# Patient Record
Sex: MALE | Race: WHITE | NOT HISPANIC OR LATINO | Employment: OTHER | URBAN - METROPOLITAN AREA
[De-identification: names, ages, dates, MRNs, and addresses within clinical notes are randomized per-mention and may not be internally consistent; named-entity substitution may affect disease eponyms.]

---

## 2017-01-03 ENCOUNTER — TELEPHONE (OUTPATIENT)
Dept: DERMATOLOGY | Facility: CLINIC | Age: 82
End: 2017-01-03

## 2017-01-03 RX ORDER — TESTOSTERONE CYPIONATE 200 MG/ML
INJECTION, SOLUTION INTRAMUSCULAR
Qty: 6 ML | Refills: 1 | Status: SHIPPED | OUTPATIENT
Start: 2017-01-03 | End: 2018-08-28 | Stop reason: SDUPTHER

## 2017-01-03 NOTE — TELEPHONE ENCOUNTER
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 I called him today and he stated that his arm has stopped bleeding and it is now in a sling with no pain and minimal swelling. Pt continues to take Keflex. Pt is coming in tomorrow for bandage change.

## 2017-01-04 ENCOUNTER — PATIENT MESSAGE (OUTPATIENT)
Dept: HEMATOLOGY/ONCOLOGY | Facility: CLINIC | Age: 82
End: 2017-01-04

## 2017-01-04 ENCOUNTER — OFFICE VISIT (OUTPATIENT)
Dept: DERMATOLOGY | Facility: CLINIC | Age: 82
End: 2017-01-04
Payer: MEDICARE

## 2017-01-04 ENCOUNTER — PROCEDURE VISIT (OUTPATIENT)
Dept: OPHTHALMOLOGY | Facility: CLINIC | Age: 82
End: 2017-01-04
Payer: MEDICARE

## 2017-01-04 VITALS — DIASTOLIC BLOOD PRESSURE: 64 MMHG | HEART RATE: 63 BPM | SYSTOLIC BLOOD PRESSURE: 144 MMHG

## 2017-01-04 DIAGNOSIS — Z09 POSTOP CHECK: Primary | ICD-10-CM

## 2017-01-04 DIAGNOSIS — H02.9 LESION OF UPPER EYELID: ICD-10-CM

## 2017-01-04 DIAGNOSIS — H00.19 CHALAZION, UNSPECIFIED LATERALITY: Primary | ICD-10-CM

## 2017-01-04 PROCEDURE — 99999 PR PBB SHADOW E&M-EST. PATIENT-LVL III: CPT | Mod: PBBFAC,,, | Performed by: DERMATOLOGY

## 2017-01-04 PROCEDURE — 92285 EXTERNAL OCULAR PHOTOGRAPHY: CPT | Mod: S$GLB,,, | Performed by: OPHTHALMOLOGY

## 2017-01-04 PROCEDURE — 88305 TISSUE EXAM BY PATHOLOGIST: CPT | Mod: 26,,, | Performed by: PATHOLOGY

## 2017-01-04 PROCEDURE — 67840 REMOVE EYELID LESION: CPT | Mod: E3,S$GLB,, | Performed by: OPHTHALMOLOGY

## 2017-01-04 PROCEDURE — 99024 POSTOP FOLLOW-UP VISIT: CPT | Mod: S$GLB,,, | Performed by: DERMATOLOGY

## 2017-01-04 PROCEDURE — 11640 EXC F/E/E/N/L MAL+MRG 0.5CM<: CPT | Mod: 51,E4,S$GLB, | Performed by: OPHTHALMOLOGY

## 2017-01-04 PROCEDURE — 92012 INTRM OPH EXAM EST PATIENT: CPT | Mod: 25,S$GLB,, | Performed by: OPHTHALMOLOGY

## 2017-01-04 RX ORDER — PREDNISONE 5 MG/1
TABLET ORAL
COMMUNITY
Start: 2017-01-02 | End: 2017-02-08

## 2017-01-04 NOTE — PROGRESS NOTES
82 y.o. male patient is here for wound check after surgery.    Patient reports that on last Friday afternoon 12/30, he noticed bleeding coming from bandage. He tried calling [facility] direct line but since it was Friday afternoon, no one answered.  He said he then called the main number and was connected to triage nurse who told him to go to ER.  Pt went to [facility] ER where no active bleeding was noted and as such an additional compression bandage was applied.  No issues since then. Taking the Keflex.  Throughout all this, I received a message from the triage nurse when we returned back from the New Year's holiday on 1/3/17 and [facility] called patient yesterday who said he was fine.     WOUND PE:  The L arm incision is healing well with sutures intact. No signs or symptoms of infection.    IMPRESSION:  Healing operative site from Mohs' surgery, [facility] L arm s/p Mohs with CLC, postop day #6.    PLAN:  Demonstrated wound care today to patient.  Continue wound care.   Keep moist and with Aquaphor and Telfa.   No tape to avoid tearing skin - only kerlex/coban    RTC:  In 1 week for suture removal

## 2017-01-04 NOTE — PROGRESS NOTES
HPI     DLS 12/13/16---Dr Monsivais    Pt is here for possible excisional of RLL cyst to r/o CA per Dr Monsivais---pt   noticed cyst for about a year with no changes to size or color     S/p Moh L arm w Dr Bliss (12/29/16)    S/p Shave Bx L arm Dr Monsivais 12/13/16      Gtts None        Last edited by Alina Moore on 1/4/2017 10:52 AM.         Assessment /Plan     For exam results, see Encounter Report.    Chalazion, unspecified laterality  -     Tissue Specimen To Pathology, Ophthalmology  -     Tissue Specimen To Pathology, Ophthalmology    Lesion of upper eyelid    Other orders  -     tobramycin-dexamethasone 0.3-0.1% (TOBRADEX) 0.3-0.1 % Oint; Place into the right eye every evening. Place on incision sites on nightly  Dispense: 3.5 g; Refill: 0        1) Chalazion, OD, RLL  -- pt with extensive blepharitis, was referred here due to concern for SCC however lesion more typical of chalazion  -- will remove and send for path today     2) Skin tag, OD, RUL  -- small, will remove and send for path today    Procedure Note     Attending: Jennie Lee  Resident: Kem Cuevas  Pre-op Dx: Chalazion; skin tag  Post-op Dx: same  Local: 2% lidocaine with epinephrine with 0.9% NaHCO3 and vitrase   Specimens: right upper eyelid lesion, right lower eyelid chalazion  Complications: None  Blood Loss: minimal     The patient was prepped and draped in the usual sterile manner for ophthalmic plastic surgery. A corneal shield was placed in the right palpebral fissure. 2% lidocaine with epinephrine with 0.9% NaHCO3 was injected around the lower lid lesion. A chalazion clamp was used to clamp the lesion with open face anteriorly. An #11 blade was then used to incise the skin overlying the lesion. The contents of the lesion were then removed with katheryn scissors and sent for pathology evaluation. High-temp cautery was used to obtain hemostasis. Next the RUL lesion was removed along with some orbicularis with katheryn scissors. High-temp cautery was  then used to obtain hemostasis.The corneal shield was removed. Tobradex ointment was applied to the wounds. The patient tolerated the procedure well. There were no complications.      RTC in 2 weeks or sooner prn    Post-operative instructions were given to the patient.     I have reviewed and concur with the resident's history, physical, assessment, and plan.  I have personally interviewed and examined the patient.

## 2017-01-05 RX ORDER — SODIUM CHLORIDE 0.9 % (FLUSH) 0.9 %
10 SYRINGE (ML) INJECTION
Status: CANCELLED | OUTPATIENT
Start: 2017-01-12

## 2017-01-05 RX ORDER — HEPARIN 100 UNIT/ML
500 SYRINGE INTRAVENOUS
Status: CANCELLED | OUTPATIENT
Start: 2017-01-12

## 2017-01-05 RX ORDER — ACETAMINOPHEN 325 MG/1
650 TABLET ORAL
Status: CANCELLED | OUTPATIENT
Start: 2017-01-12

## 2017-01-05 RX ORDER — FAMOTIDINE 10 MG/ML
20 INJECTION INTRAVENOUS
Status: CANCELLED | OUTPATIENT
Start: 2017-01-12

## 2017-01-11 ENCOUNTER — OFFICE VISIT (OUTPATIENT)
Dept: DERMATOLOGY | Facility: CLINIC | Age: 82
End: 2017-01-11
Payer: MEDICARE

## 2017-01-11 DIAGNOSIS — Z09 POSTOP CHECK: Primary | ICD-10-CM

## 2017-01-11 DIAGNOSIS — C44.111 BASAL CELL CARCINOMA, EYELID, RIGHT: ICD-10-CM

## 2017-01-11 PROCEDURE — 99999 PR PBB SHADOW E&M-EST. PATIENT-LVL III: CPT | Mod: PBBFAC,,, | Performed by: DERMATOLOGY

## 2017-01-11 PROCEDURE — 99024 POSTOP FOLLOW-UP VISIT: CPT | Mod: S$GLB,,, | Performed by: DERMATOLOGY

## 2017-01-11 RX ORDER — NEOMYCIN SULFATE, POLYMYXIN B SULFATE, AND DEXAMETHASONE 3.5; 10000; 1 MG/G; [USP'U]/G; MG/G
OINTMENT OPHTHALMIC
COMMUNITY
Start: 2017-01-04 | End: 2017-08-24

## 2017-01-11 NOTE — PROGRESS NOTES
82 y.o. male patient is here for suture removal following Mohs' surgery.    Patient reports no problems to the L arm. Now with a biopsy proven BCC R lower eyelid biopsied by Kem Cuevas MD.    Physical Exam   Eyes:         R medial lower eyelid at lid margin with a 4 x 4 mm bx site located 1.2 cm laterally from the right medial canthus and 2.5 cm medially from the right lateral canthus.  L arm sutures intact.  Good skin edges. No signs or symptoms of infection.  Small dehiscence centrally.    IMPRESSION/PLAN:  Biopsy proven infiltrative basal cell carcinoma, R lower eyelid, path # [id number].  Will schedule Mohs and coordinate subsequent reconstruction with Dr. Lee in Oculoplastics.    Healing operative site from Mohs' surgery, SCC L arm s/p Mohs with CLC, postop day #14.  Sutures removed today.   Continue wound care.  Keep moist with Aquaphor and covered with Telfa in central area of dehiscence.  Reassured pt it will heal but will take few weeks.

## 2017-01-12 ENCOUNTER — INFUSION (OUTPATIENT)
Dept: INFUSION THERAPY | Facility: HOSPITAL | Age: 82
End: 2017-01-12
Attending: INTERNAL MEDICINE
Payer: MEDICARE

## 2017-01-12 VITALS
DIASTOLIC BLOOD PRESSURE: 65 MMHG | HEART RATE: 60 BPM | RESPIRATION RATE: 18 BRPM | SYSTOLIC BLOOD PRESSURE: 142 MMHG | TEMPERATURE: 98 F

## 2017-01-12 DIAGNOSIS — D80.1 HYPOGAMMAGLOBULINEMIA: Primary | ICD-10-CM

## 2017-01-12 DIAGNOSIS — N18.2 CKD (CHRONIC KIDNEY DISEASE), STAGE 2 (MILD): ICD-10-CM

## 2017-01-12 DIAGNOSIS — J32.9 CHRONIC SINUSITIS, UNSPECIFIED LOCATION: ICD-10-CM

## 2017-01-12 DIAGNOSIS — E79.0 HYPERURICEMIA: ICD-10-CM

## 2017-01-12 DIAGNOSIS — C91.10 CLL (CHRONIC LYMPHOCYTIC LEUKEMIA): ICD-10-CM

## 2017-01-12 PROCEDURE — 63600175 PHARM REV CODE 636 W HCPCS: Performed by: INTERNAL MEDICINE

## 2017-01-12 PROCEDURE — 96365 THER/PROPH/DIAG IV INF INIT: CPT

## 2017-01-12 PROCEDURE — 96375 TX/PRO/DX INJ NEW DRUG ADDON: CPT

## 2017-01-12 PROCEDURE — 25000003 PHARM REV CODE 250: Performed by: INTERNAL MEDICINE

## 2017-01-12 PROCEDURE — 96366 THER/PROPH/DIAG IV INF ADDON: CPT

## 2017-01-12 PROCEDURE — 96367 TX/PROPH/DG ADDL SEQ IV INF: CPT

## 2017-01-12 RX ORDER — ACETAMINOPHEN 325 MG/1
650 TABLET ORAL
Status: COMPLETED | OUTPATIENT
Start: 2017-01-12 | End: 2017-01-12

## 2017-01-12 RX ORDER — FAMOTIDINE 10 MG/ML
20 INJECTION INTRAVENOUS
Status: COMPLETED | OUTPATIENT
Start: 2017-01-12 | End: 2017-01-12

## 2017-01-12 RX ORDER — SODIUM CHLORIDE 0.9 % (FLUSH) 0.9 %
10 SYRINGE (ML) INJECTION
Status: DISCONTINUED | OUTPATIENT
Start: 2017-01-12 | End: 2017-01-12 | Stop reason: HOSPADM

## 2017-01-12 RX ORDER — HEPARIN 100 UNIT/ML
500 SYRINGE INTRAVENOUS
Status: DISCONTINUED | OUTPATIENT
Start: 2017-01-12 | End: 2017-01-12 | Stop reason: HOSPADM

## 2017-01-12 RX ORDER — CLONIDINE HYDROCHLORIDE 0.1 MG/1
0.1 TABLET ORAL
Status: DISCONTINUED | OUTPATIENT
Start: 2017-01-12 | End: 2017-01-12 | Stop reason: HOSPADM

## 2017-01-12 RX ADMIN — DIPHENHYDRAMINE HYDROCHLORIDE 50 MG: 50 INJECTION, SOLUTION INTRAMUSCULAR; INTRAVENOUS at 10:01

## 2017-01-12 RX ADMIN — ACETAMINOPHEN 650 MG: 325 TABLET ORAL at 10:01

## 2017-01-12 RX ADMIN — FAMOTIDINE 20 MG: 10 INJECTION INTRAVENOUS at 10:01

## 2017-01-12 RX ADMIN — HUMAN IMMUNOGLOBULIN G 20 G: 20 LIQUID INTRAVENOUS at 10:01

## 2017-01-12 NOTE — PLAN OF CARE
Problem: Chemotherapy Effects (Adult)  Goal: Signs and Symptoms of Listed Potential Problems Will be Absent or Manageable (Chemotherapy Effects)  Signs and symptoms of listed potential problems will be absent or manageable by discharge/transition of care (reference Chemotherapy Effects (Adult) CPG).   Outcome: Ongoing (interventions implemented as appropriate)  Pt. Here today for monthly IVIG. Will see  On 1/25. In between surgeries to have cancer removed. Reports good energy. Overall denies new symptoms or worsening side effects of treatments. No questions at this time. Peripheral IV started in back of R. Forearm. Good Blood return, infusing without difficulty.

## 2017-01-12 NOTE — PLAN OF CARE
Problem: Patient Care Overview (Adult)  Goal: Plan of Care Review  Outcome: Ongoing (interventions implemented as appropriate)  Pt. Tolerated infusion without complication. Denies any negative side effects post infusion. IV flushed with NS and removed. No questions at this time. Will RTC 1/25/16.

## 2017-01-13 ENCOUNTER — TELEPHONE (OUTPATIENT)
Dept: DERMATOLOGY | Facility: CLINIC | Age: 82
End: 2017-01-13

## 2017-01-13 NOTE — TELEPHONE ENCOUNTER
Pt with bx proven BCC on R lower eyelid, pt is tentatively scheduled for mohs and repair with  on 3/2/17 at 800 am. Spoke with Betty and she said she would let us know if anything opens up sooner.

## 2017-01-16 ENCOUNTER — TELEPHONE (OUTPATIENT)
Dept: DERMATOLOGY | Facility: CLINIC | Age: 82
End: 2017-01-16

## 2017-01-24 ENCOUNTER — OFFICE VISIT (OUTPATIENT)
Dept: OPHTHALMOLOGY | Facility: CLINIC | Age: 82
End: 2017-01-24
Payer: MEDICARE

## 2017-01-24 DIAGNOSIS — C44.111 BASAL CELL CARCINOMA OF EYELID, RIGHT: Primary | ICD-10-CM

## 2017-01-24 PROCEDURE — 99999 PR PBB SHADOW E&M-EST. PATIENT-LVL III: CPT | Mod: PBBFAC,,, | Performed by: OPHTHALMOLOGY

## 2017-01-24 PROCEDURE — 92012 INTRM OPH EXAM EST PATIENT: CPT | Mod: S$GLB,,, | Performed by: OPHTHALMOLOGY

## 2017-01-24 NOTE — PROGRESS NOTES
Assessment /Plan     For exam results, see Encounter Report.    Basal cell carcinoma of eyelid, right      1) BCC of right lower lid  -- per pathology report from biopsy on 1/4/17  -- pt seen by Dr. Bliss, plan for Moh's surgery, will need reconstruction given location.  -- discussed r/b/a to repair with pt who wishes to proceed.    Informed consent obtained after extensive risks/benefits/alternatives were discussed with the patient including but not limited to pain, bleeding, infection, ocular injury, loss of the eye, asymmetry, need for revision in future, scarring, tearing.  Alternatives such as waiting were discussed.  All questions were answered.      Hold ASA, NSAIDS, and fish oil 5 to 7 days prior to procedure.    Return for surgery      I have reviewed and concur with the resident's history, physical, assessment, and plan.  I have personally interviewed and examined the patient.

## 2017-01-25 ENCOUNTER — OFFICE VISIT (OUTPATIENT)
Dept: HEMATOLOGY/ONCOLOGY | Facility: CLINIC | Age: 82
End: 2017-01-25
Payer: MEDICARE

## 2017-01-25 ENCOUNTER — INFUSION (OUTPATIENT)
Dept: INFUSION THERAPY | Facility: HOSPITAL | Age: 82
End: 2017-01-25
Attending: INTERNAL MEDICINE
Payer: MEDICARE

## 2017-01-25 VITALS — SYSTOLIC BLOOD PRESSURE: 146 MMHG | DIASTOLIC BLOOD PRESSURE: 65 MMHG | RESPIRATION RATE: 20 BRPM | HEART RATE: 65 BPM

## 2017-01-25 VITALS
HEIGHT: 67 IN | SYSTOLIC BLOOD PRESSURE: 122 MMHG | BODY MASS INDEX: 28.65 KG/M2 | WEIGHT: 182.56 LBS | DIASTOLIC BLOOD PRESSURE: 68 MMHG

## 2017-01-25 DIAGNOSIS — D80.1 HYPOGAMMAGLOBULINEMIA: ICD-10-CM

## 2017-01-25 DIAGNOSIS — D69.6 THROMBOCYTOPENIA: ICD-10-CM

## 2017-01-25 DIAGNOSIS — C91.10 CLL (CHRONIC LYMPHOCYTIC LEUKEMIA): ICD-10-CM

## 2017-01-25 DIAGNOSIS — C91.10 CLL (CHRONIC LYMPHOCYTIC LEUKEMIA): Primary | ICD-10-CM

## 2017-01-25 DIAGNOSIS — E79.0 HYPERURICEMIA: Primary | ICD-10-CM

## 2017-01-25 DIAGNOSIS — D84.9 IMMUNOSUPPRESSION: ICD-10-CM

## 2017-01-25 DIAGNOSIS — Z51.11 ENCOUNTER FOR ANTINEOPLASTIC CHEMOTHERAPY: ICD-10-CM

## 2017-01-25 PROCEDURE — 3074F SYST BP LT 130 MM HG: CPT | Mod: S$GLB,,, | Performed by: INTERNAL MEDICINE

## 2017-01-25 PROCEDURE — 25000003 PHARM REV CODE 250: Performed by: INTERNAL MEDICINE

## 2017-01-25 PROCEDURE — 96415 CHEMO IV INFUSION ADDL HR: CPT

## 2017-01-25 PROCEDURE — 96413 CHEMO IV INFUSION 1 HR: CPT

## 2017-01-25 PROCEDURE — 63600175 PHARM REV CODE 636 W HCPCS: Performed by: INTERNAL MEDICINE

## 2017-01-25 PROCEDURE — 3078F DIAST BP <80 MM HG: CPT | Mod: S$GLB,,, | Performed by: INTERNAL MEDICINE

## 2017-01-25 PROCEDURE — 1157F ADVNC CARE PLAN IN RCRD: CPT | Mod: S$GLB,,, | Performed by: INTERNAL MEDICINE

## 2017-01-25 PROCEDURE — 99999 PR PBB SHADOW E&M-EST. PATIENT-LVL III: CPT | Mod: PBBFAC,,, | Performed by: INTERNAL MEDICINE

## 2017-01-25 PROCEDURE — 96375 TX/PRO/DX INJ NEW DRUG ADDON: CPT

## 2017-01-25 PROCEDURE — 1160F RVW MEDS BY RX/DR IN RCRD: CPT | Mod: S$GLB,,, | Performed by: INTERNAL MEDICINE

## 2017-01-25 PROCEDURE — 99214 OFFICE O/P EST MOD 30 MIN: CPT | Mod: S$GLB,,, | Performed by: INTERNAL MEDICINE

## 2017-01-25 PROCEDURE — 99499 UNLISTED E&M SERVICE: CPT | Mod: S$GLB,,, | Performed by: INTERNAL MEDICINE

## 2017-01-25 PROCEDURE — 96367 TX/PROPH/DG ADDL SEQ IV INF: CPT

## 2017-01-25 PROCEDURE — 1126F AMNT PAIN NOTED NONE PRSNT: CPT | Mod: S$GLB,,, | Performed by: INTERNAL MEDICINE

## 2017-01-25 PROCEDURE — 1159F MED LIST DOCD IN RCRD: CPT | Mod: S$GLB,,, | Performed by: INTERNAL MEDICINE

## 2017-01-25 RX ORDER — ACETAMINOPHEN 325 MG/1
1000 TABLET ORAL
Status: CANCELLED | OUTPATIENT
Start: 2017-01-25

## 2017-01-25 RX ORDER — FAMOTIDINE 10 MG/ML
20 INJECTION INTRAVENOUS
Status: CANCELLED | OUTPATIENT
Start: 2017-01-25

## 2017-01-25 RX ORDER — HEPARIN 100 UNIT/ML
500 SYRINGE INTRAVENOUS
Status: CANCELLED | OUTPATIENT
Start: 2017-01-25

## 2017-01-25 RX ORDER — SODIUM CHLORIDE 0.9 % (FLUSH) 0.9 %
10 SYRINGE (ML) INJECTION
Status: CANCELLED | OUTPATIENT
Start: 2017-01-25

## 2017-01-25 RX ORDER — ACETAMINOPHEN 500 MG
1000 TABLET ORAL
Status: COMPLETED | OUTPATIENT
Start: 2017-01-25 | End: 2017-01-25

## 2017-01-25 RX ORDER — FAMOTIDINE 10 MG/ML
20 INJECTION INTRAVENOUS
Status: COMPLETED | OUTPATIENT
Start: 2017-01-25 | End: 2017-01-25

## 2017-01-25 RX ORDER — SODIUM CHLORIDE 0.9 % (FLUSH) 0.9 %
10 SYRINGE (ML) INJECTION
Status: DISCONTINUED | OUTPATIENT
Start: 2017-01-25 | End: 2017-01-25 | Stop reason: HOSPADM

## 2017-01-25 RX ORDER — HEPARIN 100 UNIT/ML
500 SYRINGE INTRAVENOUS
Status: DISCONTINUED | OUTPATIENT
Start: 2017-01-25 | End: 2017-01-25 | Stop reason: HOSPADM

## 2017-01-25 RX ORDER — GUAIFENESIN 600 MG/1
1200 TABLET, EXTENDED RELEASE ORAL 2 TIMES DAILY
COMMUNITY
End: 2017-08-24

## 2017-01-25 RX ADMIN — ACETAMINOPHEN 1000 MG: 500 TABLET ORAL at 09:01

## 2017-01-25 RX ADMIN — SODIUM CHLORIDE: 0.9 INJECTION, SOLUTION INTRAVENOUS at 09:01

## 2017-01-25 RX ADMIN — FAMOTIDINE 20 MG: 10 INJECTION INTRAVENOUS at 09:01

## 2017-01-25 RX ADMIN — DEXAMETHASONE SODIUM PHOSPHATE 10 MG: 4 INJECTION, SOLUTION INTRAMUSCULAR; INTRAVENOUS at 10:01

## 2017-01-25 RX ADMIN — SODIUM CHLORIDE 2000 MG: 0.9 INJECTION, SOLUTION INTRAVENOUS at 10:01

## 2017-01-25 RX ADMIN — DIPHENHYDRAMINE HYDROCHLORIDE 50 MG: 50 INJECTION, SOLUTION INTRAMUSCULAR; INTRAVENOUS at 09:01

## 2017-01-25 NOTE — PLAN OF CARE
Problem: Patient Care Overview (Adult)  Goal: Plan of Care Review  Outcome: Ongoing (interventions implemented as appropriate)  Patient tolerated Ofatumumab treatment with vital signs stable. Patient instructed to call MD with any problems . AVS given and Patient discharged home.

## 2017-01-25 NOTE — MR AVS SNAPSHOT
[facility]  [address]  [place] [address]  Phone: [phone]                  Thierry Ramso JrEder   2017 9:00 AM   Appointment    Description:  Male : 2/10/1934   Provider:  Joaquin Wooten Jr., MD   Department:  [facility]                To Do List           Future Appointments        Provider Department Dept Phone    2017 9:00 AM Joaquin Wooten Jr., MD [place] - Hematology Oncology [phone]    2017 10:30 AM [facility], CHEMO [facility] [phone]    2017 9:00 AM Karissa Monsivais MD [place] - Dermatology [phone]    3/2/2017 8:00 AM Ronel Bliss MD [facility] - Dermatology Surgery [phone]    3/2/2017 1:00 PM Jennie Lee MD [facility] - Ophthalmology [phone]      Goals (5 Years of Data)              10/26/16    9/28/15    4/2/15    HDL > 45   35  36  40    LDL CALC < 70   70.4  92.6  83.8      [facility]     [facility] Nurse Care Line -  Assistance  Registered nurses in the [facility] provide clinical advisement, health education, appointment booking, and other advisory services.  Call for this free service at [phone].             Medications           Message regarding Medications     Verify the changes and/or additions to your medication regime listed below are the same as discussed with your clinician today.  If any of these changes or additions are incorrect, please notify your healthcare provider.             Verify that the below list of medications is an accurate representation of the medications you are currently taking.  If none reported, the list may be blank. If incorrect, please contact your healthcare provider. Carry this list with you in case of emergency.           Current Medications     ACCU-CHEK FASTCLIX Misc TEST  BLOOD  GLUCOSE FOUR TIMES DAILY    atorvastatin (LIPITOR) 20 MG tablet TAKE 1 TABLET ONE TIME DAILY    BD ALCOHOL SWABS PadM APPLY 1  "PAD AS NEEDED.    BD INSULIN PEN NEEDLE UF SHORT 31 gauge x 5/16" Ndle USE WITH LEVEMIR PEN  TO INJECT  10  UNITS SUBCUTANEOUSLY TWICE DAILY    clopidogrel (PLAVIX) 75 mg tablet TAKE 1 TABLET ONE TIME DAILY    cyanocobalamin (VITAMIN B-12) 500 MCG tablet Take 1 tablet by mouth Daily.    epinephrine (EPIPEN 2-LEVI) 0.3 mg/0.3 mL (1:1,000) AtIn Inject 0.3 mLs (0.3 mg total) into the muscle once.    fluticasone (FLONASE) 50 mcg/actuation nasal spray 1 spray by Nasal route as needed.     insulin aspart (NOVOLOG) 100 unit/mL InPn pen Nov 4 units with meals and correction scale 1:25 goal 150 ave daily dose 26U/d plus needles    insulin detemir (LEVEMIR FLEXTOUCH) 100 unit/mL (3 mL) SubQ InPn pen Inject 18 Units into the skin once daily.    LACTOBACILLUS ACIDOPHILUS (ACIDOPHILUS ORAL) Take 1 capsule by mouth once daily.    levothyroxine (SYNTHROID) 125 MCG tablet TAKE 1 TABLET ONE TIME DAILY    losartan (COZAAR) 25 MG tablet Take 1 tablet (25 mg total) by mouth once daily.    multivitamin with minerals tablet Take 1 tablet by mouth once daily.    neomycin-polymyxin-dexamethasone (DEXACINE) 3.5 mg/g-10,000 unit/g-0.1 % Oint     oxycodone-acetaminophen (PERCOCET) 5-325 mg per tablet Take 1 tablet by mouth every 4 to 6 hours as needed for Pain.    pantoprazole (PROTONIX) 40 MG tablet TAKE 1 TABLET ONE TIME DAILY    predniSONE (DELTASONE) 5 MG tablet     tazarotene (AVAGE) 0.1 % cream Apply topically every evening.    testosterone cypionate (DEPOTESTOTERONE CYPIONATE) 200 mg/mL injection INJECT  1ML INTRAMUSCULARLY  EVERY  14  DAYS           Clinical Reference Information           Allergies as of 1/25/2017     Ace Inhibitors    Divalproex    Peanut      Immunizations Administered on Date of Encounter - 1/25/2017     None      "

## 2017-01-25 NOTE — PROGRESS NOTES
HISTORY OF PRESENT ILLNESS:  Mr. 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  In April 2013, there was an abrupt rise in his white blood cell count to   93,000 and some prolymphocytes were seen in the peripheral smear.  He developed   generalized urticaria which I suspected was paraneoplastic.  He was then treated   with five courses of fludarabine, cyclophosphamide and Rituxan.  The   fludarabine dose was decreased somewhat because of mild renal impairment and the   sixth treatment was with Rituxan alone because he developed thrombocytopenia,   which has persisted since then.    His thrombocytopenia became more severe in early 2016.  Although his platelet   counts had been around 100,000 in 2015, in 2016 the platelets declined to   36,000-40,000.  At that time, he was taking both aspirin and Plavix.  He   temporarily was taken off aspirin when he had a venous thrombosis in the right   popliteal vein and he took warfarin for several months.  That has now been   discontinued and he is again taking aspirin and Plavix.    A bone marrow examination in April 2016 showed no signs of myelodysplasia.    Small lymphocytes comprised 30% to 40% of the bone marrow cells.  The FISH   analyses for myelodysplastic disorders were negative.  The cytogenetic study   revealed trisomy 12.    I treated him with prednisone 60 mg daily for two weeks, but there was no   improvement in the platelet count.  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    Because of continued worsening thrombocytopenia, I advised treatment with   ofatumumab, which he began on 06/30/2016.  He completed eight weekly treatments   of the drug and then he completed 4 monthly treatments of the drug.    Fortunately, there has been improvement in his platelet count.  Since   08/24/2016, all of his  platelet counts have been above 100,000 except for one   count of 87,000 on 12/12/2016.    Since I last saw him, he fell onto his face and had considerable bleeding that   required suturing of lacerations of his nose.  There was a nasal fracture also.    He had a TIA in the past and he had two strokes following the pituitary   surgery in 2002.    He has had many nonmelanoma skin cancers excised from various sites and he now   is about to have another skin cancer removed from his lower right eyelid.  He is   using Efudex on his face.  He also has some dental problems and is considering   having dental implants.    He has had no infections since I last saw him.  He has been receiving   intravenous immunoglobulin because of numerous episodes of sinusitis and   respiratory infections in the past.  The frequency of those has diminished   substantially since he began the IVIG infusions.    He is not having fevers or sweats.  His energy level is good.    ADDITIONAL PAST HISTORY, SYSTEM REVIEW, SOCIAL HISTORY AND FAMILY HISTORY:  Have   been reviewed and updated in the electronic record.    PHYSICAL EXAMINATION:  GENERAL APPEARANCE:  Well-developed, well-nourished man in no distress.  EYES:  No jaundice or pallor.  There is an area of increased pigment on the mid   portion of the right lower lid.  EARS:  Clear canals and membranes.  NOSE:  Clear naris.  SINUSES:  No tenderness.  MOUTH AND THROAT:  Several missing teeth.  One broken right maxillary premolar.    No bleeding.  No mucosal lesions.  NECK:  No masses or bruits.  No thyroid abnormalities.  LYMPH NODES:  No enlarged cervical, axillary or inguinal nodes.  CHEST AND LUNGS:  Normal respiratory effort.  Clear to auscultation and   percussion.  HEART:  Regular rate and rhythm without murmur or gallop.  ABDOMEN:  Soft without masses or tenderness.  No hepatosplenomegaly.  EXTREMITIES:  Faint trace of pretibial edema bilaterally.  PERIPHERAL VASCULATURE:  Diminished  pulses in the feet and ankles.  Good   popliteal pulses.  SKIN:  There are many keratotic areas on his face in regions that are being   treated with topical 5-FU.  There are some ecchymoses on his forearms.  There   are numerous scars from prior skin cancer surgery.  NEUROLOGIC:  Motor function is good.  He is fully oriented.  Mental status is   good.    LABORATORY STUDIES:  Blood counts today include hemoglobin 15.5, WBC 5600 with a   normal differential and platelets 114,000.  Comprehensive metabolic profile is   normal with the exception of glucose 134.    IMPRESSION:  1.  Chronic lymphocytic leukemia.  2.  Thrombocytopenia, which appears to be a consequence of CLL and has improved   with ofatumumab treatment.  3.  Panhypopituitarism.  4.  Chronic immunosuppression for which he receives monthly immunoglobulin   infusions.  5.  Multiple skin cancers, both in the past and the present.    RECOMMENDATIONS:  1.  He will receive ofatumumab today.  2.  Return visit in two months (EPA appointment) with CBC, CMP, IgG level and   appointment for ofatumumab.  3.  Continue with monthly immunoglobulin infusions.    Mr. Ramos had a number of questions about his hematologic disorders and some   other medical issues and most of his 35-minute visit today was devoted to   counseling him about those matters.      AWB/HN  dd: 01/25/2017 09:25:33 (CST)  td: 01/26/2017 00:12:39 (CST)  Doc ID   #[id number]  Job ID #[id number]    CC:

## 2017-01-25 NOTE — MR AVS SNAPSHOT
"Patient Information     Patient Name Sex Thierry Villanueva Jr. Male 2/10/1934      Visit Information        Provider Department Dept Phone Center    2017 10:30 AM [facility], CHE[facility] Chemotherapy Infusion [phone] Rui Lopez      Patient Instructions     None      Your Current Medications Are     ACCU-CHEK FASTCLIX Misc    atorvastatin (LIPITOR) 20 MG tablet    BD ALCOHOL SWABS PadM    BD INSULIN PEN NEEDLE UF SHORT 31 gauge x 5/16" Ndle    clopidogrel (PLAVIX) 75 mg tablet    cyanocobalamin (VITAMIN B-12) 500 MCG tablet    epinephrine (EPIPEN 2-LEVI) 0.3 mg/0.3 mL (1:1,000) AtIn    fluticasone (FLONASE) 50 mcg/actuation nasal spray    guaifenesin (MUCINEX) 600 mg 12 hr tablet    insulin aspart (NOVOLOG) 100 unit/mL InPn pen    insulin detemir (LEVEMIR FLEXTOUCH) 100 unit/mL (3 mL) SubQ InPn pen    LACTOBACILLUS ACIDOPHILUS (ACIDOPHILUS ORAL)    levothyroxine (SYNTHROID) 125 MCG tablet    losartan (COZAAR) 25 MG tablet    multivitamin with minerals tablet    neomycin-polymyxin-dexamethasone (DEXACINE) 3.5 mg/g-10,000 unit/g-0.1 % Oint    oxycodone-acetaminophen (PERCOCET) 5-325 mg per tablet    pantoprazole (PROTONIX) 40 MG tablet    predniSONE (DELTASONE) 5 MG tablet    tazarotene (AVAGE) 0.1 % cream    testosterone cypionate (DEPOTESTOTERONE CYPIONATE) 200 mg/mL injection      Facility-Administered Medications     acetaminophen tablet 1,000 mg    alteplase injection 2 mg    dexamethasone IVPB 10 mg    diphenhydramine IVPB 50 mg    famotidine (PF) 20 mg/2 mL injection 20 mg    heparin, porcine (PF) 100 unit/mL injection flush 500 Units    ofatumumab (Arzerra) 2000 mg infusion in 1L NS    sodium chloride 0.9% 250 mL flush bag    sodium chloride 0.9% flush 10 mL      Appointments for Next Year     2017  9:00 AM ESTABLISHED PATIENT (10 min.) [place] - Dermatology Karissa Monsivais MD    Arrive at check-in approximately 15 minutes before your scheduled appointment time. Bring all outside medical " "records and imaging, along with a list of your current medications and insurance card.    5th Floor    3/2/2017  8:00 AM MOHS MICRO GRAPHIC SURG1 (10 min.) [facility] - Dermatology Surgery Ronel Bliss MD    Arrive at check-in approximately 15 minutes before your scheduled appointment time. Bring all outside medical records and imaging, along with a list of your current medications and insurance card.    [facility] 1st Floor    3/2/2017  1:00 PM PROCEDURE (15 min.) [facility] - Ophthalmology Jennie Lee MD    Arrive at check-in approximately 15 minutes before your scheduled appointment time. Bring all outside medical records and imaging, along with a list of your current medications and insurance card.    10th Floor  Dr. Kapoor patients please go to the 1st Floor [facility] for your appointment.          Default Flowsheet Data (last 24 hours)      Amb Complex Vitals Doc        01/25/17 1450 01/25/17 1310 01/25/17 1240 01/25/17 1210 01/25/17 1140    Measurements    BP (!)  146/65   Ofatumumab infusion completed (!)  157/68   Ofatumumab maintained at 400 mL/hr (!)  155/70   Ofatumumab increased to 400 mL/hr (!)  146/65   Ofatumumab increased to 200 mL/hr (!)  166/74   Ofatumumab increased to 100 mL/hr    Pulse 65 69 63 (!)  57 61       01/25/17 1110 01/25/17 1040 01/25/17 0945 01/25/17 0849 01/24/17 1544    Measurements    Weight    82.8 kg (182 lb 8.7 oz)     Height    5' 7" (1.702 m)     BSA (Calculated - sq m)    1.98 sq meters     BMI (Calculated)    28.6     /65   Ofatumumab increased to 50 mL/hr --   Ofatumumab started at 25 mL/hr 129/61 122/68     Pulse (!)  57  61      Resp   20      Pain Assessment    Pain Score   Zero Zero Zero    Pain Loc     EYE            Allergies     Ace Inhibitors Swelling    angioedema    Divalproex     Rash under arms, body creases    Peanut Swelling      Medications You Received from 01/24/2017 1456 to 01/25/2017 1456        Date/Time Order Dose Route Action    "  01/25/2017 0957 acetaminophen tablet 1,000 mg 1,000 mg Oral Given     01/25/2017 1015 dexamethasone IVPB 10 mg 10 mg Intravenous New Bag     01/25/2017 0957 diphenhydramine IVPB 50 mg 50 mg Intravenous New Bag     01/25/2017 0957 famotidine (PF) 20 mg/2 mL injection 20 mg 20 mg Intravenous Given     01/25/2017 1037 ofatumumab (Arzerra) 2000 mg infusion in 1L NS 2,000 mg Intravenous New Bag     01/25/2017 0955 sodium chloride 0.9% 250 mL flush bag   Intravenous New Bag      Current Discharge Medication List     Cannot display discharge medications since this is not an admission.

## 2017-01-26 ENCOUNTER — PATIENT MESSAGE (OUTPATIENT)
Dept: HEMATOLOGY/ONCOLOGY | Facility: CLINIC | Age: 82
End: 2017-01-26

## 2017-01-28 ENCOUNTER — PATIENT MESSAGE (OUTPATIENT)
Dept: HEMATOLOGY/ONCOLOGY | Facility: CLINIC | Age: 82
End: 2017-01-28

## 2017-02-08 ENCOUNTER — PROCEDURE VISIT (OUTPATIENT)
Dept: DERMATOLOGY | Facility: CLINIC | Age: 82
End: 2017-02-08
Payer: MEDICARE

## 2017-02-08 ENCOUNTER — PROCEDURE VISIT (OUTPATIENT)
Dept: OPHTHALMOLOGY | Facility: CLINIC | Age: 82
End: 2017-02-08
Payer: MEDICARE

## 2017-02-08 VITALS — DIASTOLIC BLOOD PRESSURE: 74 MMHG | HEART RATE: 73 BPM | SYSTOLIC BLOOD PRESSURE: 145 MMHG

## 2017-02-08 VITALS — HEART RATE: 75 BPM | DIASTOLIC BLOOD PRESSURE: 79 MMHG | SYSTOLIC BLOOD PRESSURE: 140 MMHG

## 2017-02-08 DIAGNOSIS — C44.111 BASAL CELL CARCINOMA OF EYELID, RIGHT: Primary | ICD-10-CM

## 2017-02-08 DIAGNOSIS — C44.1122 BASAL CELL CARCINOMA OF RIGHT LOWER EYELID: Primary | ICD-10-CM

## 2017-02-08 PROCEDURE — 17311 MOHS 1 STAGE H/N/HF/G: CPT | Mod: RT,S$GLB,, | Performed by: DERMATOLOGY

## 2017-02-08 PROCEDURE — 99499 UNLISTED E&M SERVICE: CPT | Mod: S$GLB,,, | Performed by: OPHTHALMOLOGY

## 2017-02-08 PROCEDURE — 14060 TIS TRNFR E/N/E/L 10 SQ CM/<: CPT | Mod: RT,S$GLB,, | Performed by: OPHTHALMOLOGY

## 2017-02-08 PROCEDURE — 68815 PROBE NASOLACRIMAL DUCT: CPT | Mod: 51,RT,S$GLB, | Performed by: OPHTHALMOLOGY

## 2017-02-08 PROCEDURE — 99499 UNLISTED E&M SERVICE: CPT | Mod: S$GLB,,, | Performed by: DERMATOLOGY

## 2017-02-08 PROCEDURE — 17312 MOHS ADDL STAGE: CPT | Mod: S$GLB,,, | Performed by: DERMATOLOGY

## 2017-02-08 RX ORDER — TOBRAMYCIN AND DEXAMETHASONE 3; 1 MG/ML; MG/ML
1 SUSPENSION/ DROPS OPHTHALMIC
Qty: 1 BOTTLE | Refills: 2 | Status: SHIPPED | OUTPATIENT
Start: 2017-02-08 | End: 2017-02-18

## 2017-02-08 RX ORDER — PREDNISONE 2.5 MG/1
2.5 TABLET ORAL DAILY
COMMUNITY
End: 2017-03-06 | Stop reason: SDUPTHER

## 2017-02-08 RX ORDER — CEPHALEXIN 500 MG/1
500 CAPSULE ORAL 4 TIMES DAILY
Qty: 40 CAPSULE | Refills: 0 | Status: SHIPPED | OUTPATIENT
Start: 2017-02-08 | End: 2017-02-18

## 2017-02-08 NOTE — PROGRESS NOTES
PROCEDURE: Mohs' Micrographic Surgery    INDICATION: Location in mask areas of face including central face, nose, eyelids, eyebrows, lips, chin, preauricular, temple, and ear. Biopsy-proven skin cancer of cosmetically and functionally important areas, including head, neck, genital, hand, foot, or areas known for having difficulty in healing, such as the lower anterior legs. Tumor with ill-defined borders. Tumor with aggressive histopathology. Aggressive histopathology including sclerosing, morpheaform/infiltrating, micronodular, superficial multicentric, poorly differentiated, basosquamous, or perineural invasion.    REFERRING MD: Kem Cuevas MD    CASE NUMBER:     ANESTHETIC: 1 cc 1% Lidocaine, plain, 2cc 0.5% Lidocaine with Epi 1:200,000 mixed 1:1 with 0.5% Bupivacaine, and 3 gtts 0.5% Tetracaine Hydrochloride Ophthalmic Solution    SURGICAL PREP: Ophthalmic Betadine    SURGEON: Ronel Bliss MD    ASSISTANTS: Terri Subramanian PA-C, Reba Rosario, Surg Tech and Kristy Eric, Surg Tech    PREOPERATIVE DIAGNOSIS: basal cell carcinoma    POSTOPERATIVE DIAGNOSIS: basal cell carcinoma    PATHOLOGIC DIAGNOSIS: basal cell carcinoma- infiltrating    HISTOLOGY OF SPECIMENS IN FIRST STAGE:   Tumor Type: Tumor seen. Infiltrative basal cell carcinoma: Basaloid tumor in dermis characterized by thin elongated strands of basaloid cells infiltrating between dermal collagen bundles.   Depth of Invasion: epidermis, dermis and subcutaneous tissue  Perineural Invasion: No    HISTOLOGY OF SPECIMENS IN SUBSEQUENT STAGES:  · Tumor Type: No tumor seen.    STAGES OF MOHS' SURGERY PERFORMED: 2    TUMOR-FREE PLANE ACHIEVED: Yes    HEMOSTASIS: electrocoagulation     SPECIMENS: 4 (3 in stage A and 1 in stage B)    LOCATION: right lower eyelid    INITIAL LESION SIZE: 0.3 x 0.5 cm    FINAL DEFECT SIZE: 0.5 x 1.6 cm    WOUND REPAIR/DISPOSITION: The patient tolerated Mohs' Micrographic Surgery for a basal cell carcinoma very well. When the  tumor was completely removed, the patient was referred to Dr. Lee in Oculoplastics for repair of the surgical defect.    Vitals:    02/08/17 0750 02/08/17 1019   BP: 133/69 (!) 140/79   BP Location: Left arm    Patient Position: Sitting    BP Method: Automatic    Pulse: 81 75

## 2017-02-08 NOTE — PROGRESS NOTES
HPI     Eye Problem    Additional comments: here for mohs repair Dr Bliss           Comments   Pain scale 4   Takes 7.5 mg of prednisone daily. Took 12.5 mg today       Last edited by Betty Hamilton on 2/8/2017  1:04 PM. (History)        ROS     Positive for: Eyes    Negative for: Constitutional, Gastrointestinal, Neurological, Skin,   Genitourinary, Musculoskeletal, HENT, Endocrine, Cardiovascular,   Respiratory, Psychiatric, Allergic/Imm, Heme/Lymph    Last edited by Rakesh Wright MD on 2/8/2017  1:37 PM. (History)        Assessment /Plan     For exam results, see Encounter Report.    Basal cell carcinoma of eyelid, right    Other orders  -     tobramycin-dexamethasone 0.3-0.1% (TOBRADEX) 0.3-0.1 % Oint; Place into both eyes 3 (three) times daily. Place a generous amount over the wounds as instructed.  Dispense: 3.5 g; Refill: 0  -     tobramycin-dexamethasone 0.3-0.1% (TOBRADEX) 0.3-0.1 % DrpS; Place 1 drop into the right eye every 4 (four) hours while awake.  Dispense: 1 Bottle; Refill: 2  -     cephALEXin (KEFLEX) 500 MG capsule; Take 1 capsule (500 mg total) by mouth 4 (four) times daily.  Dispense: 40 capsule; Refill: 0      1350 Pt denies allergy to betadine , iodine , or seafood. Grounding pad attached to left lower back region. Skin dry, intact and free of redness. Pt placed in supine position. One drop of proparacaine instilled into right eye and entire facial region prepped with betadine swabs X3 as per Dr Lee's instructions.     1640 Procedure complete. Grounding pad removed from lower back region. Pt skin dry, intact and free of redness. Post procedure instructions verbalized/ written instruction sheet given by Dr Lee to patient.

## 2017-02-08 NOTE — PROGRESS NOTES
Assessment /Plan     For exam results, see Encounter Report.    Basal cell carcinoma of eyelid, right    Other orders  -     tobramycin-dexamethasone 0.3-0.1% (TOBRADEX) 0.3-0.1 % Oint; Place into both eyes 3 (three) times daily. Place a generous amount over the wounds as instructed.  Dispense: 3.5 g; Refill: 0  -     tobramycin-dexamethasone 0.3-0.1% (TOBRADEX) 0.3-0.1 % DrpS; Place 1 drop into the right eye every 4 (four) hours while awake.  Dispense: 1 Bottle; Refill: 2  -     cephALEXin (KEFLEX) 500 MG capsule; Take 1 capsule (500 mg total) by mouth 4 (four) times daily.  Dispense: 40 capsule; Refill: 0        1) BCC of right lower lid  -- per pathology report from biopsy on 1/4/17  -- s/p Mohs surgery by Dr. Bliss, plan for Moh's surgery  -- discussed r/b/a.  Informed consent obtained.    Attending: Jennie Lee MD  Resident: Rakesh Wright MD  Pre-op Dx: right lower eyelid defect    Post-op Dx: same   Procedure: Repair of right lower eyelid defect with advancement flap and placement of mini [place] stent   1. 41657  2. 77211    Anesthesia: Local   Specimens: None   Complications: None   EBL: less than 5 cc       Indication for surgery: This is a 83 y.o. male who is s/p MOHS excision of right lower eyelid basal cell carcinoma. Pt. is here for repair. The patient understands the risk of complications including but not limited to, bleeding, infection, ocular injury, scarring, over-correction, under-correction and asymmetry, and the need for possible revision surgery.       The wound was measured to be 5 mm X 16 mm. A  Tenzel rotational flap was drawn adjacent to the right eye. 5 cc of 2% lidocaine with epinephrine, 0.5 % Marcaine, and Vitrase were injected into the right periorbital region. A corneal shield was placed in the right palpebral fissure. A 15 blade was used to incise the Tenzel flap to the depth of skin and subcutaneous tissue.  Monopolar cautery was used to elevate the flap in the pre-periosteal  plane including a cantholysis of the inferior alberto of the lateral canthal tendon.  The horizontal extent of the Tenzel flap was the lateral aspect of the zygoma. The flap was measured to be 5 mm X 20 mm.      Next the distal end of the lacerated lower canalicular system was found and subsequently dilated with a punctal dilator and 1/2 [place] probe.  A mini-[place] stent was placed in the punctal remnant.     To prevent lid retraction in the future, the Tenzel flap was then anchored to the zygoma in a superior medial vector with two buried 4-0 vicryl on P-2 sutures.      The rotational flap was then medialized to the medial lid remnant and sewn together with interrupted 6-0 vicryl for the deep layers and the orbicularis.  Vertical mattress 6-0 nurolon were placed to reappose the gray line and the lash line and left long.  The peripunctal tissues were realigned with vertical mattress 7-0 vicryl. Interrupted 6-0 vicryl on S-14 was also used to realign the skin followed by a running 6-0 nurolon suture. One anchoring 6-0 nurolon suture was placed to hold the long ends of the sutures. The lateral extent of the Tenzel flap was reapposed to the skin with a running 6-0 nurolon with interrupted 6-0 vicryl on S-14    The corneal shield was removed. Tobradex deepak was applied to the right eye and the wounds.     Hemostasis was obtained. The patient tolerated the procedure well. There were no complications.    Post-operative instructions were given to the patient.

## 2017-02-13 ENCOUNTER — OFFICE VISIT (OUTPATIENT)
Dept: DERMATOLOGY | Facility: CLINIC | Age: 82
End: 2017-02-13
Payer: MEDICARE

## 2017-02-13 VITALS — WEIGHT: 182 LBS | BODY MASS INDEX: 28.51 KG/M2

## 2017-02-13 DIAGNOSIS — C44.92 SCC (SQUAMOUS CELL CARCINOMA): ICD-10-CM

## 2017-02-13 DIAGNOSIS — C44.91 BCC (BASAL CELL CARCINOMA OF SKIN): ICD-10-CM

## 2017-02-13 DIAGNOSIS — L82.1 SEBORRHEIC KERATOSES: ICD-10-CM

## 2017-02-13 DIAGNOSIS — D48.5 NEOPLASM OF UNCERTAIN BEHAVIOR OF SKIN: Primary | ICD-10-CM

## 2017-02-13 DIAGNOSIS — Z85.828 HISTORY OF SKIN CANCER: ICD-10-CM

## 2017-02-13 PROCEDURE — 11101 PR BIOPSY, EACH ADDED LESION: CPT | Mod: S$GLB,,, | Performed by: DERMATOLOGY

## 2017-02-13 PROCEDURE — 99213 OFFICE O/P EST LOW 20 MIN: CPT | Mod: 25,S$GLB,, | Performed by: DERMATOLOGY

## 2017-02-13 PROCEDURE — 11100 PR BIOPSY OF SKIN LESION: CPT | Mod: 59,S$GLB,, | Performed by: DERMATOLOGY

## 2017-02-13 PROCEDURE — 1159F MED LIST DOCD IN RCRD: CPT | Mod: S$GLB,,, | Performed by: DERMATOLOGY

## 2017-02-13 PROCEDURE — 1160F RVW MEDS BY RX/DR IN RCRD: CPT | Mod: S$GLB,,, | Performed by: DERMATOLOGY

## 2017-02-13 PROCEDURE — 1157F ADVNC CARE PLAN IN RCRD: CPT | Mod: S$GLB,,, | Performed by: DERMATOLOGY

## 2017-02-13 PROCEDURE — 88342 IMHCHEM/IMCYTCHM 1ST ANTB: CPT | Mod: 26,,, | Performed by: PATHOLOGY

## 2017-02-13 PROCEDURE — 99999 PR PBB SHADOW E&M-EST. PATIENT-LVL III: CPT | Mod: PBBFAC,,, | Performed by: DERMATOLOGY

## 2017-02-13 PROCEDURE — 88305 TISSUE EXAM BY PATHOLOGIST: CPT | Performed by: PATHOLOGY

## 2017-02-13 RX ORDER — NEOMYCIN SULFATE, POLYMYXIN B SULFATE AND DEXAMETHASONE 3.5; 10000; 1 MG/ML; [USP'U]/ML; MG/ML
SUSPENSION/ DROPS OPHTHALMIC
COMMUNITY
Start: 2017-02-09 | End: 2017-08-24

## 2017-02-13 NOTE — PROGRESS NOTES
Subjective:       Patient ID:  Thierry Ramos Jr. is a 83 y.o. male who presents for   Chief Complaint   Patient presents with    Follow-up     for BCC    Skin Check     face     HPI Comments: bcc on right eyelid and scc on left arm removed recently, now with lesion on left nose which has bled some and a spot on his right hand not painful no tx.       Review of Systems   Constitutional: Negative for fever.   Skin: Negative for itching and rash.   Hematologic/Lymphatic: Bruises/bleeds easily.        Objective:    Physical Exam   Constitutional: He appears well-developed and well-nourished. No distress.   Neurological: He is alert and oriented to person, place, and time. He is not disoriented.   Psychiatric: He has a normal mood and affect.   Skin:   Areas Examined (abnormalities noted in diagram):   Scalp / Hair Palpated and Inspected  Head / Face Inspection Performed  Neck Inspection Performed  Chest / Axilla Inspection Performed  Abdomen Inspection Performed  Back Inspection Performed  RUE Inspected  LUE Inspection Performed  Nails and Digits Inspection Performed                       Diagram Legend      See annotation      Assessment / Plan:      Pathology Orders:      Normal Orders This Visit    Tissue Specimen To Pathology, Dermatology     Questions:    Directional Terms:  Other(comment)    Clinical information:  r/o scc    Specific Site:  right hand    Tissue Specimen To Pathology, Dermatology     Questions:    Directional Terms:  Other(comment)    Clinical information:  bcc vs scc    Specific Site:  left nose        Neoplasm of uncertain behavior of skin  -     Tissue Specimen To Pathology, Dermatology  -     Tissue Specimen To Pathology, Dermatology  Right hand  Shave biopsy performed after verbal consent including risk of infection, scar, recurrence, need for additional treatment of site. Area prepped with alcohol, anesthetized with 1% lidocaine with epinephrine. . Hemostasis achieved with monsels. No  complications. Dressing applied. Wound care explained. Will need further rx if + ca      Shave biopsy performed after verbal consent including risk of infection, scar, recurrence, need for additional treatment of site. Area prepped with alcohol, anesthetized with 1% lidocaine with epinephrine. . Hemostasis achieved with monsels No complications. Dressing applied. Wound care explained. Will need further rx if + ca          .      History of skin cancer  Comments:  [facility] [place]    Seborrheic keratoses  reassurance             No Follow-up on file.

## 2017-02-13 NOTE — MR AVS SNAPSHOT
[place] - [facility]ermatology   [facility]  [place] [address]  Phone: [phone]  Fax: [phone]                  Thierry Ramos Jr.   2017 9:00 AM   Office Visit    Description:  Male : 2/10/1934   Provider:  Karissa Monsivais MD   Department:  [place] [facility] Dermatology           Reason for Visit     Follow-up     Skin Check           Diagnoses this Visit        Comments    Neoplasm of uncertain behavior of skin    -  Primary     History of skin cancer     [facility]    Seborrheic keratoses                To Do List           Future Appointments        Provider Department Dept Phone    2017 8:00 AM Jennie Lee MD [facility] - Ophthalmology [phone]    2017 10:30 AM [facility], CHEMO [facility] [address] [phone]    3/13/2017 10:00 AM [facility], CHEMO [facility] [address] [phone]    3/23/2017 8:00 AM LAB, HEMONC CANCER BLDG [facility] [address] [phone]    3/23/2017 9:00 AM Joaquin Wooten Jr., MD [place] - Hematology Oncology [phone]      Goals (5 Years of Data)              10/26/16    9/28/15    4/2/15    HDL > 45   35  36  40    LDL CALC < 70   70.4  92.6  83.8      Follow-Up and Disposition     Return in about 3 months (around 2017).      [facility]     [facility] Care Line - 247 Assistance  Registered nurses in the [facility] provide clinical advisement, health education, appointment booking, and other advisory services.  Call for this free service at [phone].             Medications           Message regarding Medications     Verify the changes and/or additions to your medication regime listed below are the same as discussed with your clinician today.  If any of these changes or additions are incorrect, please notify your healthcare provider.             Verify that the below list of medications is an accurate representation of the medications you are currently taking.  If none reported, the list  "may be blank. If incorrect, please contact your healthcare provider. Carry this list with you in case of emergency.           Current Medications     ACCU-CHEK FASTCLIX Misc TEST  BLOOD  GLUCOSE FOUR TIMES DAILY    atorvastatin (LIPITOR) 20 MG tablet TAKE 1 TABLET ONE TIME DAILY    BD ALCOHOL SWABS PadM APPLY 1 PAD AS NEEDED.    BD INSULIN PEN NEEDLE UF SHORT 31 gauge x 5/16" Ndle USE WITH LEVEMIR PEN  TO INJECT  10  UNITS SUBCUTANEOUSLY TWICE DAILY    cephALEXin (KEFLEX) 500 MG capsule Take 1 capsule (500 mg total) by mouth 4 (four) times daily.    clopidogrel (PLAVIX) 75 mg tablet TAKE 1 TABLET ONE TIME DAILY    cyanocobalamin (VITAMIN B-12) 500 MCG tablet Take 1 tablet by mouth Daily.    fluticasone (FLONASE) 50 mcg/actuation nasal spray 1 spray by Nasal route as needed.     guaifenesin (MUCINEX) 600 mg 12 hr tablet Take 1,200 mg by mouth 2 (two) times daily.    insulin aspart (NOVOLOG) 100 unit/mL InPn pen Nov 4 units with meals and correction scale 1:25 goal 150 ave daily dose 26U/d plus needles    insulin detemir (LEVEMIR FLEXTOUCH) 100 unit/mL (3 mL) SubQ InPn pen Inject 18 Units into the skin once daily.    LACTOBACILLUS ACIDOPHILUS (ACIDOPHILUS ORAL) Take 1 capsule by mouth once daily.    levothyroxine (SYNTHROID) 125 MCG tablet TAKE 1 TABLET ONE TIME DAILY    multivitamin with minerals tablet Take 1 tablet by mouth once daily.    neomycin-polymyxin-dexamethasone (DEXACINE) 3.5 mg/g-10,000 unit/g-0.1 % Oint     neomycin-polymyxin-dexamethasone (MAXITROL) 3.5mg/mL-10,000 unit/mL-0.1 % DrpS     oxycodone-acetaminophen (PERCOCET) 5-325 mg per tablet Take 1 tablet by mouth every 4 to 6 hours as needed for Pain.    pantoprazole (PROTONIX) 40 MG tablet TAKE 1 TABLET ONE TIME DAILY    predniSONE (DELTASONE) 2.5 MG tablet Take 2.5 mg by mouth once daily.    tazarotene (AVAGE) 0.1 % cream Apply topically every evening.    testosterone cypionate (DEPOTESTOTERONE CYPIONATE) 200 mg/mL injection INJECT  1ML " INTRAMUSCULARLY  EVERY  14  DAYS    tobramycin-dexamethasone 0.3-0.1% (TOBRADEX) 0.3-0.1 % DrpS Place 1 drop into the right eye every 4 (four) hours while awake.    tobramycin-dexamethasone 0.3-0.1% (TOBRADEX) 0.3-0.1 % Oint Place into both eyes 3 (three) times daily. Place a generous amount over the wounds as instructed.    epinephrine (EPIPEN 2-LEVI) 0.3 mg/0.3 mL (1:1,000) AtIn Inject 0.3 mLs (0.3 mg total) into the muscle once.    losartan (COZAAR) 25 MG tablet Take 1 tablet (25 mg total) by mouth once daily.           Clinical Reference Information           Your Vitals Were     Weight BMI             82.6 kg (182 lb) 28.51 kg/m2         Allergies as of 2/13/2017     Ace Inhibitors    Divalproex    Peanut      Immunizations Administered on Date of Encounter - 2/13/2017     None      Orders Placed During Today's Visit      Normal Orders This Visit    Tissue Specimen To Pathology, Dermatology     Tissue Specimen To Pathology, Dermatology       Language Assistance Services     ATTENTION: Language assistance services are available, free of charge. Please call [phone].      ATENCIÓN: Si habla adia, tiene a fortune disposición servicios gratuitos de asistencia lingüística. Llame al [phone].     LOLIS Ý: N?u b?n nói Ti?ng Vi?t, có các d?ch v? h? tr? ngôn ng? mi?n phí dành cho b?n. G?i s? [phone].         [facility] complies with applicable Federal civil rights laws and does not discriminate on the basis of race, color, national origin, age, disability, or sex.

## 2017-02-14 ENCOUNTER — OFFICE VISIT (OUTPATIENT)
Dept: OPHTHALMOLOGY | Facility: CLINIC | Age: 82
End: 2017-02-14
Payer: MEDICARE

## 2017-02-14 ENCOUNTER — INFUSION (OUTPATIENT)
Dept: INFUSION THERAPY | Facility: HOSPITAL | Age: 82
End: 2017-02-14
Attending: INTERNAL MEDICINE
Payer: MEDICARE

## 2017-02-14 VITALS
RESPIRATION RATE: 18 BRPM | TEMPERATURE: 98 F | HEART RATE: 62 BPM | DIASTOLIC BLOOD PRESSURE: 65 MMHG | SYSTOLIC BLOOD PRESSURE: 138 MMHG

## 2017-02-14 DIAGNOSIS — E79.0 HYPERURICEMIA: ICD-10-CM

## 2017-02-14 DIAGNOSIS — D80.1 HYPOGAMMAGLOBULINEMIA: Primary | ICD-10-CM

## 2017-02-14 DIAGNOSIS — Z98.890 POST-OPERATIVE STATE: Primary | ICD-10-CM

## 2017-02-14 DIAGNOSIS — N18.2 CKD (CHRONIC KIDNEY DISEASE), STAGE 2 (MILD): ICD-10-CM

## 2017-02-14 DIAGNOSIS — C91.10 CLL (CHRONIC LYMPHOCYTIC LEUKEMIA): ICD-10-CM

## 2017-02-14 DIAGNOSIS — C44.111 BASAL CELL CARCINOMA OF EYELID, RIGHT: ICD-10-CM

## 2017-02-14 DIAGNOSIS — J32.9 CHRONIC SINUSITIS, UNSPECIFIED LOCATION: ICD-10-CM

## 2017-02-14 PROCEDURE — 99999 PR PBB SHADOW E&M-EST. PATIENT-LVL II: CPT | Mod: PBBFAC,,, | Performed by: OPHTHALMOLOGY

## 2017-02-14 PROCEDURE — 96375 TX/PRO/DX INJ NEW DRUG ADDON: CPT

## 2017-02-14 PROCEDURE — 96365 THER/PROPH/DIAG IV INF INIT: CPT

## 2017-02-14 PROCEDURE — 63600175 PHARM REV CODE 636 W HCPCS: Performed by: INTERNAL MEDICINE

## 2017-02-14 PROCEDURE — 99024 POSTOP FOLLOW-UP VISIT: CPT | Mod: S$GLB,,, | Performed by: OPHTHALMOLOGY

## 2017-02-14 PROCEDURE — 96366 THER/PROPH/DIAG IV INF ADDON: CPT

## 2017-02-14 PROCEDURE — 96367 TX/PROPH/DG ADDL SEQ IV INF: CPT

## 2017-02-14 PROCEDURE — 25000003 PHARM REV CODE 250: Performed by: INTERNAL MEDICINE

## 2017-02-14 RX ORDER — ACETAMINOPHEN 325 MG/1
650 TABLET ORAL
Status: COMPLETED | OUTPATIENT
Start: 2017-02-14 | End: 2017-02-14

## 2017-02-14 RX ORDER — ACETAMINOPHEN 325 MG/1
650 TABLET ORAL
Status: CANCELLED | OUTPATIENT
Start: 2017-02-14

## 2017-02-14 RX ORDER — FAMOTIDINE 10 MG/ML
20 INJECTION INTRAVENOUS
Status: CANCELLED | OUTPATIENT
Start: 2017-02-14

## 2017-02-14 RX ORDER — FAMOTIDINE 10 MG/ML
20 INJECTION INTRAVENOUS
Status: COMPLETED | OUTPATIENT
Start: 2017-02-14 | End: 2017-02-14

## 2017-02-14 RX ORDER — HEPARIN 100 UNIT/ML
500 SYRINGE INTRAVENOUS
Status: CANCELLED | OUTPATIENT
Start: 2017-02-14

## 2017-02-14 RX ORDER — SODIUM CHLORIDE 0.9 % (FLUSH) 0.9 %
10 SYRINGE (ML) INJECTION
Status: CANCELLED | OUTPATIENT
Start: 2017-02-14

## 2017-02-14 RX ADMIN — ACETAMINOPHEN 650 MG: 325 TABLET ORAL at 10:02

## 2017-02-14 RX ADMIN — SODIUM CHLORIDE: 0.9 INJECTION, SOLUTION INTRAVENOUS at 10:02

## 2017-02-14 RX ADMIN — HUMAN IMMUNOGLOBULIN G 20 G: 20 LIQUID INTRAVENOUS at 11:02

## 2017-02-14 RX ADMIN — FAMOTIDINE 20 MG: 10 INJECTION INTRAVENOUS at 10:02

## 2017-02-14 RX ADMIN — DIPHENHYDRAMINE HYDROCHLORIDE 50 MG: 50 INJECTION, SOLUTION INTRAMUSCULAR; INTRAVENOUS at 10:02

## 2017-02-14 NOTE — PROGRESS NOTES
HPI     Dr Monsivais    Pt is here for 1 week post op follow up. Pt denies any pain . No blurred   [place]. Saw Dermatologist yesterday. Using tobradex deepak at night.       S/p Moh L arm w Dr Bliss (12/29/16)    S/p Shave Bx L arm Dr Monsivais 12/13/16      Gtts none  Tobradex deepak at night       Last edited by Jennie Lee MD on 2/14/2017  8:46 AM.         Assessment /Plan     For exam results, see Encounter Report.    Post-operative state    Basal cell carcinoma of eyelid, right    Patient doing well! Post-operative instructions reviewed. Sutures removed. All questions answered.  Return in 3 weeks prn sooner any worsening of vision/symptoms or any concerns.  Continue Tobradex deepak qhs and gtts tid OD.

## 2017-02-14 NOTE — PLAN OF CARE
Problem: Patient Care Overview (Adult)  Goal: Plan of Care Review  Outcome: Ongoing (interventions implemented as appropriate)  Pt. Tolerated treatment well. Discharged without complaints or signs of adverse effects.

## 2017-02-21 ENCOUNTER — PROCEDURE VISIT (OUTPATIENT)
Dept: DERMATOLOGY | Facility: CLINIC | Age: 82
End: 2017-02-21
Payer: MEDICARE

## 2017-02-21 VITALS
WEIGHT: 182 LBS | BODY MASS INDEX: 28.56 KG/M2 | HEART RATE: 67 BPM | HEIGHT: 67 IN | SYSTOLIC BLOOD PRESSURE: 142 MMHG | DIASTOLIC BLOOD PRESSURE: 68 MMHG

## 2017-02-21 DIAGNOSIS — C44.622 SQUAMOUS CELL CARCINOMA OF HAND, RIGHT: Primary | ICD-10-CM

## 2017-02-21 PROCEDURE — 99499 UNLISTED E&M SERVICE: CPT | Mod: S$GLB,,, | Performed by: DERMATOLOGY

## 2017-02-21 PROCEDURE — 17312 MOHS ADDL STAGE: CPT | Mod: S$GLB,,, | Performed by: DERMATOLOGY

## 2017-02-21 PROCEDURE — 13132 CMPLX RPR F/C/C/M/N/AX/G/H/F: CPT | Mod: 51,RT,S$GLB, | Performed by: DERMATOLOGY

## 2017-02-21 PROCEDURE — 17311 MOHS 1 STAGE H/N/HF/G: CPT | Mod: RT,S$GLB,, | Performed by: DERMATOLOGY

## 2017-02-21 NOTE — PROGRESS NOTES
PROCEDURE: Mohs' Micrographic Surgery    INDICATION: Biopsy-proven skin cancer of cosmetically and functionally important areas, including head, neck, genital, hand, foot, or areas known for having difficulty in healing, such as the lower anterior legs. Tumor with ill-defined borders.    REFERRING MD: Karissa Monsivais M.D.    CASE NUMBER:     ANESTHETIC: 2.5 cc 0.5% Lidocaine with Epi 1:200,000 mixed 1:1 with 0.5% Bupivacaine    SURGICAL PREP: Hibiclens    SURGEON: Ronel Bliss MD    ASSISTANTS: Terri Subramanian PA-C, Jacqui Tiwari,  and Kristy Eric, Surg Tech    PREOPERATIVE DIAGNOSIS: squamous cell carcinoma    POSTOPERATIVE DIAGNOSIS: squamous cell carcinoma    PATHOLOGIC DIAGNOSIS: squamous cell carcinoma- invasive, well differentiated, poorly differentiated    HISTOLOGY OF SPECIMENS IN FIRST STAGE:   Tumor Type: Tumor seen. Invasive squamous cell carcinoma: Proliferation of squamous cells exhibiting atypia and infiltrating within the dermis.  Well-differentiated squamous cell carcinoma: Proliferation of squamous cells exhibiting atypia and infiltrating within the dermis.  Poorly-differentiated squamous cell carcinoma: Proliferation of squamous cells exhibiting atypia of poor differentiation and infiltrating within the dermis.   Depth of Invasion: epidermis, dermis and subcutaneous tissue  Perineural Invasion: No    HISTOLOGY OF SPECIMENS IN SUBSEQUENT STAGES:  · Tumor Type: No tumor seen.    STAGES OF MOHS' SURGERY PERFORMED: 2    TUMOR-FREE PLANE ACHIEVED: Yes    HEMOSTASIS: electrocoagulation and 4-0 vicryl suture ties     SPECIMENS: 3 (2 in stage A and 1 in stage B)    LOCATION: right (dorsal) hand    INITIAL LESION SIZE: 0.6 x 0.7 cm    FINAL DEFECT SIZE: 1.3 x 1.6 cm    WOUND REPAIR/DISPOSITION: The patient tolerated Mohs' Micrographic Surgery for a squamous cell carcinoma very well. When the tumor was completely removed, a repair of the surgical defect was undertaken.      PROCEDURE: Complex  "Linear Repair    INDICATION: Status post Mohs' Micrographic Surgery for squamous cell carcinoma.    CASE NUMBER:     SURGEON: Ronel Bliss MD    ASSISTANTS: Terri Subramanian PA-C and Faye Henry MA    ANESTHETIC: 2 cc 1% Lidocaine with Epinephrine 1:100,000, buffered    SURGICAL PREP: Hibiclens    LOCATION: right (dorsal) hand    DEFECT SIZE: 1.3 x 1.6 cm    WOUND REPAIR/DISPOSITION:  After the patient's carcinoma had been completely removed with Mohs' Micrographic Surgery, a repair of the surgical defect was undertaken. The patient was returned to the operating suite where the area of right dorsal hand was prepped, draped, and anesthetized in the usual sterile fashion. The wound was widely undermined in all directions. Then, electrocoagulation was used to obtain meticulous hemostasis. 4-0 Vicryl buried vertical mattress sutures were placed into the subcutaneous and dermal plane to close the wound and shankar the cutaneous wound edge. Bilateral dog ears were identified and were removed by a standard Burow's triangle technique. The cutaneous wound edges were closed using interrupted 4-0 Prolene suture.    The patient tolerated the procedure well.    The area was cleaned and dressed appropriately and the patient was given wound care instructions, as well as appointment for follow-up evaluation.    LENGTH OF REPAIR: 3.7 cm    Vitals:    02/21/17 1151 02/21/17 1437   BP: 122/68 (!) 142/68   BP Location: Left arm    Patient Position: Sitting    BP Method: Automatic    Pulse: 69 67   Weight: 82.6 kg (182 lb)    Height: 5' 7" (1.702 m)          "

## 2017-02-27 ENCOUNTER — TELEPHONE (OUTPATIENT)
Dept: CARDIOLOGY | Facility: CLINIC | Age: 82
End: 2017-02-27

## 2017-02-27 DIAGNOSIS — C44.91 BASAL CELL CARCINOMA: ICD-10-CM

## 2017-02-27 DIAGNOSIS — Z01.818 PREOP TESTING: ICD-10-CM

## 2017-02-27 DIAGNOSIS — M95.0 MOHS DEFECT OF NOSE: Primary | ICD-10-CM

## 2017-02-27 DIAGNOSIS — Z98.890 STATUS POST MOHS SURGERY: ICD-10-CM

## 2017-02-27 DIAGNOSIS — Z98.890 MOHS DEFECT OF NOSE: Primary | ICD-10-CM

## 2017-02-27 NOTE — TELEPHONE ENCOUNTER
----- Message from Renay Duran RN sent at 2/27/2017  3:20 PM CST -----  To Whom It May Concern:    Mr. Ramos is scheduled for a Mohs defect repair with Dr. Dsouza on March 31, 2017.    Since this will be performed under general anesthesia, he will require cardiac clearance prior to surgery as well as instructions on when he may safely stop his Plavix prior to surgery and resume post-operatively.    We appreciate your help in this matter.    Sincerely,  RONAN Marie, RN  Clinical Care Coordinator  Ext. [phone]

## 2017-03-01 NOTE — TELEPHONE ENCOUNTER
Cleared for surgery and anesthesia. Hold clopidogrel for 5 days prior to surgery. Pl resume the day after.

## 2017-03-02 ENCOUNTER — PATIENT MESSAGE (OUTPATIENT)
Dept: HEMATOLOGY/ONCOLOGY | Facility: CLINIC | Age: 82
End: 2017-03-02

## 2017-03-03 DIAGNOSIS — E11.9 TYPE 2 DIABETES MELLITUS WITHOUT COMPLICATION: ICD-10-CM

## 2017-03-06 ENCOUNTER — OFFICE VISIT (OUTPATIENT)
Dept: DERMATOLOGY | Facility: CLINIC | Age: 82
End: 2017-03-06
Payer: MEDICARE

## 2017-03-06 DIAGNOSIS — Z09 POSTOP CHECK: Primary | ICD-10-CM

## 2017-03-06 PROCEDURE — 99024 POSTOP FOLLOW-UP VISIT: CPT | Mod: S$GLB,,, | Performed by: DERMATOLOGY

## 2017-03-06 PROCEDURE — 99999 PR PBB SHADOW E&M-EST. PATIENT-LVL III: CPT | Mod: PBBFAC,,, | Performed by: DERMATOLOGY

## 2017-03-06 RX ORDER — PREDNISONE 2.5 MG/1
TABLET ORAL
Qty: 90 TABLET | Refills: 11 | Status: SHIPPED | OUTPATIENT
Start: 2017-03-06 | End: 2018-03-14 | Stop reason: SDUPTHER

## 2017-03-06 RX ORDER — LOSARTAN POTASSIUM 25 MG/1
TABLET ORAL
Qty: 90 TABLET | Refills: 3 | Status: SHIPPED | OUTPATIENT
Start: 2017-03-06 | End: 2018-03-03 | Stop reason: SDUPTHER

## 2017-03-06 RX ORDER — ISOPROPYL ALCOHOL 70 ML/100ML
SWAB TOPICAL
Qty: 300 EACH | Refills: 3 | Status: SHIPPED | OUTPATIENT
Start: 2017-03-06 | End: 2020-04-02

## 2017-03-06 RX ORDER — PREDNISONE 5 MG/1
TABLET ORAL
Qty: 90 TABLET | Refills: 3 | Status: SHIPPED | OUTPATIENT
Start: 2017-03-06 | End: 2018-03-14 | Stop reason: SDUPTHER

## 2017-03-06 NOTE — PROGRESS NOTES
83 y.o. male patient is here for suture removal following Mohs' surgery.    Patient reports no problems with R hand.    WOUND PE:  The R hand sutures intact. Wound healing well. Good skin edges. No signs or symptoms of infection.    IMPRESSION:  Healing operative site from Mohs surgery SCC,  R hand s/p Mohs with CLC, postop day # 13.    PLAN:  Sutures removed today. Steri-strips applied.  D/c wound care.  Keep moist with Aquaphor.    RTC:  In 3 weeks for Mohs BCC L nose.

## 2017-03-07 ENCOUNTER — TELEPHONE (OUTPATIENT)
Dept: OTOLARYNGOLOGY | Facility: CLINIC | Age: 82
End: 2017-03-07

## 2017-03-07 RX ORDER — ATORVASTATIN CALCIUM 20 MG/1
TABLET, FILM COATED ORAL
Qty: 90 TABLET | Refills: 3 | Status: SHIPPED | OUTPATIENT
Start: 2017-03-07 | End: 2018-05-28 | Stop reason: SDUPTHER

## 2017-03-13 ENCOUNTER — INFUSION (OUTPATIENT)
Dept: INFUSION THERAPY | Facility: HOSPITAL | Age: 82
End: 2017-03-13
Attending: INTERNAL MEDICINE
Payer: MEDICARE

## 2017-03-13 VITALS
HEART RATE: 57 BPM | TEMPERATURE: 98 F | SYSTOLIC BLOOD PRESSURE: 145 MMHG | RESPIRATION RATE: 16 BRPM | DIASTOLIC BLOOD PRESSURE: 67 MMHG

## 2017-03-13 DIAGNOSIS — D80.1 HYPOGAMMAGLOBULINEMIA: Primary | ICD-10-CM

## 2017-03-13 DIAGNOSIS — N18.2 CKD (CHRONIC KIDNEY DISEASE), STAGE 2 (MILD): ICD-10-CM

## 2017-03-13 DIAGNOSIS — E79.0 HYPERURICEMIA: ICD-10-CM

## 2017-03-13 DIAGNOSIS — J32.9 CHRONIC SINUSITIS, UNSPECIFIED LOCATION: ICD-10-CM

## 2017-03-13 DIAGNOSIS — C91.10 CLL (CHRONIC LYMPHOCYTIC LEUKEMIA): ICD-10-CM

## 2017-03-13 PROCEDURE — 96365 THER/PROPH/DIAG IV INF INIT: CPT

## 2017-03-13 PROCEDURE — 63600175 PHARM REV CODE 636 W HCPCS: Performed by: INTERNAL MEDICINE

## 2017-03-13 PROCEDURE — 96367 TX/PROPH/DG ADDL SEQ IV INF: CPT

## 2017-03-13 PROCEDURE — 96375 TX/PRO/DX INJ NEW DRUG ADDON: CPT

## 2017-03-13 PROCEDURE — 96366 THER/PROPH/DIAG IV INF ADDON: CPT

## 2017-03-13 PROCEDURE — 25000003 PHARM REV CODE 250: Performed by: INTERNAL MEDICINE

## 2017-03-13 RX ORDER — FAMOTIDINE 10 MG/ML
20 INJECTION INTRAVENOUS
Status: CANCELLED | OUTPATIENT
Start: 2017-03-13 | End: 2017-03-13

## 2017-03-13 RX ORDER — FAMOTIDINE 10 MG/ML
20 INJECTION INTRAVENOUS
Status: COMPLETED | OUTPATIENT
Start: 2017-03-13 | End: 2017-03-13

## 2017-03-13 RX ORDER — ACETAMINOPHEN 325 MG/1
650 TABLET ORAL
Status: COMPLETED | OUTPATIENT
Start: 2017-03-13 | End: 2017-03-13

## 2017-03-13 RX ORDER — ACETAMINOPHEN 325 MG/1
650 TABLET ORAL
Status: CANCELLED | OUTPATIENT
Start: 2017-03-13

## 2017-03-13 RX ORDER — HEPARIN 100 UNIT/ML
500 SYRINGE INTRAVENOUS
Status: CANCELLED | OUTPATIENT
Start: 2017-03-13

## 2017-03-13 RX ORDER — SODIUM CHLORIDE 0.9 % (FLUSH) 0.9 %
10 SYRINGE (ML) INJECTION
Status: CANCELLED | OUTPATIENT
Start: 2017-03-13

## 2017-03-13 RX ORDER — SODIUM CHLORIDE 0.9 % (FLUSH) 0.9 %
10 SYRINGE (ML) INJECTION
Status: DISCONTINUED | OUTPATIENT
Start: 2017-03-13 | End: 2017-03-13 | Stop reason: HOSPADM

## 2017-03-13 RX ADMIN — DIPHENHYDRAMINE HYDROCHLORIDE 50 MG: 50 INJECTION, SOLUTION INTRAMUSCULAR; INTRAVENOUS at 10:03

## 2017-03-13 RX ADMIN — SODIUM CHLORIDE, PRESERVATIVE FREE 10 ML: 5 INJECTION INTRAVENOUS at 01:03

## 2017-03-13 RX ADMIN — HUMAN IMMUNOGLOBULIN G 20 G: 20 LIQUID INTRAVENOUS at 10:03

## 2017-03-13 RX ADMIN — DEXTROSE: 50 INJECTION, SOLUTION INTRAVENOUS at 10:03

## 2017-03-13 RX ADMIN — ACETAMINOPHEN 650 MG: 325 TABLET ORAL at 10:03

## 2017-03-13 RX ADMIN — FAMOTIDINE 20 MG: 10 INJECTION INTRAVENOUS at 10:03

## 2017-03-13 NOTE — PLAN OF CARE
Problem: Patient Care Overview (Adult)  Goal: Discharge Needs Assessment  Outcome: Ongoing (interventions implemented as appropriate)  1310-Patient tolerated treatment well. Discharged without complaints or S/S of adverse event. AVS given.  Instructed to call provider for any questions or concerns.

## 2017-03-13 NOTE — PLAN OF CARE
Problem: Patient Care Overview (Adult)  Goal: Individualization & Mutuality  Outcome: Ongoing (interventions implemented as appropriate)  1013-Labs , hx, and medications reviewed. Assessment completed. Discussed plan of care with patient. Patient in agreement. Chair reclined and warm blanket and snack offered.

## 2017-03-14 ENCOUNTER — HOSPITAL ENCOUNTER (OUTPATIENT)
Dept: CARDIOLOGY | Facility: CLINIC | Age: 82
Discharge: HOME | End: 2017-03-14
Payer: MEDICARE

## 2017-03-14 ENCOUNTER — OFFICE VISIT (OUTPATIENT)
Dept: OPHTHALMOLOGY | Facility: CLINIC | Age: 82
End: 2017-03-14
Payer: MEDICARE

## 2017-03-14 DIAGNOSIS — M95.0 MOHS DEFECT OF NOSE: ICD-10-CM

## 2017-03-14 DIAGNOSIS — C44.111 BASAL CELL CARCINOMA OF EYELID, RIGHT: ICD-10-CM

## 2017-03-14 DIAGNOSIS — Z01.818 PREOP TESTING: ICD-10-CM

## 2017-03-14 DIAGNOSIS — C44.91 BASAL CELL CARCINOMA: ICD-10-CM

## 2017-03-14 DIAGNOSIS — Z98.890 STATUS POST MOHS SURGERY: ICD-10-CM

## 2017-03-14 DIAGNOSIS — Z98.890 POST-OPERATIVE STATE: Primary | ICD-10-CM

## 2017-03-14 DIAGNOSIS — Z98.890 MOHS DEFECT OF NOSE: ICD-10-CM

## 2017-03-14 PROCEDURE — 99024 POSTOP FOLLOW-UP VISIT: CPT | Mod: S$GLB,,, | Performed by: OPHTHALMOLOGY

## 2017-03-14 PROCEDURE — 99999 PR PBB SHADOW E&M-EST. PATIENT-LVL II: CPT | Mod: PBBFAC,,, | Performed by: OPHTHALMOLOGY

## 2017-03-14 PROCEDURE — 93000 ELECTROCARDIOGRAM COMPLETE: CPT | Mod: S$GLB,,, | Performed by: INTERNAL MEDICINE

## 2017-03-23 ENCOUNTER — OFFICE VISIT (OUTPATIENT)
Dept: HEMATOLOGY/ONCOLOGY | Facility: CLINIC | Age: 82
End: 2017-03-23
Payer: MEDICARE

## 2017-03-23 ENCOUNTER — INFUSION (OUTPATIENT)
Dept: INFUSION THERAPY | Facility: HOSPITAL | Age: 82
End: 2017-03-23
Attending: INTERNAL MEDICINE
Payer: MEDICARE

## 2017-03-23 VITALS
BODY MASS INDEX: 28.3 KG/M2 | DIASTOLIC BLOOD PRESSURE: 68 MMHG | HEART RATE: 72 BPM | HEIGHT: 67 IN | SYSTOLIC BLOOD PRESSURE: 118 MMHG | WEIGHT: 180.31 LBS

## 2017-03-23 VITALS — SYSTOLIC BLOOD PRESSURE: 181 MMHG | RESPIRATION RATE: 16 BRPM | HEART RATE: 65 BPM | DIASTOLIC BLOOD PRESSURE: 76 MMHG

## 2017-03-23 DIAGNOSIS — D80.1 HYPOGAMMAGLOBULINEMIA: ICD-10-CM

## 2017-03-23 DIAGNOSIS — E23.0 PANHYPOPITUITARISM: ICD-10-CM

## 2017-03-23 DIAGNOSIS — D84.9 IMMUNOSUPPRESSION: ICD-10-CM

## 2017-03-23 DIAGNOSIS — J32.0 CHRONIC MAXILLARY SINUSITIS: ICD-10-CM

## 2017-03-23 DIAGNOSIS — Z51.11 ENCOUNTER FOR ANTINEOPLASTIC CHEMOTHERAPY: ICD-10-CM

## 2017-03-23 DIAGNOSIS — E79.0 HYPERURICEMIA: Primary | ICD-10-CM

## 2017-03-23 DIAGNOSIS — C91.10 CLL (CHRONIC LYMPHOCYTIC LEUKEMIA): ICD-10-CM

## 2017-03-23 DIAGNOSIS — C91.10 CLL (CHRONIC LYMPHOCYTIC LEUKEMIA): Primary | ICD-10-CM

## 2017-03-23 PROCEDURE — 96413 CHEMO IV INFUSION 1 HR: CPT

## 2017-03-23 PROCEDURE — 99999 PR PBB SHADOW E&M-EST. PATIENT-LVL III: CPT | Mod: PBBFAC,,, | Performed by: INTERNAL MEDICINE

## 2017-03-23 PROCEDURE — 1159F MED LIST DOCD IN RCRD: CPT | Mod: S$GLB,,, | Performed by: INTERNAL MEDICINE

## 2017-03-23 PROCEDURE — 96375 TX/PRO/DX INJ NEW DRUG ADDON: CPT

## 2017-03-23 PROCEDURE — 3078F DIAST BP <80 MM HG: CPT | Mod: S$GLB,,, | Performed by: INTERNAL MEDICINE

## 2017-03-23 PROCEDURE — 25000003 PHARM REV CODE 250: Performed by: INTERNAL MEDICINE

## 2017-03-23 PROCEDURE — 1157F ADVNC CARE PLAN IN RCRD: CPT | Mod: S$GLB,,, | Performed by: INTERNAL MEDICINE

## 2017-03-23 PROCEDURE — 96415 CHEMO IV INFUSION ADDL HR: CPT

## 2017-03-23 PROCEDURE — 63600175 PHARM REV CODE 636 W HCPCS: Performed by: INTERNAL MEDICINE

## 2017-03-23 PROCEDURE — 96367 TX/PROPH/DG ADDL SEQ IV INF: CPT

## 2017-03-23 PROCEDURE — 99214 OFFICE O/P EST MOD 30 MIN: CPT | Mod: S$GLB,,, | Performed by: INTERNAL MEDICINE

## 2017-03-23 PROCEDURE — 99499 UNLISTED E&M SERVICE: CPT | Mod: S$GLB,,, | Performed by: INTERNAL MEDICINE

## 2017-03-23 PROCEDURE — 1160F RVW MEDS BY RX/DR IN RCRD: CPT | Mod: S$GLB,,, | Performed by: INTERNAL MEDICINE

## 2017-03-23 PROCEDURE — 3074F SYST BP LT 130 MM HG: CPT | Mod: S$GLB,,, | Performed by: INTERNAL MEDICINE

## 2017-03-23 PROCEDURE — 1126F AMNT PAIN NOTED NONE PRSNT: CPT | Mod: S$GLB,,, | Performed by: INTERNAL MEDICINE

## 2017-03-23 RX ORDER — ACETAMINOPHEN 500 MG
1000 TABLET ORAL
Status: COMPLETED | OUTPATIENT
Start: 2017-03-23 | End: 2017-03-23

## 2017-03-23 RX ORDER — HEPARIN 100 UNIT/ML
500 SYRINGE INTRAVENOUS
Status: CANCELLED | OUTPATIENT
Start: 2017-03-23

## 2017-03-23 RX ORDER — ACETAMINOPHEN 325 MG/1
1000 TABLET ORAL
Status: CANCELLED | OUTPATIENT
Start: 2017-03-23

## 2017-03-23 RX ORDER — SODIUM CHLORIDE 0.9 % (FLUSH) 0.9 %
10 SYRINGE (ML) INJECTION
Status: DISCONTINUED | OUTPATIENT
Start: 2017-03-23 | End: 2017-03-23 | Stop reason: HOSPADM

## 2017-03-23 RX ORDER — FAMOTIDINE 10 MG/ML
20 INJECTION INTRAVENOUS
Status: COMPLETED | OUTPATIENT
Start: 2017-03-23 | End: 2017-03-23

## 2017-03-23 RX ORDER — FAMOTIDINE 10 MG/ML
20 INJECTION INTRAVENOUS
Status: CANCELLED | OUTPATIENT
Start: 2017-03-23

## 2017-03-23 RX ORDER — SODIUM CHLORIDE 0.9 % (FLUSH) 0.9 %
10 SYRINGE (ML) INJECTION
Status: CANCELLED | OUTPATIENT
Start: 2017-03-23

## 2017-03-23 RX ADMIN — FAMOTIDINE 20 MG: 10 INJECTION INTRAVENOUS at 10:03

## 2017-03-23 RX ADMIN — SODIUM CHLORIDE: 0.9 INJECTION, SOLUTION INTRAVENOUS at 09:03

## 2017-03-23 RX ADMIN — DEXAMETHASONE SODIUM PHOSPHATE 10 MG: 4 INJECTION, SOLUTION INTRAMUSCULAR; INTRAVENOUS at 09:03

## 2017-03-23 RX ADMIN — ACETAMINOPHEN 1000 MG: 500 TABLET ORAL at 09:03

## 2017-03-23 RX ADMIN — SODIUM CHLORIDE 2000 MG: 0.9 INJECTION, SOLUTION INTRAVENOUS at 10:03

## 2017-03-23 RX ADMIN — DIPHENHYDRAMINE HYDROCHLORIDE 50 MG: 50 INJECTION, SOLUTION INTRAMUSCULAR; INTRAVENOUS at 10:03

## 2017-03-23 NOTE — MR AVS SNAPSHOT
"Patient Information     Patient Name Sex     Thierry Ramos Jr. Male 2/10/1934      Visit Information        Provider Department Dept Phone Center    3/23/2017 10:00 AM [facility], CHEMO [facility] Chemotherapy Infusion [phone] Rui Lpoez      Patient Instructions     None      Your Current Medications Are     ACCU-CHEK FASTCLIX Misc    alcohol swabs PadM    atorvastatin (LIPITOR) 20 MG tablet    BD INSULIN PEN NEEDLE UF SHORT 31 gauge x 5/16" Ndle    clopidogrel (PLAVIX) 75 mg tablet    cyanocobalamin (VITAMIN B-12) 500 MCG tablet    fluticasone (FLONASE) 50 mcg/actuation nasal spray    guaifenesin (MUCINEX) 600 mg 12 hr tablet    insulin aspart (NOVOLOG) 100 unit/mL InPn pen    insulin detemir (LEVEMIR FLEXTOUCH) 100 unit/mL (3 mL) SubQ InPn pen    LACTOBACILLUS ACIDOPHILUS (ACIDOPHILUS ORAL)    levothyroxine (SYNTHROID) 125 MCG tablet    losartan (COZAAR) 25 MG tablet    multivitamin with minerals tablet    neomycin-polymyxin-dexamethasone (DEXACINE) 3.5 mg/g-10,000 unit/g-0.1 % Oint    neomycin-polymyxin-dexamethasone (MAXITROL) 3.5mg/mL-10,000 unit/mL-0.1 % DrpS    oxycodone-acetaminophen (PERCOCET) 5-325 mg per tablet    pantoprazole (PROTONIX) 40 MG tablet    predniSONE (DELTASONE) 2.5 MG tablet    predniSONE (DELTASONE) 5 MG tablet    tazarotene (AVAGE) 0.1 % cream    testosterone cypionate (DEPOTESTOTERONE CYPIONATE) 200 mg/mL injection    epinephrine (EPIPEN 2-LEVI) 0.3 mg/0.3 mL (1:1,000) AtIn      Facility-Administered Medications     acetaminophen tablet 1,000 mg    dexamethasone IVPB 10 mg    diphenhydramine IVPB 50 mg    famotidine (PF) 20 mg/2 mL injection 20 mg    ofatumumab (Arzerra) 2000 mg infusion in 1L NS    sodium chloride 0.9% 250 mL flush bag    sodium chloride 0.9% flush 10 mL      Appointments for Next Year     3/30/2017  8:00 AM MOHS MICRO GRAPHIC SURG1 (10 min.) [facility] D[facility]at[facility] Ronel Bliss MD    Arrive at check-in approximately 15 minutes before your scheduled " appointment time. Bring all outside medical records and imaging, along with a list of your current medications and insurance card.    [facility] 1st Floor    3/30/2017 12:00 PM PRE-OP (15 min.) [facility] - Otorhinolaryngology [place] MARY Dsuoza III, MD    Arrive at check-in approximately 15 minutes before your scheduled appointment time. Bring all outside medical records and imaging, along with a list of your current medications and insurance card.    [address]    3/30/2017 12:30 PM MEDICAL PHOTO (15 min.) [facility] Medical Photography PHOTOGRAPHY, APPT    Patients must have Patient Photography Work Order and Consent Form at time of appointment.    [facility] 6th [facility] Room 642    3/30/2017  1:00 PM PRE-ADMIT TESTING VISIT (30 min.) [facility] RN    Arrive at check-in approximately 15 minutes before your scheduled appointment time. Bring all outside medical records and imaging, along with a list of your current medications and insurance card.    Department is located behind the [address] in Room [address], near the [address] Entrance.    4/5/2017  9:30 AM POST-OP (15 min.) [facility] - Otorhinolaryngology [place] MARY Dsouza III, MD    Arrive at check-in approximately 15 minutes before your scheduled appointment time. Bring all outside medical records and imaging, along with a list of your current medications and insurance card.    [address]    4/19/2017  9:00 AM ESTABLISHED PATIENT (10 min.) [place] - Dermatology Karissa Monsivais MD    Arrive at check-in approximately 15 minutes before your scheduled appointment time. Bring all outside medical records and imaging, along with a list of your current medications and insurance card.    5th Floor    6/13/2017  1:30 PM ESTABLISHED PATIENT (15 min.) [facility] Ophthalmology Jennie Lee MD    Arrive at check-in approximately 15 minutes before your scheduled appointment time. Bring all outside medical  "records and imaging, along with a list of your current medications and insurance card.    10th Floor  Dr. Kapoor patients please go to the 1st Floor [facility] for your appointment.          Default Flowsheet Data (last 24 hours)      Amb Complex Vitals Doc        03/23/17 1227 03/23/17 1154 03/23/17 1124 03/23/17 1053 03/23/17 1021    Measurements    BP (!)  (P)  156/70   rate up to 400cc/hr (!)  (P)  149/67   rate up to 200cc/hr (!)  (P)  179/75   rate up to 100cc/hr (!)  (P)  158/75   rate up to 50cc/hr (!)  (P)  166/74   Start Arzerra at 25cc/hr    Pulse (!)  (P)  58 (P)  62 (P)  62 (!)  (P)  58 (P)  60       03/23/17 0919 03/23/17 0845 03/23/17 0843          Measurements    Weight   81.8 kg (180 lb 5.4 oz)      Height  5' 7" (1.702 m)       BP (!)  159/70 118/68       Pulse (!)  58 72       Resp 16        Pain Assessment    Pain Score Zero Zero               Allergies     Ace Inhibitors Swelling    angioedema    Divalproex     Rash under arms, body creases    Peanut Swelling      Medications You Received from 03/22/2017 1436 to 03/23/2017 1436        Date/Time Order Dose Route Action     03/23/2017 0942 acetaminophen tablet 1,000 mg 1,000 mg Oral Given     03/23/2017 0942 dexamethasone IVPB 10 mg 10 mg Intravenous New Bag     03/23/2017 1000 diphenhydramine IVPB 50 mg 50 mg Intravenous New Bag     03/23/2017 1000 famotidine (PF) 20 mg/2 mL injection 20 mg 20 mg Intravenous Given     03/23/2017 1021 ofatumumab (Arzerra) 2000 mg infusion in 1L NS 2,000 mg Intravenous New Bag     03/23/2017 0942 sodium chloride 0.9% 250 mL flush bag   Intravenous New Bag      Current Discharge Medication List     Cannot display discharge medications since this is not an admission.      "

## 2017-03-23 NOTE — PROGRESS NOTES
Mr. Ramos is an 83-year-old man who was diagnosed with chronic lymphocytic   leukemia/small lymphocytic lymphoma in April 2012.  In April 2013, there was an   abrupt rise in his white blood cell count to 93,000 and some prolymphocytes were   observed in the peripheral smear.  He developed generalized urticaria, which I   suspected was paraneoplastic.  He began treatment with fludarabine,   cyclophosphamide and Rituxan and received five courses of this.  The fludarabine   dose was decreased somewhat because of mild renal impairment and the sixth   treatment was with Rituxan alone because he had developed thrombocytopenia,   which has persisted since then.    His thrombocytopenia became more severe in early 2016 with platelet counts in   the range of 35,000-40,000.  At that time, he was taking both aspirin and   Plavix.  He was taken off aspirin for several months after he had a venous   thrombosis in the right popliteal vein and he took warfarin for several months.    Warfarin has now been discontinued and he is again taking both aspirin and   Plavix.    A bone marrow examination in April 2016 revealed no signs of myelodysplasia.    Small lymphocytes comprised 35% of the bone marrow cells.  The FISH analyses for   myelodysplastic disorders were negative.  The cytogenetic study indicated   trisomy 12.    I treated him with prednisone 60 mg daily for two weeks for thrombocytopenia,   but there was no improvement in his platelet count.  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    Because of continued thrombocytopenia, I recommended therapy with ofatumumab,   which he began on 06/30/2016.  He completed eight weekly infusions of this drug   and then four monthly infusions.  With ofatumumab, there was an improvement in   the platelet count, and since mid August 2016, his platelet counts have been in   the range of 87,000-114,000.    He  had a TIA in the past and he had two strokes following pituitary surgery in   2002.  He has had many non-melanoma skin cancers removed and he is to have others   excised very soon.  A recent skin cancer excision was in the medial   right orbit.  He has had one episode of sinusitis since his last visit with me.    This has been a chronic and severe problem for some years and he has been   receiving intravenous immune globulin.  Since the immune globulin was started,   the frequency of sinusitis and other respiratory infections has clearly   diminished.    He tells me that he is to have four teeth extracted soon and will be taken off   one or both anticoagulants briefly.    He is not having fevers or sweats.  He exercises on an almost daily basis.  He   has no severe pain.  He is not having fevers or sweats.  His appetite is good and   his weight is stable.    ADDITIONAL PAST HISTORY, SYSTEM REVIEW, SOCIAL HISTORY AND FAMILY HISTORY:  Have   been reviewed and updated in the electronic record.    PHYSICAL EXAMINATION:  GENERAL APPEARANCE:  Well-developed, well-nourished man in no distress.  EYES:  Surgical scar on the medial right lower lid.  No jaundice or pallor.  EARS:  Clear canals and membranes.  NOSE:  Clear nares.  SINUSES:  No tenderness.  MOUTH AND THROAT:  Several missing and broken teeth.  No bleeding.  No mucosal   lesions.  NECK:  No thyroid abnormalities.  No masses or bruits.  LYMPH NODES:  No enlarged cervical, axillary or inguinal nodes.  CHEST AND LUNGS:  Normal respiratory effort.  Clear to auscultation and   percussion.  HEART:  Regular rate and rhythm without murmur or gallop.  ABDOMEN:  Soft without masses or tenderness.  No hepatosplenomegaly.  EXTREMITIES:  Trace pretibial edema bilaterally.  PERIPHERAL VASCULATURE:  Diminished pulses in the feet and ankles.  Adequate   popliteal pulses.  NEUROLOGIC:  Motor function is good.  He is fully oriented.  Mental status is   good.  SKIN:  Many keratotic  lesions on the face and arms.  Numerous ecchymoses on the   forearms.  Many scars from prior skin cancer surgery.    LABORATORY STUDIES:  Blood counts today include hemoglobin 15.1, WBC 4190 with   52% granulocytes, 33% lymphocytes, 12% monocytes and 1% eosinophil.  The   platelet count is 97,000.  Comprehensive metabolic profile is remarkable for   glucose 148 and albumin 3.3.    IMPRESSION:  1.  Chronic lymphocytic leukemia.  2.  Thrombocytopenia, which appears to largely be a consequence of CLL and   has improved with ofatumumab.  3.  Immunodeficiency, for which he receives monthly immune globulin infusions.  4.  Panhypopituitarism.  5.  Multiple skin cancers.    RECOMMENDATIONS:  1.  Ofatumumab will be administered today.  2.  Ofatumumab will be repeated in two months, and at that time, he will have a   CBC and CMP.  3.  He will continue to receive intravenous immune globulin every month.  4.  Return visit (EPA) in four months with CBC, CMP, IgG level and appointment for   ofatumumab.    Mr. Ramos and his wife had a number of questions about his CLL, his   thrombocytopenia, his skin cancers and a number of his other medical problems.    Most of his 30-minute appointment today was devoted to discussing these matters   with them.      RICH  dd: 03/23/2017 09:09:42 (CDT)  td: 03/24/2017 01:17:10 (CDT)  Doc ID   #[id number]  Job ID #[id number]    CC:

## 2017-03-23 NOTE — MR AVS SNAPSHOT
[facility]  [address]  [place] [address]  Phone: [phone]                  Thierry Raoms Jr.   3/23/2017 9:00 AM   Appointment    Description:  Male : 2/10/1934   Provider:  Joaquin Wooten Jr., MD   Department:  [facility]                To Do List           Future Appointments        Provider Department Dept Phone    3/23/2017 9:00 AM Joaquin Wooten Jr., MD [facility] - Hematology Oncology [phone]    3/23/2017 10:00 AM [facility], CHE[facility] [phone]    3/30/2017 8:00 AM Ronel Bliss MD [facility] - Dermatology Surgery [phone]    3/30/2017 12:00 PM [place] MARY Dsouza III, MD [facility] - Otorhinolaryngology [phone]    3/30/2017 12:30 PM PHOTOGRAPHY, APPT [facility] - Medical Photography [phone]      Your Future Surgeries/Procedures     Mar 31, 2017   Surgery with [place] MARY Dsouza III, MD   [facility]    [address] [facility] [address] [phone]              Goals (5 Years of Data)              10/26/16    9/28/15    4/2/15    HDL > 45   35  36  40    LDL CALC < 70   70.4  92.6  83.8      [facility]     [facility] Nurse Care Line -  Assistance  Registered nurses in the [facility] provide clinical advisement, health education, appointment booking, and other advisory services.  Call for this free service at [phone].             Medications           Message regarding Medications     Verify the changes and/or additions to your medication regime listed below are the same as discussed with your clinician today.  If any of these changes or additions are incorrect, please notify your healthcare provider.             Verify that the below list of medications is an accurate representation of the medications you are currently taking.  If none reported, the list may be blank. If incorrect, please contact your healthcare provider.  "Carry this list with you in case of emergency.           Current Medications     ACCU-CHEK FASTCLIX Misc TEST  BLOOD  GLUCOSE FOUR TIMES DAILY    alcohol swabs PadM APPLY 1 PAD TOPICALLY  AS NEEDED.    atorvastatin (LIPITOR) 20 MG tablet TAKE 1 TABLET ONE TIME DAILY    BD INSULIN PEN NEEDLE UF SHORT 31 gauge x 5/16" Ndle USE WITH LEVEMIR PEN  TO INJECT  10  UNITS SUBCUTANEOUSLY TWICE DAILY    clopidogrel (PLAVIX) 75 mg tablet TAKE 1 TABLET ONE TIME DAILY    cyanocobalamin (VITAMIN B-12) 500 MCG tablet Take 1 tablet by mouth Daily.    epinephrine (EPIPEN 2-LEVI) 0.3 mg/0.3 mL (1:1,000) AtIn Inject 0.3 mLs (0.3 mg total) into the muscle once.    fluticasone (FLONASE) 50 mcg/actuation nasal spray 1 spray by Nasal route as needed.     guaifenesin (MUCINEX) 600 mg 12 hr tablet Take 1,200 mg by mouth 2 (two) times daily.    insulin aspart (NOVOLOG) 100 unit/mL InPn pen Nov 4 units with meals and correction scale 1:25 goal 150 ave daily dose 26U/d plus needles    insulin detemir (LEVEMIR FLEXTOUCH) 100 unit/mL (3 mL) SubQ InPn pen Inject 26 Units into the skin once daily.    LACTOBACILLUS ACIDOPHILUS (ACIDOPHILUS ORAL) Take 1 capsule by mouth once daily.    levothyroxine (SYNTHROID) 125 MCG tablet TAKE 1 TABLET ONE TIME DAILY    losartan (COZAAR) 25 MG tablet TAKE 1 TABLET EVERY DAY    multivitamin with minerals tablet Take 1 tablet by mouth once daily.    neomycin-polymyxin-dexamethasone (DEXACINE) 3.5 mg/g-10,000 unit/g-0.1 % Oint     neomycin-polymyxin-dexamethasone (MAXITROL) 3.5mg/mL-10,000 unit/mL-0.1 % DrpS     oxycodone-acetaminophen (PERCOCET) 5-325 mg per tablet Take 1 tablet by mouth every 4 to 6 hours as needed for Pain.    pantoprazole (PROTONIX) 40 MG tablet TAKE 1 TABLET ONE TIME DAILY    predniSONE (DELTASONE) 2.5 MG tablet TAKE 1 TABLET EVERY DAY    predniSONE (DELTASONE) 5 MG tablet TAKE 1 TABLET EVERY DAY    tazarotene (AVAGE) 0.1 % cream Apply topically every evening.    testosterone cypionate " (DEPOTESTOTERONE CYPIONATE) 200 mg/mL injection INJECT  1ML INTRAMUSCULARLY  EVERY  14  DAYS           Clinical Reference Information           Allergies as of 3/23/2017     Ace Inhibitors    Divalproex    Peanut      Immunizations Administered on Date of Encounter - 3/23/2017     None      Language Assistance Services     ATTENTION: Language assistance services are available, free of charge. Please call [phone].      ATENCIÓN: Si habla español, tiene a fortune disposición servicios gratuitos de asistencia lingüística. Llame al [phone].     CHÚ Ý: N?u b?n nói Ti?ng Vi?t, có các d?ch v? h? tr? ngôn ng? mi?n phí dành cho b?n. G?i s? [phone].         [facility] Hematology Oncology complies with applicable Federal civil rights laws and does not discriminate on the basis of race, color, national origin, age, disability, or sex.

## 2017-03-23 NOTE — PLAN OF CARE
Problem: Patient Care Overview (Adult)  Goal: Plan of Care Review  Outcome: Ongoing (interventions implemented as appropriate)  Tolerated treatment well.  Advised to call MD for any problems or concerns.  AVS given.  RTC in 1 month for next Arzerra infusion.  NAD noted upon discharge.

## 2017-03-23 NOTE — PLAN OF CARE
Problem: Chemotherapy Effects (Adult)  Goal: Signs and Symptoms of Listed Potential Problems Will be Absent or Manageable (Chemotherapy Effects)  Signs and symptoms of listed potential problems will be absent or manageable by discharge/transition of care (reference Chemotherapy Effects (Adult) CPG).   Outcome: Ongoing (interventions implemented as appropriate)  Here for Cycle 14 Day 1 of Arzerra.  Blankets and comfort measures provided.  Resting well in reclining position in treatment chair.

## 2017-03-30 ENCOUNTER — OFFICE VISIT (OUTPATIENT)
Dept: OTOLARYNGOLOGY | Facility: CLINIC | Age: 82
End: 2017-03-30
Payer: MEDICARE

## 2017-03-30 ENCOUNTER — PROCEDURE VISIT (OUTPATIENT)
Dept: DERMATOLOGY | Facility: CLINIC | Age: 82
End: 2017-03-30
Payer: MEDICARE

## 2017-03-30 ENCOUNTER — ANESTHESIA EVENT (OUTPATIENT)
Dept: SURGERY | Facility: HOSPITAL | Age: 82
End: 2017-03-30
Payer: MEDICARE

## 2017-03-30 ENCOUNTER — HOSPITAL ENCOUNTER (OUTPATIENT)
Dept: PREADMISSION TESTING | Facility: HOSPITAL | Age: 82
Discharge: HOME | End: 2017-03-30
Attending: OTOLARYNGOLOGY
Payer: MEDICARE

## 2017-03-30 VITALS
DIASTOLIC BLOOD PRESSURE: 64 MMHG | TEMPERATURE: 96 F | BODY MASS INDEX: 28.25 KG/M2 | SYSTOLIC BLOOD PRESSURE: 137 MMHG | WEIGHT: 180 LBS | HEIGHT: 67 IN | RESPIRATION RATE: 16 BRPM | HEART RATE: 71 BPM

## 2017-03-30 VITALS
SYSTOLIC BLOOD PRESSURE: 139 MMHG | BODY MASS INDEX: 28.25 KG/M2 | DIASTOLIC BLOOD PRESSURE: 64 MMHG | WEIGHT: 180 LBS | HEART RATE: 69 BPM | HEIGHT: 67 IN

## 2017-03-30 DIAGNOSIS — Z98.890 HISTORY OF MOH'S MICROGRAPHIC SURGERY FOR SKIN CANCER: ICD-10-CM

## 2017-03-30 DIAGNOSIS — C44.311 BASAL CELL CARCINOMA OF LEFT ALA NASI: Primary | ICD-10-CM

## 2017-03-30 DIAGNOSIS — Z85.828 HISTORY OF MOH'S MICROGRAPHIC SURGERY FOR SKIN CANCER: ICD-10-CM

## 2017-03-30 DIAGNOSIS — C44.300 SKIN CANCER OF FACE: Primary | ICD-10-CM

## 2017-03-30 PROCEDURE — 99499 UNLISTED E&M SERVICE: CPT | Mod: S$GLB,,, | Performed by: DERMATOLOGY

## 2017-03-30 PROCEDURE — 1157F ADVNC CARE PLAN IN RCRD: CPT | Mod: S$GLB,,, | Performed by: OTOLARYNGOLOGY

## 2017-03-30 PROCEDURE — 99214 OFFICE O/P EST MOD 30 MIN: CPT | Mod: 57,S$GLB,, | Performed by: OTOLARYNGOLOGY

## 2017-03-30 PROCEDURE — 17312 MOHS ADDL STAGE: CPT | Mod: S$GLB,,, | Performed by: DERMATOLOGY

## 2017-03-30 PROCEDURE — 17311 MOHS 1 STAGE H/N/HF/G: CPT | Mod: S$GLB,,, | Performed by: DERMATOLOGY

## 2017-03-30 PROCEDURE — 1160F RVW MEDS BY RX/DR IN RCRD: CPT | Mod: S$GLB,,, | Performed by: OTOLARYNGOLOGY

## 2017-03-30 PROCEDURE — 1126F AMNT PAIN NOTED NONE PRSNT: CPT | Mod: S$GLB,,, | Performed by: OTOLARYNGOLOGY

## 2017-03-30 PROCEDURE — 99999 PR PBB SHADOW E&M-EST. PATIENT-LVL II: CPT | Mod: PBBFAC,,, | Performed by: OTOLARYNGOLOGY

## 2017-03-30 PROCEDURE — 3078F DIAST BP <80 MM HG: CPT | Mod: S$GLB,,, | Performed by: OTOLARYNGOLOGY

## 2017-03-30 PROCEDURE — 3074F SYST BP LT 130 MM HG: CPT | Mod: S$GLB,,, | Performed by: OTOLARYNGOLOGY

## 2017-03-30 PROCEDURE — 1159F MED LIST DOCD IN RCRD: CPT | Mod: S$GLB,,, | Performed by: OTOLARYNGOLOGY

## 2017-03-30 NOTE — PROGRESS NOTES
PROCEDURE: Mohs' Micrographic Surgery    INDICATION: Location in mask areas of face including central face, nose, eyelids, eyebrows, lips, chin, preauricular, temple, and ear. Biopsy-proven skin cancer of cosmetically and functionally important areas, including head, neck, genital, hand, foot, or areas known for having difficulty in healing, such as the lower anterior legs. Tumor with ill-defined borders.    REFERRING MD: Karissa Monsivais M.D.    CASE NUMBER:     ANESTHETIC: 3 cc 0.5% Lidocaine with Epi 1:200,000 mixed 1:1 with 0.5% Bupivacaine    SURGICAL PREP: Betadine    SURGEON: Ronel Bliss MD    ASSISTANTS: Terri Subramanian PA-C, Faye Henry MA and Reba Rosario, Surg Tech    PREOPERATIVE DIAGNOSIS: basal cell carcinoma    POSTOPERATIVE DIAGNOSIS: basal cell carcinoma    PATHOLOGIC DIAGNOSIS: basal cell carcinoma- superficial, nodular    HISTOLOGY OF SPECIMENS IN FIRST STAGE:   Tumor Type: Tumor seen. Superficial basal cell carcinoma: Foci of basaloid cells with peripheral palisading and focal retraction artifact arising along the dermoepidermal junction and extending into the papillary dermis.  Nodular basal cell carcinoma: Nodular tumor in dermis composed of basaloid cells exhibiting peripheral palisading and retraction artifact.   Depth of Invasion: epidermis, dermis and subcutaneous tissue  Perineural Invasion: No    HISTOLOGY OF SPECIMENS IN SUBSEQUENT STAGES:  · Tumor Type: No tumor seen.    STAGES OF MOHS' SURGERY PERFORMED: 2    TUMOR-FREE PLANE ACHIEVED: Yes    HEMOSTASIS: electrocoagulation     SPECIMENS: 4 (2 in stage A and 2 in stage B)    LOCATION: left nose (L inferior ala). Patient verified location.    INITIAL LESION SIZE: 0.7 x 0.8 cm    FINAL DEFECT SIZE: 1.1 x 1.2 cm    WOUND REPAIR/DISPOSITION: The patient tolerated Mohs' Micrographic Surgery for a basal cell carcinoma very well. When the tumor was completely removed, the patient was referred to Dr. Dsouza in ENT Plastics for repair of  "the surgical defect.    Vitals:    03/30/17 0735   BP: 139/64   BP Location: Left arm   Patient Position: Sitting   BP Method: Automatic   Pulse: 69   Weight: 81.6 kg (180 lb)   Height: 5' 7" (1.702 m)           "

## 2017-03-30 NOTE — ANESTHESIA PREPROCEDURE EVALUATION
03/30/2017  Thierry Ramos Jr. is a 83 y.o., male.    [facility] Anesthesia Evaluation    I have reviewed the Patient Summary Reports.    I have reviewed the Nursing Notes.   I have reviewed the Medications.   Steroids Taken In Past Year:     Review of Systems  Anesthesia Hx:  No problems with previous Anesthesia  History of prior surgery of interest to airway management or planning: facial plastic/reconstructive. Previous anesthesia: General 2014  with general anesthesia.  Procedure performed at an [facility]. Airway issues documented on chart review include difficult direct laryngoscopy, glidescope used , view on direct laryngoscopy Grade IV , view on video-laryngoscopy Grade I    Family Hx of Anesthesia complications:  Personal Hx of Anesthesia complications  Difficult Intubation Difficult or Failed Neuraxial Anesthesia  Hematology/Oncology:         -- Anemia: Chronic Lymphocytic Leukemia (CLL) and Squamous Cell  Current/Recent Cancer. (CLL)  --  Cancer in past history:  Other (see Oncology comments) chemotherapy    Cardiovascular:   Exercise tolerance: good Hypertension, well controlled  Denies Angina. PVD     NYHA Classification III  Functional Capacity good / => 4 METS    Pulmonary:   Denies Shortness of breath.  Denies Sleep Apnea.    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 Pt states hematoma post op resolved     Endocrine:   Diabetes, well controlled, type 2 Hypothyroidism Adrenal insufficiency 2002 pituatary tumor   Dermatological:  Skin Symptoms/Problems: Multiple sites of squamous cell CA      Physical Exam  General:  Well nourished     Airway/Jaw/Neck:  Airway Findings: Mouth Opening: Normal Tongue: Normal  General Airway Assessment: Adult  Mallampati: III  Improves to II with phonation.  TM Distance: Normal, at least 6 cm  Jaw/Neck Findings:  Limited Ability to Prognath  Neck ROM: Normal ROM  Neck Findings: Normal    Eyes/Ears/Nose:  Eyes/Ears/Nose Findings: Has guaze bandage on end of nose where lesion is    Dental:  Dental Findings: upper partial dentures   Chest/Lungs:  Chest/Lungs Findings: Clear to auscultation, Normal Respiratory Rate     Heart/Vascular:  Heart Findings: Rate: Normal  Rhythm: Regular Rhythm  Sounds: Normal  Heart murmur: negative    Abdomen:  Abdomen Findings: Normal    Musculoskeletal:  Musculoskeletal Findings: Normal   Skin:  Skin Findings:  Multiple skin tumors     Mental Status:  Mental Status Findings:  Cooperative, Alert and Oriented         Anesthesia Plan  Type of Anesthesia, risks & benefits discussed:  Anesthesia Type:  general  Patient's Preference:   Intra-op Monitoring Plan: standard ASA monitors  Intra-op Monitoring Plan Comments:   Post Op Pain Control Plan:   Post Op Pain Control Plan Comments:   Induction:   IV  Beta Blocker:  Patient is not currently on a Beta-Blocker (No further documentation required).       Informed Consent: Patient understands risks and agrees with Anesthesia plan.  Questions answered. Anesthesia consent signed with patient.  ASA Score: 3     Day of Surgery Review of History & Physical:    H&P update referred to the surgeon.     Anesthesia Plan Notes: Will give stress dose steroids (100mg hydrocortisone), platelets stable at 97,000, renal function at baseline, will use glidescope.        Ready For Surgery From Anesthesia Perspective.     Pt and wife given preop and medication instructions, verbalized understand.

## 2017-03-30 NOTE — IP AVS SNAPSHOT
[facility]  [address]  [facility] [address]  Phone: [phone]           Patient Discharge Instructions  Our goal is to set you up for success. This packet includes information on your condition, medications, and your home care. It will help you care for yourself to prevent having to return to the hospital.     Please ask your nurse if you have any questions.      There are many details to remember when preparing for your surgery. Here is what you will need to do, please ask your nurse if there are more specific instructions and if you have any questions:    1. Before procedure Do not smoke or drink alcoholic beverages 24 hours prior to your procedure. Do not eat or drink anything 8 hours before your procedure - this includes gum, mints, and candy.     2. Day of procedure Please remove all jewelry for the procedure. If you wear contact lenses, dentures, hearing aids or glasses, bring a container to put them in during your surgery and give to a family member.  If your doctor has scheduled you for an overnight stay, bring a small overnight bag with any personal items that you need.      3. After procedure  Make arrangements in advance for transportation home by a responsible adult. It is not safe to drive a vehicle during the 24 hours following surgery.     PLEASE NOTE: You may be contacted the day before your surgery to confirm your surgery date and arrival time. The Surgery schedule has many variables which may affect the time of your surgery case. Family members should be available if your surgery time changes.           [facility]  Unless otherwise directed by your provider, please   contact [facility], our nurse care line   that is available for 24/7 assistance.     [phone] (toll-free)     Registered nurses in the [facility]   provide: appointment scheduling, clinical advisement, health education, and other advisory services.               "    ** Verify the list of medication(s) below is accurate and up to date. Carry this with you in case of emergency. If your medications have changed, please notify your healthcare provider.             Medication List      TAKE these medications        Additional Info                      ACCU-CHEK FASTCLIX Misc   Quantity:  408 each   Refills:  6   Generic drug:  lancets    Instructions:  TEST  BLOOD  GLUCOSE FOUR TIMES DAILY     Begin Date    AM    Noon    PM    Bedtime       ACIDOPHILUS ORAL   Refills:  0   Dose:  1 capsule    Instructions:  Take 1 capsule by mouth once daily.     Begin Date    AM    Noon    PM    Bedtime       alcohol swabs Padm   Quantity:  300 each   Refills:  3    Instructions:  APPLY 1 PAD TOPICALLY  AS NEEDED.     Begin Date    AM    Noon    PM    Bedtime       atorvastatin 20 MG tablet   Commonly known as:  LIPITOR   Quantity:  90 tablet   Refills:  3    Instructions:  TAKE 1 TABLET ONE TIME DAILY     Begin Date    AM    Noon    PM    Bedtime       BD INSULIN PEN NEEDLE UF SHORT 31 gauge x 5/16" Ndle   Quantity:  180 each   Refills:  4   Generic drug:  pen needle, diabetic    Instructions:  USE WITH LEVEMIR PEN  TO INJECT  10  UNITS SUBCUTANEOUSLY TWICE DAILY     Begin Date    AM    Noon    PM    Bedtime       clopidogrel 75 mg tablet   Commonly known as:  PLAVIX   Quantity:  90 tablet   Refills:  3    Instructions:  TAKE 1 TABLET ONE TIME DAILY     Begin Date    AM    Noon    PM    Bedtime       epinephrine 0.3 mg/0.3 mL Atin   Commonly known as:  EPIPEN 2-LEVI   Quantity:  0.3 mL   Refills:  3   Dose:  1 each    Instructions:  Inject 0.3 mLs (0.3 mg total) into the muscle once.     Begin Date    AM    Noon    PM    Bedtime       fluticasone 50 mcg/actuation nasal spray   Commonly known as:  FLONASE   Refills:  0   Dose:  1 spray    Instructions:  1 spray by Nasal route as needed.     Begin Date    AM    Noon    PM    Bedtime       guaifenesin 600 mg 12 hr tablet   Commonly known as:  " MUCINEX   Refills:  0   Dose:  1200 mg    Instructions:  Take 1,200 mg by mouth 2 (two) times daily.     Begin Date    AM    Noon    PM    Bedtime       insulin aspart 100 unit/mL Inpn pen   Commonly known as:  NovoLOG   Quantity:  1 Box   Refills:  3    Instructions:  Nov 4 units with meals and correction scale 1:25 goal 150 ave daily dose 26U/d plus needles     Begin Date    AM    Noon    PM    Bedtime       insulin detemir 100 unit/mL (3 mL) Inpn pen   Commonly known as:  LEVEMIR FLEXTOUCH   Quantity:  30 mL   Refills:  1   Dose:  26 Units    Instructions:  Inject 26 Units into the skin once daily.     Begin Date    AM    Noon    PM    Bedtime       levothyroxine 125 MCG tablet   Commonly known as:  SYNTHROID   Quantity:  90 tablet   Refills:  3    Instructions:  TAKE 1 TABLET ONE TIME DAILY     Begin Date    AM    Noon    PM    Bedtime       losartan 25 MG tablet   Commonly known as:  COZAAR   Quantity:  90 tablet   Refills:  3    Instructions:  TAKE 1 TABLET EVERY DAY     Begin Date    AM    Noon    PM    Bedtime       multivitamin with minerals tablet   Refills:  0   Dose:  1 tablet    Instructions:  Take 1 tablet by mouth once daily.     Begin Date    AM    Noon    PM    Bedtime       * neomycin-polymyxin-dexamethasone 3.5 mg/g-10,000 unit/g-0.1 % Oint   Commonly known as:  DEXACINE   Refills:  0      Begin Date    AM    Noon    PM    Bedtime       * neomycin-polymyxin-dexamethasone 3.5mg/mL-10,000 unit/mL-0.1 % Drps   Commonly known as:  MAXITROL   Refills:  0      Begin Date    AM    Noon    PM    Bedtime       oxycodone-acetaminophen 5-325 mg per tablet   Commonly known as:  PERCOCET   Quantity:  20 tablet   Refills:  0   Dose:  1 tablet    Instructions:  Take 1 tablet by mouth every 4 to 6 hours as needed for Pain.     Begin Date    AM    Noon    PM    Bedtime       pantoprazole 40 MG tablet   Commonly known as:  PROTONIX   Quantity:  90 tablet   Refills:  3    Instructions:  TAKE 1 TABLET ONE TIME DAILY      Begin Date    AM    Noon    PM    Bedtime       * predniSONE 5 MG tablet   Commonly known as:  DELTASONE   Quantity:  90 tablet   Refills:  3    Instructions:  TAKE 1 TABLET EVERY DAY     Begin Date    AM    Noon    PM    Bedtime       * predniSONE 2.5 MG tablet   Commonly known as:  DELTASONE   Quantity:  90 tablet   Refills:  11    Instructions:  TAKE 1 TABLET EVERY DAY     Begin Date    AM    Noon    PM    Bedtime       tazarotene 0.1 % cream   Commonly known as:  AVAGE   Quantity:  30 g   Refills:  3    Instructions:  Apply topically every evening.     Begin Date    AM    Noon    PM    Bedtime       testosterone cypionate 200 mg/mL injection   Commonly known as:  DEPOTESTOTERONE CYPIONATE   Quantity:  6 mL   Refills:  1    Instructions:  INJECT  1ML INTRAMUSCULARLY  EVERY  14  DAYS     Begin Date    AM    Noon    PM    Bedtime       VITAMIN B-12 500 MCG tablet   Refills:  0   Dose:  1 tablet   Generic drug:  cyanocobalamin    Instructions:  Take 1 tablet by mouth Daily.     Begin Date    AM    Noon    PM    Bedtime       * Notice:  This list has 4 medication(s) that are the same as other medications prescribed for you. Read the directions carefully, and ask your doctor or other care provider to review them with you.               Please bring to all follow up appointments:    1. A copy of your discharge instructions.  2. All medicines you are currently taking in their original bottles.  3. Identification and insurance card.    Please arrive 15 minutes ahead of scheduled appointment time.    Please call 24 hours in advance if you must reschedule your appointment and/or time.        Your Scheduled Appointments     Apr 05, 2017  9:30 AM CDT   Post OP with [place] MD Tommy Collado III - Otorhinolaryngology ([facility] )    [address]  [facility] [address]   [phone]            Apr 10, 2017 10:00 AM CDT   Infusion 300 Min with [facility], CHEMO   [facility]    [address] [facility] [address]   [phone]            Apr 19, 2017  9:00 AM CDT   Established Patient Visit with MD Goldie Teran - Dermatology ([facility] [place])    [address] [facility]  [place] [address]   [phone]            May 15, 2017 10:00 AM CDT   Infusion 300 Min with [facility] CHEMO   [facility] ([facility])    [address] [place] [address]   [phone]            May 25, 2017  9:00 AM CDT   Infusion 420 Min with [facility] CHEMO   [facility] ([facility])    [address] [place] [address]   [phone]              Your Future Surgeries/Procedures     Mar 31, 2017   Surgery with [place] MARY Dsouza III, MD   [facility] ([facility])    [address] [facility] [address]   [phone]                  Discharge Instructions       PreOp Instructions given:  - Written medication information (what to hold and what to take)  - NPO guidelines  - Arrival place directions given; time to be given the day before procedure by the Surgeon's Office  - Bathing with antibacterial soap   - Don't wear any jewelry or bring any valuables AM of surgery  - No makeup or moisturizer to face  - No perfume/cologne, powder, lotions or aftershave    Pt. verbalized understanding.     Discharge References/Attachments     ANESTHESIA: MONITORED ANESTHESIA CARE (MAC) (ENGLISH)        Admission Information     Date & Time Provider Department CSN    3/30/2017  1:00 PM Rhonda G Leopold, MD [facility] [id number]      Care Providers     Provider Role Specialty Primary office phone    Rhonda G Leopold, MD Attending Provider Anesthesiology [phone]      Your Vitals Were     BP Pulse Temp Resp Height Weight    137/64 71 96 °F (35.6 °C) 16 5' 7" (1.702 m) 81.6 kg (180 lb)    BMI                28.19 kg/m2     "      Recent Lab Values        3/30/2012 7/10/2013 5/31/2014 10/20/2014 4/2/2015 1/27/2016 2/23/2016 12/21/2016      8:00 AM  7:16 AM  3:09 AM  7:45 AM  9:03 AM  9:43 PM  9:48 AM  8:51 AM    A1C 6.4 (H) 6.0 10.4 (H) 6.2 6.1 6.8 (H) 7.5 (H) 7.1 (H)    Comment for A1C at  8:51 AM on 12/21/2016:  According to ADA guidelines, hemoglobin A1C <7.0% represents  optimal control in non-pregnant diabetic patients.  Different  metrics may apply to specific populations.   Standards of Medical Care in Diabetes - 2016.  For the purpose of screening for the presence of diabetes:  <5.7%     Consistent with the absence of diabetes  5.7-6.4%  Consistent with increasing risk for diabetes   (prediabetes)  >or=6.5%  Consistent with diabetes  Currently no consensus exists for use of hemoglobin A1C  for diagnosis of diabetes for children.        Allergies as of 3/30/2017        Reactions    Ace Inhibitors Swelling    angioedema    Divalproex     Rash under arms, body creases    Peanut Swelling      Advance Directives     An advance directive is a document which, in the event you are no longer able to make decisions for yourself, tells your healthcare team what kind of treatment you do or do not want to receive, or who you would like to make those decisions for you.  If you do not currently have an advance directive, [facility] encourages you to create one.  For more information call:  [phone]), [phone] ([phone]),  or log on to [email or web address].        Language Assistance Services     ATTENTION: Language assistance services are available, free of charge. Please call [phone].      ATENCIÓN: Si habla español, tiene a fortune disposición servicios gratuitos de asistencia lingüística. Llame al [phone].     CHÚ Ý: N?u b?n nói Ti?ng Vi?t, có các d?ch v? h? tr? ngôn ng? mi?n phí dành cho b?n. G?i s? [phone].        Stroke Education              Chronic Kindey Disease Education             Diabetes  Discharge Instructions                                    [facility] complies with applicable Federal civil rights laws and does not discriminate on the basis of race, color, national origin, age, disability, or sex.

## 2017-03-31 ENCOUNTER — ANESTHESIA (OUTPATIENT)
Dept: SURGERY | Facility: HOSPITAL | Age: 82
End: 2017-03-31
Payer: MEDICARE

## 2017-03-31 ENCOUNTER — HOSPITAL ENCOUNTER (OUTPATIENT)
Facility: HOSPITAL | Age: 82
Discharge: HOME | End: 2017-03-31
Attending: OTOLARYNGOLOGY | Admitting: OTOLARYNGOLOGY
Payer: MEDICARE

## 2017-03-31 ENCOUNTER — SURGERY (OUTPATIENT)
Age: 82
End: 2017-03-31

## 2017-03-31 VITALS
DIASTOLIC BLOOD PRESSURE: 66 MMHG | WEIGHT: 180 LBS | TEMPERATURE: 98 F | HEIGHT: 71 IN | SYSTOLIC BLOOD PRESSURE: 124 MMHG | RESPIRATION RATE: 18 BRPM | HEART RATE: 61 BPM | BODY MASS INDEX: 25.2 KG/M2 | OXYGEN SATURATION: 100 %

## 2017-03-31 DIAGNOSIS — Z98.890 MOHS DEFECT OF ALA NASI: ICD-10-CM

## 2017-03-31 DIAGNOSIS — C44.320 SQUAMOUS CELL CARCINOMA OF SKIN OF FACE: Primary | ICD-10-CM

## 2017-03-31 DIAGNOSIS — M95.0 MOHS DEFECT OF ALA NASI: ICD-10-CM

## 2017-03-31 LAB — POCT GLUCOSE: 119 MG/DL (ref 70–110)

## 2017-03-31 PROCEDURE — 63600175 PHARM REV CODE 636 W HCPCS: Performed by: NURSE ANESTHETIST, CERTIFIED REGISTERED

## 2017-03-31 PROCEDURE — 25000003 PHARM REV CODE 250: Performed by: OTOLARYNGOLOGY

## 2017-03-31 PROCEDURE — 37000008 HC ANESTHESIA 1ST 15 MINUTES: Performed by: OTOLARYNGOLOGY

## 2017-03-31 PROCEDURE — 25000003 PHARM REV CODE 250: Performed by: REGISTERED NURSE

## 2017-03-31 PROCEDURE — 25000003 PHARM REV CODE 250: Performed by: STUDENT IN AN ORGANIZED HEALTH CARE EDUCATION/TRAINING PROGRAM

## 2017-03-31 PROCEDURE — 37000009 HC ANESTHESIA EA ADD 15 MINS: Performed by: OTOLARYNGOLOGY

## 2017-03-31 PROCEDURE — 36000706: Performed by: OTOLARYNGOLOGY

## 2017-03-31 PROCEDURE — 36000707: Performed by: OTOLARYNGOLOGY

## 2017-03-31 PROCEDURE — 71000015 HC POSTOP RECOV 1ST HR: Performed by: OTOLARYNGOLOGY

## 2017-03-31 PROCEDURE — 82962 GLUCOSE BLOOD TEST: CPT | Performed by: OTOLARYNGOLOGY

## 2017-03-31 PROCEDURE — 15004 WOUND PREP F/N/HF/G: CPT | Mod: ,,, | Performed by: OTOLARYNGOLOGY

## 2017-03-31 PROCEDURE — 25000003 PHARM REV CODE 250: Performed by: NURSE ANESTHETIST, CERTIFIED REGISTERED

## 2017-03-31 PROCEDURE — D9220A PRA ANESTHESIA: Mod: CRNA,,, | Performed by: NURSE ANESTHETIST, CERTIFIED REGISTERED

## 2017-03-31 PROCEDURE — 27100025 HC TUBING, SET FLUID WARMER: Performed by: REGISTERED NURSE

## 2017-03-31 PROCEDURE — 71000033 HC RECOVERY, INTIAL HOUR: Performed by: OTOLARYNGOLOGY

## 2017-03-31 PROCEDURE — 71000039 HC RECOVERY, EACH ADD'L HOUR

## 2017-03-31 PROCEDURE — D9220A PRA ANESTHESIA: Mod: ANES,,, | Performed by: ANESTHESIOLOGY

## 2017-03-31 PROCEDURE — 14061 TIS TRNFR E/N/E/L10.1-30SQCM: CPT | Mod: ,,, | Performed by: OTOLARYNGOLOGY

## 2017-03-31 PROCEDURE — 63600175 PHARM REV CODE 636 W HCPCS: Performed by: REGISTERED NURSE

## 2017-03-31 RX ORDER — SUCCINYLCHOLINE CHLORIDE 20 MG/ML
INJECTION INTRAMUSCULAR; INTRAVENOUS
Status: DISCONTINUED | OUTPATIENT
Start: 2017-03-31 | End: 2017-03-31

## 2017-03-31 RX ORDER — LIDOCAINE HYDROCHLORIDE 10 MG/ML
1 INJECTION, SOLUTION EPIDURAL; INFILTRATION; INTRACAUDAL; PERINEURAL ONCE
Status: DISCONTINUED | OUTPATIENT
Start: 2017-03-31 | End: 2017-03-31 | Stop reason: HOSPADM

## 2017-03-31 RX ORDER — LIDOCAINE HYDROCHLORIDE AND EPINEPHRINE 10; 10 MG/ML; UG/ML
INJECTION, SOLUTION INFILTRATION; PERINEURAL
Status: DISCONTINUED | OUTPATIENT
Start: 2017-03-31 | End: 2017-03-31 | Stop reason: HOSPADM

## 2017-03-31 RX ORDER — ACETAMINOPHEN 10 MG/ML
INJECTION, SOLUTION INTRAVENOUS
Status: DISCONTINUED | OUTPATIENT
Start: 2017-03-31 | End: 2017-03-31

## 2017-03-31 RX ORDER — OXYCODONE AND ACETAMINOPHEN 5; 325 MG/1; MG/1
1 TABLET ORAL EVERY 6 HOURS PRN
Qty: 30 TABLET | Refills: 0 | Status: SHIPPED | OUTPATIENT
Start: 2017-03-31 | End: 2017-04-10

## 2017-03-31 RX ORDER — ONDANSETRON 2 MG/ML
4 INJECTION INTRAMUSCULAR; INTRAVENOUS ONCE
Status: DISCONTINUED | OUTPATIENT
Start: 2017-03-31 | End: 2017-03-31 | Stop reason: HOSPADM

## 2017-03-31 RX ORDER — PHENYLEPHRINE HYDROCHLORIDE 10 MG/ML
INJECTION INTRAVENOUS
Status: DISCONTINUED | OUTPATIENT
Start: 2017-03-31 | End: 2017-03-31

## 2017-03-31 RX ORDER — SODIUM CHLORIDE 0.9 % (FLUSH) 0.9 %
3 SYRINGE (ML) INJECTION
Status: DISCONTINUED | OUTPATIENT
Start: 2017-03-31 | End: 2017-03-31 | Stop reason: HOSPADM

## 2017-03-31 RX ORDER — OXYCODONE AND ACETAMINOPHEN 5; 325 MG/1; MG/1
1 TABLET ORAL EVERY 4 HOURS PRN
Status: DISCONTINUED | OUTPATIENT
Start: 2017-03-31 | End: 2017-03-31 | Stop reason: HOSPADM

## 2017-03-31 RX ORDER — SODIUM CHLORIDE 9 MG/ML
INJECTION, SOLUTION INTRAVENOUS CONTINUOUS
Status: DISCONTINUED | OUTPATIENT
Start: 2017-03-31 | End: 2017-03-31 | Stop reason: HOSPADM

## 2017-03-31 RX ORDER — ROCURONIUM BROMIDE 10 MG/ML
INJECTION, SOLUTION INTRAVENOUS
Status: DISCONTINUED | OUTPATIENT
Start: 2017-03-31 | End: 2017-03-31

## 2017-03-31 RX ORDER — PROPOFOL 10 MG/ML
VIAL (ML) INTRAVENOUS
Status: DISCONTINUED | OUTPATIENT
Start: 2017-03-31 | End: 2017-03-31

## 2017-03-31 RX ORDER — ONDANSETRON HYDROCHLORIDE 8 MG/1
8 TABLET, FILM COATED ORAL EVERY 8 HOURS PRN
Qty: 21 TABLET | Refills: 1 | Status: SHIPPED | OUTPATIENT
Start: 2017-03-31 | End: 2017-04-07

## 2017-03-31 RX ORDER — CEPHALEXIN 500 MG/1
500 CAPSULE ORAL EVERY 8 HOURS
Qty: 40 CAPSULE | Refills: 0 | Status: SHIPPED | OUTPATIENT
Start: 2017-03-31 | End: 2017-04-10

## 2017-03-31 RX ORDER — LIDOCAINE HYDROCHLORIDE 10 MG/ML
1 INJECTION, SOLUTION EPIDURAL; INFILTRATION; INTRACAUDAL; PERINEURAL ONCE
Status: COMPLETED | OUTPATIENT
Start: 2017-03-31 | End: 2017-03-31

## 2017-03-31 RX ORDER — FENTANYL CITRATE 50 UG/ML
INJECTION, SOLUTION INTRAMUSCULAR; INTRAVENOUS
Status: DISCONTINUED | OUTPATIENT
Start: 2017-03-31 | End: 2017-03-31

## 2017-03-31 RX ORDER — HYDROMORPHONE HYDROCHLORIDE 1 MG/ML
0.2 INJECTION, SOLUTION INTRAMUSCULAR; INTRAVENOUS; SUBCUTANEOUS EVERY 5 MIN PRN
Status: DISCONTINUED | OUTPATIENT
Start: 2017-03-31 | End: 2017-03-31 | Stop reason: HOSPADM

## 2017-03-31 RX ORDER — ETOMIDATE 2 MG/ML
INJECTION INTRAVENOUS
Status: DISCONTINUED | OUTPATIENT
Start: 2017-03-31 | End: 2017-03-31

## 2017-03-31 RX ORDER — GLYCOPYRROLATE 0.2 MG/ML
INJECTION INTRAMUSCULAR; INTRAVENOUS
Status: DISCONTINUED | OUTPATIENT
Start: 2017-03-31 | End: 2017-03-31

## 2017-03-31 RX ORDER — BACITRACIN ZINC 500 UNIT/G
OINTMENT (GRAM) TOPICAL
Status: DISCONTINUED | OUTPATIENT
Start: 2017-03-31 | End: 2017-03-31 | Stop reason: HOSPADM

## 2017-03-31 RX ORDER — NEOSTIGMINE METHYLSULFATE 1 MG/ML
INJECTION, SOLUTION INTRAVENOUS
Status: DISCONTINUED | OUTPATIENT
Start: 2017-03-31 | End: 2017-03-31

## 2017-03-31 RX ORDER — LIDOCAINE HCL/PF 100 MG/5ML
SYRINGE (ML) INTRAVENOUS
Status: DISCONTINUED | OUTPATIENT
Start: 2017-03-31 | End: 2017-03-31

## 2017-03-31 RX ORDER — ONDANSETRON 2 MG/ML
4 INJECTION INTRAMUSCULAR; INTRAVENOUS ONCE AS NEEDED
Status: DISCONTINUED | OUTPATIENT
Start: 2017-03-31 | End: 2017-03-31 | Stop reason: HOSPADM

## 2017-03-31 RX ORDER — ONDANSETRON 2 MG/ML
INJECTION INTRAMUSCULAR; INTRAVENOUS
Status: DISCONTINUED | OUTPATIENT
Start: 2017-03-31 | End: 2017-03-31

## 2017-03-31 RX ADMIN — EPHEDRINE SULFATE 5 MG: 50 INJECTION, SOLUTION INTRAMUSCULAR; INTRAVENOUS; SUBCUTANEOUS at 12:03

## 2017-03-31 RX ADMIN — ONDANSETRON 4 MG: 2 INJECTION INTRAMUSCULAR; INTRAVENOUS at 12:03

## 2017-03-31 RX ADMIN — HYDROCORTISONE SODIUM SUCCINATE 100 MG: 100 INJECTION, POWDER, FOR SOLUTION INTRAMUSCULAR; INTRAVENOUS at 11:03

## 2017-03-31 RX ADMIN — ACETAMINOPHEN 1000 MG: 10 INJECTION, SOLUTION INTRAVENOUS at 12:03

## 2017-03-31 RX ADMIN — OXYCODONE HYDROCHLORIDE AND ACETAMINOPHEN 1 TABLET: 5; 325 TABLET ORAL at 02:03

## 2017-03-31 RX ADMIN — ETOMIDATE 15 MG: 2 INJECTION, SOLUTION INTRAVENOUS at 11:03

## 2017-03-31 RX ADMIN — LIDOCAINE HYDROCHLORIDE 100 MG: 20 INJECTION, SOLUTION INTRAVENOUS at 11:03

## 2017-03-31 RX ADMIN — BACITRACIN ZINC 1 TUBE: 500 OINTMENT TOPICAL at 12:03

## 2017-03-31 RX ADMIN — GLYCOPYRROLATE 0.3 MG: 0.2 INJECTION, SOLUTION INTRAMUSCULAR; INTRAVENOUS at 12:03

## 2017-03-31 RX ADMIN — SUCCINYLCHOLINE CHLORIDE 200 MG: 20 INJECTION, SOLUTION INTRAMUSCULAR; INTRAVENOUS at 11:03

## 2017-03-31 RX ADMIN — ROCURONIUM BROMIDE 5 MG: 10 INJECTION, SOLUTION INTRAVENOUS at 11:03

## 2017-03-31 RX ADMIN — LIDOCAINE HYDROCHLORIDE AND EPINEPHRINE 4 ML: 10; 10 INJECTION, SOLUTION INFILTRATION; PERINEURAL at 12:03

## 2017-03-31 RX ADMIN — SODIUM CHLORIDE: 0.9 INJECTION, SOLUTION INTRAVENOUS at 11:03

## 2017-03-31 RX ADMIN — NEOSTIGMINE METHYLSULFATE 3 MG: 1 INJECTION INTRAVENOUS at 12:03

## 2017-03-31 RX ADMIN — FENTANYL CITRATE 150 MCG: 50 INJECTION, SOLUTION INTRAMUSCULAR; INTRAVENOUS at 11:03

## 2017-03-31 RX ADMIN — SODIUM CHLORIDE, SODIUM GLUCONATE, SODIUM ACETATE, POTASSIUM CHLORIDE, MAGNESIUM CHLORIDE, SODIUM PHOSPHATE, DIBASIC, AND POTASSIUM PHOSPHATE: .53; .5; .37; .037; .03; .012; .00082 INJECTION, SOLUTION INTRAVENOUS at 12:03

## 2017-03-31 RX ADMIN — PROPOFOL 100 MG: 10 INJECTION, EMULSION INTRAVENOUS at 11:03

## 2017-03-31 RX ADMIN — PHENYLEPHRINE HYDROCHLORIDE 100 MCG: 10 INJECTION INTRAVENOUS at 12:03

## 2017-03-31 RX ADMIN — SODIUM CHLORIDE 10 ML/HR: 0.9 INJECTION, SOLUTION INTRAVENOUS at 11:03

## 2017-03-31 RX ADMIN — FENTANYL CITRATE 100 MCG: 50 INJECTION, SOLUTION INTRAMUSCULAR; INTRAVENOUS at 11:03

## 2017-03-31 RX ADMIN — ROCURONIUM BROMIDE 15 MG: 10 INJECTION, SOLUTION INTRAVENOUS at 11:03

## 2017-03-31 RX ADMIN — LIDOCAINE HYDROCHLORIDE 10 MG: 10 INJECTION, SOLUTION EPIDURAL; INFILTRATION; INTRACAUDAL; PERINEURAL at 11:03

## 2017-03-31 RX ADMIN — Medication 2 G: at 11:03

## 2017-03-31 NOTE — ANESTHESIA POSTPROCEDURE EVALUATION
"Anesthesia Post Evaluation    Patient: Thierry Ramos Jr.    Procedure(s) Performed: Procedure(s) (LRB):  REPAIR-MOHS DEFECT-left nose (Left)    Final Anesthesia Type: general  Patient location during evaluation: PACU  Patient participation: Yes- Able to Participate  Level of consciousness: awake and alert  Post-procedure vital signs: reviewed and stable  Pain management: adequate  Airway patency: patent  PONV status at discharge: No PONV  Anesthetic complications: no      Cardiovascular status: blood pressure returned to baseline  Respiratory status: unassisted  Hydration status: euvolemic          Visit Vitals    /65    Pulse 61    Temp 36.5 °C (97.7 °F) (Oral)    Resp 18    Ht 5' 11" (1.803 m)    Wt 81.6 kg (180 lb)    SpO2 95%    BMI 25.1 kg/m2       Pain/Geoffrey Score: Pain Assessment Performed: Yes (3/31/2017  2:20 PM)  Presence of Pain: denies (3/31/2017  2:20 PM)  Pain Rating Prior to Med Admin: 0 (3/31/2017  2:25 PM)  Geoffrey Score: 10 (3/31/2017  2:20 PM)      "

## 2017-03-31 NOTE — BRIEF OP NOTE
[facility]  Brief Operative Note     SUMMARY     Surgery Date: 3/31/2017     Surgeon(s) and Role:     * Bright Pereira MD - Resident - Assisting     * [place] MARY Dsouza III, MD - Primary        Pre-op Diagnosis:  Basal cell carcinoma [C44.91]  Preop testing [Z01.818]  Mohs defect of nose [M95.0]  Status post Mohs surgery [Z98.890]    Post-op Diagnosis:  Post-Op Diagnosis Codes:     * Basal cell carcinoma [C44.91]     * Preop testing [Z01.818]     * Mohs defect of nose [M95.0]     * Status post Mohs surgery [Z98.890]    Procedure(s) (LRB):  REPAIR-MOHS DEFECT-left nose (Left)    Anesthesia: General    Description of the findings of the procedure: site prep CPT 58514  Repair Moh's defect with melolabial flap 5x4.5 cm CPT 14-61    Findings/Key Components: see op report    Estimated Blood Loss: 4 ml         Specimens:   Specimen     None          Discharge Note    SUMMARY     Admit Date: 3/31/2017    Discharge Date and Time:  03/31/2017 1:09 PM    Hospital Course (synopsis of major diagnoses, care, treatment, and services provided during the course of the hospital stay): Pt has outpatient surgery and was awakened in the recovery room and discharged home in a stable condition. pts pain was controlled and tolerating a diet         Final Diagnosis: Post-Op Diagnosis Codes:     * Basal cell carcinoma [C44.91]     * Preop testing [Z01.818]     * Mohs defect of nose [M95.0]     * Status post Mohs surgery [Z98.890]    Disposition: Home or Self Care    Follow Up/Patient Instructions:     Medications:  Reconciled Home Medications:   Current Discharge Medication List      START taking these medications    Details   cephALEXin (KEFLEX) 500 MG capsule Take 1 capsule (500 mg total) by mouth every 8 (eight) hours.  Qty: 40 capsule, Refills: 0      ondansetron (ZOFRAN) 8 MG tablet Take 1 tablet (8 mg total) by mouth every 8 (eight) hours as needed for Nausea.  Qty: 21 tablet, Refills: 1      !!  "oxycodone-acetaminophen (PERCOCET) 5-325 mg per tablet Take 1 tablet by mouth every 6 (six) hours as needed for Pain.  Qty: 30 tablet, Refills: 0       !! - Potential duplicate medications found. Please discuss with provider.      CONTINUE these medications which have NOT CHANGED    Details   atorvastatin (LIPITOR) 20 MG tablet TAKE 1 TABLET ONE TIME DAILY  Qty: 90 tablet, Refills: 3      clopidogrel (PLAVIX) 75 mg tablet TAKE 1 TABLET ONE TIME DAILY  Qty: 90 tablet, Refills: 3      cyanocobalamin (VITAMIN B-12) 500 MCG tablet Take 1 tablet by mouth Daily.      fluticasone (FLONASE) 50 mcg/actuation nasal spray 1 spray by Nasal route as needed.       insulin detemir (LEVEMIR FLEXTOUCH) 100 unit/mL (3 mL) SubQ InPn pen Inject 26 Units into the skin once daily.  Qty: 30 mL, Refills: 1      LACTOBACILLUS ACIDOPHILUS (ACIDOPHILUS ORAL) Take 1 capsule by mouth once daily.      levothyroxine (SYNTHROID) 125 MCG tablet TAKE 1 TABLET ONE TIME DAILY  Qty: 90 tablet, Refills: 3      losartan (COZAAR) 25 MG tablet TAKE 1 TABLET EVERY DAY  Qty: 90 tablet, Refills: 3      multivitamin with minerals tablet Take 1 tablet by mouth once daily.      pantoprazole (PROTONIX) 40 MG tablet TAKE 1 TABLET ONE TIME DAILY  Qty: 90 tablet, Refills: 3      !! predniSONE (DELTASONE) 2.5 MG tablet TAKE 1 TABLET EVERY DAY  Qty: 90 tablet, Refills: 11      !! predniSONE (DELTASONE) 5 MG tablet TAKE 1 TABLET EVERY DAY  Qty: 90 tablet, Refills: 3      ACCU-CHEK FASTCLIX Misc TEST  BLOOD  GLUCOSE FOUR TIMES DAILY  Qty: 408 each, Refills: 6      alcohol swabs PadM APPLY 1 PAD TOPICALLY  AS NEEDED.  Qty: 300 each, Refills: 3      BD INSULIN PEN NEEDLE UF SHORT 31 gauge x 5/16" Ndle USE WITH LEVEMIR PEN  TO INJECT  10  UNITS SUBCUTANEOUSLY TWICE DAILY  Qty: 180 each, Refills: 4      epinephrine (EPIPEN 2-LEVI) 0.3 mg/0.3 mL (1:1,000) AtIn Inject 0.3 mLs (0.3 mg total) into the muscle once.  Qty: 0.3 mL, Refills: 3      guaifenesin (MUCINEX) 600 mg 12 hr " tablet Take 1,200 mg by mouth 2 (two) times daily.      insulin aspart (NOVOLOG) 100 unit/mL InPn pen Nov 4 units with meals and correction scale 1:25 goal 150 ave daily dose 26U/d plus needles  Qty: 1 Box, Refills: 3      neomycin-polymyxin-dexamethasone (DEXACINE) 3.5 mg/g-10,000 unit/g-0.1 % Oint       neomycin-polymyxin-dexamethasone (MAXITROL) 3.5mg/mL-10,000 unit/mL-0.1 % DrpS       !! oxycodone-acetaminophen (PERCOCET) 5-325 mg per tablet Take 1 tablet by mouth every 4 to 6 hours as needed for Pain.  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  Qty: 30 g, Refills: 3    Associated Diagnoses: Multiple actinic keratoses      testosterone cypionate (DEPOTESTOTERONE CYPIONATE) 200 mg/mL injection INJECT  1ML INTRAMUSCULARLY  EVERY  14  DAYS  Qty: 6 mL, Refills: 1       !! - Potential duplicate medications found. Please discuss with provider.          Discharge Procedure Orders  Diet general     Activity as tolerated     Call MD for:  increased confusion or weakness     Call MD for:  persistent dizziness, light-headedness, or visual disturbances     Call MD for:  worsening rash     Call MD for:  severe persistent headache     Call MD for:  difficulty breathing or increased cough     Call MD for:  redness, tenderness, or signs of infection (pain, swelling, redness, odor or green/yellow discharge around incision site)     Call MD for:  severe uncontrolled pain     Call MD for:  persistent nausea and vomiting or diarrhea     Call MD for:  temperature >100.4     No dressing needed       Follow-up Information     Follow up with ERNESTINE Dsouza MD.    Specialty:  Otolaryngology    Contact information:    [address]  [facility] [address] [phone]

## 2017-03-31 NOTE — OP NOTE
DATE OF PROCEDURE: 3/31/2017        PREOPERATIVE DIAGNOSES:  1. Mohs defect nose    POSTOPERATIVE DIAGNOSES:  2. same    PROCEDURES:  1. Site prep CPT 94110  2. Repair Moh's defect of nose with melolabial rotation flap 5x4.5 cm CPT 96352  SURGEON: DELORES Dsouza III, M.D.    ASSISTANT SURGEON: Bright Pereira M.D. (RES).    ANESTHESIA: General anesthesia with endotracheal intubation.    ESTIMATED BLOOD LOSS: 4 mL.    PROCEDURE IN DETAIL: After the appropriate consents were obtained, the patient   was brought to the Operating Room. She was positioned supine on the operating   room table. After successful endotracheal intubation, general anesthesia was   initiated. The patient was then rotated 90 degrees away from anesthesia. The   patient was then prepped and draped in the usual fashion. A complete timeout   was then held with the nursing, surgical, and Anesthesia teams. The melolabial flap was marked out and injected with 1% lidocaine with epinepherine. Incision was made with a 15 blade and the flap was elevated. The defect site was cleaned of debris, scar and fibrinous exudate. The areas that were bleeding were cauterized. A 5-0 monocryl was used to inset the flap and 6-0 nylon was used for the skin. To close the donor defect an area along the melolabial line was removed and undermined to an area 4.5x5 cm. This was closed in a similar fashion. A telfa roll with bacitracin was placed in the nose to keep it patent. Bacitracin ointment was applied and the patient was returned to [facility] and sent to the recovery room in the stable condition. Dr. Dsouza was present for the entirety of the case.      DISPOSITION: Transferred to Postanesthesia Care Unit.    COMPLICATIONS: None.    Dr. Dsouza was present and participated in the entirety of the case.

## 2017-03-31 NOTE — TRANSFER OF CARE
"Anesthesia Transfer of Care Note    Patient: Thierry Ramos Jr.    Procedure(s) Performed: Procedure(s) (LRB):  REPAIR-MOHS DEFECT-left nose (Left)    Patient location: PACU    Anesthesia Type: general    Transport from OR: Transported from OR on 6-10 L/min O2 by face mask with adequate spontaneous ventilation    Post pain: adequate analgesia    Post assessment: no apparent anesthetic complications and tolerated procedure well    Post vital signs: stable    Level of consciousness: awake, alert and oriented    Nausea/Vomiting: no nausea/vomiting    Complications: none          Last vitals:   Visit Vitals    BP (!) 158/67    Pulse 67    Temp 36.7 °C (98 °F) (Axillary)    Resp 20    Ht 5' 11" (1.803 m)    Wt 81.6 kg (180 lb)    SpO2 98%    BMI 25.1 kg/m2     "

## 2017-03-31 NOTE — CONSULTS
Mr. Ramos presents referred by Dr. Bliss for consultation.    VITAL SIGNS:  Per nurses' notes.    CHIEF COMPLAINT:  Mohs defect of the left nasal ala.    HISTORY OF PRESENT ILLNESS:  This is an 83-year-old white male with history of   multiple previous skin carcinomas of the face and extremities, previously   excised via Mohs technique and repairs varying from skin grafts to forehead   flap.  He presents today having undergone Mohs excision of skin carcinoma of the   left nasal ala this morning by Dr. Bliss.    PAST MEDICAL HISTORY:  Significant for the previously mentioned squamous and   basal cell carcinomas.  He has a history of chronic lymphocytic leukemia as well   as carotid stenosis, previous history of a stroke as well as kidney disorder.    PAST SURGICAL HISTORY:  Include carotid endarterectomy, FESS with septoplasty   and submucosal resection of turbinates by me in September 2014 and several   previous Mohs excisions.    FAMILY HISTORY:  Positive for leukemia and colon cancer and breast cancer.    SOCIAL HISTORY:  Nonsmoker.  Social drinker.    MEDICATIONS AND ALLERGIES:  Per MedCard.    PHYSICAL EXAMINATION:  General Appearance:  Well-developed, well-nourished elderly 83-year-old white   male in no apparent distress.  Communication ability is good.    External auditory canals and TMs are clear.  Hearing grossly intact.  External   nose is normal.  Intranasally, he has an anterior spur on the right with a   marked posterior spur on the right, as well.  This creates an approximately 75%   obstruction.  Turbinates are 2 to 3+.  Lips, teeth, and gums are normal.    Oropharynx:  Tonsils are 2+.  Mucosa normal.  Palate shows good elevation.  He   does have a moderately large palate.  Inspection of his head and face is normal.    He is nontender to palpation. His nasal bandages removed.  This reveals a skin   thickness only defect of the mid left nasal ala.  His mucosa and intranasal   skin is intact.  This  does go to his ala margin.  NECK:  Supple.  Thyroid with no masses.  LYMPHATICS:  No nodes.  RESPIRATORY:  Effort is normal.  Ocular mobility normal.  Cranial nerves 2-12 are grossly intact.    Auscultation is clear.  The remainder of exam is essentially normal.    IMPRESSION:  An 83-year-old white male status post Mohs excision of skin   carcinoma of the left nasal ala resulting in defect approximately 1-1.5 cm in   diameter full-thickness skin.  He is typically on Plavix.  He has been off for   the last 4 to 5 days, though his PSAs are still elevated.    RECOMMENDATION:  I have discussed my findings with he and his wife in detail.    We have discussed repair with flap and/or graft.  We will consider a melolabial   or bilobed type rotation flap for this repair.  We will set him up for surgery   for tomorrow.      HG/HN  dd: 03/30/2017 12:17:31 (CDT)  td: 03/31/2017 02:44:13 (CDT)  Doc ID   #[id number]  Job ID #[id number]    CC:

## 2017-03-31 NOTE — IP AVS SNAPSHOT
[facility]  [address]  [facility] [address]  Phone: [phone]           Patient Discharge Instructions   Our goal is to set you up for success. This packet includes information on your condition, medications, and your home care.  It will help you care for yourself to prevent having to return to the hospital.     Please ask your nurse if you have any questions.      There are many details to remember when preparing to leave the hospital. Here is what you will need to do:    1. Take your medicine. If you are prescribed medications, review your Medication List on the following pages. You may have new medications to  at the pharmacy and others that you'll need to stop taking. Review the instructions for how and when to take your medications. Talk with your doctor or nurses if you are unsure of what to do.     2. Go to your follow-up appointments. Specific follow-up information is listed in the following pages. Your may be contacted by a nurse or clinical provider about future appointments. Be sure we have all of the phone numbers to reach you. Please contact your provider's office if you are unable to make an appointment.     3. Watch for warning signs. Your doctor or nurse will give you detailed warning signs to watch for and when to call for assistance. These instructions may also include educational information about your condition. If you experience any of warning signs to your health, call your doctor.           [facility]  Unless otherwise directed by your provider, please   contact [facility], our nurse care line   that is available for 24/7 assistance.     [phone] (toll-free)     Registered nurses in the [facility]   provide: appointment scheduling, clinical advisement, health education, and other advisory services.                  ** Verify the list of medication(s) below is accurate and up to date. Carry this with you in case of  emergency. If your medications have changed, please notify your healthcare provider.             Medication List      START taking these medications        Additional Info                      cephALEXin 500 MG capsule   Commonly known as:  KEFLEX   Quantity:  40 capsule   Refills:  0   Dose:  500 mg    Instructions:  Take 1 capsule (500 mg total) by mouth every 8 (eight) hours.     Begin Date    AM    Noon    PM    Bedtime       ondansetron 8 MG tablet   Commonly known as:  ZOFRAN   Quantity:  21 tablet   Refills:  1   Dose:  8 mg    Instructions:  Take 1 tablet (8 mg total) by mouth every 8 (eight) hours as needed for Nausea.     Begin Date    AM    Noon    PM    Bedtime         CHANGE how you take these medications        Additional Info                      insulin aspart 100 unit/mL Inpn pen   Commonly known as:  NovoLOG   Quantity:  1 Box   Refills:  3   What changed:  additional instructions    Instructions:  Nov 4 units with meals and correction scale 1:25 goal 150 ave daily dose 26U/d plus needles     Begin Date    AM    Noon    PM    Bedtime       * oxycodone-acetaminophen 5-325 mg per tablet   Commonly known as:  PERCOCET   Quantity:  20 tablet   Refills:  0   Dose:  1 tablet   What changed:  Another medication with the same name was added. Make sure you understand how and when to take each.    Last time this was given:  1 tablet on 3/31/2017  2:25 PM   Instructions:  Take 1 tablet by mouth every 4 to 6 hours as needed for Pain.     Begin Date    AM    Noon    PM    Bedtime       * oxycodone-acetaminophen 5-325 mg per tablet   Commonly known as:  PERCOCET   Quantity:  30 tablet   Refills:  0   Dose:  1 tablet   What changed:  You were already taking a medication with the same name, and this prescription was added. Make sure you understand how and when to take each.    Last time this was given:  1 tablet on 3/31/2017  2:25 PM   Instructions:  Take 1 tablet by mouth every 6 (six) hours as needed for Pain.  "    Begin Date    AM    Noon    PM    Bedtime       * Notice:  This list has 2 medication(s) that are the same as other medications prescribed for you. Read the directions carefully, and ask your doctor or other care provider to review them with you.      CONTINUE taking these medications        Additional Info                      ACCU-CHEK FASTCLIX Misc   Quantity:  408 each   Refills:  6   Generic drug:  lancets    Instructions:  TEST  BLOOD  GLUCOSE FOUR TIMES DAILY     Begin Date    AM    Noon    PM    Bedtime       ACIDOPHILUS ORAL   Refills:  0   Dose:  1 capsule    Instructions:  Take 1 capsule by mouth once daily.     Begin Date    AM    Noon    PM    Bedtime       alcohol swabs Padm   Quantity:  300 each   Refills:  3    Instructions:  APPLY 1 PAD TOPICALLY  AS NEEDED.     Begin Date    AM    Noon    PM    Bedtime       atorvastatin 20 MG tablet   Commonly known as:  LIPITOR   Quantity:  90 tablet   Refills:  3    Instructions:  TAKE 1 TABLET ONE TIME DAILY     Begin Date    AM    Noon    PM    Bedtime       BD INSULIN PEN NEEDLE UF SHORT 31 gauge x 5/16" Ndle   Quantity:  180 each   Refills:  4   Generic drug:  pen needle, diabetic    Instructions:  USE WITH LEVEMIR PEN  TO INJECT  10  UNITS SUBCUTANEOUSLY TWICE DAILY     Begin Date    AM    Noon    PM    Bedtime       clopidogrel 75 mg tablet   Commonly known as:  PLAVIX   Quantity:  90 tablet   Refills:  3    Instructions:  TAKE 1 TABLET ONE TIME DAILY     Begin Date    AM    Noon    PM    Bedtime       epinephrine 0.3 mg/0.3 mL Atin   Commonly known as:  EPIPEN 2-LEVI   Quantity:  0.3 mL   Refills:  3   Dose:  1 each    Instructions:  Inject 0.3 mLs (0.3 mg total) into the muscle once.     Begin Date    AM    Noon    PM    Bedtime       fluticasone 50 mcg/actuation nasal spray   Commonly known as:  FLONASE   Refills:  0   Dose:  1 spray    Instructions:  1 spray by Nasal route as needed.     Begin Date    AM    Noon    PM    Bedtime       guaifenesin " 600 mg 12 hr tablet   Commonly known as:  MUCINEX   Refills:  0   Dose:  1200 mg    Instructions:  Take 1,200 mg by mouth 2 (two) times daily.     Begin Date    AM    Noon    PM    Bedtime       insulin detemir 100 unit/mL (3 mL) Inpn pen   Commonly known as:  LEVEMIR FLEXTOUCH   Quantity:  30 mL   Refills:  1   Dose:  26 Units    Instructions:  Inject 26 Units into the skin once daily.     Begin Date    AM    Noon    PM    Bedtime       levothyroxine 125 MCG tablet   Commonly known as:  SYNTHROID   Quantity:  90 tablet   Refills:  3    Instructions:  TAKE 1 TABLET ONE TIME DAILY     Begin Date    AM    Noon    PM    Bedtime       losartan 25 MG tablet   Commonly known as:  COZAAR   Quantity:  90 tablet   Refills:  3    Instructions:  TAKE 1 TABLET EVERY DAY     Begin Date    AM    Noon    PM    Bedtime       multivitamin with minerals tablet   Refills:  0   Dose:  1 tablet    Instructions:  Take 1 tablet by mouth once daily.     Begin Date    AM    Noon    PM    Bedtime       * neomycin-polymyxin-dexamethasone 3.5 mg/g-10,000 unit/g-0.1 % Oint   Commonly known as:  DEXACINE   Refills:  0      Begin Date    AM    Noon    PM    Bedtime       * neomycin-polymyxin-dexamethasone 3.5mg/mL-10,000 unit/mL-0.1 % Drps   Commonly known as:  MAXITROL   Refills:  0      Begin Date    AM    Noon    PM    Bedtime       pantoprazole 40 MG tablet   Commonly known as:  PROTONIX   Quantity:  90 tablet   Refills:  3    Instructions:  TAKE 1 TABLET ONE TIME DAILY     Begin Date    AM    Noon    PM    Bedtime       * predniSONE 5 MG tablet   Commonly known as:  DELTASONE   Quantity:  90 tablet   Refills:  3    Instructions:  TAKE 1 TABLET EVERY DAY     Begin Date    AM    Noon    PM    Bedtime       * predniSONE 2.5 MG tablet   Commonly known as:  DELTASONE   Quantity:  90 tablet   Refills:  11    Instructions:  TAKE 1 TABLET EVERY DAY     Begin Date    AM    Noon    PM    Bedtime       tazarotene 0.1 % cream   Commonly known as:  AVAGE    Quantity:  30 g   Refills:  3    Instructions:  Apply topically every evening.     Begin Date    AM    Noon    PM    Bedtime       testosterone cypionate 200 mg/mL injection   Commonly known as:  DEPOTESTOTERONE CYPIONATE   Quantity:  6 mL   Refills:  1    Instructions:  INJECT  1ML INTRAMUSCULARLY  EVERY  14  DAYS     Begin Date    AM    Noon    PM    Bedtime       VITAMIN B-12 500 MCG tablet   Refills:  0   Dose:  1 tablet   Generic drug:  cyanocobalamin    Instructions:  Take 1 tablet by mouth Daily.     Begin Date    AM    Noon    PM    Bedtime       * Notice:  This list has 4 medication(s) that are the same as other medications prescribed for you. Read the directions carefully, and ask your doctor or other care provider to review them with you.         Where to Get Your Medications      These medications were sent to [facility] [address]  [address] [address]     Phone:  [phone]     ondansetron 8 MG tablet         You can get these medications from any pharmacy     Bring a paper prescription for each of these medications     cephALEXin 500 MG capsule    oxycodone-acetaminophen 5-325 mg per tablet                  Please bring to all follow up appointments:    1. A copy of your discharge instructions.  2. All medicines you are currently taking in their original bottles.  3. Identification and insurance card.    Please arrive 15 minutes ahead of scheduled appointment time.    Please call 24 hours in advance if you must reschedule your appointment and/or time.        Your Scheduled Appointments     Apr 05, 2017  9:30 AM CDT   Post OP with Kim MD Tommy Collado III - Otorhinolaryngology ([facility] )    [address]  [facility] [address]   [phone]            Apr 10, 2017 10:00 AM CDT   Infusion 300 Min with [facility], CHEMO   [facility] ([facility])    [address]  [place]  [facility] [address]   [phone]            Apr 19, 2017  9:00 AM CDT   Established Patient Visit with Karissa Monsivais MD   [place] - Dermatology ([facility])    [address] [facility]  [place] [address]   [phone]            May 15, 2017 10:00 AM CDT   Infusion 300 Min with [facility], CHEMO   [facility] ([facility])    [facility] [facility] [address]   [phone]            May 25, 2017  9:00 AM CDT   Infusion 420 Min with [facility], CHEMO   [facility] ([facility])    [address] [place] [id number] [phone]              Follow-up Information     Follow up with ERNESTINE Dsouza MD.    Specialty:  Otolaryngology    Contact information:    [address] [place] [address]  [phone]          Discharge Instructions     Future Orders    Activity as tolerated     Call MD for:  difficulty breathing or increased cough     Call MD for:  increased confusion or weakness     Call MD for:  persistent dizziness, light-headedness, or visual disturbances     Call MD for:  persistent nausea and vomiting or diarrhea     Call MD for:  redness, tenderness, or signs of infection (pain, swelling, redness, odor or green/yellow discharge around incision site)     Call MD for:  severe persistent headache     Call MD for:  severe uncontrolled pain     Call MD for:  temperature >100.4     Call MD for:  worsening rash     Diet general     Questions:    Total calories:      Fat restriction, if any:      Protein restriction, if any:      Na restriction, if any:      Fluid restriction:      Additional restrictions:      No dressing needed         Admission Information     Date & Time Provider Department CSN    3/31/2017  9:11 AM [place] MARY Dsouza III, MD [facility] [id number]      Care Providers     Provider Role Specialty Primary office phone    [place] MARY Dsouza III, MD Attending Provider  "Otolaryngology [phone]    [place] MARY Dsouza III, MD Surgeon  Otolaryngology [phone]      Your Vitals Were     BP Pulse Temp Resp Height Weight    126/69 64 97.5 °F (36.4 °C) (Temporal) 18 5' 11" (1.803 m) 81.6 kg (180 lb)    SpO2 BMI             98% 25.1 kg/m2         Recent Lab Values        3/30/2012 7/10/2013 5/31/2014 10/20/2014 4/2/2015 1/27/2016 2/23/2016 12/21/2016      8:00 AM  7:16 AM  3:09 AM  7:45 AM  9:03 AM  9:43 PM  9:48 AM  8:51 AM    A1C 6.4 (H) 6.0 10.4 (H) 6.2 6.1 6.8 (H) 7.5 (H) 7.1 (H)    Comment for A1C at  8:51 AM on 12/21/2016:  According to ADA guidelines, hemoglobin A1C <7.0% represents  optimal control in non-pregnant diabetic patients.  Different  metrics may apply to specific populations.   Standards of Medical Care in Diabetes - 2016.  For the purpose of screening for the presence of diabetes:  <5.7%     Consistent with the absence of diabetes  5.7-6.4%  Consistent with increasing risk for diabetes   (prediabetes)  >or=6.5%  Consistent with diabetes  Currently no consensus exists for use of hemoglobin A1C  for diagnosis of diabetes for children.        Allergies as of 3/31/2017        Reactions    Ace Inhibitors Swelling    angioedema    Divalproex     Rash under arms, body creases    Peanut Swelling      Advance Directives     An advance directive is a document which, in the event you are no longer able to make decisions for yourself, tells your healthcare team what kind of treatment you do or do not want to receive, or who you would like to make those decisions for you.  If you do not currently have an advance directive, [facility] encourages you to create one.  For more information call:  [phone]), [phone] ([phone]),  or log on to [email or web address].        Language Assistance Services     ATTENTION: Language assistance services are available, free of charge. Please call [phone].      ATENCIÓN: Si nickolas thomas [employer] disposición " servicios gratuitos de asistencia lingüística. Jovanny lynn [phone].     [facility] Ý: N?u b?n nói Ti?ng Vi?t, có các d?ch v? h? tr? ngôn ng? mi?n phí dành cho b?n. G?i s? [phone].        Stroke Education              Chronic Kindey Disease Education             Diabetes Discharge Instructions                                    [facility] complies with applicable Federal civil rights laws and does not discriminate on the basis of race, color, national origin, age, disability, or sex.

## 2017-03-31 NOTE — H&P
VITAL SIGNS: Per nurses' notes.     CHIEF COMPLAINT: Mohs defect of the left nasal ala.     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 Bliss.     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 He has a history of chronic lymphocytic leukemia as well  as carotid stenosis, previous history of a stroke as well as kidney disorder.     PAST SURGICAL HISTORY: Include carotid endarterectomy, FESS with septoplasty   and submucosal resection of turbinates by me in September 2014 and several   previous Mohs excisions.     FAMILY HISTORY: Positive for leukemia and colon cancer and breast cancer.     SOCIAL HISTORY: Nonsmoker. Social drinker.     MEDICATIONS AND ALLERGIES: Per MedCard.     PHYSICAL EXAMINATION:  General Appearance: Well-developed, well-nourished elderly 83-year-old white   male in no apparent distress. Communication ability is good.   External auditory canals and TMs are clear. Hearing grossly intact. External   nose is normal. Intranasally, he has an anterior spur on the right with a   marked posterior spur on the right, as well. This creates an approximately 75%   obstruction. Turbinates are 2 to 3+. Lips, teeth, and gums are normal.   Oropharynx: Tonsils are 2+. Mucosa normal. Palate shows good elevation. He   does have a moderately large palate. Inspection of his head and face is normal.  He is nontender to palpation. His nasal bandages removed. This reveals a skin  thickness only defect of the mid left nasal ala. His mucosa and intranasal   skin is intact. This does go to his ala margin.  NECK: Supple. Thyroid with no masses.  LYMPHATICS: No  nodes.  RESPIRATORY: Effort is normal.  Ocular mobility normal.  Cranial nerves 2-12 are grossly intact.   Auscultation is clear.  The remainder of exam is essentially normal.     IMPRESSION: An 83-year-old white male status post Mohs excision of skin   carcinoma of the left nasal ala resulting in defect approximately 1-1.5 cm in   diameter full-thickness skin. He is typically on Plavix. He has been off for   the last 4 to 5 days, though his PSAs are still elevated.     RECOMMENDATION: I have discussed my findings with he and his wife in detail.   We have discussed repair with flap and/or graft. We will consider a melolabial   or bilobed type rotation flap for this repair. No issues since he was seen in clinic. Will proceed to surgery

## 2017-03-31 NOTE — PLAN OF CARE
Discharge instructions given, patient verbalized understanding. Consents in chart, Vitals stable, no complaints. Prescriptions given to patient.

## 2017-03-31 NOTE — ANESTHESIA RELEASE NOTE
"Anesthesia Release from PACU Note    Patient: Thierry Ramos     Procedure(s) Performed: Procedure(s) (LRB):  REPAIR-MOHS DEFECT-left nose (Left)    Anesthesia type: general    Post pain: Adequate analgesia    Post assessment: no apparent anesthetic complications    Last Vitals:   Visit Vitals    /65    Pulse 61    Temp 36.5 °C (97.7 °F) (Oral)    Resp 18    Ht 5' 11" (1.803 m)    Wt 81.6 kg (180 lb)    SpO2 95%    BMI 25.1 kg/m2       Post vital signs: stable    Level of consciousness: awake    Nausea/Vomiting: no nausea/no vomiting    Complications: none    Airway Patency: patent    Respiratory: unassisted    Cardiovascular: stable and blood pressure at baseline    Hydration: euvolemic  "

## 2017-04-02 ENCOUNTER — PATIENT MESSAGE (OUTPATIENT)
Dept: OTOLARYNGOLOGY | Facility: CLINIC | Age: 82
End: 2017-04-02

## 2017-04-05 ENCOUNTER — OFFICE VISIT (OUTPATIENT)
Dept: OTOLARYNGOLOGY | Facility: CLINIC | Age: 82
End: 2017-04-05
Payer: MEDICARE

## 2017-04-05 VITALS — HEART RATE: 63 BPM | DIASTOLIC BLOOD PRESSURE: 70 MMHG | SYSTOLIC BLOOD PRESSURE: 150 MMHG

## 2017-04-05 DIAGNOSIS — Z98.890 HISTORY OF MOH'S MICROGRAPHIC SURGERY FOR SKIN CANCER: ICD-10-CM

## 2017-04-05 DIAGNOSIS — J32.9 CHRONIC RECURRENT SINUSITIS: Primary | ICD-10-CM

## 2017-04-05 DIAGNOSIS — Z85.828 HISTORY OF MOH'S MICROGRAPHIC SURGERY FOR SKIN CANCER: ICD-10-CM

## 2017-04-05 DIAGNOSIS — Z98.890 POST-OPERATIVE STATE: ICD-10-CM

## 2017-04-05 PROCEDURE — 1126F AMNT PAIN NOTED NONE PRSNT: CPT | Mod: S$GLB,,, | Performed by: OTOLARYNGOLOGY

## 2017-04-05 PROCEDURE — 99999 PR PBB SHADOW E&M-EST. PATIENT-LVL II: CPT | Mod: PBBFAC,,, | Performed by: OTOLARYNGOLOGY

## 2017-04-05 PROCEDURE — 1159F MED LIST DOCD IN RCRD: CPT | Mod: S$GLB,,, | Performed by: OTOLARYNGOLOGY

## 2017-04-05 PROCEDURE — 1160F RVW MEDS BY RX/DR IN RCRD: CPT | Mod: S$GLB,,, | Performed by: OTOLARYNGOLOGY

## 2017-04-05 PROCEDURE — 3077F SYST BP >= 140 MM HG: CPT | Mod: S$GLB,,, | Performed by: OTOLARYNGOLOGY

## 2017-04-05 PROCEDURE — 99024 POSTOP FOLLOW-UP VISIT: CPT | Mod: S$GLB,,, | Performed by: OTOLARYNGOLOGY

## 2017-04-05 PROCEDURE — 3078F DIAST BP <80 MM HG: CPT | Mod: S$GLB,,, | Performed by: OTOLARYNGOLOGY

## 2017-04-05 PROCEDURE — 31231 NASAL ENDOSCOPY DX: CPT | Mod: 79,S$GLB,, | Performed by: OTOLARYNGOLOGY

## 2017-04-05 PROCEDURE — 1157F ADVNC CARE PLAN IN RCRD: CPT | Mod: S$GLB,,, | Performed by: OTOLARYNGOLOGY

## 2017-04-05 NOTE — PROGRESS NOTES
I have personally taken the history and examined this patient and agree with the residents note that's stated below.

## 2017-04-05 NOTE — PROGRESS NOTES
The patient presents back approximately one week S/P repair of Mohs defect of left melolabial fold with flap repair. Flap shows good viability and is healing well. Sutures are removed with out difficulty. Wound care instructions are reviewed.    Also c/o rhinorrhea and PND on a daily basis. Had FESS in 2014 for similar symptoms. Had initial relief but has been getting worse over the past year. Worse in morning. Coughing up mucus and constant draining. No sinus pressure or headaches. He feels that he is always draining. Using nasal saline rinses and flonase on a daily basis without relief. He states he has itchy eyes and sneezing. Does not believe he has allergies. PMH significant for CLL now with low IgG.    No sinus tenderness  Slight septal deviation to right  Mucosa normal without polyps    Rigid nasal endoscopy scope # [id number]  Purulent drainage from [facility] b/l; ostia patent w/o polyps    Plan:   Stop flonase  Start Medicated compounded rinses  RTC 3 weeks for follow up visit.

## 2017-04-10 ENCOUNTER — INFUSION (OUTPATIENT)
Dept: INFUSION THERAPY | Facility: HOSPITAL | Age: 82
End: 2017-04-10
Attending: OTOLARYNGOLOGY
Payer: MEDICARE

## 2017-04-10 VITALS
SYSTOLIC BLOOD PRESSURE: 111 MMHG | RESPIRATION RATE: 18 BRPM | DIASTOLIC BLOOD PRESSURE: 56 MMHG | TEMPERATURE: 99 F | HEART RATE: 63 BPM

## 2017-04-10 DIAGNOSIS — J32.9 CHRONIC SINUSITIS, UNSPECIFIED LOCATION: ICD-10-CM

## 2017-04-10 DIAGNOSIS — E79.0 HYPERURICEMIA: ICD-10-CM

## 2017-04-10 DIAGNOSIS — D80.1 HYPOGAMMAGLOBULINEMIA: Primary | ICD-10-CM

## 2017-04-10 DIAGNOSIS — C91.10 CLL (CHRONIC LYMPHOCYTIC LEUKEMIA): ICD-10-CM

## 2017-04-10 DIAGNOSIS — N18.2 CKD (CHRONIC KIDNEY DISEASE), STAGE 2 (MILD): ICD-10-CM

## 2017-04-10 PROCEDURE — 96367 TX/PROPH/DG ADDL SEQ IV INF: CPT

## 2017-04-10 PROCEDURE — 96375 TX/PRO/DX INJ NEW DRUG ADDON: CPT

## 2017-04-10 PROCEDURE — 25000003 PHARM REV CODE 250: Performed by: INTERNAL MEDICINE

## 2017-04-10 PROCEDURE — 96365 THER/PROPH/DIAG IV INF INIT: CPT

## 2017-04-10 PROCEDURE — 63600175 PHARM REV CODE 636 W HCPCS: Performed by: INTERNAL MEDICINE

## 2017-04-10 PROCEDURE — 96366 THER/PROPH/DIAG IV INF ADDON: CPT

## 2017-04-10 RX ORDER — HEPARIN 100 UNIT/ML
500 SYRINGE INTRAVENOUS
Status: CANCELLED | OUTPATIENT
Start: 2017-04-10

## 2017-04-10 RX ORDER — FAMOTIDINE 10 MG/ML
20 INJECTION INTRAVENOUS
Status: COMPLETED | OUTPATIENT
Start: 2017-04-10 | End: 2017-04-10

## 2017-04-10 RX ORDER — ACETAMINOPHEN 325 MG/1
650 TABLET ORAL
Status: CANCELLED | OUTPATIENT
Start: 2017-04-10

## 2017-04-10 RX ORDER — SODIUM CHLORIDE 0.9 % (FLUSH) 0.9 %
10 SYRINGE (ML) INJECTION
Status: CANCELLED | OUTPATIENT
Start: 2017-04-10

## 2017-04-10 RX ORDER — FAMOTIDINE 10 MG/ML
20 INJECTION INTRAVENOUS
Status: CANCELLED | OUTPATIENT
Start: 2017-04-10 | End: 2017-04-10

## 2017-04-10 RX ORDER — ACETAMINOPHEN 325 MG/1
650 TABLET ORAL
Status: COMPLETED | OUTPATIENT
Start: 2017-04-10 | End: 2017-04-10

## 2017-04-10 RX ADMIN — SODIUM CHLORIDE: 0.9 INJECTION, SOLUTION INTRAVENOUS at 09:04

## 2017-04-10 RX ADMIN — HUMAN IMMUNOGLOBULIN G 20 G: 20 LIQUID INTRAVENOUS at 10:04

## 2017-04-10 RX ADMIN — DIPHENHYDRAMINE HYDROCHLORIDE 50 MG: 50 INJECTION, SOLUTION INTRAMUSCULAR; INTRAVENOUS at 09:04

## 2017-04-10 RX ADMIN — ACETAMINOPHEN 650 MG: 325 TABLET ORAL at 09:04

## 2017-04-10 RX ADMIN — FAMOTIDINE 20 MG: 10 INJECTION INTRAVENOUS at 09:04

## 2017-04-10 NOTE — MR AVS SNAPSHOT
"Patient Information     Patient Name Sex     Thierry Ramos Jr. Male 2/10/1934      Visit Information        Provider Department Dept Phone Center    4/10/2017 10:00 AM [facility], CHEMO [facility] Chemotherapy Infusion [phone] Rui Lopez      Patient Instructions     None      Your Current Medications Are     ACCU-CHEK FASTCLIX Misc    alcohol swabs PadM    atorvastatin (LIPITOR) 20 MG tablet    BD INSULIN PEN NEEDLE UF SHORT 31 gauge x 5/16" Ndle    cephALEXin (KEFLEX) 500 MG capsule    clopidogrel (PLAVIX) 75 mg tablet    cyanocobalamin (VITAMIN B-12) 500 MCG tablet    epinephrine (EPIPEN 2-LEVI) 0.3 mg/0.3 mL (1:1,000) AtIn    fluticasone (FLONASE) 50 mcg/actuation nasal spray    guaifenesin (MUCINEX) 600 mg 12 hr tablet    insulin aspart (NOVOLOG) 100 unit/mL InPn pen    insulin detemir (LEVEMIR FLEXTOUCH) 100 unit/mL (3 mL) SubQ InPn pen    LACTOBACILLUS ACIDOPHILUS (ACIDOPHILUS ORAL)    levothyroxine (SYNTHROID) 125 MCG tablet    losartan (COZAAR) 25 MG tablet    multivitamin with minerals tablet    neomycin-polymyxin-dexamethasone (DEXACINE) 3.5 mg/g-10,000 unit/g-0.1 % Oint    neomycin-polymyxin-dexamethasone (MAXITROL) 3.5mg/mL-10,000 unit/mL-0.1 % DrpS    oxycodone-acetaminophen (PERCOCET) 5-325 mg per tablet    oxycodone-acetaminophen (PERCOCET) 5-325 mg per tablet    pantoprazole (PROTONIX) 40 MG tablet    predniSONE (DELTASONE) 2.5 MG tablet    predniSONE (DELTASONE) 5 MG tablet    tazarotene (AVAGE) 0.1 % cream    testosterone cypionate (DEPOTESTOTERONE CYPIONATE) 200 mg/mL injection      Facility-Administered Medications     acetaminophen tablet 650 mg    diphenhydramine IVPB 50 mg    famotidine (PF) 20 mg/2 mL injection 20 mg    immune globulin (human) (IgG) 10 % injection 20 g    sodium chloride 0.9% 250 mL flush bag      Appointments for Next Year     2017  9:00 AM ESTABLISHED PATIENT (10 min.) [place] [facility] Karissa C. Ray, MD    Arrive at check-in approximately 15 minutes before " your scheduled appointment time. Bring all outside medical records and imaging, along with a list of your current medications and insurance card.    5th Floor    4/26/2017  9:30 AM POST-OP (15 min.) [facility] - Otorhinolaryngology [place] MARY Dsouza III, MD    Arrive at check-in approximately 15 minutes before your scheduled appointment time. Bring all outside medical records and imaging, along with a list of your current medications and insurance card.    [facility] [place] - 4th Floor    5/15/2017 10:00 AM INFUSION 300 MIN (300 min.) [facility], CHEMO    Arrive at check-in approximately 15 minutes before your scheduled appointment time. Bring all outside medical records and imaging, along with a list of your current medications and insurance card.    [facility], 5th Floor    5/25/2017  8:00 AM NON FASTING LAB (10 min.) [facility] LAB, HEMON CANCER BLDG    Arrive at check-in approximately 15 minutes before your scheduled appointment time. Bring all outside medical records and imaging, along with a list of your current medications and insurance card.    [facility] 3rd Floor    5/25/2017  9:00 AM INFUSION 420 MIN (420 min.) [facility], CHEMO    Arrive at check-in approximately 15 minutes before your scheduled appointment time. Bring all outside medical records and imaging, along with a list of your current medications and insurance card.    [facility], 5th Floor    6/12/2017 10:00 AM INFUSION 300 MIN (300 min.) [facility], CHEMO    Arrive at check-in approximately 15 minutes before your scheduled appointment time. Bring all outside medical records and imaging, along with a list of your current medications and insurance card.    [facility], 5th Floor    6/13/2017  1:30 PM ESTABLISHED PATIENT (15 min.) [facility] - Ophthalmology Jennie Lee MD    Arrive at check-in approximately 15 minutes  before your scheduled appointment time. Bring all outside medical records and imaging, along with a list of your current medications and insurance card.    10th Floor  Dr. Kapoor patients please go to the 1st Floor [facility] for your appointment.          Default Flowsheet Data (last 24 hours)      Amb Complex Vitals Doc        04/10/17 1246 04/10/17 1213 04/10/17 1137 04/10/17 1105 04/10/17 0933    Measurements    BP (!)  111/56   IVIG completed. 137/62   IVIG rate set at 196ml/hr x 30 min (!)  152/65   IVIG rate set at 98ml/hr x 30 min 137/62   IVIG rate set at 49ml/hr x 30 min 135/64    Temp 98.9 °F (37.2 °C)        Pulse 63 63 65 65 72    Resp 18 18 18 18 18    Pain Assessment    Pain Score Zero    Zero            Allergies     Ace Inhibitors Swelling    angioedema    Divalproex     Rash under arms, body creases    Peanut Swelling      Medications You Received from 04/09/2017 1253 to 04/10/2017 1253        Date/Time Order Dose Route Action     04/10/2017 0950 acetaminophen tablet 650 mg 650 mg Oral Given     04/10/2017 0957 diphenhydramine IVPB 50 mg 50 mg Intravenous New Bag     04/10/2017 0950 famotidine (PF) 20 mg/2 mL injection 20 mg 20 mg Intravenous Given     04/10/2017 1031 immune globulin (human) (IgG) 10 % injection 20 g 20 g Intravenous New Bag     04/10/2017 0950 sodium chloride 0.9% 250 mL flush bag   Intravenous New Bag      Current Discharge Medication List     Cannot display discharge medications since this is not an admission.

## 2017-04-11 ENCOUNTER — TELEPHONE (OUTPATIENT)
Dept: OTOLARYNGOLOGY | Facility: CLINIC | Age: 82
End: 2017-04-11

## 2017-04-13 ENCOUNTER — TELEPHONE (OUTPATIENT)
Dept: OTOLARYNGOLOGY | Facility: CLINIC | Age: 82
End: 2017-04-13

## 2017-04-19 ENCOUNTER — OFFICE VISIT (OUTPATIENT)
Dept: DERMATOLOGY | Facility: CLINIC | Age: 82
End: 2017-04-19
Payer: MEDICARE

## 2017-04-19 VITALS — WEIGHT: 180 LBS | BODY MASS INDEX: 25.1 KG/M2

## 2017-04-19 DIAGNOSIS — L82.1 SEBORRHEIC KERATOSES: ICD-10-CM

## 2017-04-19 DIAGNOSIS — Z85.828 HISTORY OF SKIN CANCER: ICD-10-CM

## 2017-04-19 DIAGNOSIS — L57.0 MULTIPLE ACTINIC KERATOSES: Primary | ICD-10-CM

## 2017-04-19 PROCEDURE — 17003 DESTRUCT PREMALG LES 2-14: CPT | Mod: S$GLB,,, | Performed by: DERMATOLOGY

## 2017-04-19 PROCEDURE — 17000 DESTRUCT PREMALG LESION: CPT | Mod: S$GLB,,, | Performed by: DERMATOLOGY

## 2017-04-19 PROCEDURE — 1157F ADVNC CARE PLAN IN RCRD: CPT | Mod: S$GLB,,, | Performed by: DERMATOLOGY

## 2017-04-19 PROCEDURE — 1160F RVW MEDS BY RX/DR IN RCRD: CPT | Mod: S$GLB,,, | Performed by: DERMATOLOGY

## 2017-04-19 PROCEDURE — 99999 PR PBB SHADOW E&M-EST. PATIENT-LVL II: CPT | Mod: PBBFAC,,, | Performed by: DERMATOLOGY

## 2017-04-19 PROCEDURE — 99213 OFFICE O/P EST LOW 20 MIN: CPT | Mod: 25,S$GLB,, | Performed by: DERMATOLOGY

## 2017-04-19 PROCEDURE — 1159F MED LIST DOCD IN RCRD: CPT | Mod: S$GLB,,, | Performed by: DERMATOLOGY

## 2017-04-19 RX ORDER — BUDESONIDE 0.5 MG/2ML
INHALANT ORAL
COMMUNITY
Start: 2017-04-13 | End: 2017-08-24

## 2017-04-19 NOTE — PROGRESS NOTES
Subjective:       Patient ID:  Thierry Ramos Jr. is a 83 y.o. male who presents for   Chief Complaint   Patient presents with    Follow-up     BCC left arm     Skin Check     UBSE     HPI Comments: History of Present Illness: The patient presents with chief complaint of lesions .  Location: face    Duration: weeks  Signs/Symptoms: none    Prior treatments: none    Had the scc removed from his right hand and the bcc on his nose mohs surgery doing well.      Review of Systems   Constitutional: Negative for fever.   Skin: Negative for itching and rash.   Hematologic/Lymphatic: Bruises/bleeds easily.        Objective:    Physical Exam   Constitutional: He appears well-developed and well-nourished. No distress.   Neurological: He is alert and oriented to person, place, and time. He is not disoriented.   Psychiatric: He has a normal mood and affect.   Skin:   Areas Examined (abnormalities noted in diagram):   Scalp / Hair Palpated and Inspected  Head / Face Inspection Performed  Neck Inspection Performed  Chest / Axilla Inspection Performed  Abdomen Inspection Performed  Back Inspection Performed  RUE Inspected  LUE Inspection Performed                       Diagram Legend     Erythematous scaling macule/papule c/w actinic keratosis         Assessment / Plan:        Multiple actinic keratoses  -     Photodynamic Therapy; Future face    History of skin cancer  [facility]    Seborrheic keratoses  reassurance               Return in about 3 months (around 7/19/2017).

## 2017-04-19 NOTE — MR AVS SNAPSHOT
[place] [facility]   [facility]  [place] [address]  Phone: [phone]  Fax: [phone]                  Thierry Ramos Jr.   2017 9:00 AM   Office Visit    Description:  Male : 2/10/1934   Provider:  Karissa Monsivais MD   Department:  [place] [facility]           Reason for Visit     Follow-up     Skin Check           Diagnoses this Visit        Comments    Multiple actinic keratoses    -  Primary     History of skin cancer         Seborrheic keratoses                To Do List           Future Appointments        Provider Department Dept Phone    2017 9:30 AM [place] MARY Dsouza III, MD [facility] - Otorhinolaryngology [phone]    5/15/2017 10:00 AM [facility], CHEMO [facility] [phone]    2017 8:00 AM LAB, HEMONC CANCER BLDG [facility] [phone]    2017 9:00 AM [facility], CHE[facility] [phone]    2017 10:00 AM [facility] CHEMO [facility] [phone]      Goals (5 Years of Data)              10/26/16    9/28/15    4/2/15    HDL > 45   35  36  40    LDL CALC < 70   70.4  92.6  83.8      Follow-Up and Disposition     Return in about 3 months (around 2017).      [facility]     [facility] Nurse Care [facility] -  Assistance  Unless otherwise directed by your provider, please contact [facility], our nurse care line that is available for  assistance.     Registered nurses in the [facility] provide: appointment scheduling, clinical advisement, health education, and other advisory services.  Call: [phone] (toll free)               Medications           Message regarding Medications     Verify the changes and/or additions to your medication regime listed below are the same as discussed with your clinician today.  If any of these changes or additions are incorrect, please notify your healthcare provider.             Verify that the below list  "of medications is an accurate representation of the medications you are currently taking.  If none reported, the list may be blank. If incorrect, please contact your healthcare provider. Carry this list with you in case of emergency.           Current Medications     ACCU-CHEK FASTCLIX Misc TEST  BLOOD  GLUCOSE FOUR TIMES DAILY    alcohol swabs PadM APPLY 1 PAD TOPICALLY  AS NEEDED.    atorvastatin (LIPITOR) 20 MG tablet TAKE 1 TABLET ONE TIME DAILY    BD INSULIN PEN NEEDLE UF SHORT 31 gauge x 5/16" Ndle USE WITH LEVEMIR PEN  TO INJECT  10  UNITS SUBCUTANEOUSLY TWICE DAILY    budesonide (PULMICORT) 0.5 mg/2 mL nebulizer solution     clopidogrel (PLAVIX) 75 mg tablet TAKE 1 TABLET ONE TIME DAILY    cyanocobalamin (VITAMIN B-12) 500 MCG tablet Take 1 tablet by mouth Daily.    fluticasone (FLONASE) 50 mcg/actuation nasal spray 1 spray by Nasal route as needed.     guaifenesin (MUCINEX) 600 mg 12 hr tablet Take 1,200 mg by mouth 2 (two) times daily.    insulin aspart (NOVOLOG) 100 unit/mL InPn pen Nov 4 units with meals and correction scale 1:25 goal 150 ave daily dose 26U/d plus needles    insulin detemir (LEVEMIR FLEXTOUCH) 100 unit/mL (3 mL) SubQ InPn pen Inject 26 Units into the skin once daily.    LACTOBACILLUS ACIDOPHILUS (ACIDOPHILUS ORAL) Take 1 capsule by mouth once daily.    levothyroxine (SYNTHROID) 125 MCG tablet TAKE 1 TABLET ONE TIME DAILY    losartan (COZAAR) 25 MG tablet TAKE 1 TABLET EVERY DAY    multivitamin with minerals tablet Take 1 tablet by mouth once daily.    neomycin-polymyxin-dexamethasone (DEXACINE) 3.5 mg/g-10,000 unit/g-0.1 % Oint     neomycin-polymyxin-dexamethasone (MAXITROL) 3.5mg/mL-10,000 unit/mL-0.1 % DrpS     oxycodone-acetaminophen (PERCOCET) 5-325 mg per tablet Take 1 tablet by mouth every 4 to 6 hours as needed for Pain.    pantoprazole (PROTONIX) 40 MG tablet TAKE 1 TABLET ONE TIME DAILY    predniSONE (DELTASONE) 2.5 MG tablet TAKE 1 TABLET EVERY DAY    predniSONE (DELTASONE) 5 " MG tablet TAKE 1 TABLET EVERY DAY    tazarotene (AVAGE) 0.1 % cream Apply topically every evening.    testosterone cypionate (DEPOTESTOTERONE CYPIONATE) 200 mg/mL injection INJECT  1ML INTRAMUSCULARLY  EVERY  14  DAYS    epinephrine (EPIPEN 2-LEVI) 0.3 mg/0.3 mL (1:1,000) AtIn Inject 0.3 mLs (0.3 mg total) into the muscle once.           Clinical Reference Information           Your Vitals Were     Weight BMI             81.6 kg (180 lb) 25.1 kg/m2         Allergies as of 4/19/2017     Ace Inhibitors    Divalproex    Peanut      Immunizations Administered on Date of Encounter - 4/19/2017     None      Orders Placed During Today's Visit     Future Labs/Procedures Expected by Expires    Photodynamic Therapy  As directed 4/19/2018      Language Assistance Services     ATTENTION: Language assistance services are available, free of charge. Please call [phone].      ATENCIÓN: Si habla español, tiene a fortune disposición servicios gratuitos de asistencia lingüística. Llame al [phone].     CHÚ Ý: N?u b?n nói Ti?ng Vi?t, có các d?ch v? h? tr? ngôn ng? mi?n phí dành cho b?n. G?i s? [phone].         [facility] complies with applicable Federal civil rights laws and does not discriminate on the basis of race, color, national origin, age, disability, or sex.

## 2017-04-25 ENCOUNTER — TELEPHONE (OUTPATIENT)
Dept: DERMATOLOGY | Facility: CLINIC | Age: 82
End: 2017-04-25

## 2017-04-26 ENCOUNTER — OFFICE VISIT (OUTPATIENT)
Dept: OTOLARYNGOLOGY | Facility: CLINIC | Age: 82
End: 2017-04-26
Payer: MEDICARE

## 2017-04-26 VITALS — SYSTOLIC BLOOD PRESSURE: 131 MMHG | HEART RATE: 69 BPM | DIASTOLIC BLOOD PRESSURE: 74 MMHG

## 2017-04-26 DIAGNOSIS — J32.9 CHRONIC RECURRENT SINUSITIS: Primary | ICD-10-CM

## 2017-04-26 PROCEDURE — 1160F RVW MEDS BY RX/DR IN RCRD: CPT | Mod: S$GLB,,, | Performed by: OTOLARYNGOLOGY

## 2017-04-26 PROCEDURE — 3075F SYST BP GE 130 - 139MM HG: CPT | Mod: S$GLB,,, | Performed by: OTOLARYNGOLOGY

## 2017-04-26 PROCEDURE — 1159F MED LIST DOCD IN RCRD: CPT | Mod: S$GLB,,, | Performed by: OTOLARYNGOLOGY

## 2017-04-26 PROCEDURE — 99213 OFFICE O/P EST LOW 20 MIN: CPT | Mod: 25,24,S$GLB, | Performed by: OTOLARYNGOLOGY

## 2017-04-26 PROCEDURE — 1126F AMNT PAIN NOTED NONE PRSNT: CPT | Mod: S$GLB,,, | Performed by: OTOLARYNGOLOGY

## 2017-04-26 PROCEDURE — 31231 NASAL ENDOSCOPY DX: CPT | Mod: 79,S$GLB,, | Performed by: OTOLARYNGOLOGY

## 2017-04-26 PROCEDURE — 3078F DIAST BP <80 MM HG: CPT | Mod: S$GLB,,, | Performed by: OTOLARYNGOLOGY

## 2017-04-26 PROCEDURE — 99999 PR PBB SHADOW E&M-EST. PATIENT-LVL II: CPT | Mod: PBBFAC,,, | Performed by: OTOLARYNGOLOGY

## 2017-04-26 PROCEDURE — 1157F ADVNC CARE PLAN IN RCRD: CPT | Mod: S$GLB,,, | Performed by: OTOLARYNGOLOGY

## 2017-04-26 NOTE — PROGRESS NOTES
The patient presents back approximately one month S/P repair of Mohs defect of left melolabial fold with flap repair. Flap shows good viability and is healing well. Wound care instructions are reviewed.    Also f/u for rhinorrhea and PND on a daily basis. Had FESS in 2014 for similar symptoms. Started back on steroid rinses and he states he has some improvement in symptoms overnight. Continues to have rhinorrhea and congestion that is worse in the morning but overall improved. He states he is still blowing yellow mucus from his nose.     No sinus tenderness  Slight septal deviation to right  Thick mucus in middle meatus on right  Mucosa normal without polyps    Rigid nasal endoscopy scope # 120HYM  Thick mocoid drainage from [facility] b/l, improved from prior exam; ostia patent w/o polyps    Plan:   Continue steroid rinses  RTC4 to 8 weeks

## 2017-04-29 ENCOUNTER — PATIENT MESSAGE (OUTPATIENT)
Dept: OTOLARYNGOLOGY | Facility: CLINIC | Age: 82
End: 2017-04-29

## 2017-05-04 ENCOUNTER — CLINICAL SUPPORT (OUTPATIENT)
Dept: DERMATOLOGY | Facility: CLINIC | Age: 82
End: 2017-05-04
Payer: MEDICARE

## 2017-05-04 DIAGNOSIS — L57.0 MULTIPLE ACTINIC KERATOSES: ICD-10-CM

## 2017-05-04 PROCEDURE — 99499 UNLISTED E&M SERVICE: CPT | Mod: S$GLB,,, | Performed by: DERMATOLOGY

## 2017-05-04 PROCEDURE — 99999 PR PBB SHADOW E&M-EST. PATIENT-LVL III: CPT | Mod: PBBFAC,,,

## 2017-05-04 PROCEDURE — 96567 PDT DSTR PRMLG LES SKN: CPT | Mod: S$GLB,,, | Performed by: DERMATOLOGY

## 2017-05-04 NOTE — PROGRESS NOTES
Photodynamic Therapy Note.    PDT ordered per Dr. Monsivais    Patient here today for treatment of actinic keratoses using photodynamic therapy.  Risks including but not limited to burning, stinging, redness, swelling, crusting or blistering of the skin of the area treated were discussed with patient.  Patient elects to proceed with photodynamic therapy.    Treatment area:  Face  Treatment area cleaned with rubbing alcohol, Levulan Kerastick (1) applied evenly to entire surface and allowed to absorb for 60 minutes.  Patient then placed under Omar-U light for 16 minutes 40 seconds.    Patient tolerated treatment well with only mild but tolerable symptoms of discomfort.  Area washed gently with mild soap and water; Zinc oxide sunscreen applied.  Patient advised to avoid any significant light exposure (sun and artificial) for next 48 hours.    RTC:  In 1 month or sooner if concern arises.

## 2017-05-15 ENCOUNTER — INFUSION (OUTPATIENT)
Dept: INFUSION THERAPY | Facility: HOSPITAL | Age: 82
End: 2017-05-15
Attending: INTERNAL MEDICINE
Payer: MEDICARE

## 2017-05-15 VITALS
RESPIRATION RATE: 18 BRPM | HEART RATE: 61 BPM | TEMPERATURE: 98 F | SYSTOLIC BLOOD PRESSURE: 125 MMHG | DIASTOLIC BLOOD PRESSURE: 58 MMHG

## 2017-05-15 DIAGNOSIS — C91.10 CLL (CHRONIC LYMPHOCYTIC LEUKEMIA): ICD-10-CM

## 2017-05-15 DIAGNOSIS — D80.1 HYPOGAMMAGLOBULINEMIA: Primary | ICD-10-CM

## 2017-05-15 DIAGNOSIS — J32.9 CHRONIC SINUSITIS, UNSPECIFIED LOCATION: ICD-10-CM

## 2017-05-15 DIAGNOSIS — N18.2 CKD (CHRONIC KIDNEY DISEASE), STAGE 2 (MILD): ICD-10-CM

## 2017-05-15 DIAGNOSIS — E79.0 HYPERURICEMIA: ICD-10-CM

## 2017-05-15 PROCEDURE — 63600175 PHARM REV CODE 636 W HCPCS: Performed by: INTERNAL MEDICINE

## 2017-05-15 PROCEDURE — 25000003 PHARM REV CODE 250: Performed by: INTERNAL MEDICINE

## 2017-05-15 PROCEDURE — 96375 TX/PRO/DX INJ NEW DRUG ADDON: CPT

## 2017-05-15 PROCEDURE — 96366 THER/PROPH/DIAG IV INF ADDON: CPT

## 2017-05-15 PROCEDURE — 96365 THER/PROPH/DIAG IV INF INIT: CPT

## 2017-05-15 PROCEDURE — 96367 TX/PROPH/DG ADDL SEQ IV INF: CPT

## 2017-05-15 RX ORDER — SODIUM CHLORIDE 0.9 % (FLUSH) 0.9 %
10 SYRINGE (ML) INJECTION
Status: CANCELLED | OUTPATIENT
Start: 2017-05-15

## 2017-05-15 RX ORDER — FAMOTIDINE 10 MG/ML
20 INJECTION INTRAVENOUS
Status: COMPLETED | OUTPATIENT
Start: 2017-05-15 | End: 2017-05-15

## 2017-05-15 RX ORDER — HEPARIN 100 UNIT/ML
500 SYRINGE INTRAVENOUS
Status: CANCELLED | OUTPATIENT
Start: 2017-05-15

## 2017-05-15 RX ORDER — FAMOTIDINE 10 MG/ML
20 INJECTION INTRAVENOUS
Status: CANCELLED | OUTPATIENT
Start: 2017-05-15 | End: 2017-05-15

## 2017-05-15 RX ORDER — ACETAMINOPHEN 325 MG/1
650 TABLET ORAL
Status: CANCELLED | OUTPATIENT
Start: 2017-05-15

## 2017-05-15 RX ORDER — ACETAMINOPHEN 325 MG/1
650 TABLET ORAL
Status: COMPLETED | OUTPATIENT
Start: 2017-05-15 | End: 2017-05-15

## 2017-05-15 RX ADMIN — ACETAMINOPHEN 650 MG: 325 TABLET ORAL at 10:05

## 2017-05-15 RX ADMIN — SODIUM CHLORIDE: 0.9 INJECTION, SOLUTION INTRAVENOUS at 10:05

## 2017-05-15 RX ADMIN — DIPHENHYDRAMINE HYDROCHLORIDE 50 MG: 50 INJECTION, SOLUTION INTRAMUSCULAR; INTRAVENOUS at 10:05

## 2017-05-15 RX ADMIN — HUMAN IMMUNOGLOBULIN G 20 G: 20 LIQUID INTRAVENOUS at 11:05

## 2017-05-15 RX ADMIN — FAMOTIDINE 20 MG: 10 INJECTION INTRAVENOUS at 10:05

## 2017-05-15 NOTE — PLAN OF CARE
Problem: Patient Care Overview (Adult)  Goal: Adult Individualization and Mutuality  1. PIV   2. Likes warm blanket   Outcome: Ongoing (interventions implemented as appropriate)  1045 --  Patient's labs, history, allergies, and medication reviewed.  Patient to receive IVIG.  Discussed plan of care with patient.  Patient in agreement.  PIV set in L. FA.  Good blood return.  Will monitor.

## 2017-05-15 NOTE — PLAN OF CARE
Problem: Patient Care Overview (Adult)  Goal: Plan of Care Review  Outcome: Ongoing (interventions implemented as appropriate)  6820 -- Patient tolerated treatment well.  Discharged without S/S of adverse effects.  AVS given.  Patient instructed to call provider with any questions or concerns.

## 2017-05-15 NOTE — MR AVS SNAPSHOT
"Patient Information     Patient Name Sex     Thierry Ramos Jr. Male 2/10/1934      Visit Information        Provider Department Dept Phone Center    5/15/2017 10:00 AM [facility], CHEMO [facility] Chemotherapy Infusion [phone] Rui Lopez      Patient Instructions     None      Your Current Medications Are     ACCU-CHEK FASTCLIX Misc    alcohol swabs PadM    atorvastatin (LIPITOR) 20 MG tablet    BD INSULIN PEN NEEDLE UF SHORT 31 gauge x 5/16" Ndle    budesonide (PULMICORT) 0.5 mg/2 mL nebulizer solution    clopidogrel (PLAVIX) 75 mg tablet    cyanocobalamin (VITAMIN B-12) 500 MCG tablet    epinephrine (EPIPEN 2-LEVI) 0.3 mg/0.3 mL (1:1,000) AtIn    fluticasone (FLONASE) 50 mcg/actuation nasal spray    guaifenesin (MUCINEX) 600 mg 12 hr tablet    insulin aspart (NOVOLOG) 100 unit/mL InPn pen    insulin detemir (LEVEMIR FLEXTOUCH) 100 unit/mL (3 mL) SubQ InPn pen    LACTOBACILLUS ACIDOPHILUS (ACIDOPHILUS ORAL)    levothyroxine (SYNTHROID) 125 MCG tablet    losartan (COZAAR) 25 MG tablet    multivitamin with minerals tablet    neomycin-polymyxin-dexamethasone (DEXACINE) 3.5 mg/g-10,000 unit/g-0.1 % Oint    neomycin-polymyxin-dexamethasone (MAXITROL) 3.5mg/mL-10,000 unit/mL-0.1 % DrpS    oxycodone-acetaminophen (PERCOCET) 5-325 mg per tablet    pantoprazole (PROTONIX) 40 MG tablet    predniSONE (DELTASONE) 2.5 MG tablet    predniSONE (DELTASONE) 5 MG tablet    tazarotene (AVAGE) 0.1 % cream    testosterone cypionate (DEPOTESTOTERONE CYPIONATE) 200 mg/mL injection      Facility-Administered Medications     acetaminophen tablet 650 mg    diphenhydramine IVPB 50 mg    famotidine (PF) 20 mg/2 mL injection 20 mg    immune globulin (human) (IgG) 10 % injection 20 g    sodium chloride 0.9% 250 mL flush bag      Appointments for Next Year     2017  8:00 AM NON FASTING LAB (10 min.) [facility]    Arrive at check-in approximately 15 minutes before your scheduled appointment time. " Bring all outside medical records and imaging, along with a list of your current medications and insurance card.    [facility] 3rd Floor    5/25/2017  9:00 AM INFUSION 420 MIN (420 min.) [facility], CHEMO    Arrive at check-in approximately 15 minutes before your scheduled appointment time. Bring all outside medical records and imaging, along with a list of your current medications and insurance card.    [facility], 5th Floor    6/12/2017 10:00 AM INFUSION 300 MIN (300 min.) [facility], CHEMO    Arrive at check-in approximately 15 minutes before your scheduled appointment time. Bring all outside medical records and imaging, along with a list of your current medications and insurance card.    [facility], 5th Floor    6/13/2017  1:30 PM ESTABLISHED PATIENT (15 min.) [facility] Ophthalmology Jennie Lee MD    Arrive at check-in approximately 15 minutes before your scheduled appointment time. Bring all outside medical records and imaging, along with a list of your current medications and insurance card.    10th Floor  Dr. Kapoor patients please go to the 1st Floor [facility] for your appointment.     6/21/2017  9:00 AM ESTABLISHED PATIENT (10 min.) [place] - Dermatology Karissa Monsivais MD    Arrive at check-in approximately 15 minutes before your scheduled appointment time. Bring all outside medical records and imaging, along with a list of your current medications and insurance card.    5th Floor    6/21/2017  2:15 PM POST-OP (15 min.) [facility] - Otorhinolaryngology [place] MARY Dsouza III, MD    Arrive at check-in approximately 15 minutes before your scheduled appointment time. Bring all outside medical records and imaging, along with a list of your current medications and insurance card.    [facility] [place] - 4th Floor    7/20/2017  8:00 AM NON FASTING LAB (10 min.) [facility] CANCER DG    Arrive at  check-in approximately 15 minutes before your scheduled appointment time. Bring all outside medical records and imaging, along with a list of your current medications and insurance card.    [facility] 3rd Floor    7/20/2017  9:00 AM ESTABLISHED PATIENT EXTENDED (60 min.) [place] - Hematology Oncology Joaquin Wooten Jr., MD    Arrive at check-in approximately 15 minutes before your scheduled appointment time. Bring all outside medical records and imaging, along with a list of your current medications and insurance card.    [facility] - 3rd Floor    7/20/2017 10:00 AM INFUSION 420 MIN (420 min.) [facility] CHEMO    Arrive at check-in approximately 15 minutes before your scheduled appointment time. Bring all outside medical records and imaging, along with a list of your current medications and insurance card.    [facility], 5th Floor    9/6/2017  7:00 AM NON FASTING LAB (15 min.) [place] - Laboratory LAB, [place]    Arrive at check-in approximately 15 minutes before your scheduled appointment time. Bring all outside medical records and imaging, along with a list of your current medications and insurance card.    5th Floor    9/13/2017  9:30 AM EST PATIENT - ENDOCRINE (30 min.) [facility] - Endo/Diab/Metab Kindra Churchill NP    Arrive at check-in approximately 15 minutes before your scheduled appointment time. Bring all outside medical records and imaging, along with a list of your current medications and insurance card.    9th Floor         Default Flowsheet Data (last 24 hours)      Amb Complex Vitals Doc        05/15/17 1334 05/15/17 1307 05/15/17 1228 05/15/17 1156 05/15/17 1129    Measurements    BP (!)  125/58   IVIG complete 137/61   rate 200cc/hr 132/61   rate 100cc/hr (!)  153/66   rate 50cc/hr (!)  145/69   IVIG rate 24.5cc/hr    Pulse 61 61 60 66 70    Resp 18 18 18  18       05/15/17 1016                Measurements    BP (!)  155/69         Temp 98.1 °F (36.7 °C)        Pulse 69        Resp 18        Pain Assessment    Pain Score Zero                Allergies     Ace Inhibitors Swelling    angioedema    Divalproex     Rash under arms, body creases    Peanut Swelling      Medications You Received from 05/14/2017 1335 to 05/15/2017 1335        Date/Time Order Dose Route Action     05/15/2017 1031 acetaminophen tablet 650 mg 650 mg Oral Given     05/15/2017 1032 diphenhydramine IVPB 50 mg 50 mg Intravenous New Bag     05/15/2017 1032 famotidine (PF) 20 mg/2 mL injection 20 mg 20 mg Intravenous Given     05/15/2017 1126 immune globulin (human) (IgG) 10 % injection 20 g 20 g Intravenous New Bag     05/15/2017 1030 sodium chloride 0.9% 250 mL flush bag   Intravenous New Bag      Current Discharge Medication List     Cannot display discharge medications since this is not an admission.

## 2017-05-18 RX ORDER — PANTOPRAZOLE SODIUM 40 MG/1
TABLET, DELAYED RELEASE ORAL
Qty: 90 TABLET | Refills: 3 | Status: SHIPPED | OUTPATIENT
Start: 2017-05-18 | End: 2018-05-07 | Stop reason: SDUPTHER

## 2017-05-18 RX ORDER — CLOPIDOGREL BISULFATE 75 MG/1
TABLET ORAL
Qty: 90 TABLET | Refills: 3 | Status: SHIPPED | OUTPATIENT
Start: 2017-05-18 | End: 2018-05-07 | Stop reason: SDUPTHER

## 2017-05-25 ENCOUNTER — INFUSION (OUTPATIENT)
Dept: INFUSION THERAPY | Facility: HOSPITAL | Age: 82
End: 2017-05-25
Attending: INTERNAL MEDICINE
Payer: MEDICARE

## 2017-05-25 VITALS
SYSTOLIC BLOOD PRESSURE: 124 MMHG | BODY MASS INDEX: 25.15 KG/M2 | RESPIRATION RATE: 18 BRPM | HEART RATE: 57 BPM | TEMPERATURE: 98 F | DIASTOLIC BLOOD PRESSURE: 60 MMHG | WEIGHT: 180.31 LBS

## 2017-05-25 DIAGNOSIS — Z51.11 ENCOUNTER FOR ANTINEOPLASTIC CHEMOTHERAPY: ICD-10-CM

## 2017-05-25 DIAGNOSIS — C91.10 CLL (CHRONIC LYMPHOCYTIC LEUKEMIA): ICD-10-CM

## 2017-05-25 DIAGNOSIS — E79.0 HYPERURICEMIA: Primary | ICD-10-CM

## 2017-05-25 PROCEDURE — 63600175 PHARM REV CODE 636 W HCPCS: Performed by: INTERNAL MEDICINE

## 2017-05-25 PROCEDURE — 96375 TX/PRO/DX INJ NEW DRUG ADDON: CPT

## 2017-05-25 PROCEDURE — 96413 CHEMO IV INFUSION 1 HR: CPT

## 2017-05-25 PROCEDURE — 96415 CHEMO IV INFUSION ADDL HR: CPT

## 2017-05-25 PROCEDURE — 96367 TX/PROPH/DG ADDL SEQ IV INF: CPT

## 2017-05-25 PROCEDURE — 25000003 PHARM REV CODE 250: Performed by: INTERNAL MEDICINE

## 2017-05-25 RX ORDER — SODIUM CHLORIDE 0.9 % (FLUSH) 0.9 %
10 SYRINGE (ML) INJECTION
Status: DISCONTINUED | OUTPATIENT
Start: 2017-05-25 | End: 2017-05-25 | Stop reason: HOSPADM

## 2017-05-25 RX ORDER — SODIUM CHLORIDE 0.9 % (FLUSH) 0.9 %
10 SYRINGE (ML) INJECTION
Status: CANCELLED | OUTPATIENT
Start: 2017-05-25

## 2017-05-25 RX ORDER — ACETAMINOPHEN 500 MG
1000 TABLET ORAL
Status: COMPLETED | OUTPATIENT
Start: 2017-05-25 | End: 2017-05-25

## 2017-05-25 RX ORDER — HEPARIN 100 UNIT/ML
500 SYRINGE INTRAVENOUS
Status: CANCELLED | OUTPATIENT
Start: 2017-05-25

## 2017-05-25 RX ORDER — FAMOTIDINE 10 MG/ML
20 INJECTION INTRAVENOUS
Status: CANCELLED | OUTPATIENT
Start: 2017-05-25

## 2017-05-25 RX ORDER — FAMOTIDINE 10 MG/ML
20 INJECTION INTRAVENOUS
Status: COMPLETED | OUTPATIENT
Start: 2017-05-25 | End: 2017-05-25

## 2017-05-25 RX ORDER — ACETAMINOPHEN 325 MG/1
1000 TABLET ORAL
Status: CANCELLED | OUTPATIENT
Start: 2017-05-25

## 2017-05-25 RX ADMIN — ACETAMINOPHEN 1000 MG: 500 TABLET ORAL at 10:05

## 2017-05-25 RX ADMIN — SODIUM CHLORIDE 2000 MG: 0.9 INJECTION, SOLUTION INTRAVENOUS at 10:05

## 2017-05-25 RX ADMIN — DEXAMETHASONE SODIUM PHOSPHATE 10 MG: 4 INJECTION, SOLUTION INTRAMUSCULAR; INTRAVENOUS at 10:05

## 2017-05-25 RX ADMIN — DIPHENHYDRAMINE HYDROCHLORIDE 50 MG: 50 INJECTION, SOLUTION INTRAMUSCULAR; INTRAVENOUS at 10:05

## 2017-05-25 RX ADMIN — SODIUM CHLORIDE: 0.9 INJECTION, SOLUTION INTRAVENOUS at 09:05

## 2017-05-25 RX ADMIN — FAMOTIDINE 20 MG: 10 INJECTION INTRAVENOUS at 10:05

## 2017-05-25 NOTE — PLAN OF CARE
Problem: Patient Care Overview (Adult)  Goal: Plan of Care Review  Outcome: Ongoing (interventions implemented as appropriate)  Pt tolerated tx well, rtc in June, pt states does [facility], no distress noted upon d/c

## 2017-05-25 NOTE — PLAN OF CARE
Problem: Chemotherapy Effects (Adult)  Goal: Signs and Symptoms of Listed Potential Problems Will be Absent or Manageable (Chemotherapy Effects)  Signs and symptoms of listed potential problems will be absent or manageable by discharge/transition of care (reference Chemotherapy Effects (Adult) CPG).   Outcome: Ongoing (interventions implemented as appropriate)  Pt here for Azerra infusion

## 2017-05-26 ENCOUNTER — TELEPHONE (OUTPATIENT)
Dept: HEMATOLOGY/ONCOLOGY | Facility: CLINIC | Age: 82
End: 2017-05-26

## 2017-05-26 NOTE — TELEPHONE ENCOUNTER
Referral message fwd to Dr. Wooten.       ----- Message from Milana Finn sent at 5/26/2017 10:18 AM CDT -----  Contact: [facility]   Needs a peer to peer, so she has msged all the info. Please call Yash [email or web address] [phone]

## 2017-06-05 ENCOUNTER — TELEPHONE (OUTPATIENT)
Dept: OPHTHALMOLOGY | Facility: CLINIC | Age: 82
End: 2017-06-05

## 2017-06-05 ENCOUNTER — PATIENT MESSAGE (OUTPATIENT)
Dept: OPHTHALMOLOGY | Facility: CLINIC | Age: 82
End: 2017-06-05

## 2017-06-05 NOTE — TELEPHONE ENCOUNTER
----- Message from Elli Noriega sent at 6/5/2017 12:33 PM CDT -----  Contact: Thierry Perez calling to confirm what type of appointment he is scheduled for on 06/13/17.  He states that it shouldn't just be a follow up visit.  He can be reached at the number on file after 4pm

## 2017-06-06 ENCOUNTER — PATIENT MESSAGE (OUTPATIENT)
Dept: HEMATOLOGY/ONCOLOGY | Facility: CLINIC | Age: 82
End: 2017-06-06

## 2017-06-12 ENCOUNTER — INFUSION (OUTPATIENT)
Dept: INFUSION THERAPY | Facility: HOSPITAL | Age: 82
End: 2017-06-12
Attending: INTERNAL MEDICINE
Payer: MEDICARE

## 2017-06-12 VITALS
RESPIRATION RATE: 16 BRPM | DIASTOLIC BLOOD PRESSURE: 61 MMHG | WEIGHT: 173.31 LBS | HEIGHT: 71 IN | SYSTOLIC BLOOD PRESSURE: 142 MMHG | HEART RATE: 66 BPM | BODY MASS INDEX: 24.26 KG/M2 | TEMPERATURE: 99 F

## 2017-06-12 DIAGNOSIS — C91.10 CLL (CHRONIC LYMPHOCYTIC LEUKEMIA): ICD-10-CM

## 2017-06-12 DIAGNOSIS — E79.0 HYPERURICEMIA: ICD-10-CM

## 2017-06-12 DIAGNOSIS — J32.9 CHRONIC SINUSITIS, UNSPECIFIED LOCATION: ICD-10-CM

## 2017-06-12 DIAGNOSIS — N18.2 CKD (CHRONIC KIDNEY DISEASE), STAGE 2 (MILD): ICD-10-CM

## 2017-06-12 DIAGNOSIS — D80.1 HYPOGAMMAGLOBULINEMIA: Primary | ICD-10-CM

## 2017-06-12 PROCEDURE — 25000003 PHARM REV CODE 250: Performed by: INTERNAL MEDICINE

## 2017-06-12 PROCEDURE — 96366 THER/PROPH/DIAG IV INF ADDON: CPT

## 2017-06-12 PROCEDURE — 96367 TX/PROPH/DG ADDL SEQ IV INF: CPT

## 2017-06-12 PROCEDURE — 96365 THER/PROPH/DIAG IV INF INIT: CPT

## 2017-06-12 PROCEDURE — 63600175 PHARM REV CODE 636 W HCPCS: Performed by: INTERNAL MEDICINE

## 2017-06-12 PROCEDURE — 96375 TX/PRO/DX INJ NEW DRUG ADDON: CPT

## 2017-06-12 RX ORDER — FAMOTIDINE 10 MG/ML
20 INJECTION INTRAVENOUS
Status: COMPLETED | OUTPATIENT
Start: 2017-06-12 | End: 2017-06-12

## 2017-06-12 RX ORDER — SODIUM CHLORIDE 0.9 % (FLUSH) 0.9 %
10 SYRINGE (ML) INJECTION
Status: DISCONTINUED | OUTPATIENT
Start: 2017-06-12 | End: 2017-06-12 | Stop reason: HOSPADM

## 2017-06-12 RX ORDER — SODIUM CHLORIDE 0.9 % (FLUSH) 0.9 %
10 SYRINGE (ML) INJECTION
Status: CANCELLED | OUTPATIENT
Start: 2017-06-12

## 2017-06-12 RX ORDER — ACETAMINOPHEN 325 MG/1
650 TABLET ORAL
Status: CANCELLED | OUTPATIENT
Start: 2017-06-12

## 2017-06-12 RX ORDER — ACETAMINOPHEN 325 MG/1
650 TABLET ORAL
Status: COMPLETED | OUTPATIENT
Start: 2017-06-12 | End: 2017-06-12

## 2017-06-12 RX ORDER — FAMOTIDINE 10 MG/ML
20 INJECTION INTRAVENOUS
Status: CANCELLED | OUTPATIENT
Start: 2017-06-12 | End: 2017-06-12

## 2017-06-12 RX ORDER — HEPARIN 100 UNIT/ML
500 SYRINGE INTRAVENOUS
Status: CANCELLED | OUTPATIENT
Start: 2017-06-12

## 2017-06-12 RX ADMIN — DIPHENHYDRAMINE HYDROCHLORIDE 50 MG: 50 INJECTION, SOLUTION INTRAMUSCULAR; INTRAVENOUS at 10:06

## 2017-06-12 RX ADMIN — HUMAN IMMUNOGLOBULIN G 20 G: 20 LIQUID INTRAVENOUS at 11:06

## 2017-06-12 RX ADMIN — ACETAMINOPHEN 650 MG: 325 TABLET ORAL at 10:06

## 2017-06-12 RX ADMIN — SODIUM CHLORIDE: 0.9 INJECTION, SOLUTION INTRAVENOUS at 10:06

## 2017-06-12 RX ADMIN — FAMOTIDINE 20 MG: 10 INJECTION INTRAVENOUS at 10:06

## 2017-06-12 NOTE — PLAN OF CARE
Problem: Patient Care Overview (Adult)  Goal: Plan of Care Review  Outcome: Ongoing (interventions implemented as appropriate)  Tolerated IVIG treatment well.  Advised to call MD for any problems or concerns.  AVS given.  RTC on 7/20/17.  NAD noted upon discharge.

## 2017-06-13 ENCOUNTER — OFFICE VISIT (OUTPATIENT)
Dept: OPHTHALMOLOGY | Facility: CLINIC | Age: 82
End: 2017-06-13
Payer: MEDICARE

## 2017-06-13 DIAGNOSIS — Z98.890 POST-OPERATIVE STATE: Primary | ICD-10-CM

## 2017-06-13 DIAGNOSIS — C44.111 BASAL CELL CARCINOMA OF EYELID, RIGHT: ICD-10-CM

## 2017-06-13 PROCEDURE — 92012 INTRM OPH EXAM EST PATIENT: CPT | Mod: 25,S$GLB,, | Performed by: OPHTHALMOLOGY

## 2017-06-13 PROCEDURE — 68840 EXPLORE/IRRIGATE TEAR DUCTS: CPT | Mod: RT,S$GLB,, | Performed by: OPHTHALMOLOGY

## 2017-06-13 PROCEDURE — 99999 PR PBB SHADOW E&M-EST. PATIENT-LVL II: CPT | Mod: PBBFAC,,, | Performed by: OPHTHALMOLOGY

## 2017-06-13 NOTE — PROGRESS NOTES
HPI     Post-op Evaluation    Additional comments: 3 month f/u            Comments   S/p mohs repair with mini monoka stent 2/8/17  No pain, od . Epiphora.ou. Trying to get an appt. With allergy clinic.  No drops  Pseudophakia ou  lasik ou       Last edited by Betty Hamilton on 6/13/2017  1:39 PM. (History)            Assessment /Plan     For exam results, see Encounter Report.    Post-operative state    Basal cell carcinoma of eyelid, right        S/p right lower eyelid reconstruction with placement of mini monoka stent on 2/8/17.  Patient with medial notch adjacent to mini monoka stent.  Stent has been in place for three months, now here for removal.   - Stent removed today    Procedure note:  OD: right lower canaliculus and nld patent    - P&I s/p tube removal, canaliculus patent    - Photos taken today     RTC 1 year prn sooner any worsening    I have reviewed and concur with the resident's history, physical, assessment, and plan.  I have personally interviewed and examined the patient.

## 2017-06-21 ENCOUNTER — OFFICE VISIT (OUTPATIENT)
Dept: OTOLARYNGOLOGY | Facility: CLINIC | Age: 82
End: 2017-06-21
Payer: MEDICARE

## 2017-06-21 ENCOUNTER — OFFICE VISIT (OUTPATIENT)
Dept: DERMATOLOGY | Facility: CLINIC | Age: 82
End: 2017-06-21
Payer: MEDICARE

## 2017-06-21 VITALS — HEART RATE: 62 BPM | DIASTOLIC BLOOD PRESSURE: 69 MMHG | SYSTOLIC BLOOD PRESSURE: 140 MMHG

## 2017-06-21 VITALS — WEIGHT: 173 LBS | BODY MASS INDEX: 24.13 KG/M2

## 2017-06-21 DIAGNOSIS — Z85.828 HISTORY OF SKIN CANCER: ICD-10-CM

## 2017-06-21 DIAGNOSIS — Z98.890 POST-OPERATIVE STATE: ICD-10-CM

## 2017-06-21 DIAGNOSIS — L57.0 MULTIPLE ACTINIC KERATOSES: Primary | ICD-10-CM

## 2017-06-21 DIAGNOSIS — Z98.890 HISTORY OF MOH'S MICROGRAPHIC SURGERY FOR SKIN CANCER: ICD-10-CM

## 2017-06-21 DIAGNOSIS — Z85.828 HISTORY OF MOH'S MICROGRAPHIC SURGERY FOR SKIN CANCER: ICD-10-CM

## 2017-06-21 DIAGNOSIS — L82.1 SEBORRHEIC KERATOSES: ICD-10-CM

## 2017-06-21 DIAGNOSIS — J32.9 CHRONIC RECURRENT SINUSITIS: Primary | ICD-10-CM

## 2017-06-21 PROCEDURE — 87070 CULTURE OTHR SPECIMN AEROBIC: CPT

## 2017-06-21 PROCEDURE — 17000 DESTRUCT PREMALG LESION: CPT | Mod: S$GLB,,, | Performed by: DERMATOLOGY

## 2017-06-21 PROCEDURE — 87102 FUNGUS ISOLATION CULTURE: CPT

## 2017-06-21 PROCEDURE — 99999 PR PBB SHADOW E&M-EST. PATIENT-LVL III: CPT | Mod: PBBFAC,,, | Performed by: OTOLARYNGOLOGY

## 2017-06-21 PROCEDURE — 1159F MED LIST DOCD IN RCRD: CPT | Mod: S$GLB,,, | Performed by: DERMATOLOGY

## 2017-06-21 PROCEDURE — 87075 CULTR BACTERIA EXCEPT BLOOD: CPT

## 2017-06-21 PROCEDURE — 1159F MED LIST DOCD IN RCRD: CPT | Mod: S$GLB,,, | Performed by: OTOLARYNGOLOGY

## 2017-06-21 PROCEDURE — 31231 NASAL ENDOSCOPY DX: CPT | Mod: 79,S$GLB,, | Performed by: OTOLARYNGOLOGY

## 2017-06-21 PROCEDURE — 87186 SC STD MICRODIL/AGAR DIL: CPT

## 2017-06-21 PROCEDURE — 99999 PR PBB SHADOW E&M-EST. PATIENT-LVL II: CPT | Mod: PBBFAC,,, | Performed by: DERMATOLOGY

## 2017-06-21 PROCEDURE — 1126F AMNT PAIN NOTED NONE PRSNT: CPT | Mod: S$GLB,,, | Performed by: OTOLARYNGOLOGY

## 2017-06-21 PROCEDURE — 87077 CULTURE AEROBIC IDENTIFY: CPT

## 2017-06-21 PROCEDURE — 87076 CULTURE ANAEROBE IDENT EACH: CPT | Mod: 59

## 2017-06-21 PROCEDURE — 1157F ADVNC CARE PLAN IN RCRD: CPT | Mod: S$GLB,,, | Performed by: OTOLARYNGOLOGY

## 2017-06-21 PROCEDURE — 99213 OFFICE O/P EST LOW 20 MIN: CPT | Mod: 25,S$GLB,, | Performed by: DERMATOLOGY

## 2017-06-21 PROCEDURE — 99213 OFFICE O/P EST LOW 20 MIN: CPT | Mod: 24,25,S$GLB, | Performed by: OTOLARYNGOLOGY

## 2017-06-21 PROCEDURE — 1157F ADVNC CARE PLAN IN RCRD: CPT | Mod: S$GLB,,, | Performed by: DERMATOLOGY

## 2017-06-21 PROCEDURE — 17003 DESTRUCT PREMALG LES 2-14: CPT | Mod: S$GLB,,, | Performed by: DERMATOLOGY

## 2017-06-21 NOTE — PROGRESS NOTES
Subjective:       Patient ID:  Thierry Ramos Jr. is a 83 y.o. male who presents for   Chief Complaint   Patient presents with    Follow-up     PDT on face     Skin Check     UBSE     Had PDT helped some on face still with actinic keratoses face and arms        Review of Systems   Constitutional: Negative for fever.   Skin: Negative for itching and rash.   Hematologic/Lymphatic: Bruises/bleeds easily.        Objective:    Physical Exam   Constitutional: He appears well-developed and well-nourished. No distress.   Neurological: He is alert and oriented to person, place, and time. He is not disoriented.   Psychiatric: He has a normal mood and affect.   Skin:   Areas Examined (abnormalities noted in diagram):   Scalp / Hair Palpated and Inspected  Head / Face Inspection Performed  Neck Inspection Performed  Chest / Axilla Inspection Performed  Abdomen Inspection Performed  Back Inspection Performed  RUE Inspected  LUE Inspection Performed                   Diagram Legend     Erythematous scaling macule/papule c/w actinic keratosis           Assessment / Plan:        Multiple actinic keratoses   Cryosurgery Procedure Note    Verbal consent from the patient is obtained and the patient is aware of the precancerous quality and need for treatment of these lesions. Liquid nitrogen cryosurgery is applied to the 11 actinic keratoses, as detailed in the physical exam, to produce a freeze injury.    History of skin cancer  [facility] [place]    Seborrheic keratoses  reassurance               Return in about 4 weeks (around 7/19/2017).

## 2017-06-21 NOTE — PROGRESS NOTES
The patient presents back approximately three months S/P repair of Mohs defect of left melolabial fold with flap repair. Flap shows good viability and is healing well. Is is very happy with his result.     He also C/O purulent D/C in the AM upon arising. Had FESS in 2014 for similar symptoms. Started back on steroid rinses and he states he has some improvement in symptoms overnight. Continues to have rhinorrhea and congestion that is worse in the morning. He states he is still blowing yellow mucus from his nose.      Exam with scope # [id number]  Thick mocoid drainage from [facility] on his left side, cultures taken.  Right side is clear.     Plan:   Change to Ab rinses based on culture results.  He will contact us via my [facility] to arrange.

## 2017-06-24 LAB — BACTERIA SPEC AEROBE CULT: NORMAL

## 2017-06-27 ENCOUNTER — TELEPHONE (OUTPATIENT)
Dept: INTERNAL MEDICINE | Facility: CLINIC | Age: 82
End: 2017-06-27

## 2017-06-27 ENCOUNTER — PATIENT MESSAGE (OUTPATIENT)
Dept: OTOLARYNGOLOGY | Facility: CLINIC | Age: 82
End: 2017-06-27

## 2017-06-27 ENCOUNTER — PATIENT MESSAGE (OUTPATIENT)
Dept: HEMATOLOGY/ONCOLOGY | Facility: CLINIC | Age: 82
End: 2017-06-27

## 2017-06-27 DIAGNOSIS — E78.2 MIXED DYSLIPIDEMIA: ICD-10-CM

## 2017-06-27 DIAGNOSIS — I10 ESSENTIAL HYPERTENSION, BENIGN: Primary | ICD-10-CM

## 2017-06-27 NOTE — TELEPHONE ENCOUNTER
----- Message from Anastasia Gregg sent at 6/27/2017  9:31 AM CDT -----  Contact: Pt [phone]  Doctor appointment and lab have been scheduled.  Please link lab orders to the lab appointment.  Date of doctor appointment:  08-24-17  Physical or EP:  Physical  Date of lab appointment:    Comments:

## 2017-06-28 LAB — BACTERIA SPEC ANAEROBE CULT: NORMAL

## 2017-07-06 ENCOUNTER — PATIENT MESSAGE (OUTPATIENT)
Dept: OTOLARYNGOLOGY | Facility: CLINIC | Age: 82
End: 2017-07-06

## 2017-07-10 ENCOUNTER — INFUSION (OUTPATIENT)
Dept: INFUSION THERAPY | Facility: HOSPITAL | Age: 82
End: 2017-07-10
Attending: INTERNAL MEDICINE
Payer: MEDICARE

## 2017-07-10 VITALS
SYSTOLIC BLOOD PRESSURE: 163 MMHG | RESPIRATION RATE: 18 BRPM | HEIGHT: 71 IN | BODY MASS INDEX: 24.19 KG/M2 | DIASTOLIC BLOOD PRESSURE: 69 MMHG | HEART RATE: 60 BPM | TEMPERATURE: 98 F | WEIGHT: 172.81 LBS

## 2017-07-10 DIAGNOSIS — E79.0 HYPERURICEMIA: ICD-10-CM

## 2017-07-10 DIAGNOSIS — N18.30 STAGE 3 CHRONIC KIDNEY DISEASE: ICD-10-CM

## 2017-07-10 DIAGNOSIS — C91.10 CLL (CHRONIC LYMPHOCYTIC LEUKEMIA): ICD-10-CM

## 2017-07-10 DIAGNOSIS — D80.1 HYPOGAMMAGLOBULINEMIA: Primary | ICD-10-CM

## 2017-07-10 DIAGNOSIS — J32.9 CHRONIC SINUSITIS, UNSPECIFIED LOCATION: ICD-10-CM

## 2017-07-10 PROCEDURE — 63600175 PHARM REV CODE 636 W HCPCS: Performed by: INTERNAL MEDICINE

## 2017-07-10 PROCEDURE — 96367 TX/PROPH/DG ADDL SEQ IV INF: CPT

## 2017-07-10 PROCEDURE — 25000003 PHARM REV CODE 250: Performed by: INTERNAL MEDICINE

## 2017-07-10 PROCEDURE — 96366 THER/PROPH/DIAG IV INF ADDON: CPT

## 2017-07-10 PROCEDURE — 96365 THER/PROPH/DIAG IV INF INIT: CPT

## 2017-07-10 PROCEDURE — 96375 TX/PRO/DX INJ NEW DRUG ADDON: CPT

## 2017-07-10 RX ORDER — SODIUM CHLORIDE 0.9 % (FLUSH) 0.9 %
10 SYRINGE (ML) INJECTION
Status: CANCELLED | OUTPATIENT
Start: 2017-07-10

## 2017-07-10 RX ORDER — HEPARIN 100 UNIT/ML
500 SYRINGE INTRAVENOUS
Status: CANCELLED | OUTPATIENT
Start: 2017-07-10

## 2017-07-10 RX ORDER — ACETAMINOPHEN 325 MG/1
650 TABLET ORAL
Status: CANCELLED | OUTPATIENT
Start: 2017-07-10

## 2017-07-10 RX ORDER — SODIUM CHLORIDE 0.9 % (FLUSH) 0.9 %
10 SYRINGE (ML) INJECTION
Status: DISCONTINUED | OUTPATIENT
Start: 2017-07-10 | End: 2017-07-10 | Stop reason: HOSPADM

## 2017-07-10 RX ORDER — FAMOTIDINE 10 MG/ML
20 INJECTION INTRAVENOUS
Status: COMPLETED | OUTPATIENT
Start: 2017-07-10 | End: 2017-07-10

## 2017-07-10 RX ORDER — ACETAMINOPHEN 325 MG/1
650 TABLET ORAL
Status: COMPLETED | OUTPATIENT
Start: 2017-07-10 | End: 2017-07-10

## 2017-07-10 RX ORDER — FAMOTIDINE 10 MG/ML
20 INJECTION INTRAVENOUS
Status: CANCELLED | OUTPATIENT
Start: 2017-07-10 | End: 2017-07-10

## 2017-07-10 RX ADMIN — ACETAMINOPHEN 650 MG: 325 TABLET ORAL at 10:07

## 2017-07-10 RX ADMIN — FAMOTIDINE 20 MG: 10 INJECTION INTRAVENOUS at 10:07

## 2017-07-10 RX ADMIN — HUMAN IMMUNOGLOBULIN G 20 G: 20 LIQUID INTRAVENOUS at 10:07

## 2017-07-10 RX ADMIN — SODIUM CHLORIDE: 0.9 INJECTION, SOLUTION INTRAVENOUS at 10:07

## 2017-07-10 RX ADMIN — DIPHENHYDRAMINE HYDROCHLORIDE 50 MG: 50 INJECTION, SOLUTION INTRAMUSCULAR; INTRAVENOUS at 10:07

## 2017-07-10 NOTE — PLAN OF CARE
Problem: Patient Care Overview (Adult)  Goal: Plan of Care Review  Outcome: Ongoing (interventions implemented as appropriate)  Tolerated treatment well.  Advised to call MD for any problems or concerns.  AVS given.  RTC on 7/20/17 for Azerra infusion.  NAD noted upon discharge.

## 2017-07-11 ENCOUNTER — PATIENT MESSAGE (OUTPATIENT)
Dept: OTOLARYNGOLOGY | Facility: CLINIC | Age: 82
End: 2017-07-11

## 2017-07-11 ENCOUNTER — OFFICE VISIT (OUTPATIENT)
Dept: DERMATOLOGY | Facility: CLINIC | Age: 82
End: 2017-07-11
Payer: MEDICARE

## 2017-07-11 ENCOUNTER — TELEPHONE (OUTPATIENT)
Dept: OTOLARYNGOLOGY | Facility: CLINIC | Age: 82
End: 2017-07-11

## 2017-07-11 VITALS — WEIGHT: 172 LBS | BODY MASS INDEX: 23.99 KG/M2

## 2017-07-11 DIAGNOSIS — L82.1 SEBORRHEIC KERATOSES: ICD-10-CM

## 2017-07-11 DIAGNOSIS — Z85.828 HISTORY OF SKIN CANCER: ICD-10-CM

## 2017-07-11 DIAGNOSIS — L57.0 MULTIPLE ACTINIC KERATOSES: Primary | ICD-10-CM

## 2017-07-11 PROCEDURE — 99999 PR PBB SHADOW E&M-EST. PATIENT-LVL II: CPT | Mod: PBBFAC,,, | Performed by: DERMATOLOGY

## 2017-07-11 PROCEDURE — 1159F MED LIST DOCD IN RCRD: CPT | Mod: S$GLB,,, | Performed by: DERMATOLOGY

## 2017-07-11 PROCEDURE — 17000 DESTRUCT PREMALG LESION: CPT | Mod: S$GLB,,, | Performed by: DERMATOLOGY

## 2017-07-11 PROCEDURE — 99213 OFFICE O/P EST LOW 20 MIN: CPT | Mod: 25,S$GLB,, | Performed by: DERMATOLOGY

## 2017-07-11 PROCEDURE — 17003 DESTRUCT PREMALG LES 2-14: CPT | Mod: S$GLB,,, | Performed by: DERMATOLOGY

## 2017-07-11 PROCEDURE — 1157F ADVNC CARE PLAN IN RCRD: CPT | Mod: S$GLB,,, | Performed by: DERMATOLOGY

## 2017-07-12 NOTE — PROGRESS NOTES
Subjective:       Patient ID:  Thierry Ramos Jr. is a 83 y.o. male who presents for   Chief Complaint   Patient presents with    Follow-up     Much improved few aks remain on face which were treated with cryo last visit not painful        Review of Systems   Constitutional: Negative for fever.   Skin: Negative for itching and rash.   Hematologic/Lymphatic: Does not bruise/bleed easily.        Objective:    Physical Exam   Constitutional: He appears well-developed and well-nourished. No distress.   Neurological: He is alert and oriented to person, place, and time. He is not disoriented.   Psychiatric: He has a normal mood and affect.   Skin:   Areas Examined (abnormalities noted in diagram):   Head / Face Inspection Performed  Neck Inspection Performed  Chest / Axilla Inspection Performed  Back Inspection Performed  RUE Inspected  LUE Inspection Performed                   Diagram Legend     Erythematous scaling macule/papule c/w actinic keratosis           Assessment / Plan:        Multiple actinic keratoses   Cryosurgery Procedure Note    Verbal consent from the patient is obtained and the patient is aware of the precancerous quality and need for treatment of these lesions. Liquid nitrogen cryosurgery is applied to the 4 actinic keratoses, as detailed in the physical exam, to produce a freeze injury.      Seborrheic keratoses  reassurance      History of skin cancer  [facility] [place]           Return in about 3 months (around 10/11/2017).

## 2017-07-20 ENCOUNTER — INFUSION (OUTPATIENT)
Dept: INFUSION THERAPY | Facility: HOSPITAL | Age: 82
End: 2017-07-20
Attending: INTERNAL MEDICINE
Payer: MEDICARE

## 2017-07-20 ENCOUNTER — OFFICE VISIT (OUTPATIENT)
Dept: HEMATOLOGY/ONCOLOGY | Facility: CLINIC | Age: 82
End: 2017-07-20
Payer: MEDICARE

## 2017-07-20 VITALS
SYSTOLIC BLOOD PRESSURE: 165 MMHG | DIASTOLIC BLOOD PRESSURE: 69 MMHG | RESPIRATION RATE: 20 BRPM | TEMPERATURE: 98 F | HEART RATE: 65 BPM

## 2017-07-20 VITALS
HEART RATE: 72 BPM | WEIGHT: 172.19 LBS | BODY MASS INDEX: 24.1 KG/M2 | HEIGHT: 71 IN | DIASTOLIC BLOOD PRESSURE: 76 MMHG | SYSTOLIC BLOOD PRESSURE: 116 MMHG

## 2017-07-20 DIAGNOSIS — D80.1 HYPOGAMMAGLOBULINEMIA: ICD-10-CM

## 2017-07-20 DIAGNOSIS — Z51.11 ENCOUNTER FOR ANTINEOPLASTIC CHEMOTHERAPY: ICD-10-CM

## 2017-07-20 DIAGNOSIS — E23.0 PANHYPOPITUITARISM: ICD-10-CM

## 2017-07-20 DIAGNOSIS — E79.0 HYPERURICEMIA: Primary | ICD-10-CM

## 2017-07-20 DIAGNOSIS — D69.6 THROMBOCYTOPENIA: ICD-10-CM

## 2017-07-20 DIAGNOSIS — C91.10 CLL (CHRONIC LYMPHOCYTIC LEUKEMIA): ICD-10-CM

## 2017-07-20 DIAGNOSIS — C91.10 CLL (CHRONIC LYMPHOCYTIC LEUKEMIA): Primary | ICD-10-CM

## 2017-07-20 PROCEDURE — 96415 CHEMO IV INFUSION ADDL HR: CPT

## 2017-07-20 PROCEDURE — 99214 OFFICE O/P EST MOD 30 MIN: CPT | Mod: S$GLB,,, | Performed by: INTERNAL MEDICINE

## 2017-07-20 PROCEDURE — 96413 CHEMO IV INFUSION 1 HR: CPT

## 2017-07-20 PROCEDURE — 25000003 PHARM REV CODE 250: Performed by: INTERNAL MEDICINE

## 2017-07-20 PROCEDURE — 63600175 PHARM REV CODE 636 W HCPCS: Performed by: INTERNAL MEDICINE

## 2017-07-20 PROCEDURE — 1126F AMNT PAIN NOTED NONE PRSNT: CPT | Mod: S$GLB,,, | Performed by: INTERNAL MEDICINE

## 2017-07-20 PROCEDURE — 99999 PR PBB SHADOW E&M-EST. PATIENT-LVL III: CPT | Mod: PBBFAC,,, | Performed by: INTERNAL MEDICINE

## 2017-07-20 PROCEDURE — 96375 TX/PRO/DX INJ NEW DRUG ADDON: CPT

## 2017-07-20 PROCEDURE — 1159F MED LIST DOCD IN RCRD: CPT | Mod: S$GLB,,, | Performed by: INTERNAL MEDICINE

## 2017-07-20 PROCEDURE — 96367 TX/PROPH/DG ADDL SEQ IV INF: CPT

## 2017-07-20 PROCEDURE — 99499 UNLISTED E&M SERVICE: CPT | Mod: S$GLB,,, | Performed by: INTERNAL MEDICINE

## 2017-07-20 PROCEDURE — 1157F ADVNC CARE PLAN IN RCRD: CPT | Mod: S$GLB,,, | Performed by: INTERNAL MEDICINE

## 2017-07-20 RX ORDER — FAMOTIDINE 10 MG/ML
20 INJECTION INTRAVENOUS
Status: CANCELLED | OUTPATIENT
Start: 2017-08-24

## 2017-07-20 RX ORDER — HEPARIN 100 UNIT/ML
500 SYRINGE INTRAVENOUS
Status: CANCELLED | OUTPATIENT
Start: 2017-08-24

## 2017-07-20 RX ORDER — ACETAMINOPHEN 325 MG/1
1000 TABLET ORAL
Status: CANCELLED | OUTPATIENT
Start: 2017-08-24

## 2017-07-20 RX ORDER — FAMOTIDINE 10 MG/ML
20 INJECTION INTRAVENOUS
Status: COMPLETED | OUTPATIENT
Start: 2017-07-20 | End: 2017-07-20

## 2017-07-20 RX ORDER — SODIUM CHLORIDE 0.9 % (FLUSH) 0.9 %
10 SYRINGE (ML) INJECTION
Status: CANCELLED | OUTPATIENT
Start: 2017-08-24

## 2017-07-20 RX ORDER — ACETAMINOPHEN 500 MG
1000 TABLET ORAL
Status: COMPLETED | OUTPATIENT
Start: 2017-07-20 | End: 2017-07-20

## 2017-07-20 RX ORDER — VANCOMYCIN HYDROCHLORIDE 1 G/20ML
INJECTION, POWDER, LYOPHILIZED, FOR SOLUTION INTRAVENOUS
COMMUNITY
Start: 2017-07-11 | End: 2017-12-12

## 2017-07-20 RX ADMIN — SODIUM CHLORIDE 2000 MG: 0.9 INJECTION, SOLUTION INTRAVENOUS at 10:07

## 2017-07-20 RX ADMIN — DIPHENHYDRAMINE HYDROCHLORIDE 50 MG: 50 INJECTION, SOLUTION INTRAMUSCULAR; INTRAVENOUS at 10:07

## 2017-07-20 RX ADMIN — FAMOTIDINE 20 MG: 10 INJECTION INTRAVENOUS at 10:07

## 2017-07-20 RX ADMIN — ACETAMINOPHEN 1000 MG: 500 TABLET ORAL at 09:07

## 2017-07-20 RX ADMIN — SODIUM CHLORIDE: 9 INJECTION, SOLUTION INTRAVENOUS at 09:07

## 2017-07-20 RX ADMIN — DEXAMETHASONE SODIUM PHOSPHATE 10 MG: 4 INJECTION, SOLUTION INTRAMUSCULAR; INTRAVENOUS at 09:07

## 2017-07-20 NOTE — PLAN OF CARE
Problem: Patient Care Overview (Adult)  Goal: Plan of Care Review  Outcome: Ongoing (interventions implemented as appropriate)  Tolerated chemo infusion without difficulty. No complaints voiced. AVS given to pt. Instructed to notify Md with any concerns or problems. . Pt verbalized understanding.

## 2017-07-20 NOTE — PROGRESS NOTES
Mr. Ramos is an 83-year-old man who was diagnosed with chronic lymphocytic   leukemia in April of 2012.  In April 2013, there was an abrupt rise in his white   blood cell count of 93,000 and some prolymphocytes were noted in the peripheral   smear.  He developed generalized urticaria, which I suspected was paraneoplastic.   He was treated with five courses of fludarabine, cyclophosphamide and Rituxan.  The fludarabine dose was decreased somewhat because of mild renal impairment   and the sixth treatment was with Rituxan alone because he had developed   thrombocytopenia, which has persisted since then.    His thrombocytopenia became more severe in early 2016 with platelet counts in   the range of 35,000-40,000, at that time, he was taking both aspirin and Plavix.    He was taken off aspirin for several months when he had thrombosis in the   right popliteal vein and he took warfarin, which was later discontinued.  He is   now taking both aspirin and Plavix again.    A bone marrow examination in April 2016 showed no signs of myelodysplasia.    Small lymphocytes comprised 35% of the bone marrow cells.  The cytogenetic study   and the FISH analyses indicated trisomy 12.  The FISH analyses for   myelodysplastic disorders were negative.    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    Because of the continued thrombocytopenia and the fact that he was taking   anticoagulant medication, I recommended a trial of therapy with ofatumumab,   which he began on 06/30/2016.  He completed eight weekly infusions of this drug   and then 4 monthly infusions.  There was improvement in his platelet count with   ofatumumab and, since August 2016, his platelet counts have been in the range of    87,000-114,000.    He has had numerous episodes of infection in the past, particularly sinusitis.    He is currently using inhalation of an antibiotic regimen into his nasal passages.  He   began monthly infusions of immunoglobulin several years ago and the frequency of   sinusitis and respiratory infections subsequently diminished substantially.    He has had a TIA in the past and he had two strokes following pituitary surgery   in 2002.  He has had many non-melanoma skin cancers removed and he is seeing a   dermatologist frequently.    He had one fall since I last saw him and had soft tissue injuries and bleeding   in the skin areas of his skin area of his right hip and on his right arm.    He is not having fevers or sweats.  He tries to do some exercise daily.  He   reports no severe pain.  His weight has gradually increased as his age is   increased.  His appetite is good.    ADDITIONAL PAST HISTORY, SYSTEM REVIEW, SOCIAL HISTORY AND FAMILY HISTORY:  Have   been reviewed and updated in the electronic record.    PHYSICAL EXAMINATION:  GENERAL APPEARANCE:  Well-developed, well-nourished man in no distress.  EYES:  Several social scars on the right eyelid.  No jaundice or pallor.  EARS:  Clear canals and membranes.  NOSE:  Clear nares.  SINUSES:  No tenderness.  MOUTH AND GUMS:  Some additional missing teeth.  No mucosal lesions.  The   patient has had several teeth extracted and he plans to have some dental   implants.  NECK:  No masses or bruits.  No thyroid abnormalities.  LYMPH NODES:  No enlarged cervical, axillary or inguinal nodes.  CHEST AND LUNGS:  Normal respiratory effort.  Clear to auscultation and   percussion.  HEART:  Regular rate and rhythm without murmur or gallop.  ABDOMEN:  Soft without masses or tenderness.  No hepatosplenomegaly.  EXTREMITIES:  A very faint trace of pretibial edema bilaterally.  PERIPHERAL VASCULATURE:  Adequate popliteal pulses.  Decreased pulses in the   feet and  ankles.  NEUROLOGIC:  Mental status is good.  He is fully oriented.  Motor function is   good.  SKIN:  Many actinic keratoses on his face and arms.  Numerous ecchymoses on the   arms.  Multiple scars from prior skin cancer surgery.    LABORATORY STUDIES:  Blood counts today include hemoglobin 14.8, platelets   92,000 and WBC 4990 with 56% granulocytes, 28% lymphocytes, 12% monocytes and 2%   eosinophils.  Chemistry profile is remarkable for glucose 118 and albumin 3.4.    The IgG level is 782.    IMPRESSION:  1.  Chronic lymphocytic leukemia.  2.  Thrombocytopenia, which appears to be largely a consequence of CLL and has   improved with ofatumumab.  3.  Immunodeficiency for which he is receiving monthly immunoglobulin infusions.  4.  Panhypopituitarism.  5.  Multiple skin cancers.    PLAN:  1.  Ofatumumab today.  2.  He will be scheduled to receive ofatumumab in two months.  At that time, he   will have a CBC and CMP.  3.  He will continue to receive intravenous immunoglobulin each month.  4.  Return visit (EPA appointment) in four months with CBC, CMP, IgG level and   appointment for ofatumumab.    I talked at some length with Mr. Ramos and his wife about his current and   possible future therapy.  His most recent problem related to CLL has been   thrombocytopenia.  If the platelet count becomes reduced again, I may be   advise a trial ofdrugs such as ibrutinib or idelalisib.  The latter might be   preferable because it is less likely to be associated with bleeding   complications, but it may be somewhat more likely to be associated within   infections.      MAKAYLA/IN  dd: 07/20/2017 09:28:50 (CDT)  td: 07/20/2017 23:52:53 (CDT)  Doc ID   #[id number]  Job ID #[id number]    CC:

## 2017-07-25 LAB — FUNGUS SPEC CULT: NORMAL

## 2017-08-03 RX ORDER — LEVOTHYROXINE SODIUM 125 UG/1
TABLET ORAL
Qty: 90 TABLET | Refills: 3 | Status: SHIPPED | OUTPATIENT
Start: 2017-08-03 | End: 2018-09-06 | Stop reason: SDUPTHER

## 2017-08-03 RX ORDER — PEN NEEDLE, DIABETIC 30 GX3/16"
NEEDLE, DISPOSABLE MISCELLANEOUS
Qty: 180 EACH | Refills: 4 | Status: SHIPPED | OUTPATIENT
Start: 2017-08-03 | End: 2019-03-25

## 2017-08-07 ENCOUNTER — INFUSION (OUTPATIENT)
Dept: INFUSION THERAPY | Facility: HOSPITAL | Age: 82
End: 2017-08-07
Attending: INTERNAL MEDICINE
Payer: MEDICARE

## 2017-08-07 VITALS
DIASTOLIC BLOOD PRESSURE: 72 MMHG | HEART RATE: 55 BPM | BODY MASS INDEX: 24.01 KG/M2 | RESPIRATION RATE: 16 BRPM | TEMPERATURE: 98 F | WEIGHT: 172.19 LBS | SYSTOLIC BLOOD PRESSURE: 137 MMHG

## 2017-08-07 DIAGNOSIS — D80.1 HYPOGAMMAGLOBULINEMIA: Primary | ICD-10-CM

## 2017-08-07 DIAGNOSIS — E79.0 HYPERURICEMIA: ICD-10-CM

## 2017-08-07 DIAGNOSIS — N18.30 STAGE 3 CHRONIC KIDNEY DISEASE: ICD-10-CM

## 2017-08-07 DIAGNOSIS — J32.9 CHRONIC SINUSITIS, UNSPECIFIED LOCATION: ICD-10-CM

## 2017-08-07 DIAGNOSIS — C91.10 CLL (CHRONIC LYMPHOCYTIC LEUKEMIA): ICD-10-CM

## 2017-08-07 PROCEDURE — S0028 INJECTION, FAMOTIDINE, 20 MG: HCPCS | Performed by: INTERNAL MEDICINE

## 2017-08-07 PROCEDURE — 96367 TX/PROPH/DG ADDL SEQ IV INF: CPT

## 2017-08-07 PROCEDURE — 25000003 PHARM REV CODE 250: Performed by: INTERNAL MEDICINE

## 2017-08-07 PROCEDURE — 96366 THER/PROPH/DIAG IV INF ADDON: CPT

## 2017-08-07 PROCEDURE — 96375 TX/PRO/DX INJ NEW DRUG ADDON: CPT

## 2017-08-07 PROCEDURE — 63600175 PHARM REV CODE 636 W HCPCS: Performed by: INTERNAL MEDICINE

## 2017-08-07 PROCEDURE — 96365 THER/PROPH/DIAG IV INF INIT: CPT

## 2017-08-07 RX ORDER — SODIUM CHLORIDE 0.9 % (FLUSH) 0.9 %
10 SYRINGE (ML) INJECTION
Status: CANCELLED | OUTPATIENT
Start: 2017-08-07

## 2017-08-07 RX ORDER — SODIUM CHLORIDE 0.9 % (FLUSH) 0.9 %
10 SYRINGE (ML) INJECTION
Status: DISCONTINUED | OUTPATIENT
Start: 2017-08-07 | End: 2017-08-07 | Stop reason: HOSPADM

## 2017-08-07 RX ORDER — FAMOTIDINE 10 MG/ML
20 INJECTION INTRAVENOUS
Status: CANCELLED | OUTPATIENT
Start: 2017-08-07

## 2017-08-07 RX ORDER — ACETAMINOPHEN 325 MG/1
650 TABLET ORAL
Status: COMPLETED | OUTPATIENT
Start: 2017-08-07 | End: 2017-08-07

## 2017-08-07 RX ORDER — ACETAMINOPHEN 325 MG/1
650 TABLET ORAL
Status: CANCELLED | OUTPATIENT
Start: 2017-08-07

## 2017-08-07 RX ORDER — HEPARIN 100 UNIT/ML
500 SYRINGE INTRAVENOUS
Status: CANCELLED | OUTPATIENT
Start: 2017-08-07

## 2017-08-07 RX ORDER — FAMOTIDINE 10 MG/ML
20 INJECTION INTRAVENOUS
Status: COMPLETED | OUTPATIENT
Start: 2017-08-07 | End: 2017-08-07

## 2017-08-07 RX ORDER — HEPARIN 100 UNIT/ML
500 SYRINGE INTRAVENOUS
Status: DISCONTINUED | OUTPATIENT
Start: 2017-08-07 | End: 2017-08-07 | Stop reason: HOSPADM

## 2017-08-07 RX ADMIN — HUMAN IMMUNOGLOBULIN G 20 G: 20 LIQUID INTRAVENOUS at 11:08

## 2017-08-07 RX ADMIN — ACETAMINOPHEN 650 MG: 325 TABLET ORAL at 10:08

## 2017-08-07 RX ADMIN — SODIUM CHLORIDE: 0.9 INJECTION, SOLUTION INTRAVENOUS at 10:08

## 2017-08-07 RX ADMIN — FAMOTIDINE 20 MG: 10 INJECTION INTRAVENOUS at 10:08

## 2017-08-07 RX ADMIN — DIPHENHYDRAMINE HYDROCHLORIDE 50 MG: 50 INJECTION, SOLUTION INTRAMUSCULAR; INTRAVENOUS at 10:08

## 2017-08-07 NOTE — PLAN OF CARE
Problem: Chemotherapy Effects (Adult)  Goal: Signs and Symptoms of Listed Potential Problems Will be Absent or Manageable (Chemotherapy Effects)  Signs and symptoms of listed potential problems will be absent or manageable by discharge/transition of care (reference Chemotherapy Effects (Adult) CPG).   Outcome: Ongoing (interventions implemented as appropriate)  Labs , hx, and medications reviewed. Assessment completed. Discussed plan of care with patient. Patient in agreement. VSS.  Chair reclined and warm blanket and snack offered. Will cont to monitor

## 2017-08-07 NOTE — PLAN OF CARE
Problem: Patient Care Overview (Adult)  Goal: Plan of Care Review  Outcome: Ongoing (interventions implemented as appropriate)  Pt tolerated IVIG without adverse effects. VSS. Provided AVS & verbalized understanding of RTC date. DC with family ambulating independently.

## 2017-08-17 ENCOUNTER — LAB VISIT (OUTPATIENT)
Dept: LAB | Facility: HOSPITAL | Age: 82
End: 2017-08-17
Attending: INTERNAL MEDICINE
Payer: MEDICARE

## 2017-08-17 DIAGNOSIS — E78.2 MIXED DYSLIPIDEMIA: ICD-10-CM

## 2017-08-17 DIAGNOSIS — I10 ESSENTIAL HYPERTENSION, BENIGN: ICD-10-CM

## 2017-08-17 LAB
ALBUMIN SERPL BCP-MCNC: 3.5 G/DL
ALP SERPL-CCNC: 69 U/L
ALT SERPL W/O P-5'-P-CCNC: 41 U/L
ANION GAP SERPL CALC-SCNC: 9 MMOL/L
AST SERPL-CCNC: 43 U/L
BASOPHILS # BLD AUTO: 0.02 K/UL
BASOPHILS NFR BLD: 0.4 %
BILIRUB SERPL-MCNC: 0.6 MG/DL
BUN SERPL-MCNC: 20 MG/DL
CALCIUM SERPL-MCNC: 9.5 MG/DL
CHLORIDE SERPL-SCNC: 108 MMOL/L
CHOLEST/HDLC SERPL: 2.7 {RATIO}
CO2 SERPL-SCNC: 26 MMOL/L
CREAT SERPL-MCNC: 1 MG/DL
DIFFERENTIAL METHOD: ABNORMAL
EOSINOPHIL # BLD AUTO: 0.1 K/UL
EOSINOPHIL NFR BLD: 1.4 %
ERYTHROCYTE [DISTWIDTH] IN BLOOD BY AUTOMATED COUNT: 15.3 %
EST. GFR  (AFRICAN AMERICAN): >60 ML/MIN/1.73 M^2
EST. GFR  (NON AFRICAN AMERICAN): >60 ML/MIN/1.73 M^2
GLUCOSE SERPL-MCNC: 85 MG/DL
HCT VFR BLD AUTO: 46.7 %
HDL/CHOLESTEROL RATIO: 37.5 %
HDLC SERPL-MCNC: 104 MG/DL
HDLC SERPL-MCNC: 39 MG/DL
HGB BLD-MCNC: 15.7 G/DL
LDLC SERPL CALC-MCNC: 47.6 MG/DL
LYMPHOCYTES # BLD AUTO: 2.2 K/UL
LYMPHOCYTES NFR BLD: 43.1 %
MCH RBC QN AUTO: 31.3 PG
MCHC RBC AUTO-ENTMCNC: 33.6 G/DL
MCV RBC AUTO: 93 FL
MONOCYTES # BLD AUTO: 0.7 K/UL
MONOCYTES NFR BLD: 13.7 %
NEUTROPHILS # BLD AUTO: 2 K/UL
NEUTROPHILS NFR BLD: 40 %
NONHDLC SERPL-MCNC: 65 MG/DL
PLATELET # BLD AUTO: 96 K/UL
PMV BLD AUTO: 10.8 FL
POTASSIUM SERPL-SCNC: 4.2 MMOL/L
PROT SERPL-MCNC: 6.4 G/DL
RBC # BLD AUTO: 5.02 M/UL
SODIUM SERPL-SCNC: 143 MMOL/L
T4 FREE SERPL-MCNC: 0.74 NG/DL
TRIGL SERPL-MCNC: 87 MG/DL
TSH SERPL DL<=0.005 MIU/L-ACNC: 0.05 UIU/ML
WBC # BLD AUTO: 5.04 K/UL

## 2017-08-17 PROCEDURE — 84443 ASSAY THYROID STIM HORMONE: CPT

## 2017-08-17 PROCEDURE — 84439 ASSAY OF FREE THYROXINE: CPT

## 2017-08-17 PROCEDURE — 85025 COMPLETE CBC W/AUTO DIFF WBC: CPT

## 2017-08-17 PROCEDURE — 80053 COMPREHEN METABOLIC PANEL: CPT

## 2017-08-17 PROCEDURE — 80061 LIPID PANEL: CPT

## 2017-08-17 PROCEDURE — 36415 COLL VENOUS BLD VENIPUNCTURE: CPT | Mod: PO

## 2017-08-24 ENCOUNTER — OFFICE VISIT (OUTPATIENT)
Dept: INTERNAL MEDICINE | Facility: CLINIC | Age: 82
End: 2017-08-24
Payer: MEDICARE

## 2017-08-24 VITALS
RESPIRATION RATE: 14 BRPM | HEIGHT: 70 IN | SYSTOLIC BLOOD PRESSURE: 124 MMHG | DIASTOLIC BLOOD PRESSURE: 66 MMHG | TEMPERATURE: 98 F | BODY MASS INDEX: 24.05 KG/M2 | WEIGHT: 168 LBS | HEART RATE: 80 BPM

## 2017-08-24 DIAGNOSIS — E03.8 SUBCLINICAL HYPOTHYROIDISM: ICD-10-CM

## 2017-08-24 DIAGNOSIS — D80.1 HYPOGAMMAGLOBULINEMIA: Primary | ICD-10-CM

## 2017-08-24 DIAGNOSIS — D69.6 THROMBOCYTOPENIA: ICD-10-CM

## 2017-08-24 DIAGNOSIS — E78.2 MIXED DYSLIPIDEMIA: ICD-10-CM

## 2017-08-24 DIAGNOSIS — Z00.00 ANNUAL PHYSICAL EXAM: ICD-10-CM

## 2017-08-24 PROCEDURE — 99397 PER PM REEVAL EST PAT 65+ YR: CPT | Mod: S$GLB,,, | Performed by: INTERNAL MEDICINE

## 2017-08-24 PROCEDURE — 99999 PR PBB SHADOW E&M-EST. PATIENT-LVL III: CPT | Mod: PBBFAC,,,

## 2017-08-24 NOTE — PROGRESS NOTES
Subjective:       Patient ID: Thierry Ramos Jr. is a 83 y.o. male.    Chief Complaint: Annual Exam    Mr Ramos is an 84 yo M presenting for annual exam. He has a medical history of prior CVA, CLL in remission followed by Dr. Joaquin Wooten and is maintained on a daily biologic with recovery of thrombocytopenia. Patient also has DM2, hypothyroidism, adrenal insufficiency on daily prednisone, multiple facial/ head skin cancers s/p excision, and pituitary adenoma s/p open cranial surgery. He is followed by [facility] for DM and insulin management, as well as for adrenal insufficiency. He reports no interval worsening of symptoms, no signs of bleeding diathesis or infection this visit. Recent labwork was reviewed as mostly normal, aside from PLTs in 90,000s range, which is chronic per baseline. He is now receiving parenteral IgG every other month in Heme clinic for hypogammaglobulinemia. He is euthyroid by recent labwork. He does complain of chronic rhinosinusitis that is followed by ENT.         Cholesterol: lipid panel reviewed- tight control on statin.   Vaccines: live, inactivated viruses withheld given immunosuppresion; Tetanus - UTD  Colonoscopy: no longer routinely screened  A1c: last A1C in Endocrine clinic was ~7%.     Exercise: patient engages in little aerobic activity.   Diet: liberal intake of salt, carbs, and fats    Risk factors    Past Medical History:  No date: Actinic keratosis  No date: Anemia  No date: Basal cell carcinoma  No date: Carotid stenosis  No date: CLL (chronic lymphocytic leukemia)  2014: Diabetes mellitus      Comment: Steroid related  No date: Hyperlipidemia  No date: Hypertension  No date: Hypopituitarism  No date: Hypothyroid  3/13/2015: Immunosuppression  No date: SQUAMOUS CELL CARCINOMA      Comment: on the right side of the face   1/27/2016: Squamous cell carcinoma of skin of right temple  No date: Stroke  2/12: Syncope  No date: Unspecified disorder of kidney and  "ureter  Past Surgical History:  2/27/2007: CAROTID ENDARTERECTOMY (L)  No date: EXCISION TURBINATE, SUBMUCOUS  9/16/2014: FESS  3/23/2015, 2/24/2016: Mohs excision       Comment: combined with WLE forehead  9/16/2014: NASAL SEPTUM SURGERY  1995: right ureter surgery  3/23/2015, 2/24/2016: SENTINEL LYMPH NODE BIOPSY  9/16/2014: SINUS SURGERY      Comment: septo, ESS, BITSMR  No date: Transphenoidal surgery  Social History    Marital status:              Spouse name:                       Years of education:                 Number of children:               Occupational History  Occupation          Employer            Comment               retired                                     Social History Main Topics    Smoking status: Never Smoker                                                                Smokeless tobacco: Never Used                        Alcohol use: Yes           1.2 oz/week       Glasses of wine: 1, Cans of beer: 1 per week       Comment: "rarely"    Drug use: No              Sexual activity: Not Currently        Other Topics            Concern    None on file    Social History Narrative    He is .      Review of patient's allergies indicates:   -- Ace inhibitors -- Swelling    --  angioedema   -- Divalproex     --  Rash under arms, body creases   -- Peanut -- Swelling  Mr. Ramos had no medications administered during this visit.        Review of Systems   Constitutional: Positive for appetite change. Negative for chills, fatigue, fever and unexpected weight change.        Lower appetite, modest weight  Loss.    HENT: Positive for congestion, rhinorrhea and sinus pressure. Negative for mouth sores, nosebleeds, sore throat and trouble swallowing.         Edentulous in places   Eyes: Negative for photophobia and visual disturbance.   Respiratory: Negative for choking, chest tightness, shortness of breath and wheezing.    Cardiovascular: Negative for chest pain, palpitations and leg " swelling.   Gastrointestinal: Negative for abdominal pain, constipation, diarrhea and nausea.   Endocrine: Negative for polyuria.   Genitourinary: Negative for frequency and urgency.   Musculoskeletal: Negative for arthralgias, joint swelling and myalgias.   Skin: Positive for color change. Negative for rash.        Skin changes   Allergic/Immunologic: Negative for immunocompromised state.   Neurological: Negative for weakness, light-headedness and headaches.   Hematological: Negative for adenopathy. Does not bruise/bleed easily.   Psychiatric/Behavioral: Negative for agitation and dysphoric mood.       Objective:      Physical Exam   Constitutional: No distress.   HENT:   Nose: Nose normal.   Mouth/Throat: No oropharyngeal exudate.   Multiple actinic keratoses  R temple skin ca excision area noted.   Tongue with brown plaque   Eyes: EOM are normal. Pupils are equal, round, and reactive to light. Left eye exhibits no discharge. No scleral icterus.   Neck: Normal range of motion. No tracheal deviation present. No thyromegaly present.   Cardiovascular: Normal rate, regular rhythm, normal heart sounds and intact distal pulses.  Exam reveals no gallop and no friction rub.    No murmur heard.  Pulmonary/Chest: Effort normal and breath sounds normal. No respiratory distress. He has no wheezes. He has no rales.   Abdominal: Soft. Bowel sounds are normal. He exhibits no distension and no mass. There is no guarding.   Musculoskeletal: Normal range of motion. He exhibits no edema, tenderness or deformity.   Lymphadenopathy:     He has no cervical adenopathy.   Neurological: He is alert. He displays normal reflexes. No cranial nerve deficit. Coordination normal.   Patellar reflexes 1+    Skin: Capillary refill takes less than 2 seconds. No rash noted. He is not diaphoretic. No pallor.   Hyperpigmentation of extremities    Psychiatric: He has a normal mood and affect.       Assessment:       1. Hypogammaglobulinemia    2. Mixed  dyslipidemia    3. Annual physical exam    4. Thrombocytopenia    5. Subclinical hypothyroidism    6. Type 2 diabetes, uncontrolled, with neuropathy        Plan:       1. Hypogammaglobulinemia  -patient reports no recent infections. Maintained on IgG infusion every other month. Stable CBC with exception of platelet count.     2. Mixed dyslipidemia  -patient's LDL shows above-adequate control on current home statin.     3. Annual physical exam  -reviewed control of A1c, DM, HTN,and discussed follow up specialty care with patient.     4. Thrombocytopenia  -PLT stable per recent baseline given CLL in remission. Patient is maintained on Arzerra per Hematology.     5. Subclinical hypothyroidism  -cont home Synthroid; euthyroid     6. Type 2 diabetes, uncontrolled, with neuropathy  -good control with home insulin; following up with Endocrine soon, as well as for follow up of adrenal insufficiency.      Stu Tineo MD  PGY-2 Internal Medicine

## 2017-09-05 ENCOUNTER — INFUSION (OUTPATIENT)
Dept: INFUSION THERAPY | Facility: HOSPITAL | Age: 82
End: 2017-09-05
Attending: INTERNAL MEDICINE
Payer: MEDICARE

## 2017-09-05 VITALS
DIASTOLIC BLOOD PRESSURE: 60 MMHG | OXYGEN SATURATION: 96 % | BODY MASS INDEX: 24.05 KG/M2 | TEMPERATURE: 98 F | WEIGHT: 168 LBS | RESPIRATION RATE: 20 BRPM | HEIGHT: 70 IN | SYSTOLIC BLOOD PRESSURE: 120 MMHG | HEART RATE: 61 BPM

## 2017-09-05 DIAGNOSIS — E79.0 HYPERURICEMIA: ICD-10-CM

## 2017-09-05 DIAGNOSIS — C91.10 CLL (CHRONIC LYMPHOCYTIC LEUKEMIA): ICD-10-CM

## 2017-09-05 DIAGNOSIS — D80.1 HYPOGAMMAGLOBULINEMIA: Primary | ICD-10-CM

## 2017-09-05 DIAGNOSIS — J32.9 CHRONIC SINUSITIS, UNSPECIFIED LOCATION: ICD-10-CM

## 2017-09-05 PROCEDURE — 96365 THER/PROPH/DIAG IV INF INIT: CPT

## 2017-09-05 PROCEDURE — S0028 INJECTION, FAMOTIDINE, 20 MG: HCPCS | Performed by: INTERNAL MEDICINE

## 2017-09-05 PROCEDURE — 63600175 PHARM REV CODE 636 W HCPCS: Performed by: INTERNAL MEDICINE

## 2017-09-05 PROCEDURE — 25000003 PHARM REV CODE 250: Performed by: INTERNAL MEDICINE

## 2017-09-05 PROCEDURE — 96366 THER/PROPH/DIAG IV INF ADDON: CPT

## 2017-09-05 PROCEDURE — 96375 TX/PRO/DX INJ NEW DRUG ADDON: CPT

## 2017-09-05 PROCEDURE — 96367 TX/PROPH/DG ADDL SEQ IV INF: CPT

## 2017-09-05 RX ORDER — FAMOTIDINE 10 MG/ML
20 INJECTION INTRAVENOUS
Status: CANCELLED | OUTPATIENT
Start: 2017-09-05

## 2017-09-05 RX ORDER — HEPARIN 100 UNIT/ML
500 SYRINGE INTRAVENOUS
Status: CANCELLED | OUTPATIENT
Start: 2017-09-05

## 2017-09-05 RX ORDER — ACETAMINOPHEN 325 MG/1
650 TABLET ORAL
Status: CANCELLED | OUTPATIENT
Start: 2017-09-05

## 2017-09-05 RX ORDER — SODIUM CHLORIDE 0.9 % (FLUSH) 0.9 %
10 SYRINGE (ML) INJECTION
Status: CANCELLED | OUTPATIENT
Start: 2017-09-05

## 2017-09-05 RX ORDER — FAMOTIDINE 10 MG/ML
20 INJECTION INTRAVENOUS
Status: COMPLETED | OUTPATIENT
Start: 2017-09-05 | End: 2017-09-05

## 2017-09-05 RX ORDER — ACETAMINOPHEN 325 MG/1
650 TABLET ORAL
Status: COMPLETED | OUTPATIENT
Start: 2017-09-05 | End: 2017-09-05

## 2017-09-05 RX ORDER — HEPARIN 100 UNIT/ML
500 SYRINGE INTRAVENOUS
Status: DISCONTINUED | OUTPATIENT
Start: 2017-09-05 | End: 2017-09-05 | Stop reason: HOSPADM

## 2017-09-05 RX ORDER — SODIUM CHLORIDE 0.9 % (FLUSH) 0.9 %
10 SYRINGE (ML) INJECTION
Status: DISCONTINUED | OUTPATIENT
Start: 2017-09-05 | End: 2017-09-05 | Stop reason: HOSPADM

## 2017-09-05 RX ADMIN — ACETAMINOPHEN 650 MG: 325 TABLET ORAL at 10:09

## 2017-09-05 RX ADMIN — FAMOTIDINE 20 MG: 10 INJECTION INTRAVENOUS at 10:09

## 2017-09-05 RX ADMIN — HUMAN IMMUNOGLOBULIN G 20 G: 20 LIQUID INTRAVENOUS at 11:09

## 2017-09-05 RX ADMIN — SODIUM CHLORIDE: 0.9 INJECTION, SOLUTION INTRAVENOUS at 10:09

## 2017-09-05 RX ADMIN — DIPHENHYDRAMINE HYDROCHLORIDE 50 MG: 50 INJECTION, SOLUTION INTRAMUSCULAR; INTRAVENOUS at 10:09

## 2017-09-05 NOTE — PLAN OF CARE
Problem: Patient Care Overview (Adult)  Goal: Plan of Care Review  Outcome: Ongoing (interventions implemented as appropriate)  Patient tolerated IVIG infusion well;no s/s reaction. Patient and wife given AVS;instructed to call MD with any problems or concerns.

## 2017-09-05 NOTE — PLAN OF CARE
Problem: Patient Care Overview (Adult)  Goal: Adult Individualization and Mutuality  1. PIV   2. Likes warm blanket     Outcome: Ongoing (interventions implemented as appropriate)  Patient arrived to clinic,accompanied by wife. No c/o pain and no s/s distress. Pending IVIG infusion.

## 2017-09-06 ENCOUNTER — LAB VISIT (OUTPATIENT)
Dept: LAB | Facility: HOSPITAL | Age: 82
End: 2017-09-06
Attending: INTERNAL MEDICINE
Payer: MEDICARE

## 2017-09-06 DIAGNOSIS — E23.0 PANHYPOPITUITARISM: ICD-10-CM

## 2017-09-06 LAB
ALBUMIN SERPL BCP-MCNC: 3.1 G/DL
ALP SERPL-CCNC: 62 U/L
ALT SERPL W/O P-5'-P-CCNC: 29 U/L
ANION GAP SERPL CALC-SCNC: 10 MMOL/L
AST SERPL-CCNC: 35 U/L
BILIRUB SERPL-MCNC: 0.6 MG/DL
BUN SERPL-MCNC: 20 MG/DL
CALCIUM SERPL-MCNC: 9.1 MG/DL
CHLORIDE SERPL-SCNC: 106 MMOL/L
CO2 SERPL-SCNC: 27 MMOL/L
CREAT SERPL-MCNC: 1.1 MG/DL
EST. GFR  (AFRICAN AMERICAN): >60 ML/MIN/1.73 M^2
EST. GFR  (NON AFRICAN AMERICAN): >60 ML/MIN/1.73 M^2
ESTIMATED AVG GLUCOSE: 120 MG/DL
GLUCOSE SERPL-MCNC: 82 MG/DL
HBA1C MFR BLD HPLC: 5.8 %
POTASSIUM SERPL-SCNC: 4.5 MMOL/L
PROT SERPL-MCNC: 6.5 G/DL
SODIUM SERPL-SCNC: 143 MMOL/L
T4 FREE SERPL-MCNC: 0.77 NG/DL
TSH SERPL DL<=0.005 MIU/L-ACNC: 0.06 UIU/ML

## 2017-09-06 PROCEDURE — 83036 HEMOGLOBIN GLYCOSYLATED A1C: CPT

## 2017-09-06 PROCEDURE — 80053 COMPREHEN METABOLIC PANEL: CPT

## 2017-09-06 PROCEDURE — 84270 ASSAY OF SEX HORMONE GLOBUL: CPT

## 2017-09-06 PROCEDURE — 84439 ASSAY OF FREE THYROXINE: CPT

## 2017-09-06 PROCEDURE — 84443 ASSAY THYROID STIM HORMONE: CPT

## 2017-09-09 LAB
ALBUMIN SERPL-MCNC: 3.6 G/DL (ref 3.6–5.1)
SHBG SERPL-SCNC: 19 NMOL/L (ref 22–77)
TESTOST FREE SERPL-MCNC: 218.4 PG/ML (ref 6–73)
TESTOST SERPL-MCNC: 836 NG/DL (ref 250–1100)
TESTOSTERONE.FREE+WB SERPL-MCNC: 363.7 NG/DL (ref 15–150)

## 2017-09-13 ENCOUNTER — OFFICE VISIT (OUTPATIENT)
Dept: ENDOCRINOLOGY | Facility: CLINIC | Age: 82
End: 2017-09-13
Payer: MEDICARE

## 2017-09-13 ENCOUNTER — CLINICAL SUPPORT (OUTPATIENT)
Dept: OPHTHALMOLOGY | Facility: CLINIC | Age: 82
End: 2017-09-13
Attending: INTERNAL MEDICINE
Payer: MEDICARE

## 2017-09-13 VITALS
HEIGHT: 70 IN | DIASTOLIC BLOOD PRESSURE: 66 MMHG | BODY MASS INDEX: 23.93 KG/M2 | RESPIRATION RATE: 16 BRPM | SYSTOLIC BLOOD PRESSURE: 133 MMHG | WEIGHT: 167.13 LBS | HEART RATE: 67 BPM

## 2017-09-13 DIAGNOSIS — E03.8 CENTRAL HYPOTHYROIDISM: ICD-10-CM

## 2017-09-13 DIAGNOSIS — E23.0 PANHYPOPITUITARISM: ICD-10-CM

## 2017-09-13 DIAGNOSIS — H35.371 EPIRETINAL MEMBRANE, RIGHT: Primary | ICD-10-CM

## 2017-09-13 DIAGNOSIS — C91.10 CLL (CHRONIC LYMPHOCYTIC LEUKEMIA): ICD-10-CM

## 2017-09-13 DIAGNOSIS — E78.2 MIXED DYSLIPIDEMIA: ICD-10-CM

## 2017-09-13 DIAGNOSIS — I10 ESSENTIAL HYPERTENSION: Chronic | ICD-10-CM

## 2017-09-13 PROCEDURE — 1159F MED LIST DOCD IN RCRD: CPT | Mod: S$GLB,,, | Performed by: INTERNAL MEDICINE

## 2017-09-13 PROCEDURE — 99214 OFFICE O/P EST MOD 30 MIN: CPT | Mod: S$GLB,,, | Performed by: INTERNAL MEDICINE

## 2017-09-13 PROCEDURE — 3075F SYST BP GE 130 - 139MM HG: CPT | Mod: S$GLB,,, | Performed by: INTERNAL MEDICINE

## 2017-09-13 PROCEDURE — 3078F DIAST BP <80 MM HG: CPT | Mod: S$GLB,,, | Performed by: INTERNAL MEDICINE

## 2017-09-13 PROCEDURE — 1126F AMNT PAIN NOTED NONE PRSNT: CPT | Mod: S$GLB,,, | Performed by: INTERNAL MEDICINE

## 2017-09-13 PROCEDURE — 3008F BODY MASS INDEX DOCD: CPT | Mod: S$GLB,,, | Performed by: INTERNAL MEDICINE

## 2017-09-13 PROCEDURE — 1157F ADVNC CARE PLAN IN RCRD: CPT | Mod: S$GLB,,, | Performed by: INTERNAL MEDICINE

## 2017-09-13 PROCEDURE — 99999 PR PBB SHADOW E&M-EST. PATIENT-LVL II: CPT | Mod: PBBFAC,,,

## 2017-09-13 PROCEDURE — 99999 PR PBB SHADOW E&M-EST. PATIENT-LVL III: CPT | Mod: PBBFAC,,, | Performed by: INTERNAL MEDICINE

## 2017-09-13 PROCEDURE — 92250 FUNDUS PHOTOGRAPHY W/I&R: CPT | Mod: S$GLB,,, | Performed by: OPHTHALMOLOGY

## 2017-09-13 NOTE — PROGRESS NOTES
Subjective:      Patient ID: Thierry Ramos Jr. is a 83 y.o. male.    Chief Complaint:  No chief complaint on file.      History of Present Illness  Here for follow up of Panhypopituitarism.   Pituitary tumor treated with radiation in 2002     Previously followed by Dr. Orr with last documented office visit in 2016     This is the patient's initial visit with me today     He is currently taking Prednisone 7.5 mg daily and he does this by taking 5mg +2.5 mg daily   For Hypothyroidism - he is taking Levothyroxine 125 mcg daily   For hypogonadism - he is taking Depotesterone every 2 weeks     Denies changes in health status since last visit   He arrives to clinic today accompanied by his wife     He denies headaches  Denies vision changes     MRI orbit face neck from 03/2015:   heterogeneous enhancement in the inferior aspect of the sella which may represent residual lesion or residual pituitary tissue.      He also has T2DM , on Novolog per sliding scale starting at 200 mg /dL   Taking Levemir 18-26 units once daily     24 hour dietary recall:   Breakfast: eggs, 2 slices of wheat toast and coffee   Lunch: spinach, salad, ground beef, noodles with gravy   Dinner: turkey breast, roast beef, cheese and toast   Snacks: low carb ice cream     Last diabetic eye exam: >2-3 years ago   He does not follow with podiatry     ADA standards of care:   ACEi/ARB: losartan  Statin: lipitor  Also taking Plavix and ASA     Review of Systems   Constitutional: Positive for fatigue.   HENT: Positive for hearing loss and sinus pressure.    Eyes: Negative for visual disturbance.   Respiratory: Positive for cough. Negative for shortness of breath.    Cardiovascular: Negative for chest pain, palpitations and leg swelling.   Gastrointestinal: Negative for constipation and diarrhea.   Musculoskeletal: Negative for arthralgias and back pain.   Neurological: Negative for headaches.   Psychiatric/Behavioral: The patient is not  nervous/anxious.      Objective:   Physical Exam   Constitutional: He is oriented to person, place, and time. He appears well-developed and well-nourished. No distress.   HENT:   Head: Atraumatic.   Neck: Neck supple. No thyromegaly present.   Cardiovascular: Normal rate.    Pulmonary/Chest: Effort normal.   Abdominal: Soft.   No bruising noted to abdomen   Injection sites are okay    Musculoskeletal: Normal range of motion. He exhibits no edema.   Neurological: He is alert and oriented to person, place, and time. He has normal reflexes.   Skin: Skin is warm and dry.   Bruising noted to bilateral upper extremities    Psychiatric: He has a normal mood and affect. His behavior is normal. Thought content normal.   Nursing note and vitals reviewed.    Lab Review:   Results for orders placed or performed in visit on 09/06/17   T4, free   Result Value Ref Range    Free T4 0.77 0.71 - 1.51 ng/dL   TSH   Result Value Ref Range    TSH 0.056 (L) 0.400 - 4.000 uIU/mL   Testosterone Panel   Result Value Ref Range    Testosterone 836 250 - 1100 ng/dL    Testosterone, Free 218.4 (H) 6.0 - 73.0 pg/mL    Testosterone, Bioavailable 363.7 (H) 15.0 - 150.0 ng/dL    Sex Hormone Binding Globulin 19 (L) 22 - 77 nmol/L    Albumin 3.6 3.6 - 5.1 g/dL   Comprehensive metabolic panel   Result Value Ref Range    Sodium 143 136 - 145 mmol/L    Potassium 4.5 3.5 - 5.1 mmol/L    Chloride 106 95 - 110 mmol/L    CO2 27 23 - 29 mmol/L    Glucose 82 70 - 110 mg/dL    BUN, Bld 20 8 - 23 mg/dL    Creatinine 1.1 0.5 - 1.4 mg/dL    Calcium 9.1 8.7 - 10.5 mg/dL    Total Protein 6.5 6.0 - 8.4 g/dL    Albumin 3.1 (L) 3.5 - 5.2 g/dL    Total Bilirubin 0.6 0.1 - 1.0 mg/dL    Alkaline Phosphatase 62 55 - 135 U/L    AST 35 10 - 40 U/L    ALT 29 10 - 44 U/L    Anion Gap 10 8 - 16 mmol/L    eGFR if African American >60.0 >60 mL/min/1.73 m^2    eGFR if non African American >60.0 >60 mL/min/1.73 m^2   Hemoglobin A1c   Result Value Ref Range    Hemoglobin A1C 5.8  (H) 4.0 - 5.6 %    Estimated Avg Glucose 120 68 - 131 mg/dL     Assessment and Plan:      1. Panhypopituitarism:  Continue all meds  Free T4 is normal - continue current dose of Levothyroxine   Continue Testosterone as previously prescribed     2. T2DM with neuropathy   Good control at this time per A1c  Continue current regimen   Reviewed signs and symptoms of hypoglycemia along with appropriate treatment protocol   Eye exam due - arrange diabetic eye cam   Bring meter or log to office visit for review   Continue dietary and lifestyle changes   Advised to never walk barefoot   Never put sharp objects to feet       Follow up with Dr. Caruso in the pituitary clinic

## 2017-09-13 NOTE — PROGRESS NOTES
HPI     Diabetic Eye Exam    Additional comments: Photos           Comments   83 y.o. y/o here for screening for Diabetic Renopathy with non-dilated   fundus photos per P Juve Wooten MD         Last edited by Marika Bedolla MA on 9/13/2017 11:11 AM. (History)            Assessment /Plan     For exam results, see Encounter Report.    Type 2 diabetes, uncontrolled, with neuropathy  -     Diabetic Eye Screening Photo

## 2017-09-14 ENCOUNTER — INFUSION (OUTPATIENT)
Dept: INFUSION THERAPY | Facility: HOSPITAL | Age: 82
End: 2017-09-14
Attending: INTERNAL MEDICINE
Payer: MEDICARE

## 2017-09-14 VITALS — RESPIRATION RATE: 18 BRPM | DIASTOLIC BLOOD PRESSURE: 72 MMHG | SYSTOLIC BLOOD PRESSURE: 162 MMHG | HEART RATE: 55 BPM

## 2017-09-14 DIAGNOSIS — Z00.00 ANNUAL PHYSICAL EXAM: ICD-10-CM

## 2017-09-14 DIAGNOSIS — E79.0 HYPERURICEMIA: Primary | ICD-10-CM

## 2017-09-14 DIAGNOSIS — C91.10 CLL (CHRONIC LYMPHOCYTIC LEUKEMIA): ICD-10-CM

## 2017-09-14 PROCEDURE — 63600175 PHARM REV CODE 636 W HCPCS: Performed by: INTERNAL MEDICINE

## 2017-09-14 PROCEDURE — 96413 CHEMO IV INFUSION 1 HR: CPT

## 2017-09-14 PROCEDURE — 96415 CHEMO IV INFUSION ADDL HR: CPT

## 2017-09-14 PROCEDURE — 25000003 PHARM REV CODE 250: Performed by: INTERNAL MEDICINE

## 2017-09-14 PROCEDURE — 96367 TX/PROPH/DG ADDL SEQ IV INF: CPT

## 2017-09-14 PROCEDURE — S0028 INJECTION, FAMOTIDINE, 20 MG: HCPCS | Performed by: INTERNAL MEDICINE

## 2017-09-14 PROCEDURE — 96375 TX/PRO/DX INJ NEW DRUG ADDON: CPT

## 2017-09-14 RX ORDER — ACETAMINOPHEN 500 MG
1000 TABLET ORAL
Status: COMPLETED | OUTPATIENT
Start: 2017-09-14 | End: 2017-09-14

## 2017-09-14 RX ORDER — SODIUM CHLORIDE 0.9 % (FLUSH) 0.9 %
10 SYRINGE (ML) INJECTION
Status: CANCELLED | OUTPATIENT
Start: 2017-10-19

## 2017-09-14 RX ORDER — FAMOTIDINE 10 MG/ML
20 INJECTION INTRAVENOUS
Status: COMPLETED | OUTPATIENT
Start: 2017-09-14 | End: 2017-09-14

## 2017-09-14 RX ORDER — HEPARIN 100 UNIT/ML
500 SYRINGE INTRAVENOUS
Status: DISCONTINUED | OUTPATIENT
Start: 2017-09-14 | End: 2017-09-14 | Stop reason: HOSPADM

## 2017-09-14 RX ORDER — HEPARIN 100 UNIT/ML
500 SYRINGE INTRAVENOUS
Status: CANCELLED | OUTPATIENT
Start: 2017-10-19

## 2017-09-14 RX ORDER — SODIUM CHLORIDE 0.9 % (FLUSH) 0.9 %
10 SYRINGE (ML) INJECTION
Status: DISCONTINUED | OUTPATIENT
Start: 2017-09-14 | End: 2017-09-14 | Stop reason: HOSPADM

## 2017-09-14 RX ORDER — ACETAMINOPHEN 325 MG/1
1000 TABLET ORAL
Status: CANCELLED | OUTPATIENT
Start: 2017-10-19

## 2017-09-14 RX ORDER — FAMOTIDINE 10 MG/ML
20 INJECTION INTRAVENOUS
Status: CANCELLED | OUTPATIENT
Start: 2017-10-19

## 2017-09-14 RX ADMIN — DEXAMETHASONE SODIUM PHOSPHATE 10 MG: 4 INJECTION, SOLUTION INTRAMUSCULAR; INTRAVENOUS at 10:09

## 2017-09-14 RX ADMIN — DIPHENHYDRAMINE HYDROCHLORIDE 50 MG: 50 INJECTION, SOLUTION INTRAMUSCULAR; INTRAVENOUS at 10:09

## 2017-09-14 RX ADMIN — SODIUM CHLORIDE: 0.9 INJECTION, SOLUTION INTRAVENOUS at 10:09

## 2017-09-14 RX ADMIN — ACETAMINOPHEN 1000 MG: 500 TABLET ORAL at 10:09

## 2017-09-14 RX ADMIN — SODIUM CHLORIDE 1000 MG: 0.9 INJECTION, SOLUTION INTRAVENOUS at 11:09

## 2017-09-14 RX ADMIN — FAMOTIDINE 20 MG: 10 INJECTION INTRAVENOUS at 10:09

## 2017-09-14 NOTE — PLAN OF CARE
Problem: Patient Care Overview (Adult)  Goal: Plan of Care Review  Outcome: Ongoing (interventions implemented as appropriate)  Tolerated Arzerra treatment well.  Advised to call MD for any problems or concerns.  AVS given.  RTC on 10/3/17.  NAD noted upon discharge.

## 2017-09-19 ENCOUNTER — OFFICE VISIT (OUTPATIENT)
Dept: DERMATOLOGY | Facility: CLINIC | Age: 82
End: 2017-09-19
Payer: MEDICARE

## 2017-09-19 ENCOUNTER — TELEPHONE (OUTPATIENT)
Dept: OPTOMETRY | Facility: CLINIC | Age: 82
End: 2017-09-19

## 2017-09-19 VITALS — BODY MASS INDEX: 23.96 KG/M2 | WEIGHT: 167 LBS

## 2017-09-19 DIAGNOSIS — L82.1 SEBORRHEIC KERATOSES: ICD-10-CM

## 2017-09-19 DIAGNOSIS — D48.5 NEOPLASM OF UNCERTAIN BEHAVIOR OF SKIN: ICD-10-CM

## 2017-09-19 DIAGNOSIS — L57.0 MULTIPLE ACTINIC KERATOSES: Primary | ICD-10-CM

## 2017-09-19 DIAGNOSIS — Z85.828 HISTORY OF SKIN CANCER: ICD-10-CM

## 2017-09-19 PROCEDURE — 1157F ADVNC CARE PLAN IN RCRD: CPT | Mod: S$GLB,,, | Performed by: DERMATOLOGY

## 2017-09-19 PROCEDURE — 99999 PR PBB SHADOW E&M-EST. PATIENT-LVL III: CPT | Mod: PBBFAC,,, | Performed by: DERMATOLOGY

## 2017-09-19 PROCEDURE — 1159F MED LIST DOCD IN RCRD: CPT | Mod: S$GLB,,, | Performed by: DERMATOLOGY

## 2017-09-19 PROCEDURE — 88342 IMHCHEM/IMCYTCHM 1ST ANTB: CPT | Mod: 26,,, | Performed by: PATHOLOGY

## 2017-09-19 PROCEDURE — 3008F BODY MASS INDEX DOCD: CPT | Mod: S$GLB,,, | Performed by: DERMATOLOGY

## 2017-09-19 PROCEDURE — 1126F AMNT PAIN NOTED NONE PRSNT: CPT | Mod: S$GLB,,, | Performed by: DERMATOLOGY

## 2017-09-19 PROCEDURE — 11100 PR BIOPSY OF SKIN LESION: CPT | Mod: 59,S$GLB,, | Performed by: DERMATOLOGY

## 2017-09-19 PROCEDURE — 17003 DESTRUCT PREMALG LES 2-14: CPT | Mod: S$GLB,,, | Performed by: DERMATOLOGY

## 2017-09-19 PROCEDURE — 99213 OFFICE O/P EST LOW 20 MIN: CPT | Mod: 25,S$GLB,, | Performed by: DERMATOLOGY

## 2017-09-19 PROCEDURE — 88305 TISSUE EXAM BY PATHOLOGIST: CPT | Performed by: PATHOLOGY

## 2017-09-19 PROCEDURE — 17000 DESTRUCT PREMALG LESION: CPT | Mod: S$GLB,,, | Performed by: DERMATOLOGY

## 2017-09-19 NOTE — PROGRESS NOTES
Subjective:       Patient ID:  Thierry Ramos Jr. is a 83 y.o. male who presents for   Chief Complaint   Patient presents with    Skin Check     UBSE    Spot     History of Present Illness: The patient presents with chief complaint of spot.  Location: left temple  Duration: months  Signs/Symptoms: bleeds    Prior treatments: cryo          Review of Systems   Constitutional: Negative for fever.   Skin: Negative for itching and rash.   Hematologic/Lymphatic: Bruises/bleeds easily.        Objective:    Physical Exam   Constitutional: He appears well-developed and well-nourished. No distress.   Neurological: He is alert and oriented to person, place, and time. He is not disoriented.   Psychiatric: He has a normal mood and affect.   Skin:   Areas Examined (abnormalities noted in diagram):   Scalp / Hair Palpated and Inspected  Head / Face Inspection Performed  Neck Inspection Performed  Chest / Axilla Inspection Performed  Back Inspection Performed  RUE Inspected  LUE Inspection Performed                   Diagram Legend     Erythematous scaling macule/papule c/w actinic keratosis         See annotation      Assessment / Plan:      Pathology Orders:      Normal Orders This Visit    Tissue Specimen To Pathology, Dermatology     Questions:    Directional Terms:  Other(comment)    Clinical information:  r/o scc    Specific Site:  left temple        Multiple actinic keratoses   Cryosurgery Procedure Note    Verbal consent from the patient is obtained and the patient is aware of the precancerous quality and need for treatment of these lesions. Liquid nitrogen cryosurgery is applied to the 4 actinic keratoses, as detailed in the physical exam, to produce a freeze injury.      Neoplasm of uncertain behavior of skin  -     Tissue Specimen To Pathology, Dermatology  Left templeShave biopsy performed after verbal consent including risk of infection, scar, recurrence, need for additional treatment of site. Area prepped with  alcohol, anesthetized with  1% lidocaine with epinephrine. . Hemostasis achieved with monsels No complications. Dressing applied. Wound care explained. Will need further rx if + ca            [facility]    Seborrheic keratoses  reassurance               Return in about 3 months (around 12/19/2017).

## 2017-09-22 ENCOUNTER — PATIENT MESSAGE (OUTPATIENT)
Dept: HEMATOLOGY/ONCOLOGY | Facility: CLINIC | Age: 82
End: 2017-09-22

## 2017-09-25 ENCOUNTER — PATIENT MESSAGE (OUTPATIENT)
Dept: ADMINISTRATIVE | Facility: OTHER | Age: 82
End: 2017-09-25

## 2017-09-27 ENCOUNTER — PATIENT MESSAGE (OUTPATIENT)
Dept: DERMATOLOGY | Facility: CLINIC | Age: 82
End: 2017-09-27

## 2017-10-02 ENCOUNTER — INFUSION (OUTPATIENT)
Dept: INFUSION THERAPY | Facility: HOSPITAL | Age: 82
End: 2017-10-02
Attending: INTERNAL MEDICINE
Payer: MEDICARE

## 2017-10-02 VITALS
TEMPERATURE: 99 F | HEIGHT: 70 IN | DIASTOLIC BLOOD PRESSURE: 56 MMHG | WEIGHT: 165 LBS | HEART RATE: 61 BPM | SYSTOLIC BLOOD PRESSURE: 117 MMHG | BODY MASS INDEX: 23.62 KG/M2 | RESPIRATION RATE: 16 BRPM

## 2017-10-02 DIAGNOSIS — C91.10 CLL (CHRONIC LYMPHOCYTIC LEUKEMIA): ICD-10-CM

## 2017-10-02 DIAGNOSIS — E79.0 HYPERURICEMIA: ICD-10-CM

## 2017-10-02 DIAGNOSIS — J32.9 CHRONIC SINUSITIS, UNSPECIFIED LOCATION: ICD-10-CM

## 2017-10-02 DIAGNOSIS — D80.1 HYPOGAMMAGLOBULINEMIA: Primary | ICD-10-CM

## 2017-10-02 PROCEDURE — 96365 THER/PROPH/DIAG IV INF INIT: CPT

## 2017-10-02 PROCEDURE — 96366 THER/PROPH/DIAG IV INF ADDON: CPT

## 2017-10-02 PROCEDURE — S0028 INJECTION, FAMOTIDINE, 20 MG: HCPCS | Performed by: INTERNAL MEDICINE

## 2017-10-02 PROCEDURE — 96367 TX/PROPH/DG ADDL SEQ IV INF: CPT

## 2017-10-02 PROCEDURE — 63600175 PHARM REV CODE 636 W HCPCS: Performed by: INTERNAL MEDICINE

## 2017-10-02 PROCEDURE — 25000003 PHARM REV CODE 250: Performed by: INTERNAL MEDICINE

## 2017-10-02 PROCEDURE — 96375 TX/PRO/DX INJ NEW DRUG ADDON: CPT

## 2017-10-02 RX ORDER — FAMOTIDINE 10 MG/ML
20 INJECTION INTRAVENOUS
Status: COMPLETED | OUTPATIENT
Start: 2017-10-02 | End: 2017-10-02

## 2017-10-02 RX ORDER — ACETAMINOPHEN 325 MG/1
650 TABLET ORAL
Status: CANCELLED | OUTPATIENT
Start: 2017-10-06

## 2017-10-02 RX ORDER — SODIUM CHLORIDE 0.9 % (FLUSH) 0.9 %
10 SYRINGE (ML) INJECTION
Status: CANCELLED | OUTPATIENT
Start: 2017-10-06

## 2017-10-02 RX ORDER — ACETAMINOPHEN 325 MG/1
650 TABLET ORAL
Status: COMPLETED | OUTPATIENT
Start: 2017-10-02 | End: 2017-10-02

## 2017-10-02 RX ORDER — HEPARIN 100 UNIT/ML
500 SYRINGE INTRAVENOUS
Status: CANCELLED | OUTPATIENT
Start: 2017-10-06

## 2017-10-02 RX ORDER — FAMOTIDINE 10 MG/ML
20 INJECTION INTRAVENOUS
Status: CANCELLED | OUTPATIENT
Start: 2017-10-06 | End: 2017-10-06

## 2017-10-02 RX ADMIN — HUMAN IMMUNOGLOBULIN G 20 G: 20 LIQUID INTRAVENOUS at 10:10

## 2017-10-02 RX ADMIN — FAMOTIDINE 20 MG: 10 INJECTION INTRAVENOUS at 10:10

## 2017-10-02 RX ADMIN — DIPHENHYDRAMINE HYDROCHLORIDE 50 MG: 50 INJECTION, SOLUTION INTRAMUSCULAR; INTRAVENOUS at 10:10

## 2017-10-02 RX ADMIN — SODIUM CHLORIDE: 0.9 INJECTION, SOLUTION INTRAVENOUS at 10:10

## 2017-10-02 RX ADMIN — ACETAMINOPHEN 650 MG: 325 TABLET ORAL at 10:10

## 2017-10-02 NOTE — PLAN OF CARE
Problem: Patient Care Overview (Adult)  Goal: Plan of Care Review  Outcome: Ongoing (interventions implemented as appropriate)  Patient tolerated infusion well.  No reaction suspected.  VSS. No questions or concerns.  AVS given to patient.  Patient ambulated off unit unassisted.

## 2017-10-02 NOTE — PLAN OF CARE
Problem: Chemotherapy Effects (Adult)  Goal: Signs and Symptoms of Listed Potential Problems Will be Absent or Manageable (Chemotherapy Effects)  Signs and symptoms of listed potential problems will be absent or manageable by discharge/transition of care (reference Chemotherapy Effects (Adult) CPG).   Outcome: Ongoing (interventions implemented as appropriate)  Patient here for IVIG.  Assessment complete and labs reviewed.  VSS.  Chair reclined and blanket offered.  No needs expressed at this time.  Will continue to monitor.

## 2017-10-05 ENCOUNTER — TELEPHONE (OUTPATIENT)
Dept: CARDIOLOGY | Facility: CLINIC | Age: 82
End: 2017-10-05

## 2017-10-05 NOTE — TELEPHONE ENCOUNTER
----- Message from Lou Chambers MD sent at 10/4/2017  7:01 PM CDT -----  Regarding: RE: Plavix and Mohs surgery  Sorry for not responding earlier. Yes he can hold Plavix for a few days. If scheduled for 10/10, suggest he hold starting tomorrow and resume 1 day after surgery.  LEONA Julian, FAC, [facility]   Pre[facility]ive Cardiology  Tel: [phone]    ----- Message -----  From: Terri Subramanian PA-C  Sent: 9/28/2017   4:11 PM  To: Lou Chambers MD  Subject: Plavix and Mohs surgery                          Pt is scheduled for Mohs surgery for a basal cell carcinoma on the left temple on 10/10/17. Can patient stop Plavix a few days prior to this procedure?    Thanks,  An

## 2017-10-10 ENCOUNTER — PROCEDURE VISIT (OUTPATIENT)
Dept: DERMATOLOGY | Facility: CLINIC | Age: 82
End: 2017-10-10
Payer: MEDICARE

## 2017-10-10 VITALS
HEART RATE: 71 BPM | DIASTOLIC BLOOD PRESSURE: 62 MMHG | BODY MASS INDEX: 23.62 KG/M2 | HEIGHT: 70 IN | WEIGHT: 165 LBS | SYSTOLIC BLOOD PRESSURE: 113 MMHG

## 2017-10-10 DIAGNOSIS — C44.319 BASAL CELL CARCINOMA OF LEFT TEMPLE REGION: Primary | ICD-10-CM

## 2017-10-10 PROCEDURE — 13133 CMPLX RPR F/C/C/M/N/AX/G/H/F: CPT | Mod: S$GLB,,, | Performed by: DERMATOLOGY

## 2017-10-10 PROCEDURE — 17312 MOHS ADDL STAGE: CPT | Mod: S$GLB,,, | Performed by: DERMATOLOGY

## 2017-10-10 PROCEDURE — 17311 MOHS 1 STAGE H/N/HF/G: CPT | Mod: S$GLB,,, | Performed by: DERMATOLOGY

## 2017-10-10 PROCEDURE — 17315 MOHS SURG ADDL BLOCK: CPT | Mod: S$GLB,,, | Performed by: DERMATOLOGY

## 2017-10-10 PROCEDURE — 13132 CMPLX RPR F/C/C/M/N/AX/G/H/F: CPT | Mod: 51,S$GLB,, | Performed by: DERMATOLOGY

## 2017-10-10 PROCEDURE — 99499 UNLISTED E&M SERVICE: CPT | Mod: S$GLB,,, | Performed by: DERMATOLOGY

## 2017-10-10 RX ORDER — CEPHALEXIN 500 MG/1
500 CAPSULE ORAL 3 TIMES DAILY
Qty: 21 CAPSULE | Refills: 0 | Status: SHIPPED | OUTPATIENT
Start: 2017-10-10 | End: 2017-10-17

## 2017-10-10 NOTE — PROGRESS NOTES
PROCEDURE: Mohs' Micrographic Surgery    INDICATION: Location in mask areas of face including central face, nose, eyelids, eyebrows, lips, chin, preauricular, temple, and ear. Size > 1.0 cm on face. Biopsy-proven skin cancer of cosmetically and functionally important areas, including head, neck, genital, hand, foot, or areas known for having difficulty in healing, such as the lower anterior legs. Tumor with ill-defined borders. Aggressive histopathology including sclerosing, morpheaform/infiltrating, micronodular, superficial multicentric, poorly differentiated, basosquamous, or perineural invasion.    REFERRING MD: Karissa Monsivais M.D.    CASE NUMBER:     ANESTHETIC: 9 cc 0.5% Lidocaine with Epi 1:200,000 mixed 1:1 with 0.5% Bupivacaine    SURGICAL PREP: Hibiclens    SURGEON: Ronel Bliss MD    ASSISTANTS: Terri Subramanian PA-C, Faye Henry MA and Kristy Eric, Surg Tech    PREOPERATIVE DIAGNOSIS: poorly differentiated carcinoma, favor basal cell carcinoma    POSTOPERATIVE DIAGNOSIS: poorly differentiated carcinoma, favor basal cell carcinoma    PATHOLOGIC DIAGNOSIS: poorly differentiated carcinoma, favor basal cell carcinoma- nodular    HISTOLOGY OF SPECIMENS IN FIRST STAGE:   Tumor Type: Tumor seen. Nodular basal cell carcinoma: Nodular tumor in dermis composed of basaloid cells exhibiting peripheral palisading and retraction artifact.   Depth of Invasion: epidermis and dermis  Perineural Invasion: No    HISTOLOGY OF SPECIMENS IN SUBSEQUENT STAGES:  · Tumor Type: No tumor seen.    STAGES OF MOHS' SURGERY PERFORMED: 2    TUMOR-FREE PLANE ACHIEVED: Yes    HEMOSTASIS: electrocoagulation     SPECIMENS: 9 (7 in stage A and 2 in stage B)    LOCATION: left temple. Patient verified location.    INITIAL LESION SIZE: 1.8 x 3.1 cm    FINAL DEFECT SIZE: 3.0 x 4.2 cm    WOUND REPAIR/DISPOSITION: The patient tolerated Mohs' Micrographic Surgery for a basal cell carcinoma very well. When the tumor was completely removed, a  "repair of the surgical defect was undertaken.      PROCEDURE: Complex Linear Repair    INDICATION: Status post Mohs' Micrographic Surgery for basal cell carcinoma.    CASE NUMBER:     SURGEON: Ronel Bliss MD    ASSISTANTS: Terri Subramanian PA-C and Kristy Eric, Surg Tech    ANESTHETIC: 6 cc 1% Lidocaine with Epinephrine 1:100,000    SURGICAL PREP: Hibiclens    LOCATION: left temple    DEFECT SIZE: 3.0 x 4.2 cm    WOUND REPAIR/DISPOSITION:  After the patient's carcinoma had been completely removed with Mohs' Micrographic Surgery, a repair of the surgical defect was undertaken. The patient was returned to the operating suite where the area of left temple was prepped, draped, and anesthetized in the usual sterile fashion. The wound was widely undermined in all directions. Then, electrocoagulation was used to obtain meticulous hemostasis. 4-0 Vicryl buried vertical mattress sutures were placed into the subcutaneous and dermal plane to close the wound and shankar the cutaneous wound edge. Bilateral dog ears were identified and were removed by a standard Burow's triangle technique. The cutaneous wound edges were closed using interrupted 4-0 Prolene and 5-0 Prolene sutures.    The patient tolerated the procedure well.    The area was cleaned and dressed appropriately and the patient was given wound care instructions, as well as appointment for follow-up evaluation. Pt was placed on Keflex 500 mg TID x 7 days. Advised pt to take the Keflex after the completion of his penicillin therapy (has 1 day left).    LENGTH OF REPAIR: 8.3 cm    Vitals:    10/10/17 0732 10/10/17 1223   BP: (!) 143/63 113/62   BP Location: Right arm Right arm   Patient Position: Sitting Sitting   BP Method: Medium (Automatic) Medium (Automatic)   Pulse: 66 71   Weight: 74.8 kg (165 lb)    Height: 5' 10" (1.778 m)          "

## 2017-10-17 ENCOUNTER — OFFICE VISIT (OUTPATIENT)
Dept: DERMATOLOGY | Facility: CLINIC | Age: 82
End: 2017-10-17
Payer: MEDICARE

## 2017-10-17 DIAGNOSIS — Z09 POSTOP CHECK: Primary | ICD-10-CM

## 2017-10-17 PROCEDURE — 99999 PR PBB SHADOW E&M-EST. PATIENT-LVL III: CPT | Mod: PBBFAC,,, | Performed by: DERMATOLOGY

## 2017-10-17 PROCEDURE — 99024 POSTOP FOLLOW-UP VISIT: CPT | Mod: S$GLB,,, | Performed by: DERMATOLOGY

## 2017-10-17 NOTE — PROGRESS NOTES
83 y.o. male patient is here for suture removal following Mohs' surgery.    Patient reports no problems with left temple.    WOUND PE:  The left temple sutures intact. Wound healing well. Good skin edges. No signs or symptoms of infection.      IMPRESSION:  Healing operative site from Mohs' surgery BCC, left temple s/p Mohs with CLC, postop day # 7.    PLAN:  Sutures removed today. Steri-strips applied.  Continue wound care.  Keep moist with Aquaphor.  Call if any concern arises.    RTC:  In 3-6 months with Karissa Monsivais M.D. for skin check or sooner if new concern arises.

## 2017-11-06 ENCOUNTER — INFUSION (OUTPATIENT)
Dept: INFUSION THERAPY | Facility: HOSPITAL | Age: 82
End: 2017-11-06
Attending: INTERNAL MEDICINE
Payer: MEDICARE

## 2017-11-06 VITALS
TEMPERATURE: 98 F | RESPIRATION RATE: 16 BRPM | HEART RATE: 56 BPM | HEIGHT: 70 IN | SYSTOLIC BLOOD PRESSURE: 121 MMHG | WEIGHT: 169 LBS | DIASTOLIC BLOOD PRESSURE: 60 MMHG | BODY MASS INDEX: 24.2 KG/M2

## 2017-11-06 DIAGNOSIS — E79.0 HYPERURICEMIA: ICD-10-CM

## 2017-11-06 DIAGNOSIS — D80.1 HYPOGAMMAGLOBULINEMIA: Primary | ICD-10-CM

## 2017-11-06 DIAGNOSIS — J32.9 CHRONIC SINUSITIS, UNSPECIFIED LOCATION: ICD-10-CM

## 2017-11-06 DIAGNOSIS — C91.10 CLL (CHRONIC LYMPHOCYTIC LEUKEMIA): ICD-10-CM

## 2017-11-06 PROCEDURE — 96366 THER/PROPH/DIAG IV INF ADDON: CPT

## 2017-11-06 PROCEDURE — 96367 TX/PROPH/DG ADDL SEQ IV INF: CPT

## 2017-11-06 PROCEDURE — S0028 INJECTION, FAMOTIDINE, 20 MG: HCPCS | Performed by: INTERNAL MEDICINE

## 2017-11-06 PROCEDURE — 96375 TX/PRO/DX INJ NEW DRUG ADDON: CPT

## 2017-11-06 PROCEDURE — 63600175 PHARM REV CODE 636 W HCPCS: Performed by: INTERNAL MEDICINE

## 2017-11-06 PROCEDURE — 25000003 PHARM REV CODE 250: Performed by: INTERNAL MEDICINE

## 2017-11-06 PROCEDURE — 96365 THER/PROPH/DIAG IV INF INIT: CPT

## 2017-11-06 RX ORDER — FAMOTIDINE 10 MG/ML
20 INJECTION INTRAVENOUS
Status: COMPLETED | OUTPATIENT
Start: 2017-11-06 | End: 2017-11-06

## 2017-11-06 RX ORDER — ACETAMINOPHEN 325 MG/1
650 TABLET ORAL
Status: COMPLETED | OUTPATIENT
Start: 2017-11-06 | End: 2017-11-06

## 2017-11-06 RX ORDER — SODIUM CHLORIDE 0.9 % (FLUSH) 0.9 %
10 SYRINGE (ML) INJECTION
Status: CANCELLED | OUTPATIENT
Start: 2017-11-10

## 2017-11-06 RX ORDER — HEPARIN 100 UNIT/ML
500 SYRINGE INTRAVENOUS
Status: CANCELLED | OUTPATIENT
Start: 2017-11-10

## 2017-11-06 RX ORDER — FAMOTIDINE 10 MG/ML
20 INJECTION INTRAVENOUS
Status: CANCELLED | OUTPATIENT
Start: 2017-11-10

## 2017-11-06 RX ORDER — ACETAMINOPHEN 325 MG/1
650 TABLET ORAL
Status: CANCELLED | OUTPATIENT
Start: 2017-11-10

## 2017-11-06 RX ADMIN — HUMAN IMMUNOGLOBULIN G 20 G: 20 LIQUID INTRAVENOUS at 10:11

## 2017-11-06 RX ADMIN — FAMOTIDINE 20 MG: 10 INJECTION INTRAVENOUS at 10:11

## 2017-11-06 RX ADMIN — ACETAMINOPHEN 650 MG: 325 TABLET ORAL at 10:11

## 2017-11-06 RX ADMIN — DIPHENHYDRAMINE HYDROCHLORIDE 50 MG: 50 INJECTION, SOLUTION INTRAMUSCULAR; INTRAVENOUS at 10:11

## 2017-11-06 RX ADMIN — SODIUM CHLORIDE: 900 INJECTION, SOLUTION INTRAVENOUS at 10:11

## 2017-11-06 NOTE — PLAN OF CARE
Problem: Patient Care Overview (Adult)  Goal: Plan of Care Review  Outcome: Ongoing (interventions implemented as appropriate)  Patient toleated infusion well.  No reaction suspected.  VSS.  No questions or concerns.  AVS given to patient.  Will RTC Thurs.  Patient ambulated off unit unassisted.

## 2017-11-08 ENCOUNTER — OFFICE VISIT (OUTPATIENT)
Dept: HEMATOLOGY/ONCOLOGY | Facility: CLINIC | Age: 82
End: 2017-11-08
Payer: MEDICARE

## 2017-11-08 VITALS
HEIGHT: 71 IN | HEART RATE: 72 BPM | BODY MASS INDEX: 23.3 KG/M2 | WEIGHT: 166.44 LBS | SYSTOLIC BLOOD PRESSURE: 126 MMHG | DIASTOLIC BLOOD PRESSURE: 78 MMHG

## 2017-11-08 DIAGNOSIS — D69.6 THROMBOCYTOPENIA: ICD-10-CM

## 2017-11-08 DIAGNOSIS — C91.10 CLL (CHRONIC LYMPHOCYTIC LEUKEMIA): Primary | ICD-10-CM

## 2017-11-08 DIAGNOSIS — D80.1 HYPOGAMMAGLOBULINEMIA: ICD-10-CM

## 2017-11-08 DIAGNOSIS — D84.9 IMMUNOSUPPRESSION: ICD-10-CM

## 2017-11-08 PROCEDURE — 99215 OFFICE O/P EST HI 40 MIN: CPT | Mod: S$GLB,,, | Performed by: INTERNAL MEDICINE

## 2017-11-08 PROCEDURE — 99499 UNLISTED E&M SERVICE: CPT | Mod: S$GLB,,, | Performed by: INTERNAL MEDICINE

## 2017-11-08 PROCEDURE — 99999 PR PBB SHADOW E&M-EST. PATIENT-LVL III: CPT | Mod: PBBFAC,,, | Performed by: INTERNAL MEDICINE

## 2017-11-08 RX ORDER — ACETAMINOPHEN 325 MG/1
1000 TABLET ORAL
Status: CANCELLED | OUTPATIENT
Start: 2017-12-14

## 2017-11-08 RX ORDER — HEPARIN 100 UNIT/ML
500 SYRINGE INTRAVENOUS
Status: CANCELLED | OUTPATIENT
Start: 2017-12-14

## 2017-11-08 RX ORDER — SODIUM CHLORIDE 0.9 % (FLUSH) 0.9 %
10 SYRINGE (ML) INJECTION
Status: CANCELLED | OUTPATIENT
Start: 2017-12-14

## 2017-11-08 RX ORDER — FAMOTIDINE 10 MG/ML
20 INJECTION INTRAVENOUS
Status: CANCELLED | OUTPATIENT
Start: 2017-12-14

## 2017-11-08 NOTE — PROGRESS NOTES
HISTORY OF PRESENT ILLNESS:  Mr. Ramos is an 83-year-old man who was   diagnosed with chronic lymphocytic leukemia in April 2012.  In April 2013, there   was an abrupt rise in his white blood cell count to 93,000 and some   prolymphocytes were seen in his peripheral blood smear.  He developed   generalized urticaria, which I suspected was paraneoplastic.  At that time, he   received five courses of fludarabine, cyclophosphamide and Rituxan.  The   fludarabine dose was decreased somewhat because of mild renal impairment and the   sixth course was with Rituxan alone because he had developed thrombocytopenia,   which has persisted since then.    Thrombocytopenia has been a continuing problem.  When it became more severe in   early 2016 with platelet counts in the range of 35,000-40,000, I was more   concerned since he was taking both aspirin and Plavix.  A   bone marrow examination in April 2016 showed no signs of myelodysplasia.  35%   of the bone marrow cells were small lymphocytes.  The cytogenetic and FISH analyses  disclosed trisomy 12.  The FISH analyses for myelodysplastic disorders were   negative.    I treated him for possible immune thrombocytopenia with prednisone 60 mg daily   for two weeks, but his platelet count did not improve.  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    Because of the continued thrombocytopenia and the need for drugs to alter   platelet function, I recommended treatment with ofatumumab, which began on   06/30/2016.  He completed eight weekly infusions of the drug and then 4 monthly   infusions.  His platelet count improved following the use of ofatumumab and in   the late months of 2016, it was slightly over 100,000.  Platelet counts in 2017   have been in the range of 82,000-114,000.    He has had many episodes of infection in the past, particularly sinusitis.  He   has had no more  procedures on his sinuses since I last saw him, and he had   one course of antibiotics.  Because of the multiple infections and low   immunoglobulin levels, he was placed on monthly infusions of immunoglobulin   several years ago and the frequency of sinusitis and pneumonia has diminished.    He had one TIA in the past, he had two strokes following a pituitary surgery in   2002.  He has had numerous non-melanoma skin cancers removed, and he continues   to see a dermatologist regularly.    He is not having fevers or sweats.  He exercises on a regular basis.  His   appetite is good.  His diabetes has been satisfactorily controlled.    ADDITIONAL PAST HISTORY, SYSTEM REVIEW, SOCIAL HISTORY AND FAMILY HISTORY:  Have   been reviewed and updated in the electronic record.    PHYSICAL EXAMINATION:  GENERAL APPEARANCE:  Well-developed and well-nourished man in no distress.  EYES:  Some scars on the right eyelid.  No jaundice or pallor.  EARS:  Clear canals and membranes.  NOSE:  Clear nares.  SINUSES:  No tenderness.  MOUTH AND GUMS:  Several missing teeth.  No mucosal lesions.  NECK:  No masses or bruits.  No thyroid abnormalities.  LYMPH NODES:  There are two soft nodes measuring less than 1 cm in each side of   the posterior neck.  There is one small node of about 1 cm deep in the right   axilla.  I do not feel lymph nodes elsewhere.  CHEST AND LUNGS:  Normal respiratory effort.  Clear to auscultation and   percussion.  HEART:  Regular rate and rhythm without murmur or gallop.  ABDOMEN:  Soft without masses or tenderness.  No hepatosplenomegaly.  EXTREMITIES:  Trace pretibial edema bilaterally.  PERIPHERAL VASCULATURE:  Decreased pulses in the feet and ankles.  NEUROLOGIC:  Mental status is good.  He is fully oriented.  Motor function is   good.  SKIN:  Multiple surgical scars from prior cancer surgery.  Ecchymoses on his   arms.  Actinic changes on his face.    LABORATORY STUDIES:  Blood counts today include hemoglobin  14.6, platelets   82,000 and WBC 4700 with 54% granulocytes, 28% lymphocytes, 13% monocytes and 2%   eosinophils.  Comprehensive metabolic profile is remarkable for albumin 3.1 and   AST 41.    IMPRESSION:  1.  Chronic lymphocytic leukemia.  2.  Thrombocytopenia, which seems to largely be a consequence of CLL.  3.  Immunodeficiency for which he receives monthly immunoglobulin infusions.  4.  Panhypopituitarism.  5.  Diabetes mellitus.  6.  Multiple skin cancers.    RECOMMENDATIONS:  1.  He will receive ofatumumab tomorrow.  2.  He will continue to receive intravenous immunoglobulin each month.  3.  In two months, he will have a CBC and CMP and receive another infusion of   ofatumumab.  4.  Return visit (EPA appointment) in four months with CBC, CMP, IgG level and   appointment for ofatumumab.     and Mrs. Ramos had a number of questions about his leukemia and his   thrombocytopenia.  Most of his 30-minute visit was devoted to counseling them   about these matters and answering their questions.  I have told them in the past   that I might consider idelalisib if his platelet count becomes low again.  I   am hesitant to use ibrutinib because of its effect on platelet function.    Most of his 40-minute appointment today was devoted to reviewing these matters   with him.      RICH  dd: 11/08/2017 11:10:13 (CST)  td: 11/09/2017 06:49:05 (CST)  Doc ID   #[id number]  Job ID #[id number]    CC:

## 2017-11-08 NOTE — Clinical Note
"Let chemo know that [facility] will only let me order 2000 mg of Arzerra; the "usual" long term dose is 1000 mg, which I prefer he gets because that may be all his insurance will pay for. The higher dose is OK if they cannot fix this."

## 2017-11-09 ENCOUNTER — INFUSION (OUTPATIENT)
Dept: INFUSION THERAPY | Facility: HOSPITAL | Age: 82
End: 2017-11-09
Attending: INTERNAL MEDICINE
Payer: MEDICARE

## 2017-11-09 VITALS
SYSTOLIC BLOOD PRESSURE: 139 MMHG | HEART RATE: 58 BPM | TEMPERATURE: 97 F | RESPIRATION RATE: 17 BRPM | DIASTOLIC BLOOD PRESSURE: 65 MMHG

## 2017-11-09 DIAGNOSIS — E79.0 HYPERURICEMIA: Primary | ICD-10-CM

## 2017-11-09 DIAGNOSIS — Z00.00 ANNUAL PHYSICAL EXAM: ICD-10-CM

## 2017-11-09 DIAGNOSIS — C91.10 CLL (CHRONIC LYMPHOCYTIC LEUKEMIA): ICD-10-CM

## 2017-11-09 PROCEDURE — 63600175 PHARM REV CODE 636 W HCPCS: Performed by: INTERNAL MEDICINE

## 2017-11-09 PROCEDURE — 25000003 PHARM REV CODE 250: Performed by: INTERNAL MEDICINE

## 2017-11-09 PROCEDURE — S0028 INJECTION, FAMOTIDINE, 20 MG: HCPCS | Performed by: INTERNAL MEDICINE

## 2017-11-09 PROCEDURE — 96375 TX/PRO/DX INJ NEW DRUG ADDON: CPT

## 2017-11-09 PROCEDURE — 96413 CHEMO IV INFUSION 1 HR: CPT

## 2017-11-09 PROCEDURE — A4216 STERILE WATER/SALINE, 10 ML: HCPCS | Performed by: INTERNAL MEDICINE

## 2017-11-09 PROCEDURE — 96415 CHEMO IV INFUSION ADDL HR: CPT

## 2017-11-09 PROCEDURE — 96367 TX/PROPH/DG ADDL SEQ IV INF: CPT

## 2017-11-09 RX ORDER — SODIUM CHLORIDE 0.9 % (FLUSH) 0.9 %
10 SYRINGE (ML) INJECTION
Status: DISCONTINUED | OUTPATIENT
Start: 2017-11-09 | End: 2017-11-09 | Stop reason: HOSPADM

## 2017-11-09 RX ORDER — ACETAMINOPHEN 500 MG
1000 TABLET ORAL
Status: COMPLETED | OUTPATIENT
Start: 2017-11-09 | End: 2017-11-09

## 2017-11-09 RX ORDER — HEPARIN 100 UNIT/ML
500 SYRINGE INTRAVENOUS
Status: DISCONTINUED | OUTPATIENT
Start: 2017-11-09 | End: 2017-11-09 | Stop reason: HOSPADM

## 2017-11-09 RX ORDER — FAMOTIDINE 10 MG/ML
20 INJECTION INTRAVENOUS
Status: COMPLETED | OUTPATIENT
Start: 2017-11-09 | End: 2017-11-09

## 2017-11-09 RX ADMIN — DEXAMETHASONE SODIUM PHOSPHATE 10 MG: 4 INJECTION, SOLUTION INTRAMUSCULAR; INTRAVENOUS at 10:11

## 2017-11-09 RX ADMIN — SODIUM CHLORIDE 2000 MG: 0.9 INJECTION, SOLUTION INTRAVENOUS at 11:11

## 2017-11-09 RX ADMIN — SODIUM CHLORIDE: 0.9 INJECTION, SOLUTION INTRAVENOUS at 09:11

## 2017-11-09 RX ADMIN — FAMOTIDINE 20 MG: 10 INJECTION INTRAVENOUS at 10:11

## 2017-11-09 RX ADMIN — Medication 10 ML: at 03:11

## 2017-11-09 RX ADMIN — ACETAMINOPHEN 1000 MG: 500 TABLET ORAL at 10:11

## 2017-11-09 RX ADMIN — DIPHENHYDRAMINE HYDROCHLORIDE 50 MG: 50 INJECTION, SOLUTION INTRAMUSCULAR; INTRAVENOUS at 10:11

## 2017-11-09 NOTE — PLAN OF CARE
Problem: Patient Care Overview (Adult)  Goal: Adult Individualization and Mutuality  1. PIV   2. Likes warm blanket     Outcome: Ongoing (interventions implemented as appropriate)  Labs , hx, and medications reviewed. Assessment completed. Discussed plan of care with patient. Patient in agreement. Chair reclined and warm blanket and snack offered.

## 2017-11-22 DIAGNOSIS — C91.10 CLL (CHRONIC LYMPHOCYTIC LEUKEMIA): Primary | ICD-10-CM

## 2017-11-22 DIAGNOSIS — E55.9 VITAMIN D DEFICIENCY: ICD-10-CM

## 2017-12-04 ENCOUNTER — INFUSION (OUTPATIENT)
Dept: INFUSION THERAPY | Facility: HOSPITAL | Age: 82
End: 2017-12-04
Attending: INTERNAL MEDICINE
Payer: MEDICARE

## 2017-12-04 VITALS
TEMPERATURE: 98 F | BODY MASS INDEX: 22.63 KG/M2 | DIASTOLIC BLOOD PRESSURE: 65 MMHG | WEIGHT: 161.63 LBS | RESPIRATION RATE: 20 BRPM | SYSTOLIC BLOOD PRESSURE: 139 MMHG | HEIGHT: 71 IN | HEART RATE: 61 BPM

## 2017-12-04 DIAGNOSIS — D80.1 HYPOGAMMAGLOBULINEMIA: Primary | ICD-10-CM

## 2017-12-04 DIAGNOSIS — E79.0 HYPERURICEMIA: ICD-10-CM

## 2017-12-04 DIAGNOSIS — C91.10 CLL (CHRONIC LYMPHOCYTIC LEUKEMIA): ICD-10-CM

## 2017-12-04 DIAGNOSIS — J32.9 CHRONIC SINUSITIS, UNSPECIFIED LOCATION: ICD-10-CM

## 2017-12-04 PROCEDURE — 96366 THER/PROPH/DIAG IV INF ADDON: CPT

## 2017-12-04 PROCEDURE — 96367 TX/PROPH/DG ADDL SEQ IV INF: CPT

## 2017-12-04 PROCEDURE — 96365 THER/PROPH/DIAG IV INF INIT: CPT

## 2017-12-04 PROCEDURE — 63600175 PHARM REV CODE 636 W HCPCS: Performed by: INTERNAL MEDICINE

## 2017-12-04 PROCEDURE — S0028 INJECTION, FAMOTIDINE, 20 MG: HCPCS | Performed by: INTERNAL MEDICINE

## 2017-12-04 PROCEDURE — 25000003 PHARM REV CODE 250: Performed by: INTERNAL MEDICINE

## 2017-12-04 PROCEDURE — 96375 TX/PRO/DX INJ NEW DRUG ADDON: CPT

## 2017-12-04 RX ORDER — FAMOTIDINE 10 MG/ML
20 INJECTION INTRAVENOUS
Status: COMPLETED | OUTPATIENT
Start: 2017-12-04 | End: 2017-12-04

## 2017-12-04 RX ORDER — HEPARIN 100 UNIT/ML
500 SYRINGE INTRAVENOUS
Status: DISCONTINUED | OUTPATIENT
Start: 2017-12-04 | End: 2017-12-04 | Stop reason: HOSPADM

## 2017-12-04 RX ORDER — SODIUM CHLORIDE 0.9 % (FLUSH) 0.9 %
10 SYRINGE (ML) INJECTION
Status: CANCELLED | OUTPATIENT
Start: 2017-12-08

## 2017-12-04 RX ORDER — SODIUM CHLORIDE 0.9 % (FLUSH) 0.9 %
10 SYRINGE (ML) INJECTION
Status: DISCONTINUED | OUTPATIENT
Start: 2017-12-04 | End: 2017-12-04 | Stop reason: HOSPADM

## 2017-12-04 RX ORDER — FAMOTIDINE 10 MG/ML
20 INJECTION INTRAVENOUS
Status: CANCELLED | OUTPATIENT
Start: 2017-12-08

## 2017-12-04 RX ORDER — ACETAMINOPHEN 325 MG/1
650 TABLET ORAL
Status: COMPLETED | OUTPATIENT
Start: 2017-12-04 | End: 2017-12-04

## 2017-12-04 RX ORDER — HEPARIN 100 UNIT/ML
500 SYRINGE INTRAVENOUS
Status: CANCELLED | OUTPATIENT
Start: 2017-12-08

## 2017-12-04 RX ORDER — ACETAMINOPHEN 325 MG/1
650 TABLET ORAL
Status: CANCELLED | OUTPATIENT
Start: 2017-12-08

## 2017-12-04 RX ADMIN — HUMAN IMMUNOGLOBULIN G 20 G: 20 LIQUID INTRAVENOUS at 10:12

## 2017-12-04 RX ADMIN — FAMOTIDINE 20 MG: 10 INJECTION INTRAVENOUS at 09:12

## 2017-12-04 RX ADMIN — DIPHENHYDRAMINE HYDROCHLORIDE 50 MG: 50 INJECTION, SOLUTION INTRAMUSCULAR; INTRAVENOUS at 09:12

## 2017-12-04 RX ADMIN — ACETAMINOPHEN 650 MG: 325 TABLET ORAL at 09:12

## 2017-12-04 RX ADMIN — SODIUM CHLORIDE: 9 INJECTION, SOLUTION INTRAVENOUS at 09:12

## 2017-12-04 NOTE — PLAN OF CARE
Problem: Patient Care Overview (Adult)  Goal: Plan of Care Review  Outcome: Ongoing (interventions implemented as appropriate)  Pt tolerated IVIG with no complications. VSS. Pt instructed to call MD with any problems. AVS given to patient and patient discharged home with wife at side.

## 2017-12-07 ENCOUNTER — OFFICE VISIT (OUTPATIENT)
Dept: DERMATOLOGY | Facility: CLINIC | Age: 82
End: 2017-12-07
Payer: MEDICARE

## 2017-12-07 ENCOUNTER — LAB VISIT (OUTPATIENT)
Dept: LAB | Facility: HOSPITAL | Age: 82
End: 2017-12-07
Attending: INTERNAL MEDICINE
Payer: MEDICARE

## 2017-12-07 VITALS — BODY MASS INDEX: 22.45 KG/M2 | WEIGHT: 161 LBS

## 2017-12-07 DIAGNOSIS — Z85.828 HISTORY OF SKIN CANCER: ICD-10-CM

## 2017-12-07 DIAGNOSIS — E78.2 MIXED DYSLIPIDEMIA: ICD-10-CM

## 2017-12-07 DIAGNOSIS — L57.0 MULTIPLE ACTINIC KERATOSES: Primary | ICD-10-CM

## 2017-12-07 LAB
ALT SERPL W/O P-5'-P-CCNC: 33 U/L
ANION GAP SERPL CALC-SCNC: 8 MMOL/L
AST SERPL-CCNC: 38 U/L
BUN SERPL-MCNC: 22 MG/DL
CALCIUM SERPL-MCNC: 9.4 MG/DL
CHLORIDE SERPL-SCNC: 106 MMOL/L
CHOLEST SERPL-MCNC: 169 MG/DL
CHOLEST/HDLC SERPL: 3.5 {RATIO}
CO2 SERPL-SCNC: 28 MMOL/L
CREAT SERPL-MCNC: 1 MG/DL
EST. GFR  (AFRICAN AMERICAN): >60 ML/MIN/1.73 M^2
EST. GFR  (NON AFRICAN AMERICAN): >60 ML/MIN/1.73 M^2
GLUCOSE SERPL-MCNC: 86 MG/DL
HDLC SERPL-MCNC: 48 MG/DL
HDLC SERPL: 28.4 %
LDLC SERPL CALC-MCNC: 101.8 MG/DL
NONHDLC SERPL-MCNC: 121 MG/DL
POTASSIUM SERPL-SCNC: 3.9 MMOL/L
SODIUM SERPL-SCNC: 142 MMOL/L
TRIGL SERPL-MCNC: 96 MG/DL

## 2017-12-07 PROCEDURE — 17003 DESTRUCT PREMALG LES 2-14: CPT | Mod: S$GLB,,, | Performed by: DERMATOLOGY

## 2017-12-07 PROCEDURE — 36415 COLL VENOUS BLD VENIPUNCTURE: CPT | Mod: PO

## 2017-12-07 PROCEDURE — 84460 ALANINE AMINO (ALT) (SGPT): CPT

## 2017-12-07 PROCEDURE — 99999 PR PBB SHADOW E&M-EST. PATIENT-LVL III: CPT | Mod: PBBFAC,,, | Performed by: DERMATOLOGY

## 2017-12-07 PROCEDURE — 80048 BASIC METABOLIC PNL TOTAL CA: CPT

## 2017-12-07 PROCEDURE — 17000 DESTRUCT PREMALG LESION: CPT | Mod: S$GLB,,, | Performed by: DERMATOLOGY

## 2017-12-07 PROCEDURE — 80061 LIPID PANEL: CPT

## 2017-12-07 PROCEDURE — 99213 OFFICE O/P EST LOW 20 MIN: CPT | Mod: 25,S$GLB,, | Performed by: DERMATOLOGY

## 2017-12-07 PROCEDURE — 84450 TRANSFERASE (AST) (SGOT): CPT

## 2017-12-07 NOTE — PROGRESS NOTES
Subjective:       Patient ID:  Thierry Ramos Jr. is a 83 y.o. male who presents for   Chief Complaint   Patient presents with    Follow-up     BCC left temple    Skin Check     face     History of Present Illness: The patient presents with chief complaint of spots.  Location: left cheek  Duration: months  Signs/Symptoms: none    Prior treatments: cryo    Had mohs surgery for the bcc on his left forehead doing well.           Review of Systems   Constitutional: Negative for fever.   Skin: Negative for itching and rash.   Hematologic/Lymphatic: Bruises/bleeds easily.        Objective:    Physical Exam   Constitutional: He appears well-developed and well-nourished. No distress.   Neurological: He is alert and oriented to person, place, and time. He is not disoriented.   Psychiatric: He has a normal mood and affect.   Skin:   Areas Examined (abnormalities noted in diagram):   Scalp / Hair Palpated and Inspected  Head / Face Inspection Performed  Neck Inspection Performed  Chest / Axilla Inspection Performed  RUE Inspected  LUE Inspection Performed                   Diagram Legend     Erythematous scaling macule/papule c/w actinic keratosis       Vascular papule c/w angioma      Pigmented verrucoid papule/plaque c/w seborrheic keratosis      Yellow umbilicated papule c/w sebaceous hyperplasia      Irregularly shaped tan macule c/w lentigo     1-2 mm smooth white papules consistent with Milia      Movable subcutaneous cyst with punctum c/w epidermal inclusion cyst      Subcutaneous movable cyst c/w pilar cyst      Firm pink to brown papule c/w dermatofibroma      Pedunculated fleshy papule(s) c/w skin tag(s)      Evenly pigmented macule c/w junctional nevus     Mildly variegated pigmented, slightly irregular-bordered macule c/w mildly atypical nevus      Flesh colored to evenly pigmented papule c/w intradermal nevus       Pink pearly papule/plaque c/w basal cell carcinoma      Erythematous hyperkeratotic cursted  plaque c/w SCC      Surgical scar with no sign of skin cancer recurrence      Open and closed comedones      Inflammatory papules and pustules      Verrucoid papule consistent consistent with wart     Erythematous eczematous patches and plaques     Dystrophic onycholytic nail with subungual debris c/w onychomycosis     Umbilicated papule    Erythematous-base heme-crusted tan verrucoid plaque consistent with inflamed seborrheic keratosis     Erythematous Silvery Scaling Plaque c/w Psoriasis     See annotation      Assessment / Plan:        Multiple actinic keratoses   Cryosurgery Procedure Note    Verbal consent from the patient is obtained and the patient is aware of the precancerous quality and need for treatment of these lesions. Liquid nitrogen cryosurgery is applied to the 6 actinic keratoses, as detailed in the physical exam, to produce a freeze injury.      History of skin cancer  [facility]             Return in about 3 months (around 3/7/2018).

## 2017-12-12 ENCOUNTER — OFFICE VISIT (OUTPATIENT)
Dept: CARDIOLOGY | Facility: CLINIC | Age: 82
End: 2017-12-12
Payer: MEDICARE

## 2017-12-12 VITALS
DIASTOLIC BLOOD PRESSURE: 64 MMHG | HEART RATE: 72 BPM | HEIGHT: 71 IN | SYSTOLIC BLOOD PRESSURE: 136 MMHG | WEIGHT: 160 LBS | BODY MASS INDEX: 22.4 KG/M2

## 2017-12-12 DIAGNOSIS — I10 ESSENTIAL HYPERTENSION: ICD-10-CM

## 2017-12-12 DIAGNOSIS — E78.2 MIXED DYSLIPIDEMIA: ICD-10-CM

## 2017-12-12 DIAGNOSIS — I65.22 OCCLUSION AND STENOSIS OF LEFT CAROTID ARTERY: Primary | ICD-10-CM

## 2017-12-12 PROCEDURE — 99999 PR PBB SHADOW E&M-EST. PATIENT-LVL III: CPT | Mod: PBBFAC,,, | Performed by: INTERNAL MEDICINE

## 2017-12-12 PROCEDURE — 99215 OFFICE O/P EST HI 40 MIN: CPT | Mod: S$GLB,,, | Performed by: INTERNAL MEDICINE

## 2017-12-12 PROCEDURE — 93000 ELECTROCARDIOGRAM COMPLETE: CPT | Mod: S$GLB,,, | Performed by: INTERNAL MEDICINE

## 2017-12-12 PROCEDURE — 99499 UNLISTED E&M SERVICE: CPT | Mod: S$GLB,,, | Performed by: INTERNAL MEDICINE

## 2017-12-12 RX ORDER — NAPROXEN SODIUM 220 MG/1
81 TABLET, FILM COATED ORAL DAILY
COMMUNITY
End: 2018-10-30 | Stop reason: SDUPTHER

## 2017-12-12 NOTE — PATIENT INSTRUCTIONS
Decrease aspirin to 3 days a week.  You should avoid NSAIDs because you take a blood thinner. NSAIDs are : Ibuprofen, Advil, Motrin, Aleeve, Naproxen, Meloxicam, Mobic, Diclofenac and Voltaren.  Do not take Celebrex either.  You can take Tylenol for pain. It is not a NSAID. Limit Tylenol to 4 tabs (500 mg each) in a 24 hr period.

## 2017-12-12 NOTE — PROGRESS NOTES
Subjective:   Patient ID:  Thierry Ramos Jr. is a 83 y.o. male who presents for follow-up of Dyslipidemia      Problem List:  Dyslipidemia   Statin induced myalgias - 2006   Carotid disease with h/o stroke   - (L) CEA 2007   HTN  DM steroid related - 2014  Hypopituitarism s/p pituitary tumor resectrion  CLL  DVT 8/16    HPI:   Thierry Ramos Jr.feels generally well. He does not report angina or shortness of breath with exertion.     He came across an article that was highly critical of statins and stopped taking atorvastatin about 1 week ago because he was concerned about developing Alzheimer's dementia. He does not report cognitive impairment. Total chol jumped from 104 mg/dl in 8/17 to 169 mg/dl a few days ago.  He remains on dual antiplatelet therapy. He bruises easily, but does not report melena, nose bleeds or other bleeding from other sites.   He exercises every day - calisthinics: 100 pushups, 60 curls, leg lifts and shadow boxing.      Review of Systems   Constitution: Negative for weakness, malaise/fatigue, weight gain and weight loss.   Cardiovascular: Negative for chest pain, claudication, dyspnea on exertion, irregular heartbeat, leg swelling, orthopnea, palpitations, paroxysmal nocturnal dyspnea and syncope.   Respiratory: Positive for cough and sputum production. Negative for wheezing.    Hematologic/Lymphatic: Bruises/bleeds easily.   Musculoskeletal: Negative for falls, joint pain, muscle cramps, muscle weakness and myalgias.   Gastrointestinal: Negative for abdominal pain, heartburn and melena.   Genitourinary: Negative for frequency, hematuria and nocturia.   Neurological: Negative for dizziness, light-headedness, loss of balance and paresthesias.   Psychiatric/Behavioral: Negative for depression.       Current Outpatient Prescriptions   Medication Sig    ACCU-CHEK FASTCLIX Misc TEST  BLOOD  GLUCOSE FOUR TIMES DAILY    alcohol swabs PadM APPLY 1 PAD TOPICALLY  AS NEEDED.    aspirin 81 MG  "Chew Take 81 mg by mouth once daily.    clopidogrel (PLAVIX) 75 mg tablet TAKE 1 TABLET ONE TIME DAILY    cyanocobalamin (VITAMIN B-12) 500 MCG tablet Take 1 tablet by mouth Daily.    DOCOSAHEXANOIC ACID/EPA (FISH OIL ORAL) Take by mouth once daily.    epinephrine (EPIPEN 2-LEVI) 0.3 mg/0.3 mL (1:1,000) AtIn Inject 0.3 mLs (0.3 mg total) into the muscle once.    insulin aspart (NOVOLOG) 100 unit/mL InPn pen Nov 4 units with meals and correction scale 1:25 goal 150 ave daily dose 26U/d plus needles (Patient taking differently: correction scale 1:25 goal 150 ave daily dose 26U/d plus needles)    insulin detemir (LEVEMIR FLEXTOUCH) 100 unit/mL (3 mL) SubQ InPn pen Inject 26 Units into the skin once daily. (Patient taking differently: Inject 18 Units into the skin once daily. )    levothyroxine (SYNTHROID) 125 MCG tablet TAKE 1 TABLET ONE TIME DAILY    losartan (COZAAR) 25 MG tablet TAKE 1 TABLET EVERY DAY    multivitamin with minerals tablet Take 1 tablet by mouth once daily.    pantoprazole (PROTONIX) 40 MG tablet TAKE 1 TABLET ONE TIME DAILY    pen needle, diabetic (BD INSULIN PEN NEEDLE UF SHORT) 31 gauge x 5/16" Ndle USE WITH LEVEMIR PEN  TO INJECT  10  UNITS SUBCUTANEOUSLY TWICE DAILY    predniSONE (DELTASONE) 2.5 MG tablet TAKE 1 TABLET EVERY DAY    predniSONE (DELTASONE) 5 MG tablet TAKE 1 TABLET EVERY DAY    tazarotene (AVAGE) 0.1 % cream Apply topically every evening.    testosterone cypionate (DEPOTESTOTERONE CYPIONATE) 200 mg/mL injection INJECT  1ML INTRAMUSCULARLY  EVERY  14  DAYS    atorvastatin (LIPITOR) 20 MG tablet Stopped about a week         Social History   Substance Use Topics    Smoking status: Never Smoker    Smokeless tobacco: Never Used    Alcohol use 1.2 oz/week     1 Glasses of wine, 1 Cans of beer per week      Comment: "rarely"         Objective:     Physical Exam   Constitutional: He is oriented to person, place, and time. He appears well-developed and well-nourished. " "  /64   Pulse 72   Ht 5' 11" (1.803 m)   Wt 72.6 kg (160 lb)   BMI 22.32 kg/m²      HENT:   Head: Normocephalic and atraumatic.   Neck: No JVD present. Carotid bruit is not present.   Cardiovascular: Normal rate, regular rhythm, S1 normal and S2 normal.  Exam reveals no gallop.    No murmur heard.  Pulses:       Radial pulses are 2+ on the right side, and 2+ on the left side.        Posterior tibial pulses are 2+ on the right side, and 1+ on the left side.   Pulmonary/Chest: Effort normal. He has no wheezes. He has no rales. Chest wall is not dull to percussion.   Abdominal: Soft. There is no splenomegaly or hepatomegaly. There is no tenderness.   Musculoskeletal:        Right lower leg: He exhibits no edema.        Left lower leg: He exhibits no edema.   Neurological: He is alert and oriented to person, place, and time. Gait normal.   Skin: Skin is warm and dry. Bruising noted. No cyanosis. Nails show no clubbing.   Psychiatric: He has a normal mood and affect. His speech is normal and behavior is normal. Judgment and thought content normal. Cognition and memory are normal.           Lab Results   Component Value Date    CHOL 169 12/07/2017    HDL 48 12/07/2017    LDLCALC 101.8 12/07/2017    TRIG 96 12/07/2017    CHOLHDL 28.4 12/07/2017     Lab Results   Component Value Date    GLU 86 12/07/2017    CREATININE 1.0 12/07/2017    BUN 22 12/07/2017     12/07/2017    K 3.9 12/07/2017     12/07/2017    CO2 28 12/07/2017     Lab Results   Component Value Date    ALT 33 12/07/2017    AST 38 12/07/2017    ALKPHOS 77 11/08/2017    BILITOT 0.8 11/08/2017       ECG today reviewed by me. It reveals sinus rhythm with no significant change.      Assessment and Plan:     1. Occlusion and stenosis of left carotid artery     2. Mixed dyslipidemia    3. Essential hypertension        Mixed dyslipidemia  Discussed importance of statin therapy. Reassured patient that although statins can cause cognitive " dysfunction, it is reversible and they do not cause Alzheimer's disease. Greater than 50% of 40 minutes spent counseling.  Resume atorvastatin 20 mg a day.     Hypertension  Stable.  Continue same meds.    Occlusion and stenosis of left carotid artery  S/P (L) CVA 12/06 and (L) CEA 2/07   Continue clopidogrel but reduce low dose aspirin to 3 days a week.     RTC 9/18

## 2017-12-17 PROBLEM — I77.9 LEFT-SIDED CAROTID ARTERY DISEASE: Status: ACTIVE | Noted: 2017-12-17

## 2017-12-18 NOTE — ASSESSMENT & PLAN NOTE
S/P (L) CVA 12/06 and (L) CEA 2/07   Continue clopidogrel, but may decrease aspirin to 81 mg 3 days a week.

## 2017-12-18 NOTE — ASSESSMENT & PLAN NOTE
Discussed importance of statin therapy. Reassured patient that although statins can cause cognitive dysfunction, it is reversible and they do not cause Alzheimer's disease. Greater than 50% of 40 minutes spent counseling.  Resume atorvastatin 20 mg a day.

## 2017-12-18 NOTE — ASSESSMENT & PLAN NOTE
S/P (L) CVA 12/06 and (L) CEA 2/07   Continue clopidogrel but reduce low dose aspirin to 3 days a week.

## 2018-01-01 ENCOUNTER — PATIENT MESSAGE (OUTPATIENT)
Dept: ENDOCRINOLOGY | Facility: CLINIC | Age: 83
End: 2018-01-01

## 2018-01-02 ENCOUNTER — INFUSION (OUTPATIENT)
Dept: INFUSION THERAPY | Facility: HOSPITAL | Age: 83
End: 2018-01-02
Attending: INTERNAL MEDICINE
Payer: MEDICARE

## 2018-01-02 VITALS
HEIGHT: 71 IN | SYSTOLIC BLOOD PRESSURE: 167 MMHG | BODY MASS INDEX: 21.84 KG/M2 | WEIGHT: 156 LBS | HEART RATE: 62 BPM | TEMPERATURE: 98 F | RESPIRATION RATE: 16 BRPM | DIASTOLIC BLOOD PRESSURE: 74 MMHG

## 2018-01-02 DIAGNOSIS — D80.1 HYPOGAMMAGLOBULINEMIA: Primary | ICD-10-CM

## 2018-01-02 DIAGNOSIS — J32.9 CHRONIC SINUSITIS, UNSPECIFIED LOCATION: ICD-10-CM

## 2018-01-02 DIAGNOSIS — E79.0 HYPERURICEMIA: ICD-10-CM

## 2018-01-02 DIAGNOSIS — C91.10 CLL (CHRONIC LYMPHOCYTIC LEUKEMIA): ICD-10-CM

## 2018-01-02 PROCEDURE — 96365 THER/PROPH/DIAG IV INF INIT: CPT

## 2018-01-02 PROCEDURE — 63600175 PHARM REV CODE 636 W HCPCS: Performed by: INTERNAL MEDICINE

## 2018-01-02 PROCEDURE — 25000003 PHARM REV CODE 250: Performed by: INTERNAL MEDICINE

## 2018-01-02 PROCEDURE — 96366 THER/PROPH/DIAG IV INF ADDON: CPT

## 2018-01-02 PROCEDURE — S0028 INJECTION, FAMOTIDINE, 20 MG: HCPCS | Performed by: INTERNAL MEDICINE

## 2018-01-02 RX ORDER — HEPARIN 100 UNIT/ML
500 SYRINGE INTRAVENOUS
Status: DISCONTINUED | OUTPATIENT
Start: 2018-01-02 | End: 2018-01-02 | Stop reason: HOSPADM

## 2018-01-02 RX ORDER — ACETAMINOPHEN 325 MG/1
650 TABLET ORAL
Status: COMPLETED | OUTPATIENT
Start: 2018-01-02 | End: 2018-01-02

## 2018-01-02 RX ORDER — SODIUM CHLORIDE 0.9 % (FLUSH) 0.9 %
10 SYRINGE (ML) INJECTION
Status: CANCELLED | OUTPATIENT
Start: 2018-01-02

## 2018-01-02 RX ORDER — HEPARIN 100 UNIT/ML
500 SYRINGE INTRAVENOUS
Status: CANCELLED | OUTPATIENT
Start: 2018-01-02

## 2018-01-02 RX ORDER — SODIUM CHLORIDE 0.9 % (FLUSH) 0.9 %
10 SYRINGE (ML) INJECTION
Status: DISCONTINUED | OUTPATIENT
Start: 2018-01-02 | End: 2018-01-02 | Stop reason: HOSPADM

## 2018-01-02 RX ORDER — FAMOTIDINE 10 MG/ML
20 INJECTION INTRAVENOUS
Status: CANCELLED | OUTPATIENT
Start: 2018-01-02

## 2018-01-02 RX ORDER — ACETAMINOPHEN 325 MG/1
650 TABLET ORAL
Status: CANCELLED | OUTPATIENT
Start: 2018-01-02

## 2018-01-02 RX ORDER — FAMOTIDINE 10 MG/ML
20 INJECTION INTRAVENOUS
Status: COMPLETED | OUTPATIENT
Start: 2018-01-02 | End: 2018-01-02

## 2018-01-02 RX ADMIN — FAMOTIDINE 20 MG: 10 INJECTION INTRAVENOUS at 10:01

## 2018-01-02 RX ADMIN — HUMAN IMMUNOGLOBULIN G 20 G: 20 LIQUID INTRAVENOUS at 11:01

## 2018-01-02 RX ADMIN — DIPHENHYDRAMINE HYDROCHLORIDE 50 MG: 50 INJECTION, SOLUTION INTRAMUSCULAR; INTRAVENOUS at 11:01

## 2018-01-02 RX ADMIN — ACETAMINOPHEN 650 MG: 325 TABLET ORAL at 10:01

## 2018-01-02 NOTE — PLAN OF CARE
Problem: Patient Care Overview (Adult)  Goal: Discharge Needs Assessment  Outcome: Ongoing (interventions implemented as appropriate)  Pt tolerated IVIG well to max dose vitals remained stable. No s/s of adverse reaction noted. PIV d/c'd upon discharge cath tip intact. AVS given no questions asked pt instructed to notify MD of any concerns understanding verbalized. Leaves clinic no distress noted accompanied by wife.

## 2018-01-04 ENCOUNTER — INFUSION (OUTPATIENT)
Dept: INFUSION THERAPY | Facility: HOSPITAL | Age: 83
End: 2018-01-04
Attending: INTERNAL MEDICINE
Payer: MEDICARE

## 2018-01-04 VITALS
TEMPERATURE: 98 F | HEART RATE: 65 BPM | RESPIRATION RATE: 18 BRPM | BODY MASS INDEX: 22.45 KG/M2 | SYSTOLIC BLOOD PRESSURE: 139 MMHG | DIASTOLIC BLOOD PRESSURE: 69 MMHG | WEIGHT: 160.94 LBS

## 2018-01-04 DIAGNOSIS — C91.10 CLL (CHRONIC LYMPHOCYTIC LEUKEMIA): ICD-10-CM

## 2018-01-04 DIAGNOSIS — E79.0 HYPERURICEMIA: Primary | ICD-10-CM

## 2018-01-04 DIAGNOSIS — Z00.00 ANNUAL PHYSICAL EXAM: ICD-10-CM

## 2018-01-04 PROCEDURE — 96415 CHEMO IV INFUSION ADDL HR: CPT

## 2018-01-04 PROCEDURE — 96375 TX/PRO/DX INJ NEW DRUG ADDON: CPT

## 2018-01-04 PROCEDURE — 25000003 PHARM REV CODE 250: Performed by: INTERNAL MEDICINE

## 2018-01-04 PROCEDURE — 96367 TX/PROPH/DG ADDL SEQ IV INF: CPT

## 2018-01-04 PROCEDURE — S0028 INJECTION, FAMOTIDINE, 20 MG: HCPCS | Performed by: INTERNAL MEDICINE

## 2018-01-04 PROCEDURE — 63600175 PHARM REV CODE 636 W HCPCS: Performed by: INTERNAL MEDICINE

## 2018-01-04 PROCEDURE — 96413 CHEMO IV INFUSION 1 HR: CPT

## 2018-01-04 RX ORDER — FAMOTIDINE 10 MG/ML
20 INJECTION INTRAVENOUS
Status: COMPLETED | OUTPATIENT
Start: 2018-01-04 | End: 2018-01-04

## 2018-01-04 RX ORDER — SODIUM CHLORIDE 0.9 % (FLUSH) 0.9 %
10 SYRINGE (ML) INJECTION
Status: CANCELLED | OUTPATIENT
Start: 2018-02-08

## 2018-01-04 RX ORDER — FAMOTIDINE 10 MG/ML
20 INJECTION INTRAVENOUS
Status: CANCELLED | OUTPATIENT
Start: 2018-02-08

## 2018-01-04 RX ORDER — ACETAMINOPHEN 500 MG
1000 TABLET ORAL
Status: COMPLETED | OUTPATIENT
Start: 2018-01-04 | End: 2018-01-04

## 2018-01-04 RX ORDER — HEPARIN 100 UNIT/ML
500 SYRINGE INTRAVENOUS
Status: CANCELLED | OUTPATIENT
Start: 2018-02-08

## 2018-01-04 RX ORDER — SODIUM CHLORIDE 0.9 % (FLUSH) 0.9 %
10 SYRINGE (ML) INJECTION
Status: DISCONTINUED | OUTPATIENT
Start: 2018-01-04 | End: 2018-01-04 | Stop reason: HOSPADM

## 2018-01-04 RX ORDER — HEPARIN 100 UNIT/ML
500 SYRINGE INTRAVENOUS
Status: DISCONTINUED | OUTPATIENT
Start: 2018-01-04 | End: 2018-01-04 | Stop reason: HOSPADM

## 2018-01-04 RX ORDER — ACETAMINOPHEN 325 MG/1
1000 TABLET ORAL
Status: CANCELLED | OUTPATIENT
Start: 2018-02-08

## 2018-01-04 RX ADMIN — DIPHENHYDRAMINE HYDROCHLORIDE 50 MG: 50 INJECTION, SOLUTION INTRAMUSCULAR; INTRAVENOUS at 11:01

## 2018-01-04 RX ADMIN — DEXAMETHASONE SODIUM PHOSPHATE 10 MG: 4 INJECTION, SOLUTION INTRAMUSCULAR; INTRAVENOUS at 11:01

## 2018-01-04 RX ADMIN — SODIUM CHLORIDE: 9 INJECTION, SOLUTION INTRAVENOUS at 11:01

## 2018-01-04 RX ADMIN — SODIUM CHLORIDE 2000 MG: 0.9 INJECTION, SOLUTION INTRAVENOUS at 12:01

## 2018-01-04 RX ADMIN — ACETAMINOPHEN 1000 MG: 500 TABLET ORAL at 11:01

## 2018-01-04 RX ADMIN — FAMOTIDINE 20 MG: 10 INJECTION INTRAVENOUS at 12:01

## 2018-01-04 NOTE — PLAN OF CARE
Problem: Chemotherapy Effects (Adult)  Goal: Signs and Symptoms of Listed Potential Problems Will be Absent or Manageable (Chemotherapy Effects)  Signs and symptoms of listed potential problems will be absent or manageable by discharge/transition of care (reference Chemotherapy Effects (Adult) CPG).   Outcome: Ongoing (interventions implemented as appropriate)  Pt here for Arzerra infusion, reports feeling well and tolerating treatment, no complaints or concerns at present

## 2018-01-04 NOTE — PLAN OF CARE
Problem: Patient Care Overview (Adult)  Goal: Plan of Care Review  Outcome: Ongoing (interventions implemented as appropriate)  Infusion completed, pt tolerated well; pt instructed to contact MD for any needs or concerns; printed AVS given to pt, pt verbalized understanding of all discussed and when to report next

## 2018-01-11 ENCOUNTER — OFFICE VISIT (OUTPATIENT)
Dept: DERMATOLOGY | Facility: CLINIC | Age: 83
End: 2018-01-11
Payer: MEDICARE

## 2018-01-11 VITALS — BODY MASS INDEX: 22.32 KG/M2 | WEIGHT: 160 LBS

## 2018-01-11 DIAGNOSIS — L57.0 MULTIPLE ACTINIC KERATOSES: ICD-10-CM

## 2018-01-11 DIAGNOSIS — L82.1 SEBORRHEIC KERATOSES: Primary | ICD-10-CM

## 2018-01-11 DIAGNOSIS — B08.1 BATEMAN'S DISEASE: ICD-10-CM

## 2018-01-11 DIAGNOSIS — Z85.828 HISTORY OF SKIN CANCER: ICD-10-CM

## 2018-01-11 PROCEDURE — 99999 PR PBB SHADOW E&M-EST. PATIENT-LVL III: CPT | Mod: PBBFAC,,, | Performed by: DERMATOLOGY

## 2018-01-11 PROCEDURE — 17003 DESTRUCT PREMALG LES 2-14: CPT | Mod: S$GLB,,, | Performed by: DERMATOLOGY

## 2018-01-11 PROCEDURE — 17000 DESTRUCT PREMALG LESION: CPT | Mod: S$GLB,,, | Performed by: DERMATOLOGY

## 2018-01-11 PROCEDURE — 99213 OFFICE O/P EST LOW 20 MIN: CPT | Mod: 25,S$GLB,, | Performed by: DERMATOLOGY

## 2018-01-11 RX ORDER — IBUPROFEN 800 MG/1
TABLET ORAL
COMMUNITY
Start: 2017-10-05 | End: 2018-08-29

## 2018-01-11 RX ORDER — CHLORHEXIDINE GLUCONATE ORAL RINSE 1.2 MG/ML
SOLUTION DENTAL
COMMUNITY
Start: 2017-12-15 | End: 2018-10-30

## 2018-01-11 RX ORDER — AMOXICILLIN AND CLAVULANATE POTASSIUM 875; 125 MG/1; MG/1
TABLET, FILM COATED ORAL
COMMUNITY
Start: 2017-10-05 | End: 2018-01-12

## 2018-01-11 NOTE — PROGRESS NOTES
Subjective:       Patient ID:  Thierry Ramos Jr. is a 83 y.o. male who presents for   Chief Complaint   Patient presents with    Follow-up     face     This is a high risk patient here to check for the development of new lesions.  History of Present Illness: The patient presents with chief complaint of spot.  Location: right hand  Duration: a month  Signs/Symptoms: none    Prior treatments: none          Review of Systems   Constitutional: Negative for fever.   Skin: Negative for itching and rash.   Hematologic/Lymphatic: Bruises/bleeds easily.        Objective:    Physical Exam   Constitutional: He appears well-developed and well-nourished. No distress.   Neurological: He is alert and oriented to person, place, and time. He is not disoriented.   Psychiatric: He has a normal mood and affect.   Skin:   Areas Examined (abnormalities noted in diagram):   Scalp / Hair Palpated and Inspected  Head / Face Inspection Performed  Neck Inspection Performed  Chest / Axilla Inspection Performed  RUE Inspected  LUE Inspection Performed                       Diagram Legend     Erythematous scaling macule/papule c/w actinic keratosis       Vascular papule c/w angioma      Pigmented verrucoid papule/plaque c/w seborrheic keratosis      Yellow umbilicated papule c/w sebaceous hyperplasia      Irregularly shaped tan macule c/w lentigo     1-2 mm smooth white papules consistent with Milia      Movable subcutaneous cyst with punctum c/w epidermal inclusion cyst      Subcutaneous movable cyst c/w pilar cyst      Firm pink to brown papule c/w dermatofibroma      Pedunculated fleshy papule(s) c/w skin tag(s)      Evenly pigmented macule c/w junctional nevus     Mildly variegated pigmented, slightly irregular-bordered macule c/w mildly atypical nevus      Flesh colored to evenly pigmented papule c/w intradermal nevus       Pink pearly papule/plaque c/w basal cell carcinoma      Erythematous hyperkeratotic cursted plaque c/w SCC       Surgical scar with no sign of skin cancer recurrence      Open and closed comedones      Inflammatory papules and pustules      Verrucoid papule consistent consistent with wart     Erythematous eczematous patches and plaques     Dystrophic onycholytic nail with subungual debris c/w onychomycosis     Umbilicated papule    Erythematous-base heme-crusted tan verrucoid plaque consistent with inflamed seborrheic keratosis     Erythematous Silvery Scaling Plaque c/w Psoriasis     See annotation      Assessment / Plan:        Seborrheic keratoses  reassurance      Multiple actinic keratoses   Cryosurgery Procedure Note    Verbal consent from the patient is obtained and the patient is aware of the precancerous quality and need for treatment of these lesions. Liquid nitrogen cryosurgery is applied to the 6 actinic keratoses, as detailed in the physical exam, to produce a freeze injury.    Ephraim's disease    History of skin cancer  Comments:  [facility] [place]             Follow-up in about 3 months (around 4/11/2018).

## 2018-01-12 ENCOUNTER — OFFICE VISIT (OUTPATIENT)
Dept: ALLERGY | Facility: CLINIC | Age: 83
End: 2018-01-12
Payer: MEDICARE

## 2018-01-12 ENCOUNTER — LAB VISIT (OUTPATIENT)
Dept: LAB | Facility: HOSPITAL | Age: 83
End: 2018-01-12
Attending: ALLERGY & IMMUNOLOGY
Payer: MEDICARE

## 2018-01-12 VITALS — HEIGHT: 70 IN | OXYGEN SATURATION: 99 % | HEART RATE: 73 BPM | BODY MASS INDEX: 23.42 KG/M2 | WEIGHT: 163.56 LBS

## 2018-01-12 DIAGNOSIS — J31.0 OTHER CHRONIC RHINITIS: Primary | ICD-10-CM

## 2018-01-12 DIAGNOSIS — J31.0 OTHER CHRONIC RHINITIS: ICD-10-CM

## 2018-01-12 DIAGNOSIS — H10.423 SIMPLE CHRONIC CONJUNCTIVITIS OF BOTH EYES: ICD-10-CM

## 2018-01-12 PROCEDURE — 99204 OFFICE O/P NEW MOD 45 MIN: CPT | Mod: S$GLB,,, | Performed by: ALLERGY & IMMUNOLOGY

## 2018-01-12 PROCEDURE — 99999 PR PBB SHADOW E&M-EST. PATIENT-LVL III: CPT | Mod: PBBFAC,,, | Performed by: ALLERGY & IMMUNOLOGY

## 2018-01-12 PROCEDURE — 36415 COLL VENOUS BLD VENIPUNCTURE: CPT | Mod: PO

## 2018-01-12 PROCEDURE — 86003 ALLG SPEC IGE CRUDE XTRC EA: CPT | Mod: 59

## 2018-01-12 PROCEDURE — 86003 ALLG SPEC IGE CRUDE XTRC EA: CPT

## 2018-01-12 NOTE — PROGRESS NOTES
Subjective:       Patient ID: Thierry Ramos Jr. is a 83 y.o. male.    Chief Complaint:  Other (hx cll, trouble with sinus, )      82 yo man presents for new patient evaluation of chronic rhinitis. He has not had issues lifelong, has had about last 5 years. 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 He also had pituitary adenoma and had 25 radiation treatments to head. He feels since then he has these sinus issues. He has had extensive skin cancers on face, one removed from side of nose caused hole in nose and had reconstructive surgery. From packing he ended up with fungal infection so is on compounded nasal spray for that. He wakes every AM with congestion dn full feeling, he does sinus irrigation and gets lots of yellowish mucus out. Rest of day he blows nose with clear to yellow mucus. No sneeze. Had PND. Not much congestion. Has very itchy eyes but also dry eyes. No SOB or wheeze. Has this all year,. AM is worse. He did have reaction once with lip and mouth swelling from peanuts so avoids eating but throws peanuts  out every AM for the birds and wonders if that is a trigger. H is not on antihistamines just this nasal spray. He has no asthma or eczema. No known insect or latex allergy. Letha had sinus surgery.        Environmental History: see history section for home environment  Review of Systems   Constitutional: Positive for fatigue. Negative for activity change, appetite change, chills, fever and unexpected weight change.   HENT: Positive for congestion, postnasal drip, rhinorrhea and sinus pressure. Negative for ear discharge, ear pain, facial swelling, hearing loss, mouth sores, nosebleeds, sneezing, sore throat, tinnitus, trouble swallowing and voice change.    Eyes: Positive for redness and itching. Negative for discharge and visual disturbance.   Respiratory: Positive for cough. Negative for chest tightness, shortness of breath and wheezing.    Cardiovascular: Negative  for chest pain, palpitations and leg swelling.   Gastrointestinal: Negative for abdominal distention, abdominal pain, constipation, diarrhea, nausea and vomiting.   Genitourinary: Negative for difficulty urinating.   Musculoskeletal: Positive for arthralgias, gait problem and myalgias. Negative for back pain and joint swelling.   Skin: Negative for color change, pallor and rash.   Neurological: Negative for dizziness, tremors, speech difficulty, weakness, light-headedness and headaches.   Hematological: Negative for adenopathy. Bruises/bleeds easily.   Psychiatric/Behavioral: Negative for agitation, confusion, decreased concentration and sleep disturbance. The patient is not nervous/anxious.         Objective:    Physical Exam   Constitutional: He is oriented to person, place, and time. He appears well-developed and well-nourished. No distress.   HENT:   Head: Normocephalic and atraumatic.   Right Ear: Hearing, tympanic membrane, external ear and ear canal normal.   Left Ear: Hearing, tympanic membrane, external ear and ear canal normal.   Nose: No mucosal edema (pink turbinates), rhinorrhea, sinus tenderness or septal deviation. No epistaxis.   Mouth/Throat: Oropharynx is clear and moist and mucous membranes are normal. No uvula swelling.   Eyes: Conjunctivae are normal. Right eye exhibits no discharge. Left eye exhibits no discharge.   Neck: Normal range of motion. No thyromegaly present.   Cardiovascular: Normal rate, regular rhythm and normal heart sounds.    No murmur heard.  Pulmonary/Chest: Effort normal and breath sounds normal. No respiratory distress. He has no wheezes.   Abdominal: Soft. He exhibits no distension. There is no tenderness.   Musculoskeletal: Normal range of motion. He exhibits no edema.   Lymphadenopathy:     He has no cervical adenopathy.   Neurological: He is alert and oriented to person, place, and time. Coordination normal.   Skin: Skin is warm and dry. No rash noted. No erythema.    Psychiatric: He has a normal mood and affect. His behavior is normal. Judgment and thought content normal.   Nursing note and vitals reviewed.      Laboratory:   none performed   Assessment:       1. Other chronic rhinitis    2. Simple chronic conjunctivitis of both eyes         Plan:       1. Advised sinus issues may be allergy vs chronic inflammation from prior radiation. Will send immunocaps to see if any allergy. After consider trial azelastine to dry up some  2. Phone review

## 2018-01-15 LAB
A ALTERNATA IGE QN: <0.35 KU/L
A FUMIGATUS IGE QN: <0.35 KU/L
ALLERGEN MAPLE/SYCAMORE IGE: <0.35 KU/L
ALLERGEN PENICILLIUM IGE: <0.35 KU/L
ALLERGEN WALNUT IGE: <0.35 KU/L
ALLERGEN WALNUT TREE IGE: <0.35 KU/L
ALLERGEN WHITE PINE TREE IGE: <0.35 KU/L
ALLERGEN WILLOW IGE: <0.35 KU/L
ALMOND IGE QN: <0.35 KU/L
BAHIA GRASS IGE QN: <0.35 KU/L
BALD CYPRESS IGE QN: <0.35 KU/L
BERMUDA GRASS IGE QN: <0.35 KU/L
BRAZIL NUT IGE QN: <0.35 KU/L
C GLOBOSUM IGE QN: <0.35 KU/L
C HERBARUM IGE QN: <0.35 KU/L
C LUNATA IGE QN: <0.35 KU/L
CASHEW NUT IGE QN: <0.35 KU/L
CAT DANDER IGE QN: <0.35 KU/L
COCONUT IGE QN: <0.35 KU/L
COMMON RAGWEED IGE QN: <0.35 KU/L
COTTONWOOD IGE QN: <0.35 KU/L
D FARINAE IGE QN: <0.35 KU/L
D PTERONYSS IGE QN: <0.35 KU/L
DEPRECATED A ALTERNATA IGE RAST QL: NORMAL
DEPRECATED A FUMIGATUS IGE RAST QL: NORMAL
DEPRECATED ALMOND IGE RAST QL: NORMAL
DEPRECATED BAHIA GRASS IGE RAST QL: NORMAL
DEPRECATED BALD CYPRESS IGE RAST QL: NORMAL
DEPRECATED BERMUDA GRASS IGE RAST QL: NORMAL
DEPRECATED BRAZIL NUT IGE RAST QL: NORMAL
DEPRECATED C GLOBOSUM IGE RAST QL: NORMAL
DEPRECATED C HERBARUM IGE RAST QL: NORMAL
DEPRECATED C LUNATA IGE RAST QL: NORMAL
DEPRECATED CASHEW NUT IGE RAST QL: NORMAL
DEPRECATED CAT DANDER IGE RAST QL: NORMAL
DEPRECATED COCONUT IGE RAST QL: NORMAL
DEPRECATED COMMON RAGWEED IGE RAST QL: NORMAL
DEPRECATED COTTONWOOD IGE RAST QL: NORMAL
DEPRECATED D FARINAE IGE RAST QL: NORMAL
DEPRECATED D PTERONYSS IGE RAST QL: NORMAL
DEPRECATED DOG DANDER IGE RAST QL: NORMAL
DEPRECATED ENGL PLANTAIN IGE RAST QL: NORMAL
DEPRECATED HAZELNUT IGE RAST QL: NORMAL
DEPRECATED JOHNSON GRASS IGE RAST QL: NORMAL
DEPRECATED MACADAMIA IGE RAST QL: NORMAL
DEPRECATED MARSH ELDER IGE RAST QL: NORMAL
DEPRECATED MUGWORT IGE RAST QL: NORMAL
DEPRECATED PEANUT IGE RAST QL: NORMAL
DEPRECATED PECAN/HICK NUT IGE RAST QL: NORMAL
DEPRECATED PECAN/HICK TREE IGE RAST QL: NORMAL
DEPRECATED PISTACHIO IGE RAST QL: NORMAL
DEPRECATED ROACH IGE RAST QL: NORMAL
DEPRECATED S ROSTRATA IGE RAST QL: NORMAL
DEPRECATED SALTWORT IGE RAST QL: NORMAL
DEPRECATED SILVER BIRCH IGE RAST QL: NORMAL
DEPRECATED TIMOTHY IGE RAST QL: NORMAL
DEPRECATED WHITE OAK IGE RAST QL: NORMAL
DOG DANDER IGE QN: <0.35 KU/L
ENGL PLANTAIN IGE QN: <0.35 KU/L
HAZELNUT IGE QN: <0.35 KU/L
JOHNSON GRASS IGE QN: <0.35 KU/L
MACADAMIA IGE QN: <0.35 KU/L
MAPLE/SYCAMORE CLASS: NORMAL
MARSH ELDER IGE QN: <0.35 KU/L
MUGWORT IGE QN: <0.35 KU/L
PEANUT IGE QN: <0.35 KU/L
PECAN/HICK NUT IGE QN: <0.35 KU/L
PECAN/HICK TREE IGE QN: <0.35 KU/L
PENICILLIUM CLASS: NORMAL
PISTACHIO IGE QN: <0.35 KU/L
ROACH IGE QN: <0.35 KU/L
S ROSTRATA IGE QN: <0.35 KU/L
SALTWORT IGE QN: <0.35 KU/L
SILVER BIRCH IGE QN: <0.35 KU/L
TIMOTHY IGE QN: <0.35 KU/L
WALNUT CLASS: NORMAL
WALNUT TREE CLASS: NORMAL
WHITE OAK IGE QN: <0.35 KU/L
WHITE PINE CLASS: NORMAL
WILLOW CLASS: NORMAL

## 2018-01-19 ENCOUNTER — PATIENT MESSAGE (OUTPATIENT)
Dept: ALLERGY | Facility: CLINIC | Age: 83
End: 2018-01-19

## 2018-01-21 ENCOUNTER — HOSPITAL ENCOUNTER (EMERGENCY)
Facility: HOSPITAL | Age: 83
Discharge: HOME | End: 2018-01-21
Attending: EMERGENCY MEDICINE
Payer: MEDICARE

## 2018-01-21 VITALS
WEIGHT: 165 LBS | HEIGHT: 71 IN | TEMPERATURE: 98 F | RESPIRATION RATE: 20 BRPM | HEART RATE: 55 BPM | DIASTOLIC BLOOD PRESSURE: 67 MMHG | OXYGEN SATURATION: 99 % | SYSTOLIC BLOOD PRESSURE: 157 MMHG | BODY MASS INDEX: 23.1 KG/M2

## 2018-01-21 DIAGNOSIS — S50.02XA TRAUMATIC HEMATOMA OF LEFT ELBOW, INITIAL ENCOUNTER: ICD-10-CM
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
## 2018-01-21 DIAGNOSIS — S73.102A SPRAIN OF LEFT HIP, INITIAL ENCOUNTER: Primary | ICD-10-CM

## 2018-01-21 PROCEDURE — 99283 EMERGENCY DEPT VISIT LOW MDM: CPT

## 2018-01-21 PROCEDURE — 25000003 PHARM REV CODE 250: Performed by: EMERGENCY MEDICINE

## 2018-01-21 RX ORDER — HYDROCODONE BITARTRATE AND ACETAMINOPHEN 7.5; 325 MG/1; MG/1
1 TABLET ORAL EVERY 6 HOURS PRN
Status: DISCONTINUED | OUTPATIENT
Start: 2018-01-21 | End: 2018-01-21 | Stop reason: HOSPADM

## 2018-01-21 RX ORDER — HYDROCODONE BITARTRATE AND ACETAMINOPHEN 7.5; 325 MG/1; MG/1
1 TABLET ORAL EVERY 4 HOURS PRN
Qty: 18 TABLET | Refills: 0 | Status: SHIPPED | OUTPATIENT
Start: 2018-01-21 | End: 2018-08-29

## 2018-01-21 RX ADMIN — HYDROCODONE BITARTRATE AND ACETAMINOPHEN 1 TABLET: 7.5; 325 TABLET ORAL at 07:01

## 2018-01-22 ENCOUNTER — PATIENT MESSAGE (OUTPATIENT)
Dept: INTERNAL MEDICINE | Facility: CLINIC | Age: 83
End: 2018-01-22

## 2018-01-22 RX ORDER — AZELASTINE 1 MG/ML
2 SPRAY, METERED NASAL 2 TIMES DAILY
Qty: 30 ML | Refills: 12 | Status: SHIPPED | OUTPATIENT
Start: 2018-01-22 | End: 2018-08-29

## 2018-01-22 NOTE — ED NOTES
"Pt is able to bear weight, pt refuses CT, Dr Ag at bedside explained risks of not having CT pt states "I'm fine I can move my legs just fine"  "

## 2018-01-22 NOTE — ED NOTES
"Pt requested urinal at this time, urinal given assistance offered to stand, pt refused. States " I can do this by myself"   "

## 2018-01-22 NOTE — ED NOTES
"Assumed pt care pt refused assessment states "i've already been seen by the Doctor I just want to know about my xrays"  "

## 2018-01-22 NOTE — ED PROVIDER NOTES
Encounter Date: 1/21/2018       History     Chief Complaint   Patient presents with    Fall     pt lost his balance while going to  his granddaughter and fell onto his left side. C/o pain to left elbow and left hip. Did not hit his head or lose consciousness     84 y/o male presents to ED for evaluation s/p fall.  Pt was standing up pulling his granddaughter up by the arms when he fell to his left side.  Pt reported left hip and left elbow pain to his family.  The fall was witnessed and the patient had no head injury or LOC.  No n/v since that time.  Pt is on plavix.  He has developed a left elbow hematoma since the fall.  Pt reports pain with weightbearing.  Pt has no pain with rom left elbow.  No confusion, unsteady gait or AMS noted by family.  Pt did require assistance getting up s/p fall.      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    Hyperlipidemia     Hypertension     Hypopituitarism     Hypothyroid     Immunosuppression 3/13/2015    SQUAMOUS CELL CARCINOMA     on the right side of the face     Squamous cell carcinoma of skin of right temple 1/27/2016    Stroke     Syncope 2/12    Unspecified disorder of kidney and ureter      Past Surgical History:   Procedure Laterality Date    CAROTID ENDARTERECTOMY (L)  2/27/2007    EXCISION TURBINATE, SUBMUCOUS      FESS  9/16/2014    Mohs excision   3/23/2015, 2/24/2016    combined with WLE forehead    NASAL SEPTUM SURGERY  9/16/2014    right ureter surgery  1995    SENTINEL LYMPH NODE BIOPSY  3/23/2015, 2/24/2016  "   SINUS SURGERY  9/16/2014    septo, ESS, BITSMR    Transphenoidal surgery       Family History   Problem Relation Age of Onset    Cancer Mother      breast    Colon cancer Father     Cancer Father      colon    Cancer Brother      leukemia    No Known Problems Sister     No Known Problems Maternal Aunt     No Known Problems Maternal Uncle     No Known Problems Paternal Aunt     No Known Problems Paternal Uncle     No Known Problems Maternal Grandmother     No Known Problems Maternal Grandfather     No Known Problems Paternal Grandmother     No Known Problems Paternal Grandfather     Amblyopia Neg Hx     Blindness Neg Hx     Cataracts Neg Hx     Diabetes Neg Hx     Glaucoma Neg Hx     Hypertension Neg Hx     Macular degeneration Neg Hx     Retinal detachment Neg Hx     Strabismus Neg Hx     Stroke Neg Hx     Thyroid disease Neg Hx     Allergic rhinitis Neg Hx     Allergies Neg Hx     Angioedema Neg Hx     Asthma Neg Hx     Atopy Neg Hx     Eczema Neg Hx     Immunodeficiency Neg Hx     Rhinitis Neg Hx     Urticaria Neg Hx      Social History   Substance Use Topics    Smoking status: Never Smoker    Smokeless tobacco: Never Used    Alcohol use 1.2 oz/week     1 Glasses of wine, 1 Cans of beer per week      Comment: "rarely"     Review of Systems   Constitutional: Negative for activity change and appetite change.   HENT: Negative for facial swelling.    Eyes: Negative for visual disturbance.   Respiratory: Negative for cough and shortness of breath.    Cardiovascular: Negative for chest pain and palpitations.   Gastrointestinal: Negative for abdominal pain, nausea and vomiting.   Musculoskeletal: Negative for back pain, neck pain and neck stiffness.        + left hip and left elbow pain   Skin: Positive for wound (left elbow hematoma).   Neurological: Negative for dizziness, seizures, syncope, facial asymmetry, speech difficulty, weakness, light-headedness, numbness and headaches. "   Hematological: Bruises/bleeds easily.   Psychiatric/Behavioral: Negative for confusion.   All other systems reviewed and are negative.      Physical Exam     Initial Vitals [01/21/18 1721]   BP Pulse Resp Temp SpO2   133/61 64 16 97.9 °F (36.6 °C) 97 %      MAP       85         Physical Exam    Nursing note and vitals reviewed.  Constitutional: He appears well-developed and well-nourished. He is not diaphoretic. No distress.   HENT:   Head: Normocephalic and atraumatic.   Right Ear: External ear normal.   Left Ear: External ear normal.   Nose: Nose normal.   Mouth/Throat: Oropharynx is clear and moist.   Eyes: Conjunctivae and EOM are normal. Pupils are equal, round, and reactive to light. No scleral icterus.   Neck: Normal range of motion. Neck supple.   No midline neck pain   Cardiovascular: Normal rate, regular rhythm and normal heart sounds. Exam reveals no gallop and no friction rub.    No murmur heard.  Pulmonary/Chest: Breath sounds normal. No respiratory distress. He has no wheezes. He has no rhonchi. He has no rales. He exhibits no tenderness.   Abdominal: Soft. He exhibits no distension. There is no tenderness.   Musculoskeletal: Normal range of motion. He exhibits tenderness. He exhibits no edema.        Left hip: He exhibits tenderness. He exhibits normal range of motion, normal strength, no bony tenderness, no swelling, no crepitus, no deformity and no laceration.        Left forearm: He exhibits tenderness and swelling.        Legs:  Left elbow hematoma  No midline c/t/L spine pain   Neurological: He is alert and oriented to person, place, and time. He has normal strength. No cranial nerve deficit or sensory deficit.   Skin: Skin is warm and dry.   Left elbow hematoma   Psychiatric: His behavior is normal. Judgment and thought content normal.         ED Course   Procedures  Labs Reviewed - No data to display       X-Rays:   Independently Interpreted Readings:   Other Readings:  No acute fracture or  "dislocation noted on left elbow or left hip xray    Medical Decision Making:   Initial Assessment:   82 y/o male presents to ED s/p falling onto his left hip and left elbow.  Pt denies head injury.  No AMS noted by family.  No n/v.  No neck or back pain.  No associated numbness/weakness/tingling.   Differential Diagnosis:   DDX:  Fracture, dislocation, hematoma  Independently Interpreted Test(s):   I have ordered and independently interpreted X-rays - see prior notes.  Clinical Tests:   Radiological Study: Ordered and Reviewed  ED Management:  Elbow and left hip xray neg.  Pt reports pain in the posterior thigh that he describes as a pulled muscle.  He is able to weightbear w/o difficulty but it is painful for him to walk due to his pulled muscle.  Pt has no pain on palpation of femur/knee/tib/fib/ankle/foot.  I have told the patient and his family at bedside that he needs to have a CT of his hip and pelvis because he is reporting pain with walking.  He adamantly refuses while waving his leg around stating that "nothing is broken."  Pt would like a rx for pain meds and to be d/c home.  I have advised both the patient and the family that I cannot completely rule out a fracture w/o further imaging.  He verbalizes understanding and will f/u with pcp.     Pt counseled on their diagnosis and the importance of following up with PCP.  Pt also cautioned on when to return to ED.  Pt verbalizes understanding of discharge plan and will return to ED immediately if symptoms worsen                     ED Course      Clinical Impression:   The primary encounter diagnosis was Sprain of left hip, initial encounter. Diagnoses of Fall and Traumatic hematoma of left elbow, initial encounter were also pertinent to this visit.    Disposition:   Disposition: Discharged  Condition: Stable                        Jena Ag MD  01/23/18 0146    "

## 2018-01-29 ENCOUNTER — INFUSION (OUTPATIENT)
Dept: INFUSION THERAPY | Facility: HOSPITAL | Age: 83
End: 2018-01-29
Attending: INTERNAL MEDICINE
Payer: MEDICARE

## 2018-01-29 VITALS
HEIGHT: 71 IN | SYSTOLIC BLOOD PRESSURE: 142 MMHG | DIASTOLIC BLOOD PRESSURE: 62 MMHG | RESPIRATION RATE: 17 BRPM | WEIGHT: 165 LBS | HEART RATE: 68 BPM | TEMPERATURE: 98 F | BODY MASS INDEX: 23.1 KG/M2

## 2018-01-29 DIAGNOSIS — E79.0 HYPERURICEMIA: ICD-10-CM

## 2018-01-29 DIAGNOSIS — J32.9 CHRONIC SINUSITIS, UNSPECIFIED LOCATION: ICD-10-CM

## 2018-01-29 DIAGNOSIS — C91.10 CLL (CHRONIC LYMPHOCYTIC LEUKEMIA): ICD-10-CM

## 2018-01-29 DIAGNOSIS — D80.1 HYPOGAMMAGLOBULINEMIA: Primary | ICD-10-CM

## 2018-01-29 PROCEDURE — 25000003 PHARM REV CODE 250: Performed by: INTERNAL MEDICINE

## 2018-01-29 PROCEDURE — 96365 THER/PROPH/DIAG IV INF INIT: CPT

## 2018-01-29 PROCEDURE — 96366 THER/PROPH/DIAG IV INF ADDON: CPT

## 2018-01-29 PROCEDURE — 96367 TX/PROPH/DG ADDL SEQ IV INF: CPT

## 2018-01-29 PROCEDURE — S0028 INJECTION, FAMOTIDINE, 20 MG: HCPCS | Performed by: INTERNAL MEDICINE

## 2018-01-29 PROCEDURE — 63600175 PHARM REV CODE 636 W HCPCS: Mod: JG | Performed by: INTERNAL MEDICINE

## 2018-01-29 PROCEDURE — 96375 TX/PRO/DX INJ NEW DRUG ADDON: CPT

## 2018-01-29 RX ORDER — ACETAMINOPHEN 325 MG/1
650 TABLET ORAL
Status: CANCELLED | OUTPATIENT
Start: 2018-02-02

## 2018-01-29 RX ORDER — SODIUM CHLORIDE 0.9 % (FLUSH) 0.9 %
10 SYRINGE (ML) INJECTION
Status: DISCONTINUED | OUTPATIENT
Start: 2018-01-29 | End: 2018-01-29 | Stop reason: HOSPADM

## 2018-01-29 RX ORDER — SODIUM CHLORIDE 0.9 % (FLUSH) 0.9 %
10 SYRINGE (ML) INJECTION
Status: CANCELLED | OUTPATIENT
Start: 2018-02-02

## 2018-01-29 RX ORDER — FAMOTIDINE 10 MG/ML
20 INJECTION INTRAVENOUS
Status: COMPLETED | OUTPATIENT
Start: 2018-01-29 | End: 2018-01-29

## 2018-01-29 RX ORDER — FAMOTIDINE 10 MG/ML
20 INJECTION INTRAVENOUS
Status: CANCELLED | OUTPATIENT
Start: 2018-02-02 | End: 2018-02-02

## 2018-01-29 RX ORDER — HEPARIN 100 UNIT/ML
500 SYRINGE INTRAVENOUS
Status: CANCELLED | OUTPATIENT
Start: 2018-02-02

## 2018-01-29 RX ORDER — ACETAMINOPHEN 325 MG/1
650 TABLET ORAL
Status: COMPLETED | OUTPATIENT
Start: 2018-01-29 | End: 2018-01-29

## 2018-01-29 RX ORDER — HEPARIN 100 UNIT/ML
500 SYRINGE INTRAVENOUS
Status: DISCONTINUED | OUTPATIENT
Start: 2018-01-29 | End: 2018-01-29 | Stop reason: HOSPADM

## 2018-01-29 RX ADMIN — FAMOTIDINE 20 MG: 10 INJECTION INTRAVENOUS at 10:01

## 2018-01-29 RX ADMIN — ACETAMINOPHEN 650 MG: 325 TABLET ORAL at 10:01

## 2018-01-29 RX ADMIN — DIPHENHYDRAMINE HYDROCHLORIDE 50 MG: 50 INJECTION, SOLUTION INTRAMUSCULAR; INTRAVENOUS at 10:01

## 2018-01-29 RX ADMIN — SODIUM CHLORIDE: 900 INJECTION, SOLUTION INTRAVENOUS at 10:01

## 2018-01-29 RX ADMIN — HUMAN IMMUNOGLOBULIN G 20 G: 20 LIQUID INTRAVENOUS at 11:01

## 2018-01-29 NOTE — PLAN OF CARE
Problem: Patient Care Overview (Adult)  Goal: Plan of Care Review  Outcome: Ongoing (interventions implemented as appropriate)  Pt tolerated IVIG infusion without issue, avs given to pt , pt to rtc 2/26/18, no distress noted upon d/c to home    Problem: Chemotherapy Effects (Adult)  Goal: Signs and Symptoms of Listed Potential Problems Will be Absent or Manageable (Chemotherapy Effects)  Signs and symptoms of listed potential problems will be absent or manageable by discharge/transition of care (reference Chemotherapy Effects (Adult) CPG).   Outcome: Ongoing (interventions implemented as appropriate)  Pt

## 2018-01-29 NOTE — PLAN OF CARE
Problem: Chemotherapy Effects (Adult)  Goal: Signs and Symptoms of Listed Potential Problems Will be Absent or Manageable (Chemotherapy Effects)  Signs and symptoms of listed potential problems will be absent or manageable by discharge/transition of care (reference Chemotherapy Effects (Adult) CPG).   Outcome: Ongoing (interventions implemented as appropriate)  Pt here for IVIG infusion, labs, hx, meds, allergies reviewed. Pt with no complaints or concerns at this time, reclined in chair, continue to monitor

## 2018-02-08 ENCOUNTER — PATIENT MESSAGE (OUTPATIENT)
Dept: HEMATOLOGY/ONCOLOGY | Facility: CLINIC | Age: 83
End: 2018-02-08

## 2018-02-09 ENCOUNTER — OFFICE VISIT (OUTPATIENT)
Dept: INTERNAL MEDICINE | Facility: CLINIC | Age: 83
End: 2018-02-09
Payer: MEDICARE

## 2018-02-09 VITALS
HEART RATE: 80 BPM | WEIGHT: 160.25 LBS | BODY MASS INDEX: 22.44 KG/M2 | RESPIRATION RATE: 18 BRPM | DIASTOLIC BLOOD PRESSURE: 68 MMHG | HEIGHT: 71 IN | TEMPERATURE: 98 F | SYSTOLIC BLOOD PRESSURE: 130 MMHG

## 2018-02-09 DIAGNOSIS — S50.02XD CONTUSION OF LEFT ELBOW, SUBSEQUENT ENCOUNTER: ICD-10-CM

## 2018-02-09 DIAGNOSIS — S76.312S HAMSTRING MUSCLE STRAIN, LEFT, SEQUELA: Primary | ICD-10-CM

## 2018-02-09 PROCEDURE — 3008F BODY MASS INDEX DOCD: CPT | Mod: S$GLB,,, | Performed by: INTERNAL MEDICINE

## 2018-02-09 PROCEDURE — 90662 IIV NO PRSV INCREASED AG IM: CPT | Mod: S$GLB,,, | Performed by: INTERNAL MEDICINE

## 2018-02-09 PROCEDURE — 1126F AMNT PAIN NOTED NONE PRSNT: CPT | Mod: S$GLB,,, | Performed by: INTERNAL MEDICINE

## 2018-02-09 PROCEDURE — 1159F MED LIST DOCD IN RCRD: CPT | Mod: S$GLB,,, | Performed by: INTERNAL MEDICINE

## 2018-02-09 PROCEDURE — G0008 ADMIN INFLUENZA VIRUS VAC: HCPCS | Mod: S$GLB,,, | Performed by: INTERNAL MEDICINE

## 2018-02-09 PROCEDURE — 99999 PR PBB SHADOW E&M-EST. PATIENT-LVL III: CPT | Mod: PBBFAC,,, | Performed by: INTERNAL MEDICINE

## 2018-02-09 PROCEDURE — 99213 OFFICE O/P EST LOW 20 MIN: CPT | Mod: 25,S$GLB,, | Performed by: INTERNAL MEDICINE

## 2018-02-09 RX ORDER — BUDESONIDE 0.5 MG/2ML
INHALANT ORAL
Status: ON HOLD | COMMUNITY
Start: 2018-01-31 | End: 2018-09-03 | Stop reason: HOSPADM

## 2018-02-09 NOTE — PROGRESS NOTES
This office note has been dictated.  HISTORY OF PRESENT ILLNESS:  This is an 83-year-old gentleman with several   chronic medical conditions, in today following up on an ED visit on 01/21/2018.    The patient had sustained a mechanical fall.  Detailed descriptions of this   incident by this examiner indicates the patient had reached over to execute a   flip of his small great grandchild and felt a sudden pop in his left hamstring   group, which caused him to fall to his left and sustained a minor contusion to   his left hip and left elbow.  He went to the ED where x-rays of the hip and   elbow were unremarkable.  He reports having pain in the hamstring to a   moderately significant degree for several days but has improved profoundly.  He   is no longer having any impairment in ambulation or other.  He is not having any   overt weakness, paresthesias in the lower extremity.  There was no head trauma   involved in his mechanical fall.    CURRENT MEDICATIONS:  Noted and reviewed in the electronic medical record and   medication list.    REVIEW OF SYSTEMS:  CONSTITUTIONAL:  No fever, chills or generalized body aches.  CARDIOVASCULAR:  No chest pain, no palpitations, no syncope or presyncope.    PAST MEDICAL HISTORY, PAST SURGICAL HISTORY, FAMILY MEDICAL HISTORY AND SOCIAL   HISTORY:  All noted and reviewed in the electronic medical record history   sections.    PHYSICAL EXAMINATION:  GENERAL:  Alert, pleasant, appropriately groomed male in no acute distress.  VITAL SIGNS:  All noted and reviewed as normal.  MUSCULOSKELETAL:  Left lower extremity demonstrates mild ecchymoses of the   posterior thigh and extending down to the calf.  There is very minimal   tenderness in the mid hamstring group.  There is no induration, no fullness, no   redness, warmth.  The hip and knee executes full range of motion, stability and   strength.  There is no distal lower extremity edema.  Gait is normal.    LABORATORY DATA:  Reviewed the ED  evaluation along with the plain radiographic   film reports.    IMPRESSION:  This sounds like the patient had a hamstring pull, which caused   this fall with a consequence minimal left elbow contusion and hip contusion.    Symptomatically, he is now profoundly improved with no complication.    PLAN:  Discussed, reassured.  No specific additional therapy at this time.    Advise if any issues develop.      PB/HN  dd: 02/09/2018 12:20:53 (CST)  td: 02/10/2018 14:38:35 (CST)  Doc ID   #[id number]  Job ID #[id number]    CC:

## 2018-02-12 ENCOUNTER — PATIENT MESSAGE (OUTPATIENT)
Dept: ALLERGY | Facility: CLINIC | Age: 83
End: 2018-02-12

## 2018-02-15 RX ORDER — IPRATROPIUM BROMIDE 42 UG/1
2 SPRAY, METERED NASAL 4 TIMES DAILY
Qty: 15 ML | Refills: 6 | Status: SHIPPED | OUTPATIENT
Start: 2018-02-15 | End: 2018-08-29

## 2018-02-16 ENCOUNTER — PES CALL (OUTPATIENT)
Dept: ADMINISTRATIVE | Facility: CLINIC | Age: 83
End: 2018-02-16

## 2018-02-26 ENCOUNTER — INFUSION (OUTPATIENT)
Dept: INFUSION THERAPY | Facility: HOSPITAL | Age: 83
End: 2018-02-26
Attending: INTERNAL MEDICINE
Payer: MEDICARE

## 2018-02-26 VITALS
HEART RATE: 60 BPM | WEIGHT: 160.25 LBS | SYSTOLIC BLOOD PRESSURE: 133 MMHG | BODY MASS INDEX: 22.44 KG/M2 | DIASTOLIC BLOOD PRESSURE: 60 MMHG | HEIGHT: 71 IN | RESPIRATION RATE: 16 BRPM

## 2018-02-26 DIAGNOSIS — C91.10 CLL (CHRONIC LYMPHOCYTIC LEUKEMIA): ICD-10-CM

## 2018-02-26 DIAGNOSIS — J32.9 CHRONIC SINUSITIS, UNSPECIFIED LOCATION: ICD-10-CM

## 2018-02-26 DIAGNOSIS — E79.0 HYPERURICEMIA: ICD-10-CM

## 2018-02-26 DIAGNOSIS — D80.1 HYPOGAMMAGLOBULINEMIA: Primary | ICD-10-CM

## 2018-02-26 PROCEDURE — A4216 STERILE WATER/SALINE, 10 ML: HCPCS | Performed by: INTERNAL MEDICINE

## 2018-02-26 PROCEDURE — 96367 TX/PROPH/DG ADDL SEQ IV INF: CPT

## 2018-02-26 PROCEDURE — 96366 THER/PROPH/DIAG IV INF ADDON: CPT

## 2018-02-26 PROCEDURE — 96375 TX/PRO/DX INJ NEW DRUG ADDON: CPT

## 2018-02-26 PROCEDURE — 96365 THER/PROPH/DIAG IV INF INIT: CPT

## 2018-02-26 PROCEDURE — 25000003 PHARM REV CODE 250: Performed by: INTERNAL MEDICINE

## 2018-02-26 PROCEDURE — 63600175 PHARM REV CODE 636 W HCPCS: Performed by: INTERNAL MEDICINE

## 2018-02-26 PROCEDURE — S0028 INJECTION, FAMOTIDINE, 20 MG: HCPCS | Performed by: INTERNAL MEDICINE

## 2018-02-26 RX ORDER — ACETAMINOPHEN 325 MG/1
650 TABLET ORAL
Status: COMPLETED | OUTPATIENT
Start: 2018-02-26 | End: 2018-02-26

## 2018-02-26 RX ORDER — SODIUM CHLORIDE 0.9 % (FLUSH) 0.9 %
10 SYRINGE (ML) INJECTION
Status: DISCONTINUED | OUTPATIENT
Start: 2018-02-26 | End: 2018-02-26 | Stop reason: HOSPADM

## 2018-02-26 RX ORDER — ACETAMINOPHEN 325 MG/1
650 TABLET ORAL
Status: CANCELLED | OUTPATIENT
Start: 2018-03-02

## 2018-02-26 RX ORDER — HEPARIN 100 UNIT/ML
500 SYRINGE INTRAVENOUS
Status: DISCONTINUED | OUTPATIENT
Start: 2018-02-26 | End: 2018-02-26 | Stop reason: HOSPADM

## 2018-02-26 RX ORDER — HEPARIN 100 UNIT/ML
500 SYRINGE INTRAVENOUS
Status: CANCELLED | OUTPATIENT
Start: 2018-03-02

## 2018-02-26 RX ORDER — FAMOTIDINE 10 MG/ML
20 INJECTION INTRAVENOUS
Status: CANCELLED | OUTPATIENT
Start: 2018-03-02

## 2018-02-26 RX ORDER — SODIUM CHLORIDE 0.9 % (FLUSH) 0.9 %
10 SYRINGE (ML) INJECTION
Status: CANCELLED | OUTPATIENT
Start: 2018-03-02

## 2018-02-26 RX ORDER — FAMOTIDINE 10 MG/ML
20 INJECTION INTRAVENOUS
Status: COMPLETED | OUTPATIENT
Start: 2018-02-26 | End: 2018-02-26

## 2018-02-26 RX ADMIN — HUMAN IMMUNOGLOBULIN G 20 G: 20 LIQUID INTRAVENOUS at 10:02

## 2018-02-26 RX ADMIN — ACETAMINOPHEN 650 MG: 325 TABLET, FILM COATED ORAL at 10:02

## 2018-02-26 RX ADMIN — SODIUM CHLORIDE, PRESERVATIVE FREE 10 ML: 5 INJECTION INTRAVENOUS at 01:02

## 2018-02-26 RX ADMIN — DIPHENHYDRAMINE HYDROCHLORIDE 50 MG: 50 INJECTION, SOLUTION INTRAMUSCULAR; INTRAVENOUS at 10:02

## 2018-02-26 RX ADMIN — SODIUM CHLORIDE: 9 INJECTION, SOLUTION INTRAVENOUS at 10:02

## 2018-02-26 RX ADMIN — FAMOTIDINE 20 MG: 10 INJECTION INTRAVENOUS at 10:02

## 2018-02-26 NOTE — PLAN OF CARE
Problem: Chemotherapy Effects (Adult)  Goal: Signs and Symptoms of Listed Potential Problems Will be Absent or Manageable (Chemotherapy Effects)  Signs and symptoms of listed potential problems will be absent or manageable by discharge/transition of care (reference Chemotherapy Effects (Adult) CPG).   Outcome: Ongoing (interventions implemented as appropriate)  Pt here for IVIG. VSS. No complaints voiced. Consent/labs/meds/allergies reviewed. PIV to left hand with blood return noted. All questions answered. Will continue to monitor.

## 2018-02-26 NOTE — PLAN OF CARE
Problem: Patient Care Overview (Adult)  Goal: Plan of Care Review  Outcome: Ongoing (interventions implemented as appropriate)  Pt tolerated tx without complications. VSS. No s/s of reaction. Instructed to contact MD with any questions. PIV removed and AVS given to patient.

## 2018-03-01 ENCOUNTER — LAB VISIT (OUTPATIENT)
Dept: LAB | Facility: HOSPITAL | Age: 83
End: 2018-03-01
Attending: INTERNAL MEDICINE
Payer: MEDICARE

## 2018-03-01 ENCOUNTER — PATIENT MESSAGE (OUTPATIENT)
Dept: HEMATOLOGY/ONCOLOGY | Facility: CLINIC | Age: 83
End: 2018-03-01

## 2018-03-01 ENCOUNTER — OFFICE VISIT (OUTPATIENT)
Dept: HEMATOLOGY/ONCOLOGY | Facility: CLINIC | Age: 83
End: 2018-03-01
Payer: MEDICARE

## 2018-03-01 VITALS
SYSTOLIC BLOOD PRESSURE: 122 MMHG | HEIGHT: 71 IN | DIASTOLIC BLOOD PRESSURE: 75 MMHG | WEIGHT: 160.5 LBS | BODY MASS INDEX: 22.47 KG/M2 | HEART RATE: 72 BPM

## 2018-03-01 DIAGNOSIS — E23.0 PANHYPOPITUITARISM: ICD-10-CM

## 2018-03-01 DIAGNOSIS — C91.10 CLL (CHRONIC LYMPHOCYTIC LEUKEMIA): ICD-10-CM

## 2018-03-01 DIAGNOSIS — C91.10 CLL (CHRONIC LYMPHOCYTIC LEUKEMIA): Primary | ICD-10-CM

## 2018-03-01 DIAGNOSIS — D80.1 HYPOGAMMAGLOBULINEMIA: ICD-10-CM

## 2018-03-01 DIAGNOSIS — D69.6 THROMBOCYTOPENIA: ICD-10-CM

## 2018-03-01 DIAGNOSIS — D84.9 IMMUNOSUPPRESSION: ICD-10-CM

## 2018-03-01 PROBLEM — M95.0 MOHS DEFECT OF ALA NASI: Status: RESOLVED | Noted: 2017-03-31 | Resolved: 2018-03-01

## 2018-03-01 PROBLEM — Z98.890 MOHS DEFECT OF ALA NASI: Status: RESOLVED | Noted: 2017-03-31 | Resolved: 2018-03-01

## 2018-03-01 LAB
ALBUMIN SERPL BCP-MCNC: 3.2 G/DL
ALP SERPL-CCNC: 75 U/L
ALT SERPL W/O P-5'-P-CCNC: 41 U/L
ANION GAP SERPL CALC-SCNC: 8 MMOL/L
AST SERPL-CCNC: 42 U/L
BILIRUB SERPL-MCNC: 0.8 MG/DL
BUN SERPL-MCNC: 22 MG/DL
CALCIUM SERPL-MCNC: 9.6 MG/DL
CHLORIDE SERPL-SCNC: 107 MMOL/L
CO2 SERPL-SCNC: 26 MMOL/L
CREAT SERPL-MCNC: 1.1 MG/DL
ERYTHROCYTE [DISTWIDTH] IN BLOOD BY AUTOMATED COUNT: 14.6 %
EST. GFR  (AFRICAN AMERICAN): >60 ML/MIN/1.73 M^2
EST. GFR  (NON AFRICAN AMERICAN): >60 ML/MIN/1.73 M^2
GLUCOSE SERPL-MCNC: 94 MG/DL
HCT VFR BLD AUTO: 45.2 %
HGB BLD-MCNC: 14.4 G/DL
IGG SERPL-MCNC: 1075 MG/DL
IMM GRANULOCYTES # BLD AUTO: 0.1 K/UL
MCH RBC QN AUTO: 30.5 PG
MCHC RBC AUTO-ENTMCNC: 31.9 G/DL
MCV RBC AUTO: 96 FL
NEUTROPHILS # BLD AUTO: 3.3 K/UL
PLATELET # BLD AUTO: 87 K/UL
PMV BLD AUTO: 9.9 FL
POTASSIUM SERPL-SCNC: 5.4 MMOL/L
PROT SERPL-MCNC: 6.5 G/DL
RBC # BLD AUTO: 4.72 M/UL
SODIUM SERPL-SCNC: 141 MMOL/L
WBC # BLD AUTO: 5.51 K/UL

## 2018-03-01 PROCEDURE — 99999 PR PBB SHADOW E&M-EST. PATIENT-LVL III: CPT | Mod: PBBFAC,,, | Performed by: INTERNAL MEDICINE

## 2018-03-01 PROCEDURE — 82784 ASSAY IGA/IGD/IGG/IGM EACH: CPT

## 2018-03-01 PROCEDURE — 85027 COMPLETE CBC AUTOMATED: CPT

## 2018-03-01 PROCEDURE — 3078F DIAST BP <80 MM HG: CPT | Mod: S$GLB,,, | Performed by: INTERNAL MEDICINE

## 2018-03-01 PROCEDURE — 99214 OFFICE O/P EST MOD 30 MIN: CPT | Mod: S$GLB,,, | Performed by: INTERNAL MEDICINE

## 2018-03-01 PROCEDURE — 3074F SYST BP LT 130 MM HG: CPT | Mod: S$GLB,,, | Performed by: INTERNAL MEDICINE

## 2018-03-01 PROCEDURE — 36415 COLL VENOUS BLD VENIPUNCTURE: CPT

## 2018-03-01 PROCEDURE — 99499 UNLISTED E&M SERVICE: CPT | Mod: S$GLB,,, | Performed by: INTERNAL MEDICINE

## 2018-03-01 PROCEDURE — 80053 COMPREHEN METABOLIC PANEL: CPT

## 2018-03-01 RX ORDER — ACETAMINOPHEN 325 MG/1
1000 TABLET ORAL
Status: CANCELLED | OUTPATIENT
Start: 2018-04-06

## 2018-03-01 RX ORDER — FAMOTIDINE 10 MG/ML
20 INJECTION INTRAVENOUS
Status: CANCELLED | OUTPATIENT
Start: 2018-04-06

## 2018-03-01 RX ORDER — HEPARIN 100 UNIT/ML
500 SYRINGE INTRAVENOUS
Status: CANCELLED | OUTPATIENT
Start: 2018-04-06

## 2018-03-01 RX ORDER — SODIUM CHLORIDE 0.9 % (FLUSH) 0.9 %
10 SYRINGE (ML) INJECTION
Status: CANCELLED | OUTPATIENT
Start: 2018-04-06

## 2018-03-01 NOTE — PROGRESS NOTES
HISTORY OF PRESENT ILLNESS:  Mr. Ramos is an 84-year-old man who was   diagnosed with chronic lymphocytic leukemia in April 2012.  In April 2013, there   was a sudden rise in his white blood cell count to 93,000 and some   prolymphocytes were noted in his peripheral blood smear.  He developed   generalized urticaria, which I suspected was paraneoplastic.  He received five   courses of fludarabine, cyclophosphamide and Rituxan.  The fludarabine dose was   decreased somewhat because of mild renal impairment and the sixth dose was with   Rituxan alone because he had developed thrombocytopenia, which has persisted   since then.    His major troublesome manifestation of CLL and most of his therapy has been for  thrombocytopenia.  It became more severe in early 2016 with platelet counts in   the range of 35,000-40,000.  I was particularly concerned about this since he is   taking both aspirin and Plavix.  A bone marrow examination in April 2016 showed   no signs of myelodysplasia and 35% of the bone marrow cells were small   lymphocytes.  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  The FISH   analyses for myelodysplastic disorders were negative.    I treated him for the possibility of immune thrombocytopenia with prednisone 60   mg daily for two weeks, but his platelet count did not improve.  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    Because of the continued thrombocytopenia, I recommended treatment with   ofatumumab, which he began on 06/30/2016.  After eight weekly infusions of the   drug, he then had four monthly infusions and he has continued since then to   receive ofatumumab at two-month intervals.  His platelet count improved   following the institution of this drug and during the past year, his platelet   counts have been in the range of 82,000-97,000.  He has not had any bleeding.    He  had many episodes of infection in the past, particularly recurrent   sinusitis.  He had several surgical procedures on his sinuses.  Since I last   saw him in November 2017, he had another episode of sinusitis for which he was   treated with antibiotics.  Because of this problem, he was placed on monthly   infusions of immunoglobulin several years ago and the frequency of sinusitis    diminished as did the frequency of pneumonia.    He previously had a TIA and he had two strokes following pituitary surgery in   2002.  He has had many nonmelanoma skin cancers excised.    At present, he is generally feeling well.  He is not having fevers or sweats.    He gets regular exercise.  He injured his left leg while trying to lift a   grandchild.  It was painful for about a week, but the pain has largely resolved.    ADDITIONAL PAST HISTORY, SYSTEM REVIEW, SOCIAL HISTORY, AND FAMILY HISTORY:    Have been reviewed and updated in the electronic record.    PHYSICAL EXAMINATION:  GENERAL APPEARANCE:  Well-developed, well-nourished man in no distress.  EYES:  Scars on the right eyelid.  No jaundice or pallor.  EARS:  Clear canals and membranes.  NOSE:  Clear nares.  SINUSES:  No tenderness.  MOUTH AND TEETH:  Several missing teeth.  Some dental implant devices that has   not been yet fitted with crowns.  No mucosal lesions.  NECK:  No thyroid abnormalities.  No masses or bruits.  LYMPH NODES:  No enlarged cervical or inguinal nodes.  There may be one small   node about 1 cm in size deep in the right axilla.  CHEST AND LUNGS:  Normal respiratory effort.  Clear to auscultation and   percussion.  HEART:  Regular rate and rhythm without murmur or gallop.  ABDOMEN:  Soft without masses or tenderness.  No hepatosplenomegaly.  EXTREMITIES:  Trace pretibial edema bilaterally.  PERIPHERAL VASCULATURE:  Diminished pulses in the feet and ankles.  NEUROLOGIC:  He is fully oriented.  Motor function is good.  Mental status is   good.  SKIN:   Numerous scars on the face from prior cancer surgery.  Some ecchymoses on   the arms.  Actinic changes on the face.    LABORATORY STUDIES:  Blood counts today include hemoglobin 14.4, platelets   87,000 and WBC 5510.  The absolute neutrophil count is 3300.  Comprehensive   metabolic profile is normal with the exceptions of potassium 5.4, albumin 3.2,   and AST 42.  IgG is 1075.    IMPRESSION:  1.  Chronic lymphocytic leukemia.  2.  Thrombocytopenia, which seems to primarily be a consequence of CLL.  3.  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  4.  Panhypopituitarism.  5.  Diabetes mellitus.    RECOMMENDATIONS:  1.  He is due to receive ofatumumab now (it is given every two months).  He does   not have an appointment for this, so I will ask to be scheduled within the next   week.  2.  Continue intravenous immunoglobulin every month.  3.  In two months, he will have a CBC, CMP and another infusion of ofatumumab.  4.  Return visit (EPA appointment) in four months with CBC, CMP, IgG level and   appointment for ofatumumab.    Mr. Ramos had a number of questions about his thrombocytopenia, his infection   problems and CLL and most of his 30-minute appointment today was devoted to   discussing those matters and counseling him about them.      MAKAYLA/MARINO  dd: 03/01/2018 09:18:09 (CST)  td: 03/02/2018 03:34:40 (CST)  Doc ID   #[id number]  Job ID #[id number]    CC:

## 2018-03-02 ENCOUNTER — INFUSION (OUTPATIENT)
Dept: INFUSION THERAPY | Facility: HOSPITAL | Age: 83
End: 2018-03-02
Attending: INTERNAL MEDICINE
Payer: MEDICARE

## 2018-03-02 ENCOUNTER — PATIENT MESSAGE (OUTPATIENT)
Dept: HEMATOLOGY/ONCOLOGY | Facility: CLINIC | Age: 83
End: 2018-03-02

## 2018-03-02 VITALS
RESPIRATION RATE: 18 BRPM | HEART RATE: 67 BPM | DIASTOLIC BLOOD PRESSURE: 64 MMHG | TEMPERATURE: 97 F | SYSTOLIC BLOOD PRESSURE: 150 MMHG

## 2018-03-02 DIAGNOSIS — C91.10 CLL (CHRONIC LYMPHOCYTIC LEUKEMIA): ICD-10-CM

## 2018-03-02 DIAGNOSIS — Z00.00 ANNUAL PHYSICAL EXAM: ICD-10-CM

## 2018-03-02 DIAGNOSIS — E79.0 HYPERURICEMIA: Primary | ICD-10-CM

## 2018-03-02 PROCEDURE — S0028 INJECTION, FAMOTIDINE, 20 MG: HCPCS | Performed by: INTERNAL MEDICINE

## 2018-03-02 PROCEDURE — 63600175 PHARM REV CODE 636 W HCPCS: Performed by: INTERNAL MEDICINE

## 2018-03-02 PROCEDURE — 25000003 PHARM REV CODE 250: Performed by: INTERNAL MEDICINE

## 2018-03-02 PROCEDURE — 96413 CHEMO IV INFUSION 1 HR: CPT

## 2018-03-02 PROCEDURE — 96415 CHEMO IV INFUSION ADDL HR: CPT

## 2018-03-02 PROCEDURE — 96375 TX/PRO/DX INJ NEW DRUG ADDON: CPT

## 2018-03-02 PROCEDURE — 96367 TX/PROPH/DG ADDL SEQ IV INF: CPT

## 2018-03-02 RX ORDER — ACETAMINOPHEN 500 MG
1000 TABLET ORAL
Status: COMPLETED | OUTPATIENT
Start: 2018-03-02 | End: 2018-03-02

## 2018-03-02 RX ORDER — FAMOTIDINE 10 MG/ML
20 INJECTION INTRAVENOUS
Status: COMPLETED | OUTPATIENT
Start: 2018-03-02 | End: 2018-03-02

## 2018-03-02 RX ORDER — SODIUM CHLORIDE 0.9 % (FLUSH) 0.9 %
10 SYRINGE (ML) INJECTION
Status: DISCONTINUED | OUTPATIENT
Start: 2018-03-02 | End: 2018-03-02 | Stop reason: HOSPADM

## 2018-03-02 RX ORDER — HEPARIN 100 UNIT/ML
500 SYRINGE INTRAVENOUS
Status: DISCONTINUED | OUTPATIENT
Start: 2018-03-02 | End: 2018-03-02 | Stop reason: HOSPADM

## 2018-03-02 RX ADMIN — SODIUM CHLORIDE 2000 MG: 0.9 INJECTION, SOLUTION INTRAVENOUS at 09:03

## 2018-03-02 RX ADMIN — DEXAMETHASONE SODIUM PHOSPHATE 10 MG: 4 INJECTION, SOLUTION INTRAMUSCULAR; INTRAVENOUS at 08:03

## 2018-03-02 RX ADMIN — FAMOTIDINE 20 MG: 10 INJECTION INTRAVENOUS at 08:03

## 2018-03-02 RX ADMIN — ACETAMINOPHEN 1000 MG: 500 TABLET ORAL at 08:03

## 2018-03-02 RX ADMIN — SODIUM CHLORIDE: 0.9 INJECTION, SOLUTION INTRAVENOUS at 08:03

## 2018-03-02 RX ADMIN — DIPHENHYDRAMINE HYDROCHLORIDE 50 MG: 50 INJECTION, SOLUTION INTRAMUSCULAR; INTRAVENOUS at 09:03

## 2018-03-02 NOTE — PLAN OF CARE
Problem: Patient Care Overview (Adult)  Goal: Plan of Care Review  Outcome: Ongoing (interventions implemented as appropriate)  Pt tolerated arzerra infusion without issue, avs given, rtc 3/26/18 for IVIG, no distress noted upon d/c to home

## 2018-03-02 NOTE — PLAN OF CARE
Problem: Chemotherapy Effects (Adult)  Goal: Signs and Symptoms of Listed Potential Problems Will be Absent or Manageable (Chemotherapy Effects)  Signs and symptoms of listed potential problems will be absent or manageable by discharge/transition of care (reference Chemotherapy Effects (Adult) CPG).   Outcome: Ongoing (interventions implemented as appropriate)  Pt here for Arzerra infusion D1C20, labs, hx, meds, allergies reviewed. Pt with no complaints at this time, reclined in chair, warm blanket provided, continue to monitor

## 2018-03-05 RX ORDER — LOSARTAN POTASSIUM 25 MG/1
TABLET ORAL
Qty: 90 TABLET | Refills: 3 | Status: SHIPPED | OUTPATIENT
Start: 2018-03-05 | End: 2019-03-19 | Stop reason: SDUPTHER

## 2018-03-13 ENCOUNTER — OFFICE VISIT (OUTPATIENT)
Dept: URGENT CARE | Facility: CLINIC | Age: 83
End: 2018-03-13
Payer: MEDICARE

## 2018-03-13 VITALS
SYSTOLIC BLOOD PRESSURE: 136 MMHG | WEIGHT: 155 LBS | OXYGEN SATURATION: 97 % | RESPIRATION RATE: 18 BRPM | HEART RATE: 85 BPM | TEMPERATURE: 100 F | HEIGHT: 71 IN | DIASTOLIC BLOOD PRESSURE: 78 MMHG | BODY MASS INDEX: 21.7 KG/M2

## 2018-03-13 DIAGNOSIS — Z92.29 ANTIBIOTIC TREATMENT WITHIN PAST 2 MONTHS: ICD-10-CM

## 2018-03-13 DIAGNOSIS — H10.33 ACUTE BACTERIAL CONJUNCTIVITIS OF BOTH EYES: ICD-10-CM

## 2018-03-13 DIAGNOSIS — J40 BRONCHITIS: ICD-10-CM

## 2018-03-13 DIAGNOSIS — J32.9 SINUSITIS, UNSPECIFIED CHRONICITY, UNSPECIFIED LOCATION: Primary | ICD-10-CM

## 2018-03-13 PROCEDURE — 3078F DIAST BP <80 MM HG: CPT | Mod: CPTII,S$GLB,, | Performed by: PHYSICIAN ASSISTANT

## 2018-03-13 PROCEDURE — 99204 OFFICE O/P NEW MOD 45 MIN: CPT | Mod: S$GLB,,, | Performed by: PHYSICIAN ASSISTANT

## 2018-03-13 PROCEDURE — 3075F SYST BP GE 130 - 139MM HG: CPT | Mod: CPTII,S$GLB,, | Performed by: PHYSICIAN ASSISTANT

## 2018-03-13 RX ORDER — LEVOFLOXACIN 500 MG/1
500 TABLET, FILM COATED ORAL DAILY
Qty: 7 TABLET | Refills: 0 | Status: SHIPPED | OUTPATIENT
Start: 2018-03-13 | End: 2018-03-20

## 2018-03-13 RX ORDER — PROMETHAZINE HYDROCHLORIDE AND CODEINE PHOSPHATE 6.25; 1 MG/5ML; MG/5ML
5 SOLUTION ORAL NIGHTLY PRN
Qty: 80 ML | Refills: 0 | Status: SHIPPED | OUTPATIENT
Start: 2018-03-13 | End: 2018-03-23

## 2018-03-13 RX ORDER — POLYMYXIN B SULFATE AND TRIMETHOPRIM 1; 10000 MG/ML; [USP'U]/ML
1 SOLUTION OPHTHALMIC 4 TIMES DAILY
Qty: 1 BOTTLE | Refills: 0 | Status: SHIPPED | OUTPATIENT
Start: 2018-03-13 | End: 2018-03-20

## 2018-03-13 NOTE — PATIENT INSTRUCTIONS
- Please return here or go to the Emergency Department for any concerns or worsening of condition.   - If you were prescribed antibiotics, please take them to completion.  - If you were prescribed a narcotic medication, do not drive or operate heavy equipment or machinery while taking these medications.  - Please follow up with your primary care provider (PCP) or discussed specialist(s) as needed.           Sinusitis (Antibiotic Treatment)    The sinuses are air-filled spaces within the bones of the face. They connect to the inside of the nose. Sinusitis is an inflammation of the tissue lining the sinus cavity. Sinus inflammation can occur during a cold. It can also be due to allergies to pollens and other particles in the air. Sinusitis can cause symptoms of sinus congestion and fullness. A sinus infection causes fever, headache and facial pain. There is often green or yellow drainage from the nose or into the back of the throat (post-nasal drip). You have been given antibiotics to treat this condition.  Home care:  · Take the full course of antibiotics as instructed. Do not stop taking them, even if you feel better.  · Drink plenty of water, hot tea, and other liquids. This may help thin mucus. It also may promote sinus drainage.  · Heat may help soothe painful areas of the face. Use a towel soaked in hot water. Or,  the shower and direct the hot spray onto your face. Using a vaporizer along with a menthol rub at night may also help.   · An expectorant containing guaifenesin may help thin the mucus and promote drainage from the sinuses.  · Over-the-counter decongestants may be used unless a similar medicine was prescribed. Nasal sprays work the fastest. Use one that contains phenylephrine or oxymetazoline. First blow the nose gently. Then use the spray. Do not use these medicines more often than directed on the label or symptoms may get worse. You may also use tablets containing pseudoephedrine. Avoid  products that combine ingredients, because side effects may be increased. Read labels. You can also ask the pharmacist for help. (NOTE: Persons with high blood pressure should not use decongestants. They can raise blood pressure.)  · Over-the-counter antihistamines may help if allergies contributed to your sinusitis.    · Do not use nasal rinses or irrigation during an acute sinus infection, unless told to by your health care provider. Rinsing may spread the infection to other sinuses.  · Use acetaminophen or ibuprofen to control pain, unless another pain medicine was prescribed. (If you have chronic liver or kidney disease or ever had a stomach ulcer, talk with your doctor before using these medicines. Aspirin should never be used in anyone under 18 years of age who is ill with a fever. It may cause severe liver damage.)  · Don't smoke. This can worsen symptoms.  Follow-up care  Follow up with your healthcare provider or our staff if you are not improving within the next week.  When to seek medical advice  Call your healthcare provider if any of these occur:  · Facial pain or headache becoming more severe  · Stiff neck  · Unusual drowsiness or confusion  · Swelling of the forehead or eyelids  · Vision problems, including blurred or double vision  · Fever of 100.4ºF (38ºC) or higher, or as directed by your healthcare provider  · Seizure  · Breathing problems  · Symptoms not resolving within 10 days  Date Last Reviewed: 4/13/2015  © 6235-0347 [employer]. [address] [place] [address]. All rights reserved. This information is not intended as a substitute for professional medical care. Always follow your healthcare professional's instructions.        Acute Bronchitis  Your healthcare provider has told you that you have acute bronchitis. Bronchitis is infection or inflammation of the bronchial tubes (airways in the lungs). Normally, air moves easily in and out of the airways. Bronchitis  narrows the airways, making it harder for air to flow in and out of the lungs. This causes symptoms such as shortness of breath, coughing up yellow or green mucus, and wheezing. Bronchitis can be acute or chronic. Acute means the condition comes on quickly and goes away in a short time, usually within 3 to 10 days. Chronic means a condition lasts a long time and often comes back.    What causes acute bronchitis?  Acute bronchitis almost always starts as a viral respiratory infection, such as a cold or the flu. Certain factors make it more likely for a cold or flu to turn into bronchitis. These include being very young, being elderly, having a heart or lung problem, or having a weak immune system. Cigarette smoking also makes bronchitis more likely.  When bronchitis develops, the airways become swollen. The airways may also become infected with bacteria. This is known as a secondary infection.  Diagnosing acute bronchitis  Your healthcare provider will examine you and ask about your symptoms and health history. You may also have a sputum culture to test the fluid in your lungs. Chest X-rays may be done to look for infection in the lungs.  Treating acute bronchitis  Bronchitis usually clears up as the cold or flu goes away. You can help feel better faster by doing the following:  · Take medicine as directed. You may be told to take ibuprofen or other over-the-counter medicines. These help relieve inflammation in your bronchial tubes. Your healthcare provider may prescribe an inhaler to help open up the bronchial tubes. Most of the time, acute bronchitis is caused by a viral infection. Antibiotics are usually not prescribed for viral infections.  · Drink plenty of fluids, such as water, juice, or warm soup. Fluids loosen mucus so that you can cough it up. This helps you breathe more easily. Fluids also prevent dehydration.  · Make sure you get plenty of rest.  · Do not smoke. Do not allow anyone else to smoke in your  home.  Recovery and follow-up  Follow up with your doctor as you are told. You will likely feel better in a week or two. But a dry cough can linger beyond that time. Let your doctor know if you still have symptoms (other than a dry cough) after 2 weeks, or if youre prone to getting bronchial infections. Take steps to protect yourself from future infections. These steps include stopping smoking and avoiding tobacco smoke, washing your hands often, and getting a yearly flu shot.  When to call your healthcare provider  Call the healthcare provider if you have any of the following:  · Fever of 100.4°F (38.0°C) or higher, or as advised  · Symptoms that get worse, or new symptoms  · Trouble breathing  · Symptoms that dont start to improve within a week, or within 3 days of taking antibiotics   Date Last Reviewed: 12/1/2016  © 5581-7535 [employer]. [address] [place] [address]. All rights reserved. This information is not intended as a substitute for professional medical care. Always follow your healthcare professional's instructions.        Conjunctivitis, Bacterial    You have an infection in the membranes covering the white part of the eye. This part of the eye is called the conjunctiva. The infection is called conjunctivitis. The most common symptoms of conjunctivitis include a thick, pus-like discharge from the eye, swollen eyelids, redness, eyelids sticking together upon awakening, and a gritty or scratchy feeling in the eye. Your infection was caused by bacteria. It may be treated with medicine. With treatment, the infection takes about 7 to 10 days to resolve.  Home care  · Use prescribed antibiotic eye drops or ointment as directed to treat the infection.  · Apply a warm compress (towel soaked in warm water) to the affected eye 3 to 4 times a day. Do this just before applying medicine to the eye.  · Use a warm, wet cloth to wipe away crusting of the eyelids in the morning. This is  caused by mucus drainage during the night. You may also use saline irrigating solution or artificial tears to rinse away mucus in the eye. Do not put a patch over the eye.  · Wash your hands before and after touching the infected eye. This is to prevent spreading the infection to the other eye, and to other people. Do not share your towels or washcloths with others.  · You may use acetaminophen or ibuprofen to control pain, unless another medicine was prescribed. (Note: If you have chronic liver or kidney disease or have ever had a stomach ulcer or gastrointestinal bleeding, talk with your doctor before using these medicines.)  · Do not wear contact lenses until your eyes have healed and all symptoms are gone.  Follow-up care  Follow up with your healthcare provider, or as advised.  When to seek medical advice  Call your healthcare provider right away if any of these occur:  · Worsening vision  · Increasing pain in the eye  · Increasing swelling or redness of the eyelid  · Redness spreading around the eye  Date Last Reviewed: 6/14/2015  © 0056-0691 [email or web address]. [address] [place] [address], [place] [address]. All rights reserved. This information is not intended as a substitute for professional medical care. Always follow your healthcare professional's instructions.

## 2018-03-13 NOTE — PROGRESS NOTES
"Subjective:       Patient ID: Thierry Ramos Jr. is a 84 y.o. male.    Vitals:  height is 5' 11" (1.803 m) and weight is 70.3 kg (155 lb). His temperature is 99.9 °F (37.7 °C). His blood pressure is 136/78 and his pulse is 85. His respiration is 18 and oxygen saturation is 97%.     Chief Complaint: URI    3-4 weeks of sinus congestion/pressure and PMHx significant for frequent sinusitis s/p multiple sinus surgeries/procedures as well as skin CA and CLL for which receives Hem/Occ IV infusions on a regular basis. WIFE STATES THAT SHE GAVE HIM 5 DAYS OF A LET OVER ANTIBIOTIC (DOES NOT REMEMBER WHAT) LAST WEEK AND HIS SYMPTOMS DID NOT IMPROVE.       URI    The current episode started 1 to 4 weeks ago (3-4 weeks). The problem has been gradually worsening. There has been no fever. Associated symptoms include congestion, coughing, headaches, rhinorrhea, sinus pain and a sore throat. Pertinent negatives include no abdominal pain, chest pain, diarrhea, ear pain, nausea, neck pain, rash, vomiting or wheezing. He has tried decongestant for the symptoms. The treatment provided no relief.   Sinus Problem   This is a new problem. The current episode started 1 to 4 weeks ago (3-4 weeks). The problem has been gradually worsening since onset. There has been no fever. His pain is at a severity of 5/10. The pain is moderate. Associated symptoms include chills, congestion, coughing, headaches, sinus pressure and a sore throat. Pertinent negatives include no ear pain, hoarse voice, neck pain or shortness of breath.   Eye Problem    Both eyes are affected.This is a new problem. The current episode started in the past 7 days. The problem occurs constantly. There was no injury mechanism. The pain is at a severity of 2/10. The pain is mild. There is no known exposure to pink eye. He does not wear contacts. Associated symptoms include an eye discharge, a recent URI and weakness. Pertinent negatives include no blurred vision, eye redness, " fever, foreign body sensation, nausea, photophobia or vomiting.     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    Chronic rhinosinusitis 09/16/2014    Stroke-like episode 10/17/2014    Polyneuropathy 10/20/2014    Chronic steroid use 10/20/2014    Occlusion and stenosis of left carotid artery 10/21/2014    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    Squamous cell carcinoma of forehead 02/27/2015    Immunosuppression 03/13/2015    Squamous cell carcinoma of skin of face 01/12/2016    Squamous cell carcinoma of skin of right temple 01/27/2016    Type 2 diabetes, uncontrolled, with neuropathy 03/02/2016    Epiretinal membrane (ERM) of right eye 09/13/2017     Resolved Ambulatory Problems     Diagnosis Date Noted    CKD (chronic kidney disease) 12/04/2012    Acute urticaria 04/09/2013    CLL (chronic lymphocytic leukemia) 04/09/2013    CLL (chronic lymphocytic leukemia) 04/09/2013    CLL (chronic lymphocytic leukemia) 04/17/2013    Screening 02/25/2014    Chronic sinusitis 04/07/2014    Diabetes mellitus 05/29/2014    LUZ (acute kidney injury)     Dehydration     Hyperglycemia     Orthostasis     Anemia of chronic disease 05/31/2014    Hyperosmolar non-ketotic state in patient with type 2 diabetes mellitus 05/31/2014    LUZ (acute kidney injury) 05/31/2014    Dehydration 06/04/2014    Syncope 10/20/2014    Adrenal crisis 10/20/2014    Diarrhea 10/20/2014    Angioedema 01/27/2016    Allergic reaction 01/29/2016    Facial swelling 01/29/2016    Edema 02/23/2016    CKD (chronic kidney disease) stage 3, GFR 30-59 ml/min 02/23/2016     Thrombocytopenia 04/26/2016    Deep vein thrombosis (DVT) 07/27/2016    Long-term (current) use of anticoagulants 07/29/2016    Mohs defect of ala nasi 03/31/2017     Past Medical History:   Diagnosis Date    Actinic keratosis     Anemia     Basal cell carcinoma     Carotid stenosis     CLL (chronic lymphocytic leukemia)     Diabetes mellitus 2014    Hyperlipidemia     Hypertension     Hypopituitarism     Hypothyroid     Immunosuppression 3/13/2015    SQUAMOUS CELL CARCINOMA     Squamous cell carcinoma of skin of right temple 1/27/2016    Stroke     Syncope 2/12    Unspecified disorder of kidney and ureter       Past Surgical History:   Procedure Laterality Date    CAROTID ENDARTERECTOMY (L)  2/27/2007    EXCISION TURBINATE, SUBMUCOUS      FESS  9/16/2014    Mohs excision   3/23/2015, 2/24/2016    combined with WLE forehead    NASAL SEPTUM SURGERY  9/16/2014    right ureter surgery  1995    SENTINEL LYMPH NODE BIOPSY  3/23/2015, 2/24/2016    SINUS SURGERY  9/16/2014    septo, ESS, BITSMR    Transphenoidal surgery        Review of Systems   Constitution: Positive for chills, weakness and malaise/fatigue. Negative for fever.   HENT: Positive for congestion, rhinorrhea, sinus pain, sinus pressure and sore throat. Negative for ear pain and hoarse voice.    Eyes: Positive for discharge. Negative for blurred vision, pain, photophobia, redness, vision loss in left eye, vision loss in right eye and visual disturbance.   Cardiovascular: Negative for chest pain, dyspnea on exertion, leg swelling, near-syncope and syncope.   Respiratory: Positive for cough and sputum production. Negative for hemoptysis, shortness of breath and wheezing.    Hematologic/Lymphatic: Negative for adenopathy.   Skin: Negative for itching and rash.   Musculoskeletal: Negative for back pain, myalgias, neck pain and stiffness.   Gastrointestinal: Negative for abdominal pain, diarrhea, nausea and vomiting.   Neurological:  Positive for headaches. Negative for dizziness, light-headedness and numbness.   Psychiatric/Behavioral: Negative for altered mental status.   Allergic/Immunologic: Negative for hives.   All other systems reviewed and are negative.      Objective:      Physical Exam   Constitutional: He is oriented to person, place, and time. He appears well-developed and well-nourished.  Non-toxic appearance. He has a sickly appearance. He appears ill. No distress.   HENT:   Head: Normocephalic and atraumatic. Head is without right periorbital erythema and without left periorbital erythema.   Right Ear: Hearing, tympanic membrane, external ear and ear canal normal.   Left Ear: Hearing, tympanic membrane, external ear and ear canal normal.   Nose: Mucosal edema and sinus tenderness present. No epistaxis. Right sinus exhibits maxillary sinus tenderness and frontal sinus tenderness. Left sinus exhibits maxillary sinus tenderness and frontal sinus tenderness.   Mouth/Throat: Uvula is midline and mucous membranes are normal. No uvula swelling. Posterior oropharyngeal erythema present. No oropharyngeal exudate.   Bilateral purulent nasal congestion and erythema. Purulent post nasal drip noted.   Eyes: EOM are normal. Pupils are equal, round, and reactive to light. Right eye exhibits discharge. Right eye exhibits no hordeolum. No foreign body present in the right eye. Left eye exhibits discharge. Left eye exhibits no hordeolum. No foreign body present in the left eye. Right conjunctiva is injected. Right conjunctiva has no hemorrhage. Left conjunctiva is injected. Left conjunctiva has no hemorrhage.   Neck: Normal range of motion. Neck supple.   Cardiovascular: Normal rate, regular rhythm and normal heart sounds.  Exam reveals no gallop and no friction rub.    No murmur heard.  Pulmonary/Chest: Effort normal. No accessory muscle usage. No tachypnea and no bradypnea. No respiratory distress. He has no decreased breath sounds. He has no  wheezes. He has rhonchi in the right middle field and the left middle field. He has no rales. He exhibits no tenderness and no crepitus.   Musculoskeletal: Normal range of motion.   Lymphadenopathy:        Head (right side): No submental, no submandibular, no tonsillar, no preauricular, no posterior auricular and no occipital adenopathy present.        Head (left side): No submental, no submandibular, no tonsillar, no preauricular, no posterior auricular and no occipital adenopathy present.     He has no cervical adenopathy.        Right cervical: No posterior cervical adenopathy present.       Left cervical: No posterior cervical adenopathy present.        Right: No supraclavicular adenopathy present.        Left: No supraclavicular adenopathy present.   Neurological: He is alert and oriented to person, place, and time. He is not disoriented. No cranial nerve deficit. Coordination and gait normal.   Skin: No abrasion, no ecchymosis, no laceration and no rash noted. No erythema.   Psychiatric: He has a normal mood and affect. His behavior is normal.   Nursing note and vitals reviewed.      Assessment:       1. Sinusitis, unspecified chronicity, unspecified location    2. Bronchitis    3. Acute bacterial conjunctivitis of both eyes    4. Antibiotic treatment within past 2 months        Plan:         Sinusitis, unspecified chronicity, unspecified location  -     levoFLOXacin (LEVAQUIN) 500 MG tablet; Take 1 tablet (500 mg total) by mouth once daily.  Dispense: 7 tablet; Refill: 0  -     promethazine-codeine 6.25-10 mg/5 ml (PHENERGAN WITH CODEINE) 6.25-10 mg/5 mL syrup; Take 5 mLs by mouth nightly as needed for Cough.  Dispense: 80 mL; Refill: 0    Bronchitis  -     levoFLOXacin (LEVAQUIN) 500 MG tablet; Take 1 tablet (500 mg total) by mouth once daily.  Dispense: 7 tablet; Refill: 0  -     promethazine-codeine 6.25-10 mg/5 ml (PHENERGAN WITH CODEINE) 6.25-10 mg/5 mL syrup; Take 5 mLs by mouth nightly as needed for  Cough.  Dispense: 80 mL; Refill: 0    Acute bacterial conjunctivitis of both eyes  -     polymyxin B sulf-trimethoprim (POLYTRIM) 10,000 unit- 1 mg/mL Drop; Place 1 drop into both eyes 4 (four) times daily.  Dispense: 1 Bottle; Refill: 0    Antibiotic treatment within past 2 months    - Please return here or go to the Emergency Department for any concerns or worsening of condition.   - Follow up with PCP in the next 5-7 days if no improvement or sooner for worsening/new symptoms  - If you were prescribed antibiotics, please take them to completion.  - If you were prescribed a narcotic medication, do not drive or operate heavy equipment or machinery while taking these medications.  - Please follow up with your primary care provider (PCP) or discussed specialist(s) as needed.

## 2018-03-14 ENCOUNTER — PATIENT MESSAGE (OUTPATIENT)
Dept: ENDOCRINOLOGY | Facility: CLINIC | Age: 83
End: 2018-03-14

## 2018-03-14 RX ORDER — PREDNISONE 5 MG/1
5 TABLET ORAL DAILY
Qty: 90 TABLET | Refills: 3 | Status: SHIPPED | OUTPATIENT
Start: 2018-03-14 | End: 2019-03-15 | Stop reason: SDUPTHER

## 2018-03-14 RX ORDER — PREDNISONE 2.5 MG/1
2.5 TABLET ORAL DAILY
Qty: 90 TABLET | Refills: 11 | Status: SHIPPED | OUTPATIENT
Start: 2018-03-14 | End: 2018-05-30 | Stop reason: SDUPTHER

## 2018-03-25 RX ORDER — ACETAMINOPHEN 325 MG/1
650 TABLET ORAL
Status: CANCELLED | OUTPATIENT
Start: 2018-03-30

## 2018-03-25 RX ORDER — FAMOTIDINE 10 MG/ML
20 INJECTION INTRAVENOUS
Status: CANCELLED | OUTPATIENT
Start: 2018-03-30 | End: 2018-03-30

## 2018-03-25 RX ORDER — HEPARIN 100 UNIT/ML
500 SYRINGE INTRAVENOUS
Status: CANCELLED | OUTPATIENT
Start: 2018-03-30

## 2018-03-25 RX ORDER — SODIUM CHLORIDE 0.9 % (FLUSH) 0.9 %
10 SYRINGE (ML) INJECTION
Status: CANCELLED | OUTPATIENT
Start: 2018-03-30

## 2018-03-26 ENCOUNTER — INFUSION (OUTPATIENT)
Dept: INFUSION THERAPY | Facility: HOSPITAL | Age: 83
End: 2018-03-26
Attending: INTERNAL MEDICINE
Payer: MEDICARE

## 2018-03-26 VITALS
BODY MASS INDEX: 20.93 KG/M2 | HEART RATE: 70 BPM | HEIGHT: 71 IN | RESPIRATION RATE: 16 BRPM | SYSTOLIC BLOOD PRESSURE: 118 MMHG | DIASTOLIC BLOOD PRESSURE: 58 MMHG | WEIGHT: 149.5 LBS

## 2018-03-26 DIAGNOSIS — D80.1 HYPOGAMMAGLOBULINEMIA: Primary | ICD-10-CM

## 2018-03-26 DIAGNOSIS — C91.10 CLL (CHRONIC LYMPHOCYTIC LEUKEMIA): ICD-10-CM

## 2018-03-26 DIAGNOSIS — E79.0 HYPERURICEMIA: ICD-10-CM

## 2018-03-26 DIAGNOSIS — J32.9 CHRONIC SINUSITIS, UNSPECIFIED LOCATION: ICD-10-CM

## 2018-03-26 PROCEDURE — 63600175 PHARM REV CODE 636 W HCPCS: Performed by: INTERNAL MEDICINE

## 2018-03-26 PROCEDURE — 25000003 PHARM REV CODE 250: Performed by: INTERNAL MEDICINE

## 2018-03-26 PROCEDURE — 96365 THER/PROPH/DIAG IV INF INIT: CPT

## 2018-03-26 PROCEDURE — 96367 TX/PROPH/DG ADDL SEQ IV INF: CPT

## 2018-03-26 PROCEDURE — 96375 TX/PRO/DX INJ NEW DRUG ADDON: CPT

## 2018-03-26 PROCEDURE — S0028 INJECTION, FAMOTIDINE, 20 MG: HCPCS | Performed by: INTERNAL MEDICINE

## 2018-03-26 PROCEDURE — 96366 THER/PROPH/DIAG IV INF ADDON: CPT

## 2018-03-26 RX ORDER — ACETAMINOPHEN 325 MG/1
650 TABLET ORAL
Status: COMPLETED | OUTPATIENT
Start: 2018-03-26 | End: 2018-03-26

## 2018-03-26 RX ORDER — HEPARIN 100 UNIT/ML
500 SYRINGE INTRAVENOUS
Status: DISCONTINUED | OUTPATIENT
Start: 2018-03-26 | End: 2018-03-26 | Stop reason: HOSPADM

## 2018-03-26 RX ORDER — SODIUM CHLORIDE 0.9 % (FLUSH) 0.9 %
10 SYRINGE (ML) INJECTION
Status: DISCONTINUED | OUTPATIENT
Start: 2018-03-26 | End: 2018-03-26 | Stop reason: HOSPADM

## 2018-03-26 RX ORDER — FAMOTIDINE 10 MG/ML
20 INJECTION INTRAVENOUS
Status: COMPLETED | OUTPATIENT
Start: 2018-03-26 | End: 2018-03-26

## 2018-03-26 RX ADMIN — ACETAMINOPHEN 650 MG: 325 TABLET, FILM COATED ORAL at 09:03

## 2018-03-26 RX ADMIN — HUMAN IMMUNOGLOBULIN G 20 G: 20 LIQUID INTRAVENOUS at 10:03

## 2018-03-26 RX ADMIN — FAMOTIDINE 20 MG: 10 INJECTION INTRAVENOUS at 09:03

## 2018-03-26 RX ADMIN — DIPHENHYDRAMINE HYDROCHLORIDE 50 MG: 50 INJECTION, SOLUTION INTRAMUSCULAR; INTRAVENOUS at 09:03

## 2018-03-26 RX ADMIN — SODIUM CHLORIDE: 9 INJECTION, SOLUTION INTRAVENOUS at 09:03

## 2018-03-26 NOTE — PLAN OF CARE
Problem: Patient Care Overview (Adult)  Goal: Plan of Care Review  Outcome: Ongoing (interventions implemented as appropriate)  Pt tolerated IVIG infusion without difficulty. PIV removed. VSS; NAD. Discharging unassisted.

## 2018-04-12 ENCOUNTER — OFFICE VISIT (OUTPATIENT)
Dept: DERMATOLOGY | Facility: CLINIC | Age: 83
End: 2018-04-12
Payer: MEDICARE

## 2018-04-12 VITALS — WEIGHT: 149 LBS | BODY MASS INDEX: 20.78 KG/M2

## 2018-04-12 DIAGNOSIS — D48.5 NEOPLASM OF UNCERTAIN BEHAVIOR OF SKIN: Primary | ICD-10-CM

## 2018-04-12 DIAGNOSIS — Z85.828 HISTORY OF SKIN CANCER: ICD-10-CM

## 2018-04-12 PROCEDURE — 11100 PR BIOPSY OF SKIN LESION: CPT | Mod: S$GLB,,, | Performed by: DERMATOLOGY

## 2018-04-12 PROCEDURE — 99999 PR PBB SHADOW E&M-EST. PATIENT-LVL III: CPT | Mod: PBBFAC,,, | Performed by: DERMATOLOGY

## 2018-04-12 PROCEDURE — 88305 TISSUE EXAM BY PATHOLOGIST: CPT | Mod: 59 | Performed by: PATHOLOGY

## 2018-04-12 PROCEDURE — 99213 OFFICE O/P EST LOW 20 MIN: CPT | Mod: 25,S$GLB,, | Performed by: DERMATOLOGY

## 2018-04-12 PROCEDURE — 11101 PR BIOPSY, EACH ADDED LESION: CPT | Mod: S$GLB,,, | Performed by: DERMATOLOGY

## 2018-04-12 RX ORDER — IPRATROPIUM BROMIDE 21 UG/1
SPRAY, METERED NASAL
COMMUNITY
Start: 2018-03-28 | End: 2018-08-29

## 2018-04-12 NOTE — PROGRESS NOTES
Subjective:       Patient ID:  Thierry Ramos Jr. is a 84 y.o. male who presents for   Chief Complaint   Patient presents with    Follow-up    Spot     face     History of Present Illness: The patient presents with chief complaint of spots .  Location: face  Duration: months  Signs/Symptoms: none    Prior treatments: none    This is a high risk patient here to check for the development of new lesions.            Review of Systems   Constitutional: Negative for fever.   Skin: Negative for itching and rash.   Hematologic/Lymphatic: Bruises/bleeds easily.        Objective:    Physical Exam   Constitutional: He appears well-developed and well-nourished. No distress.   Neurological: He is alert and oriented to person, place, and time. He is not disoriented.   Psychiatric: He has a normal mood and affect.   Skin:   Areas Examined (abnormalities noted in diagram):   Scalp / Hair Palpated and Inspected  Head / Face Inspection Performed  Neck Inspection Performed  Chest / Axilla Inspection Performed  RUE Inspected  LUE Inspection Performed                       Diagram Legend     Erythematous scaling macule/papule c/w actinic keratosis       Vascular papule c/w angioma      Pigmented verrucoid papule/plaque c/w seborrheic keratosis      Yellow umbilicated papule c/w sebaceous hyperplasia      Irregularly shaped tan macule c/w lentigo     1-2 mm smooth white papules consistent with Milia      Movable subcutaneous cyst with punctum c/w epidermal inclusion cyst      Subcutaneous movable cyst c/w pilar cyst      Firm pink to brown papule c/w dermatofibroma      Pedunculated fleshy papule(s) c/w skin tag(s)      Evenly pigmented macule c/w junctional nevus     Mildly variegated pigmented, slightly irregular-bordered macule c/w mildly atypical nevus      Flesh colored to evenly pigmented papule c/w intradermal nevus       Pink pearly papule/plaque c/w basal cell carcinoma      Erythematous hyperkeratotic cursted plaque c/w  SCC      Surgical scar with no sign of skin cancer recurrence      Open and closed comedones      Inflammatory papules and pustules      Verrucoid papule consistent consistent with wart     Erythematous eczematous patches and plaques     Dystrophic onycholytic nail with subungual debris c/w onychomycosis     Umbilicated papule    Erythematous-base heme-crusted tan verrucoid plaque consistent with inflamed seborrheic keratosis     Erythematous Silvery Scaling Plaque c/w Psoriasis     See annotation      Assessment / Plan:      Pathology Orders:     Normal Orders This Visit    Tissue Specimen To Pathology, Dermatology     Questions:    Directional Terms:  Other(comment)    Clinical information:  scc    Specific Site:  left temple    Tissue Specimen To Pathology, Dermatology     Questions:    Directional Terms:  Other(comment)    Clinical information:  scc    Specific Site:  right cheek medial    Tissue Specimen To Pathology, Dermatology     Questions:    Directional Terms:  Other(comment)    Clinical information:  scc    Specific Site:  right cheek lateral        Neoplasm of uncertain behavior of skin  -     Tissue Specimen To Pathology, Dermatology  -     Tissue Specimen To Pathology, Dermatology  -     Tissue Specimen To Pathology, Dermatology  Shave biopsy performed after verbal consent including risk of infection, scar, recurrence, need for additional treatment of site. Area prepped with alcohol, anesthetized with 1% lidocaine with epinephrine. . Hemostasis achieved with  monsels. No complications. Dressing applied. Wound care explained. Will need further rx if + ca  ;eft tem,ple        Shave biopsy performed after verbal consent including risk of infection, scar, recurrence, need for additional treatment of site. Area prepped with alcohol, anesthetized with  1% lidocaine with epinephrine. . Hemostasis achieved with  monsels. No complications. Dressing applied. Wound care explained. Will need further rx if +  ca  Right cheek medial        Shave biopsy performed after verbal consent including risk of infection, scar, recurrence, need for additional treatment of site. Area prepped with alcohol, anesthetized with  1% lidocaine with epinephrine. . Hemostasis achieved with  monsels. No complications. Dressing applied. Wound care explained. Will need further rx if + ca  Right cheek lateral    History of skin cancer  Actinic keratoses  Will treat later with efudex following above bx results           Follow-up in about 3 months (around 7/12/2018).

## 2018-04-20 RX ORDER — SODIUM CHLORIDE 0.9 % (FLUSH) 0.9 %
10 SYRINGE (ML) INJECTION
Status: CANCELLED | OUTPATIENT
Start: 2018-04-30

## 2018-04-20 RX ORDER — FLUOROURACIL 50 MG/G
CREAM TOPICAL
Qty: 40 G | Refills: 3 | Status: SHIPPED | OUTPATIENT
Start: 2018-04-20

## 2018-04-20 RX ORDER — HEPARIN 100 UNIT/ML
500 SYRINGE INTRAVENOUS
Status: CANCELLED | OUTPATIENT
Start: 2018-04-30

## 2018-04-20 RX ORDER — FAMOTIDINE 10 MG/ML
20 INJECTION INTRAVENOUS
Status: CANCELLED | OUTPATIENT
Start: 2018-04-30 | End: 2018-04-30

## 2018-04-20 RX ORDER — ACETAMINOPHEN 325 MG/1
650 TABLET ORAL
Status: CANCELLED | OUTPATIENT
Start: 2018-04-30

## 2018-04-26 ENCOUNTER — INFUSION (OUTPATIENT)
Dept: INFUSION THERAPY | Facility: HOSPITAL | Age: 83
End: 2018-04-26
Attending: INTERNAL MEDICINE
Payer: MEDICARE

## 2018-04-26 VITALS
TEMPERATURE: 98 F | RESPIRATION RATE: 18 BRPM | HEIGHT: 71 IN | HEART RATE: 60 BPM | DIASTOLIC BLOOD PRESSURE: 57 MMHG | SYSTOLIC BLOOD PRESSURE: 112 MMHG | WEIGHT: 150 LBS | BODY MASS INDEX: 21 KG/M2

## 2018-04-26 DIAGNOSIS — J32.9 CHRONIC SINUSITIS, UNSPECIFIED LOCATION: ICD-10-CM

## 2018-04-26 DIAGNOSIS — C91.10 CLL (CHRONIC LYMPHOCYTIC LEUKEMIA): ICD-10-CM

## 2018-04-26 DIAGNOSIS — D80.1 HYPOGAMMAGLOBULINEMIA: Primary | ICD-10-CM

## 2018-04-26 DIAGNOSIS — E79.0 HYPERURICEMIA: ICD-10-CM

## 2018-04-26 PROCEDURE — 63600175 PHARM REV CODE 636 W HCPCS: Performed by: INTERNAL MEDICINE

## 2018-04-26 PROCEDURE — 96365 THER/PROPH/DIAG IV INF INIT: CPT

## 2018-04-26 PROCEDURE — 96367 TX/PROPH/DG ADDL SEQ IV INF: CPT

## 2018-04-26 PROCEDURE — 96375 TX/PRO/DX INJ NEW DRUG ADDON: CPT

## 2018-04-26 PROCEDURE — 25000003 PHARM REV CODE 250: Performed by: INTERNAL MEDICINE

## 2018-04-26 PROCEDURE — 96366 THER/PROPH/DIAG IV INF ADDON: CPT

## 2018-04-26 PROCEDURE — S0028 INJECTION, FAMOTIDINE, 20 MG: HCPCS | Performed by: INTERNAL MEDICINE

## 2018-04-26 RX ORDER — ACETAMINOPHEN 325 MG/1
650 TABLET ORAL
Status: COMPLETED | OUTPATIENT
Start: 2018-04-26 | End: 2018-04-26

## 2018-04-26 RX ORDER — SODIUM CHLORIDE 0.9 % (FLUSH) 0.9 %
10 SYRINGE (ML) INJECTION
Status: DISCONTINUED | OUTPATIENT
Start: 2018-04-26 | End: 2018-04-26 | Stop reason: HOSPADM

## 2018-04-26 RX ORDER — HEPARIN 100 UNIT/ML
500 SYRINGE INTRAVENOUS
Status: DISCONTINUED | OUTPATIENT
Start: 2018-04-26 | End: 2018-04-26 | Stop reason: HOSPADM

## 2018-04-26 RX ORDER — FAMOTIDINE 10 MG/ML
20 INJECTION INTRAVENOUS
Status: COMPLETED | OUTPATIENT
Start: 2018-04-26 | End: 2018-04-26

## 2018-04-26 RX ADMIN — ACETAMINOPHEN 650 MG: 325 TABLET, FILM COATED ORAL at 10:04

## 2018-04-26 RX ADMIN — HUMAN IMMUNOGLOBULIN G 20 G: 20 LIQUID INTRAVENOUS at 10:04

## 2018-04-26 RX ADMIN — DIPHENHYDRAMINE HYDROCHLORIDE 50 MG: 50 INJECTION, SOLUTION INTRAMUSCULAR; INTRAVENOUS at 10:04

## 2018-04-26 RX ADMIN — SODIUM CHLORIDE: 9 INJECTION, SOLUTION INTRAVENOUS at 10:04

## 2018-04-26 RX ADMIN — FAMOTIDINE 20 MG: 10 INJECTION INTRAVENOUS at 10:04

## 2018-04-26 NOTE — PLAN OF CARE
Problem: Patient Care Overview (Adult)  Goal: Plan of Care Review  Outcome: Ongoing (interventions implemented as appropriate)  1345:  Patient tolerated IVIG infusion well.  VSS, NAD noted.  PIV removed from left wrist intact.  AVS declined.  Released to home in stable condition.

## 2018-04-26 NOTE — NURSING
1000:  [facility], VSS, assessment completed.  #24 gauge PIV placed to right wrist, flushes easily, brisk blood return noted.  Pt tolerated well.  Blankets, coffee offered, chair reclined.  Pre-meds initiated.  Will monitor.

## 2018-04-30 ENCOUNTER — TELEPHONE (OUTPATIENT)
Dept: DERMATOLOGY | Facility: CLINIC | Age: 83
End: 2018-04-30

## 2018-04-30 NOTE — TELEPHONE ENCOUNTER
----- Message from Sebastian Zhang sent at 4/30/2018  9:15 AM CDT -----  Contact: Patient  Pt states that fluorouracil (EFUDEX) 5 % cream was sent to him in the mail through [facility], and has sent it back as he states that he does not need the medication, please contact the pt at [phone] to discuss

## 2018-05-08 RX ORDER — PANTOPRAZOLE SODIUM 40 MG/1
TABLET, DELAYED RELEASE ORAL
Qty: 90 TABLET | Refills: 3 | Status: SHIPPED | OUTPATIENT
Start: 2018-05-08 | End: 2020-04-01

## 2018-05-08 RX ORDER — CLOPIDOGREL BISULFATE 75 MG/1
TABLET ORAL
Qty: 90 TABLET | Refills: 3 | Status: SHIPPED | OUTPATIENT
Start: 2018-05-08 | End: 2019-05-22 | Stop reason: SDUPTHER

## 2018-05-09 ENCOUNTER — PROCEDURE VISIT (OUTPATIENT)
Dept: DERMATOLOGY | Facility: CLINIC | Age: 83
End: 2018-05-09
Payer: MEDICARE

## 2018-05-09 VITALS
HEART RATE: 74 BPM | DIASTOLIC BLOOD PRESSURE: 69 MMHG | HEIGHT: 71 IN | BODY MASS INDEX: 21 KG/M2 | WEIGHT: 150 LBS | SYSTOLIC BLOOD PRESSURE: 125 MMHG

## 2018-05-09 DIAGNOSIS — C44.329 SQUAMOUS CELL CARCINOMA OF SKIN OF LEFT TEMPLE: Primary | ICD-10-CM

## 2018-05-09 PROCEDURE — 99499 UNLISTED E&M SERVICE: CPT | Mod: S$GLB,,, | Performed by: DERMATOLOGY

## 2018-05-09 PROCEDURE — 13132 CMPLX RPR F/C/C/M/N/AX/G/H/F: CPT | Mod: 51,S$GLB,, | Performed by: DERMATOLOGY

## 2018-05-09 PROCEDURE — 17311 MOHS 1 STAGE H/N/HF/G: CPT | Mod: S$GLB,,, | Performed by: DERMATOLOGY

## 2018-05-09 PROCEDURE — 17312 MOHS ADDL STAGE: CPT | Mod: S$GLB,,, | Performed by: DERMATOLOGY

## 2018-05-09 RX ORDER — CEPHALEXIN 500 MG/1
500 CAPSULE ORAL 3 TIMES DAILY
Qty: 21 CAPSULE | Refills: 0 | Status: SHIPPED | OUTPATIENT
Start: 2018-05-09 | End: 2018-05-16

## 2018-05-09 NOTE — PROGRESS NOTES
PROCEDURE: Mohs' Micrographic Surgery    INDICATION: Location in mask areas of face including central face, nose, eyelids, eyebrows, lips, chin, preauricular, temple, and ear. Biopsy-proven skin cancer of cosmetically and functionally important areas, including head, neck, genital, hand, foot, or areas known for having difficulty in healing, such as the lower anterior legs. Tumor with ill-defined borders.    REFERRING MD: Karissa Monsivais M.D.    CASE NUMBER:     ANESTHETIC: 6 cc 0.5% Lidocaine with Epi 1:200,000 mixed 1:1 with 0.5% Bupivacaine    SURGICAL PREP: Hibiclens    SURGEON: Ronel Bliss MD    ASSISTANTS: Terri Subramanian PA-C and Faye Henry MA    PREOPERATIVE DIAGNOSIS: squamous cell carcinoma    POSTOPERATIVE DIAGNOSIS: squamous cell carcinoma    PATHOLOGIC DIAGNOSIS: squamous cell carcinoma- invasive, moderately differentiated, superficial    HISTOLOGY OF SPECIMENS IN FIRST STAGE:   Tumor Type: Tumor seen. Superficial squamous cell carcinoma: Proliferation of squamous cells exhibiting atypia and infiltrating within the superficial papillary dermis.  Invasive squamous cell carcinoma: Proliferation of squamous cells exhibiting atypia and infiltrating within the dermis.  Moderately-differentiated squamous cell carcinoma: Proliferation of squamous cells exhibiting atypia of moderate differentiation and infiltrating within the dermis.   Depth of Invasion: epidermis and dermis  Perineural Invasion: No    HISTOLOGY OF SPECIMENS IN SUBSEQUENT STAGES:  · Tumor Type: No tumor seen.    STAGES OF MOHS' SURGERY PERFORMED: 2    TUMOR-FREE PLANE ACHIEVED: Yes    HEMOSTASIS: electrocoagulation     SPECIMENS: 4 (2 in stage A and 2 in stage B)    LOCATION: left temple. Patient verified location.    INITIAL LESION SIZE: 0.7 x 0.7 cm    FINAL DEFECT SIZE: 1.4 x 2.0 cm    WOUND REPAIR/DISPOSITION: The patient tolerated Mohs' Micrographic Surgery for a squamous cell carcinoma very well. When the tumor was completely  "removed, a repair of the surgical defect was undertaken.      PROCEDURE: Complex Linear Repair    INDICATION: Status post Mohs' Micrographic Surgery for squamous cell carcinoma.    CASE NUMBER:     SURGEON: Ronel Bliss MD    ASSISTANTS: Terri Subramanian PA-C and Reba Rosario Surg Tech    ANESTHETIC: 3 cc 1% Lidocaine with Epinephrine 1:100,000    SURGICAL PREP: Hibiclens    LOCATION: left temple    DEFECT SIZE: 1.4 x 2.0 cm    WOUND REPAIR/DISPOSITION:  After the patient's carcinoma had been completely removed with Mohs' Micrographic Surgery, a repair of the surgical defect was undertaken. The patient was returned to the operating suite where the area of left temple was prepped, draped, and anesthetized in the usual sterile fashion. The wound was widely undermined in all directions. Then, electrocoagulation was used to obtain meticulous hemostasis. 5-0 Vicryl buried vertical mattress sutures were placed into the subcutaneous and dermal plane to close the wound and shankar the cutaneous wound edge. Bilateral dog ears were identified and were removed by a standard Burow's triangle technique. The cutaneous wound edges were closed using interrupted 5-0 Prolene suture.    The patient tolerated the procedure well.    The area was cleaned and dressed appropriately and the patient was given wound care instructions, as well as appointment for follow-up evaluation. Patient declined pain medication and was placed on Keflex 500 mg TID x 7 days.    LENGTH OF REPAIR: 3.5 cm    Vitals:    05/09/18 0736 05/09/18 1041   BP:  125/69   BP Location:  Left arm   Patient Position:  Sitting   BP Method:  Small (Automatic)   Pulse:  74   Weight: 68 kg (150 lb)    Height: 5' 11" (1.803 m)          "

## 2018-05-15 ENCOUNTER — OFFICE VISIT (OUTPATIENT)
Dept: INTERNAL MEDICINE | Facility: CLINIC | Age: 83
End: 2018-05-15
Payer: MEDICARE

## 2018-05-15 VITALS
DIASTOLIC BLOOD PRESSURE: 59 MMHG | WEIGHT: 154.31 LBS | SYSTOLIC BLOOD PRESSURE: 122 MMHG | BODY MASS INDEX: 21.6 KG/M2 | RESPIRATION RATE: 18 BRPM | TEMPERATURE: 98 F | HEART RATE: 75 BPM | HEIGHT: 71 IN

## 2018-05-15 DIAGNOSIS — E23.0 PANHYPOPITUITARISM: ICD-10-CM

## 2018-05-15 DIAGNOSIS — I15.2 HYPERTENSION ASSOCIATED WITH DIABETES: ICD-10-CM

## 2018-05-15 DIAGNOSIS — E11.59 HYPERTENSION ASSOCIATED WITH DIABETES: ICD-10-CM

## 2018-05-15 DIAGNOSIS — C91.10 CLL (CHRONIC LYMPHOCYTIC LEUKEMIA): ICD-10-CM

## 2018-05-15 PROCEDURE — 3078F DIAST BP <80 MM HG: CPT | Mod: CPTII,S$GLB,, | Performed by: INTERNAL MEDICINE

## 2018-05-15 PROCEDURE — 99499 UNLISTED E&M SERVICE: CPT | Mod: S$PBB,,, | Performed by: INTERNAL MEDICINE

## 2018-05-15 PROCEDURE — 3074F SYST BP LT 130 MM HG: CPT | Mod: CPTII,S$GLB,, | Performed by: INTERNAL MEDICINE

## 2018-05-15 PROCEDURE — 99214 OFFICE O/P EST MOD 30 MIN: CPT | Mod: S$GLB,,, | Performed by: INTERNAL MEDICINE

## 2018-05-15 PROCEDURE — 99999 PR PBB SHADOW E&M-EST. PATIENT-LVL III: CPT | Mod: PBBFAC,,, | Performed by: INTERNAL MEDICINE

## 2018-05-16 ENCOUNTER — OFFICE VISIT (OUTPATIENT)
Dept: DERMATOLOGY | Facility: CLINIC | Age: 83
End: 2018-05-16
Payer: MEDICARE

## 2018-05-16 DIAGNOSIS — Z09 POSTOP CHECK: Primary | ICD-10-CM

## 2018-05-16 PROCEDURE — 99999 PR PBB SHADOW E&M-EST. PATIENT-LVL III: CPT | Mod: PBBFAC,,, | Performed by: DERMATOLOGY

## 2018-05-16 PROCEDURE — 99024 POSTOP FOLLOW-UP VISIT: CPT | Mod: S$GLB,,, | Performed by: DERMATOLOGY

## 2018-05-16 NOTE — PROGRESS NOTES
84 y.o. male patient is here for suture removal following Mohs' surgery.    Patient reports no problems.    WOUND PE:  The L temple sutures intact. Wound healing well. Good skin edges. No signs or symptoms of infection.    IMPRESSION:  Healing operative site from Mohs' surgery, SCC L temple s/p Mohs with CLC, postop day #7.    PLAN:  Sutures removed today. Steri-strips applied.  Continue wound care.  Keep moist with Aquaphor.  Call if any concern arises.    RTC:  In 3-6 months with Karissa Monsivais M.D. for skin check or sooner if new concern arises.

## 2018-05-22 ENCOUNTER — PATIENT MESSAGE (OUTPATIENT)
Dept: HEMATOLOGY/ONCOLOGY | Facility: CLINIC | Age: 83
End: 2018-05-22

## 2018-05-22 ENCOUNTER — TELEPHONE (OUTPATIENT)
Dept: HEMATOLOGY/ONCOLOGY | Facility: CLINIC | Age: 83
End: 2018-05-22

## 2018-05-22 ENCOUNTER — INFUSION (OUTPATIENT)
Dept: INFUSION THERAPY | Facility: HOSPITAL | Age: 83
End: 2018-05-22
Attending: INTERNAL MEDICINE
Payer: MEDICARE

## 2018-05-22 VITALS
RESPIRATION RATE: 18 BRPM | HEART RATE: 68 BPM | DIASTOLIC BLOOD PRESSURE: 67 MMHG | TEMPERATURE: 98 F | SYSTOLIC BLOOD PRESSURE: 132 MMHG

## 2018-05-22 DIAGNOSIS — C91.10 CLL (CHRONIC LYMPHOCYTIC LEUKEMIA): ICD-10-CM

## 2018-05-22 DIAGNOSIS — J32.9 CHRONIC SINUSITIS, UNSPECIFIED LOCATION: ICD-10-CM

## 2018-05-22 DIAGNOSIS — D80.1 HYPOGAMMAGLOBULINEMIA: Primary | ICD-10-CM

## 2018-05-22 DIAGNOSIS — E79.0 HYPERURICEMIA: ICD-10-CM

## 2018-05-22 PROCEDURE — 96366 THER/PROPH/DIAG IV INF ADDON: CPT

## 2018-05-22 PROCEDURE — 25000003 PHARM REV CODE 250: Performed by: INTERNAL MEDICINE

## 2018-05-22 PROCEDURE — 96365 THER/PROPH/DIAG IV INF INIT: CPT

## 2018-05-22 PROCEDURE — 96375 TX/PRO/DX INJ NEW DRUG ADDON: CPT

## 2018-05-22 PROCEDURE — S0028 INJECTION, FAMOTIDINE, 20 MG: HCPCS | Performed by: INTERNAL MEDICINE

## 2018-05-22 PROCEDURE — 96367 TX/PROPH/DG ADDL SEQ IV INF: CPT

## 2018-05-22 PROCEDURE — 63600175 PHARM REV CODE 636 W HCPCS: Mod: JG | Performed by: INTERNAL MEDICINE

## 2018-05-22 RX ORDER — FAMOTIDINE 10 MG/ML
20 INJECTION INTRAVENOUS
Status: COMPLETED | OUTPATIENT
Start: 2018-05-22 | End: 2018-05-22

## 2018-05-22 RX ORDER — SODIUM CHLORIDE 0.9 % (FLUSH) 0.9 %
10 SYRINGE (ML) INJECTION
Status: CANCELLED | OUTPATIENT
Start: 2018-05-26

## 2018-05-22 RX ORDER — HEPARIN 100 UNIT/ML
500 SYRINGE INTRAVENOUS
Status: DISCONTINUED | OUTPATIENT
Start: 2018-05-22 | End: 2018-05-22 | Stop reason: HOSPADM

## 2018-05-22 RX ORDER — FAMOTIDINE 10 MG/ML
20 INJECTION INTRAVENOUS
Status: CANCELLED | OUTPATIENT
Start: 2018-05-26 | End: 2018-05-26

## 2018-05-22 RX ORDER — HEPARIN 100 UNIT/ML
500 SYRINGE INTRAVENOUS
Status: CANCELLED | OUTPATIENT
Start: 2018-05-26

## 2018-05-22 RX ORDER — ACETAMINOPHEN 325 MG/1
650 TABLET ORAL
Status: COMPLETED | OUTPATIENT
Start: 2018-05-22 | End: 2018-05-22

## 2018-05-22 RX ORDER — ACETAMINOPHEN 325 MG/1
650 TABLET ORAL
Status: CANCELLED | OUTPATIENT
Start: 2018-05-26

## 2018-05-22 RX ORDER — SODIUM CHLORIDE 0.9 % (FLUSH) 0.9 %
10 SYRINGE (ML) INJECTION
Status: DISCONTINUED | OUTPATIENT
Start: 2018-05-22 | End: 2018-05-22 | Stop reason: HOSPADM

## 2018-05-22 RX ADMIN — FAMOTIDINE 20 MG: 10 INJECTION, SOLUTION INTRAVENOUS at 10:05

## 2018-05-22 RX ADMIN — SODIUM CHLORIDE: 9 INJECTION, SOLUTION INTRAVENOUS at 10:05

## 2018-05-22 RX ADMIN — HUMAN IMMUNOGLOBULIN G 20 G: 20 LIQUID INTRAVENOUS at 10:05

## 2018-05-22 RX ADMIN — DIPHENHYDRAMINE HYDROCHLORIDE 50 MG: 50 INJECTION, SOLUTION INTRAMUSCULAR; INTRAVENOUS at 10:05

## 2018-05-22 RX ADMIN — ACETAMINOPHEN 650 MG: 325 TABLET, FILM COATED ORAL at 10:05

## 2018-05-22 NOTE — PLAN OF CARE
Problem: Patient Care Overview (Adult)  Goal: Plan of Care Review  Outcome: Ongoing (interventions implemented as appropriate)  1330 -- Patient tolerated treatment well.  Vital signs stable.  No apparent distress noted.  Discharged without S/S of adverse effects.  AVS given.  Patient instructed to call provider with any questions or concerns.

## 2018-05-22 NOTE — PLAN OF CARE
Problem: Patient Care Overview (Adult)  Goal: Adult Individualization and Mutuality  1. PIV   2. Likes warm blanket     Outcome: Ongoing (interventions implemented as appropriate)  1037 -- Patient's labs, history, allergies, and medication reviewed.  Assessment complete.  Vital signs stable.  Patient to receive IVIG.  Discussed plan of care with patient.  Patient in agreement. Chair reclined.  Warm blanket and snack offered.  Will monitor.

## 2018-05-22 NOTE — TELEPHONE ENCOUNTER
----- Message from Irish Fry sent at 5/22/2018  9:47 AM CDT -----  Contact: pt  Pt called to schedule appt for June  Callback#[phone]  Thank You  RACHEL Fry

## 2018-05-25 ENCOUNTER — INFUSION (OUTPATIENT)
Dept: INFUSION THERAPY | Facility: HOSPITAL | Age: 83
End: 2018-05-25
Attending: INTERNAL MEDICINE
Payer: MEDICARE

## 2018-05-25 VITALS
DIASTOLIC BLOOD PRESSURE: 66 MMHG | RESPIRATION RATE: 18 BRPM | SYSTOLIC BLOOD PRESSURE: 149 MMHG | HEART RATE: 62 BPM | TEMPERATURE: 98 F

## 2018-05-25 DIAGNOSIS — C91.10 CLL (CHRONIC LYMPHOCYTIC LEUKEMIA): ICD-10-CM

## 2018-05-25 DIAGNOSIS — Z00.00 ANNUAL PHYSICAL EXAM: ICD-10-CM

## 2018-05-25 DIAGNOSIS — E79.0 HYPERURICEMIA: Primary | ICD-10-CM

## 2018-05-25 PROCEDURE — 63600175 PHARM REV CODE 636 W HCPCS: Performed by: INTERNAL MEDICINE

## 2018-05-25 PROCEDURE — S0028 INJECTION, FAMOTIDINE, 20 MG: HCPCS | Performed by: INTERNAL MEDICINE

## 2018-05-25 PROCEDURE — 96375 TX/PRO/DX INJ NEW DRUG ADDON: CPT

## 2018-05-25 PROCEDURE — 96367 TX/PROPH/DG ADDL SEQ IV INF: CPT

## 2018-05-25 PROCEDURE — 96415 CHEMO IV INFUSION ADDL HR: CPT

## 2018-05-25 PROCEDURE — 25000003 PHARM REV CODE 250: Performed by: INTERNAL MEDICINE

## 2018-05-25 PROCEDURE — 96413 CHEMO IV INFUSION 1 HR: CPT

## 2018-05-25 RX ORDER — FAMOTIDINE 10 MG/ML
20 INJECTION INTRAVENOUS
Status: COMPLETED | OUTPATIENT
Start: 2018-05-25 | End: 2018-05-25

## 2018-05-25 RX ORDER — HEPARIN 100 UNIT/ML
500 SYRINGE INTRAVENOUS
Status: CANCELLED | OUTPATIENT
Start: 2018-06-08

## 2018-05-25 RX ORDER — ACETAMINOPHEN 500 MG
1000 TABLET ORAL
Status: COMPLETED | OUTPATIENT
Start: 2018-05-25 | End: 2018-05-25

## 2018-05-25 RX ORDER — SODIUM CHLORIDE 0.9 % (FLUSH) 0.9 %
10 SYRINGE (ML) INJECTION
Status: CANCELLED | OUTPATIENT
Start: 2018-06-08

## 2018-05-25 RX ADMIN — DIPHENHYDRAMINE HYDROCHLORIDE 50 MG: 50 INJECTION, SOLUTION INTRAMUSCULAR; INTRAVENOUS at 10:05

## 2018-05-25 RX ADMIN — DEXAMETHASONE SODIUM PHOSPHATE 10 MG: 4 INJECTION, SOLUTION INTRA-ARTICULAR; INTRALESIONAL; INTRAMUSCULAR; INTRAVENOUS; SOFT TISSUE at 10:05

## 2018-05-25 RX ADMIN — ACETAMINOPHEN 1000 MG: 500 TABLET ORAL at 10:05

## 2018-05-25 RX ADMIN — SODIUM CHLORIDE 2000 MG: 0.9 INJECTION, SOLUTION INTRAVENOUS at 11:05

## 2018-05-25 RX ADMIN — SODIUM CHLORIDE: 9 INJECTION, SOLUTION INTRAVENOUS at 10:05

## 2018-05-25 RX ADMIN — FAMOTIDINE 20 MG: 10 INJECTION, SOLUTION INTRAVENOUS at 10:05

## 2018-05-25 NOTE — PLAN OF CARE
Problem: Patient Care Overview (Adult)  Goal: Plan of Care Review  Outcome: Ongoing (interventions implemented as appropriate)  Today's labs reviewed by Dr. Ireland. Pt tolerated Arzerra with no complications. VSS. Pt instructed to call MD with any problems. NAD. Pt discharged home with spouse at side.

## 2018-05-28 RX ORDER — ATORVASTATIN CALCIUM 20 MG/1
TABLET, FILM COATED ORAL
Qty: 90 TABLET | Refills: 3 | Status: SHIPPED | OUTPATIENT
Start: 2018-05-28 | End: 2019-05-22 | Stop reason: SDUPTHER

## 2018-05-30 DIAGNOSIS — E23.0 HYPOPITUITARISM: Primary | ICD-10-CM

## 2018-05-30 RX ORDER — PREDNISONE 2.5 MG/1
2.5 TABLET ORAL DAILY
Qty: 90 TABLET | Refills: 3 | Status: ON HOLD | OUTPATIENT
Start: 2018-05-30 | End: 2019-07-25 | Stop reason: HOSPADM

## 2018-05-31 NOTE — PROGRESS NOTES
This office note has been dictated.  HISTORY OF PRESENT ILLNESS:  This is an 84-year-old gentleman following up on   several chronic medical conditions.  The patient has hypertension, diabetes   mellitus, panhypopituitarism, chronic lymphocytic leukemia.  He is followed by   Endocrinology for his panhypopituitarism and diabetes.  He is on insulin   therapy.  He reports generally doing well with no fever, chills, body aches or   unexpected weight loss.  No chest pain, no palpitation, no syncope.    CURRENT MEDICATIONS:  All medications are noted and reviewed in the electronic   medical record medication list.    REVIEW OF SYSTEMS:  CONSTITUTIONAL:  No fever, no chills, no generalized body aches.  HEENT:  No hoarseness, no dysphagia, no earache, no hearing issues.  CARDIOVASCULAR:  No chest pain, no palpitations, no syncope.  No claudication.  RESPIRATORY:  No cough, no shortness of breath.  GASTROINTESTINAL:  No nausea or vomiting.  No abdominal pain or diarrhea or   change in bowel habits.  NEUROLOGIC:  No headaches.  No new focal neurological symptomatologies.    PAST MEDICAL HISTORY, PAST SURGICAL HISTORY, FAMILY MEDICAL HISTORY AND SOCIAL   HISTORY:  All noted and reviewed in our electronic medical record history   sections.    PHYSICAL EXAMINATION:  GENERAL:  Alert, pleasant and appropriately groomed gentleman, in no acute   distress.  VITAL SIGNS:  Blood pressure 122/60.  Other vital signs are noted and reviewed   as normal.  HEENT:  Normocephalic.  NECK:  Supple.  No masses, no thyromegaly.  LUNGS:  Clear to auscultation and normal to percussion.  CARDIOVASCULAR:  Regular rate and rhythm.  There is no significant murmur.    Carotids are full bilaterally without bruits; 2+ pedal pulses.  No peripheral   extremity edema.  No ischemic changes in the lower extremities.  MENTAL STATUS:  Alert and oriented.  Affect and mood all appropriate.    DATA:  Reviewed recent laboratory data and noted scheduled upcoming lab  data.    IMPRESSION:  1.  Hypertension, reasonably controlled.  2.  Diabetes mellitus has been reasonably controlled, followed by Endocrinology.  3.  Panhypopituitarism.  4.  Chronic lymphocytic leukemia followed by Hematology/Oncology.    PLAN:  1.  Continue current pharmacologic regimens.  2.  See us back in six months.      PB/HN  dd: 05/31/2018 10:56:32 (CDT)  td: 06/01/2018 00:44:59 (CDT)  Doc ID   #[id number]  Job ID #[id number]    CC:       Diabetic foot exam:    Visual Inspection:  Normal -  Bilateral    Pedal Pulses:   Right: Present  Left: Present    Posterior tibialis:   Right:Present  Left: Present    pwb

## 2018-06-18 ENCOUNTER — INFUSION (OUTPATIENT)
Dept: INFUSION THERAPY | Facility: HOSPITAL | Age: 83
End: 2018-06-18
Attending: INTERNAL MEDICINE
Payer: MEDICARE

## 2018-06-18 VITALS
TEMPERATURE: 98 F | DIASTOLIC BLOOD PRESSURE: 62 MMHG | HEART RATE: 69 BPM | SYSTOLIC BLOOD PRESSURE: 143 MMHG | RESPIRATION RATE: 16 BRPM

## 2018-06-18 DIAGNOSIS — J32.9 CHRONIC SINUSITIS, UNSPECIFIED LOCATION: ICD-10-CM

## 2018-06-18 DIAGNOSIS — E79.0 HYPERURICEMIA: ICD-10-CM

## 2018-06-18 DIAGNOSIS — D80.1 HYPOGAMMAGLOBULINEMIA: Primary | ICD-10-CM

## 2018-06-18 DIAGNOSIS — C91.10 CLL (CHRONIC LYMPHOCYTIC LEUKEMIA): ICD-10-CM

## 2018-06-18 PROCEDURE — 96365 THER/PROPH/DIAG IV INF INIT: CPT

## 2018-06-18 PROCEDURE — 25000003 PHARM REV CODE 250: Performed by: INTERNAL MEDICINE

## 2018-06-18 PROCEDURE — 63600175 PHARM REV CODE 636 W HCPCS: Mod: JG | Performed by: INTERNAL MEDICINE

## 2018-06-18 PROCEDURE — 96367 TX/PROPH/DG ADDL SEQ IV INF: CPT

## 2018-06-18 PROCEDURE — 96375 TX/PRO/DX INJ NEW DRUG ADDON: CPT

## 2018-06-18 PROCEDURE — 96366 THER/PROPH/DIAG IV INF ADDON: CPT

## 2018-06-18 PROCEDURE — S0028 INJECTION, FAMOTIDINE, 20 MG: HCPCS | Performed by: INTERNAL MEDICINE

## 2018-06-18 RX ORDER — HEPARIN 100 UNIT/ML
500 SYRINGE INTRAVENOUS
Status: CANCELLED | OUTPATIENT
Start: 2018-06-22

## 2018-06-18 RX ORDER — ACETAMINOPHEN 325 MG/1
650 TABLET ORAL
Status: COMPLETED | OUTPATIENT
Start: 2018-06-18 | End: 2018-06-18

## 2018-06-18 RX ORDER — FAMOTIDINE 10 MG/ML
20 INJECTION INTRAVENOUS
Status: CANCELLED | OUTPATIENT
Start: 2018-06-22 | End: 2018-06-22

## 2018-06-18 RX ORDER — SODIUM CHLORIDE 0.9 % (FLUSH) 0.9 %
10 SYRINGE (ML) INJECTION
Status: CANCELLED | OUTPATIENT
Start: 2018-06-22

## 2018-06-18 RX ORDER — FAMOTIDINE 10 MG/ML
20 INJECTION INTRAVENOUS
Status: COMPLETED | OUTPATIENT
Start: 2018-06-18 | End: 2018-06-18

## 2018-06-18 RX ORDER — ACETAMINOPHEN 325 MG/1
650 TABLET ORAL
Status: CANCELLED | OUTPATIENT
Start: 2018-06-22

## 2018-06-18 RX ADMIN — HUMAN IMMUNOGLOBULIN G 20 G: 20 LIQUID INTRAVENOUS at 10:06

## 2018-06-18 RX ADMIN — FAMOTIDINE 20 MG: 10 INJECTION, SOLUTION INTRAVENOUS at 10:06

## 2018-06-18 RX ADMIN — DIPHENHYDRAMINE HYDROCHLORIDE 50 MG: 50 INJECTION, SOLUTION INTRAMUSCULAR; INTRAVENOUS at 10:06

## 2018-06-18 RX ADMIN — ACETAMINOPHEN 650 MG: 325 TABLET ORAL at 10:06

## 2018-06-18 RX ADMIN — SODIUM CHLORIDE: 0.9 INJECTION, SOLUTION INTRAVENOUS at 10:06

## 2018-07-16 ENCOUNTER — INFUSION (OUTPATIENT)
Dept: INFUSION THERAPY | Facility: HOSPITAL | Age: 83
End: 2018-07-16
Attending: INTERNAL MEDICINE
Payer: MEDICARE

## 2018-07-16 VITALS
RESPIRATION RATE: 16 BRPM | SYSTOLIC BLOOD PRESSURE: 113 MMHG | TEMPERATURE: 98 F | HEART RATE: 75 BPM | DIASTOLIC BLOOD PRESSURE: 54 MMHG

## 2018-07-16 DIAGNOSIS — C91.10 CLL (CHRONIC LYMPHOCYTIC LEUKEMIA): ICD-10-CM

## 2018-07-16 DIAGNOSIS — J32.9 CHRONIC SINUSITIS, UNSPECIFIED LOCATION: ICD-10-CM

## 2018-07-16 DIAGNOSIS — E79.0 HYPERURICEMIA: ICD-10-CM

## 2018-07-16 DIAGNOSIS — D80.1 HYPOGAMMAGLOBULINEMIA: Primary | ICD-10-CM

## 2018-07-16 PROCEDURE — S0028 INJECTION, FAMOTIDINE, 20 MG: HCPCS | Performed by: INTERNAL MEDICINE

## 2018-07-16 PROCEDURE — 96413 CHEMO IV INFUSION 1 HR: CPT

## 2018-07-16 PROCEDURE — 96367 TX/PROPH/DG ADDL SEQ IV INF: CPT

## 2018-07-16 PROCEDURE — 63600175 PHARM REV CODE 636 W HCPCS: Performed by: INTERNAL MEDICINE

## 2018-07-16 PROCEDURE — 96415 CHEMO IV INFUSION ADDL HR: CPT

## 2018-07-16 PROCEDURE — 96375 TX/PRO/DX INJ NEW DRUG ADDON: CPT

## 2018-07-16 PROCEDURE — 25000003 PHARM REV CODE 250: Performed by: INTERNAL MEDICINE

## 2018-07-16 RX ORDER — SODIUM CHLORIDE 0.9 % (FLUSH) 0.9 %
10 SYRINGE (ML) INJECTION
Status: CANCELLED | OUTPATIENT
Start: 2018-07-20

## 2018-07-16 RX ORDER — FAMOTIDINE 10 MG/ML
20 INJECTION INTRAVENOUS
Status: CANCELLED | OUTPATIENT
Start: 2018-07-20

## 2018-07-16 RX ORDER — ACETAMINOPHEN 325 MG/1
650 TABLET ORAL
Status: CANCELLED | OUTPATIENT
Start: 2018-07-20

## 2018-07-16 RX ORDER — FAMOTIDINE 10 MG/ML
20 INJECTION INTRAVENOUS
Status: COMPLETED | OUTPATIENT
Start: 2018-07-16 | End: 2018-07-16

## 2018-07-16 RX ORDER — HEPARIN 100 UNIT/ML
500 SYRINGE INTRAVENOUS
Status: CANCELLED | OUTPATIENT
Start: 2018-07-20

## 2018-07-16 RX ORDER — ACETAMINOPHEN 325 MG/1
650 TABLET ORAL
Status: COMPLETED | OUTPATIENT
Start: 2018-07-16 | End: 2018-07-16

## 2018-07-16 RX ADMIN — HUMAN IMMUNOGLOBULIN G 20 G: 20 LIQUID INTRAVENOUS at 10:07

## 2018-07-16 RX ADMIN — ACETAMINOPHEN 650 MG: 325 TABLET, FILM COATED ORAL at 10:07

## 2018-07-16 RX ADMIN — DIPHENHYDRAMINE HYDROCHLORIDE 50 MG: 50 INJECTION, SOLUTION INTRAMUSCULAR; INTRAVENOUS at 10:07

## 2018-07-16 RX ADMIN — FAMOTIDINE 20 MG: 10 INJECTION, SOLUTION INTRAVENOUS at 10:07

## 2018-07-16 NOTE — PLAN OF CARE
Problem: Patient Care Overview (Adult)  Goal: Plan of Care Review  Outcome: Ongoing (interventions implemented as appropriate)  Tolerated infusion well.  No reactions noted.  AVS given.  No questions or concerns at this time.  Ambulated off unit unassisted.

## 2018-07-19 ENCOUNTER — OFFICE VISIT (OUTPATIENT)
Dept: URGENT CARE | Facility: CLINIC | Age: 83
End: 2018-07-19
Payer: MEDICARE

## 2018-07-19 ENCOUNTER — OFFICE VISIT (OUTPATIENT)
Dept: HEMATOLOGY/ONCOLOGY | Facility: CLINIC | Age: 83
End: 2018-07-19
Payer: MEDICARE

## 2018-07-19 ENCOUNTER — LAB VISIT (OUTPATIENT)
Dept: LAB | Facility: HOSPITAL | Age: 83
End: 2018-07-19
Attending: INTERNAL MEDICINE
Payer: MEDICARE

## 2018-07-19 VITALS
SYSTOLIC BLOOD PRESSURE: 122 MMHG | HEIGHT: 71 IN | DIASTOLIC BLOOD PRESSURE: 70 MMHG | BODY MASS INDEX: 21.24 KG/M2 | HEART RATE: 76 BPM | WEIGHT: 151.69 LBS

## 2018-07-19 VITALS
HEIGHT: 71 IN | TEMPERATURE: 98 F | OXYGEN SATURATION: 95 % | DIASTOLIC BLOOD PRESSURE: 64 MMHG | BODY MASS INDEX: 21 KG/M2 | HEART RATE: 83 BPM | SYSTOLIC BLOOD PRESSURE: 116 MMHG | WEIGHT: 150 LBS | RESPIRATION RATE: 18 BRPM

## 2018-07-19 DIAGNOSIS — M25.512 PAIN AND SWELLING OF LEFT SHOULDER: Primary | ICD-10-CM

## 2018-07-19 DIAGNOSIS — D69.6 THROMBOCYTOPENIA: ICD-10-CM

## 2018-07-19 DIAGNOSIS — D80.1 HYPOGAMMAGLOBULINEMIA: ICD-10-CM

## 2018-07-19 DIAGNOSIS — M25.412 PAIN AND SWELLING OF LEFT SHOULDER: Primary | ICD-10-CM

## 2018-07-19 DIAGNOSIS — E23.0 PANHYPOPITUITARISM: ICD-10-CM

## 2018-07-19 DIAGNOSIS — S40.012A CONTUSION OF LEFT SHOULDER, INITIAL ENCOUNTER: ICD-10-CM

## 2018-07-19 DIAGNOSIS — C91.10 CLL (CHRONIC LYMPHOCYTIC LEUKEMIA): Primary | ICD-10-CM

## 2018-07-19 DIAGNOSIS — C91.10 CLL (CHRONIC LYMPHOCYTIC LEUKEMIA): ICD-10-CM

## 2018-07-19 DIAGNOSIS — C91.10 LEUKEMIA, LYMPHOCYTIC, CHRONIC: ICD-10-CM

## 2018-07-19 LAB
ALBUMIN SERPL BCP-MCNC: 3.4 G/DL
ALP SERPL-CCNC: 83 U/L
ALT SERPL W/O P-5'-P-CCNC: 32 U/L
ANION GAP SERPL CALC-SCNC: 9 MMOL/L
AST SERPL-CCNC: 38 U/L
BASOPHILS # BLD AUTO: 0.06 K/UL
BASOPHILS NFR BLD: 1.1 %
BILIRUB SERPL-MCNC: 0.9 MG/DL
BUN SERPL-MCNC: 30 MG/DL
CALCIUM SERPL-MCNC: 9.9 MG/DL
CHLORIDE SERPL-SCNC: 103 MMOL/L
CO2 SERPL-SCNC: 29 MMOL/L
CREAT SERPL-MCNC: 1 MG/DL
DIFFERENTIAL METHOD: ABNORMAL
EOSINOPHIL # BLD AUTO: 0.3 K/UL
EOSINOPHIL NFR BLD: 4.8 %
ERYTHROCYTE [DISTWIDTH] IN BLOOD BY AUTOMATED COUNT: 14.1 %
EST. GFR  (AFRICAN AMERICAN): >60 ML/MIN/1.73 M^2
EST. GFR  (NON AFRICAN AMERICAN): >60 ML/MIN/1.73 M^2
GLUCOSE SERPL-MCNC: 85 MG/DL
HCT VFR BLD AUTO: 46.8 %
HGB BLD-MCNC: 15.3 G/DL
IGG SERPL-MCNC: 1115 MG/DL
IMM GRANULOCYTES # BLD AUTO: 0.06 K/UL
IMM GRANULOCYTES NFR BLD AUTO: 1.1 %
LYMPHOCYTES # BLD AUTO: 2 K/UL
LYMPHOCYTES NFR BLD: 37.6 %
MCH RBC QN AUTO: 31.7 PG
MCHC RBC AUTO-ENTMCNC: 32.7 G/DL
MCV RBC AUTO: 97 FL
MONOCYTES # BLD AUTO: 0.6 K/UL
MONOCYTES NFR BLD: 11.6 %
NEUTROPHILS # BLD AUTO: 2.3 K/UL
NEUTROPHILS NFR BLD: 43.8 %
NRBC BLD-RTO: 0 /100 WBC
PLATELET # BLD AUTO: 88 K/UL
PMV BLD AUTO: 9.7 FL
POTASSIUM SERPL-SCNC: 4.4 MMOL/L
PROT SERPL-MCNC: 6.5 G/DL
RBC # BLD AUTO: 4.82 M/UL
SODIUM SERPL-SCNC: 141 MMOL/L
WBC # BLD AUTO: 5.24 K/UL

## 2018-07-19 PROCEDURE — 3074F SYST BP LT 130 MM HG: CPT | Mod: CPTII,S$GLB,, | Performed by: INTERNAL MEDICINE

## 2018-07-19 PROCEDURE — 99214 OFFICE O/P EST MOD 30 MIN: CPT | Mod: S$GLB,,, | Performed by: FAMILY MEDICINE

## 2018-07-19 PROCEDURE — 85025 COMPLETE CBC W/AUTO DIFF WBC: CPT

## 2018-07-19 PROCEDURE — 36415 COLL VENOUS BLD VENIPUNCTURE: CPT

## 2018-07-19 PROCEDURE — 3078F DIAST BP <80 MM HG: CPT | Mod: CPTII,S$GLB,, | Performed by: FAMILY MEDICINE

## 2018-07-19 PROCEDURE — 99999 PR PBB SHADOW E&M-EST. PATIENT-LVL III: CPT | Mod: PBBFAC,,, | Performed by: INTERNAL MEDICINE

## 2018-07-19 PROCEDURE — 3078F DIAST BP <80 MM HG: CPT | Mod: CPTII,S$GLB,, | Performed by: INTERNAL MEDICINE

## 2018-07-19 PROCEDURE — 82784 ASSAY IGA/IGD/IGG/IGM EACH: CPT

## 2018-07-19 PROCEDURE — 99214 OFFICE O/P EST MOD 30 MIN: CPT | Mod: S$GLB,,, | Performed by: INTERNAL MEDICINE

## 2018-07-19 PROCEDURE — 3074F SYST BP LT 130 MM HG: CPT | Mod: CPTII,S$GLB,, | Performed by: FAMILY MEDICINE

## 2018-07-19 PROCEDURE — 80053 COMPREHEN METABOLIC PANEL: CPT

## 2018-07-19 RX ORDER — HEPARIN 100 UNIT/ML
500 SYRINGE INTRAVENOUS
Status: CANCELLED | OUTPATIENT
Start: 2018-08-24

## 2018-07-19 RX ORDER — HYDROCODONE BITARTRATE AND ACETAMINOPHEN 5; 325 MG/1; MG/1
1 TABLET ORAL EVERY 8 HOURS PRN
Qty: 15 TABLET | Refills: 0 | Status: SHIPPED | OUTPATIENT
Start: 2018-07-19 | End: 2018-08-29

## 2018-07-19 RX ORDER — ACETAMINOPHEN 325 MG/1
1000 TABLET ORAL
Status: CANCELLED | OUTPATIENT
Start: 2018-08-24

## 2018-07-19 RX ORDER — FAMOTIDINE 10 MG/ML
20 INJECTION INTRAVENOUS
Status: CANCELLED | OUTPATIENT
Start: 2018-08-24

## 2018-07-19 RX ORDER — SODIUM CHLORIDE 0.9 % (FLUSH) 0.9 %
10 SYRINGE (ML) INJECTION
Status: CANCELLED | OUTPATIENT
Start: 2018-08-24

## 2018-07-19 NOTE — PROGRESS NOTES
HISTORY OF PRESENT ILLNESS:  Mr. Ramos is an 84-year-old man who was   diagnosed with chronic lymphocytic leukemia in April 2012.  In April 2013, there   was an abrupt rise in his white blood cell count to 93,000 and some   prolymphocytes were present in his peripheral smear.  He developed generalized   urticaria, which I suspected was paraneoplastic.  I treated him with five   courses of fludarabine, cyclophosphamide and Rituxan.  The fludarabine doses   were decreased somewhat because of mild renal impairment and the sixth treatment   was with Rituxan alone because he had developed thrombocytopenia, which has   persisted since then.    His major troublesome problem from CLL and much of his therapy has been in   for thrombocytopenia.  It became more severe in early 2016 with platelet   counts in the range of 35,000-40,000.  He was taking both aspirin and Plavix at   that time.  A bone marrow examination showed no signs of myelodysplasia and 35%   of the bone marrow cells were small lymphocytes.  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  The FISH studies for myelodysplastic disorders   were negative.    I treated him for the possibility of immune thrombocytopenia with prednisone 60   mg daily for two weeks, but his platelet count did not improve.  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    Because of continued thrombocytopenia, I recommended treatment with ofatumumab,   which he began in late June 2016.  He had eight weekly infusions, followed by   four monthly infusions and since then he has been receiving the drug at   two-month intervals.  His platelet count improved with that therapy and his   values in the last year have been in the range of 66,000-98,000.  Most of the   values have been over 80,000.  His white blood cell count has been normal and he   has long had a normal  proportion of lymphocytes.    He has experienced many episodes of infection in the past, particularly   recurrent sinusitis.  He has had several surgical procedures on his sinuses.    Because of this problem, I placed him several years ago on monthly   immunoglobulin infusions and these have diminished the frequency of sinusitis and   the frequency of pneumonia.    Following the pituitary surgery in 2002, he had a TIA and then two strokes.  He   has had many nonmelanoma skin cancers excised.  He is currently being treated   with a topical cream for actinic skin damage on his face and scalp.    He recently injured his left shoulder and has diminished motion there.  He also   scraped his left lower leg.    ADDITIONAL PAST HISTORY, SYSTEM REVIEW, SOCIAL HISTORY AND FAMILY HISTORY:  Have   been reviewed and updated in the electronic record.    PHYSICAL EXAMINATION:  GENERAL APPEARANCE:  Well-developed, well-nourished man in no distress.  Many   surgical scars at the sites of skin cancer treatment.  EYES:  No jaundice or pallor.  EARS:  Clear canals and membranes.  NOSE:  Clear nares.  SINUSES:  No tenderness.  MOUTH AND THROAT:  Some missing teeth.  No mucosal lesions.  NECK:  No masses or bruits.  No thyroid abnormalities.  LYMPH NODES:  No enlarged cervical, axillary or inguinal nodes.  CHEST AND LUNGS:  Normal respiratory effort.  Clear to auscultation and   percussion.  HEART:  Regular rate and rhythm without murmur or gallop.  ABDOMEN:  Soft without masses or tenderness.  No hepatosplenomegaly.  EXTREMITIES:  1+ pretibial and ankle edema bilaterally.  PERIPHERAL VASCULATURE:  Decreased pulses in the feet and ankles.  Good   popliteal pulses.  NEUROLOGIC:  He is oriented.  Motor function is good.  Mental status is good.  SKIN:  Numerous areas on the face that are inflamed from a recent topical cream   for actinic disease.  There are numerous surgical scars from prior skin cancer   surgery.    LABORATORY STUDIES:   Blood counts today include hemoglobin 15.3, platelets   88,000 and WBC 5240 with a normal differential.  Comprehensive metabolic profile   is normal with the exception of albumin 3.4.  IgG is 1115 (he received an   infusion of immunoglobulin within the last week).    IMPRESSION:  1.  Chronic lymphocytic leukemia.  2.  Thrombocytopenia, which appears to be primarily a consequence of CLL.  3.  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  The frequency of infections has   diminished with monthly immunoglobulin infusions.  4.  Panhypopituitarism.  5.  Diabetes mellitus.    PLAN:  1.  He will continue ofatumumab at intervals of every two months.  2.  Continue intravenous immunoglobulin every month.  3.  In two months, blood for CBC, CMP and an infusion of ofatumumab.  4.  Return visit (EPA appointment) in four months with CBC, CMP, IgG level and   appointment for ofatumumab.    Mr. Ramos and his wife had many questions about his thrombocytopenia, his   immune function, his chronic lymphocytic leukemia and his skin cancer troubles.    Most of his 30-minute visit today was devoted to counseling them about these   matters.      RICH  dd: 07/19/2018 10:31:42 (CDT)  td: 07/19/2018 13:00:15 (CDT)  Doc ID   #[id number]  Job ID #[id number]    CC:

## 2018-07-19 NOTE — PATIENT INSTRUCTIONS
Understanding Glenohumeral Osteoarthritis    A joint is a place where two bones meet. The glenohumeral joint is the main joint in the shoulder. It is a ball-and-socket joint. It is formed where the head or ball of the upper arm bone fits into the shallow socket on the shoulder blade. The upper arm bone is called the humerus. The socket is called the glenoid. This is how the joint gets its name. When the glenohumeral joint is healthy, it helps you rotate and move your arm freely without any pain.  With glenohumeral osteoarthritis, the parts of the joint wear down. This can cause pain, stiffness, and limited movement. Glenohumeral osteoarthritis is a long-term condition. But treatment can help manage symptoms, increase movement, and improve function.  How glenohumeral osteoarthritis occurs  All joints contain a smooth tissue called cartilage. Cartilage cushions the ends of bones. This helps them glide smoothly against each other. Glenohumeral osteoarthritis occurs when cartilage in the glenohumeral joint begins to break down. The ball and socket bones may then become exposed and rub together. The bones may become rough and pitted and may begin to wear away. This prevents smooth movement of the joint.  Causes of glenohumeral osteoarthritis  In most cases, the condition develops because of damage from a shoulder injury, such as a dislocated or broken shoulder. Normal wear and tear of the joint from aging can also cause osteoarthritis.  Symptoms of glenohumeral osteoarthritis  Symptoms tend to develop slowly over months to years. Shoulder pain is common. The pain is often worse with activity, and may get better with rest. Over time, the pain may worsen, and may even occur at night.  Stiffness in the shoulder is also common. It may be hard to move or use the arm and shoulder as you normally would. This can make even simple tasks difficult, such as reaching for an item on a shelf or getting dressed.  Treating  glenohumeral osteoarthritis  Treatment may include:  · Resting the shoulder. This involves limiting certain movements, such as reaching, lifting, pushing, or pulling. These can cause further wear and tear of the joint and make symptoms worse.  · Cold or heat packs. Cold packs can help reduce pain and swelling. Heat packs do not help with swelling. But they may help relieve pain and stiffness, especially before physical therapy or activity.  · Pain medicines. These help relieve pain and swelling. Medicines may be prescribed or bought over the counter.  · Injections of medicine into the joint. These help relieve symptoms for a time.  · Physical therapy and exercises.  These help improve strength and range of motion in the joint. This may reduce shoulder stiffness and improve function.  · Certain assistive devices. These are tools that can be used to make activities of daily life easier. For instance, a grasper can help you reach and grab items.  · Alternative treatments. These include options, such as acupuncture or massage. They may help relieve pain and stiffness in some cases.  If other treatments dont do enough to relieve symptoms, you may need surgery. During surgery, the damaged joint is removed. It is then replaced with an artificial joint. This can help relieve symptoms and improve function to near normal.     When to call your healthcare provider  Call your healthcare provider right away if you have any of these:  · Fever of 100.4°F (38°C) or higher, or as directed  · Symptoms that dont get better with treatment, or get worse  · New symptoms   Date Last Reviewed: 3/10/2016  © 8049-1849 [employer]. [address] [place] [address], [place] [address]. All rights reserved. This information is not intended as a substitute for professional medical care. Always follow your healthcare professional's instructions.

## 2018-07-19 NOTE — PROGRESS NOTES
"Subjective:       Patient ID: Thierry Ramos Jr. is a 84 y.o. male.    Vitals:  height is 5' 11" (1.803 m) and weight is 68 kg (150 lb). His temperature is 98.2 °F (36.8 °C). His blood pressure is 116/64 and his pulse is 83. His respiration is 18 and oxygen saturation is 95%.     Chief Complaint: Shoulder Injury    Pateint states that he was on his patio and he slipped and fell, one week ago. Patient states that it is painful when lifting his shoulder, and moving it around. Pt has hx of  CLL on chemo also has hx of Low PLT and gets platelet transfusion        Shoulder Injury    The incident occurred at home. The left shoulder is affected. The incident occurred more than 1 week ago. The injury mechanism was a fall. The quality of the pain is described as aching. The pain does not radiate. The pain is at a severity of 10/10. The pain is severe. Pertinent negatives include no chest pain or numbness. The symptoms are aggravated by movement and overhead lifting. He has tried heat (Ibuprofen ) for the symptoms. The treatment provided mild relief.     Review of Systems   Constitution: Negative for weakness and malaise/fatigue.   HENT: Negative for nosebleeds.    Cardiovascular: Negative for chest pain and syncope.   Respiratory: Negative for shortness of breath.    Musculoskeletal: Positive for joint pain. Negative for back pain and neck pain.   Gastrointestinal: Negative for abdominal pain.   Genitourinary: Negative for hematuria.   Neurological: Negative for dizziness and numbness.       Objective:      Physical Exam   Cardiovascular:   No murmur heard.  Pulmonary/Chest: No respiratory distress.   Musculoskeletal:   Left shoulder , no gross deformity, abduction and over head movement with increase tenderness     Lymphadenopathy:     He has no cervical adenopathy.       Assessment:       1. Pain and swelling of left shoulder    2. Contusion of left shoulder, initial encounter        Plan:         Pain and swelling of " left shoulder  -     X-Ray Shoulder 2 or More Views Left; Future; Expected date: 07/19/2018    Contusion of left shoulder, initial encounter    Other orders  -     HYDROcodone-acetaminophen (NORCO) 5-325 mg per tablet; Take 1 tablet by mouth every 8 (eight) hours as needed.  Dispense: 15 tablet; Refill: 0        Follow up with ortho

## 2018-07-20 ENCOUNTER — INFUSION (OUTPATIENT)
Dept: INFUSION THERAPY | Facility: HOSPITAL | Age: 83
End: 2018-07-20
Attending: INTERNAL MEDICINE
Payer: MEDICARE

## 2018-07-20 VITALS — DIASTOLIC BLOOD PRESSURE: 70 MMHG | RESPIRATION RATE: 18 BRPM | SYSTOLIC BLOOD PRESSURE: 175 MMHG | HEART RATE: 59 BPM

## 2018-07-20 DIAGNOSIS — E79.0 HYPERURICEMIA: Primary | ICD-10-CM

## 2018-07-20 DIAGNOSIS — Z00.00 ANNUAL PHYSICAL EXAM: ICD-10-CM

## 2018-07-20 DIAGNOSIS — C91.10 CLL (CHRONIC LYMPHOCYTIC LEUKEMIA): ICD-10-CM

## 2018-07-20 PROCEDURE — S0028 INJECTION, FAMOTIDINE, 20 MG: HCPCS | Performed by: INTERNAL MEDICINE

## 2018-07-20 PROCEDURE — 96415 CHEMO IV INFUSION ADDL HR: CPT

## 2018-07-20 PROCEDURE — 63600175 PHARM REV CODE 636 W HCPCS: Performed by: INTERNAL MEDICINE

## 2018-07-20 PROCEDURE — 63600175 PHARM REV CODE 636 W HCPCS: Mod: JG | Performed by: INTERNAL MEDICINE

## 2018-07-20 PROCEDURE — 25000003 PHARM REV CODE 250: Performed by: INTERNAL MEDICINE

## 2018-07-20 PROCEDURE — 96413 CHEMO IV INFUSION 1 HR: CPT

## 2018-07-20 PROCEDURE — 96375 TX/PRO/DX INJ NEW DRUG ADDON: CPT

## 2018-07-20 PROCEDURE — 96367 TX/PROPH/DG ADDL SEQ IV INF: CPT

## 2018-07-20 RX ORDER — HEPARIN 100 UNIT/ML
500 SYRINGE INTRAVENOUS
Status: DISCONTINUED | OUTPATIENT
Start: 2018-07-20 | End: 2018-07-20 | Stop reason: HOSPADM

## 2018-07-20 RX ORDER — ACETAMINOPHEN 500 MG
1000 TABLET ORAL
Status: COMPLETED | OUTPATIENT
Start: 2018-07-20 | End: 2018-07-20

## 2018-07-20 RX ORDER — FAMOTIDINE 10 MG/ML
20 INJECTION INTRAVENOUS
Status: COMPLETED | OUTPATIENT
Start: 2018-07-20 | End: 2018-07-20

## 2018-07-20 RX ORDER — SODIUM CHLORIDE 0.9 % (FLUSH) 0.9 %
10 SYRINGE (ML) INJECTION
Status: DISCONTINUED | OUTPATIENT
Start: 2018-07-20 | End: 2018-07-20 | Stop reason: HOSPADM

## 2018-07-20 RX ADMIN — ACETAMINOPHEN 1000 MG: 500 TABLET, FILM COATED ORAL at 09:07

## 2018-07-20 RX ADMIN — DEXAMETHASONE SODIUM PHOSPHATE 10 MG: 4 INJECTION, SOLUTION INTRA-ARTICULAR; INTRALESIONAL; INTRAMUSCULAR; INTRAVENOUS; SOFT TISSUE at 09:07

## 2018-07-20 RX ADMIN — DIPHENHYDRAMINE HYDROCHLORIDE 50 MG: 50 INJECTION, SOLUTION INTRAMUSCULAR; INTRAVENOUS at 09:07

## 2018-07-20 RX ADMIN — SODIUM CHLORIDE 1000 MG: 0.9 INJECTION, SOLUTION INTRAVENOUS at 09:07

## 2018-07-20 RX ADMIN — FAMOTIDINE 20 MG: 10 INJECTION, SOLUTION INTRAVENOUS at 09:07

## 2018-07-20 RX ADMIN — SODIUM CHLORIDE: 0.9 INJECTION, SOLUTION INTRAVENOUS at 09:07

## 2018-07-20 NOTE — PLAN OF CARE
Problem: Patient Care Overview (Adult)  Goal: Plan of Care Review  Outcome: Ongoing (interventions implemented as appropriate)  Pt tolerated Ofatumumab well.  No s/s of reaction. Vitals stable, NAD.

## 2018-08-01 ENCOUNTER — OFFICE VISIT (OUTPATIENT)
Dept: DERMATOLOGY | Facility: CLINIC | Age: 83
End: 2018-08-01
Payer: MEDICARE

## 2018-08-01 VITALS — WEIGHT: 150 LBS | BODY MASS INDEX: 20.92 KG/M2

## 2018-08-01 DIAGNOSIS — Z85.828 HISTORY OF SKIN CANCER: ICD-10-CM

## 2018-08-01 DIAGNOSIS — D48.5 NEOPLASM OF UNCERTAIN BEHAVIOR OF SKIN: Primary | ICD-10-CM

## 2018-08-01 DIAGNOSIS — L57.0 MULTIPLE ACTINIC KERATOSES: ICD-10-CM

## 2018-08-01 PROCEDURE — 11101 PR BIOPSY, EACH ADDED LESION: CPT | Mod: S$GLB,,, | Performed by: DERMATOLOGY

## 2018-08-01 PROCEDURE — 11100 PR BIOPSY OF SKIN LESION: CPT | Mod: S$GLB,,, | Performed by: DERMATOLOGY

## 2018-08-01 PROCEDURE — 88305 TISSUE EXAM BY PATHOLOGIST: CPT | Performed by: PATHOLOGY

## 2018-08-01 PROCEDURE — 99213 OFFICE O/P EST LOW 20 MIN: CPT | Mod: 25,S$GLB,, | Performed by: DERMATOLOGY

## 2018-08-01 PROCEDURE — 99999 PR PBB SHADOW E&M-EST. PATIENT-LVL III: CPT | Mod: PBBFAC,,, | Performed by: DERMATOLOGY

## 2018-08-01 NOTE — PROGRESS NOTES
Subjective:       Patient ID:  Thierry Ramos Jr. is a 84 y.o. male who presents for   Chief Complaint   Patient presents with    Follow-up     SCC left temple     History of Present Illness: The patient presents with chief complaint of spot.  Location: nose  Duration: months  Signs/Symptoms: growing    Prior treatments: none  This is a high risk patient here to check for the development of new lesions.            Review of Systems   Constitutional: Negative for fever.   Skin: Negative for itching and rash.   Hematologic/Lymphatic: Bruises/bleeds easily.        Objective:    Physical Exam   Constitutional: He appears well-developed and well-nourished. No distress.   Neurological: He is alert and oriented to person, place, and time. He is not disoriented.   Psychiatric: He has a normal mood and affect.   Skin:   Areas Examined (abnormalities noted in diagram):   Scalp / Hair Palpated and Inspected  Head / Face Inspection Performed  Neck Inspection Performed  Chest / Axilla Inspection Performed  RUE Inspected  LUE Inspection Performed                       Diagram Legend     Erythematous scaling macule/papule c/w actinic keratosis       Vascular papule c/w angioma      Pigmented verrucoid papule/plaque c/w seborrheic keratosis      Yellow umbilicated papule c/w sebaceous hyperplasia      Irregularly shaped tan macule c/w lentigo     1-2 mm smooth white papules consistent with Milia      Movable subcutaneous cyst with punctum c/w epidermal inclusion cyst      Subcutaneous movable cyst c/w pilar cyst      Firm pink to brown papule c/w dermatofibroma      Pedunculated fleshy papule(s) c/w skin tag(s)      Evenly pigmented macule c/w junctional nevus     Mildly variegated pigmented, slightly irregular-bordered macule c/w mildly atypical nevus      Flesh colored to evenly pigmented papule c/w intradermal nevus       Pink pearly papule/plaque c/w basal cell carcinoma      Erythematous hyperkeratotic cursted plaque c/w  SCC      Surgical scar with no sign of skin cancer recurrence      Open and closed comedones      Inflammatory papules and pustules      Verrucoid papule consistent consistent with wart     Erythematous eczematous patches and plaques     Dystrophic onycholytic nail with subungual debris c/w onychomycosis     Umbilicated papule    Erythematous-base heme-crusted tan verrucoid plaque consistent with inflamed seborrheic keratosis     Erythematous Silvery Scaling Plaque c/w Psoriasis     See annotation      Assessment / Plan:      Pathology Orders:     Normal Orders This Visit    Tissue Specimen To Pathology, Dermatology     Questions:    Directional Terms:  Other(comment)    Clinical information:  bcc    Specific Site:  right nose    Tissue Specimen To Pathology, Dermatology     Questions:    Directional Terms:  Other(comment)    Clinical information:  r/o scc    Specific Site:  left cheek        Neoplasm of uncertain behavior of skin  -     Tissue Specimen To Pathology, Dermatology  -     Tissue Specimen To Pathology, Dermatology  Shave biopsy performed after verbal consent including risk of infection, scar, recurrence, need for additional treatment of site. Area prepped with alcohol, anesthetized with 1% lidocaine with epinephrine. . Hemostasis achieved with monsels. No complications. Dressing applied. Wound care explained. Will need further rx if + ca        Shave biopsy performed after verbal consent including risk of infection, scar, recurrence, need for additional treatment of site. Area prepped with alcohol, anesthetized with  1% lidocaine with epinephrine. . Hemostasis achieved with monsels. No complications. Dressing applied. Wound care explained. Will need further rx if + ca            History of skin cancer  [facility]    Multiple actinic keratoses  Left arm, will use efudex hs for 2 months             Follow-up in about 3 months (around 11/1/2018).

## 2018-08-13 ENCOUNTER — INFUSION (OUTPATIENT)
Dept: INFUSION THERAPY | Facility: HOSPITAL | Age: 83
End: 2018-08-13
Attending: INTERNAL MEDICINE
Payer: MEDICARE

## 2018-08-13 ENCOUNTER — TELEPHONE (OUTPATIENT)
Dept: DERMATOLOGY | Facility: CLINIC | Age: 83
End: 2018-08-13

## 2018-08-13 VITALS
BODY MASS INDEX: 20.92 KG/M2 | DIASTOLIC BLOOD PRESSURE: 55 MMHG | RESPIRATION RATE: 18 BRPM | HEART RATE: 72 BPM | WEIGHT: 150 LBS | TEMPERATURE: 98 F | SYSTOLIC BLOOD PRESSURE: 114 MMHG

## 2018-08-13 DIAGNOSIS — J32.9 CHRONIC SINUSITIS, UNSPECIFIED LOCATION: ICD-10-CM

## 2018-08-13 DIAGNOSIS — C91.10 CLL (CHRONIC LYMPHOCYTIC LEUKEMIA): ICD-10-CM

## 2018-08-13 DIAGNOSIS — E79.0 HYPERURICEMIA: ICD-10-CM

## 2018-08-13 DIAGNOSIS — D80.1 HYPOGAMMAGLOBULINEMIA: Primary | ICD-10-CM

## 2018-08-13 PROCEDURE — 63600175 PHARM REV CODE 636 W HCPCS: Mod: JG | Performed by: INTERNAL MEDICINE

## 2018-08-13 PROCEDURE — 96375 TX/PRO/DX INJ NEW DRUG ADDON: CPT

## 2018-08-13 PROCEDURE — 96365 THER/PROPH/DIAG IV INF INIT: CPT

## 2018-08-13 PROCEDURE — 96367 TX/PROPH/DG ADDL SEQ IV INF: CPT

## 2018-08-13 PROCEDURE — 25000003 PHARM REV CODE 250: Performed by: INTERNAL MEDICINE

## 2018-08-13 PROCEDURE — S0028 INJECTION, FAMOTIDINE, 20 MG: HCPCS | Performed by: INTERNAL MEDICINE

## 2018-08-13 PROCEDURE — 96366 THER/PROPH/DIAG IV INF ADDON: CPT

## 2018-08-13 RX ORDER — FAMOTIDINE 10 MG/ML
20 INJECTION INTRAVENOUS
Status: COMPLETED | OUTPATIENT
Start: 2018-08-13 | End: 2018-08-13

## 2018-08-13 RX ORDER — ACETAMINOPHEN 325 MG/1
650 TABLET ORAL
Status: COMPLETED | OUTPATIENT
Start: 2018-08-13 | End: 2018-08-13

## 2018-08-13 RX ORDER — SODIUM CHLORIDE 0.9 % (FLUSH) 0.9 %
10 SYRINGE (ML) INJECTION
Status: CANCELLED | OUTPATIENT
Start: 2018-08-17

## 2018-08-13 RX ORDER — ACETAMINOPHEN 325 MG/1
650 TABLET ORAL
Status: CANCELLED | OUTPATIENT
Start: 2018-08-17

## 2018-08-13 RX ORDER — FAMOTIDINE 10 MG/ML
20 INJECTION INTRAVENOUS
Status: CANCELLED | OUTPATIENT
Start: 2018-08-17 | End: 2018-08-17

## 2018-08-13 RX ORDER — HEPARIN 100 UNIT/ML
500 SYRINGE INTRAVENOUS
Status: CANCELLED | OUTPATIENT
Start: 2018-08-17

## 2018-08-13 RX ADMIN — HUMAN IMMUNOGLOBULIN G 20 G: 20 LIQUID INTRAVENOUS at 10:08

## 2018-08-13 RX ADMIN — SODIUM CHLORIDE: 9 INJECTION, SOLUTION INTRAVENOUS at 10:08

## 2018-08-13 RX ADMIN — DIPHENHYDRAMINE HYDROCHLORIDE 50 MG: 50 INJECTION, SOLUTION INTRAMUSCULAR; INTRAVENOUS at 10:08

## 2018-08-13 RX ADMIN — FAMOTIDINE 20 MG: 10 INJECTION INTRAVENOUS at 10:08

## 2018-08-13 RX ADMIN — ACETAMINOPHEN 650 MG: 325 TABLET, FILM COATED ORAL at 10:08

## 2018-08-13 NOTE — TELEPHONE ENCOUNTER
----- Message from Faye Henry MA sent at 8/13/2018  8:50 AM CDT -----  Contact: call pt Monday on his cell at [phone]      ----- Message -----  From: Faye Henry MA  Sent: 8/10/2018  11:21 AM  To: Faye Henry MA        ----- Message -----  From: Tran Mcgill  Sent: 8/10/2018   9:02 AM  To: Izaiah Rodney S Staff    Izaiah pt-Pt is calling to set up surgery. Pt will be in the infusion center Monday and can be reached on his cell

## 2018-08-13 NOTE — PLAN OF CARE
Problem: Patient Care Overview (Adult)  Goal: Plan of Care Review  Outcome: Ongoing (interventions implemented as appropriate)  Patient tolerated infusion well.  No reactions noted.   AVS given.  No questions or concerns at this time.  Ambulated off unit unassisted.

## 2018-08-13 NOTE — PLAN OF CARE
Problem: Chemotherapy Effects (Adult)  Goal: Signs and Symptoms of Listed Potential Problems Will be Absent or Manageable (Chemotherapy Effects)  Signs and symptoms of listed potential problems will be absent or manageable by discharge/transition of care (reference Chemotherapy Effects (Adult) CPG).   Outcome: Ongoing (interventions implemented as appropriate)  Patient here for IVIG.  Assessment completed.  VSS.  No needs at this time.  Chair reclined and blanket offered.  Will continue to monitor.

## 2018-08-13 NOTE — TELEPHONE ENCOUNTER
Left message for pt to call the office regarding scheduling Mohs sx for SCC left cheek and BCC right nose.

## 2018-08-16 RX ORDER — TESTOSTERONE CYPIONATE 200 MG/ML
INJECTION, SOLUTION INTRAMUSCULAR
Qty: 6 ML | Refills: 1 | OUTPATIENT
Start: 2018-08-16

## 2018-08-28 ENCOUNTER — PATIENT MESSAGE (OUTPATIENT)
Dept: ENDOCRINOLOGY | Facility: CLINIC | Age: 83
End: 2018-08-28

## 2018-08-28 ENCOUNTER — TELEPHONE (OUTPATIENT)
Dept: ENDOCRINOLOGY | Facility: CLINIC | Age: 83
End: 2018-08-28

## 2018-08-28 DIAGNOSIS — E23.0 PANHYPOPITUITARISM: Primary | ICD-10-CM

## 2018-08-28 DIAGNOSIS — E03.8 CENTRAL HYPOTHYROIDISM: ICD-10-CM

## 2018-08-28 NOTE — TELEPHONE ENCOUNTER
----- Message from Rosaura Cobb sent at 8/28/2018 10:16 AM CDT -----  Contact: self   Patient states need refill on medication T[facility] called into [facility]   Pt states out of medication   Please call pt [phone] to let know done   no

## 2018-08-29 ENCOUNTER — PROCEDURE VISIT (OUTPATIENT)
Dept: DERMATOLOGY | Facility: CLINIC | Age: 83
End: 2018-08-29
Payer: MEDICARE

## 2018-08-29 VITALS
BODY MASS INDEX: 21 KG/M2 | HEIGHT: 71 IN | DIASTOLIC BLOOD PRESSURE: 62 MMHG | SYSTOLIC BLOOD PRESSURE: 113 MMHG | WEIGHT: 150 LBS | HEART RATE: 72 BPM

## 2018-08-29 DIAGNOSIS — C44.329 SQUAMOUS CELL CARCINOMA OF SKIN OF LEFT CHEEK: Primary | ICD-10-CM

## 2018-08-29 PROCEDURE — 99499 UNLISTED E&M SERVICE: CPT | Mod: S$GLB,,, | Performed by: DERMATOLOGY

## 2018-08-29 PROCEDURE — 13132 CMPLX RPR F/C/C/M/N/AX/G/H/F: CPT | Mod: 51,S$GLB,, | Performed by: DERMATOLOGY

## 2018-08-29 PROCEDURE — 17312 MOHS ADDL STAGE: CPT | Mod: S$GLB,,, | Performed by: DERMATOLOGY

## 2018-08-29 PROCEDURE — 17311 MOHS 1 STAGE H/N/HF/G: CPT | Mod: S$GLB,,, | Performed by: DERMATOLOGY

## 2018-08-29 RX ORDER — CEPHALEXIN 500 MG/1
500 CAPSULE ORAL 3 TIMES DAILY
Qty: 30 CAPSULE | Refills: 0 | Status: ON HOLD | OUTPATIENT
Start: 2018-08-29 | End: 2018-09-03 | Stop reason: HOSPADM

## 2018-08-29 NOTE — PROGRESS NOTES
PROCEDURE: Mohs' Micrographic Surgery    INDICATION: Location in non-mask areas of face where maximum conservation of tumor-free tissue is needed. Biopsy-proven skin cancer of cosmetically and functionally important areas, including head, neck, genital, hand, foot, or areas known for having difficulty in healing, such as the lower anterior legs. Tumor with ill-defined borders.    REFERRING MD: Karissa Monsivais M.D.    CASE NUMBER:     ANESTHETIC: 6 cc 0.5% Lidocaine with Epi 1:200,000 mixed 1:1 with 0.5% Bupivacaine    SURGICAL PREP: Hibiclens    SURGEON: Ronel Bliss MD    ASSISTANTS: Terri Subramanian PA-C and Reba Rosario, Surg Tech    PREOPERATIVE DIAGNOSIS: squamous cell carcinoma    POSTOPERATIVE DIAGNOSIS: squamous cell carcinoma    PATHOLOGIC DIAGNOSIS: squamous cell carcinoma- invasive, moderately differentiated    HISTOLOGY OF SPECIMENS IN FIRST STAGE:   Tumor Type: Tumor seen. Invasive squamous cell carcinoma: Proliferation of squamous cells exhibiting atypia and infiltrating within the dermis.  Moderately-differentiated squamous cell carcinoma: Proliferation of squamous cells exhibiting atypia of moderate differentiation and infiltrating within the dermis.   Depth of Invasion: epidermis and dermis  Perineural Invasion: No    HISTOLOGY OF SPECIMENS IN SUBSEQUENT STAGES:  Tumor Type: Tumor seen. Same as in first stage.   Depth of Invasion: epidermis and dermis  Perineural Invasion: No    STAGES OF MOHS' SURGERY PERFORMED: 3    TUMOR-FREE PLANE ACHIEVED: Yes    HEMOSTASIS: electrocoagulation     SPECIMENS: 5 (2 in stage A, 2 in stage B and 1 in stage C)    LOCATION: left (superolateral) cheek. Patient verified location with hand held mirror.    INITIAL LESION SIZE: 1.0 x 1.0 cm    FINAL DEFECT SIZE: 1.7 x 2.4 cm    WOUND REPAIR/DISPOSITION: The patient tolerated Mohs' Micrographic Surgery for a squamous cell carcinoma very well. When the tumor was completely removed, a repair of the surgical defect was  "undertaken.      PROCEDURE: Complex Linear Repair    INDICATION: Status post Mohs' Micrographic Surgery for squamous cell carcinoma.    CASE NUMBER:     SURGEON: Ronel Bliss MD    ASSISTANTS: Terri Subramanian PA-C and Faye Henry MA    ANESTHETIC: 3 cc 1% Lidocaine with Epinephrine 1:100,000    SURGICAL PREP: Hibiclens    LOCATION: left (superolateral) cheek    DEFECT SIZE: 1.7 x 2.4 cm    WOUND REPAIR/DISPOSITION:  After the patient's carcinoma had been completely removed with Mohs' Micrographic Surgery, a repair of the surgical defect was undertaken. The patient was returned to the operating suite where the area of left superolateral cheek was prepped, draped, and anesthetized in the usual sterile fashion. The wound was widely undermined in all directions. Then, electrocoagulation was used to obtain meticulous hemostasis. 5-0 Vicryl buried vertical mattress sutures were placed into the subcutaneous and dermal plane to close the wound and shankar the cutaneous wound edge. Bilateral dog ears were identified and were removed by a standard Burow's triangle technique. The cutaneous wound edges were closed using interrupted 5-0 Prolene suture.    The patient tolerated the procedure well.    The area was cleaned and dressed appropriately and the patient was given wound care instructions, as well as appointment for follow-up evaluation. Patient declined pain medication and was placed on Keflex 500 mg TID x 10 days.    LENGTH OF REPAIR: 4.3 cm    Vitals:    08/29/18 0724 08/29/18 1058   BP: 132/62 113/62   BP Location: Right arm    Patient Position: Sitting    BP Method: Small (Automatic)    Pulse: 71 72   Weight: 68 kg (150 lb)    Height: 5' 11" (1.803 m)          "

## 2018-08-31 RX ORDER — TESTOSTERONE CYPIONATE 200 MG/ML
INJECTION, SOLUTION INTRAMUSCULAR
Qty: 6 ML | Refills: 1 | OUTPATIENT
Start: 2018-08-31

## 2018-09-01 ENCOUNTER — HOSPITAL ENCOUNTER (OUTPATIENT)
Facility: HOSPITAL | Age: 83
Discharge: HOME | End: 2018-09-03
Attending: EMERGENCY MEDICINE | Admitting: INTERNAL MEDICINE
Payer: MEDICARE

## 2018-09-01 DIAGNOSIS — Z86.718 HISTORY OF DVT (DEEP VEIN THROMBOSIS): ICD-10-CM

## 2018-09-01 DIAGNOSIS — Z86.73 HISTORY OF CVA (CEREBROVASCULAR ACCIDENT): ICD-10-CM

## 2018-09-01 DIAGNOSIS — E11.8 TYPE 2 DIABETES MELLITUS WITH COMPLICATION, WITH LONG-TERM CURRENT USE OF INSULIN: ICD-10-CM

## 2018-09-01 DIAGNOSIS — R06.02 SHORTNESS OF BREATH: ICD-10-CM

## 2018-09-01 DIAGNOSIS — Z79.4 TYPE 2 DIABETES MELLITUS WITH COMPLICATION, WITH LONG-TERM CURRENT USE OF INSULIN: ICD-10-CM

## 2018-09-01 DIAGNOSIS — J32.1 CHRONIC FRONTAL SINUSITIS: ICD-10-CM

## 2018-09-01 DIAGNOSIS — I95.9 HYPOTENSION: ICD-10-CM

## 2018-09-01 DIAGNOSIS — R53.1 WEAKNESS: ICD-10-CM

## 2018-09-01 DIAGNOSIS — R55 SYNCOPE: Primary | ICD-10-CM

## 2018-09-01 DIAGNOSIS — R19.7 DIARRHEA, UNSPECIFIED TYPE: ICD-10-CM

## 2018-09-01 LAB
ALBUMIN SERPL BCP-MCNC: 3.8 G/DL
ALP SERPL-CCNC: 86 U/L
ALT SERPL W/O P-5'-P-CCNC: 35 U/L
ANION GAP SERPL CALC-SCNC: 11 MMOL/L
APTT BLDCRRT: 22.9 SEC
AST SERPL-CCNC: 43 U/L
BASOPHILS # BLD AUTO: 0.01 K/UL
BASOPHILS NFR BLD: 0.2 %
BILIRUB SERPL-MCNC: 0.9 MG/DL
BILIRUB UR QL STRIP: NEGATIVE
BNP SERPL-MCNC: 76 PG/ML
BUN SERPL-MCNC: 28 MG/DL
CALCIUM SERPL-MCNC: 10.1 MG/DL
CHLORIDE SERPL-SCNC: 104 MMOL/L
CLARITY UR: CLEAR
CO2 SERPL-SCNC: 25 MMOL/L
COLOR UR: YELLOW
CREAT SERPL-MCNC: 1.1 MG/DL
CRP SERPL-MCNC: 23.8 MG/L
DIFFERENTIAL METHOD: ABNORMAL
EOSINOPHIL # BLD AUTO: 0.1 K/UL
EOSINOPHIL NFR BLD: 1.2 %
ERYTHROCYTE [DISTWIDTH] IN BLOOD BY AUTOMATED COUNT: 14.9 %
ERYTHROCYTE [SEDIMENTATION RATE] IN BLOOD BY WESTERGREN METHOD: 9 MM/HR
EST. GFR  (AFRICAN AMERICAN): >60 ML/MIN/1.73 M^2
EST. GFR  (NON AFRICAN AMERICAN): >60 ML/MIN/1.73 M^2
ESTIMATED AVG GLUCOSE: 111 MG/DL
GLUCOSE SERPL-MCNC: 97 MG/DL
GLUCOSE UR QL STRIP: NEGATIVE
HBA1C MFR BLD HPLC: 5.5 %
HCT VFR BLD AUTO: 47.7 %
HGB BLD-MCNC: 15.1 G/DL
HGB UR QL STRIP: NEGATIVE
INR PPP: 0.9
KETONES UR QL STRIP: NEGATIVE
LEUKOCYTE ESTERASE UR QL STRIP: NEGATIVE
LYMPHOCYTES # BLD AUTO: 1.3 K/UL
LYMPHOCYTES NFR BLD: 22.8 %
MAGNESIUM SERPL-MCNC: 2 MG/DL
MCH RBC QN AUTO: 29.9 PG
MCHC RBC AUTO-ENTMCNC: 31.7 G/DL
MCV RBC AUTO: 95 FL
MONOCYTES # BLD AUTO: 0.7 K/UL
MONOCYTES NFR BLD: 11.6 %
NEUTROPHILS # BLD AUTO: 3.7 K/UL
NEUTROPHILS NFR BLD: 62.5 %
NITRITE UR QL STRIP: NEGATIVE
OB PNL STL: NEGATIVE
PH UR STRIP: 6 [PH] (ref 5–8)
PLATELET # BLD AUTO: 103 K/UL
PMV BLD AUTO: 9.5 FL
POCT GLUCOSE: 100 MG/DL (ref 70–110)
POCT GLUCOSE: 140 MG/DL (ref 70–110)
POCT GLUCOSE: 200 MG/DL (ref 70–110)
POCT GLUCOSE: 98 MG/DL (ref 70–110)
POTASSIUM SERPL-SCNC: 4.5 MMOL/L
PROCALCITONIN SERPL IA-MCNC: 0.18 NG/ML
PROT SERPL-MCNC: 6.8 G/DL
PROT UR QL STRIP: NEGATIVE
PROTHROMBIN TIME: 9.9 SEC
RBC # BLD AUTO: 5.05 M/UL
SODIUM SERPL-SCNC: 140 MMOL/L
SP GR UR STRIP: 1.01 (ref 1–1.03)
T4 FREE SERPL-MCNC: 0.94 NG/DL
TROPONIN I SERPL DL<=0.01 NG/ML-MCNC: <0.006 NG/ML
TSH SERPL DL<=0.005 MIU/L-ACNC: 0.06 UIU/ML
URN SPEC COLLECT METH UR: NORMAL
UROBILINOGEN UR STRIP-ACNC: NEGATIVE EU/DL
WBC # BLD AUTO: 5.87 K/UL

## 2018-09-01 PROCEDURE — 85652 RBC SED RATE AUTOMATED: CPT

## 2018-09-01 PROCEDURE — 87070 CULTURE OTHR SPECIMN AEROBIC: CPT

## 2018-09-01 PROCEDURE — 82962 GLUCOSE BLOOD TEST: CPT | Mod: 91

## 2018-09-01 PROCEDURE — 96361 HYDRATE IV INFUSION ADD-ON: CPT

## 2018-09-01 PROCEDURE — 84443 ASSAY THYROID STIM HORMONE: CPT

## 2018-09-01 PROCEDURE — 87186 SC STD MICRODIL/AGAR DIL: CPT

## 2018-09-01 PROCEDURE — 36415 COLL VENOUS BLD VENIPUNCTURE: CPT

## 2018-09-01 PROCEDURE — 63600175 PHARM REV CODE 636 W HCPCS: Performed by: NURSE PRACTITIONER

## 2018-09-01 PROCEDURE — 87077 CULTURE AEROBIC IDENTIFY: CPT

## 2018-09-01 PROCEDURE — 63600175 PHARM REV CODE 636 W HCPCS: Performed by: STUDENT IN AN ORGANIZED HEALTH CARE EDUCATION/TRAINING PROGRAM

## 2018-09-01 PROCEDURE — 83735 ASSAY OF MAGNESIUM: CPT

## 2018-09-01 PROCEDURE — 85025 COMPLETE CBC W/AUTO DIFF WBC: CPT

## 2018-09-01 PROCEDURE — 83880 ASSAY OF NATRIURETIC PEPTIDE: CPT

## 2018-09-01 PROCEDURE — 87040 BLOOD CULTURE FOR BACTERIA: CPT

## 2018-09-01 PROCEDURE — 86140 C-REACTIVE PROTEIN: CPT

## 2018-09-01 PROCEDURE — 84484 ASSAY OF TROPONIN QUANT: CPT

## 2018-09-01 PROCEDURE — 25000003 PHARM REV CODE 250: Performed by: EMERGENCY MEDICINE

## 2018-09-01 PROCEDURE — 93005 ELECTROCARDIOGRAM TRACING: CPT

## 2018-09-01 PROCEDURE — 84439 ASSAY OF FREE THYROXINE: CPT

## 2018-09-01 PROCEDURE — 94640 AIRWAY INHALATION TREATMENT: CPT

## 2018-09-01 PROCEDURE — 84145 PROCALCITONIN (PCT): CPT

## 2018-09-01 PROCEDURE — 63600175 PHARM REV CODE 636 W HCPCS: Performed by: INTERNAL MEDICINE

## 2018-09-01 PROCEDURE — 25000242 PHARM REV CODE 250 ALT 637 W/ HCPCS: Performed by: INTERNAL MEDICINE

## 2018-09-01 PROCEDURE — 25000003 PHARM REV CODE 250: Performed by: INTERNAL MEDICINE

## 2018-09-01 PROCEDURE — G0378 HOSPITAL OBSERVATION PER HR: HCPCS

## 2018-09-01 PROCEDURE — 93010 ELECTROCARDIOGRAM REPORT: CPT | Mod: ,,, | Performed by: INTERNAL MEDICINE

## 2018-09-01 PROCEDURE — 87205 SMEAR GRAM STAIN: CPT

## 2018-09-01 PROCEDURE — 81003 URINALYSIS AUTO W/O SCOPE: CPT

## 2018-09-01 PROCEDURE — 99285 EMERGENCY DEPT VISIT HI MDM: CPT | Mod: 25

## 2018-09-01 PROCEDURE — 82272 OCCULT BLD FECES 1-3 TESTS: CPT

## 2018-09-01 PROCEDURE — 96374 THER/PROPH/DIAG INJ IV PUSH: CPT

## 2018-09-01 PROCEDURE — 85730 THROMBOPLASTIN TIME PARTIAL: CPT

## 2018-09-01 PROCEDURE — 85610 PROTHROMBIN TIME: CPT

## 2018-09-01 PROCEDURE — 80053 COMPREHEN METABOLIC PANEL: CPT

## 2018-09-01 PROCEDURE — 83036 HEMOGLOBIN GLYCOSYLATED A1C: CPT

## 2018-09-01 PROCEDURE — 25000003 PHARM REV CODE 250: Performed by: NURSE PRACTITIONER

## 2018-09-01 RX ORDER — INSULIN ASPART 100 [IU]/ML
0-5 INJECTION, SOLUTION INTRAVENOUS; SUBCUTANEOUS
Status: DISCONTINUED | OUTPATIENT
Start: 2018-09-01 | End: 2018-09-03 | Stop reason: HOSPADM

## 2018-09-01 RX ORDER — ATORVASTATIN CALCIUM 20 MG/1
20 TABLET, FILM COATED ORAL DAILY
Status: DISCONTINUED | OUTPATIENT
Start: 2018-09-01 | End: 2018-09-03 | Stop reason: HOSPADM

## 2018-09-01 RX ORDER — BUDESONIDE 0.5 MG/2ML
0.5 INHALANT ORAL EVERY 12 HOURS
Status: DISCONTINUED | OUTPATIENT
Start: 2018-09-01 | End: 2018-09-03 | Stop reason: HOSPADM

## 2018-09-01 RX ORDER — VANCOMYCIN HCL IN 5 % DEXTROSE 1G/250ML
1000 PLASTIC BAG, INJECTION (ML) INTRAVENOUS
Status: DISCONTINUED | OUTPATIENT
Start: 2018-09-01 | End: 2018-09-03 | Stop reason: HOSPADM

## 2018-09-01 RX ORDER — GLUCAGON 1 MG
1 KIT INJECTION
Status: DISCONTINUED | OUTPATIENT
Start: 2018-09-01 | End: 2018-09-03 | Stop reason: HOSPADM

## 2018-09-01 RX ORDER — ONDANSETRON 2 MG/ML
4 INJECTION INTRAMUSCULAR; INTRAVENOUS
Status: COMPLETED | OUTPATIENT
Start: 2018-09-01 | End: 2018-09-01

## 2018-09-01 RX ORDER — CEPHALEXIN 500 MG/1
500 CAPSULE ORAL
Status: COMPLETED | OUTPATIENT
Start: 2018-09-01 | End: 2018-09-01

## 2018-09-01 RX ORDER — PANTOPRAZOLE SODIUM 40 MG/1
40 TABLET, DELAYED RELEASE ORAL DAILY
Status: DISCONTINUED | OUTPATIENT
Start: 2018-09-01 | End: 2018-09-03 | Stop reason: HOSPADM

## 2018-09-01 RX ORDER — VANCOMYCIN HCL IN 5 % DEXTROSE 1G/250ML
1000 PLASTIC BAG, INJECTION (ML) INTRAVENOUS
Status: DISCONTINUED | OUTPATIENT
Start: 2018-09-01 | End: 2018-09-01

## 2018-09-01 RX ORDER — CEPHALEXIN 500 MG/1
500 CAPSULE ORAL 3 TIMES DAILY
Status: DISCONTINUED | OUTPATIENT
Start: 2018-09-01 | End: 2018-09-01

## 2018-09-01 RX ORDER — LEVOTHYROXINE SODIUM 125 UG/1
125 TABLET ORAL
Status: DISCONTINUED | OUTPATIENT
Start: 2018-09-02 | End: 2018-09-03 | Stop reason: HOSPADM

## 2018-09-01 RX ORDER — CHLORHEXIDINE GLUCONATE ORAL RINSE 1.2 MG/ML
15 SOLUTION DENTAL 2 TIMES DAILY
Status: DISCONTINUED | OUTPATIENT
Start: 2018-09-01 | End: 2018-09-03 | Stop reason: HOSPADM

## 2018-09-01 RX ORDER — CLOPIDOGREL BISULFATE 75 MG/1
75 TABLET ORAL DAILY
Status: DISCONTINUED | OUTPATIENT
Start: 2018-09-02 | End: 2018-09-03 | Stop reason: HOSPADM

## 2018-09-01 RX ORDER — IBUPROFEN 200 MG
24 TABLET ORAL
Status: DISCONTINUED | OUTPATIENT
Start: 2018-09-01 | End: 2018-09-03 | Stop reason: HOSPADM

## 2018-09-01 RX ORDER — IBUPROFEN 200 MG
16 TABLET ORAL
Status: DISCONTINUED | OUTPATIENT
Start: 2018-09-01 | End: 2018-09-03 | Stop reason: HOSPADM

## 2018-09-01 RX ADMIN — VANCOMYCIN HYDROCHLORIDE 1000 MG: 1 INJECTION, POWDER, LYOPHILIZED, FOR SOLUTION INTRAVENOUS at 03:09

## 2018-09-01 RX ADMIN — ONDANSETRON 4 MG: 2 INJECTION INTRAMUSCULAR; INTRAVENOUS at 08:09

## 2018-09-01 RX ADMIN — CHLORHEXIDINE GLUCONATE 15 ML: 1.2 RINSE ORAL at 09:09

## 2018-09-01 RX ADMIN — LEVOTHYROXINE SODIUM 125 MCG: 50 TABLET ORAL at 09:09

## 2018-09-01 RX ADMIN — SODIUM CHLORIDE 1000 ML: 0.9 INJECTION, SOLUTION INTRAVENOUS at 08:09

## 2018-09-01 RX ADMIN — BUDESONIDE 0.5 MG: 0.5 SUSPENSION RESPIRATORY (INHALATION) at 08:09

## 2018-09-01 RX ADMIN — PIPERACILLIN AND TAZOBACTAM 4.5 G: 4; .5 INJECTION, POWDER, LYOPHILIZED, FOR SOLUTION INTRAVENOUS; PARENTERAL at 05:09

## 2018-09-01 RX ADMIN — PREDNISONE 7.5 MG: 2.5 TABLET ORAL at 01:09

## 2018-09-01 RX ADMIN — CEPHALEXIN 500 MG: 500 CAPSULE ORAL at 09:09

## 2018-09-01 RX ADMIN — SODIUM CHLORIDE 1000 ML: 0.9 INJECTION, SOLUTION INTRAVENOUS at 01:09

## 2018-09-01 RX ADMIN — PREDNISONE 7.5 MG: 2.5 TABLET ORAL at 09:09

## 2018-09-01 RX ADMIN — SODIUM CHLORIDE 1000 ML: 0.9 INJECTION, SOLUTION INTRAVENOUS at 10:09

## 2018-09-01 RX ADMIN — SODIUM CHLORIDE 1000 ML: 0.9 INJECTION, SOLUTION INTRAVENOUS at 09:09

## 2018-09-01 NOTE — ED PROVIDER NOTES
Encounter Date: 9/1/2018       History     Chief Complaint   Patient presents with    Loss of Consciousness     States began with N/V/D during the night with syncope this morning while in bathroom. Assisted to floor by family member and denies injury. Presents awake, alert, oriented with continued c/o generalized weakness. No h/o fever reported. s/p skin cancer removal to left side of face 2 days ago.     85yo male with pmhx of anemia, CLL, DM, HLD, HTN, hypopituitarism, hypothyroidism, skin cancer, stroke, and recent Mohs procedure for left sided facial skin cancer presents to the ED with his wife for syncope.  The patient reports that starting last night he noticed that he was burping frequently.  He then had several episodes of non bloody diarrhea.  This morning he has vomited about 3 times, nonbloody material.  While attempting to take his medication this morning, he became extremely nauseated and vomited.  He was standing outside when his wife noticed that he looked weak, and she assisted him to the ground.  Pt's wife reports that he was unconscious for about a minute with spontaneous resolution. Pt has had syncopal episodes in the past. No history of CAD.  Pt denies headache, numbness/tingling, CP, SOB, abd pain, dysuria, fever.  He does report chills, decreased activity, fatigue, and chronic productive cough.  Pt is on plavix for history of CVA.  Pt is also on Keflex after his Mohs procedure.  This is the extent of the patient's complaints at this time.       The history is provided by the patient.     Review of patient's allergies indicates:   Allergen Reactions    Ace inhibitors Swelling     angioedema     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    Hyperlipidemia     Hypertension     Hypopituitarism     Hypothyroid      "Immunosuppression 3/13/2015    Squamous Cell Carcinoma     on the right side of the face     Squamous cell carcinoma of skin of right temple 1/27/2016    Stroke     Syncope 2/12    Unspecified disorder of kidney and ureter      Past Surgical History:   Procedure Laterality Date    CAROTID ENDARTERECTOMY (L)  2/27/2007    EXCISION TURBINATE, SUBMUCOUS      FESS  9/16/2014    Mohs excision   3/23/2015, 2/24/2016    combined with WLE forehead    NASAL SEPTUM SURGERY  9/16/2014    right ureter surgery  1995    SENTINEL LYMPH NODE BIOPSY  3/23/2015, 2/24/2016    SINUS SURGERY  9/16/2014    septo, ESS, BITSMR    Transphenoidal surgery       Family History   Problem Relation Age of Onset    Cancer Mother         breast    Colon cancer Father     Cancer Father         colon    Cancer Brother         leukemia    No Known Problems Sister     No Known Problems Maternal Aunt     No Known Problems Maternal Uncle     No Known Problems Paternal Aunt     No Known Problems Paternal Uncle     No Known Problems Maternal Grandmother     No Known Problems Maternal Grandfather     No Known Problems Paternal Grandmother     No Known Problems Paternal Grandfather     Amblyopia Neg Hx     Blindness Neg Hx     Cataracts Neg Hx     Diabetes Neg Hx     Glaucoma Neg Hx     Hypertension Neg Hx     Macular degeneration Neg Hx     Retinal detachment Neg Hx     Strabismus Neg Hx     Stroke Neg Hx     Thyroid disease Neg Hx     Allergic rhinitis Neg Hx     Allergies Neg Hx     Angioedema Neg Hx     Asthma Neg Hx     Atopy Neg Hx     Eczema Neg Hx     Immunodeficiency Neg Hx     Rhinitis Neg Hx     Urticaria Neg Hx      Social History     Tobacco Use    Smoking status: Never Smoker    Smokeless tobacco: Never Used   Substance Use Topics    Alcohol use: Yes     Alcohol/week: 1.2 oz     Types: 1 Glasses of wine, 1 Cans of beer per week     Comment: "rarely"    Drug use: No     Review of Systems "   Constitutional: Positive for activity change, appetite change, chills and fatigue. Negative for fever.   HENT: Positive for congestion, postnasal drip and sinus pressure. Negative for nosebleeds and sinus pain.    Eyes: Negative for visual disturbance.   Respiratory: Negative for cough, chest tightness and shortness of breath.    Cardiovascular: Negative for chest pain.   Gastrointestinal: Positive for diarrhea, nausea and vomiting. Negative for abdominal pain and blood in stool.   Genitourinary: Negative for dysuria.   Musculoskeletal: Negative for back pain, joint swelling, myalgias and neck pain.   Skin: Positive for wound (left facial sutured wound).   Allergic/Immunologic: Positive for immunocompromised state.   Neurological: Positive for syncope and weakness (generalized). Negative for headaches.   Hematological: Bruises/bleeds easily.   Psychiatric/Behavioral: Negative for confusion.   All other systems reviewed and are negative.      Physical Exam     Initial Vitals [09/01/18 0712]   BP Pulse Resp Temp SpO2   (!) 109/53 82 15 97.6 °F (36.4 °C) 96 %      MAP       --         Physical Exam    Nursing note and vitals reviewed.  Constitutional: He appears well-developed and well-nourished. He is active and cooperative. He is easily aroused.  Non-toxic appearance. He has a sickly appearance (chronically ill). He does not appear ill. No distress.   HENT:   Head: Normocephalic and atraumatic.       Right Ear: External ear normal.   Left Ear: External ear normal.   Nose: No rhinorrhea.   Mouth/Throat: Uvula is midline, oropharynx is clear and moist and mucous membranes are normal.   Eyes: Conjunctivae, EOM and lids are normal.   Neck: Normal range of motion and full passive range of motion without pain.   Cardiovascular: Normal rate, regular rhythm and normal heart sounds.   Pulmonary/Chest: Effort normal and breath sounds normal.   Productive cough on exam- yellow.  Pt reports cough is chronic.    Abdominal:  Soft. Normal appearance and bowel sounds are normal. He exhibits no distension. There is no tenderness. There is no rigidity, no rebound, no guarding and no CVA tenderness.   Neurological: He is alert, oriented to person, place, and time and easily aroused. He has normal strength. No cranial nerve deficit or sensory deficit. Coordination normal. GCS eye subscore is 4. GCS verbal subscore is 5. GCS motor subscore is 6.   Skin: Skin is warm and dry. Capillary refill takes less than 2 seconds. Abrasion and ecchymosis (UE) noted. No rash noted. No pallor.   Sutured wound to left face without drainage or erythema.  Well-approximated.      Several healing excoriations to bilateral forearms.       Psychiatric: He has a normal mood and affect. His speech is normal and behavior is normal. Judgment and thought content normal. Cognition and memory are normal.         ED Course   Procedures  Labs Reviewed   CBC W/ AUTO DIFFERENTIAL - Abnormal; Notable for the following components:       Result Value    MCHC 31.7 (*)     RDW 14.9 (*)     Platelets 103 (*)     All other components within normal limits   COMPREHENSIVE METABOLIC PANEL - Abnormal; Notable for the following components:    BUN, Bld 28 (*)     AST 43 (*)     All other components within normal limits   TSH - Abnormal; Notable for the following components:    TSH 0.058 (*)     All other components within normal limits   CULTURE, BLOOD   CULTURE, BLOOD   CULTURE, STOOL   TROPONIN I   PROTIME-INR   MAGNESIUM   APTT   URINALYSIS, REFLEX TO URINE CULTURE    Narrative:     Preferred Collection Type->Urine, Clean Catch   B-TYPE NATRIURETIC PEPTIDE   T4, FREE   OCCULT BLOOD X 1, STOOL   POCT GLUCOSE   POCT GLUCOSE   POCT GLUCOSE MONITORING CONTINUOUS          Imaging Results          X-Ray Chest 1 View (In process)    Procedure changed from X-Ray Chest PA And Lateral                US Abdominal Aorta (Final result)  Result time 09/01/18 13:24:32    Final result by Akosua ESTRELLA  MD Lance (09/01/18 13:24:32)                 Impression:      No abdominal aortic aneurysm      Electronically signed by: Akosua Lucas MD  Date:    09/01/2018  Time:    13:24             Narrative:    EXAMINATION:  US ABDOMINAL AORTA    CLINICAL HISTORY:  Hypotension, unspecified    TECHNIQUE:  Limited ultrasound was performed of the abdominal aorta, with cross sectional diameter measurements obtained.    COMPARISON:  None.    FINDINGS:  The proximal abdominal aorta measures 1.5 x 1.1 cm.    The mid abdominal aorta measures 1.5 x 1.3 cm.    The distal abdominal aorta measures 1.3 x 0.9 cm.    The right iliac artery measures 0.6 x 0.5 cm.    The left iliac artery measures 0.6 x 0.7 cm.    Aortoiliac atherosclerosis: Mild                                 Medical Decision Making:   Initial Assessment:   84-year-old male presents the ED for a syncopal episode.  He also reports nausea, vomiting, and diarrhea which started last night.  He denies chest pain or shortness of breath.  The patient appears well, nontoxic.  Vitals stable. No focal neuro findings.  Differential Diagnosis:   Orthostatic hypotension, vasovagal syncope, dehydration, dysrhythmia, anemia,electrolyte derangement, infection, NSTEMI, CVA,obstructive cardiomyopathy, structural disease, aortic dissection, PE, pulmonary hypertension, carotid sinus syndrome, situational, neurogenic, psychogenic, drug-induced, autonomic failure, dehydration.     Clinical Tests:   Lab Tests: Ordered and Reviewed  Radiological Study: Ordered  Medical Tests: Ordered and Reviewed  ED Management:  Labs, EKG, IV fluids, zofran  CT head and CXR was ordered but pt declined.   Other:   I have discussed this case with another health care provider.       <> Summary of the Discussion:  ([id number]): Discussed case and persistent orthostatic hypotension.   declines to admit the patient until imaging completed, as he feels that the patient has unknown cause of  hypotension at this time.  Pt continues to decline imaging.  Pt also reports that he is not DNR.   notified.      spoke with , who agrees to accept the patient despite having no imaging.   9:07 AM  Pt does demonstrate orthostatic hypotension.  2nd liter NS ordered.  Labs: BUN elevated to 28, consistent with dehydration.  Normal creat. Trop and BNP negative.  H&H stable.  Plts low (103k), but improved since patient's previous values (88k).  Pt tolerating PO.  Reports chills but no other complaints.    10:02 AM  Pt continues to demonstrate orthostatic hypotension despite 2L NS bolus.  Third liter of NS ordered.  Pt has not taken his BP medication today and I do not suspect hypotension is due to medication. Pt continues to decline CXR and all imaging.     11:23 AM  Pt continues to demonstrate orthostatic hypotension despite 3L NS bolus.      11:13 AM  UA pending.  3L NS infused.  Will repeat orthostatic vitals.   Pt continues to demonstrate orthostatic hypotension despite 3L NS bolus.  Pt will be placed in observation to  for syncope and orthostatic hypotension.               Attending Attestation:     Physician Attestation Statement for NP/PA:   I have conducted a face to face encounter with this patient in addition to the NP/PA, due to Medical Complexity    Other NP/PA Attestation Additions:      Medical Decision Making: Emergent evaluation of an 84-year-old male presents with syncopal episode, history of GI illness with diarrhea and vomiting for the past few days.  He is afebrile, vital signs are positive for hypertension with orthostasis.  Patient was given significant fluid resuscitation, however remains hypotensive.  We will order ultrasound of the abdominal aorta, which is unremarkable for aneurysmal dilatation or free fluid.  Patient has refused all radiological studies with radiation, i.e. E x-ray or CT scan.  We have discussed the risks of trying to treat him with limited  information and patient still refuses.  He understands the risks, exhibits otherwise normal mentation and logical thinking.    I discussed the case with Dr. Figueredo, who will admit the patient to his service.                 ED Course as of Sep 01 1341   Sat Sep 01, 2018   1000 Patient has refused CT and chest x-ray  [NP]      ED Course User Index  [NP] Owen Zhao MD     Clinical Impression:     1. Syncope    2. Weakness    3. Hypotension    4. History of DVT (deep vein thrombosis)    5. History of CVA (cerebrovascular accident)    6. Type 2 diabetes, uncontrolled, with neuropathy    7. Type 2 diabetes mellitus with complication, with long-term current use of insulin                                  Bonita Addison, [facility]  09/01/18 1341       Bonita Addison, [facility]  09/01/18 1341       Owen Zhao MD  09/01/18 [id number]

## 2018-09-01 NOTE — PROGRESS NOTES
Pt arrived to unit from ED via stretcher. AAOx4. NAD noted. Tele initiated on pt, per MD order. Bedrails up x 2. Bed low and locked, alarm on. Fall precautions maintained. Call light within reach. See full documented assessment on pertinent flowsheets. Will continue to monitor.

## 2018-09-01 NOTE — ED TRIAGE NOTES
Pt presents to ED with complaint of N/V/D this morning with reported syncopal episode.  Pt was in the kitchen with hs wife standing behind him when event occurred, was assisted to ground by wife.  Reports unable to be aroused until EMS arrived. Hx of squamous cell carcinoma, had Moh's procedure on Wed.  Currently on Keflex TID.

## 2018-09-01 NOTE — H&P
"[facility] Medicine H&P Note     Admitting Team: \[facility]\"" Hospitalist Team B  Attending Physician: Bruce Figueredo MD  Resident: Dr. Evans   Intern: Dr. Hair     Date of Admit: 9/1/2018    Chief Complaint     Syncope with seizure-like activity x 30 seconds     Subjective:      History of Present Illness:  Thierry Ramos Jr. is a 84 y.o.  male who has a medical history of CLL (in remission, on Arzerra every 2 months, IVIg monthly), chronic thrombocytopenia, squamous cell carcinoma of skin, basal cell carcinoma of skin, panhypopituitarism s/p pituitary tumor resection (2002), CVA (2007), HTN, HLD, DM, DVT (2016) and anemia, carotid stenosis, syncope and unspecified disorder of kidney and ureter. The patient presented to [facility] on 9/1/2018 with a primary complaint of syncope with witnessed seizure-like activity.    The patient was in his usual state of health, independent of all ADLs and exercising daily, until the morning of admit. He reports having one early morning episode of non-bloody diarrhea "everything in me." Afterwards, he attempted to take his morning medications, at which time he became severely nauseated and had 3 episodes of NBNB vomiting. At this time, he felt all over body weakness and felt like he was going to pass out. Patient's wife saw him and helped him to floor as he had loss of consciousness x 30 seconds - 1 minute. Accompanied by bladder incontinence and bilateral UE jerking movements. Immediately afterwards, patient noted to be confused and lethargic and gasping for breath, resolved without intervention after ~15 minutes. Patient and wife certain he did not hit his head, also deny chest pain, shortness of breath, radiation, diaphoresis, abdominal pain, recent illness or sick contacts. Reports past syncopal episodes, last ~2 years ago, 2-3 events at this time, but denies further workup. Per patient, he had noted lower blood pressures over the days prior to " admit but was still taking home Losartan. Denies decrease in po intake though pt's wife states he does not take in many fluids at his baseline. Also reports h/o chronic sinusitis with several sinus procedures since CLL diagnosis, on maintenance IVIg, has been having productive cough x weeks (sputum yellow and thick in appearance).     Patient recently had Mohs procedure for SCC over right temple on Wednesday 8/29, after which he was prescribed Keflex tid. He reports compliance to all medications.     Patient adamantly denies head imaging due to concerns over radiation exposure. Received radiation after his pituitary resection in 2002, after which he developed CLL, squamous cell carcinoma of skin and basal cell carcinoma of skin.     Past Medical History:  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    Hyperlipidemia     Hypertension     Hypopituitarism     Hypothyroid     Immunosuppression 3/13/2015    Squamous Cell Carcinoma     on the right side of the face     Squamous cell carcinoma of skin of right temple 1/27/2016    Stroke     Syncope 2/12    Unspecified disorder of kidney and ureter        Past Surgical History:  Past Surgical History:   Procedure Laterality Date    CAROTID ENDARTERECTOMY (L)  2/27/2007    EXCISION TURBINATE, SUBMUCOUS      FESS  9/16/2014    Mohs excision   3/23/2015, 2/24/2016    combined with WLE forehead    NASAL SEPTUM SURGERY  9/16/2014    right ureter surgery  1995    SENTINEL LYMPH NODE BIOPSY  3/23/2015, 2/24/2016    SINUS SURGERY  9/16/2014    septo, ESS, BITSMR    Transphenoidal surgery         Allergies:  Review of patient's allergies indicates:   Allergen Reactions    Ace inhibitors Swelling     angioedema       Home Medications:  Prednisone 7.5mg  Testosterone 1/2cc  Levothyroxine  "125mcg  Atorvastatin 20mg  Levemir 18-26U  Novolog qprn  Plavix 75mg  Pantoprazole  Losartan 25mg  ASA 81mg  MV  Fish oil  B-12 vitamin 5000mcg  Keflex     Family History:  Mother - , breast cancer  Father - , colon cancer  Brother - , leukemia  Brother - , TBI s/p accident     Social History:  Social History     Tobacco Use    Smoking status: Never Smoker    Smokeless tobacco: Never Used   Substance Use Topics    Alcohol use: Yes     Alcohol/week: 1.2 oz     Types: 1 Glasses of wine, 1 Cans of beer per week     Comment: "rarely"    Drug use: No       Review of Systems:  All other systems are reviewed and are negative.    Health Maintaince :   Primary Care Physician: Dr. Juve Wooten     Immunizations:   TDap: need to assess   Flu: need to assess  Pna: need to assess     Cancer Screening:  Colonoscopy: last 2014, two 8-10mm polyps in descending colon resected, recommend re-screen 3-5 years, due now      Objective:   Last 24 Hour Vital Signs:  BP  Min: 78/54  Max: 113/72  Temp  Av.7 °F (36.5 °C)  Min: 97.6 °F (36.4 °C)  Max: 97.8 °F (36.6 °C)  Pulse  Av.6  Min: 70  Max: 93  Resp  Av.4  Min: 15  Max: 22  SpO2  Av %  Min: 96 %  Max: 100 %  Height  Av' 11" (180.3 cm)  Min: 5' 11" (180.3 cm)  Max: 5' 11" (180.3 cm)  Weight  Av kg (150 lb)  Min: 68 kg (150 lb)  Max: 68 kg (150 lb)  Body mass index is 20.92 kg/m².  No intake/output data recorded.    Physical Examination:  Gen - awake and alert, in no apparent distress, chronically ill appearance, cooperative on interview  HEENT - normocephalic and atraumatic, numerous erythematous crusting papules on face, EOM intact, PERRLA, clear conjunctivae, no scleral icterus, moist mucus membranes   CV - normal rate, regular rhythm, S1 and S2 appreciated  Resp - clear to auscultation bilaterally, no increased work of breathing, no crackles or wheezes noted  Abd - soft, nontender, nondistended, no CVA tenderness, +BS "   Rectal - no hemorrhoids or fissures, no mariola blood on DERRICK   Ext - no cyanosis or LE edema noted, no rashes or lesions   Neuro - AAOx3, GCS 15, no focal deficits appreciated   Skin - normal color and texture, abrasions and UE ecchymosis noted bilaterally, sutured wound to left face without drainage, swelling or erythema  Pulses - 1+ symmetrically (radial and DP)     Laboratory:  Most Recent Data:  CBC:   Lab Results   Component Value Date    WBC 5.87 09/01/2018    HGB 15.1 09/01/2018    HCT 47.7 09/01/2018     (L) 09/01/2018    MCV 95 09/01/2018    RDW 14.9 (H) 09/01/2018     WBC Differential: 62.5% N, 22.8% L, 11.6% M, 1.2% Eo, 0.2% Baso  BMP:   Lab Results   Component Value Date     09/01/2018    K 4.5 09/01/2018     09/01/2018    CO2 25 09/01/2018    BUN 28 (H) 09/01/2018    CREATININE 1.1 09/01/2018    GLU 97 09/01/2018    CALCIUM 10.1 09/01/2018    MG 2.0 09/01/2018    PHOS 3.5 10/21/2014     LFTs:   Lab Results   Component Value Date    PROT 6.8 09/01/2018    ALBUMIN 3.8 09/01/2018    BILITOT 0.9 09/01/2018    AST 43 (H) 09/01/2018    ALKPHOS 86 09/01/2018    ALT 35 09/01/2018     Coags:   Lab Results   Component Value Date    INR 0.9 09/01/2018     FLP:   Lab Results   Component Value Date    CHOL 169 12/07/2017    HDL 48 12/07/2017    LDLCALC 101.8 12/07/2017    TRIG 96 12/07/2017    CHOLHDL 28.4 12/07/2017     DM:   Lab Results   Component Value Date    HGBA1C 5.8 (H) 09/06/2017    HGBA1C 7.1 (H) 12/21/2016    HGBA1C 7.5 (H) 02/23/2016    LDLCALC 101.8 12/07/2017    CREATININE 1.1 09/01/2018     Thyroid:   Lab Results   Component Value Date    TSH 0.058 (L) 09/01/2018    FREET4 0.94 09/01/2018    B6VWDXW 5.4 06/29/2006     Anemia:   Lab Results   Component Value Date    IRON 72 05/30/2014    TIBC 246 (L) 05/30/2014    FERRITIN 463 (H) 05/30/2014     Cardiac:   Lab Results   Component Value Date    TROPONINI <0.006 09/01/2018    BNP 76 09/01/2018     Urinalysis:   Lab Results   Component  Value Date    LABURIN No growth 05/29/2014    COLORU Yellow 09/01/2018    SPECGRAV 1.015 09/01/2018    NITRITE Negative 09/01/2018    KETONESU Negative 09/01/2018    UROBILINOGEN Negative 09/01/2018    WBCUA 1 08/17/2017       Trended Lab Data:  Recent Labs   Lab  09/01/18   0756   WBC  5.87   HGB  15.1   HCT  47.7   PLT  103*   MCV  95   RDW  14.9*   NA  140   K  4.5   CL  104   CO2  25   BUN  28*   CREATININE  1.1   GLU  97   PROT  6.8   ALBUMIN  3.8   BILITOT  0.9   AST  43*   ALKPHOS  86   ALT  35       Trended Cardiac Data:  Recent Labs   Lab  09/01/18   0756   TROPONINI  <0.006   BNP  76       Microbiology Data:  9/1 BCx -   9/1 Stool Cx-     Other Results:  EKG (my interpretation): regular rate, sinus rhythm, no axis deviation, no enlargement      Radiology:  Imaging Results          X-Ray Chest 1 View (Final result)  Result time 09/01/18 13:51:12   Procedure changed from X-Ray Chest PA And Lateral     Final result by Akosua Lucas MD (09/01/18 13:51:12)                 Impression:      Interval development of new focal abnormal opacification of the left lung base since June 22, 2016.      Electronically signed by: Akosua Lucas MD  Date:    09/01/2018  Time:    13:51             Narrative:    EXAMINATION:  XR CHEST 1 VIEW    CLINICAL HISTORY:  h/o chronic sinusitis, productive cough, concern for sepsis;. Syncope and collapse    TECHNIQUE:  Single frontal portable view of the chest was performed.    COMPARISON:  June 22, 2016    FINDINGS:  Support devices: None    Since the previous examination, focal abnormal opacification, which could be due to focal consolidation related to infection or atelectasis, has developed at the left lung base.The right lung is clear, with normal appearance of pulmonary vasculature and no pleural effusion or pneumothorax.    The cardiac silhouette is normal in size. The hilar and mediastinal contours are unremarkable.    Visualized osseous structures are stable with  degenerative changes of the spine and shoulders.  There are atherosclerotic calcifications of the aorta.                               US Abdominal Aorta (Final result)  Result time 09/01/18 13:24:32    Final result by Akosua Lucas MD (09/01/18 13:24:32)                 Impression:      No abdominal aortic aneurysm      Electronically signed by: Akosua Lucas MD  Date:    09/01/2018  Time:    13:24             Narrative:    EXAMINATION:  US ABDOMINAL AORTA    CLINICAL HISTORY:  Hypotension, unspecified    TECHNIQUE:  Limited ultrasound was performed of the abdominal aorta, with cross sectional diameter measurements obtained.    COMPARISON:  None.    FINDINGS:  The proximal abdominal aorta measures 1.5 x 1.1 cm.    The mid abdominal aorta measures 1.5 x 1.3 cm.    The distal abdominal aorta measures 1.3 x 0.9 cm.    The right iliac artery measures 0.6 x 0.5 cm.    The left iliac artery measures 0.6 x 0.7 cm.    Aortoiliac atherosclerosis: Mild                                 Assessment:     Thierry Ramos Jr. is a 84 y.o.  male who has a medical history of CLL (in remission, on Arzerra every 2 months, IVIg monthly), chronic thrombocytopenia, squamous cell carcinoma of skin, basal cell carcinoma of skin, panhypopituitarism s/p pituitary tumor resection (2002), CVA (2007), HTN, HLD, DM, DVT (2016) and anemia, carotid stenosis, syncope and unspecified disorder of kidney and ureter. The patient presented to [facility] on 9/1/2018 with a primary complaint of syncope with witnessed seizure-like activity.     Plan:     Syncope vs. seizure  - pt has prior history of similar episodes, last ~2 years ago, without further workup  - patient reports large volume NB diarrhea and several episodes of emesis this morning just prior to syncopal episode   - per patient's wife, he does not regularly have much fluid po intake  - blood pressure has been low for last several days though patient still  taking ARB, orthostatic + in ED   - received 4L NS in ED with some improvement in symptoms  - appears more likely related to hypotension / hypoperfusion 2/2 dehydration  - 9/1 Abdominal aorta US negative for dissection or aneurysm   - follow up ESR and procal  - ordered EEG, will follow up   - ordered carotid doppler, will follow up    - start on vanc / zosyn, follow up vanc trough     Chronic thrombocytopenia  - likely side effect of CLL treatment, follows closely with Heme[facility]c  - on maintenance Arzerra every 2 months   - plts on admit 103K   - will continue to monitor     CLL  - in remission, on maintenance Arzerra every 2 months and IVIg monthly)   - no acute issues at this time    Skin cancer (squamous cell and basal cell)  - recently underwent Mohs procedure for SCC to right temporal area, discharged to home with keflex  - holding keflex at this time as patient is being covered under broad spectrum abx    Panhypopituitarism s/p pituitary resection (2002)  - started on home prednisone 7.5 mg and levothyroxine 125mcg   - no acute issues at this time, will continue to monitor     CVA  - will hold home plavix 75mg at this time, pending FOBT result  - will restart tomorrow 9/2     HTN  - per patient, home blood pressures have been low last several days but was compliant with home losartan  - orthostatic BPs in ED: lying 110/49 --> sitting 87/44, lying 105/50 --> sitting 78/54, reports still lightheaded on positional changes   - holding home losartan at this time     HLD  - continuing atorvastatin 20mg     DM  - started on SSI    DVT   - ordered LE DVT US, will follow up     Anemia  - obtained FOBT to rule out GI bleed  - H/H stable on admit  - will continue to monitor     Unspecified disorder of kidney and ureter   - per patient, history of stents as child  - Cr on admit 1.1   - not issue at this time    Diet: Diabetic   PPx: Protonix po   Dispo: follow up imaging studies and labs, at this time appears likely related  to hypotension / dehydration      Agata Herrmann MD  [facility]  [facility]     [facility] Hospitalist Pager numbers:   [facility] Hospitalist Medicine Team A (Tamy/Robert): [phone]  [facility] Hospitalist Medicine Team B (Bea/Lesli):  [phone]

## 2018-09-02 LAB
ALBUMIN SERPL BCP-MCNC: 3 G/DL
ALP SERPL-CCNC: 74 U/L
ALT SERPL W/O P-5'-P-CCNC: 28 U/L
ANION GAP SERPL CALC-SCNC: 7 MMOL/L
AST SERPL-CCNC: 39 U/L
BASOPHILS # BLD AUTO: ABNORMAL K/UL
BASOPHILS NFR BLD: 0 %
BILIRUB SERPL-MCNC: 0.6 MG/DL
BUN SERPL-MCNC: 26 MG/DL
CALCIUM SERPL-MCNC: 8.6 MG/DL
CHLORIDE SERPL-SCNC: 106 MMOL/L
CO2 SERPL-SCNC: 25 MMOL/L
CREAT SERPL-MCNC: 1.2 MG/DL
DIFFERENTIAL METHOD: ABNORMAL
EOSINOPHIL # BLD AUTO: ABNORMAL K/UL
EOSINOPHIL NFR BLD: 2 %
ERYTHROCYTE [DISTWIDTH] IN BLOOD BY AUTOMATED COUNT: 14.8 %
EST. GFR  (AFRICAN AMERICAN): >60 ML/MIN/1.73 M^2
EST. GFR  (NON AFRICAN AMERICAN): 55 ML/MIN/1.73 M^2
GLUCOSE SERPL-MCNC: 151 MG/DL
HCT VFR BLD AUTO: 42.5 %
HGB BLD-MCNC: 14.3 G/DL
LYMPHOCYTES # BLD AUTO: ABNORMAL K/UL
LYMPHOCYTES NFR BLD: 32 %
MCH RBC QN AUTO: 31.2 PG
MCHC RBC AUTO-ENTMCNC: 33.6 G/DL
MCV RBC AUTO: 93 FL
MONOCYTES # BLD AUTO: ABNORMAL K/UL
MONOCYTES NFR BLD: 6 %
NEUTROPHILS NFR BLD: 59 %
NEUTS BAND NFR BLD MANUAL: 1 %
PLATELET # BLD AUTO: 93 K/UL
PLATELET BLD QL SMEAR: ABNORMAL
PMV BLD AUTO: 10.2 FL
POCT GLUCOSE: 102 MG/DL (ref 70–110)
POCT GLUCOSE: 185 MG/DL (ref 70–110)
POCT GLUCOSE: 259 MG/DL (ref 70–110)
POCT GLUCOSE: 84 MG/DL (ref 70–110)
POTASSIUM SERPL-SCNC: 4.2 MMOL/L
PROT SERPL-MCNC: 5.2 G/DL
RBC # BLD AUTO: 4.59 M/UL
SODIUM SERPL-SCNC: 138 MMOL/L
WBC # BLD AUTO: 5.75 K/UL

## 2018-09-02 PROCEDURE — 85027 COMPLETE CBC AUTOMATED: CPT

## 2018-09-02 PROCEDURE — 85007 BL SMEAR W/DIFF WBC COUNT: CPT | Mod: NCS

## 2018-09-02 PROCEDURE — 25000003 PHARM REV CODE 250: Performed by: INTERNAL MEDICINE

## 2018-09-02 PROCEDURE — 83735 ASSAY OF MAGNESIUM: CPT

## 2018-09-02 PROCEDURE — 94640 AIRWAY INHALATION TREATMENT: CPT

## 2018-09-02 PROCEDURE — G0378 HOSPITAL OBSERVATION PER HR: HCPCS

## 2018-09-02 PROCEDURE — 63600175 PHARM REV CODE 636 W HCPCS: Performed by: INTERNAL MEDICINE

## 2018-09-02 PROCEDURE — 25000003 PHARM REV CODE 250: Performed by: STUDENT IN AN ORGANIZED HEALTH CARE EDUCATION/TRAINING PROGRAM

## 2018-09-02 PROCEDURE — 84100 ASSAY OF PHOSPHORUS: CPT

## 2018-09-02 PROCEDURE — 25000242 PHARM REV CODE 250 ALT 637 W/ HCPCS: Performed by: STUDENT IN AN ORGANIZED HEALTH CARE EDUCATION/TRAINING PROGRAM

## 2018-09-02 PROCEDURE — 36415 COLL VENOUS BLD VENIPUNCTURE: CPT

## 2018-09-02 PROCEDURE — 80053 COMPREHEN METABOLIC PANEL: CPT

## 2018-09-02 PROCEDURE — 25000242 PHARM REV CODE 250 ALT 637 W/ HCPCS: Performed by: INTERNAL MEDICINE

## 2018-09-02 RX ORDER — HEPARIN SODIUM 5000 [USP'U]/ML
5000 INJECTION, SOLUTION INTRAVENOUS; SUBCUTANEOUS EVERY 12 HOURS
Status: DISCONTINUED | OUTPATIENT
Start: 2018-09-02 | End: 2018-09-03 | Stop reason: HOSPADM

## 2018-09-02 RX ORDER — PREDNISONE 5 MG/1
15 TABLET ORAL DAILY
Status: DISCONTINUED | OUTPATIENT
Start: 2018-09-02 | End: 2018-09-03 | Stop reason: HOSPADM

## 2018-09-02 RX ORDER — IPRATROPIUM BROMIDE AND ALBUTEROL SULFATE 2.5; .5 MG/3ML; MG/3ML
3 SOLUTION RESPIRATORY (INHALATION)
Status: DISPENSED | OUTPATIENT
Start: 2018-09-02 | End: 2018-09-03

## 2018-09-02 RX ADMIN — PREDNISONE 15 MG: 5 TABLET ORAL at 08:09

## 2018-09-02 RX ADMIN — CHLORHEXIDINE GLUCONATE 15 ML: 1.2 RINSE ORAL at 09:09

## 2018-09-02 RX ADMIN — PIPERACILLIN AND TAZOBACTAM 4.5 G: 4; .5 INJECTION, POWDER, LYOPHILIZED, FOR SOLUTION INTRAVENOUS; PARENTERAL at 09:09

## 2018-09-02 RX ADMIN — BUDESONIDE 0.5 MG: 0.5 SUSPENSION RESPIRATORY (INHALATION) at 07:09

## 2018-09-02 RX ADMIN — PIPERACILLIN AND TAZOBACTAM 4.5 G: 4; .5 INJECTION, POWDER, LYOPHILIZED, FOR SOLUTION INTRAVENOUS; PARENTERAL at 07:09

## 2018-09-02 RX ADMIN — ATORVASTATIN CALCIUM 20 MG: 20 TABLET, FILM COATED ORAL at 09:09

## 2018-09-02 RX ADMIN — IPRATROPIUM BROMIDE AND ALBUTEROL SULFATE 3 ML: .5; 3 SOLUTION RESPIRATORY (INHALATION) at 07:09

## 2018-09-02 RX ADMIN — CLOPIDOGREL BISULFATE 75 MG: 75 TABLET ORAL at 09:09

## 2018-09-02 RX ADMIN — PIPERACILLIN AND TAZOBACTAM 4.5 G: 4; .5 INJECTION, POWDER, LYOPHILIZED, FOR SOLUTION INTRAVENOUS; PARENTERAL at 02:09

## 2018-09-02 RX ADMIN — HEPARIN SODIUM 5000 UNITS: 5000 INJECTION, SOLUTION INTRAVENOUS; SUBCUTANEOUS at 09:09

## 2018-09-02 RX ADMIN — VANCOMYCIN HYDROCHLORIDE 1000 MG: 1 INJECTION, POWDER, LYOPHILIZED, FOR SOLUTION INTRAVENOUS at 04:09

## 2018-09-02 RX ADMIN — LEVOTHYROXINE SODIUM 125 MCG: 125 TABLET ORAL at 06:09

## 2018-09-02 RX ADMIN — IPRATROPIUM BROMIDE AND ALBUTEROL SULFATE 3 ML: .5; 3 SOLUTION RESPIRATORY (INHALATION) at 01:09

## 2018-09-02 RX ADMIN — PANTOPRAZOLE SODIUM 40 MG: 40 TABLET, DELAYED RELEASE ORAL at 09:09

## 2018-09-02 RX ADMIN — INSULIN ASPART 1 UNITS: 100 INJECTION, SOLUTION INTRAVENOUS; SUBCUTANEOUS at 09:09

## 2018-09-02 NOTE — PLAN OF CARE
Problem: Patient Care Overview  Goal: Plan of Care Review  Outcome: Ongoing (interventions implemented as appropriate)  Patient is alert and oriented x 4. BP has been low. Encouraged patient to drink plenty of oral fluids. The latest reading is 137/65 mm of Hg. IV antibiotics given as prescribed and documented. Has been using urinal. Good volumes of urine, voided frequently. Suture on his left side of the face looks clean, dry and intact. No incidence of diarrhea tonight. Stayed in bed all night. Blood sugar levels have been stable. On telemetry showing NSR. Has lots of bruises and scabs on skin especially on both arms. All Pressure areas are intact. Safety maintained through out night.  Will continue to monitor patient.

## 2018-09-02 NOTE — PROGRESS NOTES
"[facility] Medicine Resident HONicoletteI Progress Note    Subjective:      Per patient, he feels great today, says he has not felt this good in a long time. Reports much improvement in breathing with use of nebulizer, would like prescription for discharge. Tolerating po intake, drinking a lot of fluid and urinating well. Feels like he can go home today. Denies fever, chills, nausea, vomiting, abdominal pain, diarrhea, shortness of breath or chest pain.     Telemetry shows sinus rhythm without ectopy.     Objective:   Last 24 Hour Vital Signs:  BP  Min: 78/54  Max: 140/67  Temp  Av.8 °F (36.6 °C)  Min: 97 °F (36.1 °C)  Max: 98.7 °F (37.1 °C)  Pulse  Av.9  Min: 48  Max: 89  Resp  Av.5  Min: 17  Max: 22  SpO2  Av.1 %  Min: 96 %  Max: 100 %  Height  Av' 11" (180.3 cm)  Min: 5' 11" (180.3 cm)  Max: 5' 11" (180.3 cm)  Weight  Av kg (150 lb 0 oz)  Min: 68 kg (150 lb 0 oz)  Max: 68 kg (150 lb 0 oz)  I/O last 3 completed shifts:  In: 3340 [P.O.:340; IV Piggyback:3000]  Out: 2280 [Urine:2280]    Physical Examination:  Gen - awake and alert, in no apparent distress, well-appearing and enthusiastic on interview   HEENT - normocephalic and atraumatic, PERRLA, EOM intact, clear conjunctivae, no scleral icterus, moist mucus membranes   CV - normal rate, regular rhythm, S1 and S2 appreciated, cap refill <2 seconds  Resp - clear to auscultation bilaterally, no crackles or wheezes noted, breathing comfortably on room air  Abd - soft, nontender, nondistended, +BS  Ext - no cyanosis or LE edema noted, no rashes or lesions  Neuro - AAOx3, GCS 15, no focal neurologic deficits appreciated   Skin - normal color and texture, abrasions and UE ecchymosis noted bilaterally, sutured wound to left face without drainage/swelling/erythema  Pulses - 2+ symmetrically (radial and DP)     Laboratory:  Laboratory Data Reviewed: yes  Pertinent Findings:  Trended Lab Data:  Recent Labs   Lab  18   0756  18   0834   WBC "  5.87   --    HGB  15.1  14.3   HCT  47.7  42.5   PLT  103*   --    MCV  95  93   RDW  14.9*  14.8*   NA  140   --    K  4.5   --    CL  104   --    CO2  25   --    BUN  28*   --    GLU  97   --    CALCIUM  10.1   --    PROT  6.8   --    ALBUMIN  3.8   --    BILITOT  0.9   --    AST  43*   --    ALKPHOS  86   --    ALT  35   --        Microbiology Data Reviewed: yes  Pertinent Findings:  9/1 BCx - NGTD x1 day (x2)   9/1 Stool Cx- needs to be collected   9/1 Resp Cx - <10 epithelial cells per low power field, rare WBCs, moderate gram + cocci, few gram - rods     Other Results:  Prior EKG (my interpretation): regular rate, sinus rhythm    Radiology Data Reviewed: yes  Pertinent Findings:  Imaging Results          X-Ray Chest 1 View (Final result)  Result time 09/01/18 13:51:12   Procedure changed from X-Ray Chest PA And Lateral     Final result by Akosua Lucas MD (09/01/18 13:51:12)                 Impression:      Interval development of new focal abnormal opacification of the left lung base since June 22, 2016.      Electronically signed by: Akosua Lucas MD  Date:    09/01/2018  Time:    13:51             Narrative:    EXAMINATION:  XR CHEST 1 VIEW    CLINICAL HISTORY:  h/o chronic sinusitis, productive cough, concern for sepsis;. Syncope and collapse    TECHNIQUE:  Single frontal portable view of the chest was performed.    COMPARISON:  June 22, 2016    FINDINGS:  Support devices: None    Since the previous examination, focal abnormal opacification, which could be due to focal consolidation related to infection or atelectasis, has developed at the left lung base.The right lung is clear, with normal appearance of pulmonary vasculature and no pleural effusion or pneumothorax.    The cardiac silhouette is normal in size. The hilar and mediastinal contours are unremarkable.    Visualized osseous structures are stable with degenerative changes of the spine and shoulders.  There are atherosclerotic  calcifications of the aorta.                               US Abdominal Aorta (Final result)  Result time 09/01/18 13:24:32    Final result by Akosua Lucas MD (09/01/18 13:24:32)                 Impression:      No abdominal aortic aneurysm      Electronically signed by: Akosua Lucas MD  Date:    09/01/2018  Time:    13:24             Narrative:    EXAMINATION:  US ABDOMINAL AORTA    CLINICAL HISTORY:  Hypotension, unspecified    TECHNIQUE:  Limited ultrasound was performed of the abdominal aorta, with cross sectional diameter measurements obtained.    COMPARISON:  None.    FINDINGS:  The proximal abdominal aorta measures 1.5 x 1.1 cm.    The mid abdominal aorta measures 1.5 x 1.3 cm.    The distal abdominal aorta measures 1.3 x 0.9 cm.    The right iliac artery measures 0.6 x 0.5 cm.    The left iliac artery measures 0.6 x 0.7 cm.    Aortoiliac atherosclerosis: Mild                                  Current Medications:     Infusions:       Scheduled:   atorvastatin  20 mg Oral Daily    budesonide  0.5 mg Nebulization Q12H    chlorhexidine  15 mL Mouth/Throat BID    clopidogrel  75 mg Oral Daily    levothyroxine  125 mcg Oral Before breakfast    multivitamin with minerals  1 tablet Oral Daily    pantoprazole  40 mg Oral Daily    piperacillin-tazobactam (ZOSYN) IVPB  4.5 g Intravenous Q8H    predniSONE  15 mg Oral Daily    vancomycin (VANCOCIN) IVPB  1,000 mg Intravenous Q24H        PRN:  dextrose 50%, dextrose 50%, glucagon (human recombinant), glucose, glucose, insulin aspart U-100    Antibiotics and Day Number of Therapy:  Zosyn 4.5g IV q8h (started 9/1)  Vanc 1g IV q24h (started 9/1)    Lines and Day Number of Therapy:  PIV     Assessment:     Thierry Richard Shay is a 84 y.o.  male who has a medical history of CLL (in remission, on Arzerra every 2 months, IVIg monthly), chronic thrombocytopenia, squamous cell carcinoma of skin, basal cell carcinoma of skin, panhypopituitarism s/p  pituitary tumor resection (2002), CVA (2007), HTN, HLD, DM, DVT (2016) and anemia, carotid stenosis, syncope and unspecified disorder of kidney and ureter. The patient presented to [facility] on 9/1/2018 with a primary complaint of syncope with witnessed seizure-like activity.     Plan:     Syncope vs. Seizure  - pt has prior history of similar episodes, last ~2 years ago, without further workup  - patient reports large volume NB diarrhea and several episodes of emesis just prior to syncopal episode   - per patient's wife, he does not regularly have much fluid po intake  - blood pressure had been low for last several days though patient still taking ARB, orthostatic + in ED   - received 4L NS in ED with improvement in symptoms  - 9/1 Abdominal aorta US negative for dissection or aneurysm   - ESR 9 / Procal 0.18 / CRP 23.8   - ordered carotid doppler, will follow up    - pt much improved since admit following IVF and po intake  - start on vanc / zosyn 9/1, follow up vanc trough   - BCx: NGTD x1d, RespCx -<10 epithelial cells per low power field, rare WBCs, moderate gram + cocci, few gram - rods, continue to follow up     - ordered TTE, most recent 2014, will follow up     Vomiting and diarrhea  - pt with large quantity NB bowel movement and several episodes NBNB emesis on morning of admit  - no episodes since admit   - ordered C diff panel     Chronic sinusitis  - pt on IVIg for chronic sinusitis and respiratory infections  - CXR unchanged from previous, patient afebrile since admit   - marked improvement with nebulizer, continue q4 scheduled  - give combivent inhaler on discharge  - will schedule with Pulm outpatient     Chronic thrombocytopenia  - likely side effect of CLL treatment, follows closely with [facility]  - on maintenance Arzerra every 2 months   - plts on admit 103K   - will continue to monitor      CLL  - in remission, on maintenance Arzerra every 2 months and IVIg monthly)   - no acute  issues at this time     Skin cancer (squamous cell and basal cell)  - recently underwent Mohs procedure for SCC to right temporal area, discharged to home with keflex  - holding keflex at this time as patient is being covered under broad spectrum abx     Panhypopituitarism s/p pituitary resection (2002)  - started on home prednisone 7.5 mg and levothyroxine 125mcg   - no acute issues at this time, will continue to monitor      CVA  - will hold home plavix 75mg at this time, pending FOBT result  - restarted 9/2      HTN  - per patient, home blood pressures have been low last several days but was compliant with home losartan  - orthostatic BPs in ED: lying 110/49 --> sitting 87/44, lying 105/50 --> sitting 78/54, reports still lightheaded on positional changes   - holding home losartan at this time   - ordered AM orthostatics      HLD  - continuing atorvastatin 20mg      DM  - started on SSI     DVT (2016)  - ordered LE DVT US, will follow up      Anemia  - ED FOBT negative   - H/H stable on admit  - will continue to monitor      Unspecified disorder of kidney and ureter   - per patient, history of stents as child  - Cr on admit 1.1   - not issue at this time     Diet: Diabetic   PPx: Protonix po     Dispo: follow up imaging studies and labs, likely discharge tomorrow      Agata Herrmann M.D.   [facility] Internal Medicine HO-I  [facility] Hospital Medicine Firm [facility]     [facility] Medicine Hospitalist Pager numbers:   [facility] Hospitalist Medicine Team A (Tamy/Robert): [phone]  [facility] Hospitalist Medicine Team B (Bea/Lesli):  [phone]

## 2018-09-03 VITALS
SYSTOLIC BLOOD PRESSURE: 162 MMHG | HEART RATE: 59 BPM | RESPIRATION RATE: 16 BRPM | DIASTOLIC BLOOD PRESSURE: 67 MMHG | BODY MASS INDEX: 21 KG/M2 | HEIGHT: 71 IN | OXYGEN SATURATION: 98 % | TEMPERATURE: 94 F | WEIGHT: 150 LBS

## 2018-09-03 PROBLEM — R06.02 SHORTNESS OF BREATH: Status: ACTIVE | Noted: 2018-09-03

## 2018-09-03 LAB
ALBUMIN SERPL BCP-MCNC: 2.7 G/DL
ALP SERPL-CCNC: 62 U/L
ALT SERPL W/O P-5'-P-CCNC: 25 U/L
ANION GAP SERPL CALC-SCNC: 7 MMOL/L
AST SERPL-CCNC: 29 U/L
BASOPHILS # BLD AUTO: 0.01 K/UL
BASOPHILS NFR BLD: 0.2 %
BILIRUB SERPL-MCNC: 0.4 MG/DL
BUN SERPL-MCNC: 20 MG/DL
CALCIUM SERPL-MCNC: 8.3 MG/DL
CHLORIDE SERPL-SCNC: 109 MMOL/L
CO2 SERPL-SCNC: 24 MMOL/L
CREAT SERPL-MCNC: 1 MG/DL
DIFFERENTIAL METHOD: ABNORMAL
EOSINOPHIL # BLD AUTO: 0 K/UL
EOSINOPHIL NFR BLD: 0.2 %
ERYTHROCYTE [DISTWIDTH] IN BLOOD BY AUTOMATED COUNT: 14.4 %
EST. GFR  (AFRICAN AMERICAN): >60 ML/MIN/1.73 M^2
EST. GFR  (NON AFRICAN AMERICAN): >60 ML/MIN/1.73 M^2
GLUCOSE SERPL-MCNC: 92 MG/DL
HCT VFR BLD AUTO: 36.9 %
HGB BLD-MCNC: 12.4 G/DL
LYMPHOCYTES # BLD AUTO: 1.1 K/UL
LYMPHOCYTES NFR BLD: 27.3 %
MAGNESIUM SERPL-MCNC: 2 MG/DL
MCH RBC QN AUTO: 31.1 PG
MCHC RBC AUTO-ENTMCNC: 33.6 G/DL
MCV RBC AUTO: 93 FL
MONOCYTES # BLD AUTO: 0.6 K/UL
MONOCYTES NFR BLD: 14.5 %
NEUTROPHILS # BLD AUTO: 2.3 K/UL
NEUTROPHILS NFR BLD: 55.8 %
PHOSPHATE SERPL-MCNC: 2.4 MG/DL
PLATELET # BLD AUTO: 76 K/UL
PMV BLD AUTO: 9.5 FL
POCT GLUCOSE: 148 MG/DL (ref 70–110)
POCT GLUCOSE: 69 MG/DL (ref 70–110)
POTASSIUM SERPL-SCNC: 3.7 MMOL/L
PROT SERPL-MCNC: 4.8 G/DL
RBC # BLD AUTO: 3.99 M/UL
SODIUM SERPL-SCNC: 140 MMOL/L
WBC # BLD AUTO: 4.07 K/UL

## 2018-09-03 PROCEDURE — 25000003 PHARM REV CODE 250: Performed by: STUDENT IN AN ORGANIZED HEALTH CARE EDUCATION/TRAINING PROGRAM

## 2018-09-03 PROCEDURE — 99900035 HC TECH TIME PER 15 MIN (STAT)

## 2018-09-03 PROCEDURE — 94640 AIRWAY INHALATION TREATMENT: CPT

## 2018-09-03 PROCEDURE — 85025 COMPLETE CBC W/AUTO DIFF WBC: CPT

## 2018-09-03 PROCEDURE — 80053 COMPREHEN METABOLIC PANEL: CPT

## 2018-09-03 PROCEDURE — 63700000 PHARM REV CODE 250 ALT 637 W/O HCPCS: Performed by: INTERNAL MEDICINE

## 2018-09-03 PROCEDURE — 93306 TTE W/DOPPLER COMPLETE: CPT

## 2018-09-03 PROCEDURE — 36415 COLL VENOUS BLD VENIPUNCTURE: CPT

## 2018-09-03 PROCEDURE — 25000003 PHARM REV CODE 250: Performed by: INTERNAL MEDICINE

## 2018-09-03 PROCEDURE — 25000242 PHARM REV CODE 250 ALT 637 W/ HCPCS: Performed by: INTERNAL MEDICINE

## 2018-09-03 PROCEDURE — 94761 N-INVAS EAR/PLS OXIMETRY MLT: CPT

## 2018-09-03 PROCEDURE — 63600175 PHARM REV CODE 636 W HCPCS: Performed by: INTERNAL MEDICINE

## 2018-09-03 PROCEDURE — G0378 HOSPITAL OBSERVATION PER HR: HCPCS

## 2018-09-03 RX ORDER — BUDESONIDE AND FORMOTEROL FUMARATE DIHYDRATE 160; 4.5 UG/1; UG/1
2 AEROSOL RESPIRATORY (INHALATION) EVERY 12 HOURS
Qty: 1 INHALER | Refills: 3 | Status: SHIPPED | OUTPATIENT
Start: 2018-09-03 | End: 2019-03-25

## 2018-09-03 RX ORDER — AMOXICILLIN AND CLAVULANATE POTASSIUM 875; 125 MG/1; MG/1
1 TABLET, FILM COATED ORAL 2 TIMES DAILY
Qty: 10 TABLET | Refills: 0 | Status: SHIPPED | OUTPATIENT
Start: 2018-09-03 | End: 2018-10-30 | Stop reason: ALTCHOICE

## 2018-09-03 RX ORDER — PREDNISONE 5 MG/1
15 TABLET ORAL DAILY
Qty: 9 TABLET | Refills: 0 | Status: SHIPPED | OUTPATIENT
Start: 2018-09-03 | End: 2018-09-06

## 2018-09-03 RX ORDER — ALBUTEROL SULFATE 90 UG/1
2 AEROSOL, METERED RESPIRATORY (INHALATION) EVERY 6 HOURS PRN
Qty: 1 INHALER | Refills: 3 | Status: SHIPPED | OUTPATIENT
Start: 2018-09-03 | End: 2019-03-25

## 2018-09-03 RX ORDER — AMOXICILLIN AND CLAVULANATE POTASSIUM 875; 125 MG/1; MG/1
1 TABLET, FILM COATED ORAL 2 TIMES DAILY
Qty: 8 TABLET | Refills: 0 | Status: SHIPPED | OUTPATIENT
Start: 2018-09-03 | End: 2018-09-03 | Stop reason: SDUPTHER

## 2018-09-03 RX ORDER — PREDNISONE 5 MG/1
10 TABLET ORAL DAILY
Qty: 3 TABLET | Refills: 0 | Status: SHIPPED | OUTPATIENT
Start: 2018-09-07 | End: 2018-09-10

## 2018-09-03 RX ORDER — BENZOCAINE .13; .15; .5; 2 G/100G; G/100G; G/100G; G/100G
2 GEL ORAL 2 TIMES DAILY
Qty: 1 ML | Refills: 3 | Status: SHIPPED | OUTPATIENT
Start: 2018-09-03 | End: 2018-12-06

## 2018-09-03 RX ORDER — LOSARTAN POTASSIUM 25 MG/1
25 TABLET ORAL DAILY
Status: DISCONTINUED | OUTPATIENT
Start: 2018-09-03 | End: 2018-09-03 | Stop reason: HOSPADM

## 2018-09-03 RX ORDER — PREDNISONE 5 MG/1
15 TABLET ORAL DAILY
Qty: 30 TABLET | Refills: 0 | Status: SHIPPED | OUTPATIENT
Start: 2018-09-04 | End: 2018-09-14

## 2018-09-03 RX ORDER — IPRATROPIUM BROMIDE AND ALBUTEROL SULFATE 2.5; .5 MG/3ML; MG/3ML
3 SOLUTION RESPIRATORY (INHALATION) EVERY 6 HOURS PRN
Qty: 810 ML | Refills: 3 | Status: SHIPPED | OUTPATIENT
Start: 2018-09-03 | End: 2018-09-03 | Stop reason: HOSPADM

## 2018-09-03 RX ORDER — BENZOCAINE .13; .15; .5; 2 G/100G; G/100G; G/100G; G/100G
2 GEL ORAL 2 TIMES DAILY
Qty: 1 ML | Refills: 3 | Status: SHIPPED | OUTPATIENT
Start: 2018-09-03 | End: 2018-09-03 | Stop reason: SDUPTHER

## 2018-09-03 RX ADMIN — HEPARIN SODIUM 5000 UNITS: 5000 INJECTION, SOLUTION INTRAVENOUS; SUBCUTANEOUS at 09:09

## 2018-09-03 RX ADMIN — Medication 1 TABLET: at 09:09

## 2018-09-03 RX ADMIN — PIPERACILLIN AND TAZOBACTAM 4.5 G: 4; .5 INJECTION, POWDER, LYOPHILIZED, FOR SOLUTION INTRAVENOUS; PARENTERAL at 02:09

## 2018-09-03 RX ADMIN — ATORVASTATIN CALCIUM 20 MG: 20 TABLET, FILM COATED ORAL at 09:09

## 2018-09-03 RX ADMIN — LOSARTAN POTASSIUM 25 MG: 25 TABLET ORAL at 10:09

## 2018-09-03 RX ADMIN — PIPERACILLIN AND TAZOBACTAM 4.5 G: 4; .5 INJECTION, POWDER, LYOPHILIZED, FOR SOLUTION INTRAVENOUS; PARENTERAL at 09:09

## 2018-09-03 RX ADMIN — PANTOPRAZOLE SODIUM 40 MG: 40 TABLET, DELAYED RELEASE ORAL at 09:09

## 2018-09-03 RX ADMIN — BUDESONIDE 0.5 MG: 0.5 SUSPENSION RESPIRATORY (INHALATION) at 11:09

## 2018-09-03 RX ADMIN — LEVOTHYROXINE SODIUM 125 MCG: 125 TABLET ORAL at 06:09

## 2018-09-03 RX ADMIN — CHLORHEXIDINE GLUCONATE 15 ML: 1.2 RINSE ORAL at 09:09

## 2018-09-03 RX ADMIN — PREDNISONE 15 MG: 5 TABLET ORAL at 09:09

## 2018-09-03 RX ADMIN — CLOPIDOGREL BISULFATE 75 MG: 75 TABLET ORAL at 09:09

## 2018-09-03 NOTE — PLAN OF CARE
Problem: Patient Care Overview  Goal: Plan of Care Review  Outcome: Ongoing (interventions implemented as appropriate)  Patient is Alert and oriented x 4. Vital signs have been stable. All due meds given including IV antibiotic as prescribed and documented. All pressure areas are intact. Slept fairly well during night.On Telemetry showing NSR. Safety maintained. Will continue to monitor patient.

## 2018-09-03 NOTE — PLAN OF CARE
Patient to d/c home today with spouse Lorin. Patient states he has no HH or need for assistive devices. patient requested nebulizer for home use.  Nebulizer for home use ordered. TN spoke with Mr Henry, [facility] on call rep. Patient may not have a qualifying dx. Okay to pull Nebulizer from [employer] and patient has be billed if not approved by insurance . TN to update patient.    update: TN confirmed Patient has nebulizer at home. Requesting scripts. TEam aware and will provide scripts for medications.    patient ready to d/c. Kailee updated of above.        Future Appointments   Date Time Provider Department Center   9/5/2018  7:30 AM Ronel Bliss MD [facility] DERMSUR [address]   9/10/2018 10:00 AM [facility]   9/14/2018  8:00 AM LAB, [facility] CANCER DG [facility] LAB HO [facility]   9/14/2018  9:00 AM [facility]   9/19/2018 11:00 AM Cb Enriquez MD [facility] ENDOCRN [address]   10/8/2018 10:00 AM [facility]   10/23/2018  9:00 AM VASCULAR, [facility] VASCARD [place]   10/30/2018  8:30 AM Lou Chambers MD [facility] CARDIO [address]   11/8/2018  9:00 AM LAB, [facility] CANCER DG [facility] LAB HO [facility]   11/8/2018 10:00 AM Joaquin Wooten Jr., MD [facility] HEM ONC [facility]   11/9/2018  9:00 AM [facility], [facility]MO [facility] CHE[facility]         09/03/18 1327   Final Note   Assessment Type Final Discharge Note   Discharge Disposition Home   What phone number can be called within the next 1-3 days to see how you are doing after discharge? [phone]   Hospital Follow Up  Appt(s) scheduled? Yes   Discharge plans and expectations educations in teach back method with documentation complete? Yes   Right Care Referral Info   Post Acute Recommendation No Care

## 2018-09-03 NOTE — PROGRESS NOTES
"[facility] Medicine Resident HO-I Progress Note    Subjective:      Patient denies any problems or complaints overnight. No episodes of lightheadedness, dizziness, chest pain, shortness of breath, nausea, vomiting, diarrhea. Feels like chronic sinus congestion is much better with breathing treatments "better than I have in years", would like prescription for nebulizer on discharge.     Tele shows sinus rhythm without ectopy.     Objective:   Last 24 Hour Vital Signs:  BP  Min: 120/56  Max: 160/70  Temp  Av.8 °F (36.6 °C)  Min: 96.6 °F (35.9 °C)  Max: 98.8 °F (37.1 °C)  Pulse  Av.3  Min: 55  Max: 78  Resp  Av  Min: 17  Max: 20  SpO2  Av.8 %  Min: 97 %  Max: 98 %  I/O last 3 completed shifts:  In: 450 [P.O.:100; IV Piggyback:350]  Out: 2530 [Urine:2530]    Physical Examination:  Gen - awake and alert, in no apparent distress, well-appearing and pleasant on interview   HEENT - normocephalic and atraumatic, PERRLA, EOM intact, clear conjunctivae, no scleral icterus, moist mucus membranes   CV - normal rate, regular rhythm, S1 and S2 appreciated, cap refill <2 seconds  Resp - clear to auscultation bilaterally, no crackles or wheezes noted, breathing comfortably on room air  Abd - soft, nontender, nondistended, +BS  Ext - no cyanosis or LE edema noted, no new bruising or lesions   Neuro - AAOx3, GCS 15, no focal neurologic deficits appreciated   Skin - normal color and texture, abrasions and UE ecchymosis noted bilaterally, sutured wound to left face without drainage/swelling/erythema  Pulses - 2+ symmetrically (radial and DP)     Laboratory:  Laboratory Data Reviewed: yes  Pertinent Findings:  Trended Lab Data:  Recent Labs   Lab  18   0756  18   0834  18   1526  18   0557   WBC  5.87  5.75   --   4.07   HGB  15.1  14.3   --   12.4*   HCT  47.7  42.5   --   36.9*   PLT  103*  93*   --   76*   MCV  95  93   --   93   RDW  14.9*  14.8*   --   14.4   NA  140   --   138  140   K  " 4.5   --   4.2  3.7   CL  104   --   106  109   CO2  25   --   25  24   BUN  28*   --   26*  20   GLU  97   --   151*  92   CALCIUM  10.1   --   8.6*  8.3*   PROT  6.8   --   5.2*  4.8*   ALBUMIN  3.8   --   3.0*  2.7*   BILITOT  0.9   --   0.6  0.4   AST  43*   --   39  29   ALKPHOS  86   --   74  62   ALT  35   --   28  25     Mg - 2.0  P - 2.4    Microbiology Data Reviewed: yes  Pertinent Findings:  BCx - NGTD x2d (X2)  RespCx - <10 epi, rare WBCs, moderate gram + cocci, few gram - rods   StoolCx - needs to be collected  C diff - needs to be collected    Other Results:  EKG (my interpretation): no new since admit     9/1 - regular rate, sinus rhythm     Radiology Data Reviewed: yes  Pertinent Findings:  Imaging Results          X-Ray Chest 1 View (Final result)  Result time 09/01/18 13:51:12   Procedure changed from X-Ray Chest PA And Lateral     Final result by Akosua Lucas MD (09/01/18 13:51:12)                 Impression:      Interval development of new focal abnormal opacification of the left lung base since June 22, 2016.      Electronically signed by: Akosua Lucas MD  Date:    09/01/2018  Time:    13:51             Narrative:    EXAMINATION:  XR CHEST 1 VIEW    CLINICAL HISTORY:  h/o chronic sinusitis, productive cough, concern for sepsis;. Syncope and collapse    TECHNIQUE:  Single frontal portable view of the chest was performed.    COMPARISON:  June 22, 2016    FINDINGS:  Support devices: None    Since the previous examination, focal abnormal opacification, which could be due to focal consolidation related to infection or atelectasis, has developed at the left lung base.The right lung is clear, with normal appearance of pulmonary vasculature and no pleural effusion or pneumothorax.    The cardiac silhouette is normal in size. The hilar and mediastinal contours are unremarkable.    Visualized osseous structures are stable with degenerative changes of the spine and shoulders.  There are  atherosclerotic calcifications of the aorta.                               US Abdominal Aorta (Final result)  Result time 09/01/18 13:24:32    Final result by Akosua Lucas MD (09/01/18 13:24:32)                 Impression:      No abdominal aortic aneurysm      Electronically signed by: Akosua Lucas MD  Date:    09/01/2018  Time:    13:24             Narrative:    EXAMINATION:  US ABDOMINAL AORTA    CLINICAL HISTORY:  Hypotension, unspecified    TECHNIQUE:  Limited ultrasound was performed of the abdominal aorta, with cross sectional diameter measurements obtained.    COMPARISON:  None.    FINDINGS:  The proximal abdominal aorta measures 1.5 x 1.1 cm.    The mid abdominal aorta measures 1.5 x 1.3 cm.    The distal abdominal aorta measures 1.3 x 0.9 cm.    The right iliac artery measures 0.6 x 0.5 cm.    The left iliac artery measures 0.6 x 0.7 cm.    Aortoiliac atherosclerosis: Mild                                Current Medications:     Infusions:       Scheduled:   albuterol-ipratropium  3 mL Nebulization Q6H WAKE    atorvastatin  20 mg Oral Daily    budesonide  0.5 mg Nebulization Q12H    chlorhexidine  15 mL Mouth/Throat BID    clopidogrel  75 mg Oral Daily    heparin (porcine)  5,000 Units Subcutaneous Q12H    levothyroxine  125 mcg Oral Before breakfast    multivitamin with minerals  1 tablet Oral Daily    pantoprazole  40 mg Oral Daily    piperacillin-tazobactam (ZOSYN) IVPB  4.5 g Intravenous Q8H    predniSONE  15 mg Oral Daily    vancomycin (VANCOCIN) IVPB  1,000 mg Intravenous Q24H        PRN:  dextrose 50%, dextrose 50%, glucagon (human recombinant), glucose, glucose, insulin aspart U-100    Antibiotics and Day Number of Therapy:  Zosyn 4.5g IV q8h (started 9/1)  Vanc 1g IV q24h (started 9/1)    Lines and Day Number of Therapy:  PIV    Assessment:     Thierry Ramos  is a 84 y.o.  male who has a medical history of CLL (in remission, on Arzerra every 2 months, IVIg  monthly), chronic thrombocytopenia, squamous cell carcinoma of skin, basal cell carcinoma of skin, panhypopituitarism s/p pituitary tumor resection (2002), CVA (2007), HTN, HLD, DM, DVT (2016) and anemia, carotid stenosis, syncope and unspecified disorder of kidney and ureter. The patient presented to [facility] on 9/1/2018 with a primary complaint of syncope with witnessed seizure-like activity.     Plan:     Syncope vs. Seizure, likely 2/2 orthostatic hypotension/dehydration  - pt has prior history of similar episodes, last ~2 years ago, without further workup  - patient reports large volume NB diarrhea and several episodes of emesis just prior to syncopal episode   - per patient's wife, he does not regularly have much fluid po intake  - blood pressure had been low for last several days though patient still taking ARB, orthostatic + in ED   - received 4L NS in ED with improvement in symptoms  - 9/1 Abdominal aorta US negative for dissection or aneurysm   - ESR 9 / Procal 0.18 / CRP 23.8   - ordered carotid doppler, will follow up    - pt much improved since admit following IVF and po intake, no longer orthostatic by vitals  - start on vanc / zosyn 9/1, follow up vanc trough   - BCx: NGTD x1d, RespCx -<10 epithelial cells per low power field, rare WBCs, moderate gram + cocci, few gram - rods, continue to follow up     - ordered TTE, most recent 2014, will follow up      Vomiting and diarrhea, resolved  - pt with large quantity NB bowel movement and several episodes NBNB emesis on morning of admit  - no episodes since admit   - ordered stool culture, needs to be collected     Chronic sinusitis, improving   - pt on IVIg for chronic sinusitis and respiratory infections  - CXR unchanged from previous, patient afebrile since admit   - marked improvement with budesonide nebulizer q12h and duonebs q4h   - prescriptions for symbicort inhaler and Rinocort nasal spray on discharge, educated patient on  directions for usage   - will schedule with Pulm outpatient   - order outpatient PFTs   - discharge with 5 day course Augmentin     Chronic thrombocytopenia  - likely side effect of CLL treatment, follows closely with [facility]  - baseline per chart review 66-100K  - on maintenance Arzerra every 2 months   - plts on admit 103K --> 9/3 76   - will continue to monitor      CLL  - in remission, on maintenance Arzerra every 2 months and IVIg monthly)   - no acute issues at this time     Skin cancer (squamous cell and basal cell)  - recently underwent Mohs procedure for SCC to right temporal area, discharged to home with keflex  - holding keflex at this time as patient is being covered under broad spectrum abx     Panhypopituitarism s/p pituitary resection (2002)  - started on home levothyroxine 125mcg and stress dose Prednisone while admitted   - no acute issues at this time, will continue to monitor   - will discharge with taper course of Prednisone      CVA  - will hold home plavix 75mg at this time, pending negative FOBT  - restarted 9/2      HTN  - per patient, home blood pressures have been low last several days but was compliant with home losartan, holding home Losartan  - 9/3 restarted home Losartan 25mg qd     HLD  - continuing atorvastatin 20mg      DM  - started on SSI     DVT (2016)  - ordered LE DVT US, will follow up      Anemia  - ED FOBT negative   - H/H stable on admit  - will continue to monitor      Unspecified disorder of kidney and ureter   - per patient, history of stents as child  - Cr on admit 1.1   - not issue at this time     Diet: Diabetic   PPx: Protonix po      Dispo: discharge to home today         Agata Herrmann M.D.   [facility] Internal Medicine HO-I  [facility] Hospital Medicine Team B     [facility] Medicine Hospitalist Pager numbers:   [facility] Hospitalist Medicine Team A (Tamy/Robert): [phone]  [facility] Hospitalist Medicine Team B (Bea/Lesli):  [phone]

## 2018-09-04 ENCOUNTER — PATIENT MESSAGE (OUTPATIENT)
Dept: HEMATOLOGY/ONCOLOGY | Facility: CLINIC | Age: 83
End: 2018-09-04

## 2018-09-04 ENCOUNTER — TELEPHONE (OUTPATIENT)
Dept: ENDOCRINOLOGY | Facility: CLINIC | Age: 83
End: 2018-09-04

## 2018-09-04 LAB
BACTERIA SPEC AEROBE CULT: NORMAL
DIASTOLIC DYSFUNCTION: NO
ESTIMATED PA SYSTOLIC PRESSURE: 37.11
GRAM STN SPEC: NORMAL
RETIRED EF AND QEF - SEE NOTES: 65 (ref 55–65)
TRICUSPID VALVE REGURGITATION: NORMAL

## 2018-09-05 ENCOUNTER — PROCEDURE VISIT (OUTPATIENT)
Dept: DERMATOLOGY | Facility: CLINIC | Age: 83
End: 2018-09-05
Payer: MEDICARE

## 2018-09-05 VITALS
SYSTOLIC BLOOD PRESSURE: 134 MMHG | DIASTOLIC BLOOD PRESSURE: 69 MMHG | WEIGHT: 150 LBS | HEIGHT: 71 IN | BODY MASS INDEX: 21 KG/M2 | HEART RATE: 64 BPM

## 2018-09-05 DIAGNOSIS — C44.311 BASAL CELL CARCINOMA OF RIGHT NASAL SIDEWALL: Primary | ICD-10-CM

## 2018-09-05 PROCEDURE — 17312 MOHS ADDL STAGE: CPT | Mod: PBBFAC | Performed by: DERMATOLOGY

## 2018-09-05 PROCEDURE — 17311 MOHS 1 STAGE H/N/HF/G: CPT | Mod: 79,S$PBB,51, | Performed by: DERMATOLOGY

## 2018-09-05 PROCEDURE — 99499 UNLISTED E&M SERVICE: CPT | Mod: S$PBB,,, | Performed by: DERMATOLOGY

## 2018-09-05 PROCEDURE — 17311 MOHS 1 STAGE H/N/HF/G: CPT | Mod: 79,PBBFAC | Performed by: DERMATOLOGY

## 2018-09-05 PROCEDURE — 14061 TIS TRNFR E/N/E/L10.1-30SQCM: CPT | Mod: S$PBB,59,, | Performed by: DERMATOLOGY

## 2018-09-05 PROCEDURE — 17312 MOHS ADDL STAGE: CPT | Mod: S$PBB,,, | Performed by: DERMATOLOGY

## 2018-09-05 PROCEDURE — 14061 TIS TRNFR E/N/E/L10.1-30SQCM: CPT | Mod: PBBFAC | Performed by: DERMATOLOGY

## 2018-09-05 NOTE — PROGRESS NOTES
PROCEDURE: Mohs' Micrographic Surgery    INDICATION: Location in mask areas of face including central face, nose, eyelids, eyebrows, lips, chin, preauricular, temple, and ear. Biopsy-proven skin cancer of cosmetically and functionally important areas, including head, neck, genital, hand, foot, or areas known for having difficulty in healing, such as the lower anterior legs. Tumor with ill-defined borders.    REFERRING MD: Karissa Monsivais M.D.    CASE NUMBER:     ANESTHETIC: 5.5 cc 0.5% Lidocaine with Epi 1:200,000 mixed 1:1 with 0.5% Bupivacaine    SURGICAL PREP: Hibiclens    SURGEON: Ronel Bliss MD    ASSISTANTS: Terri Subramanian PA-C and Reba Rosario, Surg Tech    PREOPERATIVE DIAGNOSIS: basal cell carcinoma    POSTOPERATIVE DIAGNOSIS: basal cell carcinoma    PATHOLOGIC DIAGNOSIS: basal cell carcinoma- nodular, infiltrating    HISTOLOGY OF SPECIMENS IN FIRST STAGE:   Tumor Type: Tumor seen. Nodular basal cell carcinoma: Nodular tumor in dermis composed of basaloid cells exhibiting peripheral palisading and retraction artifact.  Infiltrative basal cell carcinoma: Basaloid tumor in dermis characterized by thin elongated strands of basaloid cells infiltrating between dermal collagen bundles.   Depth of Invasion: epidermis and dermis  Perineural Invasion: No    HISTOLOGY OF SPECIMENS IN SUBSEQUENT STAGES:  Tumor Type: Tumor seen. Same as in first stage.   Depth of Invasion: epidermis and dermis  Perineural Invasion: No    STAGES OF MOHS' SURGERY PERFORMED: 3    TUMOR-FREE PLANE ACHIEVED: Yes    HEMOSTASIS: electrocoagulation     SPECIMENS: 5 (2 in stage A, 2 in stage B and 1 in stage C)    LOCATION: right nose (R lateral nasal sidewall/ nasofacial sulcus). Patient verified location with hand held mirror.    INITIAL LESION SIZE: 0.5 x 0.7 cm    FINAL DEFECT SIZE: 0.9 x 1.7 cm    WOUND REPAIR/DISPOSITION: The patient tolerated Mohs' Micrographic Surgery for a basal cell carcinoma very well. When the tumor was  completely removed, a repair of the surgical defect was undertaken.      PROCEDURE: Rotational Flap (Adjacent Tissue Transfer)    INDICATION: Status post Mohs' Micrographic Surgery for basal cell carcinoma.    CASE NUMBER:     ANESTHETIC: 4 cc 1% Lidocaine with Epinephrine 1:100,000    SURGICAL PREP: Hibiclens    SURGEON: Ronel Bliss MD    ASSISTANTS: Terri Subramanian PA-C, Reba Rosario, Surg Tech and Pamela Mack MD    LOCATION: right nose (R lateral nasal sidewall/ nasofacial sulcus)    DEFECT SIZE: 0.9 x 1.7 cm    WOUND REPAIR/DISPOSITION:  After the patient's carcinoma had been completely removed with Mohs' Micrographic Surgery, a repair of the surgical defect was undertaken. The patient was returned to the operating suite where the area of right nasofacial sulcus was prepped, draped, and anesthetized in the usual sterile fashion. A rotational flap was designed in the inferolateral surrounding tissue of the right nasofacial sulcus along the nasolabial fold. A Burow's triangle was drawn in laterally to help with tissue movement. Then with a #15 blade the flap was incised and the Burow's triangle was excised, the area was widely undermined in the subcutaneous tissue plane. Then, electrocoagulation was used to obtain meticulous hemostasis. A 4-0 Vicryl pexing suture was performed by attaching the underside of the most superior aspect of the flap to the right nasal sidewall periosteum to eliminate tension on lower eyelid. The flap was then advanced in by a complex closure. Then, 4-0 Vicryl and 5-0 Vicryl buried vertical mattress sutures were placed into the subcutaneous and dermal plane to close the wound and shankar the cutaneous wound edge. The flap was then trimmed to fit the defect. The cutaneous wound edges were closed using interrupted 5-0 Prolene and 6-0 Prolene sutures.    The patient tolerated the procedure well.    The area was cleaned and dressed appropriately and the patient was given wound care  "instructions, as well as an appointment for follow-up evaluation. Patient is currently on Augmentin.    SIZE OF FLAP: 12 cm squared    Vitals:    09/05/18 0720 09/05/18 1146   BP: (!) 149/66 134/69   BP Location: Right arm Right arm   Patient Position: Sitting Sitting   BP Method: Small (Automatic) Small (Automatic)   Pulse: 63 64   Weight: 68 kg (150 lb)    Height: 5' 11" (1.803 m)          "

## 2018-09-05 NOTE — PROGRESS NOTES
84 y.o. male patient is here for suture removal following Mohs' surgery.    Patient reports no problems with left (superolateral) cheek.    WOUND PE:  The left (superolateral) cheek sutures intact. Wound healing well. Good skin edges. No signs or symptoms of infection.    IMPRESSION:  Healing operative site from Mohs' surgery SCC, left (superolateral) cheek s/p Mohs with CLC, postop day # 7.    PLAN:  Sutures removed today. Steri-strips applied.  Continue wound care.  Keep moist with Aquaphor.    RTC:  In 1 week for suture removal right nose.

## 2018-09-06 ENCOUNTER — OFFICE VISIT (OUTPATIENT)
Dept: INTERNAL MEDICINE | Facility: CLINIC | Age: 83
End: 2018-09-06
Payer: MEDICARE

## 2018-09-06 ENCOUNTER — TELEPHONE (OUTPATIENT)
Dept: OTOLARYNGOLOGY | Facility: CLINIC | Age: 83
End: 2018-09-06

## 2018-09-06 VITALS
OXYGEN SATURATION: 93 % | DIASTOLIC BLOOD PRESSURE: 64 MMHG | BODY MASS INDEX: 20.65 KG/M2 | TEMPERATURE: 98 F | HEIGHT: 71 IN | HEART RATE: 73 BPM | RESPIRATION RATE: 16 BRPM | WEIGHT: 147.5 LBS | SYSTOLIC BLOOD PRESSURE: 124 MMHG

## 2018-09-06 DIAGNOSIS — J32.9 CHRONIC SINUSITIS, UNSPECIFIED LOCATION: Primary | ICD-10-CM

## 2018-09-06 DIAGNOSIS — R93.89 ABNORMAL CXR: ICD-10-CM

## 2018-09-06 DIAGNOSIS — C91.10 CLL (CHRONIC LYMPHOCYTIC LEUKEMIA): ICD-10-CM

## 2018-09-06 DIAGNOSIS — E23.0 PANHYPOPITUITARISM: ICD-10-CM

## 2018-09-06 LAB
BACTERIA BLD CULT: NORMAL
BACTERIA BLD CULT: NORMAL

## 2018-09-06 PROCEDURE — 99999 PR PBB SHADOW E&M-EST. PATIENT-LVL V: CPT | Mod: PBBFAC,GC,, | Performed by: STUDENT IN AN ORGANIZED HEALTH CARE EDUCATION/TRAINING PROGRAM

## 2018-09-06 PROCEDURE — 1101F PT FALLS ASSESS-DOCD LE1/YR: CPT | Mod: CPTII,GC,, | Performed by: STUDENT IN AN ORGANIZED HEALTH CARE EDUCATION/TRAINING PROGRAM

## 2018-09-06 PROCEDURE — 3078F DIAST BP <80 MM HG: CPT | Mod: CPTII,GC,, | Performed by: STUDENT IN AN ORGANIZED HEALTH CARE EDUCATION/TRAINING PROGRAM

## 2018-09-06 PROCEDURE — 3074F SYST BP LT 130 MM HG: CPT | Mod: CPTII,GC,, | Performed by: STUDENT IN AN ORGANIZED HEALTH CARE EDUCATION/TRAINING PROGRAM

## 2018-09-06 PROCEDURE — 99214 OFFICE O/P EST MOD 30 MIN: CPT | Mod: S$PBB,GC,, | Performed by: STUDENT IN AN ORGANIZED HEALTH CARE EDUCATION/TRAINING PROGRAM

## 2018-09-06 PROCEDURE — 99215 OFFICE O/P EST HI 40 MIN: CPT | Mod: PBBFAC,PO | Performed by: STUDENT IN AN ORGANIZED HEALTH CARE EDUCATION/TRAINING PROGRAM

## 2018-09-06 RX ORDER — TESTOSTERONE CYPIONATE 200 MG/ML
INJECTION, SOLUTION INTRAMUSCULAR
Qty: 6 ML | Refills: 1 | Status: SHIPPED | OUTPATIENT
Start: 2018-09-06 | End: 2019-05-14

## 2018-09-06 RX ORDER — LEVOTHYROXINE SODIUM 125 UG/1
TABLET ORAL
Qty: 90 TABLET | Refills: 3 | Status: SHIPPED | OUTPATIENT
Start: 2018-09-06 | End: 2019-09-05 | Stop reason: SDUPTHER

## 2018-09-06 NOTE — TELEPHONE ENCOUNTER
----- Message from Crys Powell sent at 9/6/2018  3:49 PM CDT -----  Dr CHATA Wooten/Dr Contreras has entered a referral for patient to be seen by ENT.  I scheduled first available on October 4th.  Dr Wooten is wanting patient to be seen sooner. Patient was last seen in 2017 at [facility].  Patient is wanting to change doctors because he wasn't happy with his experience.  Patient has a lot of issues going on and was recently released from the Hospital (see chart).  Please call patient to schedule a sooner appointment or message me back to advise.    Chronic sinusitis, unspecified location [J32.9    Thanks, Nida  7th Fl Referrals  [facility]

## 2018-09-06 NOTE — PROGRESS NOTES
Subjective:       Patient ID: Thierry Ramos Jr. is a 84 y.o. male.    Chief Complaint: Hospital Follow Up    HPI     84 year old male with CLL (in remission, on Arzerra every 2 months, IVIg monthly), chronic thrombocytopenia, squamous cell carcinoma of skin, basal cell carcinoma of skin, panhypopituitarism s/p pituitary tumor resection (2002), CVA (2007), HTN, DM2 who presents today for hospital follow up following admission for syncope event.  Patient reports he was in his normal state of health until Friday night when he experienced multiple bouts of diarrhea after taking a double dose of laxatives.  The following morning he had a syncopal event and presented to the ED.  He was found to be orthostatic and given 3L of fluids.  His orthostasis resolved with fluids and his syncope workup appeared to be WNL (dischage summary pending).  During this admission he was also treated for chronic sinusitis with Zosyn and Vanc x 3 days followed by a 5 day course of PO amoxacillin.  In addition he was found to have an focal abnormal opacification of the left lung base on CXR and was started on symbicort and albuterol for wheezing and SOB.  His insulin was also stopped on discharge.  Since discharge on 9/3 he reports he is doing well.  He had a Mohs' surgery yesterday with Dr. Bliss for Basal cell carcinoma.  He is concerned regarding his chronic sinusitis and request an ENT referral.  He denies fever, chills, dizziness, repeat syncopal events, abdominal pain, nausea or vomiting.                     Review of Systems   Constitutional: Negative for fatigue and fever.   HENT: Positive for postnasal drip, rhinorrhea and sinus pressure. Negative for congestion and hearing loss.    Eyes: Negative for pain and visual disturbance.   Respiratory: Positive for shortness of breath and wheezing. Negative for chest tightness.    Cardiovascular: Negative for chest pain and palpitations.   Gastrointestinal: Negative for abdominal pain,  blood in stool, constipation, diarrhea, nausea and vomiting.   Genitourinary: Negative for dysuria, frequency and urgency.   Musculoskeletal: Positive for back pain. Negative for arthralgias and myalgias.   Skin: Positive for color change and wound. Negative for rash.   Neurological: Negative for dizziness, seizures, syncope and headaches.   Hematological: Negative for adenopathy. Does not bruise/bleed easily.   Psychiatric/Behavioral: Negative for confusion, self-injury and sleep disturbance. The patient is not nervous/anxious.        Objective:      Physical Exam   Constitutional: No distress.   HENT:   Nose: Nose normal.   Mouth/Throat: No oropharyngeal exudate.   Multiple actinic keratoses  R temple skin ca excision area noted.   Tongue with brown plaque   Eyes: EOM are normal. Pupils are equal, round, and reactive to light. Left eye exhibits no discharge. No scleral icterus.   Neck: Normal range of motion.   Cardiovascular: Normal rate, regular rhythm, normal heart sounds and intact distal pulses. Exam reveals no gallop and no friction rub.   No murmur heard.  Pulmonary/Chest: Effort normal and breath sounds normal. No respiratory distress. He has no wheezes. He has no rales.   Abdominal: Soft. Bowel sounds are normal. He exhibits no distension and no mass. There is no guarding.   Musculoskeletal: Normal range of motion. He exhibits no edema, tenderness or deformity.   Neurological: He is alert. He displays normal reflexes. No cranial nerve deficit. Coordination normal.   Patellar reflexes 1+    Skin: Capillary refill takes less than 2 seconds. No rash noted. He is not diaphoretic. No pallor.   Hyperpigmentation of extremities   Surgical bandaged over right maxillary sinus s/p Mohs    Psychiatric: He has a normal mood and affect.       Assessment:       1. Chronic sinusitis, unspecified location    2. Abnormal CXR    3. Panhypopituitarism    4. CLL (chronic lymphocytic leukemia)    5. Type 2 diabetes,  uncontrolled, with neuropathy        Plan:       Chronics sinusitis  - Refer to ENT  - continue Augmentin to complete 5 day course.     Abnormal CXR  - Repeat CXR in 1 month    DM2  - Restarted insulin lower dose 15 units QHS    Panhypopituitarism  - Per Endo, needs to reestablish care  - Continue Prednisone, Levothyroxine, and testosterone per Endocrine    CLL  - Per Dr. Je branch onc    RTC in 3 months     Kem Contreras M.D.  IM PGY-3  Pager [phone]

## 2018-09-06 NOTE — TELEPHONE ENCOUNTER
Spoke with patient scheduled sooner appointment for 9/20/18 at 8:00 patient notified of date and time of appointment

## 2018-09-10 ENCOUNTER — INFUSION (OUTPATIENT)
Dept: INFUSION THERAPY | Facility: HOSPITAL | Age: 83
End: 2018-09-10
Attending: INTERNAL MEDICINE
Payer: MEDICARE

## 2018-09-10 VITALS — HEART RATE: 55 BPM | RESPIRATION RATE: 18 BRPM | SYSTOLIC BLOOD PRESSURE: 131 MMHG | DIASTOLIC BLOOD PRESSURE: 63 MMHG

## 2018-09-10 DIAGNOSIS — J32.9 CHRONIC SINUSITIS, UNSPECIFIED LOCATION: ICD-10-CM

## 2018-09-10 DIAGNOSIS — E79.0 HYPERURICEMIA: ICD-10-CM

## 2018-09-10 DIAGNOSIS — D80.1 HYPOGAMMAGLOBULINEMIA: Primary | ICD-10-CM

## 2018-09-10 DIAGNOSIS — C91.10 CLL (CHRONIC LYMPHOCYTIC LEUKEMIA): ICD-10-CM

## 2018-09-10 PROCEDURE — 25000003 PHARM REV CODE 250: Performed by: INTERNAL MEDICINE

## 2018-09-10 PROCEDURE — 96375 TX/PRO/DX INJ NEW DRUG ADDON: CPT

## 2018-09-10 PROCEDURE — S0028 INJECTION, FAMOTIDINE, 20 MG: HCPCS | Performed by: INTERNAL MEDICINE

## 2018-09-10 PROCEDURE — 96365 THER/PROPH/DIAG IV INF INIT: CPT

## 2018-09-10 PROCEDURE — 96367 TX/PROPH/DG ADDL SEQ IV INF: CPT

## 2018-09-10 PROCEDURE — 96366 THER/PROPH/DIAG IV INF ADDON: CPT

## 2018-09-10 PROCEDURE — 63600175 PHARM REV CODE 636 W HCPCS: Mod: JG | Performed by: INTERNAL MEDICINE

## 2018-09-10 RX ORDER — FAMOTIDINE 10 MG/ML
20 INJECTION INTRAVENOUS
Status: COMPLETED | OUTPATIENT
Start: 2018-09-10 | End: 2018-09-10

## 2018-09-10 RX ORDER — ACETAMINOPHEN 325 MG/1
650 TABLET ORAL
Status: COMPLETED | OUTPATIENT
Start: 2018-09-10 | End: 2018-09-10

## 2018-09-10 RX ORDER — ACETAMINOPHEN 325 MG/1
650 TABLET ORAL
Status: CANCELLED | OUTPATIENT
Start: 2018-09-14

## 2018-09-10 RX ORDER — SODIUM CHLORIDE 0.9 % (FLUSH) 0.9 %
10 SYRINGE (ML) INJECTION
Status: CANCELLED | OUTPATIENT
Start: 2018-09-14

## 2018-09-10 RX ORDER — HEPARIN 100 UNIT/ML
500 SYRINGE INTRAVENOUS
Status: DISCONTINUED | OUTPATIENT
Start: 2018-09-10 | End: 2018-09-10 | Stop reason: HOSPADM

## 2018-09-10 RX ORDER — SODIUM CHLORIDE 0.9 % (FLUSH) 0.9 %
10 SYRINGE (ML) INJECTION
Status: DISCONTINUED | OUTPATIENT
Start: 2018-09-10 | End: 2018-09-10 | Stop reason: HOSPADM

## 2018-09-10 RX ORDER — HEPARIN 100 UNIT/ML
500 SYRINGE INTRAVENOUS
Status: CANCELLED | OUTPATIENT
Start: 2018-09-14

## 2018-09-10 RX ORDER — FAMOTIDINE 10 MG/ML
20 INJECTION INTRAVENOUS
Status: CANCELLED | OUTPATIENT
Start: 2018-09-14

## 2018-09-10 RX ADMIN — HUMAN IMMUNOGLOBULIN G 20 G: 20 LIQUID INTRAVENOUS at 11:09

## 2018-09-10 RX ADMIN — ACETAMINOPHEN 650 MG: 325 TABLET ORAL at 10:09

## 2018-09-10 RX ADMIN — SODIUM CHLORIDE: 0.9 INJECTION, SOLUTION INTRAVENOUS at 10:09

## 2018-09-10 RX ADMIN — FAMOTIDINE 20 MG: 10 INJECTION, SOLUTION INTRAVENOUS at 10:09

## 2018-09-10 RX ADMIN — Medication 50 MG: at 10:09

## 2018-09-10 NOTE — DISCHARGE SUMMARY
[facility] Hospital Medicine Discharge Summary    Primary Team: [facility] Hospitalist Team B  Attending Physician: Dr. Figueredo  Resident: Dr. Evans  Intern: Dr. Hair     Date of Admit: 9/1/2018  Date of Discharge: 9/3/2018    Discharge to: home   Condition: stable     Discharge Diagnoses     Patient Active Problem List   Diagnosis    Mixed dyslipidemia    Hypertension    CLL (chronic lymphocytic leukemia)    Panhypopituitarism    Hyperuricemia    Annual physical exam    Neuropathy    Thrombocytopenia    History of CVA (cerebrovascular accident)    Central hypothyroidism    Chronic rhinosinusitis    Stroke-like episode    Polyneuropathy    Chronic steroid use    Occlusion and stenosis of left carotid artery    Hypogammaglobulinemia    Squamous cell carcinoma of forehead    Immunosuppression    Squamous cell carcinoma of skin of face    Squamous cell carcinoma of skin of right temple    Type 2 diabetes, uncontrolled, with neuropathy    Epiretinal membrane (ERM) of right eye    Weakness    Shortness of breath       Consultants and Procedures     Consultants:  None    Procedures:   None    Imaging:  US abdominal aorta  CXR  2D echo       Brief History of Present Illness      Thierry Ramos Jr. is a 84 y.o.  male who has a medical history of CLL (in remission, on Arzerra every 2 months, IVIg monthly), chronic thrombocytopenia, squamous cell carcinoma of skin, basal cell carcinoma of skin, panhypopituitarism s/p pituitary tumor resection (2002), CVA (2007), HTN, HLD, DM, DVT (2016) and anemia, carotid stenosis, syncope and unspecified disorder of kidney and ureter. The patient presented to [facility] on 9/1/2018 with a primary complaint of syncope with witnessed seizure-like activity.     The patient was in his usual state of health, independent of all ADLs and exercising daily, until the morning of admit. He reports having one early morning episode of non-bloody diarrhea  ""everything in me." Afterwards, he attempted to take his morning medications, at which time he became severely nauseated and had 3 episodes of NBNB vomiting. At this time, he felt all over body weakness and felt like he was going to pass out. Patient's wife saw him and helped him to floor as he had loss of consciousness x 30 seconds - 1 minute. Accompanied by bladder incontinence and bilateral UE jerking movements. Immediately afterwards, patient noted to be confused and lethargic and gasping for breath, resolved without intervention after ~15 minutes. Patient and wife certain he did not hit his head, also deny chest pain, shortness of breath, radiation, diaphoresis, abdominal pain, recent illness or sick contacts. Reports past syncopal episodes, last ~2 years ago, 2-3 events at this time, but denies further workup. Per patient, he had noted lower blood pressures over the days prior to admit but was still taking home Losartan. Denies decrease in po intake though pt's wife states he does not take in many fluids at his baseline. Also reports h/o chronic sinusitis with several sinus procedures since CLL diagnosis, on maintenance IVIg, has been having productive cough x weeks (sputum yellow and thick in appearance).      Patient recently had Mohs procedure for SCC over right temple on Wednesday 8/29, after which he was prescribed Keflex tid. He reports compliance to all medications.      Patient adamantly denies head imaging due to concerns over radiation exposure. Received radiation after his pituitary resection in 2002, after which he developed CLL, squamous cell carcinoma of skin and basal cell carcinoma of skin.       For the full HPI please refer to the History & Physical from this admission.    Hospital Course By Problem with Pertinent Findings     Syncope vs. Seizure, likely 2/2 orthostatic hypotension/dehydration  - pt has prior history of similar episodes, last ~2 years ago, without further workup  - patient " reports large volume NB diarrhea and several episodes of emesis just prior to syncopal episode   - per patient's wife, he does not regularly have much fluid po intake  - blood pressure had been low for last several days though patient still taking ARB, orthostatic + in ED   - received 4L NS in ED with improvement in symptoms  - 9/1 Abdominal aorta US negative for dissection or aneurysm   - ESR 9 / Procal 0.18 / CRP 23.8   - pt much improved since admit following IVF and po intake, no longer orthostatic by vitals  - start on vanc / zosyn 9/1, follow up vanc trough   - BCx: NGTD x1d  - RespCx: Serratia marcescens, <10 epi cells per LPF, moderate GPC and few GNR, possible colonization. Patient CXR negative for acute consolidation, afebrile during admit.  - 9/3 TTE: no wall motion abnormalities, EF 60-65%, normal LVDF and LVSF, estimated PA systolic 37mmHg, aortic sclerosis without stenosi     - scheduled for carotid doppler and cards follow up in October      Vomiting and diarrhea, resolved  - pt with large quantity NB bowel movement and several episodes NBNB emesis on morning of admit  - no episodes since admit   - ordered stool culture, needs to be collected     Chronic sinusitis, improving   - pt on IVIg for chronic sinusitis and respiratory infections  - CXR unchanged from previous, patient afebrile since admit   - marked improvement with budesonide neb q12h and duonebs q4h   - prescriptions for symbicort inhaler and Rinocort nasal spray on discharge, educated patient on directions for usage   - will schedule with Pulm outpatient   - order outpatient PFTs   - discharge with 5 day course Augmentin     Chronic thrombocytopenia  - likely side effect of CLL treatment, follows closely with [facility]  - baseline per chart review 66-100K  - on maintenance Arzerra every 2 months   - plts on admit 103K --> 9/3 76   - will continue to monitor      CLL  - in remission, on maintenance Arzerra every 2 months and IVIg monthly)   -  no acute issues at this time     Skin cancer (squamous cell and basal cell)  - recently underwent Mohs procedure for SCC to right temporal area, discharged to home with keflex  - holding keflex at this time as patient is being covered under broad spectrum abx     Panhypopituitarism s/p pituitary resection (2002)  - started on home levothyroxine 125mcg and stress dose Prednisone while admitted   - no acute issues at this time, will continue to monitor   - will discharge with taper course of Prednisone      CVA  - will hold home plavix 75mg at this time, pending negative FOBT  - restarted 9/2      HTN  - per patient, home blood pressures have been low last several days but was compliant with home losartan, holding home Losartan  - 9/3 restarted home Losartan 25mg qd     HLD  - continuing atorvastatin 20mg      DM  - started on SSI     DVT (2016)  - no acute issues at this time      Anemia  - ED FOBT negative   - H/H stable on admit  - will continue to monitor      Unspecified disorder of kidney and ureter   - per patient, history of stents as child  - Cr on admit 1.1   - no acute issues at this time  - will continue to monitor    Discharge Medications      Thierry Ramos Jr.   Home Medication Instructions DARREN:[id number]    Printed on:09/09/18 7784   Medication Information                      ACCU-CHEK FASTCLIX Misc  TEST  BLOOD  GLUCOSE FOUR TIMES DAILY             albuterol 90 mcg/actuation inhaler  Inhale 2 puffs into the lungs every 6 (six) hours as needed for Wheezing. Rescue             alcohol swabs PadM  APPLY 1 PAD TOPICALLY  AS NEEDED.             amoxicillin-clavulanate 875-125mg (AUGMENTIN) 875-125 mg per tablet  Take 1 tablet by mouth 2 (two) times daily.             aspirin 81 MG Chew  Take 81 mg by mouth once daily.             atorvastatin (LIPITOR) 20 MG tablet  TAKE 1 TABLET EVERY DAY             budesonide (RINOCORT AQUA) 32 mcg/actuation nasal spray  2 sprays (64 mcg total) by Nasal route 2  "(two) times daily. Use saline,  wait 2min, 1 spray of Rinocort/nostril, wait 2min, 1 spray/nostril             budesonide-formoterol 160-4.5 mcg (SYMBICORT) 160-4.5 mcg/actuation HFAA  Inhale 2 puffs into the lungs every 12 (twelve) hours. Controller             chlorhexidine (PERIDEX) 0.12 % solution               clopidogrel (PLAVIX) 75 mg tablet  TAKE 1 TABLET EVERY DAY             cyanocobalamin (VITAMIN B-12) 500 MCG tablet  Take 1 tablet by mouth Daily.             DOCOSAHEXANOIC ACID/EPA (FISH OIL ORAL)  Take by mouth once daily.             epinephrine (EPIPEN 2-LEVI) 0.3 mg/0.3 mL (1:1,000) AtIn  Inject 0.3 mLs (0.3 mg total) into the muscle once.             fluorouracil (EFUDEX) 5 % cream  Use hs for 2 weeks on right cheek             losartan (COZAAR) 25 MG tablet  TAKE 1 TABLET EVERY DAY             multivitamin with minerals tablet  Take 1 tablet by mouth once daily.             pantoprazole (PROTONIX) 40 MG tablet  TAKE 1 TABLET EVERY DAY             pen needle, diabetic (BD INSULIN PEN NEEDLE UF SHORT) 31 gauge x 5/16" Ndle  USE WITH LEVEMIR PEN  TO INJECT  10  UNITS SUBCUTANEOUSLY TWICE DAILY             predniSONE (DELTASONE) 2.5 MG tablet  Take 1 tablet (2.5 mg total) by mouth once daily. In addition to 5mg tablets, for total of 7.5mg daily.             predniSONE (DELTASONE) 5 MG tablet  Take 1 tablet (5 mg total) by mouth once daily.             predniSONE (DELTASONE) 5 MG tablet  Take 3 tablets (15 mg total) by mouth once daily. for 10 days             predniSONE (DELTASONE) 5 MG tablet  Take 2 tablets (10 mg total) by mouth once daily. Take 15mg daily PO x 3 days, then 10mg daily PO x 4 days, then continue home dose for 3 days             tazarotene (AVAGE) 0.1 % cream  Apply topically every evening.                 Discharge Information:   Diet:  Diabetic     Physical Activity:  As tolerated              Instructions:  1. Take all medications as prescribed  2. Keep all follow-up " appointments  3. Return to the hospital or call your primary care physicians if any worsening symptoms such as fever, chest pain, shortness of breath, return of symptoms, or any other concerns.    Follow-Up Appointments:  As scheduled.      Agata Herrmann MD  [facility], [facility]

## 2018-09-10 NOTE — PLAN OF CARE
Problem: Patient Care Overview (Adult)  Goal: Plan of Care Review  Outcome: Ongoing (interventions implemented as appropriate)  Pt tolerated IVIG well.  No s/s of reaction. Vitals stable, NAD.

## 2018-09-12 ENCOUNTER — OFFICE VISIT (OUTPATIENT)
Dept: DERMATOLOGY | Facility: CLINIC | Age: 83
End: 2018-09-12
Payer: MEDICARE

## 2018-09-12 DIAGNOSIS — Z09 POSTOP CHECK: Primary | ICD-10-CM

## 2018-09-12 PROCEDURE — 99024 POSTOP FOLLOW-UP VISIT: CPT | Mod: ,,, | Performed by: DERMATOLOGY

## 2018-09-12 PROCEDURE — 99999 PR PBB SHADOW E&M-EST. PATIENT-LVL III: CPT | Mod: PBBFAC,,, | Performed by: DERMATOLOGY

## 2018-09-12 PROCEDURE — 99213 OFFICE O/P EST LOW 20 MIN: CPT | Mod: PBBFAC | Performed by: DERMATOLOGY

## 2018-09-12 NOTE — PROGRESS NOTES
84 y.o. male patient is here for suture removal following Mohs' surgery.    Patient reports no problems with right nose (right lateral nasal sidewall/nasofacial sulcus).    WOUND PE:  The right nose (right lateral nasal sidewall/nasofacial sulcus) sutures intact. Wound healing well. Good skin edges. No signs or symptoms of infection. Flap is pink and viable.    IMPRESSION:  Healing operative site from Mohs' surgery BCC, right nose (right lateral nasal sidewall/nasofacial sulcus) s/p Mohs with Rotational Flap, postop day # 7.    PLAN:  Sutures removed today. Steri-strips applied.  Continue wound care.  Keep moist with Aquaphor.    RTC:  In 3 months with general derm.

## 2018-09-14 ENCOUNTER — INFUSION (OUTPATIENT)
Dept: INFUSION THERAPY | Facility: HOSPITAL | Age: 83
End: 2018-09-14
Attending: INTERNAL MEDICINE
Payer: MEDICARE

## 2018-09-14 VITALS
WEIGHT: 147 LBS | TEMPERATURE: 98 F | SYSTOLIC BLOOD PRESSURE: 120 MMHG | DIASTOLIC BLOOD PRESSURE: 57 MMHG | RESPIRATION RATE: 16 BRPM | BODY MASS INDEX: 20.58 KG/M2 | HEART RATE: 64 BPM | HEIGHT: 71 IN

## 2018-09-14 DIAGNOSIS — E79.0 HYPERURICEMIA: Primary | ICD-10-CM

## 2018-09-14 DIAGNOSIS — Z00.00 ANNUAL PHYSICAL EXAM: ICD-10-CM

## 2018-09-14 DIAGNOSIS — C91.10 CLL (CHRONIC LYMPHOCYTIC LEUKEMIA): ICD-10-CM

## 2018-09-14 PROCEDURE — 96367 TX/PROPH/DG ADDL SEQ IV INF: CPT

## 2018-09-14 PROCEDURE — S0028 INJECTION, FAMOTIDINE, 20 MG: HCPCS | Performed by: INTERNAL MEDICINE

## 2018-09-14 PROCEDURE — 63600175 PHARM REV CODE 636 W HCPCS: Performed by: INTERNAL MEDICINE

## 2018-09-14 PROCEDURE — 25000003 PHARM REV CODE 250: Performed by: INTERNAL MEDICINE

## 2018-09-14 PROCEDURE — 96413 CHEMO IV INFUSION 1 HR: CPT

## 2018-09-14 PROCEDURE — 96375 TX/PRO/DX INJ NEW DRUG ADDON: CPT

## 2018-09-14 PROCEDURE — 96415 CHEMO IV INFUSION ADDL HR: CPT

## 2018-09-14 RX ORDER — SODIUM CHLORIDE 0.9 % (FLUSH) 0.9 %
10 SYRINGE (ML) INJECTION
Status: CANCELLED | OUTPATIENT
Start: 2018-09-17

## 2018-09-14 RX ORDER — FAMOTIDINE 10 MG/ML
20 INJECTION INTRAVENOUS
Status: COMPLETED | OUTPATIENT
Start: 2018-09-14 | End: 2018-09-14

## 2018-09-14 RX ORDER — ACETAMINOPHEN 500 MG
1000 TABLET ORAL
Status: COMPLETED | OUTPATIENT
Start: 2018-09-14 | End: 2018-09-14

## 2018-09-14 RX ORDER — DIPHENHYDRAMINE HCL 50 MG
50 CAPSULE ORAL
Status: COMPLETED | OUTPATIENT
Start: 2018-09-14 | End: 2018-09-14

## 2018-09-14 RX ORDER — HEPARIN 100 UNIT/ML
500 SYRINGE INTRAVENOUS
Status: CANCELLED | OUTPATIENT
Start: 2018-09-17

## 2018-09-14 RX ADMIN — DEXAMETHASONE SODIUM PHOSPHATE 10 MG: 4 INJECTION, SOLUTION INTRA-ARTICULAR; INTRALESIONAL; INTRAMUSCULAR; INTRAVENOUS; SOFT TISSUE at 08:09

## 2018-09-14 RX ADMIN — SODIUM CHLORIDE 2000 MG: 0.9 INJECTION, SOLUTION INTRAVENOUS at 09:09

## 2018-09-14 RX ADMIN — SODIUM CHLORIDE: 0.9 INJECTION, SOLUTION INTRAVENOUS at 08:09

## 2018-09-14 RX ADMIN — ACETAMINOPHEN 1000 MG: 500 TABLET, FILM COATED ORAL at 08:09

## 2018-09-14 RX ADMIN — DIPHENHYDRAMINE HYDROCHLORIDE 50 MG: 50 CAPSULE ORAL at 09:09

## 2018-09-14 RX ADMIN — FAMOTIDINE 20 MG: 10 INJECTION, SOLUTION INTRAVENOUS at 09:09

## 2018-09-14 NOTE — PLAN OF CARE
Problem: Chemotherapy Effects (Adult)  Goal: Signs and Symptoms of Listed Potential Problems Will be Absent or Manageable (Chemotherapy Effects)  Signs and symptoms of listed potential problems will be absent or manageable by discharge/transition of care (reference Chemotherapy Effects (Adult) CPG).   Outcome: Ongoing (interventions implemented as appropriate)  Patient here for Ofatumumab.  Assessment complete and labs reviewed.  VSS.  Chair reclined and blanket offered.  No needs expressed at this time.  Will continue to monitor.

## 2018-09-19 ENCOUNTER — OFFICE VISIT (OUTPATIENT)
Dept: ENDOCRINOLOGY | Facility: CLINIC | Age: 83
End: 2018-09-19
Payer: MEDICARE

## 2018-09-19 VITALS
DIASTOLIC BLOOD PRESSURE: 74 MMHG | BODY MASS INDEX: 21.42 KG/M2 | WEIGHT: 153 LBS | HEART RATE: 71 BPM | SYSTOLIC BLOOD PRESSURE: 122 MMHG | HEIGHT: 71 IN

## 2018-09-19 DIAGNOSIS — E11.40 TYPE 2 DIABETES, CONTROLLED, WITH NEUROPATHY: ICD-10-CM

## 2018-09-19 DIAGNOSIS — E23.0 PANHYPOPITUITARISM: Primary | ICD-10-CM

## 2018-09-19 DIAGNOSIS — E29.1 SECONDARY MALE HYPOGONADISM: ICD-10-CM

## 2018-09-19 DIAGNOSIS — Z12.5 PROSTATE CANCER SCREENING: ICD-10-CM

## 2018-09-19 DIAGNOSIS — E03.8 CENTRAL HYPOTHYROIDISM: ICD-10-CM

## 2018-09-19 DIAGNOSIS — E27.49 SECONDARY ADRENAL INSUFFICIENCY: ICD-10-CM

## 2018-09-19 PROCEDURE — 99999 PR PBB SHADOW E&M-EST. PATIENT-LVL V: CPT | Mod: PBBFAC,,, | Performed by: INTERNAL MEDICINE

## 2018-09-19 PROCEDURE — 3078F DIAST BP <80 MM HG: CPT | Mod: CPTII,,, | Performed by: INTERNAL MEDICINE

## 2018-09-19 PROCEDURE — 3074F SYST BP LT 130 MM HG: CPT | Mod: CPTII,,, | Performed by: INTERNAL MEDICINE

## 2018-09-19 PROCEDURE — 99215 OFFICE O/P EST HI 40 MIN: CPT | Mod: PBBFAC | Performed by: INTERNAL MEDICINE

## 2018-09-19 PROCEDURE — 1101F PT FALLS ASSESS-DOCD LE1/YR: CPT | Mod: CPTII,,, | Performed by: INTERNAL MEDICINE

## 2018-09-19 PROCEDURE — 99214 OFFICE O/P EST MOD 30 MIN: CPT | Mod: S$PBB,,, | Performed by: INTERNAL MEDICINE

## 2018-09-19 RX ORDER — DEXAMETHASONE SODIUM PHOSPHATE 4 MG/ML
4 INJECTION, SOLUTION INTRA-ARTICULAR; INTRALESIONAL; INTRAMUSCULAR; INTRAVENOUS; SOFT TISSUE ONCE AS NEEDED
Qty: 1 ML | Refills: 5 | Status: SHIPPED | OUTPATIENT
Start: 2018-09-19 | End: 2018-09-19

## 2018-09-19 NOTE — PROGRESS NOTES
"Subjective:      Chief Complaint: Follow-up for panhypopituitarism and type 2 diabetes mellitus    HPI: Thierry Ramos Jr. is a 84 y.o. male who is here for a follow-up evaluation for panhypopituitarism and type 2 diabetes mellitus     With regards to pituitary tumor:  Diagnosed with pituitary tumor in July, 2012 - had an MRI and went to surgery the next day.   S/p open resection and post-op gamma knife therapy  Last MRI in 2014 - No sign of residual tumor.  Original path not available, but I couldn't find any documentation of hormone overproduction related to the adenoma in the old records.    Here for follow up of Panhypopituitarism.      Last seen in 2017 by DUSTIN Churchill     This is my first time seeing him. He arrives to clinic today accompanied by his wife      He is currently taking Prednisone 7.5 mg daily (5 + 2.5) taken in the AM. Per Dr. Orr's notes, dose reduction was attempted, but resulted in two syncopal episodes, although it's unclear if they were related to adrenal insufficiency.  He reports doing okay, other than an episode of "dehydration" earlier this month with an admission to [facility] for syncope. After volume repletion, he feels well.   For Hypothyroidism - he is taking Levothyroxine 125 mcg daily   For hypogonadism - he is taking T cypionate 200 mg/mL - 1/2 mL every 2 weeks. He reports no libido and some muscle wasting since last visit. He is wondering if his levels may be too low. Injects on Fridays (last 5 days ago)    He denies headaches  Denies vision changes.    He also has type 2 diabetes mellitus:  Taking Levemir 15 units once daily - dose recently decreased by his PCP due to lower blood sugars  Checks glucose at night only - reports values  for the most part.  No hypoglycemia.     ADA standards of care:   ACEi/ARB: losartan  Statin: lipitor  Also taking Plavix and ASA     He also has CLL being treated by oncology and multiple skin cancers s/p resection - most recently " 2 weeks ago (Mohs procedure) - wound is healing well.    Reviewed past medical, family, social history and updated as appropriate.    Review of Systems   Constitutional: Negative for unexpected weight change.   Eyes: Negative for visual disturbance.   Respiratory: Negative for shortness of breath.    Cardiovascular: Negative for chest pain.   Gastrointestinal: Negative for abdominal pain.   Genitourinary: Negative for urgency.   Musculoskeletal: Negative for arthralgias.        Muscles feel smaller   Skin: Positive for wound.   Neurological: Negative for light-headedness and headaches.   Hematological: Bruises/bleeds easily.   Psychiatric/Behavioral: Negative for sleep disturbance.     Objective:     Vitals:    09/19/18 1108   BP: 122/74   Pulse: 71       Physical Exam   Constitutional: He is oriented to person, place, and time. He appears well-developed.   HENT:   Right Ear: External ear normal.   Left Ear: External ear normal.   Nose: Nose normal.   Hearing grossly normal  Dentition grossly normal   Eyes: Conjunctivae are normal. Right eye exhibits no discharge. Left eye exhibits no discharge.   Cardiovascular: Normal rate.   No murmur heard.  Pulmonary/Chest: Effort normal and breath sounds normal.   Abdominal: Soft. He exhibits no mass. There is no tenderness.   Musculoskeletal: He exhibits no edema.   No digital clubbing or extremity cyanosis   Neurological: He is alert and oriented to person, place, and time. Coordination normal.   Skin: No rash noted.   Thin skin.  Multiple ecchymoses on bilateral arms.   Psychiatric: He has a normal mood and affect. His behavior is normal.   Alert and oriented to person, place, and situation.   Nursing note and vitals reviewed.      Wt Readings from Last 10 Encounters:   09/19/18 1108 69.4 kg (152 lb 16 oz)   09/14/18 0837 66.7 kg (147 lb)   09/06/18 1424 66.9 kg (147 lb 7.8 oz)   09/05/18 0720 68 kg (150 lb)   09/01/18 1400 68 kg (150 lb 0 oz)   09/01/18 0712 68 kg (150 lb)    08/29/18 0724 68 kg (150 lb)   08/13/18 0951 68 kg (150 lb 0 oz)   08/01/18 0908 68 kg (150 lb)   07/19/18 1317 68 kg (150 lb)   07/19/18 1010 68.8 kg (151 lb 10.8 oz)       Lab Results   Component Value Date    HGBA1C 5.5 09/01/2018     Lab Results   Component Value Date    CHOL 124 09/14/2018    HDL 45 09/14/2018    LDLCALC 62.8 (L) 09/14/2018    TRIG 81 09/14/2018    CHOLHDL 36.3 09/14/2018     Lab Results   Component Value Date     09/14/2018    K 4.1 09/14/2018     09/14/2018    CO2 26 09/14/2018    GLU 85 09/14/2018    BUN 30 (H) 09/14/2018    CREATININE 1.1 09/14/2018    CALCIUM 9.5 09/14/2018    PROT 6.0 09/14/2018    ALBUMIN 3.1 (L) 09/14/2018    BILITOT 0.8 09/14/2018    ALKPHOS 101 09/14/2018    AST 49 (H) 09/14/2018    ALT 43 09/14/2018    ANIONGAP 9 09/14/2018    ESTGFRAFRICA >60.0 09/14/2018    EGFRNONAA >60.0 09/14/2018    TSH 0.058 (L) 09/01/2018      No results found for: MICALBCREAT    Assessment/Plan:     Panhypopituitarism  2/2 resection of pituitary adenoma (reportedly) s/p gamma knife treatment  See individual hormone deficiencies    Prostate cancer screening  Check PSA for patient on chronic testosterone.      Secondary male hypogonadism  Currently on 100 mg depotest q 2 weeks.  Check mid-cycle testosterone on 9/21 to assess for dose adjustments.      Secondary adrenal insufficiency  Continue prednisone 7.5 mg daily  Had syncope after previous attempts to wean dose    Discussed sick day precautions  Has med alert tag  Emergency dex kit prescribed - discussed situations where use would be appropriate and to call 911 if a dose is needed    Needs assessment of bone density - last was 10 years ago and was normal. If he is in the osteopenia or osteoporosis range, will likely institute treatment as he is at higher than average risk for falls and fragility fractures given his age, body habitus, and chronic steroid use.    Central hypothyroidism  Last FT4 at goal.  Continue LT4 125 mcg  daily      Type 2 diabetes, controlled, with neuropathy  Last A1c is 5.5, consistent with his lower end PM glucose levels  I advised to start checking in the AM in a fasting state to get a better idea of the appropriate insulin dose.    Recommend continuing to wean insulin to achieve goal fasting glucose ~ avoiding hypoglycemia. If his sugars remain at goal at next visit, consider switching to oral therapy.      RTC in 1 year    I discussed the case with Dr. Powell and she is in agreement with the plan.

## 2018-09-19 NOTE — ASSESSMENT & PLAN NOTE
Currently on 100 mg depotest q 2 weeks.  Check mid-cycle testosterone on 9/21 to assess for dose adjustments.

## 2018-09-19 NOTE — ASSESSMENT & PLAN NOTE
2/2 resection of pituitary adenoma (reportedly) s/p gamma knife treatment  See individual hormone deficiencies

## 2018-09-19 NOTE — ASSESSMENT & PLAN NOTE
Continue prednisone 7.5 mg daily  Had syncope after previous attempts to wean dose    Discussed sick day precautions  Has med alert tag  Emergency dex kit prescribed - discussed situations where use would be appropriate and to call 911 if a dose is needed    Needs assessment of bone density - last was 10 years ago and was normal. If he is in the osteopenia or osteoporosis range, will likely institute treatment as he is at higher than average risk for falls and fragility fractures given his age, body habitus, and chronic steroid use.

## 2018-09-20 ENCOUNTER — OFFICE VISIT (OUTPATIENT)
Dept: OTOLARYNGOLOGY | Facility: CLINIC | Age: 83
End: 2018-09-20
Payer: MEDICARE

## 2018-09-20 VITALS
HEIGHT: 71 IN | DIASTOLIC BLOOD PRESSURE: 65 MMHG | SYSTOLIC BLOOD PRESSURE: 112 MMHG | WEIGHT: 153.25 LBS | BODY MASS INDEX: 21.45 KG/M2 | HEART RATE: 69 BPM

## 2018-09-20 DIAGNOSIS — D84.9 IMMUNOSUPPRESSION: ICD-10-CM

## 2018-09-20 DIAGNOSIS — J32.8 OTHER CHRONIC SINUSITIS: Primary | ICD-10-CM

## 2018-09-20 DIAGNOSIS — J34.3 HYPERTROPHY OF INFERIOR NASAL TURBINATE: ICD-10-CM

## 2018-09-20 PROCEDURE — 99214 OFFICE O/P EST MOD 30 MIN: CPT | Mod: 25,S$PBB,, | Performed by: OTOLARYNGOLOGY

## 2018-09-20 PROCEDURE — 3078F DIAST BP <80 MM HG: CPT | Mod: CPTII,,, | Performed by: OTOLARYNGOLOGY

## 2018-09-20 PROCEDURE — 99213 OFFICE O/P EST LOW 20 MIN: CPT | Mod: PBBFAC,PO | Performed by: OTOLARYNGOLOGY

## 2018-09-20 PROCEDURE — 1101F PT FALLS ASSESS-DOCD LE1/YR: CPT | Mod: CPTII,,, | Performed by: OTOLARYNGOLOGY

## 2018-09-20 PROCEDURE — 87077 CULTURE AEROBIC IDENTIFY: CPT

## 2018-09-20 PROCEDURE — 3074F SYST BP LT 130 MM HG: CPT | Mod: CPTII,,, | Performed by: OTOLARYNGOLOGY

## 2018-09-20 PROCEDURE — 31231 NASAL ENDOSCOPY DX: CPT | Mod: PBBFAC,PO | Performed by: OTOLARYNGOLOGY

## 2018-09-20 PROCEDURE — 87075 CULTR BACTERIA EXCEPT BLOOD: CPT

## 2018-09-20 PROCEDURE — 99999 PR PBB SHADOW E&M-EST. PATIENT-LVL III: CPT | Mod: PBBFAC,,, | Performed by: OTOLARYNGOLOGY

## 2018-09-20 PROCEDURE — 87070 CULTURE OTHR SPECIMN AEROBIC: CPT

## 2018-09-20 PROCEDURE — 31231 NASAL ENDOSCOPY DX: CPT | Mod: S$PBB,,, | Performed by: OTOLARYNGOLOGY

## 2018-09-20 PROCEDURE — 87186 SC STD MICRODIL/AGAR DIL: CPT

## 2018-09-20 NOTE — PROGRESS NOTES
Chief Complaint   Patient presents with    Sinus Problem    Nasal Congestion    Headache     pressure in forehead   .    HPI:     Thierry Ramos Jr. is a 84 y.o. male who presents for evaluation of chronic sinusitis for several years.  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  He is currently on IV IG every month for this.  He has had bilateral FESS of all sinuses by Dr. Dsouza in 2014.  He reports a several month history of several month history of pain and pressure in the forehead,  nasal congestion and postnasal drip. He notes thick purulent discharge bilaterally. He does use sinus rinses or nasal sprays. He admits to midface pain and pressure.  He admits to rhinorrhea and postnasal drip. There is not maxillary tooth pain. He  admits to headaches.  He has been on a recent 10 day course of Augmentin and felt like this helped somewhat.       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    Hyperlipidemia     Hypertension     Hypopituitarism     Hypothyroid     Immunosuppression 3/13/2015    Squamous Cell Carcinoma     on the right side of the face     Squamous cell carcinoma of skin of right temple 1/27/2016    Stroke     Syncope 2/12    Unspecified disorder of kidney and ureter      Social History     Socioeconomic History    Marital status:      Spouse name: Not on file    Number of children: Not on file    Years of education: Not on file    Highest education level: Not on file   Social Needs    Financial resource strain: Not on file    Food insecurity - worry: Not on file    Food insecurity - inability: Not on file    Transportation needs - medical: Not on file    Transportation needs - non-medical: Not on file   Occupational History     "Occupation: retired   Tobacco Use    Smoking status: Never Smoker    Smokeless tobacco: Never Used   Substance and Sexual Activity    Alcohol use: Yes     Alcohol/week: 1.2 oz     Types: 1 Glasses of wine, 1 Cans of beer per week     Comment: "rarely"    Drug use: No    Sexual activity: Not Currently   Other Topics Concern    Not on file   Social History Narrative    He is .     Past Surgical History:   Procedure Laterality Date    (ROTATION) FLAP N/A 3/23/2015    Performed [facility] at [facility] OR 2ND FLR    BIOPSY-LYMPH NODE N/A 3/23/2015    Performed by [facility] at [facility] OR 2ND FLR    CAROTID ENDARTERECTOMY (L)  2/27/2007    COLONOSCOPY N/A 2/25/2014    Performed by DELORES Lambert MD at [facility] ENDO (4TH FLR)    EXCISION TURBINATE, SUBMUCOUS      EXCISION-LESION-FACE Right 2/24/2016    [facility] 2ND FLR    EXCISION-LESION-FACE N/A 3/23/2015    [facility] at [facility] OR 2ND FLR    FESS  9/16/2014    FULL THICKNESS SKIN GRAFT N/A 2/24/2016    [facility] 2ND FLR    MAPPING-LYMPH NODE N/A 3/23/2015    [facility]    MAPPING-SENTINEL NODE N/A 2/24/2016    [facility] OR 2ND FLR    Mohs excision   3/23/2015, 2/24/2016    combined with WLE forehead    NASAL SEPTUM SURGERY  9/16/2014    REPAIR-MOHS DEFECT-left nose Left 3/31/2017    Performed by Jermaine D[facility]za III, [facility] at [facility] OR 2ND FLR    RESECTION-TURBINATES (SMR) Bilateral 9/16/2014    [facility]    right ureter surgery  1995    SENTINEL LYMPH NODE BIOPSY  3/23/2015, 2/24/2016    SEPTOPLASTY Bilateral 9/16/2014    Performed by [facility], [facility] at [facility] OR 2ND FLR    SINUS SURGERY  9/16/2014    septo, ESS, [facility]    SINUS SURGERY FUNCTIONAL ENDOSCOPIC WITH NAVIGATION with bilateral maxillary, ethmoids, sphenoids, right " frontal, possible left NF duct-balloons Bilateral 9/16/2014    Performed by [facility]ermaine [facility], [facility] at [facility] OR [address] [place] [address]    Transphenoidal surgery       Family History   Problem Relation Age of Onset    Cancer Mother         breast    Colon cancer Father     Cancer Father         colon    Cancer Brother         leukemia    No Known Problems Sister     No Known Problems Maternal Aunt     No Known Problems Maternal Uncle     No Known Problems Paternal Aunt     No Known Problems Paternal Uncle     No Known Problems Maternal Grandmother     No Known Problems Maternal Grandfather     No Known Problems Paternal Grandmother     No Known Problems Paternal Grandfather     Amblyopia Neg Hx     Blindness Neg Hx     Cataracts Neg Hx     Diabetes Neg Hx     Glaucoma Neg Hx     Hypertension Neg Hx     Macular degeneration Neg Hx     Retinal detachment Neg Hx     Strabismus Neg Hx     Stroke Neg Hx     Thyroid disease Neg Hx     Allergic rhinitis Neg Hx     Allergies Neg Hx     Angioedema Neg Hx     Asthma Neg Hx     Atopy Neg Hx     Eczema Neg Hx     Immunodeficiency Neg Hx     Rhinitis Neg Hx     Urticaria Neg Hx            Review of Systems  General: negative for chills, fever or weight loss  Psychological: negative for mood changes or depression  Ophthalmic: negative for blurry vision, photophobia or eye pain  ENT: see HPI  Respiratory: no cough, shortness of breath, or wheezing  Cardiovascular: no chest pain or dyspnea on exertion  Gastrointestinal: no abdominal pain, change in bowel habits, or black/ bloody stools  Musculoskeletal: negative for gait disturbance or muscular weakness  Neurological: no syncope or seizures; no ataxia  Dermatological: negative for puritis,  rash and jaundice  Hematologic/lymphatic: no easy bruising, no new lumps or bumps      Physical Exam:    Vitals:    09/20/18 0801   BP: 112/65   Pulse: 69       Constitutional: Well appearing / communicating without difficutly.   NAD.  Eyes: EOM I Bilaterally  Head/Face: Normocephalic.  Negative paranasal sinus pressure/tenderness.  Salivary glands WNL.  House Brackmann I Bilaterally.    Right Ear: Auricle normal appearance. External Auditory Canal within normal limits,TM w/o masses/lesions/perforations. TM mobility noted.   Left Ear: Auricle normal appearance. External Auditory Canal WNL,TM w/o masses/lesions/perforations. TM mobility noted.  Nose:+ bilateral crusting. No gross nasal septal deviation. Inferior Turbinates 3+ bilaterally. No septal perforation. No masses/lesions. External nasal skin appears normal without masses/lesions.  Oral Cavity: Gingiva/lips within normal limits.  Dentition/gingiva healthy appearing. Mucus membranes moist. Floor of mouth soft, no masses palpated. Oral Tongue mobile. Hard Palate appears normal.    Oropharynx: Base of tongue appears normal. No masses/lesions noted. Tonsillar fossa/pharyngeal wall without lesions. Posterior oropharynx WNL.  Soft palate without masses. Midline uvula.   Neck/Lymphatic: No LAD I-VI bilaterally.  No thyromegaly.  No masses noted on exam.    Mirror laryngoscopy/nasopharyngoscopy: Active gag reflex.  Unable to perform.    Neuro/Psychiatric: AOx3.  Normal mood and affect.   Cardiovascular: Normal carotid pulses bilaterally, no increasing jugular venous distention noted at cervical region bilaterally.    Respiratory: Normal respiratory effort, no stridor, no retractions noted.      See separate procedure note for nasal endoscopy.      Assessment:    ICD-10-CM ICD-9-CM    1. Other chronic sinusitis J32.8 473.8 Aerobic culture      Culture, Anaerobic   2. Immunosuppression D89.9 279.9    3. Hypertrophy of inferior nasal turbinate J34.3 478.0      The primary encounter diagnosis was Other chronic sinusitis. Diagnoses of Immunosuppression and Hypertrophy of inferior nasal turbinate were also pertinent to this visit.      Plan:  Orders Placed This Encounter   Procedures    Aerobic  culture    Culture, Anaerobic       Sinus cultures obtained on nasal endoscopy. Will plan antibiotic therapy according to culture results and sensitivity profile. We discussed the use of compounded medication in a nasal nebulizer which I think will be a good option for him.    He was counseled on the off-label nature of this therapy and he consented to its use.      Alicia Truong MD

## 2018-09-20 NOTE — LETTER
September 20, 2018      Kem Contreras MD  [address]  [facility] [address]           [facility]hin[facility]  [address] [place] [address], [address]  [facility] [address]  Phone: [phone]  Fax: [phone]          Patient: Thierry Ramos Jr.   MR Number: [id number]   YOB: 1934   Date of Visit: 9/20/2018       Dear Dr. Kem Contreras:    Thank you for referring Thierry Ramos to me for evaluation. Attached you will find relevant portions of my assessment and plan of care.    If you have questions, please do not hesitate to call me. I look forward to following Thierry Ramos along with you.    Sincerely,    Alicia Truong MD    Enclosure  CC:  No Recipients    If you would like to receive this communication electronically, please contact [email or web address] or [phone] to request more information on [facility] [employer] access.    For providers and/or their staff who would like to refer a patient to [facility], please contact us through our one-stop-shop provider referral line, [facility], at [phone].    If you feel you have received this communication in error or would no longer like to receive these types of communications, please e-mail [email or web address]

## 2018-09-20 NOTE — PROCEDURES
Procedures     PROCEDURE NOTE:  Nasal endoscopy   Preprocedure diagnosis:  Chronic sinusitis  Postprocedure diangosis:  Same  Complications:  None  Blood Loss:  None    Procedure in detail:  After verbal consent was obtained, the patient's nasal cavity was anesthesized using topical 1%lidocaine and Neosynepherine.  A rigid 0 degree endoscope was placed in first the right, then the left nasal cavity.  The inferior and middle turbinates were examined, and found to be erythematous bilaterally.  The middle meatus and maxillary antrum was also examined, and found to have purulent rhinorrhea bilaterally. Culture obtained from the left. No masses seen.  The patient tolerated the procedure well and there were no complications.

## 2018-09-21 ENCOUNTER — LAB VISIT (OUTPATIENT)
Dept: LAB | Facility: HOSPITAL | Age: 83
End: 2018-09-21
Attending: INTERNAL MEDICINE
Payer: MEDICARE

## 2018-09-21 DIAGNOSIS — E23.0 PANHYPOPITUITARISM: ICD-10-CM

## 2018-09-21 DIAGNOSIS — Z12.5 PROSTATE CANCER SCREENING: ICD-10-CM

## 2018-09-21 DIAGNOSIS — E29.1 SECONDARY MALE HYPOGONADISM: ICD-10-CM

## 2018-09-21 LAB
COMPLEXED PSA SERPL-MCNC: 0.91 NG/ML
TESTOST SERPL-MCNC: 691 NG/DL

## 2018-09-21 PROCEDURE — 84153 ASSAY OF PSA TOTAL: CPT

## 2018-09-21 PROCEDURE — 84403 ASSAY OF TOTAL TESTOSTERONE: CPT

## 2018-09-21 PROCEDURE — 36415 COLL VENOUS BLD VENIPUNCTURE: CPT | Mod: PO

## 2018-09-24 ENCOUNTER — TELEPHONE (OUTPATIENT)
Dept: CARDIOLOGY | Facility: CLINIC | Age: 83
End: 2018-09-24

## 2018-09-24 LAB — BACTERIA SPEC AEROBE CULT: NORMAL

## 2018-09-24 NOTE — TELEPHONE ENCOUNTER
----- Message from Lou Chambers MD sent at 9/23/2018  5:22 PM CDT -----  Labs do not need to be repeated prior to appt at the end of next month

## 2018-09-25 ENCOUNTER — TELEPHONE (OUTPATIENT)
Dept: OTOLARYNGOLOGY | Facility: CLINIC | Age: 83
End: 2018-09-25

## 2018-09-25 LAB — BACTERIA SPEC ANAEROBE CULT: NORMAL

## 2018-09-25 NOTE — TELEPHONE ENCOUNTER
Please notify patient that we received his culture report and I have sent medications for him to begin(compounded medication in nasal nebulizer) as we discussed.  Please schedule follow up appointment in 1 month.

## 2018-09-25 NOTE — TELEPHONE ENCOUNTER
Spoke with patient's wife she was notified will be sending in a prescription to [facility] I discussed with her someone should be contacting them to explain the medications and will see patient for his one month follow up in October. Lorin verbalized understanding

## 2018-10-05 RX ORDER — ACETAMINOPHEN 325 MG/1
650 TABLET ORAL
Status: CANCELLED | OUTPATIENT
Start: 2018-10-16

## 2018-10-05 RX ORDER — SODIUM CHLORIDE 0.9 % (FLUSH) 0.9 %
10 SYRINGE (ML) INJECTION
Status: CANCELLED | OUTPATIENT
Start: 2018-10-16

## 2018-10-05 RX ORDER — HEPARIN 100 UNIT/ML
500 SYRINGE INTRAVENOUS
Status: CANCELLED | OUTPATIENT
Start: 2018-10-16

## 2018-10-05 RX ORDER — FAMOTIDINE 10 MG/ML
20 INJECTION INTRAVENOUS
Status: CANCELLED | OUTPATIENT
Start: 2018-10-16 | End: 2018-10-16

## 2018-10-08 ENCOUNTER — INFUSION (OUTPATIENT)
Dept: INFUSION THERAPY | Facility: HOSPITAL | Age: 83
End: 2018-10-08
Attending: STUDENT IN AN ORGANIZED HEALTH CARE EDUCATION/TRAINING PROGRAM
Payer: MEDICARE

## 2018-10-08 ENCOUNTER — HOSPITAL ENCOUNTER (OUTPATIENT)
Dept: RADIOLOGY | Facility: HOSPITAL | Age: 83
Discharge: HOME | End: 2018-10-08
Attending: STUDENT IN AN ORGANIZED HEALTH CARE EDUCATION/TRAINING PROGRAM
Payer: MEDICARE

## 2018-10-08 VITALS
BODY MASS INDEX: 21.97 KG/M2 | HEART RATE: 67 BPM | SYSTOLIC BLOOD PRESSURE: 135 MMHG | RESPIRATION RATE: 18 BRPM | HEIGHT: 71 IN | TEMPERATURE: 98 F | DIASTOLIC BLOOD PRESSURE: 56 MMHG | WEIGHT: 156.94 LBS

## 2018-10-08 DIAGNOSIS — R93.89 ABNORMAL CXR: ICD-10-CM

## 2018-10-08 DIAGNOSIS — J32.9 CHRONIC SINUSITIS, UNSPECIFIED LOCATION: ICD-10-CM

## 2018-10-08 DIAGNOSIS — E79.0 HYPERURICEMIA: ICD-10-CM

## 2018-10-08 DIAGNOSIS — C91.10 CLL (CHRONIC LYMPHOCYTIC LEUKEMIA): ICD-10-CM

## 2018-10-08 DIAGNOSIS — D80.1 HYPOGAMMAGLOBULINEMIA: Primary | ICD-10-CM

## 2018-10-08 PROCEDURE — 96365 THER/PROPH/DIAG IV INF INIT: CPT

## 2018-10-08 PROCEDURE — 71046 X-RAY EXAM CHEST 2 VIEWS: CPT | Mod: TC,PO

## 2018-10-08 PROCEDURE — S0028 INJECTION, FAMOTIDINE, 20 MG: HCPCS | Performed by: INTERNAL MEDICINE

## 2018-10-08 PROCEDURE — 25000003 PHARM REV CODE 250: Performed by: STUDENT IN AN ORGANIZED HEALTH CARE EDUCATION/TRAINING PROGRAM

## 2018-10-08 PROCEDURE — 96366 THER/PROPH/DIAG IV INF ADDON: CPT

## 2018-10-08 PROCEDURE — 71046 X-RAY EXAM CHEST 2 VIEWS: CPT | Mod: 26,,, | Performed by: RADIOLOGY

## 2018-10-08 PROCEDURE — 25000003 PHARM REV CODE 250: Performed by: INTERNAL MEDICINE

## 2018-10-08 PROCEDURE — 96375 TX/PRO/DX INJ NEW DRUG ADDON: CPT

## 2018-10-08 PROCEDURE — 63600175 PHARM REV CODE 636 W HCPCS: Mod: JG | Performed by: INTERNAL MEDICINE

## 2018-10-08 RX ORDER — SODIUM CHLORIDE 0.9 % (FLUSH) 0.9 %
10 SYRINGE (ML) INJECTION
Status: DISCONTINUED | OUTPATIENT
Start: 2018-10-08 | End: 2018-10-08 | Stop reason: HOSPADM

## 2018-10-08 RX ORDER — ACETAMINOPHEN 325 MG/1
650 TABLET ORAL
Status: COMPLETED | OUTPATIENT
Start: 2018-10-08 | End: 2018-10-08

## 2018-10-08 RX ORDER — DIPHENHYDRAMINE HCL 50 MG
50 CAPSULE ORAL ONCE
Status: COMPLETED | OUTPATIENT
Start: 2018-10-08 | End: 2018-10-08

## 2018-10-08 RX ORDER — HEPARIN 100 UNIT/ML
500 SYRINGE INTRAVENOUS
Status: DISCONTINUED | OUTPATIENT
Start: 2018-10-08 | End: 2018-10-08 | Stop reason: HOSPADM

## 2018-10-08 RX ORDER — FAMOTIDINE 10 MG/ML
20 INJECTION INTRAVENOUS
Status: COMPLETED | OUTPATIENT
Start: 2018-10-08 | End: 2018-10-08

## 2018-10-08 RX ADMIN — FAMOTIDINE 20 MG: 10 INJECTION, SOLUTION INTRAVENOUS at 10:10

## 2018-10-08 RX ADMIN — ACETAMINOPHEN 650 MG: 325 TABLET ORAL at 10:10

## 2018-10-08 RX ADMIN — HUMAN IMMUNOGLOBULIN G 20 G: 20 LIQUID INTRAVENOUS at 10:10

## 2018-10-08 RX ADMIN — DIPHENHYDRAMINE HYDROCHLORIDE 50 MG: 50 CAPSULE ORAL at 10:10

## 2018-10-08 RX ADMIN — SODIUM CHLORIDE: 9 INJECTION, SOLUTION INTRAVENOUS at 09:10

## 2018-10-08 NOTE — PLAN OF CARE
Problem: Patient Care Overview (Adult)  Goal: Plan of Care Review  Outcome: Ongoing (interventions implemented as appropriate)  Infusion completed, pt tolerated well, pt instructed to remain well hydrated, to contact MD for any needs or concerns; declined printed AVS, will contact  to discuss next month's appts; pt and spouse verbalized understanding of all discussed

## 2018-10-08 NOTE — PLAN OF CARE
Problem: Chemotherapy Effects (Adult)  Goal: Signs and Symptoms of Listed Potential Problems Will be Absent or Manageable (Chemotherapy Effects)  Signs and symptoms of listed potential problems will be absent or manageable by discharge/transition of care (reference Chemotherapy Effects (Adult) CPG).   Outcome: Ongoing (interventions implemented as appropriate)  Pt here for IVIG infusion, accompanied by spouse, no new complaints or concerns, reports tolerating treatment well; discussed treatment plan for today and pt agrees to proceed

## 2018-10-10 ENCOUNTER — PATIENT MESSAGE (OUTPATIENT)
Dept: HEMATOLOGY/ONCOLOGY | Facility: CLINIC | Age: 83
End: 2018-10-10

## 2018-10-16 NOTE — PROGRESS NOTES
Chief Complaint   Patient presents with    Follow-up    Sinusitis   .    HPI:     Thierry Ramos Jr. is a 84 y.o. male who presents for evaluation of chronic sinusitis for several years.  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  He is currently on IV IG every month for this.  He has had bilateral FESS of all sinuses by Dr. Dsouza in 2014.  He reports a several month history of several month history of pain and pressure in the forehead,  nasal congestion and postnasal drip. He notes thick purulent discharge bilaterally. He does use sinus rinses or nasal sprays. He admits to midface pain and pressure.  He admits to rhinorrhea and postnasal drip. There is not maxillary tooth pain. He  admits to headaches.  He has been on a recent 10 day course of Augmentin and felt like this helped somewhat.     Interval HPI 10/18/2018:   Mr. Ramos follows up today for CRS. Since last visit he has been using his compounded nasal saline rinses BID. He feels that he not any better. He reports continued nasal congestion and post-nasal drip. He note thick discharge bilaterally.       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    Hyperlipidemia     Hypertension     Hypopituitarism     Hypothyroid     Immunosuppression 3/13/2015    Squamous Cell Carcinoma     on the right side of the face     Squamous cell carcinoma of skin of right temple 1/27/2016    Stroke     Syncope 2/12    Unspecified disorder of kidney and ureter      Social History     Socioeconomic History    Marital status:      Spouse name: Not on file    Number of children: Not on file    Years of education: Not on file    Highest education level: Not on file   Social Needs    Financial resource strain: Not on  "file    Food insecurity - worry: Not on file    Food insecurity - inability: Not on file    Transportation needs - medical: Not on file    Transportation needs - non-medical: Not on file   Occupational History    Occupation: retired   Tobacco Use    Smoking status: Never Smoker    Smokeless tobacco: Never Used   Substance and Sexual Activity    Alcohol use: Yes     Alcohol/week: 1.2 oz     Types: 1 Glasses of wine, 1 Cans of beer per week     Comment: "rarely"    Drug use: No    Sexual activity: Not Currently   Other Topics Concern    Not on file   Social History Narrative    He is .     Past Surgical History:   Procedure Laterality Date    (ROTATION) FLAP N/A 3/23/2015    Performed by [facility]ister [facility] at [facility] OR 2ND FLR    BIOPSY-LYMPH NODE N/A 3/23/2015    Performed by [facility] at [facility] OR 2ND FLR    CAROTID ENDARTERECTOMY (L)  2/27/2007    COLONOSCOPY N/A 2/25/2014    Performed by DELORES Lambert MD at [facility] ENDO (4TH FLR)    EXCISION TURBINATE, SUBMUCOUS      EXCISION-LESION-FACE Right 2/24/2016    Performed by [facility] at [facility] OR 2ND FLR    EXCISION-LESION-FACE N/A 3/23/2015    Performed by [facility] at [facility] OR 2ND FLR    FESS  9/16/2014    FULL THICKNESS SKIN GRAFT N/A 2/24/2016    Performed by [facility] at [facility] OR 2ND FLR    MAPPING-LYMPH NODE N/A 3/23/2015    [facility]    MAPPING-SENTINEL NODE N/A 2/24/2016    Performed by [facility] at [facility] OR 2ND FLR    Mohs excision   3/23/2015, 2/24/2016    combined with WLE forehead    NASAL SEPTUM SURGERY  9/16/2014    REPAIR-MOHS DEFECT-left nose Left 3/31/2017    Performed by Jermaine Dsouza III, MD at [facility] OR 2ND FLR    RESECTION-TURBINATES (SMR) Bilateral 9/16/2014    Performed by Jermaine Dsouza III, MD at [facility] OR 2ND FLR    right ureter surgery  1995    SENTINEL LYMPH NODE BIOPSY  3/23/2015, 2/24/2016    SEPTOPLASTY Bilateral 9/16/2014    " Performed by Jermaine D[facility]za III, MD at [facility] OR 2ND FLR    SINUS SURGERY  9/16/2014    septo, ESS, BITSMR    SINUS SURGERY FUNCTIONAL ENDOSCOPIC WITH NAVIGATION with bilateral maxillary, ethmoids, sphenoids, right frontal, possible left NF duct-balloons Bilateral 9/16/2014    Performed by Jermaine Dsouza III, MD at [facility] OR 2ND FLR    Transphenoidal surgery       Family History   Problem Relation Age of Onset    Cancer Mother         breast    Colon cancer Father     Cancer Father         colon    Cancer Brother         leukemia    No Known Problems Sister     No Known Problems Maternal Aunt     No Known Problems Maternal Uncle     No Known Problems Paternal Aunt     No Known Problems Paternal Uncle     No Known Problems Maternal Grandmother     No Known Problems Maternal Grandfather     No Known Problems Paternal Grandmother     No Known Problems Paternal Grandfather     Amblyopia Neg Hx     Blindness Neg Hx     Cataracts Neg Hx     Diabetes Neg Hx     Glaucoma Neg Hx     Hypertension Neg Hx     Macular degeneration Neg Hx     Retinal detachment Neg Hx     Strabismus Neg Hx     Stroke Neg Hx     Thyroid disease Neg Hx     Allergic rhinitis Neg Hx     Allergies Neg Hx     Angioedema Neg Hx     Asthma Neg Hx     Atopy Neg Hx     Eczema Neg Hx     Immunodeficiency Neg Hx     Rhinitis Neg Hx     Urticaria Neg Hx            Review of Systems  General: negative for chills, fever or weight loss  Psychological: negative for mood changes or depression  Ophthalmic: negative for blurry vision, photophobia or eye pain  ENT: see HPI  Respiratory: no cough, shortness of breath, or wheezing  Cardiovascular: no chest pain or dyspnea on exertion  Gastrointestinal: no abdominal pain, change in bowel habits, or black/ bloody stools  Musculoskeletal: negative for gait disturbance or muscular weakness  Neurological: no syncope or seizures; no ataxia  Dermatological: negative for puritis,  rash and  jaundice  Hematologic/lymphatic: no easy bruising, no new lumps or bumps      Physical Exam:    Vitals:    10/18/18 0834   BP: 108/60   Pulse: 73   Temp: 96.3 °F (35.7 °C)       Constitutional: Well appearing / communicating without difficutly.  NAD.  Eyes: EOM I Bilaterally  Head/Face: Normocephalic.  Negative paranasal sinus pressure/tenderness.  Salivary glands WNL.  House Brackmann I Bilaterally.    Right Ear: Auricle normal appearance. External Auditory Canal within normal limits,TM w/o masses/lesions/perforations. TM mobility noted.   Left Ear: Auricle normal appearance. External Auditory Canal WNL,TM w/o masses/lesions/perforations. TM mobility noted.  Nose:+ bilateral crusting. No gross nasal septal deviation. Inferior Turbinates 3+ bilaterally. No septal perforation. No masses/lesions. External nasal skin appears normal without masses/lesions.  Oral Cavity: Gingiva/lips within normal limits.  Dentition/gingiva healthy appearing. Mucus membranes moist. Floor of mouth soft, no masses palpated. Oral Tongue mobile. Hard Palate appears normal.    Oropharynx: Base of tongue appears normal. No masses/lesions noted. Tonsillar fossa/pharyngeal wall without lesions. Posterior oropharynx WNL.  Soft palate without masses. Midline uvula.   Neck/Lymphatic: No LAD I-VI bilaterally.  No thyromegaly.  No masses noted on exam.    Mirror laryngoscopy/nasopharyngoscopy: Active gag reflex.  Unable to perform.    Neuro/Psychiatric: AOx3.  Normal mood and affect.   Cardiovascular: Normal carotid pulses bilaterally, no increasing jugular venous distention noted at cervical region bilaterally.    Respiratory: Normal respiratory effort, no stridor, no retractions noted.      See separate procedure note for nasal endoscopy.    Culture result 9/20/2018:   Aerobic Bacterial Culture SERRATIA MARCESCENS   Moderate   No other significant isolate       Resulting Agency [facility]   Susceptibility      Serratia marcescens     CULTURE, AEROBIC   (SPECIFY SOURCE)     Cefepime <=8 mcg/mL Sensitive     Ceftriaxone <=8 mcg/mL Sensitive     Ciprofloxacin <=1 mcg/mL Sensitive     Ertapenem <=2 mcg/mL Sensitive     Gentamicin <=4 mcg/mL Sensitive     Meropenem <=4 mcg/mL Sensitive     Piperacillin/Tazo <=16 mcg/mL Sensitive     Tobramycin <=4 mcg/mL Sensitive     Trimeth/Sulfa <=2/38 mcg/mL Sensitive             Assessment:    ICD-10-CM ICD-9-CM    1. Other chronic sinusitis J32.8 473.8    2. Immunosuppression D89.9 279.9    3. Hypertrophy of inferior nasal turbinate J34.3 478.0      The primary encounter diagnosis was Other chronic sinusitis. Diagnoses of Immunosuppression and Hypertrophy of inferior nasal turbinate were also pertinent to this visit.      Plan:  No orders of the defined types were placed in this encounter.    Start Bactrim today. Continue the use of compounded medication in a nasal nebulizer which I think will be a good option for him.    He was counseled on the off-label nature of this therapy and he consented to its use.      Alicia Truong MD

## 2018-10-18 ENCOUNTER — OFFICE VISIT (OUTPATIENT)
Dept: OTOLARYNGOLOGY | Facility: CLINIC | Age: 83
End: 2018-10-18
Payer: MEDICARE

## 2018-10-18 VITALS
HEIGHT: 71 IN | SYSTOLIC BLOOD PRESSURE: 108 MMHG | HEART RATE: 73 BPM | TEMPERATURE: 96 F | WEIGHT: 156.31 LBS | BODY MASS INDEX: 21.88 KG/M2 | DIASTOLIC BLOOD PRESSURE: 60 MMHG

## 2018-10-18 DIAGNOSIS — J32.8 OTHER CHRONIC SINUSITIS: Primary | ICD-10-CM

## 2018-10-18 DIAGNOSIS — D84.9 IMMUNOSUPPRESSION: ICD-10-CM

## 2018-10-18 DIAGNOSIS — J34.3 HYPERTROPHY OF INFERIOR NASAL TURBINATE: ICD-10-CM

## 2018-10-18 PROCEDURE — 1101F PT FALLS ASSESS-DOCD LE1/YR: CPT | Mod: CPTII,,, | Performed by: OTOLARYNGOLOGY

## 2018-10-18 PROCEDURE — 99215 OFFICE O/P EST HI 40 MIN: CPT | Mod: PBBFAC,PN,25 | Performed by: OTOLARYNGOLOGY

## 2018-10-18 PROCEDURE — 31231 NASAL ENDOSCOPY DX: CPT | Mod: S$PBB,,, | Performed by: OTOLARYNGOLOGY

## 2018-10-18 PROCEDURE — 3074F SYST BP LT 130 MM HG: CPT | Mod: CPTII,,, | Performed by: OTOLARYNGOLOGY

## 2018-10-18 PROCEDURE — 31231 NASAL ENDOSCOPY DX: CPT | Mod: PBBFAC,PN | Performed by: OTOLARYNGOLOGY

## 2018-10-18 PROCEDURE — 99214 OFFICE O/P EST MOD 30 MIN: CPT | Mod: 25,S$PBB,, | Performed by: OTOLARYNGOLOGY

## 2018-10-18 PROCEDURE — 3078F DIAST BP <80 MM HG: CPT | Mod: CPTII,,, | Performed by: OTOLARYNGOLOGY

## 2018-10-18 PROCEDURE — 99999 PR PBB SHADOW E&M-EST. PATIENT-LVL V: CPT | Mod: PBBFAC,,, | Performed by: OTOLARYNGOLOGY

## 2018-10-18 RX ORDER — SULFAMETHOXAZOLE AND TRIMETHOPRIM 800; 160 MG/1; MG/1
1 TABLET ORAL 2 TIMES DAILY
Qty: 28 TABLET | Refills: 0 | Status: SHIPPED | OUTPATIENT
Start: 2018-10-18 | End: 2018-11-01

## 2018-10-21 NOTE — PROGRESS NOTES
I, Preethi Powell, have personally taken the history and physical exam and I agree with Dr. Enriquez's assessment and plan.

## 2018-10-23 ENCOUNTER — CLINICAL SUPPORT (OUTPATIENT)
Dept: CARDIOLOGY | Facility: CLINIC | Age: 83
End: 2018-10-23
Attending: INTERNAL MEDICINE
Payer: MEDICARE

## 2018-10-23 DIAGNOSIS — I65.22 OCCLUSION AND STENOSIS OF LEFT CAROTID ARTERY: ICD-10-CM

## 2018-10-23 LAB — INTERNAL CAROTID STENOSIS: ABNORMAL

## 2018-10-23 PROCEDURE — 93880 EXTRACRANIAL BILAT STUDY: CPT | Mod: PBBFAC,PO | Performed by: INTERNAL MEDICINE

## 2018-10-30 ENCOUNTER — OFFICE VISIT (OUTPATIENT)
Dept: CARDIOLOGY | Facility: CLINIC | Age: 83
End: 2018-10-30
Payer: MEDICARE

## 2018-10-30 VITALS
SYSTOLIC BLOOD PRESSURE: 104 MMHG | HEART RATE: 76 BPM | BODY MASS INDEX: 21.59 KG/M2 | DIASTOLIC BLOOD PRESSURE: 62 MMHG | WEIGHT: 154.19 LBS | HEIGHT: 71 IN

## 2018-10-30 DIAGNOSIS — I65.22 OCCLUSION AND STENOSIS OF LEFT CAROTID ARTERY: ICD-10-CM

## 2018-10-30 DIAGNOSIS — E78.2 MIXED DYSLIPIDEMIA: Primary | ICD-10-CM

## 2018-10-30 DIAGNOSIS — C91.10 CLL (CHRONIC LYMPHOCYTIC LEUKEMIA): ICD-10-CM

## 2018-10-30 PROCEDURE — 1101F PT FALLS ASSESS-DOCD LE1/YR: CPT | Mod: CPTII,S$GLB,, | Performed by: INTERNAL MEDICINE

## 2018-10-30 PROCEDURE — 99999 PR PBB SHADOW E&M-EST. PATIENT-LVL III: CPT | Mod: PBBFAC,,, | Performed by: INTERNAL MEDICINE

## 2018-10-30 PROCEDURE — 99214 OFFICE O/P EST MOD 30 MIN: CPT | Mod: S$GLB,,, | Performed by: INTERNAL MEDICINE

## 2018-10-30 PROCEDURE — 3074F SYST BP LT 130 MM HG: CPT | Mod: CPTII,S$GLB,, | Performed by: INTERNAL MEDICINE

## 2018-10-30 PROCEDURE — 3078F DIAST BP <80 MM HG: CPT | Mod: CPTII,S$GLB,, | Performed by: INTERNAL MEDICINE

## 2018-10-30 PROCEDURE — 99499 UNLISTED E&M SERVICE: CPT | Mod: HCNC,S$GLB,, | Performed by: INTERNAL MEDICINE

## 2018-10-30 RX ORDER — NAPROXEN SODIUM 220 MG/1
TABLET, FILM COATED ORAL
Status: ON HOLD
Start: 2018-10-30 | End: 2019-07-25 | Stop reason: HOSPADM

## 2018-10-30 NOTE — PATIENT INSTRUCTIONS
Drink plenty of fluids.  Let us know if the BP starts to run lower and I will stop losartan  Decrease aspirin to 3 days a week. Continue clopidogrel aka Plavix

## 2018-10-30 NOTE — PROGRESS NOTES
Subjective:   Patient ID:  Thierry Ramos Jr. is a 84 y.o. male who presents for follow-up of Dyslipidemia      Problem List:  Dyslipidemia   Statin induced myalgias - 2006   Carotid disease with h/o stroke   - (L) CEA 2007   HTN  DM steroid related - 2014  Hypopituitarism s/p pituitary tumor resectrion  CLL  DVT 8/16    HPI:   Thierry Ramos Jr. feels generally well. He does not report chest discomfort or shortness of breath.  Does not come report symptoms suggestive of a TIA or stroke.  Carotid ultrasound revealed bilateral atherosclerosis estimated at 40-49%.  Episode of syncope that occurred in the setting of a large bowel movement in 9/18.  He was treated w an anticoagulant for 6 months following a DVT in 8/16. At that time aspirin was stopped and clopidogrel continued. He is on aspirin and clopidogrel.  He also takes low-dose prednisone for hypopituitarism. He has considerable PET bruising.  He does not report bleeding.  Used to be treated with fenofibrate for mixed hyperlipidemia but after he switched to a low carbohydrate diet, his triglycerides improved and I was able to discontinue fenofibrate.      ROS    Current Outpatient Medications   Medication Sig    ACCU-CHEK FASTCLIX Misc TEST  BLOOD  GLUCOSE FOUR TIMES DAILY    albuterol 90 mcg/actuation inhaler Inhale 2 puffs into the lungs every 6 (six) hours as needed for Wheezing. Rescue    alcohol swabs PadM APPLY 1 PAD TOPICALLY  AS NEEDED.    aspirin 81 MG Chew Take 81 mg by mouth once daily.    atorvastatin (LIPITOR) 20 MG tablet TAKE 1 TABLET EVERY DAY    budesonide (RINOCORT AQUA) 32 mcg/actuation nasal spray 2 sprays (64 mcg total) by Nasal route 2 (two) times daily. Use saline,  wait 2min, 1 spray of Rinocort/nostril, wait 2min, 1 spray/nostril (Patient taking differently: 2 sprays by Nasal route as needed. Use saline,  wait 2min, 1 spray of Rinocort/nostril, wait 2min, 1 spray/nostril)    budesonide-formoterol 160-4.5 mcg (SYMBICORT)  "160-4.5 mcg/actuation HFAA Inhale 2 puffs into the lungs every 12 (twelve) hours. Controller (Patient taking differently: Inhale 2 puffs into the lungs every 12 (twelve) hours as needed. Controller)    clopidogrel (PLAVIX) 75 mg tablet TAKE 1 TABLET EVERY DAY    cyanocobalamin (VITAMIN B-12) 500 MCG tablet Take 1 tablet by mouth Daily.    DOCOSAHEXANOIC ACID/EPA (FISH OIL ORAL) Take by mouth once daily.    epinephrine (EPIPEN 2-LEVI) 0.3 mg/0.3 mL (1:1,000) AtIn Inject 0.3 mLs (0.3 mg total) into the muscle once.    fluorouracil (EFUDEX) 5 % cream Use hs for 2 weeks on right cheek    insulin detemir U-100 (LEVEMIR FLEXTOUCH) 100 unit/mL (3 mL) SubQ InPn pen Inject 15 Units into the skin every evening. (Patient taking differently: Inject 15 Units into the skin as needed. )    levothyroxine (SYNTHROID) 125 MCG tablet TAKE 1 TABLET ONE TIME DAILY    losartan (COZAAR) 25 MG tablet TAKE 1 TABLET EVERY DAY    multivitamin with minerals tablet Take 1 tablet by mouth once daily.    pantoprazole (PROTONIX) 40 MG tablet TAKE 1 TABLET EVERY DAY    pen needle, diabetic (BD INSULIN PEN NEEDLE UF SHORT) 31 gauge x 5/16" Ndle USE WITH LEVEMIR PEN  TO INJECT  10  UNITS SUBCUTANEOUSLY TWICE DAILY    predniSONE (DELTASONE) 2.5 MG tablet Take 1 tablet (2.5 mg total) by mouth once daily. In addition to 5mg tablets, for total of 7.5mg daily.    predniSONE (DELTASONE) 5 MG tablet Take 1 tablet (5 mg total) by mouth once daily.    sulfamethoxazole-trimethoprim 800-160mg (BACTRIM DS) 800-160 mg Tab Take 1 tablet by mouth 2 (two) times daily. for 14 days    tazarotene (AVAGE) 0.1 % cream Apply topically every evening.    testosterone cypionate (DEPOTESTOTERONE CYPIONATE) 200 mg/mL injection INJECT  1ML INTRAMUSCULARLY  EVERY  14  DAYS         Social History     Tobacco Use    Smoking status: Never Smoker    Smokeless tobacco: Never Used   Substance Use Topics    Alcohol use: Yes     Alcohol/week: 1.2 oz     Types: 1 " "Glasses of wine, 1 Cans of beer per week     Comment: "rarely"    Drug use: No         Objective:     Physical Exam   Constitutional: He is oriented to person, place, and time. He appears well-developed and well-nourished.   /62   Pulse 76   Ht 5' 11" (1.803 m)   Wt 70 kg (154 lb 3.4 oz)   BMI 21.51 kg/m²      HENT:   Head: Normocephalic and atraumatic.   Neck: No JVD present. Carotid bruit is not present.   Cardiovascular: Normal rate, regular rhythm, S1 normal and S2 normal. Exam reveals no gallop.   No murmur heard.  Pulses:       Radial pulses are 2+ on the right side, and 2+ on the left side.        Posterior tibial pulses are 1+ on the right side, and 1+ on the left side.   Pulmonary/Chest: Effort normal. He has no wheezes. He has no rales. Chest wall is not dull to percussion.   Abdominal: Soft. There is no splenomegaly or hepatomegaly. There is no tenderness.   Musculoskeletal:        Right lower leg: He exhibits no edema.        Left lower leg: He exhibits no edema.   Neurological: He is alert and oriented to person, place, and time. Gait normal.   Skin: Skin is warm and dry. Bruising noted. No cyanosis. Nails show no clubbing.   Psychiatric: He has a normal mood and affect. His speech is normal and behavior is normal. Judgment and thought content normal. Cognition and memory are normal.           Lab Results   Component Value Date    CHOL 124 09/14/2018    HDL 45 09/14/2018    LDLCALC 62.8 (L) 09/14/2018    TRIG 81 09/14/2018    CHOLHDL 36.3 09/14/2018     Lab Results   Component Value Date    GLU 85 09/14/2018    CREATININE 1.1 09/14/2018    BUN 30 (H) 09/14/2018     09/14/2018    K 4.1 09/14/2018     09/14/2018    CO2 26 09/14/2018     Lab Results   Component Value Date    ALT 43 09/14/2018    AST 49 (H) 09/14/2018    ALKPHOS 101 09/14/2018    BILITOT 0.8 09/14/2018           Assessment and Plan:     1. Mixed dyslipidemia  The triglycerides are within normal limits.  Continue " atorvastatin alone.  Continue low-carbohydrate diet.  - EKG 12-lead  - Lipid panel; Future  - ALT (SGPT); Future  - AST (SGOT); Future    2. Occlusion and stenosis of left carotid artery   S/P Stroke and CEA.  - Basic metabolic panel; Future    3. CLL (chronic lymphocytic leukemia)  FUP with Hematology.     BP is a bit low today. I reviewed BP readings from recent clinic visits.  Continue losartan for now but will discontinue if blood pressure drops further.    Follow-up in about 9 months (around 7/30/2019).

## 2018-11-04 ENCOUNTER — PATIENT MESSAGE (OUTPATIENT)
Dept: ADMINISTRATIVE | Facility: OTHER | Age: 83
End: 2018-11-04

## 2018-11-05 ENCOUNTER — PES CALL (OUTPATIENT)
Dept: ADMINISTRATIVE | Facility: CLINIC | Age: 83
End: 2018-11-05

## 2018-11-05 ENCOUNTER — PATIENT MESSAGE (OUTPATIENT)
Dept: INTERNAL MEDICINE | Facility: CLINIC | Age: 83
End: 2018-11-05

## 2018-11-08 ENCOUNTER — OFFICE VISIT (OUTPATIENT)
Dept: HEMATOLOGY/ONCOLOGY | Facility: CLINIC | Age: 83
End: 2018-11-08
Payer: MEDICARE

## 2018-11-08 ENCOUNTER — LAB VISIT (OUTPATIENT)
Dept: LAB | Facility: HOSPITAL | Age: 83
End: 2018-11-08
Attending: INTERNAL MEDICINE
Payer: MEDICARE

## 2018-11-08 VITALS
WEIGHT: 156.06 LBS | HEART RATE: 72 BPM | DIASTOLIC BLOOD PRESSURE: 72 MMHG | SYSTOLIC BLOOD PRESSURE: 135 MMHG | HEIGHT: 71 IN | BODY MASS INDEX: 21.85 KG/M2

## 2018-11-08 DIAGNOSIS — E23.0 PANHYPOPITUITARISM: ICD-10-CM

## 2018-11-08 DIAGNOSIS — C91.10 CLL (CHRONIC LYMPHOCYTIC LEUKEMIA): ICD-10-CM

## 2018-11-08 DIAGNOSIS — D69.6 THROMBOCYTOPENIA: ICD-10-CM

## 2018-11-08 DIAGNOSIS — D84.9 IMMUNOSUPPRESSION: ICD-10-CM

## 2018-11-08 DIAGNOSIS — D80.1 HYPOGAMMAGLOBULINEMIA: ICD-10-CM

## 2018-11-08 DIAGNOSIS — C91.10 CLL (CHRONIC LYMPHOCYTIC LEUKEMIA): Primary | ICD-10-CM

## 2018-11-08 LAB
ALBUMIN SERPL BCP-MCNC: 3.5 G/DL
ALP SERPL-CCNC: 84 U/L
ALT SERPL W/O P-5'-P-CCNC: 45 U/L
ANION GAP SERPL CALC-SCNC: 8 MMOL/L
AST SERPL-CCNC: 49 U/L
BASOPHILS # BLD AUTO: 0.06 K/UL
BASOPHILS NFR BLD: 0.8 %
BILIRUB SERPL-MCNC: 0.8 MG/DL
BUN SERPL-MCNC: 26 MG/DL
CALCIUM SERPL-MCNC: 10.2 MG/DL
CHLORIDE SERPL-SCNC: 105 MMOL/L
CO2 SERPL-SCNC: 28 MMOL/L
CREAT SERPL-MCNC: 1.2 MG/DL
DIFFERENTIAL METHOD: ABNORMAL
EOSINOPHIL # BLD AUTO: 0.2 K/UL
EOSINOPHIL NFR BLD: 2.4 %
ERYTHROCYTE [DISTWIDTH] IN BLOOD BY AUTOMATED COUNT: 15.4 %
EST. GFR  (AFRICAN AMERICAN): >60 ML/MIN/1.73 M^2
EST. GFR  (NON AFRICAN AMERICAN): 55.2 ML/MIN/1.73 M^2
GLUCOSE SERPL-MCNC: 139 MG/DL
HCT VFR BLD AUTO: 46.2 %
HGB BLD-MCNC: 14.4 G/DL
IGG SERPL-MCNC: 602 MG/DL
IMM GRANULOCYTES # BLD AUTO: 0.11 K/UL
IMM GRANULOCYTES NFR BLD AUTO: 1.5 %
LYMPHOCYTES # BLD AUTO: 2 K/UL
LYMPHOCYTES NFR BLD: 27.3 %
MCH RBC QN AUTO: 30.3 PG
MCHC RBC AUTO-ENTMCNC: 31.2 G/DL
MCV RBC AUTO: 97 FL
MONOCYTES # BLD AUTO: 0.9 K/UL
MONOCYTES NFR BLD: 11.6 %
NEUTROPHILS # BLD AUTO: 4.2 K/UL
NEUTROPHILS NFR BLD: 56.4 %
NRBC BLD-RTO: 0 /100 WBC
PLATELET # BLD AUTO: 107 K/UL
PMV BLD AUTO: 9.4 FL
POTASSIUM SERPL-SCNC: 4.8 MMOL/L
PROT SERPL-MCNC: 6.3 G/DL
RBC # BLD AUTO: 4.76 M/UL
SODIUM SERPL-SCNC: 141 MMOL/L
WBC # BLD AUTO: 7.39 K/UL

## 2018-11-08 PROCEDURE — 80053 COMPREHEN METABOLIC PANEL: CPT | Mod: HCNC

## 2018-11-08 PROCEDURE — 99215 OFFICE O/P EST HI 40 MIN: CPT | Mod: HCNC,S$GLB,, | Performed by: INTERNAL MEDICINE

## 2018-11-08 PROCEDURE — 99499 UNLISTED E&M SERVICE: CPT | Mod: HCNC,S$GLB,, | Performed by: INTERNAL MEDICINE

## 2018-11-08 PROCEDURE — 1101F PT FALLS ASSESS-DOCD LE1/YR: CPT | Mod: CPTII,HCNC,S$GLB, | Performed by: INTERNAL MEDICINE

## 2018-11-08 PROCEDURE — 36415 COLL VENOUS BLD VENIPUNCTURE: CPT | Mod: HCNC

## 2018-11-08 PROCEDURE — 3075F SYST BP GE 130 - 139MM HG: CPT | Mod: CPTII,HCNC,S$GLB, | Performed by: INTERNAL MEDICINE

## 2018-11-08 PROCEDURE — 99999 PR PBB SHADOW E&M-EST. PATIENT-LVL III: CPT | Mod: PBBFAC,HCNC,, | Performed by: INTERNAL MEDICINE

## 2018-11-08 PROCEDURE — 3078F DIAST BP <80 MM HG: CPT | Mod: CPTII,HCNC,S$GLB, | Performed by: INTERNAL MEDICINE

## 2018-11-08 PROCEDURE — 82784 ASSAY IGA/IGD/IGG/IGM EACH: CPT | Mod: HCNC

## 2018-11-08 PROCEDURE — 85025 COMPLETE CBC W/AUTO DIFF WBC: CPT | Mod: HCNC

## 2018-11-08 NOTE — PROGRESS NOTES
Mr. Ramos is an 84-year-old man who was diagnosed with chronic lymphocytic   leukemia in April 2012.  In April 2013, there was a sudden rise in his white   blood cell count to 93,000 and some prolymphocytes were present in his   peripheral smear.  He developed generalized urticaria, which I suspected was a  paraneoplastic condition.  He received five courses of fludarabine,   cyclophosphamide and Rituxan with the fludarabine doses decreased somewhat   because of mild renal impairment.  A sixth treatment was with Rituxan alone   because he developed thrombocytopenia, which has been present since then.    His major hematologic problem and much of his treatment has been for   thrombocytopenia.  It became more severe in early 2016.  At that time, he was   taking both aspirin and Plavix.  A bone marrow examination had no signs of   myelodysplasia and 35% of the bone marrow cells were small lymphocytes.  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  The FISH studies for   myelodysplastic disorders were negative.    I treated him for a possibility of immune thrombocytopenia with prednisone 60 mg   daily for two weeks, but there was no improvement in the platelet count.    Prednisone was then cut back to the usual dose that he takes for   panhypopituitarism, which followed treatment of a pituitary tumor.    Because of continued thrombocytopenia, I advised therapy with ofatumumab, which   he began in late June 2016.  He had eight weekly infusions, followed by four   monthly infusions and subsequently has been receiving the drug at two-month   intervals.  We have now gone slightly past two years of therapy.  His platelet   count improved soon after that treatment and during most of the last two   years, his platelet counts have been above 75,000.  The range has been   between 66,000-117,000.  He has not had any significant bleeding.  His white   blood cell count has remained normal as his proportion of  lymphocytes.    He has had many episodes of infection in the past, particularly recurrent   sinusitis and he has had several surgical procedures on his sinuses.  Because of   this problem he has been receiving intravenous immune globulin monthly and the   frequency of pneumonia and severe sinusitis has diminished.    Following pituitary surgery in 2002 for a pituitary tumor, he had a TIA and then   two strokes.  He has had many nonmelanoma skin cancers excised.  He continues   to have an upper respiratory congestion and is followed by an ENT physician.    ADDITIONAL PAST HISTORY, SYSTEM REVIEW, SOCIAL HISTORY AND FAMILY HISTORY:  Have   been reviewed and updated in the electronic record.    PHYSICAL EXAMINATION:  GENERAL APPEARANCE:  Well-developed man in no distress.  EYES:  No jaundice or pallor.  EARS:  Clear canals and membranes.  NOSE:  Clear nares.  SINUSES:  No tenderness.  MOUTH AND THROAT:  No mucosal lesions.  No bleeding.  A few missing teeth.  NECK:  No thyroid abnormalities.  No masses or bruits.  LYMPH NODES:  I do not feel any enlarged cervical, axillary or inguinal nodes.    When I first examined his axillae, I suspected that he had some lymph node   enlargement in the right axilla.  However, after examining him in multiple   positions and talking to him about his exercise program, I think that I am   feeling muscular tissue that is more prominent on the right than on the left.    He has subpectoral muscle hypertrophy that is likely due to an intensive   upper body exercise program that he does each day.  CHEST AND LUNGS:  Normal respiratory effort.  Clear to auscultation and   percussion.  HEART:  Regular rate and rhythm without murmur or gallop.  ABDOMEN:  Soft without masses or tenderness.  No hepatosplenomegaly.  EXTREMITIES:  Trace pretibial and ankle edema bilaterally.  PERIPHERAL VASCULATURE:  Diminished pulses in the feet and ankles.  Good   popliteal pulses.  NEUROLOGIC:  Memory is good.   Motor function is good.  He is fully oriented.  SKIN:  Ecchymoses on the arms.  No lesions that are suspicious for malignancy.    LABORATORY STUDIES:  Blood counts today include hemoglobin 14.4, WBC 7390 with a   normal differential and platelets 107,000.  Comprehensive metabolic profile is   remarkable for AST 49 and ALT 45.  IgG level is 602.    IMPRESSION:  1.  Chronic lymphocytic leukemia.  2.  Thrombocytopenia, which appears to be primarily a consequence of CLL.  I   still have some concern that he may have myelodysplasia, but I have no proof of   that possibility.  3.  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  4.  Panhypopituitarism.  5.  Diabetes mellitus.    PLAN:  1.  I am discontinuing ofatumumab.  2.  He will continue to receive intravenous immunoglobulin each month.  3.  In two months blood for CBC and CMP--at a time when he comes for an  immunoglobulin infusion.  4.  Return visit (EPA appointment) in four months with CBC, CMP and IgG level.    Mr. Ramos and Mrs. Ramos had many issues to discuss today related to his   infections, his CLL, his platelet count and some concerns about non-hematologic   problems.  Most of his 45-minute visit today was devoted to counseling about   these matters and answering the questions which they posed.      RICH  dd: 11/08/2018 11:01:50 (CST)  td: 11/09/2018 01:49:17 (CST)  Doc ID   #[id number]  Job ID #[id number]    CC:

## 2018-11-09 ENCOUNTER — TELEPHONE (OUTPATIENT)
Dept: HEMATOLOGY/ONCOLOGY | Facility: CLINIC | Age: 83
End: 2018-11-09

## 2018-11-11 RX ORDER — HEPARIN 100 UNIT/ML
500 SYRINGE INTRAVENOUS
Status: CANCELLED | OUTPATIENT
Start: 2018-12-15

## 2018-11-11 RX ORDER — FAMOTIDINE 10 MG/ML
20 INJECTION INTRAVENOUS
Status: CANCELLED | OUTPATIENT
Start: 2018-11-17 | End: 2018-11-17

## 2018-11-11 RX ORDER — FAMOTIDINE 10 MG/ML
20 INJECTION INTRAVENOUS
Status: CANCELLED | OUTPATIENT
Start: 2018-12-15 | End: 2018-12-15

## 2018-11-11 RX ORDER — SODIUM CHLORIDE 0.9 % (FLUSH) 0.9 %
10 SYRINGE (ML) INJECTION
Status: CANCELLED | OUTPATIENT
Start: 2018-12-15

## 2018-11-11 RX ORDER — SODIUM CHLORIDE 0.9 % (FLUSH) 0.9 %
10 SYRINGE (ML) INJECTION
Status: CANCELLED | OUTPATIENT
Start: 2018-11-17

## 2018-11-11 RX ORDER — HEPARIN 100 UNIT/ML
500 SYRINGE INTRAVENOUS
Status: CANCELLED | OUTPATIENT
Start: 2018-11-17

## 2018-11-11 RX ORDER — ACETAMINOPHEN 325 MG/1
650 TABLET ORAL
Status: CANCELLED | OUTPATIENT
Start: 2018-11-17

## 2018-11-11 RX ORDER — ACETAMINOPHEN 325 MG/1
650 TABLET ORAL
Status: CANCELLED | OUTPATIENT
Start: 2018-12-15

## 2018-11-13 ENCOUNTER — INFUSION (OUTPATIENT)
Dept: INFUSION THERAPY | Facility: HOSPITAL | Age: 83
End: 2018-11-13
Attending: INTERNAL MEDICINE
Payer: MEDICARE

## 2018-11-13 VITALS
SYSTOLIC BLOOD PRESSURE: 128 MMHG | RESPIRATION RATE: 17 BRPM | TEMPERATURE: 98 F | WEIGHT: 156 LBS | DIASTOLIC BLOOD PRESSURE: 58 MMHG | HEIGHT: 71 IN | HEART RATE: 72 BPM | BODY MASS INDEX: 21.84 KG/M2

## 2018-11-13 DIAGNOSIS — J32.9 CHRONIC SINUSITIS, UNSPECIFIED LOCATION: ICD-10-CM

## 2018-11-13 DIAGNOSIS — C91.10 CLL (CHRONIC LYMPHOCYTIC LEUKEMIA): ICD-10-CM

## 2018-11-13 DIAGNOSIS — D80.1 HYPOGAMMAGLOBULINEMIA: Primary | ICD-10-CM

## 2018-11-13 DIAGNOSIS — E79.0 HYPERURICEMIA: ICD-10-CM

## 2018-11-13 PROCEDURE — 25000003 PHARM REV CODE 250: Mod: HCNC | Performed by: INTERNAL MEDICINE

## 2018-11-13 PROCEDURE — 96367 TX/PROPH/DG ADDL SEQ IV INF: CPT | Mod: HCNC

## 2018-11-13 PROCEDURE — 96375 TX/PRO/DX INJ NEW DRUG ADDON: CPT | Mod: HCNC

## 2018-11-13 PROCEDURE — 96366 THER/PROPH/DIAG IV INF ADDON: CPT | Mod: HCNC

## 2018-11-13 PROCEDURE — S0028 INJECTION, FAMOTIDINE, 20 MG: HCPCS | Mod: HCNC | Performed by: INTERNAL MEDICINE

## 2018-11-13 PROCEDURE — 63600175 PHARM REV CODE 636 W HCPCS: Mod: JG,HCNC | Performed by: INTERNAL MEDICINE

## 2018-11-13 PROCEDURE — 96365 THER/PROPH/DIAG IV INF INIT: CPT | Mod: HCNC

## 2018-11-13 RX ORDER — HEPARIN 100 UNIT/ML
500 SYRINGE INTRAVENOUS
Status: DISCONTINUED | OUTPATIENT
Start: 2018-11-13 | End: 2018-11-13 | Stop reason: HOSPADM

## 2018-11-13 RX ORDER — SODIUM CHLORIDE 0.9 % (FLUSH) 0.9 %
10 SYRINGE (ML) INJECTION
Status: DISCONTINUED | OUTPATIENT
Start: 2018-11-13 | End: 2018-11-13 | Stop reason: HOSPADM

## 2018-11-13 RX ORDER — FAMOTIDINE 10 MG/ML
20 INJECTION INTRAVENOUS
Status: COMPLETED | OUTPATIENT
Start: 2018-11-13 | End: 2018-11-13

## 2018-11-13 RX ORDER — ACETAMINOPHEN 325 MG/1
650 TABLET ORAL
Status: COMPLETED | OUTPATIENT
Start: 2018-11-13 | End: 2018-11-13

## 2018-11-13 RX ADMIN — HUMAN IMMUNOGLOBULIN G 20 G: 20 LIQUID INTRAVENOUS at 09:11

## 2018-11-13 RX ADMIN — DIPHENHYDRAMINE HYDROCHLORIDE 50 MG: 50 INJECTION, SOLUTION INTRAMUSCULAR; INTRAVENOUS at 08:11

## 2018-11-13 RX ADMIN — FAMOTIDINE 20 MG: 10 INJECTION, SOLUTION INTRAVENOUS at 08:11

## 2018-11-13 RX ADMIN — SODIUM CHLORIDE: 0.9 INJECTION, SOLUTION INTRAVENOUS at 08:11

## 2018-11-13 RX ADMIN — ACETAMINOPHEN 650 MG: 325 TABLET ORAL at 08:11

## 2018-11-13 NOTE — PLAN OF CARE
Problem: Patient Care Overview (Adult)  Goal: Plan of Care Review  Outcome: Ongoing (interventions implemented as appropriate)  Pt tolerated IVIg without issue, pt to rtc 12/11/18, no distress noted upon d/c to home

## 2018-11-13 NOTE — PLAN OF CARE
Problem: Chemotherapy Effects (Adult)  Goal: Signs and Symptoms of Listed Potential Problems Will be Absent or Manageable (Chemotherapy Effects)  Signs and symptoms of listed potential problems will be absent or manageable by discharge/transition of care (reference Chemotherapy Effects (Adult) CPG).   Outcome: Ongoing (interventions implemented as appropriate)  Pt here for monthly IVIG, labs, hx, meds, allergies reviewed, pt with no complaints at this time, reclined in chair, continue to monitor

## 2018-11-15 ENCOUNTER — OFFICE VISIT (OUTPATIENT)
Dept: OTOLARYNGOLOGY | Facility: CLINIC | Age: 83
End: 2018-11-15
Payer: MEDICARE

## 2018-11-15 VITALS
DIASTOLIC BLOOD PRESSURE: 60 MMHG | SYSTOLIC BLOOD PRESSURE: 113 MMHG | TEMPERATURE: 97 F | BODY MASS INDEX: 22.09 KG/M2 | WEIGHT: 158.38 LBS | HEART RATE: 87 BPM

## 2018-11-15 DIAGNOSIS — D84.9 IMMUNOSUPPRESSION: ICD-10-CM

## 2018-11-15 DIAGNOSIS — J32.8 OTHER CHRONIC SINUSITIS: Primary | ICD-10-CM

## 2018-11-15 DIAGNOSIS — J34.3 HYPERTROPHY OF INFERIOR NASAL TURBINATE: ICD-10-CM

## 2018-11-15 PROCEDURE — 1101F PT FALLS ASSESS-DOCD LE1/YR: CPT | Mod: CPTII,HCNC,S$GLB, | Performed by: OTOLARYNGOLOGY

## 2018-11-15 PROCEDURE — 99999 PR PBB SHADOW E&M-EST. PATIENT-LVL IV: CPT | Mod: PBBFAC,HCNC,, | Performed by: OTOLARYNGOLOGY

## 2018-11-15 PROCEDURE — 3074F SYST BP LT 130 MM HG: CPT | Mod: CPTII,HCNC,S$GLB, | Performed by: OTOLARYNGOLOGY

## 2018-11-15 PROCEDURE — 99213 OFFICE O/P EST LOW 20 MIN: CPT | Mod: 25,HCNC,S$GLB, | Performed by: OTOLARYNGOLOGY

## 2018-11-15 PROCEDURE — 31231 NASAL ENDOSCOPY DX: CPT | Mod: HCNC,S$GLB,, | Performed by: OTOLARYNGOLOGY

## 2018-11-15 PROCEDURE — 3078F DIAST BP <80 MM HG: CPT | Mod: CPTII,HCNC,S$GLB, | Performed by: OTOLARYNGOLOGY

## 2018-11-15 PROCEDURE — 99499 UNLISTED E&M SERVICE: CPT | Mod: HCNC,S$GLB,, | Performed by: OTOLARYNGOLOGY

## 2018-11-15 NOTE — PROCEDURES
Procedures       PROCEDURE NOTE:  Nasal endoscopy   Preprocedure diagnosis:  Chronic sinusitis  Postprocedure diangosis:  Same  Complications:  None  Blood Loss:  None    Procedure in detail:  After verbal consent was obtained, the patient's nasal cavity was anesthesized using topical 1%lidocaine and Neosynepherine.  A rigid 0 degree endoscope was placed in first the right, then the left nasal cavity.  The inferior and middle turbinates were examined, and found to be erythematous bilaterally.  The middle meatus and maxillary antrum was also examined, and found to have purulent rhinorrhea on the left; the right sinonasal cavity is improved.  No masses seen.  The patient tolerated the procedure well and there were no complications.

## 2018-11-15 NOTE — PROGRESS NOTES
Chief Complaint   Patient presents with    Sinusitis     improving   .    HPI:     Thierry Ramos Jr. is a 84 y.o. male who presents for evaluation of chronic sinusitis for several years.  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  He is currently on IV IG every month for this.  He has had bilateral FESS of all sinuses by Dr. Dsouza in 2014.  He reports a several month history of several month history of pain and pressure in the forehead,  nasal congestion and postnasal drip. He notes thick purulent discharge bilaterally. He does use sinus rinses or nasal sprays. He admits to midface pain and pressure.  He admits to rhinorrhea and postnasal drip. There is not maxillary tooth pain. He  admits to headaches.  He has been on a recent 10 day course of Augmentin and felt like this helped somewhat.     Interval HPI 11/15/2018:  Mr. Ramos follows up today for CRS.  He completed his Bactrim prescription. He is continued compounded nasal saline nebulizer.  He feels that he is improving somewhat.  He reports that the rhinorrhea and post-nasal drip.  He notes decreased thick discharge bilaterally.       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    Hyperlipidemia     Hypertension     Hypopituitarism     Hypothyroid     Immunosuppression 3/13/2015    Squamous Cell Carcinoma     on the right side of the face     Squamous cell carcinoma of skin of right temple 1/27/2016    Stroke     Syncope 2/12    Unspecified disorder of kidney and ureter      Social History     Socioeconomic History    Marital status:      Spouse name: Not on file    Number of children: Not on file    Years of education: Not on file    Highest education level: Not on file   Social Needs     "Financial resource strain: Not on file    Food insecurity - worry: Not on file    Food insecurity - inability: Not on file    Transportation needs - medical: Not on file    Transportation needs - non-medical: Not on file   Occupational History    Occupation: retired   Tobacco Use    Smoking status: Never Smoker    Smokeless tobacco: Never Used   Substance and Sexual Activity    Alcohol use: Yes     Alcohol/week: 1.2 oz     Types: 1 Glasses of wine, 1 Cans of beer per week     Comment: "rarely"    Drug use: No    Sexual activity: Not Currently   Other Topics Concern    Not on file   Social History Narrative    He is .     Past Surgical History:   Procedure Laterality Date    (ROTATION) FLAP N/A 3/23/2015    Performed by Joe [facility]allister MD at [facility] OR 2ND FLR    BIOPSY-LYMPH NODE N/A 3/23/2015    Performed by Joe Mcallister MD at [facility] OR 2ND FLR    CAROTID ENDARTERECTOMY (L)  2/27/2007    COLONOSCOPY N/A 2/25/2014    Performed by DELORES Lambert MD at [facility] ENDO (4TH FLR)    EXCISION TURBINATE, SUBMUCOUS      EXCISION-LESION-FACE Right 2/24/2016    Performed by [facility] at [facility] OR 2ND FLR    EXCISION-LESION-FACE N/A 3/23/2015    Performed by [facility] at [facility] OR 2ND FLR    FESS  9/16/2014    FULL THICKNESS SKIN GRAFT N/A 2/24/2016    Performed by [facility] at [facility] OR 2ND FLR    MAPPING-LYMPH NODE N/A 3/23/2015    [facility]    MAPPING-SENTINEL NODE N/A 2/24/2016    Performed by [facility] at [facility] OR 2ND FLR    Mohs excision   3/23/2015, 2/24/2016    combined with WLE forehead    NASAL SEPTUM SURGERY  9/16/2014    REPAIR-MOHS DEFECT-left nose Left 3/31/2017    Performed by Jermaine Dsouza III, MD at [facility] OR 2ND FLR    RESECTION-TURBINATES (SMR) Bilateral 9/16/2014    Performed by Jermaine Dsouza III, MD at [facility] OR 2ND FLR    right ureter surgery  1995    SENTINEL LYMPH NODE BIOPSY  3/23/2015, 2/24/2016    " SEPTOPLASTY Bilateral 9/16/2014    Performed by [facility] at [facility] OR 2ND FLR    SINUS SURGERY  9/16/2014    septo, ESS, BITSMR    SINUS SURGERY FUNCTIONAL ENDOSCOPIC WITH NAVIGATION with bilateral maxillary, ethmoids, sphenoids, right frontal, possible left NF duct-balloons Bilateral 9/16/2014    Performed by Jermaine D[facility] III, [facility] at [facility] OR 2ND FLR    Transphenoidal surgery       Family History   Problem Relation Age of Onset    Cancer Mother         breast    Colon cancer Father     Cancer Father         colon    Cancer Brother         leukemia    No Known Problems Sister     No Known Problems Maternal Aunt     No Known Problems Maternal Uncle     No Known Problems Paternal Aunt     No Known Problems Paternal Uncle     No Known Problems Maternal Grandmother     No Known Problems Maternal Grandfather     No Known Problems Paternal Grandmother     No Known Problems Paternal Grandfather     Amblyopia Neg Hx     Blindness Neg Hx     Cataracts Neg Hx     Diabetes Neg Hx     Glaucoma Neg Hx     Hypertension Neg Hx     Macular degeneration Neg Hx     Retinal detachment Neg Hx     Strabismus Neg Hx     Stroke Neg Hx     Thyroid disease Neg Hx     Allergic rhinitis Neg Hx     Allergies Neg Hx     Angioedema Neg Hx     Asthma Neg Hx     Atopy Neg Hx     Eczema Neg Hx     Immunodeficiency Neg Hx     Rhinitis Neg Hx     Urticaria Neg Hx            Review of Systems  General: negative for chills, fever or weight loss  Psychological: negative for mood changes or depression  Ophthalmic: negative for blurry vision, photophobia or eye pain  ENT: see HPI  Respiratory: no cough, shortness of breath, or wheezing  Cardiovascular: no chest pain or dyspnea on exertion  Gastrointestinal: no abdominal pain, change in bowel habits, or black/ bloody stools  Musculoskeletal: negative for gait disturbance or muscular weakness  Neurological: no syncope or seizures; no ataxia  Dermatological:  negative for puritis,  rash and jaundice  Hematologic/lymphatic: no easy bruising, no new lumps or bumps      Physical Exam:    Vitals:    11/15/18 0917   BP: 113/60   Pulse: 87   Temp: 96.9 °F (36.1 °C)       Constitutional: Well appearing / communicating without difficutly.  NAD.  Eyes: EOM I Bilaterally  Head/Face: Normocephalic.  Negative paranasal sinus pressure/tenderness.  Salivary glands WNL.  House Brackmann I Bilaterally.    Right Ear: Auricle normal appearance. External Auditory Canal within normal limits,TM w/o masses/lesions/perforations. TM mobility noted.   Left Ear: Auricle normal appearance. External Auditory Canal WNL,TM w/o masses/lesions/perforations. TM mobility noted.  Nose:+ bilateral crusting. No gross nasal septal deviation. Inferior Turbinates 3+ bilaterally. No septal perforation. No masses/lesions. External nasal skin appears normal without masses/lesions.  Oral Cavity: Gingiva/lips within normal limits.  Dentition/gingiva healthy appearing. Mucus membranes moist. Floor of mouth soft, no masses palpated. Oral Tongue mobile. Hard Palate appears normal.    Oropharynx: Base of tongue appears normal. No masses/lesions noted. Tonsillar fossa/pharyngeal wall without lesions. Posterior oropharynx WNL.  Soft palate without masses. Midline uvula.   Neck/Lymphatic: No LAD I-VI bilaterally.  No thyromegaly.  No masses noted on exam.    Mirror laryngoscopy/nasopharyngoscopy: Active gag reflex.  Unable to perform.    Neuro/Psychiatric: AOx3.  Normal mood and affect.   Cardiovascular: Normal carotid pulses bilaterally, no increasing jugular venous distention noted at cervical region bilaterally.    Respiratory: Normal respiratory effort, no stridor, no retractions noted.      See separate procedure note for nasal endoscopy.    Culture result 9/20/2018:   Aerobic Bacterial Culture SERRATIA MARCESCENS   Moderate   No other significant isolate       Resulting Agency [facility]   Susceptibility      Serratia  marcescens     CULTURE, AEROBIC  (SPECIFY SOURCE)     Cefepime <=8 mcg/mL Sensitive     Ceftriaxone <=8 mcg/mL Sensitive     Ciprofloxacin <=1 mcg/mL Sensitive     Ertapenem <=2 mcg/mL Sensitive     Gentamicin <=4 mcg/mL Sensitive     Meropenem <=4 mcg/mL Sensitive     Piperacillin/Tazo <=16 mcg/mL Sensitive     Tobramycin <=4 mcg/mL Sensitive     Trimeth/Sulfa <=2/38 mcg/mL Sensitive             Assessment:    ICD-10-CM ICD-9-CM    1. Other chronic sinusitis J32.8 473.8    2. Immunosuppression D89.9 279.9    3. Hypertrophy of inferior nasal turbinate J34.3 478.0      The primary encounter diagnosis was Other chronic sinusitis. Diagnoses of Immunosuppression and Hypertrophy of inferior nasal turbinate were also pertinent to this visit.      Plan:  No orders of the defined types were placed in this encounter.    CRS: Improved.  Continue the use of compounded medication in a nasal nebulizer which is working well for him.    He was counseled on the off-label nature of this therapy and he consented to its use.  Follow up in 2-3 months for recheck.       Alicia Truong MD

## 2018-11-28 ENCOUNTER — OFFICE VISIT (OUTPATIENT)
Dept: DERMATOLOGY | Facility: CLINIC | Age: 83
End: 2018-11-28
Payer: MEDICARE

## 2018-11-28 VITALS — WEIGHT: 158 LBS | BODY MASS INDEX: 22.04 KG/M2

## 2018-11-28 DIAGNOSIS — Z85.828 HISTORY OF SKIN CANCER: ICD-10-CM

## 2018-11-28 DIAGNOSIS — L82.1 SEBORRHEIC KERATOSES: ICD-10-CM

## 2018-11-28 DIAGNOSIS — D48.5 NEOPLASM OF UNCERTAIN BEHAVIOR OF SKIN: Primary | ICD-10-CM

## 2018-11-28 PROCEDURE — 1101F PT FALLS ASSESS-DOCD LE1/YR: CPT | Mod: CPTII,HCNC,S$GLB, | Performed by: DERMATOLOGY

## 2018-11-28 PROCEDURE — 99999 PR PBB SHADOW E&M-EST. PATIENT-LVL III: CPT | Mod: PBBFAC,HCNC,, | Performed by: DERMATOLOGY

## 2018-11-28 PROCEDURE — 11100 PR BIOPSY OF SKIN LESION: CPT | Mod: 79,HCNC,S$GLB, | Performed by: DERMATOLOGY

## 2018-11-28 PROCEDURE — 99213 OFFICE O/P EST LOW 20 MIN: CPT | Mod: 25,HCNC,S$GLB, | Performed by: DERMATOLOGY

## 2018-11-28 PROCEDURE — 88305 TISSUE EXAM BY PATHOLOGIST: CPT | Mod: HCNC | Performed by: PATHOLOGY

## 2018-11-28 NOTE — PROGRESS NOTES
Subjective:       Patient ID:  Thierry Ramos Jr. is a 84 y.o. male who presents for   Chief Complaint   Patient presents with    Lesion     left ear,     Skin Check     UBSE    Follow-up     BCC right nose     This is a high risk patient here to check for the development of new lesions.  History of Present Illness: The patient presents with chief complaint of spiot.  Location: post left ear  Duration: several weeks  Signs/Symptoms: growing    Prior treatments: none          Review of Systems   Constitutional: Negative for fever.   Skin: Negative for itching and rash.   Hematologic/Lymphatic: Bruises/bleeds easily.        Objective:    Physical Exam   Constitutional: He appears well-developed and well-nourished. No distress.   Neurological: He is alert and oriented to person, place, and time. He is not disoriented.   Psychiatric: He has a normal mood and affect.   Skin:   Areas Examined (abnormalities noted in diagram):   Scalp / Hair Palpated and Inspected  Head / Face Inspection Performed  Neck Inspection Performed  Chest / Axilla Inspection Performed  Genitals / Buttocks / Groin Inspection Performed  Back Inspection Performed  RUE Inspected  LUE Inspection Performed                           Diagram Legend     Erythematous scaling macule/papule c/w actinic keratosis       Vascular papule c/w angioma      Pigmented verrucoid papule/plaque c/w seborrheic keratosis      Yellow umbilicated papule c/w sebaceous hyperplasia      Irregularly shaped tan macule c/w lentigo     1-2 mm smooth white papules consistent with Milia      Movable subcutaneous cyst with punctum c/w epidermal inclusion cyst      Subcutaneous movable cyst c/w pilar cyst      Firm pink to brown papule c/w dermatofibroma      Pedunculated fleshy papule(s) c/w skin tag(s)      Evenly pigmented macule c/w junctional nevus     Mildly variegated pigmented, slightly irregular-bordered macule c/w mildly atypical nevus      Flesh colored to evenly  pigmented papule c/w intradermal nevus       Pink pearly papule/plaque c/w basal cell carcinoma      Erythematous hyperkeratotic cursted plaque c/w SCC      Surgical scar with no sign of skin cancer recurrence      Open and closed comedones      Inflammatory papules and pustules      Verrucoid papule consistent consistent with wart     Erythematous eczematous patches and plaques     Dystrophic onycholytic nail with subungual debris c/w onychomycosis     Umbilicated papule    Erythematous-base heme-crusted tan verrucoid plaque consistent with inflamed seborrheic keratosis     Erythematous Silvery Scaling Plaque c/w Psoriasis     See annotation      Assessment / Plan:      Pathology Orders:     Normal Orders This Visit    Tissue Specimen To Pathology, Dermatology     Questions:    Directional Terms:  Other(comment)    Clinical information:  scc vs bcc    Specific Site:  left post auricular        Neoplasm of uncertain behavior of skin  -     Tissue Specimen To Pathology, Dermatology  Shave removal procedure note:    Shave removal performed after verbal consent including risk of infection, scar, recurrence, need for additional treatment of site. Area prepped with alcohol, anesthetized 1% lidocaine with epinephrine. Lesional tissue shaved  followed by cautery and hyfrecation x 3. Lesion defect size 6mm No complications. Dressing applied. Wound care explained.      History of skin cancer    Seborrheic keratoses  reassurance               Follow-up in about 3 months (around 2/28/2019).

## 2018-12-06 ENCOUNTER — LAB VISIT (OUTPATIENT)
Dept: LAB | Facility: HOSPITAL | Age: 83
End: 2018-12-06
Attending: INTERNAL MEDICINE
Payer: MEDICARE

## 2018-12-06 ENCOUNTER — OFFICE VISIT (OUTPATIENT)
Dept: INTERNAL MEDICINE | Facility: CLINIC | Age: 83
End: 2018-12-06
Payer: MEDICARE

## 2018-12-06 DIAGNOSIS — E23.0 PANHYPOPITUITARISM: ICD-10-CM

## 2018-12-06 DIAGNOSIS — E78.5 DYSLIPIDEMIA: ICD-10-CM

## 2018-12-06 DIAGNOSIS — C91.10 CLL (CHRONIC LYMPHOCYTIC LEUKEMIA): ICD-10-CM

## 2018-12-06 DIAGNOSIS — E11.42 TYPE 2 DIABETES MELLITUS WITH PERIPHERAL NEUROPATHY: ICD-10-CM

## 2018-12-06 DIAGNOSIS — I10 ESSENTIAL HYPERTENSION: Primary | ICD-10-CM

## 2018-12-06 DIAGNOSIS — G62.9 POLYNEUROPATHY: ICD-10-CM

## 2018-12-06 LAB
ESTIMATED AVG GLUCOSE: 108 MG/DL
HBA1C MFR BLD HPLC: 5.4 %

## 2018-12-06 PROCEDURE — 36415 COLL VENOUS BLD VENIPUNCTURE: CPT | Mod: HCNC,PO

## 2018-12-06 PROCEDURE — 99214 OFFICE O/P EST MOD 30 MIN: CPT | Mod: HCNC,S$GLB,, | Performed by: INTERNAL MEDICINE

## 2018-12-06 PROCEDURE — 99499 UNLISTED E&M SERVICE: CPT | Mod: HCNC,S$GLB,, | Performed by: INTERNAL MEDICINE

## 2018-12-06 PROCEDURE — 1101F PT FALLS ASSESS-DOCD LE1/YR: CPT | Mod: CPTII,HCNC,S$GLB, | Performed by: INTERNAL MEDICINE

## 2018-12-06 PROCEDURE — 99999 PR PBB SHADOW E&M-EST. PATIENT-LVL III: CPT | Mod: PBBFAC,HCNC,, | Performed by: INTERNAL MEDICINE

## 2018-12-06 PROCEDURE — 83036 HEMOGLOBIN GLYCOSYLATED A1C: CPT | Mod: HCNC

## 2018-12-06 PROCEDURE — 3074F SYST BP LT 130 MM HG: CPT | Mod: CPTII,HCNC,S$GLB, | Performed by: INTERNAL MEDICINE

## 2018-12-06 PROCEDURE — 3078F DIAST BP <80 MM HG: CPT | Mod: CPTII,HCNC,S$GLB, | Performed by: INTERNAL MEDICINE

## 2018-12-11 ENCOUNTER — INFUSION (OUTPATIENT)
Dept: INFUSION THERAPY | Facility: HOSPITAL | Age: 83
End: 2018-12-11
Attending: INTERNAL MEDICINE
Payer: MEDICARE

## 2018-12-11 VITALS
RESPIRATION RATE: 16 BRPM | BODY MASS INDEX: 21 KG/M2 | WEIGHT: 150 LBS | DIASTOLIC BLOOD PRESSURE: 60 MMHG | TEMPERATURE: 98 F | SYSTOLIC BLOOD PRESSURE: 126 MMHG | HEIGHT: 71 IN | HEART RATE: 58 BPM

## 2018-12-11 DIAGNOSIS — E79.0 HYPERURICEMIA: ICD-10-CM

## 2018-12-11 DIAGNOSIS — J32.9 CHRONIC SINUSITIS, UNSPECIFIED LOCATION: ICD-10-CM

## 2018-12-11 DIAGNOSIS — C91.10 CLL (CHRONIC LYMPHOCYTIC LEUKEMIA): ICD-10-CM

## 2018-12-11 DIAGNOSIS — D80.1 HYPOGAMMAGLOBULINEMIA: Primary | ICD-10-CM

## 2018-12-11 PROCEDURE — 96365 THER/PROPH/DIAG IV INF INIT: CPT | Mod: HCNC

## 2018-12-11 PROCEDURE — 25000003 PHARM REV CODE 250: Mod: HCNC | Performed by: INTERNAL MEDICINE

## 2018-12-11 PROCEDURE — 63600175 PHARM REV CODE 636 W HCPCS: Mod: HCNC | Performed by: INTERNAL MEDICINE

## 2018-12-11 PROCEDURE — 96366 THER/PROPH/DIAG IV INF ADDON: CPT | Mod: HCNC

## 2018-12-11 PROCEDURE — 96375 TX/PRO/DX INJ NEW DRUG ADDON: CPT | Mod: HCNC

## 2018-12-11 PROCEDURE — 96367 TX/PROPH/DG ADDL SEQ IV INF: CPT | Mod: HCNC

## 2018-12-11 PROCEDURE — S0028 INJECTION, FAMOTIDINE, 20 MG: HCPCS | Mod: HCNC | Performed by: INTERNAL MEDICINE

## 2018-12-11 RX ORDER — HEPARIN 100 UNIT/ML
500 SYRINGE INTRAVENOUS
Status: DISCONTINUED | OUTPATIENT
Start: 2018-12-11 | End: 2018-12-11 | Stop reason: HOSPADM

## 2018-12-11 RX ORDER — ACETAMINOPHEN 325 MG/1
650 TABLET ORAL
Status: COMPLETED | OUTPATIENT
Start: 2018-12-11 | End: 2018-12-11

## 2018-12-11 RX ORDER — FAMOTIDINE 10 MG/ML
20 INJECTION INTRAVENOUS
Status: COMPLETED | OUTPATIENT
Start: 2018-12-11 | End: 2018-12-11

## 2018-12-11 RX ORDER — SODIUM CHLORIDE 0.9 % (FLUSH) 0.9 %
10 SYRINGE (ML) INJECTION
Status: DISCONTINUED | OUTPATIENT
Start: 2018-12-11 | End: 2018-12-11 | Stop reason: HOSPADM

## 2018-12-11 RX ADMIN — ACETAMINOPHEN 650 MG: 325 TABLET ORAL at 08:12

## 2018-12-11 RX ADMIN — FAMOTIDINE 20 MG: 10 INJECTION, SOLUTION INTRAVENOUS at 08:12

## 2018-12-11 RX ADMIN — SODIUM CHLORIDE: 0.9 INJECTION, SOLUTION INTRAVENOUS at 08:12

## 2018-12-11 RX ADMIN — DIPHENHYDRAMINE HYDROCHLORIDE 50 MG: 50 INJECTION, SOLUTION INTRAMUSCULAR; INTRAVENOUS at 08:12

## 2018-12-11 RX ADMIN — HUMAN IMMUNOGLOBULIN G 20 G: 20 LIQUID INTRAVENOUS at 09:12

## 2018-12-11 NOTE — PLAN OF CARE
Problem: Patient Care Overview (Adult)  Goal: Plan of Care Review  Outcome: Ongoing (interventions implemented as appropriate)  1125:  Patient tolerated IVIG infusion well, VSS, NAD noted, denies any changes.  PIV removed intact, AVS declined.  Patient released in stable condition, ambulated off unit, accompanied by his wife.      
Patient/Caregiver provided printed discharge information.

## 2018-12-19 ENCOUNTER — OFFICE VISIT (OUTPATIENT)
Dept: URGENT CARE | Facility: CLINIC | Age: 83
End: 2018-12-19
Payer: MEDICARE

## 2018-12-19 VITALS
SYSTOLIC BLOOD PRESSURE: 140 MMHG | RESPIRATION RATE: 19 BRPM | BODY MASS INDEX: 21 KG/M2 | TEMPERATURE: 98 F | OXYGEN SATURATION: 98 % | HEART RATE: 63 BPM | DIASTOLIC BLOOD PRESSURE: 64 MMHG | HEIGHT: 71 IN | WEIGHT: 150 LBS

## 2018-12-19 VITALS
WEIGHT: 154.13 LBS | HEIGHT: 71 IN | SYSTOLIC BLOOD PRESSURE: 110 MMHG | HEART RATE: 84 BPM | BODY MASS INDEX: 21.58 KG/M2 | DIASTOLIC BLOOD PRESSURE: 60 MMHG

## 2018-12-19 DIAGNOSIS — S51.812A SKIN TEAR OF LEFT FOREARM WITHOUT COMPLICATION, INITIAL ENCOUNTER: Primary | ICD-10-CM

## 2018-12-19 PROCEDURE — 3078F DIAST BP <80 MM HG: CPT | Mod: CPTII,S$GLB,, | Performed by: INTERNAL MEDICINE

## 2018-12-19 PROCEDURE — 1101F PT FALLS ASSESS-DOCD LE1/YR: CPT | Mod: CPTII,S$GLB,, | Performed by: INTERNAL MEDICINE

## 2018-12-19 PROCEDURE — 3077F SYST BP >= 140 MM HG: CPT | Mod: CPTII,S$GLB,, | Performed by: INTERNAL MEDICINE

## 2018-12-19 PROCEDURE — 99214 OFFICE O/P EST MOD 30 MIN: CPT | Mod: S$GLB,,, | Performed by: INTERNAL MEDICINE

## 2018-12-19 RX ORDER — MUPIROCIN 20 MG/G
OINTMENT TOPICAL
Qty: 22 G | Refills: 1 | Status: SHIPPED | OUTPATIENT
Start: 2018-12-19 | End: 2019-03-25

## 2018-12-19 NOTE — PROGRESS NOTES
This office note has been dictated.  HISTORY OF PRESENT ILLNESS:  This is an 84-year-old gentleman with a number of   chronic medical conditions including diabetes mellitus, hypertension, peripheral   neuropathy, dyslipidemia, CLL, panhypopituitarism.  The patient reports that   since September, he has been off of his insulin because of excellent blood sugar   control.  He presents home logs for the last couple of months off of insulin.    They are reviewed and they are entirely within normal limits.  He reports that   he is doing well generally with no chest pain, palpitations, syncope, cough,   shortness of breath, claudication.  He reports taking all of his medications as   directed.  He is followed by other subspecialists including Endocrinology and   Hematology/Oncology.    CURRENT MEDICATIONS:  All medications noted and reviewed in the electronic   medical record medication list.    REVIEW OF SYSTEMS:  CONSTITUTIONAL:  No fever, no chills, no generalized body aches.  HEENT:  No hoarseness, no dysphagia.  CARDIOVASCULAR:  No chest pain, no palpitation, no syncope.  No claudication.  GASTROINTESTINAL:  No nausea, vomiting, abdominal pain, diarrhea, change in   bowel habits.  NEUROLOGIC:  No new focal neurological symptomatologies.  Peripheral neuropathy   symptoms are stable.    PAST MEDICAL HISTORY, PAST SURGICAL HISTORY, FAMILY MEDICAL HISTORY AND SOCIAL   HISTORY:  All noted and reviewed in the electronic medical record history   sections.    PHYSICAL EXAMINATION:  GENERAL:  Alert, pleasant, appropriately groomed gentleman in no acute distress.  VITAL SIGNS:  Blood pressure taken manually by this examiner is 110/60.  Other   vital signs normal.  EYES:  Sclerae white, nonicteric.  HEENT:  Normocephalic.  NECK:  Supple, no masses, no thyromegaly.  LUNGS:  Clear to auscultation.  CARDIOVASCULAR:  Regular rate and rhythm.  There is no significant murmur.    Carotids are full bilaterally without bruits.  No JVD,  no peripheral extremity   edema.  No ischemic changes in the lower extremities.  MENTAL STATUS:  Alert and oriented.  Affect and mood all appropriate.    DATA:  Reviewed lab data from 11/08/2017, chemistries and CBC.    IMPRESSION:  1.  Diet-controlled diabetes mellitus, good control based on home readings off   medication.  2.  Hypertension, well controlled.  3.  Dyslipidemia, on statin therapy.  4.  CLL followed by Hematology/Oncology.  5.  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    PLAN:  1.  Continue current pharmacologic regimens.  2.  Update glycohemoglobin.  3.  Follow up in six months.      PB/HN  dd: 12/19/2018 11:02:19 (CST)  td: 12/20/2018 06:30:40 (CST)  Doc ID   #[id number]  Job ID #[id number]    CC:

## 2019-01-08 ENCOUNTER — INFUSION (OUTPATIENT)
Dept: INFUSION THERAPY | Facility: HOSPITAL | Age: 84
End: 2019-01-08
Attending: INTERNAL MEDICINE
Payer: MEDICARE

## 2019-01-08 ENCOUNTER — PATIENT MESSAGE (OUTPATIENT)
Dept: HEMATOLOGY/ONCOLOGY | Facility: CLINIC | Age: 84
End: 2019-01-08

## 2019-01-08 VITALS
TEMPERATURE: 98 F | HEIGHT: 71 IN | BODY MASS INDEX: 21.02 KG/M2 | WEIGHT: 150.13 LBS | HEART RATE: 66 BPM | DIASTOLIC BLOOD PRESSURE: 79 MMHG | RESPIRATION RATE: 18 BRPM | SYSTOLIC BLOOD PRESSURE: 138 MMHG

## 2019-01-08 DIAGNOSIS — J32.9 CHRONIC SINUSITIS, UNSPECIFIED LOCATION: ICD-10-CM

## 2019-01-08 DIAGNOSIS — C91.10 CLL (CHRONIC LYMPHOCYTIC LEUKEMIA): ICD-10-CM

## 2019-01-08 DIAGNOSIS — D80.1 HYPOGAMMAGLOBULINEMIA: Primary | ICD-10-CM

## 2019-01-08 DIAGNOSIS — E79.0 HYPERURICEMIA: ICD-10-CM

## 2019-01-08 PROCEDURE — 63600175 PHARM REV CODE 636 W HCPCS: Mod: JG,HCNC | Performed by: INTERNAL MEDICINE

## 2019-01-08 PROCEDURE — 25000003 PHARM REV CODE 250: Mod: HCNC | Performed by: INTERNAL MEDICINE

## 2019-01-08 PROCEDURE — S0028 INJECTION, FAMOTIDINE, 20 MG: HCPCS | Mod: HCNC | Performed by: INTERNAL MEDICINE

## 2019-01-08 PROCEDURE — 96366 THER/PROPH/DIAG IV INF ADDON: CPT | Mod: HCNC

## 2019-01-08 PROCEDURE — A4216 STERILE WATER/SALINE, 10 ML: HCPCS | Mod: HCNC | Performed by: INTERNAL MEDICINE

## 2019-01-08 PROCEDURE — 96375 TX/PRO/DX INJ NEW DRUG ADDON: CPT | Mod: HCNC

## 2019-01-08 PROCEDURE — 96367 TX/PROPH/DG ADDL SEQ IV INF: CPT | Mod: HCNC

## 2019-01-08 PROCEDURE — 96365 THER/PROPH/DIAG IV INF INIT: CPT | Mod: HCNC

## 2019-01-08 RX ORDER — FAMOTIDINE 10 MG/ML
20 INJECTION INTRAVENOUS
Status: COMPLETED | OUTPATIENT
Start: 2019-01-08 | End: 2019-01-08

## 2019-01-08 RX ORDER — SODIUM CHLORIDE 0.9 % (FLUSH) 0.9 %
10 SYRINGE (ML) INJECTION
Status: DISCONTINUED | OUTPATIENT
Start: 2019-01-08 | End: 2019-01-08 | Stop reason: HOSPADM

## 2019-01-08 RX ORDER — ACETAMINOPHEN 325 MG/1
650 TABLET ORAL
Status: CANCELLED | OUTPATIENT
Start: 2019-01-12

## 2019-01-08 RX ORDER — FAMOTIDINE 10 MG/ML
20 INJECTION INTRAVENOUS
Status: CANCELLED | OUTPATIENT
Start: 2019-01-12

## 2019-01-08 RX ORDER — HEPARIN 100 UNIT/ML
500 SYRINGE INTRAVENOUS
Status: DISCONTINUED | OUTPATIENT
Start: 2019-01-08 | End: 2019-01-08 | Stop reason: HOSPADM

## 2019-01-08 RX ORDER — SODIUM CHLORIDE 0.9 % (FLUSH) 0.9 %
10 SYRINGE (ML) INJECTION
Status: CANCELLED | OUTPATIENT
Start: 2019-01-12

## 2019-01-08 RX ORDER — ACETAMINOPHEN 325 MG/1
650 TABLET ORAL
Status: COMPLETED | OUTPATIENT
Start: 2019-01-08 | End: 2019-01-08

## 2019-01-08 RX ORDER — HEPARIN 100 UNIT/ML
500 SYRINGE INTRAVENOUS
Status: CANCELLED | OUTPATIENT
Start: 2019-01-12

## 2019-01-08 RX ADMIN — Medication 10 ML: at 11:01

## 2019-01-08 RX ADMIN — FAMOTIDINE 20 MG: 10 INJECTION, SOLUTION INTRAVENOUS at 09:01

## 2019-01-08 RX ADMIN — DIPHENHYDRAMINE HYDROCHLORIDE 50 MG: 50 INJECTION, SOLUTION INTRAMUSCULAR; INTRAVENOUS at 09:01

## 2019-01-08 RX ADMIN — SODIUM CHLORIDE: 0.9 INJECTION, SOLUTION INTRAVENOUS at 09:01

## 2019-01-08 RX ADMIN — HUMAN IMMUNOGLOBULIN G 20 G: 20 LIQUID INTRAVENOUS at 09:01

## 2019-01-08 RX ADMIN — ACETAMINOPHEN 650 MG: 325 TABLET ORAL at 09:01

## 2019-01-08 NOTE — PLAN OF CARE
Problem: Patient Care Overview (Adult)  Goal: Plan of Care Review  Outcome: Ongoing (interventions implemented as appropriate)  Pt tolerated IVIG without complications. VSS. No s/s of reaction. PIV removed. Instructed to contact MD with any questions.

## 2019-01-09 ENCOUNTER — TELEPHONE (OUTPATIENT)
Dept: HEMATOLOGY/ONCOLOGY | Facility: CLINIC | Age: 84
End: 2019-01-09

## 2019-01-09 ENCOUNTER — LAB VISIT (OUTPATIENT)
Dept: LAB | Facility: HOSPITAL | Age: 84
End: 2019-01-09
Attending: INTERNAL MEDICINE
Payer: MEDICARE

## 2019-01-09 ENCOUNTER — TELEPHONE (OUTPATIENT)
Dept: INTERNAL MEDICINE | Facility: CLINIC | Age: 84
End: 2019-01-09

## 2019-01-09 DIAGNOSIS — C91.10 CLL (CHRONIC LYMPHOCYTIC LEUKEMIA): ICD-10-CM

## 2019-01-09 LAB
ANISOCYTOSIS BLD QL SMEAR: SLIGHT
BASOPHILS # BLD AUTO: 0.04 K/UL
BASOPHILS NFR BLD: 0.5 %
DIFFERENTIAL METHOD: ABNORMAL
EOSINOPHIL # BLD AUTO: 0.1 K/UL
EOSINOPHIL NFR BLD: 1.6 %
ERYTHROCYTE [DISTWIDTH] IN BLOOD BY AUTOMATED COUNT: 15.7 %
HCT VFR BLD AUTO: 43.1 %
HGB BLD-MCNC: 13.6 G/DL
HYPOCHROMIA BLD QL SMEAR: ABNORMAL
IMM GRANULOCYTES # BLD AUTO: 0.11 K/UL
IMM GRANULOCYTES NFR BLD AUTO: 1.5 %
LYMPHOCYTES # BLD AUTO: 3.3 K/UL
LYMPHOCYTES NFR BLD: 45.3 %
MCH RBC QN AUTO: 31.6 PG
MCHC RBC AUTO-ENTMCNC: 31.6 G/DL
MCV RBC AUTO: 100 FL
MONOCYTES # BLD AUTO: 0.6 K/UL
MONOCYTES NFR BLD: 8.6 %
NEUTROPHILS # BLD AUTO: 3.1 K/UL
NEUTROPHILS NFR BLD: 42.5 %
NRBC BLD-RTO: 0 /100 WBC
OVALOCYTES BLD QL SMEAR: ABNORMAL
PLATELET # BLD AUTO: 51 K/UL
PLATELET BLD QL SMEAR: ABNORMAL
PMV BLD AUTO: 9.7 FL
POIKILOCYTOSIS BLD QL SMEAR: SLIGHT
POLYCHROMASIA BLD QL SMEAR: ABNORMAL
RBC # BLD AUTO: 4.3 M/UL
WBC # BLD AUTO: 7.29 K/UL

## 2019-01-09 PROCEDURE — 36415 COLL VENOUS BLD VENIPUNCTURE: CPT | Mod: HCNC,PO

## 2019-01-09 PROCEDURE — 85025 COMPLETE CBC W/AUTO DIFF WBC: CPT | Mod: HCNC

## 2019-01-09 NOTE — TELEPHONE ENCOUNTER
----- Message from Lele Leyva sent at 1/8/2019 10:00 AM CST -----  Contact: [facility] [phone]  Pharmacy is calling to clarify an RX.  RX name:  Test strips  What do they need to clarify:  Request an CMN form  Comments:

## 2019-01-09 NOTE — TELEPHONE ENCOUNTER
Called patient to schedule follow up and lab. He scheduled f/u with Dr Wooten for Thursday 1/10 and labs today at [place].

## 2019-01-10 ENCOUNTER — OFFICE VISIT (OUTPATIENT)
Dept: HEMATOLOGY/ONCOLOGY | Facility: CLINIC | Age: 84
End: 2019-01-10
Payer: MEDICARE

## 2019-01-10 ENCOUNTER — TELEPHONE (OUTPATIENT)
Dept: PHARMACY | Facility: CLINIC | Age: 84
End: 2019-01-10

## 2019-01-10 VITALS
HEART RATE: 72 BPM | SYSTOLIC BLOOD PRESSURE: 118 MMHG | BODY MASS INDEX: 21.73 KG/M2 | DIASTOLIC BLOOD PRESSURE: 75 MMHG | HEIGHT: 71 IN | WEIGHT: 155.19 LBS

## 2019-01-10 DIAGNOSIS — C91.10 CLL (CHRONIC LYMPHOCYTIC LEUKEMIA): Primary | ICD-10-CM

## 2019-01-10 DIAGNOSIS — D80.1 HYPOGAMMAGLOBULINEMIA: ICD-10-CM

## 2019-01-10 DIAGNOSIS — E79.0 HYPERURICEMIA: ICD-10-CM

## 2019-01-10 DIAGNOSIS — D69.6 THROMBOCYTOPENIA: ICD-10-CM

## 2019-01-10 PROCEDURE — 1101F PR PT FALLS ASSESS DOC 0-1 FALLS W/OUT INJ PAST YR: ICD-10-PCS | Mod: CPTII,HCNC,S$GLB, | Performed by: INTERNAL MEDICINE

## 2019-01-10 PROCEDURE — 3078F DIAST BP <80 MM HG: CPT | Mod: CPTII,HCNC,S$GLB, | Performed by: INTERNAL MEDICINE

## 2019-01-10 PROCEDURE — 99999 PR PBB SHADOW E&M-EST. PATIENT-LVL III: ICD-10-PCS | Mod: PBBFAC,HCNC,, | Performed by: INTERNAL MEDICINE

## 2019-01-10 PROCEDURE — 3074F SYST BP LT 130 MM HG: CPT | Mod: CPTII,HCNC,S$GLB, | Performed by: INTERNAL MEDICINE

## 2019-01-10 PROCEDURE — 1101F PT FALLS ASSESS-DOCD LE1/YR: CPT | Mod: CPTII,HCNC,S$GLB, | Performed by: INTERNAL MEDICINE

## 2019-01-10 PROCEDURE — 99999 PR PBB SHADOW E&M-EST. PATIENT-LVL III: CPT | Mod: PBBFAC,HCNC,, | Performed by: INTERNAL MEDICINE

## 2019-01-10 PROCEDURE — 3074F PR MOST RECENT SYSTOLIC BLOOD PRESSURE < 130 MM HG: ICD-10-PCS | Mod: CPTII,HCNC,S$GLB, | Performed by: INTERNAL MEDICINE

## 2019-01-10 PROCEDURE — 99214 OFFICE O/P EST MOD 30 MIN: CPT | Mod: HCNC,S$GLB,, | Performed by: INTERNAL MEDICINE

## 2019-01-10 PROCEDURE — 99214 PR OFFICE/OUTPT VISIT, EST, LEVL IV, 30-39 MIN: ICD-10-PCS | Mod: HCNC,S$GLB,, | Performed by: INTERNAL MEDICINE

## 2019-01-10 PROCEDURE — 3078F PR MOST RECENT DIASTOLIC BLOOD PRESSURE < 80 MM HG: ICD-10-PCS | Mod: CPTII,HCNC,S$GLB, | Performed by: INTERNAL MEDICINE

## 2019-01-10 RX ORDER — ALLOPURINOL 300 MG/1
300 TABLET ORAL DAILY
Qty: 30 TABLET | Refills: 1 | Status: SHIPPED | OUTPATIENT
Start: 2019-01-10 | End: 2019-03-25

## 2019-01-10 NOTE — PROGRESS NOTES
HISTORY OF PRESENT ILLNESS:  Mr. Ramos is an 84-year-old man who was   diagnosed with chronic lymphocytic leukemia in April 2012.  In April 2013, there   was an abrupt rise in his white blood cell count to 93,000 and he had a modest   number of prolymphocytes in his peripheral smear.  He had generalized urticaria,   which I suspected was paraneoplastic.  At that time, he was treated with 5   courses of fludarabine, cyclophosphamide and Rituxan.  The fludarabine doses   were decreased somewhat because of mild renal impairment.  The sixth treatment   was with Rituxan alone because he developed thrombocytopenia, which has   persisted in varying degrees since then.    His major hematologic problem and much of his treatment since 2013, has been   related to thrombocytopenia.  It became more severe in early 2016, at a time   when he was taking both aspirin and Plavix.  A bone marrow examination had no   evidence of myelodysplasia and 35% of the bone marrow cells were small   lymphocytes.  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  The FISH   studies for myelodysplastic disorders were negative.    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  Prednisone   was then decreased to the maintenance dose that he takes for panhypopituitarism,   which followed treatment of a pituitary tumor.    Subsequently, because of continued thrombocytopenia, I recommended treatment   with ofatumumab, which he began in late June 2016.  At that time, his platelet   count was 57,000.  He generally tolerated the drug well and the frequency of administration was   gradually reduced over a period of more than 2 years.  Platelet counts during   most of those 2 years were between 66,000-117,000.  The drug was discontinued in   early November 2018.    He has had many episodes of infection in the past, particularly recurrent   sinusitis  and he has had several surgical procedures on his sinuses.  Because of   this, he has been receiving intravenous immunoglobulin monthly for years and   the frequency of pneumonia and severe sinusitis has diminished.    After a pituitary surgery for a tumor in 2002, he experienced a TIA and then had   2 strokes.  He has had many nonmelanoma skin cancers excised.    He asked to be seen earlier today because he was concerned about his recent   blood counts.  His platelet count has declined modestly from the range of   66,000-107,000 seen in the past 9 months to a current value of 51,000.  His   white blood cell count remains normal and his lymphocyte percentage this week   has been 61% and 45%.    He is not having fevers or sweats.  He has had no change in his activity level.    He has not noted any enlarged lymph nodes.    ADDITIONAL PAST HISTORY, SYSTEM REVIEW, SOCIAL HISTORY AND FAMILY HISTORY:  Have   been reviewed and updated in the electronic record.    PHYSICAL EXAMINATION:  GENERAL APPEARANCE:  A well-developed man in no distress.  EYES:  No jaundice or pallor.  EARS:  Clear canals and membranes.  NOSE:  Clear nares.  SINUSES:  No tenderness.  MOUTH AND THROAT:  A few missing teeth.  No bleeding.  No mucosal lesions.  NECK:  No masses or bruits.  No thyroid abnormalities.  SKIN:  Many surgical scars on the face and extremities at sites where he has had   treatment for skin cancers and actinic changes on the arms, face and neck.  LYMPH NODES:  I do not feel any enlarged cervical or inguinal nodes.  In his   right axilla, there continues to be an area of soft tissue prominence that   measures about 3 x 2 cm.  Although I have suspected that it might represent   pectoral muscle hypertrophy due to his fairly intensive upper body exercise   program, it is also possible that this is a group of matted lymph nodes.  CHEST AND LUNGS:  Normal respiratory effort.  Clear to auscultation and   percussion.  HEART:  Regular  "rate and rhythm without murmur or gallop.  ABDOMEN:  Soft without masses or tenderness.  No hepatosplenomegaly.  EXTREMITIES:  Trace pretibial and ankle edema bilaterally.  PERIPHERAL VASCULATURE:  Good popliteal pulses.  Decreased pulses in the feet   and ankles.  NEUROLOGIC:  He is fully oriented.  Memory is good.  Motor function is normal.    ADDITIONAL LABORATORY STUDIES:  His chemistry profile on 01/08/2019, was   remarkable for AST 60 and ALT 58.    IMPRESSION:  1.  Chronic lymphocytic leukemia.  2.  Thrombocytopenia, which is at least in part a consequence of CLL.  I still   am concerned about the possibility of myelodysplasia, but I have not been able   to prove that.  3.  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  4.  Panhypopituitarism.  5.  Diabetes mellitus.    PLAN:  1.  We had another lengthy discussion about treatment today.  Of the various   options that are available, I have recommended that he begin treatment with   ibrutinib at a dose of 280 mg daily.  I am not particularly concerned about the   platelet function effects of this drug since he is already on aspirin and   Plavix, but I am concerned about the possibility of worsening thrombocytopenia   and that is the reason that I am starting at a somewhat lower than "usual"   starting dose of 420 mg.  I am sending a prescription for that to the [facility].  2.  Allopurinol 300 mg.  He will start taking this once daily 3 days before he   starts ibrutinib and then continue to take it for a total of 10 days.  3.  He will notify my nurse when he starts ibrutinib and he will begin to  have a weekly CBC, CMP and uric acid level.  4.  Monthly infusions of immunoglobulin will be continued.  5.  I explained to  and Mrs. Ramos today that I am retiring at the end of   February 2019, and we will need to refer him to one of my colleagues.  He would   like to see Dr. Kelly Marr whom his wife has met when she was visiting a   relative " in the hospital.  I will ask my staff to make him an appointment with   Dr. Marr in approximately 2 months.    I gave Mr. Ramos printed information about ibrutinib, a drug that he already   had some familiarity with.  I reviewed side effects including atrial   fibrillation, lowering of the blood counts, diarrhea and a number of the very   rare, but severe reported complications.  He is willing to proceed with the   drug.      MAKAYLA/IN  dd: 01/10/2019 08:59:43 (CST)  td: 01/10/2019 21:29:36 (CST)  Doc ID   #[id number]  Job ID #[id number]    CC:

## 2019-01-15 ENCOUNTER — TELEPHONE (OUTPATIENT)
Dept: HEMATOLOGY/ONCOLOGY | Facility: CLINIC | Age: 84
End: 2019-01-15

## 2019-01-15 DIAGNOSIS — C91.10 CLL (CHRONIC LYMPHOCYTIC LEUKEMIA): Primary | ICD-10-CM

## 2019-01-15 NOTE — TELEPHONE ENCOUNTER
----- Message from Arcelia Cole sent at 1/15/2019  1:41 PM CST -----  Contact: Pt  Needs Advice    Reason for call: Pt would like status on prior auth for Ibrutinib medication         Communication Preference: # [phone]    Additional Information:     Patient was told that [facility] is working on it. He verbalized understanding.

## 2019-01-16 NOTE — TELEPHONE ENCOUNTER
DOCUMENTATION ONLY:  Prior authorization for Imbruvica 140mg Capsule approved from 01/15/2019 to 01/15/2020    Co-pay: $2,276.24    Patient Assistance IS required. Sending to the financial assistance team to investigate assistance options. Nicolette SALCEDO

## 2019-01-17 NOTE — TELEPHONE ENCOUNTER
FOR DOCUMENTATION ONLY:  Financial Assistance for Imbruvica is approved from 1-7-19 to 12-31-19  [employer]  BIN: [id number]  PCN: [id number]  Id: [id number]  [id number]

## 2019-01-17 NOTE — TELEPHONE ENCOUNTER
Imbruvica is approved by the patient's insurance.  We will reach out to the patient to notify of the approval, offer consultation, and schedule a delivery of the medication.     Sending a staff message to Dr Joaquin Wooten regarding approval

## 2019-01-18 ENCOUNTER — PATIENT MESSAGE (OUTPATIENT)
Dept: HEMATOLOGY/ONCOLOGY | Facility: CLINIC | Age: 84
End: 2019-01-18

## 2019-01-18 NOTE — TELEPHONE ENCOUNTER
Contacted patient to set up initial Imbruvica shipment and offer consultation. Patient requested to  medication from [facility] today - approved for $0 copay. We will complete full consultation when he picks up medication with an emphasis on DDIs as patient had several questions about that. Provided directions to [facility] and sent [employer] message as well - patient aware to contact [facility] if he needs help finding us.

## 2019-01-18 NOTE — TELEPHONE ENCOUNTER
And Mrs. Ramos presented to [facility] for initial Imbruvica consultation. Patient started allopurinol today therefore plans to initiate Imbruvica on 1/21. $0 copay.    Patient was counseled on the administration directions for Imbruvica: Take 2 capsules (280 mg) by mouth once daily with or without food, with plenty of water.  Avoid GF/GF juice and seville oranges. Reviewed PO chemo handling precautions to avoid touching medication directly. Store at room temp.    Patient was counseled on the following possible side effects, which include, but are not limited to:   - Myelosuppression and increased risk of infx: Patient aware to monitor for s/sx infx (fever >100.4, chills, etc) and follow precautions such as proper hand hygiene. He is aware of importance of being adherent to all provider and lab appts.  - N/V: No antiemetic on hand, aware to contact [facility] or MD if experienced. Patient reported home remedy with baking soda works.  - Diarrhea: Patient currently struggles with constipation and takes docusate prn. Recommended Imodium and ample hydration if diarrhea; contact MD if >4 stools/day.  - Stomatitis: Swish and spit with 1/2 tsp salt or baking soda mixing in 8 oz warm water.  - Fatigue/weakness: Patient's baseline energy levels are great. He does 120 inclined push ups, 15 mins of stationary bike and simulated jump rope every morning. He has great diet consisting of ample fruits/veggies and has 30g protein shake each morning. Discussed trying to have small, frequent meals if decreased appetite occurs.  - Rash: Patient has HC1% cream at home, did not provide with initial fill.    Cautioned him to monitor for peripheral edema as well- already present. Provided ED warnings for CV event and recommended patient monitor BP the first couple weeks of tx. His BP runs low at baseline.    DDIs: Medication and allergy list reviewed, Imbruvica may enhance the adverse effect of antiplatelets of aspirin and Plavix -provided ED  s/sx bleeding event.     Will send message to Dr. Wooten's office to relay start date as pt will need labs one week after starting. Patient requested labs on [facility]. A pharmacist will f/u with patient in 1 week from start, [facility] to contact patient in 3 weeks for refills.

## 2019-01-24 ENCOUNTER — TELEPHONE (OUTPATIENT)
Dept: HEMATOLOGY/ONCOLOGY | Facility: CLINIC | Age: 84
End: 2019-01-24

## 2019-01-24 NOTE — TELEPHONE ENCOUNTER
spoke with pt on today in regards to scheduling March f/u with  office,  inform pt once schedule is open we will schedule accordingly, pt voiced he understands.

## 2019-01-24 NOTE — TELEPHONE ENCOUNTER
Spoke to patient and appointment set up with Dr Marr      ----- Message from Nico Ireland sent at 1/24/2019  1:16 PM CST -----  Contact: Pt    ----- Message -----  From: Irais Rockwell  Sent: 1/24/2019   1:09 PM  To: Nico Ireland    Pt would like to schedule his F/U appt in March   Contact : [phone]

## 2019-01-28 ENCOUNTER — LAB VISIT (OUTPATIENT)
Dept: LAB | Facility: HOSPITAL | Age: 84
End: 2019-01-28
Attending: INTERNAL MEDICINE
Payer: MEDICARE

## 2019-01-28 DIAGNOSIS — E79.0 HYPERURICEMIA: ICD-10-CM

## 2019-01-28 DIAGNOSIS — C91.10 CLL (CHRONIC LYMPHOCYTIC LEUKEMIA): ICD-10-CM

## 2019-01-28 LAB
ALBUMIN SERPL BCP-MCNC: 3.5 G/DL
ALP SERPL-CCNC: 110 U/L
ALT SERPL W/O P-5'-P-CCNC: 50 U/L
ANION GAP SERPL CALC-SCNC: 8 MMOL/L
ANISOCYTOSIS BLD QL SMEAR: SLIGHT
AST SERPL-CCNC: 45 U/L
BASOPHILS # BLD AUTO: ABNORMAL K/UL
BASOPHILS NFR BLD: 0 %
BILIRUB SERPL-MCNC: 1 MG/DL
BUN SERPL-MCNC: 28 MG/DL
CALCIUM SERPL-MCNC: 9.6 MG/DL
CHLORIDE SERPL-SCNC: 104 MMOL/L
CO2 SERPL-SCNC: 27 MMOL/L
CREAT SERPL-MCNC: 1 MG/DL
DIFFERENTIAL METHOD: ABNORMAL
EOSINOPHIL # BLD AUTO: ABNORMAL K/UL
EOSINOPHIL NFR BLD: 0 %
ERYTHROCYTE [DISTWIDTH] IN BLOOD BY AUTOMATED COUNT: 15.7 %
EST. GFR  (AFRICAN AMERICAN): >60 ML/MIN/1.73 M^2
EST. GFR  (NON AFRICAN AMERICAN): >60 ML/MIN/1.73 M^2
GLUCOSE SERPL-MCNC: 112 MG/DL
HCT VFR BLD AUTO: 44.3 %
HGB BLD-MCNC: 13.7 G/DL
HYPOCHROMIA BLD QL SMEAR: ABNORMAL
IMM GRANULOCYTES # BLD AUTO: ABNORMAL K/UL
IMM GRANULOCYTES NFR BLD AUTO: ABNORMAL %
LYMPHOCYTES # BLD AUTO: ABNORMAL K/UL
LYMPHOCYTES NFR BLD: 65 %
MCH RBC QN AUTO: 30.9 PG
MCHC RBC AUTO-ENTMCNC: 30.9 G/DL
MCV RBC AUTO: 100 FL
MONOCYTES # BLD AUTO: ABNORMAL K/UL
MONOCYTES NFR BLD: 6 %
NEUTROPHILS NFR BLD: 29 %
NRBC BLD-RTO: 0 /100 WBC
OVALOCYTES BLD QL SMEAR: ABNORMAL
PLATELET # BLD AUTO: 71 K/UL
PLATELET BLD QL SMEAR: ABNORMAL
PMV BLD AUTO: 10.8 FL
POIKILOCYTOSIS BLD QL SMEAR: SLIGHT
POLYCHROMASIA BLD QL SMEAR: ABNORMAL
POTASSIUM SERPL-SCNC: 4.1 MMOL/L
PROT SERPL-MCNC: 6.2 G/DL
RBC # BLD AUTO: 4.44 M/UL
SODIUM SERPL-SCNC: 139 MMOL/L
URATE SERPL-MCNC: 4.3 MG/DL
WBC # BLD AUTO: 13.61 K/UL

## 2019-01-28 PROCEDURE — 36415 COLL VENOUS BLD VENIPUNCTURE: CPT | Mod: HCNC,PO

## 2019-01-28 PROCEDURE — 80053 COMPREHEN METABOLIC PANEL: CPT | Mod: HCNC

## 2019-01-28 PROCEDURE — 85060 PATHOLOGIST REVIEW: ICD-10-PCS | Mod: HCNC,,, | Performed by: PATHOLOGY

## 2019-01-28 PROCEDURE — 84550 ASSAY OF BLOOD/URIC ACID: CPT | Mod: HCNC

## 2019-01-28 PROCEDURE — 85060 BLOOD SMEAR INTERPRETATION: CPT | Mod: HCNC,,, | Performed by: PATHOLOGY

## 2019-01-28 PROCEDURE — 85007 BL SMEAR W/DIFF WBC COUNT: CPT | Mod: HCNC

## 2019-01-28 PROCEDURE — 85027 COMPLETE CBC AUTOMATED: CPT | Mod: HCNC

## 2019-01-29 LAB — PATH REV BLD -IMP: NORMAL

## 2019-01-30 ENCOUNTER — TELEPHONE (OUTPATIENT)
Dept: PHARMACY | Facility: CLINIC | Age: 84
End: 2019-01-30

## 2019-01-30 NOTE — TELEPHONE ENCOUNTER
"Contacted patient in regards to Imbruvica clinical follow up. Patient confirmed initiating Imbruvica on 1/21.    Administration: Patient confirms taking medication as prescribed: Imbruvica 140mg 2 capsules by mouth once daily. Patient takes medication with breakfast (fruit and raison bread) about the same time each morning.   Adherence: Patient denies missed doses of his Imbruvica since starting. However, he did accidentally take only one capsule (rather than two) one morning by accident. He has gotten into a better routine of pouring two capsules into a dosing cup each morning.  Side effects/disease state management:   - Patient denies side effects including peripheral edema, HTN, dizziness, headache, rash, pruritis and diarrhea.   - GI: The patient previously struggled with constipation in the past but noticed his BMs have become more regular and he no longer has to take a stool softener like he has for the past several years. - - BP/BG: Patient has been checking BP (137/65, 135/75, 119/66) and denied major increase in BP or pulse. His BG has not increased either (fasting BG 83, 87, 83).   - Nausea: The patient had one episode of nausea that was relieved by baking soda mixed in water; has not reoccurred since.   - Bleeding/bruising: Mr. Ramos was getting onto his stationary bike this past week but slipped and fell into the window. He noted slight bleeding and bruising after the fall. He only wears "sock feet" when indoors - recommended he wear shoes when getting on the stationary bike to hopefully provide more stability. He stated he would be more cautious when getting on the bike.   - Lab work: The patient was pleased his platelets are slightly increasing and is looking forward to future lab appts to monitor for progress.   Energy levels: Mr. Ramos continues to perform 15 mins on his stationary bike, pushups, and simulated jump rope each day. He's had slight increase in fatigue but encouraged him to " maintain daily exercise regimen as much as possible.  Pertinent findings: The patient denied the majority of side effects but did note a fall the past week. Patient bleeds/bruises easily at baseline therefore we will monitor this closely.    Mr. Ramos is overall doing great with Imbruvica and continues to have a positive outlook on life as well as tx. He successfully arranged an appt to transition care from Dr. Wooten to Dr. Marr. A pharmacist will conduct the next refill call to complete brief f/u at this time. Encouraged patient to contact [facility] with any questions/concerns in the meantime.

## 2019-02-02 RX ORDER — SODIUM CHLORIDE 0.9 % (FLUSH) 0.9 %
10 SYRINGE (ML) INJECTION
Status: CANCELLED | OUTPATIENT
Start: 2019-02-05

## 2019-02-02 RX ORDER — FAMOTIDINE 10 MG/ML
20 INJECTION INTRAVENOUS
Status: CANCELLED | OUTPATIENT
Start: 2019-02-05 | End: 2019-02-05

## 2019-02-02 RX ORDER — ACETAMINOPHEN 325 MG/1
650 TABLET ORAL
Status: CANCELLED | OUTPATIENT
Start: 2019-02-05

## 2019-02-02 RX ORDER — HEPARIN 100 UNIT/ML
500 SYRINGE INTRAVENOUS
Status: CANCELLED | OUTPATIENT
Start: 2019-02-05

## 2019-02-04 ENCOUNTER — LAB VISIT (OUTPATIENT)
Dept: LAB | Facility: HOSPITAL | Age: 84
End: 2019-02-04
Attending: INTERNAL MEDICINE
Payer: MEDICARE

## 2019-02-04 DIAGNOSIS — E79.0 HYPERURICEMIA: ICD-10-CM

## 2019-02-04 DIAGNOSIS — C91.10 CLL (CHRONIC LYMPHOCYTIC LEUKEMIA): ICD-10-CM

## 2019-02-04 LAB
ALBUMIN SERPL BCP-MCNC: 3.1 G/DL
ALP SERPL-CCNC: 78 U/L
ALT SERPL W/O P-5'-P-CCNC: 30 U/L
ANION GAP SERPL CALC-SCNC: 6 MMOL/L
AST SERPL-CCNC: 31 U/L
BASOPHILS # BLD AUTO: 0.06 K/UL
BASOPHILS NFR BLD: 0.7 %
BILIRUB SERPL-MCNC: 0.8 MG/DL
BUN SERPL-MCNC: 30 MG/DL
CALCIUM SERPL-MCNC: 9.4 MG/DL
CHLORIDE SERPL-SCNC: 105 MMOL/L
CO2 SERPL-SCNC: 27 MMOL/L
CREAT SERPL-MCNC: 1 MG/DL
DIFFERENTIAL METHOD: ABNORMAL
EOSINOPHIL # BLD AUTO: 0 K/UL
EOSINOPHIL NFR BLD: 0.4 %
ERYTHROCYTE [DISTWIDTH] IN BLOOD BY AUTOMATED COUNT: 15.9 %
EST. GFR  (AFRICAN AMERICAN): >60 ML/MIN/1.73 M^2
EST. GFR  (NON AFRICAN AMERICAN): >60 ML/MIN/1.73 M^2
GLUCOSE SERPL-MCNC: 133 MG/DL
HCT VFR BLD AUTO: 41.9 %
HGB BLD-MCNC: 12.8 G/DL
IMM GRANULOCYTES # BLD AUTO: 0.09 K/UL
IMM GRANULOCYTES NFR BLD AUTO: 1.1 %
LYMPHOCYTES # BLD AUTO: 3.8 K/UL
LYMPHOCYTES NFR BLD: 45.8 %
MCH RBC QN AUTO: 31.5 PG
MCHC RBC AUTO-ENTMCNC: 30.5 G/DL
MCV RBC AUTO: 103 FL
MONOCYTES # BLD AUTO: 0.6 K/UL
MONOCYTES NFR BLD: 7 %
NEUTROPHILS # BLD AUTO: 3.7 K/UL
NEUTROPHILS NFR BLD: 45 %
NRBC BLD-RTO: 0 /100 WBC
PLATELET # BLD AUTO: 67 K/UL
PMV BLD AUTO: 10.7 FL
POTASSIUM SERPL-SCNC: 4.5 MMOL/L
PROT SERPL-MCNC: 5.5 G/DL
RBC # BLD AUTO: 4.06 M/UL
SODIUM SERPL-SCNC: 138 MMOL/L
URATE SERPL-MCNC: 3.5 MG/DL
WBC # BLD AUTO: 8.25 K/UL

## 2019-02-04 PROCEDURE — 36415 COLL VENOUS BLD VENIPUNCTURE: CPT | Mod: HCNC,PO

## 2019-02-04 PROCEDURE — 85025 COMPLETE CBC W/AUTO DIFF WBC: CPT | Mod: HCNC

## 2019-02-04 PROCEDURE — 80053 COMPREHEN METABOLIC PANEL: CPT | Mod: HCNC

## 2019-02-04 PROCEDURE — 84550 ASSAY OF BLOOD/URIC ACID: CPT | Mod: HCNC

## 2019-02-05 ENCOUNTER — INFUSION (OUTPATIENT)
Dept: INFUSION THERAPY | Facility: HOSPITAL | Age: 84
End: 2019-02-05
Attending: INTERNAL MEDICINE
Payer: MEDICARE

## 2019-02-05 VITALS
RESPIRATION RATE: 18 BRPM | DIASTOLIC BLOOD PRESSURE: 58 MMHG | SYSTOLIC BLOOD PRESSURE: 123 MMHG | HEART RATE: 57 BPM | TEMPERATURE: 98 F

## 2019-02-05 DIAGNOSIS — C91.10 CLL (CHRONIC LYMPHOCYTIC LEUKEMIA): ICD-10-CM

## 2019-02-05 DIAGNOSIS — E79.0 HYPERURICEMIA: ICD-10-CM

## 2019-02-05 DIAGNOSIS — J32.9 CHRONIC SINUSITIS, UNSPECIFIED LOCATION: ICD-10-CM

## 2019-02-05 DIAGNOSIS — D80.1 HYPOGAMMAGLOBULINEMIA: Primary | ICD-10-CM

## 2019-02-05 PROCEDURE — 96375 TX/PRO/DX INJ NEW DRUG ADDON: CPT | Mod: HCNC

## 2019-02-05 PROCEDURE — 25000003 PHARM REV CODE 250: Mod: HCNC | Performed by: INTERNAL MEDICINE

## 2019-02-05 PROCEDURE — 96367 TX/PROPH/DG ADDL SEQ IV INF: CPT | Mod: HCNC

## 2019-02-05 PROCEDURE — 96366 THER/PROPH/DIAG IV INF ADDON: CPT | Mod: HCNC

## 2019-02-05 PROCEDURE — 63600175 PHARM REV CODE 636 W HCPCS: Mod: HCNC | Performed by: INTERNAL MEDICINE

## 2019-02-05 PROCEDURE — S0028 INJECTION, FAMOTIDINE, 20 MG: HCPCS | Mod: HCNC | Performed by: INTERNAL MEDICINE

## 2019-02-05 PROCEDURE — 96365 THER/PROPH/DIAG IV INF INIT: CPT | Mod: HCNC

## 2019-02-05 RX ORDER — ACETAMINOPHEN 325 MG/1
650 TABLET ORAL
Status: COMPLETED | OUTPATIENT
Start: 2019-02-05 | End: 2019-02-05

## 2019-02-05 RX ORDER — SODIUM CHLORIDE 0.9 % (FLUSH) 0.9 %
10 SYRINGE (ML) INJECTION
Status: DISCONTINUED | OUTPATIENT
Start: 2019-02-05 | End: 2019-02-05 | Stop reason: HOSPADM

## 2019-02-05 RX ORDER — FAMOTIDINE 10 MG/ML
20 INJECTION INTRAVENOUS
Status: COMPLETED | OUTPATIENT
Start: 2019-02-05 | End: 2019-02-05

## 2019-02-05 RX ADMIN — HUMAN IMMUNOGLOBULIN G 20 G: 20 LIQUID INTRAVENOUS at 08:02

## 2019-02-05 RX ADMIN — SODIUM CHLORIDE: 0.9 INJECTION, SOLUTION INTRAVENOUS at 08:02

## 2019-02-05 RX ADMIN — FAMOTIDINE 20 MG: 10 INJECTION, SOLUTION INTRAVENOUS at 08:02

## 2019-02-05 RX ADMIN — ACETAMINOPHEN 650 MG: 325 TABLET ORAL at 08:02

## 2019-02-05 RX ADMIN — DIPHENHYDRAMINE HYDROCHLORIDE 50 MG: 50 INJECTION, SOLUTION INTRAMUSCULAR; INTRAVENOUS at 08:02

## 2019-02-05 NOTE — PLAN OF CARE
Problem: Anemia  Goal: Anemia Symptom Improvement    Intervention: Monitor and Manage Anemia  Tolerated IVIG well titrated to max dose vitals stable. PIV removed, pt d/c home stable condition, avs reviewed instructed to contact MD office with questions, leaves clinic ambulatory with wife.

## 2019-02-11 ENCOUNTER — LAB VISIT (OUTPATIENT)
Dept: LAB | Facility: HOSPITAL | Age: 84
End: 2019-02-11
Attending: INTERNAL MEDICINE
Payer: MEDICARE

## 2019-02-11 ENCOUNTER — PATIENT MESSAGE (OUTPATIENT)
Dept: HEMATOLOGY/ONCOLOGY | Facility: CLINIC | Age: 84
End: 2019-02-11

## 2019-02-11 DIAGNOSIS — C91.10 CLL (CHRONIC LYMPHOCYTIC LEUKEMIA): ICD-10-CM

## 2019-02-11 DIAGNOSIS — E79.0 HYPERURICEMIA: ICD-10-CM

## 2019-02-11 LAB
ALBUMIN SERPL BCP-MCNC: 3.2 G/DL
ALP SERPL-CCNC: 72 U/L
ALT SERPL W/O P-5'-P-CCNC: 25 U/L
ANION GAP SERPL CALC-SCNC: 5 MMOL/L
AST SERPL-CCNC: 30 U/L
BASOPHILS # BLD AUTO: 0.07 K/UL
BASOPHILS NFR BLD: 0.7 %
BILIRUB SERPL-MCNC: 0.9 MG/DL
BUN SERPL-MCNC: 29 MG/DL
CALCIUM SERPL-MCNC: 9.8 MG/DL
CHLORIDE SERPL-SCNC: 104 MMOL/L
CO2 SERPL-SCNC: 29 MMOL/L
CREAT SERPL-MCNC: 1.1 MG/DL
DIFFERENTIAL METHOD: ABNORMAL
EOSINOPHIL # BLD AUTO: 0 K/UL
EOSINOPHIL NFR BLD: 0.3 %
ERYTHROCYTE [DISTWIDTH] IN BLOOD BY AUTOMATED COUNT: 16.1 %
EST. GFR  (AFRICAN AMERICAN): >60 ML/MIN/1.73 M^2
EST. GFR  (NON AFRICAN AMERICAN): >60 ML/MIN/1.73 M^2
GLUCOSE SERPL-MCNC: 106 MG/DL
HCT VFR BLD AUTO: 42.9 %
HGB BLD-MCNC: 13.2 G/DL
IMM GRANULOCYTES # BLD AUTO: 0.19 K/UL
IMM GRANULOCYTES NFR BLD AUTO: 2 %
LYMPHOCYTES # BLD AUTO: 3.9 K/UL
LYMPHOCYTES NFR BLD: 40.7 %
MCH RBC QN AUTO: 31.1 PG
MCHC RBC AUTO-ENTMCNC: 30.8 G/DL
MCV RBC AUTO: 101 FL
MONOCYTES # BLD AUTO: 0.8 K/UL
MONOCYTES NFR BLD: 8.3 %
NEUTROPHILS # BLD AUTO: 4.7 K/UL
NEUTROPHILS NFR BLD: 48 %
NRBC BLD-RTO: 0 /100 WBC
PLATELET # BLD AUTO: 64 K/UL
PMV BLD AUTO: 11 FL
POTASSIUM SERPL-SCNC: 5.3 MMOL/L
PROT SERPL-MCNC: 5.8 G/DL
RBC # BLD AUTO: 4.25 M/UL
SODIUM SERPL-SCNC: 138 MMOL/L
URATE SERPL-MCNC: 3.8 MG/DL
WBC # BLD AUTO: 9.68 K/UL

## 2019-02-11 PROCEDURE — 85025 COMPLETE CBC W/AUTO DIFF WBC: CPT | Mod: HCNC

## 2019-02-11 PROCEDURE — 84550 ASSAY OF BLOOD/URIC ACID: CPT | Mod: HCNC

## 2019-02-11 PROCEDURE — 36415 COLL VENOUS BLD VENIPUNCTURE: CPT | Mod: HCNC,PO

## 2019-02-11 PROCEDURE — 80053 COMPREHEN METABOLIC PANEL: CPT | Mod: HCNC

## 2019-02-12 ENCOUNTER — TELEPHONE (OUTPATIENT)
Dept: PHARMACY | Facility: CLINIC | Age: 84
End: 2019-02-12

## 2019-02-12 ENCOUNTER — PATIENT MESSAGE (OUTPATIENT)
Dept: HEMATOLOGY/ONCOLOGY | Facility: CLINIC | Age: 84
End: 2019-02-12

## 2019-02-12 NOTE — TELEPHONE ENCOUNTER
"Refill call for Imbruvica - spoke with Mr. Ramos. Patient confirms the need of a refill. Will ship on  with patient consent. Copay $0 at 004. Patient has 8-9 days supply remaining.  Next dose: ~.  Patient has not started any new medications, 0 missed doses and denies new side effects.   Patient taking medication as prescribed, no changes in direction. Patient did not have any concerns or questions at this time.  & address verified.    Mr Ramos continues to do well on Imbruvica. He takes 2 caps QAM and has started taking allopurinol as well. He has had "no trouble" with the medication - great energy and appetite. Continues his exercise regimen of pushups, biking, and jump roping daily. Just celebrated his 85th birthday. His only complaint is a constant runny nose - he uses nebulizers BID. Has tried OTC antihistamine and nasal corticosteroids - don't help much. No help from previous ENT visits. He want's something to "dry me up." Advised him daily, consistent dosing of zytrec may help, not just PRN. Otherwise, Mr. Ramos seems to be doing very well - will f/u on 3 cycles or earlier if clinically necessary.    "

## 2019-02-15 ENCOUNTER — OFFICE VISIT (OUTPATIENT)
Dept: OTOLARYNGOLOGY | Facility: CLINIC | Age: 84
End: 2019-02-15
Payer: MEDICARE

## 2019-02-15 VITALS
SYSTOLIC BLOOD PRESSURE: 123 MMHG | WEIGHT: 156.06 LBS | HEART RATE: 66 BPM | BODY MASS INDEX: 21.77 KG/M2 | DIASTOLIC BLOOD PRESSURE: 64 MMHG

## 2019-02-15 DIAGNOSIS — J34.3 HYPERTROPHY OF INFERIOR NASAL TURBINATE: ICD-10-CM

## 2019-02-15 DIAGNOSIS — J32.8 OTHER CHRONIC SINUSITIS: Primary | ICD-10-CM

## 2019-02-15 DIAGNOSIS — D84.9 IMMUNOSUPPRESSION: ICD-10-CM

## 2019-02-15 PROCEDURE — 87070 CULTURE OTHR SPECIMN AEROBIC: CPT | Mod: HCNC

## 2019-02-15 PROCEDURE — 3078F PR MOST RECENT DIASTOLIC BLOOD PRESSURE < 80 MM HG: ICD-10-PCS | Mod: HCNC,CPTII,S$GLB, | Performed by: OTOLARYNGOLOGY

## 2019-02-15 PROCEDURE — 87077 CULTURE AEROBIC IDENTIFY: CPT | Mod: HCNC

## 2019-02-15 PROCEDURE — 31231 NASAL ENDOSCOPY DX: CPT | Mod: HCNC,S$GLB,, | Performed by: OTOLARYNGOLOGY

## 2019-02-15 PROCEDURE — 87075 CULTR BACTERIA EXCEPT BLOOD: CPT | Mod: HCNC

## 2019-02-15 PROCEDURE — 1101F PT FALLS ASSESS-DOCD LE1/YR: CPT | Mod: HCNC,CPTII,S$GLB, | Performed by: OTOLARYNGOLOGY

## 2019-02-15 PROCEDURE — 31231 PR NASAL ENDOSCOPY, DX: ICD-10-PCS | Mod: HCNC,S$GLB,, | Performed by: OTOLARYNGOLOGY

## 2019-02-15 PROCEDURE — 3078F DIAST BP <80 MM HG: CPT | Mod: HCNC,CPTII,S$GLB, | Performed by: OTOLARYNGOLOGY

## 2019-02-15 PROCEDURE — 99999 PR PBB SHADOW E&M-EST. PATIENT-LVL IV: CPT | Mod: PBBFAC,HCNC,, | Performed by: OTOLARYNGOLOGY

## 2019-02-15 PROCEDURE — 3074F PR MOST RECENT SYSTOLIC BLOOD PRESSURE < 130 MM HG: ICD-10-PCS | Mod: HCNC,CPTII,S$GLB, | Performed by: OTOLARYNGOLOGY

## 2019-02-15 PROCEDURE — 3074F SYST BP LT 130 MM HG: CPT | Mod: HCNC,CPTII,S$GLB, | Performed by: OTOLARYNGOLOGY

## 2019-02-15 PROCEDURE — 99214 OFFICE O/P EST MOD 30 MIN: CPT | Mod: 25,HCNC,S$GLB, | Performed by: OTOLARYNGOLOGY

## 2019-02-15 PROCEDURE — 87186 SC STD MICRODIL/AGAR DIL: CPT | Mod: HCNC

## 2019-02-15 PROCEDURE — 99999 PR PBB SHADOW E&M-EST. PATIENT-LVL IV: ICD-10-PCS | Mod: PBBFAC,HCNC,, | Performed by: OTOLARYNGOLOGY

## 2019-02-15 PROCEDURE — 99214 PR OFFICE/OUTPT VISIT, EST, LEVL IV, 30-39 MIN: ICD-10-PCS | Mod: 25,HCNC,S$GLB, | Performed by: OTOLARYNGOLOGY

## 2019-02-15 PROCEDURE — 1101F PR PT FALLS ASSESS DOC 0-1 FALLS W/OUT INJ PAST YR: ICD-10-PCS | Mod: HCNC,CPTII,S$GLB, | Performed by: OTOLARYNGOLOGY

## 2019-02-15 RX ORDER — CIPROFLOXACIN 500 MG/1
500 TABLET ORAL 2 TIMES DAILY
Qty: 28 TABLET | Refills: 0 | Status: SHIPPED | OUTPATIENT
Start: 2019-02-15 | End: 2019-03-01

## 2019-02-15 RX ORDER — CIPROFLOXACIN 500 MG/1
500 TABLET ORAL 2 TIMES DAILY
Qty: 28 TABLET | Refills: 0 | Status: SHIPPED | OUTPATIENT
Start: 2019-02-15 | End: 2019-02-15

## 2019-02-18 LAB — BACTERIA SPEC AEROBE CULT: NORMAL

## 2019-02-19 LAB — BACTERIA SPEC ANAEROBE CULT: NORMAL

## 2019-02-20 ENCOUNTER — TELEPHONE (OUTPATIENT)
Dept: PHARMACY | Facility: CLINIC | Age: 84
End: 2019-02-20

## 2019-02-20 NOTE — TELEPHONE ENCOUNTER
Spoke with Mr Ramos - he slept well last night and feels fine this morning. Has not heard from [facility], so advised him to skip today's dose and resume his normal schedule tomorrow unless we/he hear anything different from Dr. Wooten's office. He verbalized understanding. Sent f/u message to Dr. Wooten and staff:

## 2019-02-25 ENCOUNTER — LAB VISIT (OUTPATIENT)
Dept: LAB | Facility: HOSPITAL | Age: 84
End: 2019-02-25
Attending: INTERNAL MEDICINE
Payer: MEDICARE

## 2019-02-25 DIAGNOSIS — C91.10 CLL (CHRONIC LYMPHOCYTIC LEUKEMIA): ICD-10-CM

## 2019-02-25 DIAGNOSIS — E79.0 HYPERURICEMIA: ICD-10-CM

## 2019-02-25 LAB
ALBUMIN SERPL BCP-MCNC: 3.1 G/DL
ALP SERPL-CCNC: 74 U/L
ALT SERPL W/O P-5'-P-CCNC: 34 U/L
ANION GAP SERPL CALC-SCNC: 7 MMOL/L
AST SERPL-CCNC: 40 U/L
BASOPHILS # BLD AUTO: 0.05 K/UL
BASOPHILS NFR BLD: 0.6 %
BILIRUB SERPL-MCNC: 0.9 MG/DL
BUN SERPL-MCNC: 34 MG/DL
CALCIUM SERPL-MCNC: 9.6 MG/DL
CHLORIDE SERPL-SCNC: 104 MMOL/L
CO2 SERPL-SCNC: 28 MMOL/L
CREAT SERPL-MCNC: 1.1 MG/DL
DIFFERENTIAL METHOD: ABNORMAL
EOSINOPHIL # BLD AUTO: 0 K/UL
EOSINOPHIL NFR BLD: 0.2 %
ERYTHROCYTE [DISTWIDTH] IN BLOOD BY AUTOMATED COUNT: 16.4 %
EST. GFR  (AFRICAN AMERICAN): >60 ML/MIN/1.73 M^2
EST. GFR  (NON AFRICAN AMERICAN): >60 ML/MIN/1.73 M^2
GLUCOSE SERPL-MCNC: 120 MG/DL
HCT VFR BLD AUTO: 42.1 %
HGB BLD-MCNC: 13.4 G/DL
IMM GRANULOCYTES # BLD AUTO: 0.16 K/UL
IMM GRANULOCYTES NFR BLD AUTO: 1.8 %
LYMPHOCYTES # BLD AUTO: 3.3 K/UL
LYMPHOCYTES NFR BLD: 36.6 %
MCH RBC QN AUTO: 32.7 PG
MCHC RBC AUTO-ENTMCNC: 31.8 G/DL
MCV RBC AUTO: 103 FL
MONOCYTES # BLD AUTO: 0.6 K/UL
MONOCYTES NFR BLD: 6.4 %
NEUTROPHILS # BLD AUTO: 4.9 K/UL
NEUTROPHILS NFR BLD: 54.4 %
NRBC BLD-RTO: 0 /100 WBC
PLATELET # BLD AUTO: 65 K/UL
PMV BLD AUTO: 10.9 FL
POTASSIUM SERPL-SCNC: 5.4 MMOL/L
PROT SERPL-MCNC: 5.4 G/DL
RBC # BLD AUTO: 4.1 M/UL
SODIUM SERPL-SCNC: 139 MMOL/L
URATE SERPL-MCNC: 3.8 MG/DL
WBC # BLD AUTO: 9.01 K/UL

## 2019-02-25 PROCEDURE — 84550 ASSAY OF BLOOD/URIC ACID: CPT | Mod: HCNC

## 2019-02-25 PROCEDURE — 85025 COMPLETE CBC W/AUTO DIFF WBC: CPT | Mod: HCNC

## 2019-02-25 PROCEDURE — 36415 COLL VENOUS BLD VENIPUNCTURE: CPT | Mod: HCNC,PO

## 2019-02-25 PROCEDURE — 80053 COMPREHEN METABOLIC PANEL: CPT | Mod: HCNC

## 2019-02-25 NOTE — PROCEDURES
Procedures       PROCEDURE NOTE:  Nasal endoscopy   Preprocedure diagnosis:  Chronic sinusitis  Postprocedure diangosis:  Same  Complications:  None  Blood Loss:  None    Procedure in detail:  After verbal consent was obtained, the patient's nasal cavity was anesthesized using topical 1%lidocaine and Neosynepherine.  A rigid 0 degree endoscope was placed in first the right, then the left nasal cavity.  The inferior and middle turbinates were examined, and found to be erythematous bilaterally.  The middle meatus and maxillary antrum was also examined, and found to have purulent rhinorrhea on the left; the right sinonasal cavity is clear.  Culture obtained. No masses seen.  The patient tolerated the procedure well and there were no complications.

## 2019-02-28 RX ORDER — FAMOTIDINE 10 MG/ML
20 INJECTION INTRAVENOUS
Status: CANCELLED | OUTPATIENT
Start: 2019-03-01 | End: 2019-03-01

## 2019-02-28 RX ORDER — HEPARIN 100 UNIT/ML
500 SYRINGE INTRAVENOUS
Status: CANCELLED | OUTPATIENT
Start: 2019-03-01

## 2019-02-28 RX ORDER — SODIUM CHLORIDE 0.9 % (FLUSH) 0.9 %
10 SYRINGE (ML) INJECTION
Status: CANCELLED | OUTPATIENT
Start: 2019-03-01

## 2019-02-28 RX ORDER — ACETAMINOPHEN 325 MG/1
650 TABLET ORAL
Status: CANCELLED | OUTPATIENT
Start: 2019-03-01

## 2019-03-04 ENCOUNTER — LAB VISIT (OUTPATIENT)
Dept: LAB | Facility: HOSPITAL | Age: 84
End: 2019-03-04
Attending: INTERNAL MEDICINE
Payer: MEDICARE

## 2019-03-04 DIAGNOSIS — C91.10 CLL (CHRONIC LYMPHOCYTIC LEUKEMIA): ICD-10-CM

## 2019-03-04 DIAGNOSIS — E79.0 HYPERURICEMIA: ICD-10-CM

## 2019-03-04 LAB
ALBUMIN SERPL BCP-MCNC: 3.4 G/DL
ALP SERPL-CCNC: 82 U/L
ALT SERPL W/O P-5'-P-CCNC: 37 U/L
ANION GAP SERPL CALC-SCNC: 9 MMOL/L
ANISOCYTOSIS BLD QL SMEAR: SLIGHT
AST SERPL-CCNC: 44 U/L
BASOPHILS # BLD AUTO: 0.03 K/UL
BASOPHILS NFR BLD: 0.3 %
BILIRUB SERPL-MCNC: 1 MG/DL
BUN SERPL-MCNC: 28 MG/DL
CALCIUM SERPL-MCNC: 9.4 MG/DL
CHLORIDE SERPL-SCNC: 106 MMOL/L
CO2 SERPL-SCNC: 26 MMOL/L
CREAT SERPL-MCNC: 1 MG/DL
DACRYOCYTES BLD QL SMEAR: ABNORMAL
DIFFERENTIAL METHOD: ABNORMAL
EOSINOPHIL # BLD AUTO: 0.1 K/UL
EOSINOPHIL NFR BLD: 0.5 %
ERYTHROCYTE [DISTWIDTH] IN BLOOD BY AUTOMATED COUNT: 16.1 %
EST. GFR  (AFRICAN AMERICAN): >60 ML/MIN/1.73 M^2
EST. GFR  (NON AFRICAN AMERICAN): >60 ML/MIN/1.73 M^2
GLUCOSE SERPL-MCNC: 99 MG/DL
HCT VFR BLD AUTO: 46.7 %
HGB BLD-MCNC: 14.7 G/DL
HYPOCHROMIA BLD QL SMEAR: ABNORMAL
IMM GRANULOCYTES # BLD AUTO: 0.15 K/UL
IMM GRANULOCYTES NFR BLD AUTO: 1.6 %
LYMPHOCYTES # BLD AUTO: 4 K/UL
LYMPHOCYTES NFR BLD: 43.1 %
MCH RBC QN AUTO: 32.3 PG
MCHC RBC AUTO-ENTMCNC: 31.5 G/DL
MCV RBC AUTO: 103 FL
MONOCYTES # BLD AUTO: 0.7 K/UL
MONOCYTES NFR BLD: 7.4 %
NEUTROPHILS # BLD AUTO: 4.4 K/UL
NEUTROPHILS NFR BLD: 47.1 %
NRBC BLD-RTO: 0 /100 WBC
OVALOCYTES BLD QL SMEAR: ABNORMAL
PLATELET # BLD AUTO: 74 K/UL
PMV BLD AUTO: 10.5 FL
POIKILOCYTOSIS BLD QL SMEAR: SLIGHT
POLYCHROMASIA BLD QL SMEAR: ABNORMAL
POTASSIUM SERPL-SCNC: 4.5 MMOL/L
PROT SERPL-MCNC: 5.9 G/DL
RBC # BLD AUTO: 4.55 M/UL
SODIUM SERPL-SCNC: 141 MMOL/L
URATE SERPL-MCNC: 3.9 MG/DL
WBC # BLD AUTO: 9.3 K/UL

## 2019-03-04 PROCEDURE — 36415 COLL VENOUS BLD VENIPUNCTURE: CPT | Mod: HCNC,PO

## 2019-03-04 PROCEDURE — 85025 COMPLETE CBC W/AUTO DIFF WBC: CPT | Mod: HCNC

## 2019-03-04 PROCEDURE — 80053 COMPREHEN METABOLIC PANEL: CPT | Mod: HCNC

## 2019-03-04 PROCEDURE — 84550 ASSAY OF BLOOD/URIC ACID: CPT | Mod: HCNC

## 2019-03-06 ENCOUNTER — INFUSION (OUTPATIENT)
Dept: INFUSION THERAPY | Facility: HOSPITAL | Age: 84
End: 2019-03-06
Attending: INTERNAL MEDICINE
Payer: MEDICARE

## 2019-03-06 VITALS
DIASTOLIC BLOOD PRESSURE: 58 MMHG | HEIGHT: 71 IN | WEIGHT: 156.94 LBS | RESPIRATION RATE: 18 BRPM | BODY MASS INDEX: 21.97 KG/M2 | SYSTOLIC BLOOD PRESSURE: 120 MMHG | TEMPERATURE: 98 F | HEART RATE: 53 BPM

## 2019-03-06 DIAGNOSIS — E79.0 HYPERURICEMIA: ICD-10-CM

## 2019-03-06 DIAGNOSIS — J32.9 CHRONIC SINUSITIS, UNSPECIFIED LOCATION: ICD-10-CM

## 2019-03-06 DIAGNOSIS — C91.10 CLL (CHRONIC LYMPHOCYTIC LEUKEMIA): ICD-10-CM

## 2019-03-06 DIAGNOSIS — D80.1 HYPOGAMMAGLOBULINEMIA: Primary | ICD-10-CM

## 2019-03-06 PROCEDURE — 25000003 PHARM REV CODE 250: Mod: HCNC | Performed by: INTERNAL MEDICINE

## 2019-03-06 PROCEDURE — 96365 THER/PROPH/DIAG IV INF INIT: CPT | Mod: HCNC

## 2019-03-06 PROCEDURE — 96366 THER/PROPH/DIAG IV INF ADDON: CPT | Mod: HCNC

## 2019-03-06 PROCEDURE — S0028 INJECTION, FAMOTIDINE, 20 MG: HCPCS | Mod: HCNC | Performed by: INTERNAL MEDICINE

## 2019-03-06 PROCEDURE — 63600175 PHARM REV CODE 636 W HCPCS: Mod: HCNC | Performed by: INTERNAL MEDICINE

## 2019-03-06 PROCEDURE — 96375 TX/PRO/DX INJ NEW DRUG ADDON: CPT | Mod: HCNC

## 2019-03-06 PROCEDURE — 96367 TX/PROPH/DG ADDL SEQ IV INF: CPT | Mod: HCNC

## 2019-03-06 RX ORDER — ACETAMINOPHEN 325 MG/1
650 TABLET ORAL
Status: COMPLETED | OUTPATIENT
Start: 2019-03-06 | End: 2019-03-06

## 2019-03-06 RX ORDER — FAMOTIDINE 10 MG/ML
20 INJECTION INTRAVENOUS
Status: COMPLETED | OUTPATIENT
Start: 2019-03-06 | End: 2019-03-06

## 2019-03-06 RX ADMIN — DIPHENHYDRAMINE HYDROCHLORIDE 50 MG: 50 INJECTION, SOLUTION INTRAMUSCULAR; INTRAVENOUS at 08:03

## 2019-03-06 RX ADMIN — FAMOTIDINE 20 MG: 10 INJECTION, SOLUTION INTRAVENOUS at 08:03

## 2019-03-06 RX ADMIN — SODIUM CHLORIDE: 0.9 INJECTION, SOLUTION INTRAVENOUS at 08:03

## 2019-03-06 RX ADMIN — HUMAN IMMUNOGLOBULIN G 20 G: 20 LIQUID INTRAVENOUS at 08:03

## 2019-03-06 RX ADMIN — ACETAMINOPHEN 650 MG: 325 TABLET ORAL at 08:03

## 2019-03-06 NOTE — PLAN OF CARE
Problem: Anemia  Goal: Anemia Symptom Improvement  Outcome: Ongoing (interventions implemented as appropriate)  Labs , hx, and medications reviewed. Assessment completed. Discussed plan of care with patient. Patient in agreement. VSS.  Chair reclined and warm blanket and snack offered. Will cont to monitor

## 2019-03-06 NOTE — PLAN OF CARE
Problem: Patient Care Overview (Adult)  Goal: Plan of Care Review  Outcome: Ongoing (interventions implemented as appropriate)  Pt tolerated IVIG without adverse effects. VSS. Verbalized understanding of RTC date. DC with family ambulating independently.

## 2019-03-12 ENCOUNTER — OFFICE VISIT (OUTPATIENT)
Dept: HEMATOLOGY/ONCOLOGY | Facility: CLINIC | Age: 84
End: 2019-03-12
Payer: MEDICARE

## 2019-03-12 VITALS
OXYGEN SATURATION: 98 % | WEIGHT: 155.44 LBS | HEART RATE: 68 BPM | BODY MASS INDEX: 21.76 KG/M2 | HEIGHT: 71 IN | RESPIRATION RATE: 18 BRPM | SYSTOLIC BLOOD PRESSURE: 124 MMHG | DIASTOLIC BLOOD PRESSURE: 64 MMHG | TEMPERATURE: 98 F

## 2019-03-12 DIAGNOSIS — E23.0 PANHYPOPITUITARISM: ICD-10-CM

## 2019-03-12 DIAGNOSIS — C91.10 CLL (CHRONIC LYMPHOCYTIC LEUKEMIA): Primary | ICD-10-CM

## 2019-03-12 DIAGNOSIS — D80.1 HYPOGAMMAGLOBULINEMIA: ICD-10-CM

## 2019-03-12 DIAGNOSIS — D69.6 THROMBOCYTOPENIA: ICD-10-CM

## 2019-03-12 PROCEDURE — 99215 OFFICE O/P EST HI 40 MIN: CPT | Mod: HCNC,S$GLB,, | Performed by: INTERNAL MEDICINE

## 2019-03-12 PROCEDURE — 99999 PR PBB SHADOW E&M-EST. PATIENT-LVL III: CPT | Mod: PBBFAC,HCNC,, | Performed by: INTERNAL MEDICINE

## 2019-03-12 PROCEDURE — 3074F SYST BP LT 130 MM HG: CPT | Mod: HCNC,CPTII,S$GLB, | Performed by: INTERNAL MEDICINE

## 2019-03-12 PROCEDURE — 1101F PR PT FALLS ASSESS DOC 0-1 FALLS W/OUT INJ PAST YR: ICD-10-PCS | Mod: HCNC,CPTII,S$GLB, | Performed by: INTERNAL MEDICINE

## 2019-03-12 PROCEDURE — 99215 PR OFFICE/OUTPT VISIT, EST, LEVL V, 40-54 MIN: ICD-10-PCS | Mod: HCNC,S$GLB,, | Performed by: INTERNAL MEDICINE

## 2019-03-12 PROCEDURE — 99999 PR PBB SHADOW E&M-EST. PATIENT-LVL III: ICD-10-PCS | Mod: PBBFAC,HCNC,, | Performed by: INTERNAL MEDICINE

## 2019-03-12 PROCEDURE — 3078F PR MOST RECENT DIASTOLIC BLOOD PRESSURE < 80 MM HG: ICD-10-PCS | Mod: HCNC,CPTII,S$GLB, | Performed by: INTERNAL MEDICINE

## 2019-03-12 PROCEDURE — 1101F PT FALLS ASSESS-DOCD LE1/YR: CPT | Mod: HCNC,CPTII,S$GLB, | Performed by: INTERNAL MEDICINE

## 2019-03-12 PROCEDURE — 3078F DIAST BP <80 MM HG: CPT | Mod: HCNC,CPTII,S$GLB, | Performed by: INTERNAL MEDICINE

## 2019-03-12 PROCEDURE — 3074F PR MOST RECENT SYSTOLIC BLOOD PRESSURE < 130 MM HG: ICD-10-PCS | Mod: HCNC,CPTII,S$GLB, | Performed by: INTERNAL MEDICINE

## 2019-03-12 NOTE — Clinical Note
IVIG therapy first week of AprilCBC every 2 weeksReturn visit in 2 months with CBC, CMP, LDH and IgG

## 2019-03-12 NOTE — PROGRESS NOTES
Subjective:       Patient ID: Thierry Ramos Jr. is a 85 y.o. male.    Chief Complaint: CLL (chronic lymphocytic leukemia) and Results    Mr. Ramos has a history of CLL diagnosed in April 2012 by Dr Joaquin Wooten. In April 2013, there was an abrupt rise in his white blood cell count to 93,000 and he had a modest number of prolymphocytes in his peripheral smear.  He had generalized urticaria, which was thought to be paraneoplastic.  At that time, he was treated with 5 courses of fludarabine, cyclophosphamide and Rituxan.  The fludarabine doses were decreased somewhat because of mild renal impairment.  The sixth treatment was with Rituxan alone because he developed thrombocytopenia, which has persisted in varying degrees since then.     His major hematologic problem and much of his treatment since 2013, has been related to thrombocytopenia.  It became more severe in early 2016, at a time when he was taking both aspirin and Plavix.  A bone marrow examination had no evidence of myelodysplasia and 35% of the bone marrow cells were small lymphocytes.  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  The FISH studies for myelodysplastic disorders were negative.     Dr. 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     Subsequently, because of continued thrombocytopenia,  recommended treatment with ofatumumab, which he began in late June 2016.  At that time, his platelet count was 57,000.  He generally tolerated the drug well and the frequency of administration was gradually reduced over a period of more than 2 years.  Platelet counts during most of those 2 years were between 66,000-117,000.  The drug was discontinued in early November 2018.     He has had  many episodes of infection in the past, particularly recurrent sinusitis and he has had several surgical procedures on his sinuses.  Because of this, he has been receiving intravenous immunoglobulin monthly for years and   the frequency of pneumonia and severe sinusitis has diminished. Most recent dose of IVIG was 3/6/19.      After a pituitary surgery for a tumor in 2002, he experienced a TIA and then had 2 strokes.  He has had many nonmelanoma skin cancers excised.     He asked to be seen earlier than planned by Dr. Wooten in January because he was concerned about his recent blood counts.  His platelet count has declined modestly from the range of 66,000-107,000 seen in the past 9 months to a current value of 51,000.  His   white blood cell count remains normal and his lymphocyte percentage this week has been 61% and 45%.    He was started on a low dose ibrutinib 280 mg rather than 420mg due to concern for treatment related- further decrease in platelet count while on aspirin and plavix.     He is not having fevers or sweats.  He has had no change in his activity level which includes daily jump rope, 125 pushups, 15 minutes on the stationary bike. He has not noted any enlarged lymph nodes.    His wife reports that he falls, upon questions this is more accidents. He lost his footing once coming off the stationary bike. Another time he tripped over a concrete parking block. No issues with balance.              HPI  Review of Systems   Constitutional: Negative.    HENT: Negative.    Eyes: Negative.    Respiratory: Negative.    Cardiovascular: Negative.    Gastrointestinal: Negative.    Endocrine: Negative.    Genitourinary: Negative.    Skin: Negative.    Allergic/Immunologic: Negative for environmental allergies, food allergies and immunocompromised state.   Neurological: Negative.    Hematological: Negative for adenopathy. Bruises/bleeds easily.   Psychiatric/Behavioral: Negative.        Objective:      Physical  Exam   Constitutional: He is oriented to person, place, and time. He appears well-developed and well-nourished.   HENT:   Head: Normocephalic and atraumatic.   Eyes: Conjunctivae are normal. No scleral icterus.   Neck: Normal range of motion. Neck supple.   Cardiovascular: Normal rate and intact distal pulses.   Pulmonary/Chest: Effort normal. No respiratory distress.   Abdominal: Soft. He exhibits no distension. There is no tenderness.   Musculoskeletal: Normal range of motion. He exhibits no edema.   Neurological: He is alert and oriented to person, place, and time. No cranial nerve deficit.   Skin: Skin is warm and dry. Ecchymosis noted.   Psychiatric: He has a normal mood and affect. His behavior is normal.   Nursing note and vitals reviewed.      Assessment:       1. CLL (chronic lymphocytic leukemia)    2. Hypogammaglobulinemia    3. Thrombocytopenia    4. Panhypopituitarism        Plan:       CLL  Continue ibrutinib 280mg daily. CBC is stable.  Schedule for IVIG first week of April  CBC every other week  Return visit in 2 months with CBC, CMP and LDH and IgG

## 2019-03-12 NOTE — LETTER
March 12, 2019      Joaquin Wooten Jr., MD  [address]  [facility] [address]           [facility]  [address]  [facility] [address]  Phone: [phone]          Patient: Thierry Ramos Jr.   MR Number: [id number]   YOB: 1934   Date of Visit: 3/12/2019       Dear Dr. Joaquin Wooten Jr.:    Thank you for referring Thierry Ramos to me for evaluation. Attached you will find relevant portions of my assessment and plan of care.    If you have questions, please do not hesitate to call me. I look forward to following Thierry Ramos along with you.    Sincerely,    Kelly Marr MD    Enclosure  CC:  No Recipients    If you would like to receive this communication electronically, please contact [email or web address] or [phone] to request more information on [employer] Link access.    For providers and/or their staff who would like to refer a patient to [facility], please contact us through our one-stop-shop provider referral line, [facility], at [phone].    If you feel you have received this communication in error or would no longer like to receive these types of communications, please e-mail [email or web address]

## 2019-03-13 DIAGNOSIS — C91.10 CLL (CHRONIC LYMPHOCYTIC LEUKEMIA): Primary | ICD-10-CM

## 2019-03-15 ENCOUNTER — TELEPHONE (OUTPATIENT)
Dept: PHARMACY | Facility: CLINIC | Age: 84
End: 2019-03-15

## 2019-03-15 DIAGNOSIS — E23.0 PANHYPOPITUITARISM: Primary | ICD-10-CM

## 2019-03-15 DIAGNOSIS — E11.40 TYPE 2 DIABETES, CONTROLLED, WITH NEUROPATHY: ICD-10-CM

## 2019-03-15 NOTE — TELEPHONE ENCOUNTER
Patient returned call to confirm need of Imbruvica refill. He has 10 tablets (5 days) remaining on hand. $0.00 copay in 004. Will ship 3/18 to arrive 3/19. No changes to report but he does mention some severe congestion so he was transferred to a pharmacist to discuss.

## 2019-03-15 NOTE — TELEPHONE ENCOUNTER
"The patient was transferred to me during routine refill call. He reported having a chronic runny nose and sinus pressure that is causing a slight change to vision. Runny nose "pours" at nighttime. At our initial consult in January, the patient reported sinus issues at baseline. He was given a course of Cipro in the past but this did not resolve the issue. Informed him sinusitis has a 11-22% reported incidence but I do not see studies reflecting chronic sinus issues non-related to infection. Unsure if this is related to Imbruvica. He has an appt with his ENT physician in [place] in 1-2 weeks - advised him to discuss further with her. Patient verbalized understanding and had no further questions.  "

## 2019-03-15 NOTE — TELEPHONE ENCOUNTER
----- Message from Dian Chambers sent at 3/15/2019  2:26 PM CDT -----  Contact: self [phone]  ..Needs Advice    Reason for call:        Communication Preference:phone     Additional Information:  Pt was a pt of   haven't been seen since he left pt states he needs a refill on his predniSONE (DELTASONE) 5 MG tablet states it needs to be sent to [facility] - [facility] [address] he is not a pt of  he needs a refill

## 2019-03-16 RX ORDER — PREDNISONE 5 MG/1
5 TABLET ORAL DAILY
Qty: 90 TABLET | Refills: 0 | Status: SHIPPED | OUTPATIENT
Start: 2019-03-16 | End: 2019-04-16

## 2019-03-19 ENCOUNTER — PATIENT MESSAGE (OUTPATIENT)
Dept: INTERNAL MEDICINE | Facility: CLINIC | Age: 84
End: 2019-03-19

## 2019-03-19 RX ORDER — LOSARTAN POTASSIUM 25 MG/1
25 TABLET ORAL DAILY
Qty: 90 TABLET | Refills: 3 | Status: SHIPPED | OUTPATIENT
Start: 2019-03-19 | End: 2019-11-21

## 2019-03-25 ENCOUNTER — OFFICE VISIT (OUTPATIENT)
Dept: OPTOMETRY | Facility: CLINIC | Age: 84
End: 2019-03-25
Payer: MEDICARE

## 2019-03-25 ENCOUNTER — PATIENT MESSAGE (OUTPATIENT)
Dept: HEMATOLOGY/ONCOLOGY | Facility: CLINIC | Age: 84
End: 2019-03-25

## 2019-03-25 DIAGNOSIS — H34.8110 CENTRAL RETINAL VEIN OCCLUSION WITH MACULAR EDEMA OF RIGHT EYE: Primary | ICD-10-CM

## 2019-03-25 PROCEDURE — 92014 PR EYE EXAM, EST PATIENT,COMPREHESV: ICD-10-PCS | Mod: HCNC,S$GLB,, | Performed by: OPTOMETRIST

## 2019-03-25 PROCEDURE — 99999 PR PBB SHADOW E&M-EST. PATIENT-LVL II: ICD-10-PCS | Mod: PBBFAC,HCNC,, | Performed by: OPTOMETRIST

## 2019-03-25 PROCEDURE — 92014 COMPRE OPH EXAM EST PT 1/>: CPT | Mod: HCNC,S$GLB,, | Performed by: OPTOMETRIST

## 2019-03-25 PROCEDURE — 99999 PR PBB SHADOW E&M-EST. PATIENT-LVL II: CPT | Mod: PBBFAC,HCNC,, | Performed by: OPTOMETRIST

## 2019-03-25 NOTE — PROGRESS NOTES
HPI     Blurry va     Pt noticed three weeks ago while looking in the distance that bottom half   of things were blurry or not there. Pt would like prescription for reading   glasses if needed.  Denies flashes/floaters/pain/irritation OU.    Last edited by Annmarie Singh on 3/25/2019  1:41 PM. (History)            Assessment /Plan     For exam results, see Encounter Report.    Central retinal vein occlusion with macular edema of right eye  -Referral Next available     RTC as directed [facility]

## 2019-03-26 ENCOUNTER — LAB VISIT (OUTPATIENT)
Dept: LAB | Facility: HOSPITAL | Age: 84
End: 2019-03-26
Attending: INTERNAL MEDICINE
Payer: MEDICARE

## 2019-03-26 DIAGNOSIS — C91.10 CLL (CHRONIC LYMPHOCYTIC LEUKEMIA): ICD-10-CM

## 2019-03-26 LAB
BASOPHILS # BLD AUTO: 0.07 K/UL (ref 0–0.2)
BASOPHILS NFR BLD: 0.6 % (ref 0–1.9)
DIFFERENTIAL METHOD: ABNORMAL
EOSINOPHIL # BLD AUTO: 0.1 K/UL (ref 0–0.5)
EOSINOPHIL NFR BLD: 0.5 % (ref 0–8)
ERYTHROCYTE [DISTWIDTH] IN BLOOD BY AUTOMATED COUNT: 16.1 % (ref 11.5–14.5)
HCT VFR BLD AUTO: 42.9 % (ref 40–54)
HGB BLD-MCNC: 13.7 G/DL (ref 14–18)
IMM GRANULOCYTES # BLD AUTO: 0.22 K/UL (ref 0–0.04)
IMM GRANULOCYTES NFR BLD AUTO: 1.9 % (ref 0–0.5)
LYMPHOCYTES # BLD AUTO: 6.9 K/UL (ref 1–4.8)
LYMPHOCYTES NFR BLD: 61 % (ref 18–48)
MCH RBC QN AUTO: 32.3 PG (ref 27–31)
MCHC RBC AUTO-ENTMCNC: 31.9 G/DL (ref 32–36)
MCV RBC AUTO: 101 FL (ref 82–98)
MONOCYTES # BLD AUTO: 0.9 K/UL (ref 0.3–1)
MONOCYTES NFR BLD: 7.7 % (ref 4–15)
NEUTROPHILS # BLD AUTO: 3.2 K/UL (ref 1.8–7.7)
NEUTROPHILS NFR BLD: 28.3 % (ref 38–73)
NRBC BLD-RTO: 0 /100 WBC
PLATELET # BLD AUTO: 78 K/UL (ref 150–350)
PMV BLD AUTO: 10.7 FL (ref 9.2–12.9)
RBC # BLD AUTO: 4.24 M/UL (ref 4.6–6.2)
WBC # BLD AUTO: 11.34 K/UL (ref 3.9–12.7)

## 2019-03-26 PROCEDURE — 36415 COLL VENOUS BLD VENIPUNCTURE: CPT | Mod: HCNC,PO

## 2019-03-26 PROCEDURE — 85025 COMPLETE CBC W/AUTO DIFF WBC: CPT | Mod: HCNC

## 2019-03-29 ENCOUNTER — INITIAL CONSULT (OUTPATIENT)
Dept: OPHTHALMOLOGY | Facility: CLINIC | Age: 84
End: 2019-03-29
Payer: MEDICARE

## 2019-03-29 VITALS — DIASTOLIC BLOOD PRESSURE: 52 MMHG | SYSTOLIC BLOOD PRESSURE: 130 MMHG | HEART RATE: 68 BPM

## 2019-03-29 DIAGNOSIS — H34.8112 CENTRAL RETINAL VEIN OCCLUSION OF RIGHT EYE, UNSPECIFIED COMPLICATION STATUS: ICD-10-CM

## 2019-03-29 DIAGNOSIS — H35.371 EPIRETINAL MEMBRANE, RIGHT: ICD-10-CM

## 2019-03-29 DIAGNOSIS — H35.61 RETINAL HEMORRHAGE, RIGHT EYE: Primary | ICD-10-CM

## 2019-03-29 PROCEDURE — 92134 CPTRZ OPH DX IMG PST SGM RTA: CPT | Mod: HCNC,S$GLB,, | Performed by: OPHTHALMOLOGY

## 2019-03-29 PROCEDURE — 92235 FLUORESCEIN ANGRPH MLTIFRAME: CPT | Mod: HCNC,S$GLB,, | Performed by: OPHTHALMOLOGY

## 2019-03-29 PROCEDURE — 92235 FLUORESCEIN ANGIOGRAPHY - OU - BOTH EYES: ICD-10-PCS | Mod: HCNC,S$GLB,, | Performed by: OPHTHALMOLOGY

## 2019-03-29 PROCEDURE — 99999 PR PBB SHADOW E&M-EST. PATIENT-LVL III: CPT | Mod: PBBFAC,HCNC,, | Performed by: OPHTHALMOLOGY

## 2019-03-29 PROCEDURE — 92014 PR EYE EXAM, EST PATIENT,COMPREHESV: ICD-10-PCS | Mod: HCNC,S$GLB,, | Performed by: OPHTHALMOLOGY

## 2019-03-29 PROCEDURE — 92225 PR SPECIAL EYE EXAM, INITIAL: ICD-10-PCS | Mod: HCNC,RT,S$GLB, | Performed by: OPHTHALMOLOGY

## 2019-03-29 PROCEDURE — 99999 PR PBB SHADOW E&M-EST. PATIENT-LVL III: ICD-10-PCS | Mod: PBBFAC,HCNC,, | Performed by: OPHTHALMOLOGY

## 2019-03-29 PROCEDURE — 92225 PR SPECIAL EYE EXAM, INITIAL: CPT | Mod: HCNC,RT,S$GLB, | Performed by: OPHTHALMOLOGY

## 2019-03-29 PROCEDURE — 92014 COMPRE OPH EXAM EST PT 1/>: CPT | Mod: HCNC,S$GLB,, | Performed by: OPHTHALMOLOGY

## 2019-03-29 PROCEDURE — 92134 OCT, RETINA - OU - BOTH EYES: ICD-10-PCS | Mod: HCNC,S$GLB,, | Performed by: OPHTHALMOLOGY

## 2019-03-29 NOTE — LETTER
March 31, 2019      Marcell Monge, OD  [address]  [facility] [address]           [facility]  [address]  [facility] [address]  Phone: [phone]  Fax: [phone]          Patient: Thierry Ramos Jr.   MR Number: [id number]   YOB: 1934   Date of Visit: 3/29/2019       Dear Dr. Marcell Monge:    Thank you for referring Thierry Ramos to me for evaluation. Attached you will find relevant portions of my assessment and plan of care.    If you have questions, please do not hesitate to call me. I look forward to following Thierry Ramos along with you.    Sincerely,    Melvin Ying MD    Enclosure  CC:  No Recipients    If you would like to receive this communication electronically, please contact [email or web address] or [phone] to request more information on [employer] Link access.    For providers and/or their staff who would like to refer a patient to [facility], please contact us through our one-stop-shop provider referral line, [facility], at [phone].    If you feel you have received this communication in error or would no longer like to receive these types of communications, please e-mail [email or web address]

## 2019-03-29 NOTE — PATIENT INSTRUCTIONS

## 2019-03-31 NOTE — PROGRESS NOTES
HPI     DLS 03/25/19  By  Dr. Marcell Monge, OD    84 YO M here today per referral for CRVO w/ ME OD.     Pt states for 3-4 wks notes blur OD and missing vision inf OD.  Thinks   it's getting a little better OD since onset.  Va OS normal.        LBS 78 at 6 a.m. This morning (03/29/19) st     Hemoglobin A1C       Date                     Value               Ref Range             Status                12/06/2018               5.4                 4.0 - 5.6 %           Final                 09/01/2018               5.5                 4.0 - 5.6 %           Final                 09/06/2017               5.8 (H)             4.0 - 5.6 %           Final                   POHx  1. CRVO w/ ME OD    2. S/p mohs repair with mini monoka stent 2/8/17     3. Basal cell carcinoma of eyelid, right ( Dr. Lee)    Last edited by Melvin Ying MD on 3/29/2019  9:15 AM. (History)        [facility] SDOCT:   OD: good quality, good contour, sl ONL thickening, thin ERM, no comparison scan  OS: good quality, good contour, no comparison scan      Assessment /Plan     For exam results, see Encounter Report.    Retinal hemorrhage, right eye  -     OCT, Retina - OU - Both Eyes  -     Flourescein Angiography - OU - Both Eyes    Epiretinal membrane, right    Central retinal vein occlusion of right eye, unspecified complication status      No ischemia, NV, or ME OD  Recommend CV risk factor control and f/u with PMD as instructed    Observe  RTC one month with OCT, sooner PRN

## 2019-04-01 RX ORDER — FAMOTIDINE 10 MG/ML
20 INJECTION INTRAVENOUS
Status: CANCELLED | OUTPATIENT
Start: 2019-04-01

## 2019-04-01 RX ORDER — ACETAMINOPHEN 325 MG/1
650 TABLET ORAL
Status: CANCELLED | OUTPATIENT
Start: 2019-04-01

## 2019-04-01 RX ORDER — HEPARIN 100 UNIT/ML
500 SYRINGE INTRAVENOUS
Status: CANCELLED | OUTPATIENT
Start: 2019-04-01

## 2019-04-01 RX ORDER — SODIUM CHLORIDE 0.9 % (FLUSH) 0.9 %
10 SYRINGE (ML) INJECTION
Status: CANCELLED | OUTPATIENT
Start: 2019-04-01

## 2019-04-02 ENCOUNTER — INFUSION (OUTPATIENT)
Dept: INFUSION THERAPY | Facility: HOSPITAL | Age: 84
End: 2019-04-02
Attending: INTERNAL MEDICINE
Payer: MEDICARE

## 2019-04-02 VITALS
HEIGHT: 71 IN | WEIGHT: 155.44 LBS | RESPIRATION RATE: 18 BRPM | HEART RATE: 60 BPM | TEMPERATURE: 98 F | DIASTOLIC BLOOD PRESSURE: 61 MMHG | SYSTOLIC BLOOD PRESSURE: 144 MMHG | BODY MASS INDEX: 21.76 KG/M2

## 2019-04-02 DIAGNOSIS — C91.10 CLL (CHRONIC LYMPHOCYTIC LEUKEMIA): ICD-10-CM

## 2019-04-02 DIAGNOSIS — J32.9 CHRONIC SINUSITIS, UNSPECIFIED LOCATION: ICD-10-CM

## 2019-04-02 DIAGNOSIS — D80.1 HYPOGAMMAGLOBULINEMIA: Primary | ICD-10-CM

## 2019-04-02 PROCEDURE — 96367 TX/PROPH/DG ADDL SEQ IV INF: CPT | Mod: HCNC

## 2019-04-02 PROCEDURE — 96366 THER/PROPH/DIAG IV INF ADDON: CPT | Mod: HCNC

## 2019-04-02 PROCEDURE — 96365 THER/PROPH/DIAG IV INF INIT: CPT | Mod: HCNC

## 2019-04-02 PROCEDURE — 63600175 PHARM REV CODE 636 W HCPCS: Mod: HCNC | Performed by: INTERNAL MEDICINE

## 2019-04-02 PROCEDURE — S0028 INJECTION, FAMOTIDINE, 20 MG: HCPCS | Mod: HCNC | Performed by: INTERNAL MEDICINE

## 2019-04-02 PROCEDURE — 96376 TX/PRO/DX INJ SAME DRUG ADON: CPT | Mod: HCNC

## 2019-04-02 PROCEDURE — 25000003 PHARM REV CODE 250: Mod: HCNC | Performed by: INTERNAL MEDICINE

## 2019-04-02 RX ORDER — ACETAMINOPHEN 325 MG/1
650 TABLET ORAL
Status: COMPLETED | OUTPATIENT
Start: 2019-04-02 | End: 2019-04-02

## 2019-04-02 RX ORDER — FAMOTIDINE 10 MG/ML
20 INJECTION INTRAVENOUS
Status: COMPLETED | OUTPATIENT
Start: 2019-04-02 | End: 2019-04-02

## 2019-04-02 RX ADMIN — DIPHENHYDRAMINE HYDROCHLORIDE 50 MG: 50 INJECTION, SOLUTION INTRAMUSCULAR; INTRAVENOUS at 08:04

## 2019-04-02 RX ADMIN — HUMAN IMMUNOGLOBULIN G 20 G: 20 LIQUID INTRAVENOUS at 08:04

## 2019-04-02 RX ADMIN — SODIUM CHLORIDE: 9 INJECTION, SOLUTION INTRAVENOUS at 08:04

## 2019-04-02 RX ADMIN — FAMOTIDINE 20 MG: 10 INJECTION, SOLUTION INTRAVENOUS at 08:04

## 2019-04-02 RX ADMIN — ACETAMINOPHEN 650 MG: 325 TABLET ORAL at 08:04

## 2019-04-02 NOTE — PLAN OF CARE
Problem: Adult Inpatient Plan of Care  Goal: Patient-Specific Goal (Individualization)  Outcome: Ongoing (interventions implemented as appropriate)  Patient here for IVIG.  Assessment completed and labs reviewed.  VSS.  No needs at this time.  Chair reclined and blanket offered.  Will continue to monitor.

## 2019-04-02 NOTE — PLAN OF CARE
Problem: Adult Inpatient Plan of Care  Goal: Plan of Care Review  Outcome: Ongoing (interventions implemented as appropriate)  Tolerated infusion well.  No reactions noted.  No questions or concerns at this time.  Ambulated off unit in [facility].

## 2019-04-05 ENCOUNTER — PATIENT MESSAGE (OUTPATIENT)
Dept: ENDOCRINOLOGY | Facility: CLINIC | Age: 84
End: 2019-04-05

## 2019-04-05 ENCOUNTER — LAB VISIT (OUTPATIENT)
Dept: LAB | Facility: HOSPITAL | Age: 84
End: 2019-04-05
Attending: INTERNAL MEDICINE
Payer: MEDICARE

## 2019-04-05 DIAGNOSIS — E23.0 PANHYPOPITUITARISM: ICD-10-CM

## 2019-04-05 DIAGNOSIS — E11.40 TYPE 2 DIABETES, CONTROLLED, WITH NEUROPATHY: ICD-10-CM

## 2019-04-05 LAB
CHOLEST SERPL-MCNC: 99 MG/DL (ref 120–199)
CHOLEST/HDLC SERPL: 2 {RATIO} (ref 2–5)
ESTIMATED AVG GLUCOSE: 114 MG/DL (ref 68–131)
HBA1C MFR BLD HPLC: 5.6 % (ref 4–5.6)
HDLC SERPL-MCNC: 49 MG/DL (ref 40–75)
HDLC SERPL: 49.5 % (ref 20–50)
LDLC SERPL CALC-MCNC: 39.4 MG/DL (ref 63–159)
NONHDLC SERPL-MCNC: 50 MG/DL
T4 FREE SERPL-MCNC: 0.74 NG/DL (ref 0.71–1.51)
TESTOST SERPL-MCNC: 888 NG/DL (ref 304–1227)
TRIGL SERPL-MCNC: 53 MG/DL (ref 30–150)

## 2019-04-05 PROCEDURE — 36415 COLL VENOUS BLD VENIPUNCTURE: CPT | Mod: HCNC,PO

## 2019-04-05 PROCEDURE — 83036 HEMOGLOBIN GLYCOSYLATED A1C: CPT | Mod: HCNC

## 2019-04-05 PROCEDURE — 84403 ASSAY OF TOTAL TESTOSTERONE: CPT | Mod: HCNC

## 2019-04-05 PROCEDURE — 80061 LIPID PANEL: CPT | Mod: HCNC

## 2019-04-05 PROCEDURE — 84439 ASSAY OF FREE THYROXINE: CPT | Mod: HCNC

## 2019-04-09 ENCOUNTER — LAB VISIT (OUTPATIENT)
Dept: LAB | Facility: HOSPITAL | Age: 84
End: 2019-04-09
Attending: INTERNAL MEDICINE
Payer: MEDICARE

## 2019-04-09 ENCOUNTER — TELEPHONE (OUTPATIENT)
Dept: PHARMACY | Facility: CLINIC | Age: 84
End: 2019-04-09

## 2019-04-09 DIAGNOSIS — C91.10 CLL (CHRONIC LYMPHOCYTIC LEUKEMIA): ICD-10-CM

## 2019-04-09 LAB
ANISOCYTOSIS BLD QL SMEAR: SLIGHT
BASOPHILS # BLD AUTO: 0.09 K/UL (ref 0–0.2)
BASOPHILS NFR BLD: 0.7 % (ref 0–1.9)
DIFFERENTIAL METHOD: ABNORMAL
EOSINOPHIL # BLD AUTO: 0.1 K/UL (ref 0–0.5)
EOSINOPHIL NFR BLD: 0.5 % (ref 0–8)
ERYTHROCYTE [DISTWIDTH] IN BLOOD BY AUTOMATED COUNT: 15.3 % (ref 11.5–14.5)
HCT VFR BLD AUTO: 48.2 % (ref 40–54)
HGB BLD-MCNC: 15.3 G/DL (ref 14–18)
IMM GRANULOCYTES # BLD AUTO: 0.21 K/UL (ref 0–0.04)
IMM GRANULOCYTES NFR BLD AUTO: 1.6 % (ref 0–0.5)
LYMPHOCYTES # BLD AUTO: 7.4 K/UL (ref 1–4.8)
LYMPHOCYTES NFR BLD: 57.7 % (ref 18–48)
MCH RBC QN AUTO: 31.9 PG (ref 27–31)
MCHC RBC AUTO-ENTMCNC: 31.7 G/DL (ref 32–36)
MCV RBC AUTO: 100 FL (ref 82–98)
MONOCYTES # BLD AUTO: 0.9 K/UL (ref 0.3–1)
MONOCYTES NFR BLD: 7.1 % (ref 4–15)
NEUTROPHILS # BLD AUTO: 4.1 K/UL (ref 1.8–7.7)
NEUTROPHILS NFR BLD: 32.4 % (ref 38–73)
NRBC BLD-RTO: 0 /100 WBC
PLATELET # BLD AUTO: 103 K/UL (ref 150–350)
PLATELET BLD QL SMEAR: ABNORMAL
PMV BLD AUTO: 11 FL (ref 9.2–12.9)
RBC # BLD AUTO: 4.8 M/UL (ref 4.6–6.2)
WBC # BLD AUTO: 12.82 K/UL (ref 3.9–12.7)

## 2019-04-09 PROCEDURE — 36415 COLL VENOUS BLD VENIPUNCTURE: CPT | Mod: HCNC,PO

## 2019-04-09 PROCEDURE — 85025 COMPLETE CBC W/AUTO DIFF WBC: CPT | Mod: HCNC

## 2019-04-09 NOTE — TELEPHONE ENCOUNTER
Rx call regarding Imbruvica from [facility]. Will ship 4/11 for 4/12 with pt consent. Copay 0.00.  Patient has not started any new medications, no missed doses/ side effects. Patient did have concerns about why he cant touch capsules and was transferred to [facility]. Pt has 6 doses on hand.

## 2019-04-09 NOTE — TELEPHONE ENCOUNTER
Spoke with Mr. Ramos - transferred from routine refill call. He wanted clarification on why he should not touch the Imbruvica. Advised him the medication is intended for him, so it's ok for him to touch Imbruvica but the main concern is other people coming in contact with the medication. Explained differences between PO and IV chemo (targeted vs non-targeted therapy) and advised that the handling precautions often come from potential to cause birth defects and from toxicities associated with similar IV/PO chem medications. Advised him if he or his wife do happen to touch the medication to wash hands well with soap and water. He verbalized understanding.    Patient also asked if Xyzal was safe to take with Imbruvica - no known DDIs

## 2019-04-16 ENCOUNTER — OFFICE VISIT (OUTPATIENT)
Dept: ENDOCRINOLOGY | Facility: CLINIC | Age: 84
End: 2019-04-16
Payer: MEDICARE

## 2019-04-16 VITALS
HEART RATE: 64 BPM | TEMPERATURE: 99 F | SYSTOLIC BLOOD PRESSURE: 120 MMHG | WEIGHT: 151.88 LBS | BODY MASS INDEX: 21.19 KG/M2 | DIASTOLIC BLOOD PRESSURE: 54 MMHG

## 2019-04-16 DIAGNOSIS — E27.49 SECONDARY ADRENAL INSUFFICIENCY: ICD-10-CM

## 2019-04-16 DIAGNOSIS — E03.8 CENTRAL HYPOTHYROIDISM: ICD-10-CM

## 2019-04-16 DIAGNOSIS — E29.1 SECONDARY MALE HYPOGONADISM: Primary | ICD-10-CM

## 2019-04-16 DIAGNOSIS — E23.0 PANHYPOPITUITARISM: ICD-10-CM

## 2019-04-16 DIAGNOSIS — E23.0 HYPOPITUITARISM: ICD-10-CM

## 2019-04-16 PROCEDURE — 99999 PR PBB SHADOW E&M-EST. PATIENT-LVL III: ICD-10-PCS | Mod: PBBFAC,HCNC,, | Performed by: INTERNAL MEDICINE

## 2019-04-16 PROCEDURE — 99499 RISK ADDL DX/OHS AUDIT: ICD-10-PCS | Mod: HCNC,S$GLB,, | Performed by: INTERNAL MEDICINE

## 2019-04-16 PROCEDURE — 3074F SYST BP LT 130 MM HG: CPT | Mod: HCNC,CPTII,S$GLB, | Performed by: INTERNAL MEDICINE

## 2019-04-16 PROCEDURE — 1100F PTFALLS ASSESS-DOCD GE2>/YR: CPT | Mod: HCNC,CPTII,S$GLB, | Performed by: INTERNAL MEDICINE

## 2019-04-16 PROCEDURE — 3078F DIAST BP <80 MM HG: CPT | Mod: HCNC,CPTII,S$GLB, | Performed by: INTERNAL MEDICINE

## 2019-04-16 PROCEDURE — 99499 UNLISTED E&M SERVICE: CPT | Mod: HCNC,S$GLB,, | Performed by: INTERNAL MEDICINE

## 2019-04-16 PROCEDURE — 1100F PR PT FALLS ASSESS DOC 2+ FALLS/FALL W/INJURY/YR: ICD-10-PCS | Mod: HCNC,CPTII,S$GLB, | Performed by: INTERNAL MEDICINE

## 2019-04-16 PROCEDURE — 99214 OFFICE O/P EST MOD 30 MIN: CPT | Mod: HCNC,S$GLB,, | Performed by: INTERNAL MEDICINE

## 2019-04-16 PROCEDURE — 99999 PR PBB SHADOW E&M-EST. PATIENT-LVL III: CPT | Mod: PBBFAC,HCNC,, | Performed by: INTERNAL MEDICINE

## 2019-04-16 PROCEDURE — 3288F FALL RISK ASSESSMENT DOCD: CPT | Mod: HCNC,CPTII,S$GLB, | Performed by: INTERNAL MEDICINE

## 2019-04-16 PROCEDURE — 3288F PR FALLS RISK ASSESSMENT DOCUMENTED: ICD-10-PCS | Mod: HCNC,CPTII,S$GLB, | Performed by: INTERNAL MEDICINE

## 2019-04-16 PROCEDURE — 3074F PR MOST RECENT SYSTOLIC BLOOD PRESSURE < 130 MM HG: ICD-10-PCS | Mod: HCNC,CPTII,S$GLB, | Performed by: INTERNAL MEDICINE

## 2019-04-16 PROCEDURE — 99214 PR OFFICE/OUTPT VISIT, EST, LEVL IV, 30-39 MIN: ICD-10-PCS | Mod: HCNC,S$GLB,, | Performed by: INTERNAL MEDICINE

## 2019-04-16 PROCEDURE — 3078F PR MOST RECENT DIASTOLIC BLOOD PRESSURE < 80 MM HG: ICD-10-PCS | Mod: HCNC,CPTII,S$GLB, | Performed by: INTERNAL MEDICINE

## 2019-04-16 NOTE — PROGRESS NOTES
Subjective:      Chief Complaint: Follow-up for panhypopituitarism and type 2 diabetes mellitus    HPI: Thierry Ramos Jr. is a 85 y.o. male who is here for a follow-up evaluation for panhypopituitarism and type 2 diabetes mellitus     Previously seen by , new to me    With regards to pituitary tumor:  Diagnosed with pituitary tumor in July, 2002 - had an MRI and went to surgery the next day.   S/p open resection and post-op gamma knife therapy  Last MRI in 2014 - No sign of residual tumor.  Original path not available, but I couldn't find any documentation of hormone overproduction related to the adenoma in the old records.    Here for follow up of Panhypopituitarism.      He is currently taking Prednisone 7.5 mg daily (5 + 2.5) taken in the AM. Per Dr. Orr's notes, dose reduction was attempted, but resulted in two syncopal episodes, although it's unclear if they were related to adrenal insufficiency.  For Hypothyroidism - he is taking Levothyroxine 125 mcg daily   For hypogonadism - he is taking T cypionate 200 mg/mL - 1/2 mL every 2 weeks. But wife giving closer to 0.4 mL each week. Concerned about loss of strength and energy with reduction in dose    He denies headaches  Denies vision changes.    Overall doing well since last visit. Has CLL and on treatment with oral chemo which he seems to be tolerating well    He also has type 2 diabetes mellitus: diet controlled  Previously taking levemir but stopped due to low A1c  Checking glucose daily with all values in low 100's off all therapy    Lab Results   Component Value Date    HGBA1C 5.6 04/05/2019        ADA standards of care:   ACEi/ARB: losartan  Statin: lipitor  Also taking Plavix and ASA     He also has CLL being treated by oncology and multiple skin cancers s/p resection    Reviewed past medical, family, social history and updated as appropriate.    Review of Systems   Constitutional: Negative for unexpected weight change.   Eyes: Negative  for visual disturbance.   Respiratory: Negative for shortness of breath.    Cardiovascular: Negative for chest pain.   Gastrointestinal: Negative for abdominal pain.   Genitourinary: Negative for urgency.   Musculoskeletal: Negative for arthralgias.   Skin: Positive for wound.   Neurological: Negative for light-headedness and headaches.   Hematological: Bruises/bleeds easily.   Psychiatric/Behavioral: Negative for sleep disturbance.     Objective:     Vitals:    04/16/19 1055   BP: (!) 120/54   Pulse: 64   Temp: 98.5 °F (36.9 °C)       Physical Exam   Constitutional: He is oriented to person, place, and time. He appears well-developed.   HENT:   Right Ear: External ear normal.   Left Ear: External ear normal.   Nose: Nose normal.   Hearing grossly normal  Dentition grossly normal   Eyes: Conjunctivae are normal. Right eye exhibits no discharge. Left eye exhibits no discharge.   Cardiovascular: Normal rate.   No murmur heard.  Pulmonary/Chest: Effort normal and breath sounds normal.   Abdominal: Soft. He exhibits no mass. There is no tenderness.   Musculoskeletal: He exhibits no edema.   No digital clubbing or extremity cyanosis   Neurological: He is alert and oriented to person, place, and time. Coordination normal.   Skin: No rash noted.   Thin skin.  Multiple ecchymoses on bilateral arms.   Psychiatric: He has a normal mood and affect. His behavior is normal.   Alert and oriented to person, place, and situation.   Nursing note and vitals reviewed.      Wt Readings from Last 10 Encounters:   04/02/19 0800 70.5 kg (155 lb 6.8 oz)   03/12/19 0704 70.5 kg (155 lb 6.8 oz)   03/06/19 0700 71.2 kg (156 lb 15.5 oz)   02/15/19 0945 70.8 kg (156 lb 1.4 oz)   01/10/19 0841 70.4 kg (155 lb 3.3 oz)   01/08/19 0854 68.1 kg (150 lb 2.1 oz)   12/19/18 1435 68 kg (150 lb)   12/11/18 0814 68 kg (150 lb)   12/06/18 0827 69.9 kg (154 lb 1.6 oz)   11/28/18 1316 71.7 kg (158 lb)       Lab Results   Component Value Date    HGBA1C 5.6  04/05/2019     Lab Results   Component Value Date    CHOL 99 (L) 04/05/2019    HDL 49 04/05/2019    LDLCALC 39.4 (L) 04/05/2019    TRIG 53 04/05/2019    CHOLHDL 49.5 04/05/2019     Lab Results   Component Value Date     03/04/2019    K 4.5 03/04/2019     03/04/2019    CO2 26 03/04/2019    GLU 99 03/04/2019    BUN 28 (H) 03/04/2019    CREATININE 1.0 03/04/2019    CALCIUM 9.4 03/04/2019    PROT 5.9 (L) 03/04/2019    ALBUMIN 3.4 (L) 03/04/2019    BILITOT 1.0 03/04/2019    ALKPHOS 82 03/04/2019    AST 44 (H) 03/04/2019    ALT 37 03/04/2019    ANIONGAP 9 03/04/2019    ESTGFRAFRICA >60.0 03/04/2019    EGFRNONAA >60.0 03/04/2019    TSH 0.058 (L) 09/01/2018      Lab Results   Component Value Date    MICALBCREAT 27.8 04/05/2019     DXA 9/2018: normal BMD      Assessment/Plan:     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 Panhypopituitarism    3. Central hypothyroidism    4. Hypopituitarism    5. Type 2 diabetes, uncontrolled, with neuropathy    6. Secondary adrenal insufficiency      Panhypopituitarism  2/2 resection of pituitary adenoma (reportedly) s/p gamma knife treatment  See individual hormone deficiencies    Secondary male hypogonadism  Currently on 100 mg depotest q 2 weeks  Recent testosterone above goal at upper end of normal range so will decrease testosterone to 75 mg (0.375 mL) every 2 weeks  Repeat testosterone at midpoint between injections in 1-2 months      Prostate cancer screening   PSA unchanged 10/2018     Secondary adrenal insufficiency  Continue prednisone 7.5 mg daily  Had syncope after previous attempts to wean dose     Discussed sick day precautions  Has med alert tag  Emergency dex kit prescribed - discussed situations where use would be appropriate and to call 911 if a dose is needed     DXA 2018 with normal BMD, no need to repeat in near future unless clinical change     Central hypothyroidism  Last FT4 at goal.  Continue LT4 125 mcg daily      Type 2 diabetes, controlled, with  neuropathy  Diet controlled, off all therapy  A1c normal  Recommended glucose monitoring no more often than weekly and if stable over period of months can likely discontinue altogether    RTC in 1 year    Cee Farmer MD

## 2019-04-23 ENCOUNTER — LAB VISIT (OUTPATIENT)
Dept: LAB | Facility: HOSPITAL | Age: 84
End: 2019-04-23
Attending: INTERNAL MEDICINE
Payer: MEDICARE

## 2019-04-23 DIAGNOSIS — C91.10 CLL (CHRONIC LYMPHOCYTIC LEUKEMIA): ICD-10-CM

## 2019-04-23 LAB
BASOPHILS # BLD AUTO: 0.1 K/UL (ref 0–0.2)
BASOPHILS NFR BLD: 0.9 % (ref 0–1.9)
DIFFERENTIAL METHOD: ABNORMAL
EOSINOPHIL # BLD AUTO: 0.1 K/UL (ref 0–0.5)
EOSINOPHIL NFR BLD: 0.9 % (ref 0–8)
ERYTHROCYTE [DISTWIDTH] IN BLOOD BY AUTOMATED COUNT: 14.6 % (ref 11.5–14.5)
HCT VFR BLD AUTO: 43.9 % (ref 40–54)
HGB BLD-MCNC: 13.9 G/DL (ref 14–18)
IMM GRANULOCYTES # BLD AUTO: 0.3 K/UL (ref 0–0.04)
IMM GRANULOCYTES NFR BLD AUTO: 2.8 % (ref 0–0.5)
LYMPHOCYTES # BLD AUTO: 5.6 K/UL (ref 1–4.8)
LYMPHOCYTES NFR BLD: 52.5 % (ref 18–48)
MCH RBC QN AUTO: 32.1 PG (ref 27–31)
MCHC RBC AUTO-ENTMCNC: 31.7 G/DL (ref 32–36)
MCV RBC AUTO: 101 FL (ref 82–98)
MONOCYTES # BLD AUTO: 0.8 K/UL (ref 0.3–1)
MONOCYTES NFR BLD: 7.8 % (ref 4–15)
NEUTROPHILS # BLD AUTO: 3.8 K/UL (ref 1.8–7.7)
NEUTROPHILS NFR BLD: 35.1 % (ref 38–73)
NRBC BLD-RTO: 0 /100 WBC
PLATELET # BLD AUTO: 95 K/UL (ref 150–350)
PMV BLD AUTO: 11.2 FL (ref 9.2–12.9)
RBC # BLD AUTO: 4.33 M/UL (ref 4.6–6.2)
WBC # BLD AUTO: 10.72 K/UL (ref 3.9–12.7)

## 2019-04-23 PROCEDURE — 36415 COLL VENOUS BLD VENIPUNCTURE: CPT | Mod: HCNC,PO

## 2019-04-23 PROCEDURE — 85025 COMPLETE CBC W/AUTO DIFF WBC: CPT | Mod: HCNC

## 2019-05-03 ENCOUNTER — TELEPHONE (OUTPATIENT)
Dept: PHARMACY | Facility: CLINIC | Age: 84
End: 2019-05-03

## 2019-05-07 ENCOUNTER — LAB VISIT (OUTPATIENT)
Dept: LAB | Facility: HOSPITAL | Age: 84
End: 2019-05-07
Attending: INTERNAL MEDICINE
Payer: MEDICARE

## 2019-05-07 DIAGNOSIS — C91.10 CLL (CHRONIC LYMPHOCYTIC LEUKEMIA): ICD-10-CM

## 2019-05-07 LAB
BASOPHILS # BLD AUTO: 0.08 K/UL (ref 0–0.2)
BASOPHILS NFR BLD: 0.8 % (ref 0–1.9)
DIFFERENTIAL METHOD: ABNORMAL
EOSINOPHIL # BLD AUTO: 0.1 K/UL (ref 0–0.5)
EOSINOPHIL NFR BLD: 1.2 % (ref 0–8)
ERYTHROCYTE [DISTWIDTH] IN BLOOD BY AUTOMATED COUNT: 14.6 % (ref 11.5–14.5)
HCT VFR BLD AUTO: 42.9 % (ref 40–54)
HGB BLD-MCNC: 13.4 G/DL (ref 14–18)
IMM GRANULOCYTES # BLD AUTO: 0.25 K/UL (ref 0–0.04)
IMM GRANULOCYTES NFR BLD AUTO: 2.5 % (ref 0–0.5)
LYMPHOCYTES # BLD AUTO: 5.3 K/UL (ref 1–4.8)
LYMPHOCYTES NFR BLD: 53.9 % (ref 18–48)
MCH RBC QN AUTO: 31.5 PG (ref 27–31)
MCHC RBC AUTO-ENTMCNC: 31.2 G/DL (ref 32–36)
MCV RBC AUTO: 101 FL (ref 82–98)
MONOCYTES # BLD AUTO: 0.8 K/UL (ref 0.3–1)
MONOCYTES NFR BLD: 7.7 % (ref 4–15)
NEUTROPHILS # BLD AUTO: 3.3 K/UL (ref 1.8–7.7)
NEUTROPHILS NFR BLD: 33.9 % (ref 38–73)
NRBC BLD-RTO: 0 /100 WBC
PLATELET # BLD AUTO: 91 K/UL (ref 150–350)
PMV BLD AUTO: 10.8 FL (ref 9.2–12.9)
RBC # BLD AUTO: 4.25 M/UL (ref 4.6–6.2)
WBC # BLD AUTO: 9.88 K/UL (ref 3.9–12.7)

## 2019-05-07 PROCEDURE — 36415 COLL VENOUS BLD VENIPUNCTURE: CPT | Mod: HCNC,PO

## 2019-05-07 PROCEDURE — 85025 COMPLETE CBC W/AUTO DIFF WBC: CPT | Mod: HCNC

## 2019-05-09 ENCOUNTER — OFFICE VISIT (OUTPATIENT)
Dept: OPHTHALMOLOGY | Facility: CLINIC | Age: 84
End: 2019-05-09
Payer: MEDICARE

## 2019-05-09 VITALS — SYSTOLIC BLOOD PRESSURE: 145 MMHG | HEART RATE: 57 BPM | DIASTOLIC BLOOD PRESSURE: 57 MMHG

## 2019-05-09 DIAGNOSIS — H04.123 DRY EYE SYNDROME OF BOTH EYES: ICD-10-CM

## 2019-05-09 DIAGNOSIS — H34.8112 CENTRAL RETINAL VEIN OCCLUSION OF RIGHT EYE, UNSPECIFIED COMPLICATION STATUS: Primary | ICD-10-CM

## 2019-05-09 DIAGNOSIS — H35.371 EPIRETINAL MEMBRANE, RIGHT: ICD-10-CM

## 2019-05-09 DIAGNOSIS — H01.00A BLEPHARITIS OF UPPER AND LOWER EYELIDS OF BOTH EYES, UNSPECIFIED TYPE: ICD-10-CM

## 2019-05-09 DIAGNOSIS — H01.00B BLEPHARITIS OF UPPER AND LOWER EYELIDS OF BOTH EYES, UNSPECIFIED TYPE: ICD-10-CM

## 2019-05-09 PROCEDURE — 99999 PR PBB SHADOW E&M-EST. PATIENT-LVL III: ICD-10-PCS | Mod: PBBFAC,HCNC,, | Performed by: OPHTHALMOLOGY

## 2019-05-09 PROCEDURE — 92226 PR SPECIAL EYE EXAM, SUBSEQUENT: CPT | Mod: HCNC,RT,S$GLB, | Performed by: OPHTHALMOLOGY

## 2019-05-09 PROCEDURE — 92226 PR SPECIAL EYE EXAM, SUBSEQUENT: ICD-10-PCS | Mod: HCNC,RT,S$GLB, | Performed by: OPHTHALMOLOGY

## 2019-05-09 PROCEDURE — 92012 INTRM OPH EXAM EST PATIENT: CPT | Mod: HCNC,S$GLB,, | Performed by: OPHTHALMOLOGY

## 2019-05-09 PROCEDURE — 92012 PR EYE EXAM, EST PATIENT,INTERMED: ICD-10-PCS | Mod: HCNC,S$GLB,, | Performed by: OPHTHALMOLOGY

## 2019-05-09 PROCEDURE — 92134 CPTRZ OPH DX IMG PST SGM RTA: CPT | Mod: HCNC,S$GLB,, | Performed by: OPHTHALMOLOGY

## 2019-05-09 PROCEDURE — 92134 POSTERIOR SEGMENT OCT RETINA (OCULAR COHERENCE TOMOGRAPHY)-BOTH EYES: ICD-10-PCS | Mod: HCNC,S$GLB,, | Performed by: OPHTHALMOLOGY

## 2019-05-09 PROCEDURE — 99999 PR PBB SHADOW E&M-EST. PATIENT-LVL III: CPT | Mod: PBBFAC,HCNC,, | Performed by: OPHTHALMOLOGY

## 2019-05-09 RX ORDER — ERYTHROMYCIN 5 MG/G
OINTMENT OPHTHALMIC
Qty: 1 TUBE | Refills: 6 | Status: SHIPPED | OUTPATIENT
Start: 2019-05-09 | End: 2020-04-02

## 2019-05-09 NOTE — PROGRESS NOTES
Subjective:       Patient ID: Thierry Ramos Jr. is a 85 y.o. male      Chief Complaint   Patient presents with    Macular Degeneration     History of Present Illness  HPI     DLS 03/29/19 ----Dr Ying     1 mon ck     Pt noticed that  DVA is stable but is having problem with NVA OU----not   using glasses due to MFIOL.  Notes that OD is worse than OS.  Says he was   seeing better right after CE/IOL  -eye pain OU  -flashes or floaters OU           POHx  1. CRVO w/ ME OD      S/p Eylea OD (3.25.19)  2. S/p mohs repair with mini [place] stent 2/8/17     3. Basal cell carcinoma of eyelid, right ( Dr. Lee)    Last edited by Melvin Ying MD on 5/9/2019  8:35 AM. (History)        Imaging:    See report    Assessment/Plan:     1. Central retinal vein occlusion of right eye, unspecified complication status  Still no ME or NV  Recommend continued obs  - Posterior Segment OCT Retina-Both eyes  - Posterior Segment OCT Retina-Both eyes; Future    2. Epiretinal membrane, right  Recommend to observe for now  - Posterior Segment OCT Retina-Both eyes  - Posterior Segment OCT Retina-Both eyes; Future    3. Blepharitis of upper and lower eyelids of both eyes, unspecified type  May be affecting Va.  OD does not closed well sec to prior lid surgery  Ilo HS/HS  ATs 4/4  Hot compressed  F/u with Dr Monge.  Recommend refraction as will unlikely treat OD    4. Dry eye syndrome of both eyes  See #3    Follow up in about 2 months (around 7/9/2019), or if symptoms worsen or fail to improve, for Dilated examination, OCT Mac.

## 2019-05-14 ENCOUNTER — PATIENT MESSAGE (OUTPATIENT)
Dept: ENDOCRINOLOGY | Facility: CLINIC | Age: 84
End: 2019-05-14

## 2019-05-14 ENCOUNTER — OFFICE VISIT (OUTPATIENT)
Dept: HEMATOLOGY/ONCOLOGY | Facility: CLINIC | Age: 84
End: 2019-05-14
Payer: MEDICARE

## 2019-05-14 ENCOUNTER — LAB VISIT (OUTPATIENT)
Dept: LAB | Facility: HOSPITAL | Age: 84
End: 2019-05-14
Attending: INTERNAL MEDICINE
Payer: MEDICARE

## 2019-05-14 VITALS
HEIGHT: 71 IN | OXYGEN SATURATION: 99 % | BODY MASS INDEX: 21.91 KG/M2 | TEMPERATURE: 98 F | SYSTOLIC BLOOD PRESSURE: 150 MMHG | HEART RATE: 64 BPM | DIASTOLIC BLOOD PRESSURE: 69 MMHG | WEIGHT: 156.5 LBS

## 2019-05-14 DIAGNOSIS — C91.10 CLL (CHRONIC LYMPHOCYTIC LEUKEMIA): Primary | ICD-10-CM

## 2019-05-14 DIAGNOSIS — C91.10 CLL (CHRONIC LYMPHOCYTIC LEUKEMIA): ICD-10-CM

## 2019-05-14 DIAGNOSIS — E29.1 SECONDARY MALE HYPOGONADISM: Primary | ICD-10-CM

## 2019-05-14 DIAGNOSIS — D80.1 HYPOGAMMAGLOBULINEMIA: ICD-10-CM

## 2019-05-14 DIAGNOSIS — E29.1 SECONDARY MALE HYPOGONADISM: ICD-10-CM

## 2019-05-14 LAB
ALBUMIN SERPL BCP-MCNC: 2.9 G/DL (ref 3.5–5.2)
ALP SERPL-CCNC: 84 U/L (ref 55–135)
ALT SERPL W/O P-5'-P-CCNC: 24 U/L (ref 10–44)
ANION GAP SERPL CALC-SCNC: 6 MMOL/L (ref 8–16)
AST SERPL-CCNC: 33 U/L (ref 10–40)
BASOPHILS # BLD AUTO: 0.08 K/UL (ref 0–0.2)
BASOPHILS NFR BLD: 0.9 % (ref 0–1.9)
BILIRUB SERPL-MCNC: 0.9 MG/DL (ref 0.1–1)
BUN SERPL-MCNC: 22 MG/DL (ref 8–23)
CALCIUM SERPL-MCNC: 9.3 MG/DL (ref 8.7–10.5)
CHLORIDE SERPL-SCNC: 103 MMOL/L (ref 95–110)
CO2 SERPL-SCNC: 30 MMOL/L (ref 23–29)
CREAT SERPL-MCNC: 1 MG/DL (ref 0.5–1.4)
DIFFERENTIAL METHOD: ABNORMAL
EOSINOPHIL # BLD AUTO: 0.2 K/UL (ref 0–0.5)
EOSINOPHIL NFR BLD: 1.8 % (ref 0–8)
ERYTHROCYTE [DISTWIDTH] IN BLOOD BY AUTOMATED COUNT: 13.9 % (ref 11.5–14.5)
EST. GFR  (AFRICAN AMERICAN): >60 ML/MIN/1.73 M^2
EST. GFR  (NON AFRICAN AMERICAN): >60 ML/MIN/1.73 M^2
GLUCOSE SERPL-MCNC: 89 MG/DL (ref 70–110)
HCT VFR BLD AUTO: 44.8 % (ref 40–54)
HGB BLD-MCNC: 14.3 G/DL (ref 14–18)
IGG SERPL-MCNC: 370 MG/DL (ref 650–1600)
IMM GRANULOCYTES # BLD AUTO: 0.27 K/UL (ref 0–0.04)
IMM GRANULOCYTES NFR BLD AUTO: 3.2 % (ref 0–0.5)
LDH SERPL L TO P-CCNC: 209 U/L (ref 110–260)
LYMPHOCYTES # BLD AUTO: 3.8 K/UL (ref 1–4.8)
LYMPHOCYTES NFR BLD: 45.3 % (ref 18–48)
MCH RBC QN AUTO: 31.2 PG (ref 27–31)
MCHC RBC AUTO-ENTMCNC: 31.9 G/DL (ref 32–36)
MCV RBC AUTO: 98 FL (ref 82–98)
MONOCYTES # BLD AUTO: 0.7 K/UL (ref 0.3–1)
MONOCYTES NFR BLD: 8.7 % (ref 4–15)
NEUTROPHILS # BLD AUTO: 3.4 K/UL (ref 1.8–7.7)
NEUTROPHILS NFR BLD: 40.1 % (ref 38–73)
NRBC BLD-RTO: 0 /100 WBC
PLATELET # BLD AUTO: 111 K/UL (ref 150–350)
PMV BLD AUTO: 10.7 FL (ref 9.2–12.9)
POTASSIUM SERPL-SCNC: 4.1 MMOL/L (ref 3.5–5.1)
PROT SERPL-MCNC: 5.3 G/DL (ref 6–8.4)
RBC # BLD AUTO: 4.59 M/UL (ref 4.6–6.2)
SODIUM SERPL-SCNC: 139 MMOL/L (ref 136–145)
TESTOST SERPL-MCNC: 931 NG/DL (ref 304–1227)
WBC # BLD AUTO: 8.48 K/UL (ref 3.9–12.7)

## 2019-05-14 PROCEDURE — 3078F DIAST BP <80 MM HG: CPT | Mod: HCNC,CPTII,S$GLB, | Performed by: INTERNAL MEDICINE

## 2019-05-14 PROCEDURE — 83615 LACTATE (LD) (LDH) ENZYME: CPT | Mod: HCNC

## 2019-05-14 PROCEDURE — 82784 ASSAY IGA/IGD/IGG/IGM EACH: CPT | Mod: HCNC

## 2019-05-14 PROCEDURE — 3077F SYST BP >= 140 MM HG: CPT | Mod: HCNC,CPTII,S$GLB, | Performed by: INTERNAL MEDICINE

## 2019-05-14 PROCEDURE — 99999 PR PBB SHADOW E&M-EST. PATIENT-LVL III: CPT | Mod: PBBFAC,HCNC,, | Performed by: INTERNAL MEDICINE

## 2019-05-14 PROCEDURE — 99999 PR PBB SHADOW E&M-EST. PATIENT-LVL III: ICD-10-PCS | Mod: PBBFAC,HCNC,, | Performed by: INTERNAL MEDICINE

## 2019-05-14 PROCEDURE — 85025 COMPLETE CBC W/AUTO DIFF WBC: CPT | Mod: HCNC

## 2019-05-14 PROCEDURE — 3077F PR MOST RECENT SYSTOLIC BLOOD PRESSURE >= 140 MM HG: ICD-10-PCS | Mod: HCNC,CPTII,S$GLB, | Performed by: INTERNAL MEDICINE

## 2019-05-14 PROCEDURE — 99215 PR OFFICE/OUTPT VISIT, EST, LEVL V, 40-54 MIN: ICD-10-PCS | Mod: HCNC,S$GLB,, | Performed by: INTERNAL MEDICINE

## 2019-05-14 PROCEDURE — 84403 ASSAY OF TOTAL TESTOSTERONE: CPT | Mod: HCNC

## 2019-05-14 PROCEDURE — 99499 RISK ADDL DX/OHS AUDIT: ICD-10-PCS | Mod: HCNC,S$GLB,, | Performed by: INTERNAL MEDICINE

## 2019-05-14 PROCEDURE — 1101F PT FALLS ASSESS-DOCD LE1/YR: CPT | Mod: HCNC,CPTII,S$GLB, | Performed by: INTERNAL MEDICINE

## 2019-05-14 PROCEDURE — 99215 OFFICE O/P EST HI 40 MIN: CPT | Mod: HCNC,S$GLB,, | Performed by: INTERNAL MEDICINE

## 2019-05-14 PROCEDURE — 80053 COMPREHEN METABOLIC PANEL: CPT | Mod: HCNC

## 2019-05-14 PROCEDURE — 99499 UNLISTED E&M SERVICE: CPT | Mod: HCNC,S$GLB,, | Performed by: INTERNAL MEDICINE

## 2019-05-14 PROCEDURE — 1101F PR PT FALLS ASSESS DOC 0-1 FALLS W/OUT INJ PAST YR: ICD-10-PCS | Mod: HCNC,CPTII,S$GLB, | Performed by: INTERNAL MEDICINE

## 2019-05-14 PROCEDURE — 36415 COLL VENOUS BLD VENIPUNCTURE: CPT | Mod: HCNC

## 2019-05-14 PROCEDURE — 3078F PR MOST RECENT DIASTOLIC BLOOD PRESSURE < 80 MM HG: ICD-10-PCS | Mod: HCNC,CPTII,S$GLB, | Performed by: INTERNAL MEDICINE

## 2019-05-14 RX ORDER — TESTOSTERONE CYPIONATE 1000 MG/10ML
50 INJECTION, SOLUTION INTRAMUSCULAR
Qty: 10 ML | Refills: 5 | Status: SHIPPED | OUTPATIENT
Start: 2019-05-14 | End: 2020-01-14 | Stop reason: SDUPTHER

## 2019-05-14 NOTE — Clinical Note
CBC every other weekReturn visit in 3 months with CBC, CMP and LDH and IgGNeeds IVIG infusion Monthly- needs dose in May

## 2019-05-14 NOTE — PROGRESS NOTES
Subjective:       Patient ID: Thierry Ramos Jr. is a 85 y.o. male.    Chief Complaint: CLL (chronic lymphocytic leukemia)    Mr. Ramos has a history of CLL diagnosed in April 2012 by Dr Joaquin Wooten. In April 2013, there was an abrupt rise in his white blood cell count to 93,000 and he had a modest number of prolymphocytes in his peripheral smear.  He had generalized urticaria, which was thought to be paraneoplastic.  At that time, he was treated with 5 courses of fludarabine, cyclophosphamide and Rituxan.  The fludarabine doses were decreased somewhat because of mild renal impairment.  The sixth treatment was with Rituxan alone because he developed thrombocytopenia, which has persisted in varying degrees since then.     His major hematologic problem and much of his treatment since 2013, has been related to thrombocytopenia.  It became more severe in early 2016, at a time when he was taking both aspirin and Plavix.  A bone marrow examination had no evidence of myelodysplasia and 35% of the bone marrow cells were small lymphocytes.  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  The FISH studies for myelodysplastic disorders were negative.     Dr. 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     Subsequently, because of continued thrombocytopenia,  recommended treatment with ofatumumab, which he began in late June 2016.  At that time, his platelet count was 57,000.  He generally tolerated the drug well and the frequency of administration was gradually reduced over a period of more than 2 years.  Platelet counts during most of those 2 years were between 66,000-117,000.  The drug was discontinued in early November 2018.     He has had many  episodes of infection in the past, particularly recurrent sinusitis and he has had several surgical procedures on his sinuses.  Because of this, he has been receiving intravenous immunoglobulin monthly for years and   the frequency of pneumonia and severe sinusitis has diminished. Most recent dose of IVIG was 3/6/19.      After a pituitary surgery for a tumor in 2002, he experienced a TIA and then had 2 strokes.  He has had many nonmelanoma skin cancers excised.     He asked to be seen earlier than planned by Dr. Wooten in January because he was concerned about his recent blood counts.  His platelet count has declined modestly from the range of 66,000-107,000 seen in the past 9 months to a current value of 51,000.  His   white blood cell count remains normal and his lymphocyte percentage this week has been 61% and 45%.    He was started on a low dose ibrutinib 280 mg rather than 420mg due to concern for treatment related- further decrease in platelet count while on aspirin and plavix.     He has had no change in his activity level which includes daily jump rope, 125 pushups, 15 minutes on the stationary bike. He has not noted any enlarged lymph nodes. He and his wife are installing ivone after water damage at their home.    CBC and CMP are stable. IGG is low at 370; last dose of IVIG was 4/2019        HPI  Review of Systems   Constitutional: Negative.    HENT: Negative.    Eyes: Negative.    Respiratory: Negative.    Cardiovascular: Negative.    Gastrointestinal: Negative.    Endocrine: Negative.    Genitourinary: Negative.    Skin: Negative.    Allergic/Immunologic: Negative for environmental allergies, food allergies and immunocompromised state.   Neurological: Negative.    Hematological: Negative for adenopathy. Bruises/bleeds easily.   Psychiatric/Behavioral: Negative.        Objective:      Physical Exam   Constitutional: He is oriented to person, place, and time. He appears well-developed and well-nourished.    HENT:   Head: Normocephalic and atraumatic.   Eyes: Conjunctivae are normal. No scleral icterus.   Neck: Normal range of motion. Neck supple.   Cardiovascular: Normal rate and intact distal pulses.   Pulmonary/Chest: Effort normal. No respiratory distress.   Abdominal: Soft. He exhibits no distension. There is no tenderness.   Musculoskeletal: Normal range of motion. He exhibits no edema.   Neurological: He is alert and oriented to person, place, and time. No cranial nerve deficit.   Skin: Skin is warm and dry. Ecchymosis noted.   Psychiatric: He has a normal mood and affect. His behavior is normal.   Nursing note and vitals reviewed.      Assessment:       1. CLL (chronic lymphocytic leukemia)    2. Hypogammaglobulinemia        Plan:       CLL  Continue ibrutinib 280mg daily. CBC is stable.  Schedule for IVIG for May 2019  CBC every other week  Return visit in 3 months with CBC, CMP and LDH and IgG   No pertinent past medical history

## 2019-05-17 ENCOUNTER — INFUSION (OUTPATIENT)
Dept: INFUSION THERAPY | Facility: HOSPITAL | Age: 84
End: 2019-05-17
Attending: INTERNAL MEDICINE
Payer: MEDICARE

## 2019-05-17 VITALS
HEIGHT: 71 IN | DIASTOLIC BLOOD PRESSURE: 66 MMHG | WEIGHT: 156.5 LBS | HEART RATE: 61 BPM | RESPIRATION RATE: 18 BRPM | TEMPERATURE: 98 F | SYSTOLIC BLOOD PRESSURE: 143 MMHG | BODY MASS INDEX: 21.91 KG/M2

## 2019-05-17 DIAGNOSIS — J32.9 CHRONIC SINUSITIS, UNSPECIFIED LOCATION: ICD-10-CM

## 2019-05-17 DIAGNOSIS — C91.10 CLL (CHRONIC LYMPHOCYTIC LEUKEMIA): ICD-10-CM

## 2019-05-17 DIAGNOSIS — D80.1 HYPOGAMMAGLOBULINEMIA: Primary | ICD-10-CM

## 2019-05-17 PROCEDURE — 25000003 PHARM REV CODE 250: Mod: HCNC | Performed by: INTERNAL MEDICINE

## 2019-05-17 PROCEDURE — 96367 TX/PROPH/DG ADDL SEQ IV INF: CPT | Mod: HCNC

## 2019-05-17 PROCEDURE — 96366 THER/PROPH/DIAG IV INF ADDON: CPT | Mod: HCNC

## 2019-05-17 PROCEDURE — 96376 TX/PRO/DX INJ SAME DRUG ADON: CPT | Mod: HCNC

## 2019-05-17 PROCEDURE — S0028 INJECTION, FAMOTIDINE, 20 MG: HCPCS | Mod: HCNC | Performed by: INTERNAL MEDICINE

## 2019-05-17 PROCEDURE — 63600175 PHARM REV CODE 636 W HCPCS: Mod: HCNC,JG | Performed by: INTERNAL MEDICINE

## 2019-05-17 PROCEDURE — 96365 THER/PROPH/DIAG IV INF INIT: CPT | Mod: HCNC

## 2019-05-17 RX ORDER — ACETAMINOPHEN 325 MG/1
650 TABLET ORAL
Status: COMPLETED | OUTPATIENT
Start: 2019-05-17 | End: 2019-05-17

## 2019-05-17 RX ORDER — SODIUM CHLORIDE 0.9 % (FLUSH) 0.9 %
10 SYRINGE (ML) INJECTION
Status: CANCELLED | OUTPATIENT
Start: 2019-05-17

## 2019-05-17 RX ORDER — FAMOTIDINE 10 MG/ML
20 INJECTION INTRAVENOUS
Status: COMPLETED | OUTPATIENT
Start: 2019-05-17 | End: 2019-05-17

## 2019-05-17 RX ORDER — ACETAMINOPHEN 325 MG/1
650 TABLET ORAL
Status: CANCELLED | OUTPATIENT
Start: 2019-05-17

## 2019-05-17 RX ORDER — HEPARIN 100 UNIT/ML
500 SYRINGE INTRAVENOUS
Status: CANCELLED | OUTPATIENT
Start: 2019-05-17

## 2019-05-17 RX ORDER — FAMOTIDINE 10 MG/ML
20 INJECTION INTRAVENOUS
Status: CANCELLED | OUTPATIENT
Start: 2019-05-17

## 2019-05-17 RX ADMIN — DIPHENHYDRAMINE HYDROCHLORIDE 50 MG: 50 INJECTION, SOLUTION INTRAMUSCULAR; INTRAVENOUS at 09:05

## 2019-05-17 RX ADMIN — ACETAMINOPHEN 650 MG: 325 TABLET ORAL at 09:05

## 2019-05-17 RX ADMIN — FAMOTIDINE 20 MG: 10 INJECTION, SOLUTION INTRAVENOUS at 09:05

## 2019-05-17 RX ADMIN — HUMAN IMMUNOGLOBULIN G 20 G: 20 LIQUID INTRAVENOUS at 09:05

## 2019-05-17 NOTE — PLAN OF CARE
Problem: Adult Inpatient Plan of Care  Goal: Plan of Care Review  Outcome: Ongoing (interventions implemented as appropriate)  Tolerated infusion well.  No reactions noted.  No questions or concerns at this time.  Ambulated off unit in [facility].

## 2019-05-23 ENCOUNTER — OFFICE VISIT (OUTPATIENT)
Dept: DERMATOLOGY | Facility: CLINIC | Age: 84
End: 2019-05-23
Payer: MEDICARE

## 2019-05-23 VITALS — BODY MASS INDEX: 21.76 KG/M2 | WEIGHT: 156 LBS

## 2019-05-23 DIAGNOSIS — L57.0 MULTIPLE ACTINIC KERATOSES: Primary | ICD-10-CM

## 2019-05-23 DIAGNOSIS — Z85.828 HISTORY OF SKIN CANCER: ICD-10-CM

## 2019-05-23 DIAGNOSIS — L82.1 SEBORRHEIC KERATOSES: ICD-10-CM

## 2019-05-23 PROCEDURE — 99999 PR PBB SHADOW E&M-EST. PATIENT-LVL III: ICD-10-PCS | Mod: PBBFAC,HCNC,, | Performed by: DERMATOLOGY

## 2019-05-23 PROCEDURE — 99213 OFFICE O/P EST LOW 20 MIN: CPT | Mod: 25,HCNC,S$GLB, | Performed by: DERMATOLOGY

## 2019-05-23 PROCEDURE — 99213 PR OFFICE/OUTPT VISIT, EST, LEVL III, 20-29 MIN: ICD-10-PCS | Mod: 25,HCNC,S$GLB, | Performed by: DERMATOLOGY

## 2019-05-23 PROCEDURE — 1101F PT FALLS ASSESS-DOCD LE1/YR: CPT | Mod: HCNC,CPTII,S$GLB, | Performed by: DERMATOLOGY

## 2019-05-23 PROCEDURE — 99999 PR PBB SHADOW E&M-EST. PATIENT-LVL III: CPT | Mod: PBBFAC,HCNC,, | Performed by: DERMATOLOGY

## 2019-05-23 PROCEDURE — 17004 PR DESTRUCTION, PREMALIGNANT LESIONS; 15 OR MORE LESIONS: ICD-10-PCS | Mod: HCNC,S$GLB,, | Performed by: DERMATOLOGY

## 2019-05-23 PROCEDURE — 17004 DESTROY PREMAL LESIONS 15/>: CPT | Mod: HCNC,S$GLB,, | Performed by: DERMATOLOGY

## 2019-05-23 PROCEDURE — 1101F PR PT FALLS ASSESS DOC 0-1 FALLS W/OUT INJ PAST YR: ICD-10-PCS | Mod: HCNC,CPTII,S$GLB, | Performed by: DERMATOLOGY

## 2019-05-23 RX ORDER — PANTOPRAZOLE SODIUM 40 MG/1
TABLET, DELAYED RELEASE ORAL
Qty: 90 TABLET | Refills: 3 | Status: ON HOLD | OUTPATIENT
Start: 2019-05-23 | End: 2019-07-25 | Stop reason: SDUPTHER

## 2019-05-23 RX ORDER — PREDNISONE 5 MG/1
TABLET ORAL
Qty: 90 TABLET | Refills: 0 | Status: ON HOLD | OUTPATIENT
Start: 2019-05-23 | End: 2019-07-25 | Stop reason: HOSPADM

## 2019-05-23 RX ORDER — CLOPIDOGREL BISULFATE 75 MG/1
TABLET ORAL
Qty: 90 TABLET | Refills: 3 | Status: ON HOLD | OUTPATIENT
Start: 2019-05-23 | End: 2019-07-25 | Stop reason: HOSPADM

## 2019-05-23 RX ORDER — ATORVASTATIN CALCIUM 20 MG/1
TABLET, FILM COATED ORAL
Qty: 90 TABLET | Refills: 3 | Status: SHIPPED | OUTPATIENT
Start: 2019-05-23 | End: 2020-04-01

## 2019-05-23 NOTE — PROGRESS NOTES
Subjective:       Patient ID:  Thierry Ramos Jr. is a 85 y.o. male who presents for   Chief Complaint   Patient presents with    Skin Check     UBSE     This is a high risk patient here to check for the development of new lesions.  Pt with CLL.    Lesion  - Initial  Affected locations: scalp, face, left arm and right arm  Signs / symptoms: asymptomatic  Aggravated by: nothing  Relieving factors/Treatments tried: nothing        Review of Systems   Constitutional: Negative for fever, chills, weight loss, weight gain, fatigue and malaise.   Skin: Positive for wears hat. Negative for activity-related sunscreen use.   Hematologic/Lymphatic: Bruises/bleeds easily.        Objective:    Physical Exam   Constitutional: He appears well-developed and well-nourished. No distress.   Neurological: He is alert and oriented to person, place, and time. He is not disoriented.   Psychiatric: He has a normal mood and affect.   Skin:   Areas Examined (abnormalities noted in diagram):   Scalp / Hair Palpated and Inspected  Head / Face Inspection Performed  Neck Inspection Performed  Chest / Axilla Inspection Performed  Genitals / Buttocks / Groin Inspection Performed  Back Inspection Performed  RUE Inspected  LUE Inspection Performed                           Diagram Legend     Erythematous scaling macule/papule c/w actinic keratosis       Vascular papule c/w angioma      Pigmented verrucoid papule/plaque c/w seborrheic keratosis      Yellow umbilicated papule c/w sebaceous hyperplasia      Irregularly shaped tan macule c/w lentigo     1-2 mm smooth white papules consistent with Milia      Movable subcutaneous cyst with punctum c/w epidermal inclusion cyst      Subcutaneous movable cyst c/w pilar cyst      Firm pink to brown papule c/w dermatofibroma      Pedunculated fleshy papule(s) c/w skin tag(s)      Evenly pigmented macule c/w junctional nevus     Mildly variegated pigmented, slightly irregular-bordered macule c/w mildly  atypical nevus      Flesh colored to evenly pigmented papule c/w intradermal nevus       Pink pearly papule/plaque c/w basal cell carcinoma      Erythematous hyperkeratotic cursted plaque c/w SCC      Surgical scar with no sign of skin cancer recurrence      Open and closed comedones      Inflammatory papules and pustules      Verrucoid papule consistent consistent with wart     Erythematous eczematous patches and plaques     Dystrophic onycholytic nail with subungual debris c/w onychomycosis     Umbilicated papule    Erythematous-base heme-crusted tan verrucoid plaque consistent with inflamed seborrheic keratosis     Erythematous Silvery Scaling Plaque c/w Psoriasis     See annotation      Assessment / Plan:        Multiple actinic keratoses   Cryosurgery Procedure Note    Verbal consent from the patient is obtained and the patient is aware of the precancerous quality and need for treatment of these lesions. Liquid nitrogen cryosurgery is applied to the 16 actinic keratoses, as detailed in the physical exam, to produce a freeze injury.    History of skin cancers  mnmsc    Seborrheic keratoses  reassurance               Follow up in about 3 months (around 8/23/2019).

## 2019-05-27 ENCOUNTER — OFFICE VISIT (OUTPATIENT)
Dept: OPTOMETRY | Facility: CLINIC | Age: 84
End: 2019-05-27
Payer: MEDICARE

## 2019-05-27 DIAGNOSIS — H52.13 MYOPIA OF BOTH EYES: ICD-10-CM

## 2019-05-27 DIAGNOSIS — H16.223 KERATOCONJUNCTIVITIS SICCA, NOT SPECIFIED AS SJÖGREN'S, BILATERAL: Primary | ICD-10-CM

## 2019-05-27 PROCEDURE — 92014 COMPRE OPH EXAM EST PT 1/>: CPT | Mod: HCNC,S$GLB,, | Performed by: OPTOMETRIST

## 2019-05-27 PROCEDURE — 92014 PR EYE EXAM, EST PATIENT,COMPREHESV: ICD-10-PCS | Mod: HCNC,S$GLB,, | Performed by: OPTOMETRIST

## 2019-05-27 PROCEDURE — 99999 PR PBB SHADOW E&M-EST. PATIENT-LVL II: CPT | Mod: PBBFAC,HCNC,, | Performed by: OPTOMETRIST

## 2019-05-27 PROCEDURE — 99999 PR PBB SHADOW E&M-EST. PATIENT-LVL II: ICD-10-PCS | Mod: PBBFAC,HCNC,, | Performed by: OPTOMETRIST

## 2019-05-27 RX ORDER — CYCLOSPORINE 0.5 MG/ML
1 EMULSION OPHTHALMIC 2 TIMES DAILY
Qty: 180 EACH | Refills: 3 | Status: SHIPPED | OUTPATIENT
Start: 2019-05-27 | End: 2020-05-26

## 2019-05-27 NOTE — PROGRESS NOTES
HPI     Patient is  Here for refraction OU, patient is used to perfect vision   explained the reason why he is here today.   He's open to see how clear vision with glasses, but unsure if that's what   he wants.  Patient has extreme dry eyes.    Refresh QD-QID, Erythromycin QHS with little relief     Last edited by Marcell Monge, OD on 5/27/2019  8:06 AM. (History)            Assessment /Plan     For exam results, see Encounter Report.    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  Dispense: 180 each; Refill: 3  -counseled 20 mins on impact of tears on VA.  LASIK, MFIOL and vision loss 2/2 CRVO OD  -Start Restasis BID, Switch ATs to lipid base (Refresh ADV or Systane Complete)  -reviewed chronic nature    Myopia of both eyes  Eyeglass Final Rx     Eyeglass Final Rx       Sphere Cylinder Axis Dist VA Add    Right -1.00 Sphere  20/40- +2.50    Left -0.25 +0.50 180 20/25 +2.50    Expiration Date:  5/27/2020              No significant benefit to sRx. Best to hold and see if improved VA with dry eye regimen     RTC as directed OMD

## 2019-05-28 ENCOUNTER — LAB VISIT (OUTPATIENT)
Dept: LAB | Facility: HOSPITAL | Age: 84
End: 2019-05-28
Attending: INTERNAL MEDICINE
Payer: MEDICARE

## 2019-05-28 ENCOUNTER — TELEPHONE (OUTPATIENT)
Dept: PHARMACY | Facility: CLINIC | Age: 84
End: 2019-05-28

## 2019-05-28 DIAGNOSIS — C91.10 CLL (CHRONIC LYMPHOCYTIC LEUKEMIA): ICD-10-CM

## 2019-05-28 LAB
BASOPHILS # BLD AUTO: 0.08 K/UL (ref 0–0.2)
BASOPHILS NFR BLD: 0.8 % (ref 0–1.9)
DIFFERENTIAL METHOD: ABNORMAL
EOSINOPHIL # BLD AUTO: 0 K/UL (ref 0–0.5)
EOSINOPHIL NFR BLD: 0.1 % (ref 0–8)
ERYTHROCYTE [DISTWIDTH] IN BLOOD BY AUTOMATED COUNT: 13.7 % (ref 11.5–14.5)
HCT VFR BLD AUTO: 43.5 % (ref 40–54)
HGB BLD-MCNC: 13.9 G/DL (ref 14–18)
IMM GRANULOCYTES # BLD AUTO: 0.25 K/UL (ref 0–0.04)
IMM GRANULOCYTES NFR BLD AUTO: 2.6 % (ref 0–0.5)
LYMPHOCYTES # BLD AUTO: 4 K/UL (ref 1–4.8)
LYMPHOCYTES NFR BLD: 41.1 % (ref 18–48)
MCH RBC QN AUTO: 31 PG (ref 27–31)
MCHC RBC AUTO-ENTMCNC: 32 G/DL (ref 32–36)
MCV RBC AUTO: 97 FL (ref 82–98)
MONOCYTES # BLD AUTO: 0.6 K/UL (ref 0.3–1)
MONOCYTES NFR BLD: 5.7 % (ref 4–15)
NEUTROPHILS # BLD AUTO: 4.9 K/UL (ref 1.8–7.7)
NEUTROPHILS NFR BLD: 49.7 % (ref 38–73)
NRBC BLD-RTO: 0 /100 WBC
PLATELET # BLD AUTO: 96 K/UL (ref 150–350)
PMV BLD AUTO: 10.6 FL (ref 9.2–12.9)
RBC # BLD AUTO: 4.49 M/UL (ref 4.6–6.2)
WBC # BLD AUTO: 9.8 K/UL (ref 3.9–12.7)

## 2019-05-28 PROCEDURE — 85025 COMPLETE CBC W/AUTO DIFF WBC: CPT | Mod: HCNC

## 2019-05-28 PROCEDURE — 36415 COLL VENOUS BLD VENIPUNCTURE: CPT | Mod: HCNC,PO

## 2019-05-28 NOTE — TELEPHONE ENCOUNTER
Called patient for refill and follow up, and he states that he has #43 capsules on hand. Patient says he may have missed counted as he should have an even amount since he takes 2 capsules once daily. Adamantly denies missing any doses. Based on this count, patient has about 21 days on hand. He did mention that he does not usually pull out the pills to count as he knows he is not supposed to touch the pills directly. previous dose counts could have been an estimate of how many he has on hand and not an exact count. will follow up in a couple weeks to complete follow up and refill. Will check adherence at that time as well.

## 2019-06-03 ENCOUNTER — PES CALL (OUTPATIENT)
Dept: ADMINISTRATIVE | Facility: CLINIC | Age: 84
End: 2019-06-03

## 2019-06-11 ENCOUNTER — LAB VISIT (OUTPATIENT)
Dept: LAB | Facility: HOSPITAL | Age: 84
End: 2019-06-11
Attending: INTERNAL MEDICINE
Payer: MEDICARE

## 2019-06-11 DIAGNOSIS — C91.10 CLL (CHRONIC LYMPHOCYTIC LEUKEMIA): ICD-10-CM

## 2019-06-11 LAB
BASOPHILS # BLD AUTO: 0.03 K/UL (ref 0–0.2)
BASOPHILS NFR BLD: 0.3 % (ref 0–1.9)
DIFFERENTIAL METHOD: ABNORMAL
EOSINOPHIL # BLD AUTO: 0.1 K/UL (ref 0–0.5)
EOSINOPHIL NFR BLD: 0.5 % (ref 0–8)
ERYTHROCYTE [DISTWIDTH] IN BLOOD BY AUTOMATED COUNT: 14.2 % (ref 11.5–14.5)
HCT VFR BLD AUTO: 45.3 % (ref 40–54)
HGB BLD-MCNC: 14.3 G/DL (ref 14–18)
IMM GRANULOCYTES # BLD AUTO: 0.46 K/UL (ref 0–0.04)
IMM GRANULOCYTES NFR BLD AUTO: 4 % (ref 0–0.5)
LYMPHOCYTES # BLD AUTO: 3.8 K/UL (ref 1–4.8)
LYMPHOCYTES NFR BLD: 33.1 % (ref 18–48)
MCH RBC QN AUTO: 30.8 PG (ref 27–31)
MCHC RBC AUTO-ENTMCNC: 31.6 G/DL (ref 32–36)
MCV RBC AUTO: 97 FL (ref 82–98)
MONOCYTES # BLD AUTO: 1.2 K/UL (ref 0.3–1)
MONOCYTES NFR BLD: 10.7 % (ref 4–15)
NEUTROPHILS # BLD AUTO: 5.9 K/UL (ref 1.8–7.7)
NEUTROPHILS NFR BLD: 51.4 % (ref 38–73)
NRBC BLD-RTO: 0 /100 WBC
PLATELET # BLD AUTO: 105 K/UL (ref 150–350)
PLATELET BLD QL SMEAR: ABNORMAL
PMV BLD AUTO: 10.7 FL (ref 9.2–12.9)
RBC # BLD AUTO: 4.65 M/UL (ref 4.6–6.2)
WBC # BLD AUTO: 11.49 K/UL (ref 3.9–12.7)

## 2019-06-11 PROCEDURE — 36415 COLL VENOUS BLD VENIPUNCTURE: CPT | Mod: HCNC,PO

## 2019-06-11 PROCEDURE — 85025 COMPLETE CBC W/AUTO DIFF WBC: CPT | Mod: HCNC

## 2019-06-12 ENCOUNTER — TELEPHONE (OUTPATIENT)
Dept: PHARMACY | Facility: CLINIC | Age: 84
End: 2019-06-12

## 2019-06-13 NOTE — TELEPHONE ENCOUNTER
Contacted patient in regards to Imbruvica refill and clinical follow up. The patient counted he has 14 capsules remaining (7 day supply). Will ship refill on 6/17 for the patient to receive 6/18. $0 copay, address confirmed. No changes in medications, allergies, health conditions or missed doses.    Administration: Patient confirms taking medication as prescribed: Imbruvica 140mg - 2 capsules once daily. He avoids GF/GF juice and oranges. Discussed the recommendation is only to avoid [place] oranges, does not need to avoid all oranges. The patient is in a consistent routine of taking each morning. Proper chemo handling procedures are followed.  Adherence: Patient denies missed doses of his Imbruvica in the past month.  Side effects/disease state management: Patient denies the majority of side effects including diarrhea, fatigue, dyspnea, N/V or rash. He did mention he has had peripheral edema for about three months.  Per my notes in January, the patient had edema at baseline however he stated this is new for him. He believes it is pitting. We discussed management strategies including propping feet above heart level and decreasing salt intake. The patient eats very healthy and has a sodium-free diet. We reviewed s/sx CV event including sudden chest pain, sharp headache, changes in vision, etc. The patient verbalized understanding and denied occurrence of any of these. He will see internist on 6/21 and will discuss edema. Also recommended for him to reach out to provider/[facility] if edema worsens.  He also reported having weakness in his legs but denied any falls or loss of balance.  Pain levels: No pain.  Energy levels: The patient continues to perform his exercises every morning including simulated jump rope, stationary bike, free weights, push ups and jogging in place. He continues to spend time with wife and enjoys playing computer games.    The patient continues to have sinus complications that are mostly unchanged  since baseline. He takes daily Allegra and frequently does sinus washes. A pharmacist will continue to follow up with the patient every three months or as clinically appropriate. Encouraged patient to contact [facility] with any questions/concerns.

## 2019-06-14 ENCOUNTER — INFUSION (OUTPATIENT)
Dept: INFUSION THERAPY | Facility: HOSPITAL | Age: 84
End: 2019-06-14
Attending: INTERNAL MEDICINE
Payer: MEDICARE

## 2019-06-14 VITALS
SYSTOLIC BLOOD PRESSURE: 108 MMHG | TEMPERATURE: 98 F | RESPIRATION RATE: 18 BRPM | DIASTOLIC BLOOD PRESSURE: 54 MMHG | WEIGHT: 156.5 LBS | BODY MASS INDEX: 21.91 KG/M2 | HEART RATE: 70 BPM | HEIGHT: 71 IN

## 2019-06-14 DIAGNOSIS — D80.1 HYPOGAMMAGLOBULINEMIA: Primary | ICD-10-CM

## 2019-06-14 DIAGNOSIS — C91.10 CLL (CHRONIC LYMPHOCYTIC LEUKEMIA): ICD-10-CM

## 2019-06-14 DIAGNOSIS — J32.9 CHRONIC SINUSITIS, UNSPECIFIED LOCATION: ICD-10-CM

## 2019-06-14 PROCEDURE — 25000003 PHARM REV CODE 250: Mod: HCNC | Performed by: INTERNAL MEDICINE

## 2019-06-14 PROCEDURE — 96375 TX/PRO/DX INJ NEW DRUG ADDON: CPT | Mod: HCNC

## 2019-06-14 PROCEDURE — 63600175 PHARM REV CODE 636 W HCPCS: Mod: HCNC | Performed by: INTERNAL MEDICINE

## 2019-06-14 PROCEDURE — 96366 THER/PROPH/DIAG IV INF ADDON: CPT | Mod: HCNC

## 2019-06-14 PROCEDURE — 96365 THER/PROPH/DIAG IV INF INIT: CPT | Mod: HCNC

## 2019-06-14 PROCEDURE — 96367 TX/PROPH/DG ADDL SEQ IV INF: CPT | Mod: HCNC

## 2019-06-14 PROCEDURE — S0028 INJECTION, FAMOTIDINE, 20 MG: HCPCS | Mod: HCNC | Performed by: INTERNAL MEDICINE

## 2019-06-14 RX ORDER — SODIUM CHLORIDE 0.9 % (FLUSH) 0.9 %
10 SYRINGE (ML) INJECTION
Status: CANCELLED | OUTPATIENT
Start: 2019-06-14

## 2019-06-14 RX ORDER — HEPARIN 100 UNIT/ML
500 SYRINGE INTRAVENOUS
Status: CANCELLED | OUTPATIENT
Start: 2019-06-14

## 2019-06-14 RX ORDER — HEPARIN 100 UNIT/ML
500 SYRINGE INTRAVENOUS
Status: DISCONTINUED | OUTPATIENT
Start: 2019-06-14 | End: 2019-06-14 | Stop reason: HOSPADM

## 2019-06-14 RX ORDER — FAMOTIDINE 10 MG/ML
20 INJECTION INTRAVENOUS
Status: COMPLETED | OUTPATIENT
Start: 2019-06-14 | End: 2019-06-14

## 2019-06-14 RX ORDER — SODIUM CHLORIDE 0.9 % (FLUSH) 0.9 %
10 SYRINGE (ML) INJECTION
Status: DISCONTINUED | OUTPATIENT
Start: 2019-06-14 | End: 2019-06-14 | Stop reason: HOSPADM

## 2019-06-14 RX ORDER — ACETAMINOPHEN 325 MG/1
650 TABLET ORAL
Status: COMPLETED | OUTPATIENT
Start: 2019-06-14 | End: 2019-06-14

## 2019-06-14 RX ORDER — FAMOTIDINE 10 MG/ML
20 INJECTION INTRAVENOUS
Status: CANCELLED | OUTPATIENT
Start: 2019-06-14

## 2019-06-14 RX ORDER — ACETAMINOPHEN 325 MG/1
650 TABLET ORAL
Status: CANCELLED | OUTPATIENT
Start: 2019-06-14

## 2019-06-14 RX ADMIN — DIPHENHYDRAMINE HYDROCHLORIDE 50 MG: 50 INJECTION, SOLUTION INTRAMUSCULAR; INTRAVENOUS at 09:06

## 2019-06-14 RX ADMIN — ACETAMINOPHEN 650 MG: 325 TABLET ORAL at 09:06

## 2019-06-14 RX ADMIN — SODIUM CHLORIDE: 9 INJECTION, SOLUTION INTRAVENOUS at 09:06

## 2019-06-14 RX ADMIN — HUMAN IMMUNOGLOBULIN G 20 G: 20 LIQUID INTRAVENOUS at 10:06

## 2019-06-14 RX ADMIN — FAMOTIDINE 20 MG: 10 INJECTION, SOLUTION INTRAVENOUS at 09:06

## 2019-06-14 NOTE — PLAN OF CARE
Problem: Adult Inpatient Plan of Care  Goal: Plan of Care Review  Outcome: Ongoing (interventions implemented as appropriate)  1240:  Patient tolerated IVIG infusion well, VSS, NAD noted, denies any changes.  PIV removed intact, AVS declined.  Patient released in stable condition, ambulated off unit accompanied by his wife.

## 2019-06-21 ENCOUNTER — OFFICE VISIT (OUTPATIENT)
Dept: INTERNAL MEDICINE | Facility: CLINIC | Age: 84
End: 2019-06-21
Payer: MEDICARE

## 2019-06-21 VITALS
SYSTOLIC BLOOD PRESSURE: 114 MMHG | BODY MASS INDEX: 22.07 KG/M2 | DIASTOLIC BLOOD PRESSURE: 46 MMHG | WEIGHT: 157.63 LBS | HEIGHT: 71 IN | HEART RATE: 60 BPM | TEMPERATURE: 98 F

## 2019-06-21 DIAGNOSIS — R60.0 BILATERAL LEG EDEMA: Primary | ICD-10-CM

## 2019-06-21 DIAGNOSIS — C91.10 CLL (CHRONIC LYMPHOCYTIC LEUKEMIA): ICD-10-CM

## 2019-06-21 DIAGNOSIS — D69.6 THROMBOCYTOPENIA: ICD-10-CM

## 2019-06-21 DIAGNOSIS — E23.0 PANHYPOPITUITARISM: ICD-10-CM

## 2019-06-21 DIAGNOSIS — R53.1 WEAKNESS: ICD-10-CM

## 2019-06-21 PROCEDURE — 1101F PR PT FALLS ASSESS DOC 0-1 FALLS W/OUT INJ PAST YR: ICD-10-PCS | Mod: HCNC,CPTII,S$GLB, | Performed by: INTERNAL MEDICINE

## 2019-06-21 PROCEDURE — 99499 RISK ADDL DX/OHS AUDIT: ICD-10-PCS | Mod: HCNC,S$GLB,, | Performed by: INTERNAL MEDICINE

## 2019-06-21 PROCEDURE — 99214 PR OFFICE/OUTPT VISIT, EST, LEVL IV, 30-39 MIN: ICD-10-PCS | Mod: HCNC,S$GLB,, | Performed by: INTERNAL MEDICINE

## 2019-06-21 PROCEDURE — 99999 PR PBB SHADOW E&M-EST. PATIENT-LVL IV: CPT | Mod: PBBFAC,HCNC,, | Performed by: INTERNAL MEDICINE

## 2019-06-21 PROCEDURE — 3074F SYST BP LT 130 MM HG: CPT | Mod: HCNC,CPTII,S$GLB, | Performed by: INTERNAL MEDICINE

## 2019-06-21 PROCEDURE — 3074F PR MOST RECENT SYSTOLIC BLOOD PRESSURE < 130 MM HG: ICD-10-PCS | Mod: HCNC,CPTII,S$GLB, | Performed by: INTERNAL MEDICINE

## 2019-06-21 PROCEDURE — 99999 PR PBB SHADOW E&M-EST. PATIENT-LVL IV: ICD-10-PCS | Mod: PBBFAC,HCNC,, | Performed by: INTERNAL MEDICINE

## 2019-06-21 PROCEDURE — 99214 OFFICE O/P EST MOD 30 MIN: CPT | Mod: HCNC,S$GLB,, | Performed by: INTERNAL MEDICINE

## 2019-06-21 PROCEDURE — 3078F PR MOST RECENT DIASTOLIC BLOOD PRESSURE < 80 MM HG: ICD-10-PCS | Mod: HCNC,CPTII,S$GLB, | Performed by: INTERNAL MEDICINE

## 2019-06-21 PROCEDURE — 1101F PT FALLS ASSESS-DOCD LE1/YR: CPT | Mod: HCNC,CPTII,S$GLB, | Performed by: INTERNAL MEDICINE

## 2019-06-21 PROCEDURE — 3078F DIAST BP <80 MM HG: CPT | Mod: HCNC,CPTII,S$GLB, | Performed by: INTERNAL MEDICINE

## 2019-06-21 PROCEDURE — 99499 UNLISTED E&M SERVICE: CPT | Mod: HCNC,S$GLB,, | Performed by: INTERNAL MEDICINE

## 2019-06-21 RX ORDER — TESTOSTERONE CYPIONATE 200 MG/ML
INJECTION, SOLUTION INTRAMUSCULAR
COMMUNITY
Start: 2019-05-23 | End: 2019-09-19 | Stop reason: SDUPTHER

## 2019-06-21 NOTE — PROGRESS NOTES
Chief history of present illness:  85-year-old gentleman in today with wife primary complaint of recent increase in low lower leg swelling. He has had this for a lesser degree for some time but recently seems worse.  He is not describing any shortness of breath cough PND orthopnea.  No chest pain palpitations syncope presyncope.  No claudications symptomatology.  No new activities.  He has been less active in recent month or 2 due to increased fatigue.  He was started on Ibrutinib per Hematology Oncology a few months ago.  This is for CLL and thrombocytopenia.    Current medications:  All medications are noted and reviewed in the electronic medical record medication list.    Review of systems:  No fever no chills.  General fatigue.  HEENT:  No hoarseness no dysphagia.  Respiratory:  No cough shortness of breath.  Cardiovascular:  No chest pain palpitations syncope presyncope claudication.  See HPI.  GI:  He did have some nausea and a couple of episodes of vomiting yesterday but no abdominal pain. Has resolved.  No changes in bowel habits.  No diarrhea.  No melena no hematochezia.  :  No change in the color character of his urine.  Neurologic:  Denies any new focal neurological symptomatologies.    Past medical history, past surgical history, family medical history social history is are all noted and reviewed in the electronic medical record history sections.    Physical examination:  General:  Alert male no acute distress.  Vital signs:  Noted reviewed is normal.  Eyes:  Sclerae white not icteric.  HEENT:  Mouth pharynx normal no thrush.  Neck supple no masses no thyromegaly.  Lungs:  Clear to auscultation.  Cardiovascular:  Regular rate and rhythm. No significant murmur.  Carotids full bilaterally bruits.  No JVD. There is 1 to 2+ edema distal lower extremities.  No ischemic changes.  GI:  The abdomen is soft benign no masses no tenderness organomegaly.  Mental status:  Alert oriented affect mood all generally.   The appropriate.    Data:  He has had recent lab data which included chemistry profile, TSH, CBC.  Reasonable though his albumin is a bit low.  He also had a 2D echocardiogram in September 2018 which was essentially normal.    Impression:  Recent fluctuating increased lower extremity edema probably multifactorial.  Could be related to his medication-Ibrutinib.  Hypoalbuminemia may be contributing as well.  Recent decrease activity.  CLL with thrombocytopenia.  Diet-controlled diabetes mellitus.  Pan hypopituitarism on supplementation.    Plan:  The patient has laboratory data ordered for June 25th 2019 I will add a brain natriuretic peptide.  Otherwise we will continue to observe and if worsens or new symptoms develop he should contact the office.  Encouraged in improve nutrition regarding her protein intake etc.

## 2019-06-25 ENCOUNTER — LAB VISIT (OUTPATIENT)
Dept: LAB | Facility: HOSPITAL | Age: 84
End: 2019-06-25
Attending: INTERNAL MEDICINE
Payer: MEDICARE

## 2019-06-25 DIAGNOSIS — C91.10 CLL (CHRONIC LYMPHOCYTIC LEUKEMIA): ICD-10-CM

## 2019-06-25 DIAGNOSIS — R60.0 BILATERAL LEG EDEMA: ICD-10-CM

## 2019-06-25 LAB
BASOPHILS # BLD AUTO: 0.1 K/UL (ref 0–0.2)
BASOPHILS NFR BLD: 0.9 % (ref 0–1.9)
BNP SERPL-MCNC: 59 PG/ML (ref 0–99)
DIFFERENTIAL METHOD: ABNORMAL
EOSINOPHIL # BLD AUTO: 0.1 K/UL (ref 0–0.5)
EOSINOPHIL NFR BLD: 1.2 % (ref 0–8)
ERYTHROCYTE [DISTWIDTH] IN BLOOD BY AUTOMATED COUNT: 14.6 % (ref 11.5–14.5)
HCT VFR BLD AUTO: 44.8 % (ref 40–54)
HGB BLD-MCNC: 14.3 G/DL (ref 14–18)
IMM GRANULOCYTES # BLD AUTO: 0.33 K/UL (ref 0–0.04)
IMM GRANULOCYTES NFR BLD AUTO: 2.9 % (ref 0–0.5)
LYMPHOCYTES # BLD AUTO: 5 K/UL (ref 1–4.8)
LYMPHOCYTES NFR BLD: 44.4 % (ref 18–48)
MCH RBC QN AUTO: 30.4 PG (ref 27–31)
MCHC RBC AUTO-ENTMCNC: 31.9 G/DL (ref 32–36)
MCV RBC AUTO: 95 FL (ref 82–98)
MONOCYTES # BLD AUTO: 1 K/UL (ref 0.3–1)
MONOCYTES NFR BLD: 9.1 % (ref 4–15)
NEUTROPHILS # BLD AUTO: 4.7 K/UL (ref 1.8–7.7)
NEUTROPHILS NFR BLD: 41.5 % (ref 38–73)
NRBC BLD-RTO: 0 /100 WBC
PLATELET # BLD AUTO: 106 K/UL (ref 150–350)
PMV BLD AUTO: 11 FL (ref 9.2–12.9)
RBC # BLD AUTO: 4.7 M/UL (ref 4.6–6.2)
WBC # BLD AUTO: 11.27 K/UL (ref 3.9–12.7)

## 2019-06-25 PROCEDURE — 36415 COLL VENOUS BLD VENIPUNCTURE: CPT | Mod: HCNC,PO

## 2019-06-25 PROCEDURE — 85025 COMPLETE CBC W/AUTO DIFF WBC: CPT | Mod: HCNC

## 2019-06-25 PROCEDURE — 83880 ASSAY OF NATRIURETIC PEPTIDE: CPT | Mod: HCNC

## 2019-06-26 ENCOUNTER — PES CALL (OUTPATIENT)
Dept: ADMINISTRATIVE | Facility: CLINIC | Age: 84
End: 2019-06-26

## 2019-07-05 ENCOUNTER — PATIENT MESSAGE (OUTPATIENT)
Dept: HEMATOLOGY/ONCOLOGY | Facility: CLINIC | Age: 84
End: 2019-07-05

## 2019-07-08 ENCOUNTER — OFFICE VISIT (OUTPATIENT)
Dept: HEMATOLOGY/ONCOLOGY | Facility: CLINIC | Age: 84
End: 2019-07-08
Payer: MEDICARE

## 2019-07-08 ENCOUNTER — PATIENT MESSAGE (OUTPATIENT)
Dept: HEMATOLOGY/ONCOLOGY | Facility: CLINIC | Age: 84
End: 2019-07-08

## 2019-07-08 ENCOUNTER — LAB VISIT (OUTPATIENT)
Dept: LAB | Facility: HOSPITAL | Age: 84
End: 2019-07-08
Attending: INTERNAL MEDICINE
Payer: MEDICARE

## 2019-07-08 VITALS
SYSTOLIC BLOOD PRESSURE: 152 MMHG | BODY MASS INDEX: 22.01 KG/M2 | HEART RATE: 60 BPM | OXYGEN SATURATION: 98 % | DIASTOLIC BLOOD PRESSURE: 63 MMHG | WEIGHT: 157.19 LBS | HEIGHT: 71 IN | TEMPERATURE: 98 F | RESPIRATION RATE: 18 BRPM

## 2019-07-08 DIAGNOSIS — R53.1 WEAKNESS: ICD-10-CM

## 2019-07-08 DIAGNOSIS — E87.5 HYPERKALEMIA: ICD-10-CM

## 2019-07-08 DIAGNOSIS — C91.10 CLL (CHRONIC LYMPHOCYTIC LEUKEMIA): ICD-10-CM

## 2019-07-08 DIAGNOSIS — R60.0 EDEMA OF BOTH LEGS: ICD-10-CM

## 2019-07-08 DIAGNOSIS — E86.0 DEHYDRATION: ICD-10-CM

## 2019-07-08 DIAGNOSIS — C91.10 CLL (CHRONIC LYMPHOCYTIC LEUKEMIA): Primary | ICD-10-CM

## 2019-07-08 DIAGNOSIS — J30.2 SEASONAL ALLERGIES: ICD-10-CM

## 2019-07-08 LAB
ALBUMIN SERPL BCP-MCNC: 2.8 G/DL (ref 3.5–5.2)
ALP SERPL-CCNC: 119 U/L (ref 55–135)
ALT SERPL W/O P-5'-P-CCNC: 32 U/L (ref 10–44)
ANION GAP SERPL CALC-SCNC: 7 MMOL/L (ref 8–16)
ANISOCYTOSIS BLD QL SMEAR: SLIGHT
AST SERPL-CCNC: 43 U/L (ref 10–40)
BASOPHILS # BLD AUTO: 0.07 K/UL (ref 0–0.2)
BASOPHILS NFR BLD: 0.7 % (ref 0–1.9)
BILIRUB SERPL-MCNC: 0.8 MG/DL (ref 0.1–1)
BUN SERPL-MCNC: 41 MG/DL (ref 8–23)
BURR CELLS BLD QL SMEAR: ABNORMAL
CALCIUM SERPL-MCNC: 8.9 MG/DL (ref 8.7–10.5)
CHLORIDE SERPL-SCNC: 106 MMOL/L (ref 95–110)
CO2 SERPL-SCNC: 25 MMOL/L (ref 23–29)
CREAT SERPL-MCNC: 1 MG/DL (ref 0.5–1.4)
DIFFERENTIAL METHOD: ABNORMAL
EOSINOPHIL # BLD AUTO: 0 K/UL (ref 0–0.5)
EOSINOPHIL NFR BLD: 0.1 % (ref 0–8)
ERYTHROCYTE [DISTWIDTH] IN BLOOD BY AUTOMATED COUNT: 15.6 % (ref 11.5–14.5)
EST. GFR  (AFRICAN AMERICAN): >60 ML/MIN/1.73 M^2
EST. GFR  (NON AFRICAN AMERICAN): >60 ML/MIN/1.73 M^2
GLUCOSE SERPL-MCNC: 151 MG/DL (ref 70–110)
HCT VFR BLD AUTO: 39.8 % (ref 40–54)
HGB BLD-MCNC: 12.5 G/DL (ref 14–18)
IMM GRANULOCYTES # BLD AUTO: 0.4 K/UL (ref 0–0.04)
IMM GRANULOCYTES NFR BLD AUTO: 3.9 % (ref 0–0.5)
LYMPHOCYTES # BLD AUTO: 3.9 K/UL (ref 1–4.8)
LYMPHOCYTES NFR BLD: 37.7 % (ref 18–48)
MCH RBC QN AUTO: 30.4 PG (ref 27–31)
MCHC RBC AUTO-ENTMCNC: 31.4 G/DL (ref 32–36)
MCV RBC AUTO: 97 FL (ref 82–98)
MONOCYTES # BLD AUTO: 0.7 K/UL (ref 0.3–1)
MONOCYTES NFR BLD: 6.7 % (ref 4–15)
NEUTROPHILS # BLD AUTO: 5.3 K/UL (ref 1.8–7.7)
NEUTROPHILS NFR BLD: 50.9 % (ref 38–73)
NRBC BLD-RTO: 0 /100 WBC
OVALOCYTES BLD QL SMEAR: ABNORMAL
PLATELET # BLD AUTO: 110 K/UL (ref 150–350)
PLATELET BLD QL SMEAR: ABNORMAL
PMV BLD AUTO: 10.4 FL (ref 9.2–12.9)
POIKILOCYTOSIS BLD QL SMEAR: SLIGHT
POLYCHROMASIA BLD QL SMEAR: ABNORMAL
POTASSIUM SERPL-SCNC: 5.2 MMOL/L (ref 3.5–5.1)
PROT SERPL-MCNC: 5 G/DL (ref 6–8.4)
RBC # BLD AUTO: 4.11 M/UL (ref 4.6–6.2)
SODIUM SERPL-SCNC: 138 MMOL/L (ref 136–145)
TOXIC GRANULES BLD QL SMEAR: PRESENT
WBC # BLD AUTO: 10.36 K/UL (ref 3.9–12.7)

## 2019-07-08 PROCEDURE — 80053 COMPREHEN METABOLIC PANEL: CPT | Mod: HCNC

## 2019-07-08 PROCEDURE — 99213 PR OFFICE/OUTPT VISIT, EST, LEVL III, 20-29 MIN: ICD-10-PCS | Mod: HCNC,S$GLB,, | Performed by: NURSE PRACTITIONER

## 2019-07-08 PROCEDURE — 1101F PT FALLS ASSESS-DOCD LE1/YR: CPT | Mod: HCNC,CPTII,S$GLB, | Performed by: NURSE PRACTITIONER

## 2019-07-08 PROCEDURE — 85025 COMPLETE CBC W/AUTO DIFF WBC: CPT | Mod: HCNC

## 2019-07-08 PROCEDURE — 3077F PR MOST RECENT SYSTOLIC BLOOD PRESSURE >= 140 MM HG: ICD-10-PCS | Mod: HCNC,CPTII,S$GLB, | Performed by: NURSE PRACTITIONER

## 2019-07-08 PROCEDURE — 99999 PR PBB SHADOW E&M-EST. PATIENT-LVL III: CPT | Mod: PBBFAC,HCNC,, | Performed by: NURSE PRACTITIONER

## 2019-07-08 PROCEDURE — 99213 OFFICE O/P EST LOW 20 MIN: CPT | Mod: HCNC,S$GLB,, | Performed by: NURSE PRACTITIONER

## 2019-07-08 PROCEDURE — 1101F PR PT FALLS ASSESS DOC 0-1 FALLS W/OUT INJ PAST YR: ICD-10-PCS | Mod: HCNC,CPTII,S$GLB, | Performed by: NURSE PRACTITIONER

## 2019-07-08 PROCEDURE — 3077F SYST BP >= 140 MM HG: CPT | Mod: HCNC,CPTII,S$GLB, | Performed by: NURSE PRACTITIONER

## 2019-07-08 PROCEDURE — 3078F DIAST BP <80 MM HG: CPT | Mod: HCNC,CPTII,S$GLB, | Performed by: NURSE PRACTITIONER

## 2019-07-08 PROCEDURE — 36415 COLL VENOUS BLD VENIPUNCTURE: CPT | Mod: HCNC

## 2019-07-08 PROCEDURE — 99999 PR PBB SHADOW E&M-EST. PATIENT-LVL III: ICD-10-PCS | Mod: PBBFAC,HCNC,, | Performed by: NURSE PRACTITIONER

## 2019-07-08 PROCEDURE — 3078F PR MOST RECENT DIASTOLIC BLOOD PRESSURE < 80 MM HG: ICD-10-PCS | Mod: HCNC,CPTII,S$GLB, | Performed by: NURSE PRACTITIONER

## 2019-07-08 RX ORDER — FEXOFENADINE HCL 60 MG
60 TABLET ORAL DAILY
Refills: 0
Start: 2019-07-08 | End: 2019-09-30

## 2019-07-08 NOTE — PROGRESS NOTES
Subjective:       Patient ID: Thierry Ramos Jr. is a 85 y.o. male.    Chief Complaint: No chief complaint on file.    Mr. Ramos has a history of CLL diagnosed in April 2012 by Dr Joaquin Wooten. In April 2013, there was an abrupt rise in his white blood cell count to 93,000 and he had a modest number of prolymphocytes in his peripheral smear.  He had generalized urticaria, which was thought to be paraneoplastic.  At that time, he was treated with 5 courses of fludarabine, cyclophosphamide and Rituxan.  The fludarabine doses were decreased somewhat because of mild renal impairment.  The sixth treatment was with Rituxan alone because he developed thrombocytopenia, which has persisted in varying degrees since then.     His major hematologic problem and much of his treatment since 2013, has been related to thrombocytopenia.  It became more severe in early 2016, at a time when he was taking both aspirin and Plavix.  A bone marrow examination had no evidence of myelodysplasia and 35% of the bone marrow cells were small lymphocytes.  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  The FISH studies for myelodysplastic disorders were negative.     Dr. 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     Subsequently, because of continued thrombocytopenia,  recommended treatment with ofatumumab, which he began in late June 2016.  At that time, his platelet count was 57,000.  He generally tolerated the drug well and the frequency of administration was gradually reduced over a period of more than 2 years.  Platelet counts during most of those 2 years were between 66,000-117,000.  The drug was discontinued in early November 2018.     He has had many episodes of  infection in the past, particularly recurrent sinusitis and he has had several surgical procedures on his sinuses.  Because of this, he has been receiving intravenous immunoglobulin monthly for years and   the frequency of pneumonia and severe sinusitis has diminished. Most recent dose of IVIG was 3/6/19.      After a pituitary surgery for a tumor in 2002, he experienced a TIA and then had 2 strokes.  He has had many nonmelanoma skin cancers excised.     He asked to be seen earlier than planned by Dr. Wooten in January because he was concerned about his recent blood counts.  His platelet count has declined modestly from the range of 66,000-107,000 seen in the past 9 months to a current value of 51,000.  His   white blood cell count remains normal and his lymphocyte percentage this week has been 61% and 45%.    He was started on a low dose ibrutinib 280 mg rather than 420mg due to concern for treatment related- further decrease in platelet count while on aspirin and plavix.     He has had no change in his activity level which includes daily jump rope, 125 pushups, 15 minutes on the stationary bike. He has not noted any enlarged lymph nodes. He and his wife are installing ivone after water damage at their home.    CBC and CMP are stable. IGG is low at 370; last dose of IVIG was 4/2019    Interval history: Mr Beauchamp presents today with his wife for an urgent care visit. He reports increasing muscle weakness--especially to his legs-- and leg swelling since starting ibrutinib in January. Denies fever, chill, n/v/d/c, chest pain, or bleeding. States easy bruising.     HPI  Review of Systems   Constitutional: Negative.    HENT: Negative.    Eyes: Negative.    Respiratory: Negative.    Cardiovascular: Positive for leg swelling.   Gastrointestinal: Negative.    Endocrine: Negative.    Genitourinary: Negative.    Musculoskeletal: Positive for arthralgias.   Skin: Negative.    Allergic/Immunologic: Negative for  environmental allergies, food allergies and immunocompromised state.   Neurological: Positive for weakness.   Hematological: Negative for adenopathy. Bruises/bleeds easily.   Psychiatric/Behavioral: Negative.        Objective:      Physical Exam   Constitutional: He is oriented to person, place, and time. He appears well-developed and well-nourished.   HENT:   Head: Normocephalic and atraumatic.   Eyes: Conjunctivae are normal. No scleral icterus.   Neck: Normal range of motion. Neck supple.   Cardiovascular: Normal rate and intact distal pulses.   Pulmonary/Chest: Effort normal. No respiratory distress.   Abdominal: Soft. He exhibits no distension. There is no tenderness.   Musculoskeletal: Normal range of motion. He exhibits edema.   +1-2 pitting edema to BLE   Neurological: He is alert and oriented to person, place, and time. No cranial nerve deficit.   Skin: Skin is warm and dry. Ecchymosis noted. There is erythema.   Psychiatric: He has a normal mood and affect. His behavior is normal.   Nursing note and vitals reviewed.      Assessment:       1. CLL (chronic lymphocytic leukemia)        Plan:       CLL  Continue ibrutinib 280mg daily. CBC is stable.  Schedule for IVIG for May 2019  CBC every other week    Hyperkalemia  K+ 5.2 today  Patient states that he eats bananas at home because he had low K+ in the past  Instructed to eat fewer bananas  Will prescribe kayexelate x 1 dose    Dehydration  BUN 41; creatinine wnl  Not giving IVF today due to pitting edema to BLE  Patient states that he does not drink much water at home because it causes urinary frequency  Encouraged increased water intake. Patient states understanding.    Generalized weakness but more to legs/Leg edema  Patient states that he has had increasing generalized weakness--especially in the legs--and BLE edema since starting ibrutinib in January  Will hold ibrutinib and have patient f/u with labs and a symptom check in 1 week    Return to clinic in  1 week with CMP, CBC, and appt with NP  Return visit in 3 months with CBC, CMP and LDH and IgG  Distress Screening Results: Psychosocial Distress screening score of Distress Score: 7 noted and reviewed. No intervention indicated. Declines onc psych consult. States distress is related to current symptoms.

## 2019-07-09 ENCOUNTER — PATIENT MESSAGE (OUTPATIENT)
Dept: HEMATOLOGY/ONCOLOGY | Facility: CLINIC | Age: 84
End: 2019-07-09

## 2019-07-09 DIAGNOSIS — E87.5 HYPERKALEMIA: Primary | ICD-10-CM

## 2019-07-09 RX ORDER — SODIUM POLYSTYRENE SULFONATE 4.1 MEQ/G
15 POWDER, FOR SUSPENSION ORAL; RECTAL ONCE
Qty: 15 G | Refills: 0 | Status: SHIPPED | OUTPATIENT
Start: 2019-07-09 | End: 2019-07-11

## 2019-07-09 RX ORDER — SODIUM POLYSTYRENE SULFONATE 4.1 MEQ/G
15 POWDER, FOR SUSPENSION ORAL; RECTAL ONCE
Qty: 15 G | Refills: 0 | Status: SHIPPED | OUTPATIENT
Start: 2019-07-09 | End: 2019-07-09

## 2019-07-10 ENCOUNTER — OFFICE VISIT (OUTPATIENT)
Dept: OPHTHALMOLOGY | Facility: CLINIC | Age: 84
End: 2019-07-10
Payer: MEDICARE

## 2019-07-10 DIAGNOSIS — H01.00A BLEPHARITIS OF UPPER AND LOWER EYELIDS OF BOTH EYES, UNSPECIFIED TYPE: ICD-10-CM

## 2019-07-10 DIAGNOSIS — H34.8112 CENTRAL RETINAL VEIN OCCLUSION OF RIGHT EYE, UNSPECIFIED COMPLICATION STATUS: Primary | ICD-10-CM

## 2019-07-10 DIAGNOSIS — H35.371 EPIRETINAL MEMBRANE, RIGHT: ICD-10-CM

## 2019-07-10 DIAGNOSIS — H04.123 DRY EYE SYNDROME OF BOTH EYES: ICD-10-CM

## 2019-07-10 DIAGNOSIS — H01.00B BLEPHARITIS OF UPPER AND LOWER EYELIDS OF BOTH EYES, UNSPECIFIED TYPE: ICD-10-CM

## 2019-07-10 PROCEDURE — 92134 CPTRZ OPH DX IMG PST SGM RTA: CPT | Mod: HCNC,S$GLB,, | Performed by: OPHTHALMOLOGY

## 2019-07-10 PROCEDURE — 99999 PR PBB SHADOW E&M-EST. PATIENT-LVL III: CPT | Mod: PBBFAC,HCNC,, | Performed by: OPHTHALMOLOGY

## 2019-07-10 PROCEDURE — 99499 RISK ADDL DX/OHS AUDIT: ICD-10-PCS | Mod: HCNC,S$GLB,, | Performed by: OPHTHALMOLOGY

## 2019-07-10 PROCEDURE — 92012 PR EYE EXAM, EST PATIENT,INTERMED: ICD-10-PCS | Mod: HCNC,S$GLB,, | Performed by: OPHTHALMOLOGY

## 2019-07-10 PROCEDURE — 99499 UNLISTED E&M SERVICE: CPT | Mod: HCNC,S$GLB,, | Performed by: OPHTHALMOLOGY

## 2019-07-10 PROCEDURE — 92012 INTRM OPH EXAM EST PATIENT: CPT | Mod: HCNC,S$GLB,, | Performed by: OPHTHALMOLOGY

## 2019-07-10 PROCEDURE — 92226 PR SPECIAL EYE EXAM, SUBSEQUENT: ICD-10-PCS | Mod: HCNC,RT,S$GLB, | Performed by: OPHTHALMOLOGY

## 2019-07-10 PROCEDURE — 92226 PR SPECIAL EYE EXAM, SUBSEQUENT: CPT | Mod: HCNC,RT,S$GLB, | Performed by: OPHTHALMOLOGY

## 2019-07-10 PROCEDURE — 92134 POSTERIOR SEGMENT OCT RETINA (OCULAR COHERENCE TOMOGRAPHY)-BOTH EYES: ICD-10-PCS | Mod: HCNC,S$GLB,, | Performed by: OPHTHALMOLOGY

## 2019-07-10 PROCEDURE — 99999 PR PBB SHADOW E&M-EST. PATIENT-LVL III: ICD-10-PCS | Mod: PBBFAC,HCNC,, | Performed by: OPHTHALMOLOGY

## 2019-07-10 NOTE — PROGRESS NOTES
Subjective:       Patient ID: Thierry Ramos Jr. is a 85 y.o. male      Chief Complaint   Patient presents with    Concerns About Ocular Health     History of Present Illness  HPI     2 mon ck     Pt states that vision about the same just still have the trouble with the   OD.   Va OS seems to be good.  No f/f/pain.  Pt feels tired and says he   thinks it Ibrutinib.  His doctor took him off it.      Eye Med(s) - Restasis BID - OU       POHx  1. CRVO w/ ME OD        2. S/p mohs repair with mini [place] stent 2/8/17     3. Basal cell carcinoma of eyelid, right ( Dr. Lee)    Last edited by Melvin Ying MD on 7/10/2019 10:13 AM. (History)        Imaging:    See report    Assessment/Plan:     1. Central retinal vein occlusion of right eye, unspecified complication status  IRH clearing.  No ME/NV/VH  Observe  Pt with CLL.  Changing Rx (stopping Ibrutinib)  CV risk factor control    - Posterior Segment OCT Retina-Both eyes  - Posterior Segment OCT Retina-Both eyes; Future    2. Epiretinal membrane, right  No traction.  Observe  - Posterior Segment OCT Retina-Both eyes  - Posterior Segment OCT Retina-Both eyes; Future    3. Blepharitis of upper and lower eyelids of both eyes, unspecified type  Restart Ilo HS/HS (pt felt as though it helped).  Hot compresses    4. Dry eye syndrome of both eyes  ATs up to 4/4  Continue Restasis    Follow up in about 3 months (around 10/10/2019), or if symptoms worsen or fail to improve, for Dilated examination, OCT Mac.

## 2019-07-12 ENCOUNTER — INFUSION (OUTPATIENT)
Dept: INFUSION THERAPY | Facility: HOSPITAL | Age: 84
End: 2019-07-12
Attending: INTERNAL MEDICINE
Payer: MEDICARE

## 2019-07-12 VITALS
TEMPERATURE: 98 F | DIASTOLIC BLOOD PRESSURE: 58 MMHG | SYSTOLIC BLOOD PRESSURE: 123 MMHG | HEART RATE: 73 BPM | RESPIRATION RATE: 18 BRPM

## 2019-07-12 DIAGNOSIS — C91.10 CLL (CHRONIC LYMPHOCYTIC LEUKEMIA): ICD-10-CM

## 2019-07-12 DIAGNOSIS — J32.9 CHRONIC SINUSITIS, UNSPECIFIED LOCATION: ICD-10-CM

## 2019-07-12 DIAGNOSIS — D80.1 HYPOGAMMAGLOBULINEMIA: Primary | ICD-10-CM

## 2019-07-12 PROCEDURE — 96366 THER/PROPH/DIAG IV INF ADDON: CPT | Mod: HCNC

## 2019-07-12 PROCEDURE — 96375 TX/PRO/DX INJ NEW DRUG ADDON: CPT | Mod: HCNC

## 2019-07-12 PROCEDURE — 63600175 PHARM REV CODE 636 W HCPCS: Mod: HCNC,JG | Performed by: INTERNAL MEDICINE

## 2019-07-12 PROCEDURE — 25000003 PHARM REV CODE 250: Mod: HCNC | Performed by: INTERNAL MEDICINE

## 2019-07-12 PROCEDURE — 96365 THER/PROPH/DIAG IV INF INIT: CPT | Mod: HCNC

## 2019-07-12 PROCEDURE — S0028 INJECTION, FAMOTIDINE, 20 MG: HCPCS | Mod: HCNC | Performed by: INTERNAL MEDICINE

## 2019-07-12 PROCEDURE — 96367 TX/PROPH/DG ADDL SEQ IV INF: CPT | Mod: HCNC

## 2019-07-12 RX ORDER — ACETAMINOPHEN 325 MG/1
650 TABLET ORAL
Status: COMPLETED | OUTPATIENT
Start: 2019-07-12 | End: 2019-07-12

## 2019-07-12 RX ORDER — FAMOTIDINE 10 MG/ML
20 INJECTION INTRAVENOUS
Status: CANCELLED | OUTPATIENT
Start: 2019-07-12

## 2019-07-12 RX ORDER — SODIUM CHLORIDE 0.9 % (FLUSH) 0.9 %
10 SYRINGE (ML) INJECTION
Status: CANCELLED | OUTPATIENT
Start: 2019-07-12

## 2019-07-12 RX ORDER — SODIUM CHLORIDE 0.9 % (FLUSH) 0.9 %
10 SYRINGE (ML) INJECTION
Status: DISCONTINUED | OUTPATIENT
Start: 2019-07-12 | End: 2019-07-12 | Stop reason: HOSPADM

## 2019-07-12 RX ORDER — ACETAMINOPHEN 325 MG/1
650 TABLET ORAL
Status: CANCELLED | OUTPATIENT
Start: 2019-07-12

## 2019-07-12 RX ORDER — FAMOTIDINE 10 MG/ML
20 INJECTION INTRAVENOUS
Status: COMPLETED | OUTPATIENT
Start: 2019-07-12 | End: 2019-07-12

## 2019-07-12 RX ORDER — HEPARIN 100 UNIT/ML
500 SYRINGE INTRAVENOUS
Status: CANCELLED | OUTPATIENT
Start: 2019-07-12

## 2019-07-12 RX ORDER — HEPARIN 100 UNIT/ML
500 SYRINGE INTRAVENOUS
Status: DISCONTINUED | OUTPATIENT
Start: 2019-07-12 | End: 2019-07-12 | Stop reason: HOSPADM

## 2019-07-12 RX ADMIN — DIPHENHYDRAMINE HYDROCHLORIDE 50 MG: 50 INJECTION, SOLUTION INTRAMUSCULAR; INTRAVENOUS at 09:07

## 2019-07-12 RX ADMIN — FAMOTIDINE 20 MG: 10 INJECTION, SOLUTION INTRAVENOUS at 09:07

## 2019-07-12 RX ADMIN — ACETAMINOPHEN 650 MG: 325 TABLET ORAL at 09:07

## 2019-07-12 RX ADMIN — SODIUM CHLORIDE: 0.9 INJECTION, SOLUTION INTRAVENOUS at 09:07

## 2019-07-12 RX ADMIN — HUMAN IMMUNOGLOBULIN G 20 G: 20 LIQUID INTRAVENOUS at 10:07

## 2019-07-12 NOTE — PLAN OF CARE
Problem: Adult Inpatient Plan of Care  Goal: Patient-Specific Goal (Individualization)  Outcome: Ongoing (interventions implemented as appropriate)  5439-Labs , hx, and medications reviewed, patient is here for monthly infusion. Assessment completed. Discussed plan of care with patient. Patient in agreement. Chair reclined and warm blanket and snack offered.

## 2019-07-12 NOTE — PLAN OF CARE
Problem: Adult Inpatient Plan of Care  Goal: Plan of Care Review  Outcome: Ongoing (interventions implemented as appropriate)  1234-Patient tolerated treatment well. Discharged without complaints or S/S of adverse event. Instructed to call provider for any questions or concerns.

## 2019-07-15 ENCOUNTER — HOSPITAL ENCOUNTER (INPATIENT)
Facility: HOSPITAL | Age: 84
LOS: 10 days | Discharge: HOME HEALTH | DRG: 871 | End: 2019-07-25
Attending: EMERGENCY MEDICINE | Admitting: FAMILY MEDICINE
Payer: MEDICARE
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
## 2019-07-15 DIAGNOSIS — Z45.2 ENCOUNTER FOR CENTRAL LINE PLACEMENT: ICD-10-CM

## 2019-07-15 DIAGNOSIS — A41.9 SEPSIS DUE TO PNEUMONIA: ICD-10-CM

## 2019-07-15 DIAGNOSIS — R55 SYNCOPE: ICD-10-CM

## 2019-07-15 DIAGNOSIS — E27.49 SECONDARY ADRENAL INSUFFICIENCY: ICD-10-CM

## 2019-07-15 DIAGNOSIS — R40.20 LOSS OF CONSCIOUSNESS: ICD-10-CM

## 2019-07-15 DIAGNOSIS — R74.8 ABNORMAL LIVER ENZYMES: ICD-10-CM

## 2019-07-15 DIAGNOSIS — R53.1 WEAKNESS: ICD-10-CM

## 2019-07-15 DIAGNOSIS — Z71.89 GOALS OF CARE, COUNSELING/DISCUSSION: ICD-10-CM

## 2019-07-15 DIAGNOSIS — E23.0 PANHYPOPITUITARISM: ICD-10-CM

## 2019-07-15 DIAGNOSIS — Z51.5 PALLIATIVE CARE ENCOUNTER: ICD-10-CM

## 2019-07-15 DIAGNOSIS — A41.9 SEPSIS, DUE TO UNSPECIFIED ORGANISM: ICD-10-CM

## 2019-07-15 DIAGNOSIS — J18.9 PNEUMONIA OF LEFT LOWER LOBE DUE TO INFECTIOUS ORGANISM: ICD-10-CM

## 2019-07-15 DIAGNOSIS — I48.91 ATRIAL FIBRILLATION, RAPID: ICD-10-CM

## 2019-07-15 DIAGNOSIS — I50.30 (HFPEF) HEART FAILURE WITH PRESERVED EJECTION FRACTION: ICD-10-CM

## 2019-07-15 DIAGNOSIS — R41.82 ALTERED MENTAL STATUS, UNSPECIFIED ALTERED MENTAL STATUS TYPE: Primary | ICD-10-CM

## 2019-07-15 DIAGNOSIS — J18.9 SEPSIS DUE TO PNEUMONIA: ICD-10-CM

## 2019-07-15 DIAGNOSIS — D69.3 IMMUNE THROMBOCYTOPENIA: ICD-10-CM

## 2019-07-15 DIAGNOSIS — I48.91 A-FIB: ICD-10-CM

## 2019-07-15 DIAGNOSIS — C91.10 CLL (CHRONIC LYMPHOCYTIC LEUKEMIA): ICD-10-CM

## 2019-07-15 DIAGNOSIS — N17.0 ACUTE RENAL FAILURE WITH TUBULAR NECROSIS: ICD-10-CM

## 2019-07-15 DIAGNOSIS — A41.9 SEPSIS: ICD-10-CM

## 2019-07-15 DIAGNOSIS — Z86.73 HISTORY OF CVA (CEREBROVASCULAR ACCIDENT): ICD-10-CM

## 2019-07-15 PROBLEM — D69.6 THROMBOCYTOPENIA, UNSPECIFIED: Status: ACTIVE | Noted: 2019-07-15

## 2019-07-15 PROBLEM — D72.829 LEUKOCYTOSIS: Status: ACTIVE | Noted: 2019-07-15

## 2019-07-15 LAB
ALBUMIN SERPL BCP-MCNC: 2.8 G/DL (ref 3.5–5.2)
ALP SERPL-CCNC: 258 U/L (ref 55–135)
ALT SERPL W/O P-5'-P-CCNC: 91 U/L (ref 10–44)
ANION GAP SERPL CALC-SCNC: 10 MMOL/L (ref 8–16)
ANISOCYTOSIS BLD QL SMEAR: SLIGHT
AST SERPL-CCNC: 163 U/L (ref 10–40)
BACTERIA #/AREA URNS HPF: ABNORMAL /HPF
BASOPHILS # BLD AUTO: ABNORMAL K/UL (ref 0–0.2)
BASOPHILS NFR BLD: 0 % (ref 0–1.9)
BILIRUB SERPL-MCNC: 0.8 MG/DL (ref 0.1–1)
BILIRUB UR QL STRIP: NEGATIVE
BNP SERPL-MCNC: 36 PG/ML (ref 0–99)
BUN SERPL-MCNC: 34 MG/DL (ref 8–23)
CALCIUM SERPL-MCNC: 9.2 MG/DL (ref 8.7–10.5)
CHLORIDE SERPL-SCNC: 107 MMOL/L (ref 95–110)
CLARITY UR: CLEAR
CO2 SERPL-SCNC: 25 MMOL/L (ref 23–29)
COLOR UR: ABNORMAL
CREAT SERPL-MCNC: 1.1 MG/DL (ref 0.5–1.4)
DIFFERENTIAL METHOD: ABNORMAL
EOSINOPHIL # BLD AUTO: ABNORMAL K/UL (ref 0–0.5)
EOSINOPHIL NFR BLD: 0 % (ref 0–8)
ERYTHROCYTE [DISTWIDTH] IN BLOOD BY AUTOMATED COUNT: 16.1 % (ref 11.5–14.5)
EST. GFR  (AFRICAN AMERICAN): >60 ML/MIN/1.73 M^2
EST. GFR  (NON AFRICAN AMERICAN): >60 ML/MIN/1.73 M^2
GLUCOSE SERPL-MCNC: 84 MG/DL (ref 70–110)
GLUCOSE UR QL STRIP: NEGATIVE
HCT VFR BLD AUTO: 38.9 % (ref 40–54)
HGB BLD-MCNC: 12.3 G/DL (ref 14–18)
HGB UR QL STRIP: ABNORMAL
HYALINE CASTS #/AREA URNS LPF: 16 /LPF
KETONES UR QL STRIP: ABNORMAL
LACTATE SERPL-SCNC: 2.3 MMOL/L (ref 0.5–2.2)
LACTATE SERPL-SCNC: 3 MMOL/L (ref 0.5–2.2)
LEUKOCYTE ESTERASE UR QL STRIP: NEGATIVE
LYMPHOCYTES # BLD AUTO: ABNORMAL K/UL (ref 1–4.8)
LYMPHOCYTES NFR BLD: 44 % (ref 18–48)
MAGNESIUM SERPL-MCNC: 1.8 MG/DL (ref 1.6–2.6)
MCH RBC QN AUTO: 29.8 PG (ref 27–31)
MCHC RBC AUTO-ENTMCNC: 31.6 G/DL (ref 32–36)
MCV RBC AUTO: 94 FL (ref 82–98)
MICROSCOPIC COMMENT: ABNORMAL
MONOCYTES # BLD AUTO: ABNORMAL K/UL (ref 0.3–1)
MONOCYTES NFR BLD: 11 % (ref 4–15)
MYELOCYTES NFR BLD MANUAL: 2 %
NEUTROPHILS NFR BLD: 42 % (ref 38–73)
NEUTS BAND NFR BLD MANUAL: 1 %
NITRITE UR QL STRIP: NEGATIVE
PH UR STRIP: 6 [PH] (ref 5–8)
PLATELET # BLD AUTO: 120 K/UL (ref 150–350)
PLATELET BLD QL SMEAR: ABNORMAL
PMV BLD AUTO: ABNORMAL FL (ref 9.2–12.9)
POCT GLUCOSE: 126 MG/DL (ref 70–110)
POCT GLUCOSE: 82 MG/DL (ref 70–110)
POIKILOCYTOSIS BLD QL SMEAR: SLIGHT
POLYCHROMASIA BLD QL SMEAR: ABNORMAL
POTASSIUM SERPL-SCNC: 3.9 MMOL/L (ref 3.5–5.1)
PROT SERPL-MCNC: 5.5 G/DL (ref 6–8.4)
PROT UR QL STRIP: ABNORMAL
RBC # BLD AUTO: 4.13 M/UL (ref 4.6–6.2)
RBC #/AREA URNS HPF: 0 /HPF (ref 0–4)
SODIUM SERPL-SCNC: 142 MMOL/L (ref 136–145)
SP GR UR STRIP: 1.02 (ref 1–1.03)
T4 FREE SERPL-MCNC: 0.9 NG/DL (ref 0.71–1.51)
TROPONIN I SERPL DL<=0.01 NG/ML-MCNC: 0.01 NG/ML (ref 0–0.03)
TSH SERPL DL<=0.005 MIU/L-ACNC: 0.04 UIU/ML (ref 0.4–4)
URN SPEC COLLECT METH UR: ABNORMAL
UROBILINOGEN UR STRIP-ACNC: 1 EU/DL
WBC # BLD AUTO: 12.75 K/UL (ref 3.9–12.7)
WBC #/AREA URNS HPF: 3 /HPF (ref 0–5)

## 2019-07-15 PROCEDURE — 63600175 PHARM REV CODE 636 W HCPCS: Mod: HCNC | Performed by: FAMILY MEDICINE

## 2019-07-15 PROCEDURE — 85027 COMPLETE CBC AUTOMATED: CPT | Mod: HCNC

## 2019-07-15 PROCEDURE — 85007 BL SMEAR W/DIFF WBC COUNT: CPT | Mod: HCNC

## 2019-07-15 PROCEDURE — 83605 ASSAY OF LACTIC ACID: CPT | Mod: 91,HCNC

## 2019-07-15 PROCEDURE — 83735 ASSAY OF MAGNESIUM: CPT | Mod: HCNC

## 2019-07-15 PROCEDURE — 97165 OT EVAL LOW COMPLEX 30 MIN: CPT | Mod: HCNC

## 2019-07-15 PROCEDURE — 83880 ASSAY OF NATRIURETIC PEPTIDE: CPT | Mod: HCNC

## 2019-07-15 PROCEDURE — 94640 AIRWAY INHALATION TREATMENT: CPT | Mod: HCNC

## 2019-07-15 PROCEDURE — 63600175 PHARM REV CODE 636 W HCPCS: Mod: HCNC | Performed by: EMERGENCY MEDICINE

## 2019-07-15 PROCEDURE — 93010 EKG 12-LEAD: ICD-10-PCS | Mod: HCNC,,, | Performed by: INTERNAL MEDICINE

## 2019-07-15 PROCEDURE — 25000003 PHARM REV CODE 250: Mod: HCNC | Performed by: NURSE PRACTITIONER

## 2019-07-15 PROCEDURE — 25000003 PHARM REV CODE 250: Mod: HCNC | Performed by: EMERGENCY MEDICINE

## 2019-07-15 PROCEDURE — 83605 ASSAY OF LACTIC ACID: CPT | Mod: HCNC

## 2019-07-15 PROCEDURE — 94761 N-INVAS EAR/PLS OXIMETRY MLT: CPT | Mod: HCNC

## 2019-07-15 PROCEDURE — 93010 ELECTROCARDIOGRAM REPORT: CPT | Mod: HCNC,,, | Performed by: INTERNAL MEDICINE

## 2019-07-15 PROCEDURE — 25000242 PHARM REV CODE 250 ALT 637 W/ HCPCS: Mod: HCNC | Performed by: NURSE PRACTITIONER

## 2019-07-15 PROCEDURE — 63600175 PHARM REV CODE 636 W HCPCS: Mod: HCNC | Performed by: NURSE PRACTITIONER

## 2019-07-15 PROCEDURE — 99291 CRITICAL CARE FIRST HOUR: CPT | Mod: 25,HCNC

## 2019-07-15 PROCEDURE — 87040 BLOOD CULTURE FOR BACTERIA: CPT | Mod: 59,HCNC

## 2019-07-15 PROCEDURE — 81000 URINALYSIS NONAUTO W/SCOPE: CPT | Mod: HCNC

## 2019-07-15 PROCEDURE — 96360 HYDRATION IV INFUSION INIT: CPT | Mod: 59,HCNC

## 2019-07-15 PROCEDURE — 84484 ASSAY OF TROPONIN QUANT: CPT | Mod: HCNC

## 2019-07-15 PROCEDURE — 36556 INSERT NON-TUNNEL CV CATH: CPT | Mod: HCNC

## 2019-07-15 PROCEDURE — 97161 PT EVAL LOW COMPLEX 20 MIN: CPT | Mod: HCNC

## 2019-07-15 PROCEDURE — 93005 ELECTROCARDIOGRAM TRACING: CPT | Mod: HCNC

## 2019-07-15 PROCEDURE — 20000000 HC ICU ROOM: Mod: HCNC

## 2019-07-15 PROCEDURE — 84439 ASSAY OF FREE THYROXINE: CPT | Mod: HCNC

## 2019-07-15 PROCEDURE — 84443 ASSAY THYROID STIM HORMONE: CPT | Mod: HCNC

## 2019-07-15 PROCEDURE — 25000003 PHARM REV CODE 250: Mod: HCNC

## 2019-07-15 PROCEDURE — 96361 HYDRATE IV INFUSION ADD-ON: CPT | Mod: 59,HCNC

## 2019-07-15 PROCEDURE — 80053 COMPREHEN METABOLIC PANEL: CPT | Mod: HCNC

## 2019-07-15 RX ORDER — NAPROXEN SODIUM 220 MG/1
81 TABLET, FILM COATED ORAL DAILY
Status: DISCONTINUED | OUTPATIENT
Start: 2019-07-15 | End: 2019-07-18

## 2019-07-15 RX ORDER — CETIRIZINE HYDROCHLORIDE 10 MG/1
10 TABLET ORAL DAILY
Status: DISCONTINUED | OUTPATIENT
Start: 2019-07-15 | End: 2019-07-25 | Stop reason: HOSPADM

## 2019-07-15 RX ORDER — VANCOMYCIN HCL IN 5 % DEXTROSE 1G/250ML
1000 PLASTIC BAG, INJECTION (ML) INTRAVENOUS
Status: DISCONTINUED | OUTPATIENT
Start: 2019-07-16 | End: 2019-07-16

## 2019-07-15 RX ORDER — VANCOMYCIN HCL IN 5 % DEXTROSE 1G/250ML
15 PLASTIC BAG, INJECTION (ML) INTRAVENOUS
Status: DISCONTINUED | OUTPATIENT
Start: 2019-07-15 | End: 2019-07-15 | Stop reason: DRUGHIGH

## 2019-07-15 RX ORDER — GLUCAGON 1 MG
1 KIT INJECTION
Status: DISCONTINUED | OUTPATIENT
Start: 2019-07-15 | End: 2019-07-25 | Stop reason: HOSPADM

## 2019-07-15 RX ORDER — SODIUM CHLORIDE 0.9 % (FLUSH) 0.9 %
10 SYRINGE (ML) INJECTION
Status: DISCONTINUED | OUTPATIENT
Start: 2019-07-15 | End: 2019-07-25 | Stop reason: HOSPADM

## 2019-07-15 RX ORDER — PANTOPRAZOLE SODIUM 40 MG/1
40 TABLET, DELAYED RELEASE ORAL DAILY
Status: DISCONTINUED | OUTPATIENT
Start: 2019-07-15 | End: 2019-07-25 | Stop reason: HOSPADM

## 2019-07-15 RX ORDER — NOREPINEPHRINE BITARTRATE/D5W 16MG/250ML
PLASTIC BAG, INJECTION (ML) INTRAVENOUS
Status: COMPLETED
Start: 2019-07-15 | End: 2019-07-15

## 2019-07-15 RX ORDER — ATORVASTATIN CALCIUM 20 MG/1
20 TABLET, FILM COATED ORAL DAILY
Status: DISCONTINUED | OUTPATIENT
Start: 2019-07-15 | End: 2019-07-25 | Stop reason: HOSPADM

## 2019-07-15 RX ORDER — FUROSEMIDE 10 MG/ML
40 INJECTION INTRAMUSCULAR; INTRAVENOUS ONCE
Status: COMPLETED | OUTPATIENT
Start: 2019-07-15 | End: 2019-07-15

## 2019-07-15 RX ORDER — CYANOCOBALAMIN (VITAMIN B-12) 250 MCG
500 TABLET ORAL DAILY
Status: DISCONTINUED | OUTPATIENT
Start: 2019-07-15 | End: 2019-07-25 | Stop reason: HOSPADM

## 2019-07-15 RX ORDER — IBUPROFEN 200 MG
24 TABLET ORAL
Status: DISCONTINUED | OUTPATIENT
Start: 2019-07-15 | End: 2019-07-25 | Stop reason: HOSPADM

## 2019-07-15 RX ORDER — PREDNISONE 2.5 MG/1
7.5 TABLET ORAL DAILY
Status: DISCONTINUED | OUTPATIENT
Start: 2019-07-15 | End: 2019-07-16

## 2019-07-15 RX ORDER — SODIUM CHLORIDE 9 MG/ML
INJECTION, SOLUTION INTRAVENOUS CONTINUOUS
Status: DISCONTINUED | OUTPATIENT
Start: 2019-07-15 | End: 2019-07-16

## 2019-07-15 RX ORDER — CLOPIDOGREL BISULFATE 75 MG/1
75 TABLET ORAL DAILY
Status: DISCONTINUED | OUTPATIENT
Start: 2019-07-15 | End: 2019-07-18

## 2019-07-15 RX ORDER — ACETAMINOPHEN 325 MG/1
650 TABLET ORAL EVERY 4 HOURS PRN
Status: DISCONTINUED | OUTPATIENT
Start: 2019-07-15 | End: 2019-07-25 | Stop reason: HOSPADM

## 2019-07-15 RX ORDER — IPRATROPIUM BROMIDE AND ALBUTEROL SULFATE 2.5; .5 MG/3ML; MG/3ML
3 SOLUTION RESPIRATORY (INHALATION)
Status: DISCONTINUED | OUTPATIENT
Start: 2019-07-15 | End: 2019-07-25 | Stop reason: HOSPADM

## 2019-07-15 RX ORDER — ENOXAPARIN SODIUM 100 MG/ML
40 INJECTION SUBCUTANEOUS EVERY 24 HOURS
Status: DISCONTINUED | OUTPATIENT
Start: 2019-07-15 | End: 2019-07-16

## 2019-07-15 RX ORDER — INSULIN ASPART 100 [IU]/ML
0-5 INJECTION, SOLUTION INTRAVENOUS; SUBCUTANEOUS
Status: DISCONTINUED | OUTPATIENT
Start: 2019-07-15 | End: 2019-07-25 | Stop reason: HOSPADM

## 2019-07-15 RX ORDER — NOREPINEPHRINE BITARTRATE/D5W 16MG/250ML
0.02 PLASTIC BAG, INJECTION (ML) INTRAVENOUS CONTINUOUS
Status: DISCONTINUED | OUTPATIENT
Start: 2019-07-15 | End: 2019-07-21

## 2019-07-15 RX ORDER — ERYTHROMYCIN 5 MG/G
OINTMENT OPHTHALMIC NIGHTLY
Status: DISCONTINUED | OUTPATIENT
Start: 2019-07-15 | End: 2019-07-25 | Stop reason: HOSPADM

## 2019-07-15 RX ORDER — ONDANSETRON 2 MG/ML
4 INJECTION INTRAMUSCULAR; INTRAVENOUS EVERY 8 HOURS PRN
Status: DISCONTINUED | OUTPATIENT
Start: 2019-07-15 | End: 2019-07-25 | Stop reason: HOSPADM

## 2019-07-15 RX ORDER — IBUPROFEN 200 MG
16 TABLET ORAL
Status: DISCONTINUED | OUTPATIENT
Start: 2019-07-15 | End: 2019-07-25 | Stop reason: HOSPADM

## 2019-07-15 RX ADMIN — PIPERACILLIN AND TAZOBACTAM 4.5 G: 4; .5 INJECTION, POWDER, LYOPHILIZED, FOR SOLUTION INTRAVENOUS; PARENTERAL at 10:07

## 2019-07-15 RX ADMIN — SODIUM CHLORIDE: 0.9 INJECTION, SOLUTION INTRAVENOUS at 04:07

## 2019-07-15 RX ADMIN — FUROSEMIDE 40 MG: 10 INJECTION, SOLUTION INTRAVENOUS at 06:07

## 2019-07-15 RX ADMIN — PIPERACILLIN AND TAZOBACTAM 4.5 G: 4; .5 INJECTION, POWDER, LYOPHILIZED, FOR SOLUTION INTRAVENOUS; PARENTERAL at 06:07

## 2019-07-15 RX ADMIN — IPRATROPIUM BROMIDE AND ALBUTEROL SULFATE 3 ML: .5; 3 SOLUTION RESPIRATORY (INHALATION) at 08:07

## 2019-07-15 RX ADMIN — Medication 0.1 MCG/KG/MIN: at 06:07

## 2019-07-15 RX ADMIN — HYPROMELLOSE 2910 1 DROP: 5 SOLUTION OPHTHALMIC at 10:07

## 2019-07-15 RX ADMIN — ATORVASTATIN CALCIUM 20 MG: 20 TABLET, FILM COATED ORAL at 03:07

## 2019-07-15 RX ADMIN — CETIRIZINE HYDROCHLORIDE 10 MG: 10 TABLET, FILM COATED ORAL at 03:07

## 2019-07-15 RX ADMIN — PREDNISONE 7.5 MG: 2.5 TABLET ORAL at 02:07

## 2019-07-15 RX ADMIN — ENOXAPARIN SODIUM 40 MG: 100 INJECTION SUBCUTANEOUS at 06:07

## 2019-07-15 RX ADMIN — PANTOPRAZOLE SODIUM 40 MG: 40 TABLET, DELAYED RELEASE ORAL at 03:07

## 2019-07-15 RX ADMIN — BACITRACIN ZINC, POLYMYXIN B SULFATE, NEOMYCIN SULFATE 1 EACH: 400; 5000; 3.5 OINTMENT TOPICAL at 08:07

## 2019-07-15 RX ADMIN — VANCOMYCIN HYDROCHLORIDE 2000 MG: 100 INJECTION, POWDER, LYOPHILIZED, FOR SOLUTION INTRAVENOUS at 11:07

## 2019-07-15 RX ADMIN — ASPIRIN 81 MG 81 MG: 81 TABLET ORAL at 03:07

## 2019-07-15 RX ADMIN — ERYTHROMYCIN: 5 OINTMENT OPHTHALMIC at 10:07

## 2019-07-15 RX ADMIN — SODIUM CHLORIDE 2040 ML: 0.9 INJECTION, SOLUTION INTRAVENOUS at 07:07

## 2019-07-15 RX ADMIN — IPRATROPIUM BROMIDE AND ALBUTEROL SULFATE 3 ML: .5; 3 SOLUTION RESPIRATORY (INHALATION) at 03:07

## 2019-07-15 RX ADMIN — Medication 500 MCG: at 02:07

## 2019-07-15 RX ADMIN — CLOPIDOGREL BISULFATE 75 MG: 75 TABLET ORAL at 03:07

## 2019-07-15 NOTE — PT/OT/SLP EVAL
Physical Therapy Evaluation    Patient Name:  Thierry aRmos Jr.   MRN:  [id number]    Recommendations:     Discharge Recommendations:  other (see comments)(TBD)   Discharge Equipment Recommendations: other (see comments)(unknown)   Barriers to discharge: None    Assessment:     Thierry Ramos Jr. is a 85 y.o. male admitted with a medical diagnosis of Sepsis due to pneumonia.  He presents with the following impairments/functional limitations:  weakness, impaired functional mobilty, impaired self care skills, gait instability, impaired endurance, impaired balance, impaired cardiopulmonary response to activity, impaired skin . Patient seen in ED attempted to come from supine <> sit however unable to tolerate no overt LOC noted, returned to supine. Supine /53 HR 70, seated BP 83/48, HR 79, supine /50, HR 69.    Rehab Prognosis: Good; patient would benefit from acute skilled PT services to address these deficits and reach maximum level of function.    Recent Surgery: * No surgery found *      Plan:     During this hospitalization, patient to be seen 6 x/week to address the identified rehab impairments via gait training, therapeutic activities, therapeutic exercises and progress toward the following goals:    · Plan of Care Expires:  08/15/19    Subjective     Chief Complaint: weakness  Patient/Family Comments/goals:  Go home  Pain/Comfort:  · Pain Rating 1: 0/10  · Pain Rating Post-Intervention 1: 0/10    Patients cultural, spiritual, [employer] conflicts given the current situation:      Living Environment:  Lives with spouse no concerns  Prior to admission, patients level of function was independent.  Equipment used at home: other (see comments)(unknown).  DME owned (not currently used): none.  Upon discharge, patient will have assistance from family.    Objective:     Communicated with primary nurse prior to session.  Patient found supine with blood pressure cuff, central line, telemetry, pulse ox  (continuous), michel catheter  upon PT entry to room.    General Precautions: Standard,     Orthopedic Precautions:N/A   Braces: N/A     Exams:  · RLE ROM: WFL  · RLE Strength: 2/5 to 3/5 by observation of transitional mvt  · LLE ROM: WFL  · LLE Strength: 2/5 to 3/5 by observation of transitional mvt    Functional Mobility:  · Bed Mobility:     · Supine to Sit: moderate assistance  · Sit to Supine: moderate assistance and maximal assistance      Therapeutic Activities and Exercises:   na    AM-PAC 6 CLICK MOBILITY  Total Score:7     Patient left supine with all lines intact, call button in reach and spouse present.    GOALS:   Multidisciplinary Problems     Physical Therapy Goals        Problem: Physical Therapy Goal    Goal Priority Disciplines Outcome Goal Variances Interventions   Physical Therapy Goal     PT, PT/OT Ongoing (interventions implemented as appropriate)     Description:  Goals to be met by: 8/15/2019     Patient will increase functional independence with mobility by performin. Supine to sit with Modified [place]  2. Sit to stand transfer with Modified [place]  3. Bed to chair transfer with Modified [place] using Rolling Walker  4. Gait  x 20 feet with Modified [place] using Rolling Walker.                       History:     Past Medical History:   Diagnosis Date    Actinic keratosis     Anemia     Basal cell carcinoma     Carotid stenosis     CLL (chronic lymphocytic leukemia)     Diabetes mellitus     Steroid related    Hyperlipidemia     Hypertension     Hypopituitarism     Hypothyroid     Immunosuppression 3/13/2015    Squamous Cell Carcinoma     on the right side of the face     Squamous cell carcinoma of skin of right temple 2016    Stroke     Syncope     Unspecified disorder of kidney and ureter        Past Surgical History:   Procedure Laterality Date    (ROTATION) FLAP N/A 3/23/2015    Performed [facility] at [facility] OR [address] [place] [address]     BIOPSY-LYMPH NODE N/A 3/23/2015    Performed [facility] at [facility] OR 2ND FLR    CAROTID ENDARTERECTOMY (L)  2/27/2007    COLONOSCOPY N/A 2/25/2014    Performed by DELORES Lambert MD at [facility] ENDO (4TH FLR)    EXCISION TURBINATE, SUBMUCOUS      EXCISION-LESION-FACE Right 2/24/2016    Performed by [facility] at [facility] OR 2ND FLR    EXCISION-LESION-FACE N/A 3/23/2015    Performed by [facility] at [facility] OR 2ND FLR    FESS  9/16/2014    FULL THICKNESS SKIN GRAFT N/A 2/24/2016    Performed by [facility] at [facility] OR 2ND FLR    MAPPING-LYMPH NODE N/A 3/23/2015    [facility]    MAPPING-SENTINEL NODE N/A 2/24/2016    Performed by [facility] at [facility] OR 2ND FLR    Mohs excision   3/23/2015, 2/24/2016    combined with WLE forehead    NASAL SEPTUM SURGERY  9/16/2014    REPAIR-MOHS DEFECT-left nose Left 3/31/2017    Performed by Jermaine Dsouza III, MD at [facility] OR 2ND FLR    RESECTION-TURBINATES (SMR) Bilateral 9/16/2014    Performed by Jermaine Dsouza III, MD at [facility] OR 2ND FLR    right ureter surgery  1995    SENTINEL LYMPH NODE BIOPSY  3/23/2015, 2/24/2016    SEPTOPLASTY Bilateral 9/16/2014    Performed by Jermaine Dsouza III, MD at [facility] OR 2ND FLR    SINUS SURGERY  9/16/2014    septo, ESS, BITSMR    SINUS SURGERY FUNCTIONAL ENDOSCOPIC WITH NAVIGATION with bilateral maxillary, ethmoids, sphenoids, right frontal, possible left NF duct-balloons Bilateral 9/16/2014    Performed by Jermaine Dsouza III, MD at [facility] OR 2ND FLR    Transphenoidal surgery         Time Tracking:     PT Received On: 07/15/19  PT Start Time: 1328     PT Stop Time: 1348  PT Total Time (min): 20 min     Billable Minutes: Evaluation 20      Cb Tineo, PT  07/15/2019

## 2019-07-15 NOTE — H&P
"[facility] Medicine  History & Physical    Patient Name: Thierry Ramos Jr.  MRN: [id number]  Admission Date: 7/15/2019  Attending Physician: Irlanda Braswell MD   Primary Care Provider: AKILA Wooten MD         Patient information was obtained from patient and ER records.     Subjective:     Principal Problem:Sepsis due to pneumonia    Chief Complaint:   Chief Complaint   Patient presents with    Fall     Patient presents to the ED with reports of having had felt weak after using the restroom and "passed out". Patient states having a hx of cancer and that the drug he was being prescribed made him feel week. Spouse states LOC was unwitnessed. Patient reports having skin tears to the right wrist, right elbow, and left upper arm s/p fall.     Loss of Consciousness        HPI: 85 y.o. male with PMHx of DM, CLL, HTN, HLD, CVA, carotid stenosis, hypothyroid, hypopituitarism, squamous cell carcinoma who presents after syncopal episode. Patient reports worsening muscle weakness. Patient and spouse reports patient started becoming weak in beginning of June/2019 and it progressively worsened.  Reports weakness is especially in the legs.  He also reports leg swelling which has been associated to starting Ibrutinib for CLL.  Patient follows with Dr. Trejo for CLL and last seen 7/8/19 for the weakness and was told to hold the Ibrutinib until his next follow up appointment.  Spouse reports patient fell twice last night with loss of consciousness after the second fall.   Denies any fever, SOB, nausea, vomiting, diarrhea, chest pain or abdominal pain. ED findings: WBC 12.75, H/H 12.3/38.9, Lactic Acid 3.0, ALK Phos 258, AST//9, chest x-ray showed mild pulmonary opacity at the left lung base may relate to pulmonary infiltrate/airspace disease, EKG no ischemic changes.  Patient admitted for further medical management.       Past Medical History:   Diagnosis Date    Actinic keratosis     " Anemia     Basal cell carcinoma     Carotid stenosis     CLL (chronic lymphocytic leukemia)     Diabetes mellitus 2014    Steroid related    Hyperlipidemia     Hypertension     Hypopituitarism     Hypothyroid     Immunosuppression 3/13/2015    Squamous Cell Carcinoma     on the right side of the face     Squamous cell carcinoma of skin of right temple 1/27/2016    Stroke     Syncope 2/12    Unspecified disorder of kidney and ureter        Past Surgical History:   Procedure Laterality Date    (ROTATION) FLAP N/A 3/23/2015    Performed by Joe Mcallister MD at [facility] OR 2ND FLR    BIOPSY-LYMPH NODE N/A 3/23/2015    Performed by [facility] at [facility] OR 2ND FLR    CAROTID ENDARTERECTOMY (L)  2/27/2007    COLONOSCOPY N/A 2/25/2014    Performed by DELORES Lambert MD at [facility] ENDO (4TH FLR)    EXCISION TURBINATE, SUBMUCOUS      EXCISION-LESION-FACE Right 2/24/2016    Performed by Joe Mcallister MD at [facility] OR 2ND FLR    EXCISION-LESION-FACE N/A 3/23/2015    Performed by Joe Mcallister MD at [facility] OR 2ND FLR    FESS  9/16/2014    FULL THICKNESS SKIN GRAFT N/A 2/24/2016    Performed by [facility] Mc[facility]ister MD at [facility] OR 2ND FLR    MAPPING-LYMPH NODE N/A 3/23/2015    Performed by Joe Mcallister MD at [facility] OR [address] [place] [address]    MAPPING-SENTINEL NODE N/A 2/24/2016    Performed by Joe Mcallister MD at [facility] OR 2ND FLR    Mohs excision   3/23/2015, 2/24/2016    combined with WLE forehead    NASAL SEPTUM SURGERY  9/16/2014    REPAIR-MOHS DEFECT-left nose Left 3/31/2017    Performed by Jermaine Dsouza III, MD at [facility] OR 2ND FLR    RESECTION-TURBINATES (SMR) Bilateral 9/16/2014    [facility]    right ureter surgery  1995    SENTINEL LYMPH NODE BIOPSY  3/23/2015, 2/24/2016    SEPTOPLASTY Bilateral 9/16/2014    Performed by Jermaine Dsouza III, MD at [facility] OR 2ND FLR    SINUS SURGERY  9/16/2014    septo, ESS, [facility]    SINUS SURGERY FUNCTIONAL ENDOSCOPIC WITH  NAVIGATION with bilateral maxillary, ethmoids, sphenoids, right frontal, possible left NF duct-balloons Bilateral 9/16/2014    Performed by [place] MARY Dsouza III, MD at [facility] OR [address] [place] [address]    Transphenoidal surgery         Review of patient's allergies indicates:   Allergen Reactions    Ace inhibitors Swelling     angioedema    Divalproex Hives     Rash under arms, body creases       No current facility-administered medications on file prior to encounter.      Current Outpatient Medications on File Prior to Encounter   Medication Sig    ibrutinib (IMBRUVICA) 140 mg Cap Take 2 capsules (280 mg) by mouth once daily.    ACCU-CHEK FASTCLIX Misc TEST  BLOOD  GLUCOSE FOUR TIMES DAILY    alcohol swabs PadM APPLY 1 PAD TOPICALLY  AS NEEDED.    aspirin 81 MG Chew 3 days a week    atorvastatin (LIPITOR) 20 MG tablet TAKE 1 TABLET EVERY DAY    clopidogrel (PLAVIX) 75 mg tablet TAKE 1 TABLET EVERY DAY    cyanocobalamin (VITAMIN B-12) 500 MCG tablet Take 1 tablet by mouth Daily.    cycloSPORINE (RESTASIS) 0.05 % ophthalmic emulsion Place 0.4 mLs (1 drop total) into both eyes 2 (two) times daily.    DOCOSAHEXANOIC ACID/EPA (FISH OIL ORAL) Take by mouth once daily.    erythromycin (ROMYCIN) ophthalmic ointment Apply to eyelids and lashes O[facility] QHS    fexofenadine (ALLEGRA) 60 MG tablet Take 1 tablet (60 mg total) by mouth once daily.    fluorouracil (EFUDEX) 5 % cream Use hs for 2 weeks on right cheek    levothyroxine (SYNTHROID) 125 MCG tablet TAKE 1 TABLET ONE TIME DAILY    losartan (COZAAR) 25 MG tablet Take 1 tablet (25 mg total) by mouth once daily.    multivitamin with minerals tablet Take 1 tablet by mouth once daily.    pantoprazole (PROTONIX) 40 MG tablet TAKE 1 TABLET EVERY DAY    pantoprazole (PROTONIX) 40 MG tablet TAKE 1 TABLET EVERY DAY    predniSONE (DELTASONE) 2.5 MG tablet Take 1 tablet (2.5 mg total) by mouth once daily. In addition to 5mg tablets, for total of 7.5mg daily.    predniSONE (DELTASONE)  "5 MG tablet TAKE 1 TABLET (5 MG TOTAL) BY MOUTH ONCE DAILY.    tazarotene (AVAGE) 0.1 % cream Apply topically every evening.    testosterone cypionate (DEPOTESTOTERONE CYPIONATE) 100 mg/mL injection Inject 0.5 mLs (50 mg total) into the muscle every 14 (fourteen) days.    testosterone cypionate (DEPOTESTOTERONE CYPIONATE) 200 mg/mL injection      Family History     Problem Relation (Age of Onset)    Cancer Mother, Father, Brother    Colon cancer Father    No Known Problems Sister, Maternal Aunt, Maternal Uncle, Paternal Aunt, Paternal Uncle, Maternal Grandmother, Maternal Grandfather, Paternal Grandmother, Paternal Grandfather        Tobacco Use    Smoking status: Never Smoker    Smokeless tobacco: Never Used   Substance and Sexual Activity    Alcohol use: Yes     Alcohol/week: 1.2 oz     Types: 1 Glasses of wine, 1 Cans of beer per week     Comment: "rarely"    Drug use: No    Sexual activity: Not Currently     Review of Systems   Constitutional: Positive for fatigue. Negative for chills and fever.   HENT: Negative for congestion, postnasal drip and rhinorrhea.    Eyes: Negative for visual disturbance.   Respiratory: Negative for chest tightness and shortness of breath.    Cardiovascular: Positive for leg swelling. Negative for chest pain.   Gastrointestinal: Negative for abdominal distention and abdominal pain.   Genitourinary: Negative for difficulty urinating.   Musculoskeletal: Negative for arthralgias and myalgias.   Skin: Negative for color change.   Neurological: Positive for weakness.   Hematological: Does not bruise/bleed easily.   Psychiatric/Behavioral: Negative for agitation.     Objective:     Vital Signs (Most Recent):  Temp: 96.7 °F (35.9 °C)(oral unable to register and pt refused rectal temp) (07/15/19 1134)  Pulse: 67 (07/15/19 1231)  Resp: (!) 30 (07/15/19 1231)  BP: (!) 115/57 (07/15/19 1231)  SpO2: 99 % (07/15/19 1231) Vital Signs (24h Range):  Temp:  [96.7 °F (35.9 °C)-98.5 °F (36.9 " °C)] 96.7 °F (35.9 °C)  Pulse:  [53-78] 67  Resp:  [20-38] 30  SpO2:  [95 %-100 %] 99 %  BP: ()/(37-85) 115/57     Weight: 68 kg (150 lb)  Body mass index is 20.92 kg/m².    Physical Exam   Constitutional: He appears well-developed and well-nourished.   HENT:   Head: Normocephalic and atraumatic.   Eyes: EOM are normal.   Neck: Normal range of motion. Neck supple.   Cardiovascular: Normal rate.   Pulmonary/Chest: Effort normal and breath sounds normal.   Abdominal: Soft. Bowel sounds are normal.   Musculoskeletal: He exhibits edema (bilateral lower extremities).   Neurological: He is alert.   Intermittent confusion   Skin: Skin is warm and dry.   Psychiatric: He has a normal mood and affect.         CRANIAL NERVES     CN III, IV, VI   Extraocular motions are normal.        Significant Labs:   A1C:   Recent Labs   Lab 04/05/19  0748   HGBA1C 5.6       BMP:   Recent Labs   Lab 07/15/19  0802   GLU 84      K 3.9      CO2 25   BUN 34*   CREATININE 1.1   CALCIUM 9.2   MG 1.8     CBC:   Recent Labs   Lab 07/15/19  0855   WBC 12.75*   HGB 12.3*   HCT 38.9*   *     CMP:   Recent Labs   Lab 07/15/19  0802      K 3.9      CO2 25   GLU 84   BUN 34*   CREATININE 1.1   CALCIUM 9.2   PROT 5.5*   ALBUMIN 2.8*   BILITOT 0.8   ALKPHOS 258*   *   ALT 91*   ANIONGAP 10   EGFRNONAA >60     Lactic Acid:   Recent Labs   Lab 07/15/19  0855   LACTATE 3.0*     Magnesium:   Recent Labs   Lab 07/15/19  0802   MG 1.8     Troponin:   Recent Labs   Lab 07/15/19  0802   TROPONINI 0.012     TSH:   Recent Labs   Lab 07/15/19  0802   TSH 0.040*     Urine Studies:   Recent Labs   Lab 07/15/19  0926   COLORU [place]*   APPEARANCEUA Clear   PHUR 6.0   SPECGRAV 1.020   PROTEINUA 2+*   GLUCUA Negative   KETONESU Trace*   BILIRUBINUA Negative   OCCULTUA Trace*   NITRITE Negative   UROBILINOGEN 1.0   LEUKOCYTESUR Negative   RBCUA 0   WBCUA 3   BACTERIA None   HYALINECASTS 16*       Significant Imaging: I have  reviewed all pertinent imaging results/findings within the past 24 hours.    Assessment/Plan:     * Sepsis due to pneumonia  Altered mental status  Leukocytosis  Weakness      Chest X-ray showed left lung opacity   Continuous Cardiac monitoring  O2 @ 2L NC  Vanc and Zosyn  Blood Cultures: pending  Albuterol-ipratropium neb treatments Q4HR  Troponin: WNL  Lactic Acid: 3.0 Repeat: pending  IVFs NS@ 100cchr   PT/OT    Abnormal liver enzymes  Monitor        Type 2 diabetes, controlled, with neuropathy  Hemoglobin A1c: pending  Current BS: 84  POC glucose checks ac meals and nightly  Low dose SSI PRN  Hypoglycemia protocol PRN  Diabetic Diet        Central hypothyroidism  Continue synthroid      Thrombocytopenia  Monitor      Panhypopituitarism  Continue prednisone      CLL (chronic lymphocytic leukemia)  Immunosuppression  Edema of both legs    Follows with Dr. Trejo  Ibrutinib placed on hold by Dr. Trejo 2/2 to worsening weakness and leg swelling.        Hypertension  Hold home BP meds  SBP        Mixed dyslipidemia  History of CVA (cerebrovascular accident)    Denies chest pain or SOB  Continue aspirin, statin and plavix      VTE Risk Mitigation (From admission, onward)        Ordered     enoxaparin injection 40 mg  Daily      07/15/19 1149     IP VTE HIGH RISK PATIENT  Once      07/15/19 1149             Sarah Monroy NP  Department of Hospital Medicine   [facility]

## 2019-07-15 NOTE — ED NOTES
"Pt c/o generalized weakness and of 2 falls last night. Wife reports pt has been weak for the past several days and also has been having swelling to both legs for the past few days. Saw his oncologist and pcp last week and had 1 cancer medication stopped temporarily to see if that improved his weakness. Denies any improvement. Wife reports pt fell into a doorway first last night and hit his right arm. Did not fall to the ground. Has 2 bleeding skin tears to right arm from this fall. Pt then fell again in the bathroom this morning and passed out. Wife reports she heard him fall and went into the bathroom and found him "semi conscious" lying on the ground. Pt obtained skin tear to his left upper arm with this fall. Also has bruising and swelling to his left upper arm. Pt also has been c/o tailbone pain for awhile, and has been sitting on a pillow, but wife reports he did not have any bruising to his buttocks prior to today. Large, purple bruise noted to right buttock. Pt denied hitting his head when he fell. At this time pt is awake, alert, oriented to self only. Wife reports pt is normally orientedx4. Generalized weakness noted. Swelling noted to bilateral arms and legs. PERRLA. Skin is warm, dry, and pt's wife denies recent fever. Sinus rhythm on monitor. VSS. Will continue to monitor closely.  "

## 2019-07-15 NOTE — SUBJECTIVE & OBJECTIVE
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    Hyperlipidemia     Hypertension     Hypopituitarism     Hypothyroid     Immunosuppression 3/13/2015    Squamous Cell Carcinoma     on the right side of the face     Squamous cell carcinoma of skin of right temple 1/27/2016    Stroke     Syncope 2/12    Unspecified disorder of kidney and ureter        Past Surgical History:   Procedure Laterality Date    (ROTATION) FLAP N/A 3/23/2015    Performed by [facility] at [facility] OR 2ND FLR    BIOPSY-LYMPH NODE N/A 3/23/2015    Performed by Joe Mcallister MD at [facility] OR 2ND FLR    CAROTID ENDARTERECTOMY (L)  2/27/2007    COLONOSCOPY N/A 2/25/2014    Performed by DELORES Lambert MD at [facility] ENDO (4TH FLR)    EXCISION TURBINATE, SUBMUCOUS      EXCISION-LESION-FACE Right 2/24/2016    Performed by Joe Mcallister MD at [facility] OR 2ND FLR    EXCISION-LESION-FACE N/A 3/23/2015    Performed by Joe Mcallister MD at [facility] OR 2ND FLR    FESS  9/16/2014    FULL THICKNESS SKIN GRAFT N/A 2/24/2016    Performed by Joe Mcallister MD at [facility] OR 2ND FLR    MAPPING-LYMPH NODE N/A 3/23/2015    Performed by Joe [facility]allister MD at [facility] OR 2ND FLR    MAPPING-SENTINEL NODE N/A 2/24/2016    Performed by Joe Mcallister MD at [facility] OR 2ND FLR    Mohs excision   3/23/2015, 2/24/2016    combined with WLE forehead    NASAL SEPTUM SURGERY  9/16/2014    REPAIR-MOHS DEFECT-left nose Left 3/31/2017    Performed by Jermaine Dsouza III, MD at [facility] OR 2ND FLR    RESECTION-TURBINATES (SMR) Bilateral 9/16/2014    Performed by Jermaine Dsouza III, MD at [facility] OR 2ND FLR    right ureter surgery  1995    SENTINEL LYMPH NODE BIOPSY  3/23/2015, 2/24/2016    SEPTOPLASTY Bilateral 9/16/2014    Performed by Jermaine Dsouza III, MD at [facility] OR 2ND FLR    SINUS SURGERY  9/16/2014     septo, ESS, [facility]    SINUS SURGERY FUNCTIONAL ENDOSCOPIC WITH NAVIGATION with bilateral maxillary, ethmoids, sphenoids, right frontal, possible left NF duct-balloons Bilateral 9/16/2014    Performed by [place] MARY Dsouza III, MD at [facility] OR [address] [place] [address]    Transphenoidal surgery         Review of patient's allergies indicates:   Allergen Reactions    Ace inhibitors Swelling     angioedema    Divalproex Hives     Rash under arms, body creases       No current facility-administered medications on file prior to encounter.      Current Outpatient Medications on File Prior to Encounter   Medication Sig    ibrutinib (IMBRUVICA) 140 mg Cap Take 2 capsules (280 mg) by mouth once daily.    ACCU-CHEK FASTCLIX Misc TEST  BLOOD  GLUCOSE FOUR TIMES DAILY    alcohol swabs PadM APPLY 1 PAD TOPICALLY  AS NEEDED.    aspirin 81 MG Chew 3 days a week    atorvastatin (LIPITOR) 20 MG tablet TAKE 1 TABLET EVERY DAY    clopidogrel (PLAVIX) 75 mg tablet TAKE 1 TABLET EVERY DAY    cyanocobalamin (VITAMIN B-12) 500 MCG tablet Take 1 tablet by mouth Daily.    cycloSPORINE (RESTASIS) 0.05 % ophthalmic emulsion Place 0.4 mLs (1 drop total) into both eyes 2 (two) times daily.    DOCOSAHEXANOIC ACID/EPA (FISH OIL ORAL) Take by mouth once daily.    erythromycin (ROMYCIN) ophthalmic ointment Apply to eyelids and lashes OU QHS    fexofenadine (ALLEGRA) 60 MG tablet Take 1 tablet (60 mg total) by mouth once daily.    fluorouracil (EFUDEX) 5 % cream Use hs for 2 weeks on right cheek    levothyroxine (SYNTHROID) 125 MCG tablet TAKE 1 TABLET ONE TIME DAILY    losartan (COZAAR) 25 MG tablet Take 1 tablet (25 mg total) by mouth once daily.    multivitamin with minerals tablet Take 1 tablet by mouth once daily.    pantoprazole (PROTONIX) 40 MG tablet TAKE 1 TABLET EVERY DAY    pantoprazole (PROTONIX) 40 MG tablet TAKE 1 TABLET EVERY DAY    predniSONE (DELTASONE) 2.5 MG tablet Take 1 tablet (2.5 mg total) by mouth once daily. In addition to  "5mg tablets, for total of 7.5mg daily.    predniSONE (DELTASONE) 5 MG tablet TAKE 1 TABLET (5 MG TOTAL) BY MOUTH ONCE DAILY.    tazarotene (AVAGE) 0.1 % cream Apply topically every evening.    testosterone cypionate (DEPOTESTOTERONE CYPIONATE) 100 mg/mL injection Inject 0.5 mLs (50 mg total) into the muscle every 14 (fourteen) days.    testosterone cypionate (DEPOTESTOTERONE CYPIONATE) 200 mg/mL injection      Family History     Problem Relation (Age of Onset)    Cancer Mother, Father, Brother    Colon cancer Father    No Known Problems Sister, Maternal Aunt, Maternal Uncle, Paternal Aunt, Paternal Uncle, Maternal Grandmother, Maternal Grandfather, Paternal Grandmother, Paternal Grandfather        Tobacco Use    Smoking status: Never Smoker    Smokeless tobacco: Never Used   Substance and Sexual Activity    Alcohol use: Yes     Alcohol/week: 1.2 oz     Types: 1 Glasses of wine, 1 Cans of beer per week     Comment: "rarely"    Drug use: No    Sexual activity: Not Currently     Review of Systems   Constitutional: Positive for fatigue. Negative for chills and fever.   HENT: Negative for congestion, postnasal drip and rhinorrhea.    Eyes: Negative for visual disturbance.   Respiratory: Negative for chest tightness and shortness of breath.    Cardiovascular: Positive for leg swelling. Negative for chest pain.   Gastrointestinal: Negative for abdominal distention and abdominal pain.   Genitourinary: Negative for difficulty urinating.   Musculoskeletal: Negative for arthralgias and myalgias.   Skin: Negative for color change.   Neurological: Positive for weakness.   Hematological: Does not bruise/bleed easily.   Psychiatric/Behavioral: Negative for agitation.     Objective:     Vital Signs (Most Recent):  Temp: 96.7 °F (35.9 °C)(oral unable to register and pt refused rectal temp) (07/15/19 1134)  Pulse: 67 (07/15/19 1231)  Resp: (!) 30 (07/15/19 1231)  BP: (!) 115/57 (07/15/19 1231)  SpO2: 99 % (07/15/19 1231) " Vital Signs (24h Range):  Temp:  [96.7 °F (35.9 °C)-98.5 °F (36.9 °C)] 96.7 °F (35.9 °C)  Pulse:  [53-78] 67  Resp:  [20-38] 30  SpO2:  [95 %-100 %] 99 %  BP: ()/(37-85) 115/57     Weight: 68 kg (150 lb)  Body mass index is 20.92 kg/m².    Physical Exam   Constitutional: He appears well-developed and well-nourished.   HENT:   Head: Normocephalic and atraumatic.   Eyes: EOM are normal.   Neck: Normal range of motion. Neck supple.   Cardiovascular: Normal rate.   Pulmonary/Chest: Effort normal and breath sounds normal.   Abdominal: Soft. Bowel sounds are normal.   Musculoskeletal: He exhibits edema (bilateral lower extremities).   Neurological: He is alert.   Intermittent confusion   Skin: Skin is warm and dry.   Psychiatric: He has a normal mood and affect.         CRANIAL NERVES     CN III, IV, VI   Extraocular motions are normal.        Significant Labs:   A1C:   Recent Labs   Lab 04/05/19  0748   HGBA1C 5.6       BMP:   Recent Labs   Lab 07/15/19  0802   GLU 84      K 3.9      CO2 25   BUN 34*   CREATININE 1.1   CALCIUM 9.2   MG 1.8     CBC:   Recent Labs   Lab 07/15/19  0855   WBC 12.75*   HGB 12.3*   HCT 38.9*   *     CMP:   Recent Labs   Lab 07/15/19  0802      K 3.9      CO2 25   GLU 84   BUN 34*   CREATININE 1.1   CALCIUM 9.2   PROT 5.5*   ALBUMIN 2.8*   BILITOT 0.8   ALKPHOS 258*   *   ALT 91*   ANIONGAP 10   EGFRNONAA >60     Lactic Acid:   Recent Labs   Lab 07/15/19  0855   LACTATE 3.0*     Magnesium:   Recent Labs   Lab 07/15/19  0802   MG 1.8     Troponin:   Recent Labs   Lab 07/15/19  0802   TROPONINI 0.012     TSH:   Recent Labs   Lab 07/15/19  0802   TSH 0.040*     Urine Studies:   Recent Labs   Lab 07/15/19  0926   COLORU [place]*   APPEARANCEUA Clear   PHUR 6.0   SPECGRAV 1.020   PROTEINUA 2+*   GLUCUA Negative   KETONESU Trace*   BILIRUBINUA Negative   OCCULTUA Trace*   NITRITE Negative   UROBILINOGEN 1.0   LEUKOCYTESUR Negative   RBCUA 0   WBCUA 3    BACTERIA None   HYALINECASTS 16*       Significant Imaging: I have reviewed all pertinent imaging results/findings within the past 24 hours.

## 2019-07-15 NOTE — PLAN OF CARE
Problem: Occupational Therapy Goal  Goal: Occupational Therapy Goal  Goals to be met by: 8/15     Patient will increase functional independence with ADLs by performing:    Feeding with Modified [place].  UE Dressing with Set-up Assistance.  Grooming while seated with Modified [place] post setup.  Toileting from bedside commode with Minimal Assistance for hygiene and clothing management.   Sitting at edge of bed x15 minutes with Modified [place] and no drop in BP.  Rolling to Bilateral with Modified [place].   Supine to sit with Modified [place].  Toilet transfer to bedside commode with Minimal Assistance.  Upper extremity exercise program x10 reps per handout, with independence.    Outcome: Ongoing (interventions implemented as appropriate)  OT eval limited by orthostatic BP.  Report to follow    Ongoing assessment of needs

## 2019-07-15 NOTE — ED PROVIDER NOTES
"Encounter Date: 7/15/2019    SCRIBE #1 NOTE: I, Tereza Harris, am scribing for, and in the presence of,  Dr. Braswell. I have scribed the entire note.       History     Chief Complaint   Patient presents with    Fall     Patient presents to the ED with reports of having had felt weak after using the restroom and "passed out". Patient states having a hx of cancer and that the drug he was being prescribed made him feel week. Spouse states LOC was unwitnessed. Patient reports having skin tears to the right wrist, right elbow, and left upper arm s/p fall.     Loss of Consciousness     Time seen by provider: 6:51 AM    This is a 84 y/o male with PMHx of DM, CLL, HTN, HLD, CVA who presents after syncopal episode. C/o generalized weakness. He reports increasing muscle weakness--especially to his legs-- and leg swelling since starting ibrutinib in January.  He saw Dr. Albright for this complaint on 7/8/19.    Per spouse, patient fell twice last night with loss of consciousness after the second fall.   Patient denies any fever, nausea, diarrhea, chest pain or abdominal pain. There are no other complaints at this time.        The history is provided by the patient.     Review of patient's allergies indicates:   Allergen Reactions    Ace inhibitors Swelling     angioedema    Divalproex Hives     Rash under arms, body creases     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    Hyperlipidemia     Hypertension     Hypopituitarism     Hypothyroid     Immunosuppression 3/13/2015    Squamous Cell Carcinoma     on the right side of the face     Squamous cell carcinoma of skin of right temple 1/27/2016    Stroke     Syncope 2/12    Unspecified disorder of kidney and ureter      Past Surgical History:   Procedure Laterality Date    (ROTATION) FLAP N/A 3/23/2015    " Performed by Joe [facility]allister [facility] at [facility] OR 2ND FLR    BIOPSY-LYMPH NODE N/A 3/23/2015    Performed by Joe Mcallister MD at [facility] OR 2ND FLR    CAROTID ENDARTERECTOMY (L)  2/27/2007    COLONOSCOPY N/A 2/25/2014    Performed by DELORES Lambert MD at [facility] ENDO (4TH FLR)    EXCISION TURBINATE, SUBMUCOUS      EXCISION-LESION-FACE Right 2/24/2016    Performed by [facility]ister [facility] at [facility] OR 2ND FLR    EXCISION-LESION-FACE N/A 3/23/2015    Performed by Joe Mcallister MD at [facility] OR 2ND FLR    FESS  9/16/2014    FULL THICKNESS SKIN GRAFT N/A 2/24/2016    Performed by Joe [facility]ister [facility] at [facility] OR 2ND [facility]    MAPPING-LYMPH NODE N/A 3/23/2015    [facility]    MAPPING-SENTINEL NODE N/A 2/24/2016    Performed by [facility] at [facility] OR 2ND [facility]    Mohs excision   3/23/2015, 2/24/2016    combined with WLE forehead    NASAL SEPTUM SURGERY  9/16/2014    REPAIR-MOHS DEFECT-left nose Left 3/31/2017    Performed by Jermaine Dsouza III, MD at [facility] OR [address] [place] [address]    RESECTION-TURBINATES (SMR) Bilateral 9/16/2014    Per[facility] Salis[facility]au[facility]    right ureter surgery  1995    SENTINEL LYMPH NODE BIOPSY  3/23/2015, 2/24/2016    SEPTOPLASTY Bilateral 9/16/2014    [facility]    SINUS SURGERY  9/16/2014    septo, ESS, BITSMR    SINUS SURGERY FUNCTIONAL ENDOSCOPIC WITH NAVIGATION with bilateral maxillary, ethmoids, sphenoids, right frontal, possible left NF duct-balloons Bilateral 9/16/2014    Performed by Jermaine Dsouza III, MD at [facility] OR [address] [place] [address]    Transphenoidal surgery       Family History   Problem Relation Age of Onset    Cancer Mother         breast    Colon cancer Father     Cancer Father         colon    Cancer Brother         leukemia    No Known Problems Sister     No Known Problems Maternal Aunt     No Known Problems Maternal Uncle     No Known Problems Paternal Aunt     No Known  "Problems Paternal Uncle     No Known Problems Maternal Grandmother     No Known Problems Maternal Grandfather     No Known Problems Paternal Grandmother     No Known Problems Paternal Grandfather     Amblyopia Neg Hx     Blindness Neg Hx     Cataracts Neg Hx     Diabetes Neg Hx     Glaucoma Neg Hx     Hypertension Neg Hx     Macular degeneration Neg Hx     Retinal detachment Neg Hx     Strabismus Neg Hx     Stroke Neg Hx     Thyroid disease Neg Hx     Allergic rhinitis Neg Hx     Allergies Neg Hx     Angioedema Neg Hx     Asthma Neg Hx     Atopy Neg Hx     Eczema Neg Hx     Immunodeficiency Neg Hx     Rhinitis Neg Hx     Urticaria Neg Hx      Social History     Tobacco Use    Smoking status: Never Smoker    Smokeless tobacco: Never Used   Substance Use Topics    Alcohol use: Yes     Alcohol/week: 1.2 oz     Types: 1 Glasses of wine, 1 Cans of beer per week     Comment: "rarely"    Drug use: No     Review of Systems   Constitutional: Negative for chills and fever.   HENT: Negative for congestion and sore throat.    Eyes: Negative for pain and visual disturbance.   Respiratory: Negative for shortness of breath.    Cardiovascular: Negative for chest pain.   Gastrointestinal: Negative for constipation, diarrhea, nausea and vomiting.   Genitourinary: Negative for dysuria, frequency and urgency.   Musculoskeletal: Negative for back pain.   Skin: Positive for wound. Negative for rash.   Neurological: Positive for syncope. Negative for dizziness and weakness.   Hematological: Does not bruise/bleed easily.   Psychiatric/Behavioral: Negative for agitation, behavioral problems and confusion.       Physical Exam     Initial Vitals [07/15/19 0649]   BP Pulse Resp Temp SpO2   (!) 126/59 75 20 98.5 °F (36.9 °C) 99 %      MAP       --         Physical Exam    Nursing note and vitals reviewed.  Constitutional: He appears well-developed and well-nourished. He is not diaphoretic. No distress.   HENT: "   Head: Normocephalic and atraumatic. Head is without raccoon's eyes, without Cordoba's sign, without abrasion, without contusion and without laceration.   Right Ear: External ear normal.   Left Ear: External ear normal.   Nose: Nose normal.   Eyes: Conjunctivae and EOM are normal. Pupils are equal, round, and reactive to light.   Neck: Normal range of motion. Neck supple.   No bony tenderness to midline cervical spine   Cardiovascular: Normal rate, regular rhythm, normal heart sounds and intact distal pulses. Exam reveals no friction rub.    No murmur heard.  Pulmonary/Chest: Breath sounds normal. No respiratory distress. He has no wheezes. He has no rhonchi. He has no rales.   Abdominal: Soft. He exhibits no distension and no mass. There is no tenderness.   Musculoskeletal: Normal range of motion. He exhibits no edema or tenderness.   2+ pitting arden  2+ pulses in all four extremities  No midline bony TTP to thoracic or lumbar spine  MAEW  Stable pelvis   Neurological: He is alert. No cranial nerve deficit or sensory deficit.   Oriented to person only.   Skin: Skin is warm and dry. Capillary refill takes less than 2 seconds.   1.5 cm skin tear to right elbow.   3 cm skin tear to left proximal upper arm with surrounding ecchymosis   3 cm skin tear to dorsal right wrist.   Psychiatric: He has a normal mood and affect.         ED Course   PIV under US guidance  Date/Time: 7/15/2019 8:28 AM  Performed by: Irlanda Braswell MD  Authorized by: Irlanda Braswell MD   Area Prepped With: Chlorohexidine.  Catheter Size: 20 ga.  Number of attempts: 1  Fixation/Dressing: Taped in place.  Patient tolerance: Patient tolerated the procedure well with no immediate complications  Comments: US PIV to right brachial    Critical Care  Date/Time: 7/15/2019 8:55 AM  Performed by: Irlanda Braswell MD  Authorized by: Irlanda Braswell MD   Total critical care time (exclusive of procedural time) : 35 minutes  Critical care was  necessary to treat or prevent imminent or life-threatening deterioration of the following conditions: sepsis.  Critical care was time spent personally by me on the following activities: blood draw for specimens, discussions with consultants, evaluation of patient's response to treatment, obtaining history from patient or surrogate, ordering and review of laboratory studies, pulse oximetry, vascular access procedures, review of old charts, re-evaluation of patient's condition, ordering and review of radiographic studies, ordering and performing treatments and interventions, examination of patient, discussions with primary provider and development of treatment plan with patient or surrogate.    Central Line  Date/Time: 7/15/2019 10:05 AM  Performed by: Irlanda Braswell MD  Consent Done: Yes  Indications: med administration  Anesthesia: local infiltration    Anesthesia:  Local Anesthetic: lidocaine 1% without epinephrine  Preparation: skin prepped with ChloraPrep  Skin prep agent dried: skin prep agent completely dried prior to procedure  Sterile barriers: all five maximum sterile barriers used - cap, mask, sterile gown, sterile gloves, and large sterile sheet  Hand hygiene: hand hygiene performed prior to central venous catheter insertion  Location details: right internal jugular  Catheter type: triple lumen  Ultrasound guidance: yes  Needle advanced into vessel with real time Ultrasound guidance.  Guidewire confirmed in vessel.  Sterile sheath used.  Number of attempts: 1  Assessment: placement verified by x-ray,  no pneumothorax on x-ray and successful placement  Complications: none  Complications: No        Labs Reviewed   COMPREHENSIVE METABOLIC PANEL - Abnormal; Notable for the following components:       Result Value    BUN, Bld 34 (*)     Total Protein 5.5 (*)     Albumin 2.8 (*)     Alkaline Phosphatase 258 (*)      (*)     ALT 91 (*)     All other components within normal limits   URINALYSIS -  Abnormal; Notable for the following components:    Color, UA Orange (*)     Protein, UA 2+ (*)     Ketones, UA Trace (*)     Occult Blood UA Trace (*)     All other components within normal limits   TSH - Abnormal; Notable for the following components:    TSH 0.040 (*)     All other components within normal limits   LACTIC ACID, PLASMA - Abnormal; Notable for the following components:    Lactate (Lactic Acid) 3.0 (*)     All other components within normal limits   CBC W/ AUTO DIFFERENTIAL - Abnormal; Notable for the following components:    WBC 12.75 (*)     RBC 4.13 (*)     Hemoglobin 12.3 (*)     Hematocrit 38.9 (*)     Mean Corpuscular Hemoglobin Conc 31.6 (*)     RDW 16.1 (*)     Platelets 120 (*)     Platelet Estimate Decreased (*)     All other components within normal limits   URINALYSIS MICROSCOPIC - Abnormal; Notable for the following components:    Hyaline Casts, UA 16 (*)     All other components within normal limits   CULTURE, BLOOD   CULTURE, BLOOD   TROPONIN I   MAGNESIUM   B-TYPE NATRIURETIC PEPTIDE   T4, FREE   LACTIC ACID, PLASMA   POCT GLUCOSE MONITORING CONTINUOUS          X-Rays:   Independently Interpreted Readings:   Other Readings:  Reviewed by myself, read by radiology.    Imaging Results          X-Ray Chest AP Portable (Final result)  Result time 07/15/19 10:26:43    Final result by Moose Kraus MD (07/15/19 10:26:43)                 Impression:      Right central line tip projecting in the mid SVC.  No significant changes.      Electronically signed by: Moose Kraus MD  Date:    07/15/2019  Time:    10:26             Narrative:    EXAMINATION:  XR CHEST AP PORTABLE    CLINICAL HISTORY:  Encounter for adjustment and management of vascular access device    TECHNIQUE:  Single frontal view of the chest was performed.    COMPARISON:  07/15/2019 at 07:29    FINDINGS:  Right central line tip projecting in the distal SVC.  There is mild left lung base opacification, similar to the prior exam.   No pneumothorax.  Heart size unchanged.                               CT Head Without Contrast (Final result)  Result time 07/15/19 08:24:35    Final result by Moose Kraus MD (07/15/19 08:24:35)                 Impression:      No acute intracranial abnormality.      Electronically signed by: Moose Kraus MD  Date:    07/15/2019  Time:    08:24             Narrative:    EXAMINATION:  CT HEAD WITHOUT CONTRAST    CLINICAL HISTORY:  Confusion/delirium, altered LOC, unexplained;    TECHNIQUE:  Low dose axial images were obtained through the head.  Coronal and sagittal reformations were also performed. Contrast was not administered.    COMPARISON:  None.    FINDINGS:  No extra-axial fluid collections or intracranial hemorrhage.  No mass effect.  Several small foci of low attenuation in the basal ganglia/periventricular white matter, suggesting remote lacunar type infarcts.  Cerebral volume loss noted.  Thinning of the corpus callosum noted.  Small amount of encephalomalacia in the base of the left frontal lobe suggesting remote injury.  Prior left frontal craniotomy noted.  Mild mucosal thickening in the paranasal sinuses.  Mastoid air cells are clear.                               X-Ray Chest AP Portable (Final result)  Result time 07/15/19 08:00:50    Final result by Laurent Muñoz MD (07/15/19 08:00:50)                 Impression:      Mild pulmonary opacity at the left lung base may relate to pulmonary infiltrate/airspace disease however in the setting of trauma the possibility of pulmonary contusive change is to be considered.    Suspected minimal pleural fluid at the left costophrenic sulcus, incompletely imaged.      Electronically signed by: Laurent Muñoz  Date:    07/15/2019  Time:    08:00             Narrative:    EXAMINATION:  XR CHEST AP PORTABLE    CLINICAL HISTORY:  fall;    TECHNIQUE:  Single frontal view of the chest was performed.    COMPARISON:  October 8, 2018    FINDINGS:  Single portable  chest view is submitted.  The cardiomediastinal silhouette appears stable.  There is appearance of mild infiltrate at the left lung base, and suspicion of blunting left costophrenic angle which is incompletely imaged on this film.  The right hemithorax appears clear.  There is no evidence for large pleural effusion and no evidence for pneumothorax.  The osseous structures demonstrate chronic change.                                Medical Decision Making:   Initial Assessment:   This is a 85 y.o. male who presents after syncopal episode  Differential Diagnosis:   Arrhythmia, pacemaker dysfunction, obstructive cardiomyopathy, structural disease, aortic dissection, PE, pulmonary hypertension, carotid sinus syndrome, situational, vasovagal, cerebrovascular, neurogenic, psychogenic, drug-induced, autonomic failure, dehydration.     Independently Interpreted Test(s):   I have ordered and independently interpreted EKG Reading(s) - see prior notes  Clinical Tests:   Lab Tests: Ordered and Reviewed  Radiological Study: Ordered and Reviewed  Medical Tests: Ordered and Reviewed  ED Management:  Patient treated for PNA and sepsis.  Will be admitted to ICU for further management.                     ED Course as of Jul 15 1229   Mon Jul 15, 2019   0701 BP(!): 126/59 [LD]   0701 Temp: 98.5 °F (36.9 °C) [LD]   0701 Pulse: 75 [LD]   0701 Resp: 20 [LD]   0701 SpO2: 99 % [LD]   0718 EKG of poor quality,  Appears to be NSR.  Rate of 67 bpm.  No STEMI    [LD]   0800 Called to patients bedside for decreased LOC.  Patient was not responding to verbal or painful stimuli.  SBP at this time was in the 70s.  Patient placed in trendelenburg and given IVFs.  BP improved as well as mental status.      [LD]   0831 PIV placed under US guidance    [LD]   0839 CXR: Mild pulmonary opacity at the left lung base may relate to pulmonary infiltrate/airspace disease however in the setting of trauma the possibility of pulmonary contusive change is to be  considered.   Considering patient for sepsis at this time given opacity on CXR and new cough.  Code sepsis called.     [LD]   0854 Patient had another episode of decreased loc, BP at this time was 60s/30s.  Patient placed in trendelenburg and regained full consciousness.  EKG during episode with sinus bradycardia, rate of 53 bpm.  Normal intervals, normal conduction, no STEMI    [LD]   0856 BP(!): 67/37 [LD]   0857 Creatinine: 1.1 [LD]   0857 BUN, Bld(!): 34 [LD]   1004 WBC(!): 12.75 [LD]   1037 [facility] hospitalist consulted for admission    [LD]      ED Course User Index  [LD] Irlanda Braswell MD     Clinical Impression:       ICD-10-CM ICD-9-CM   1. Altered mental status, unspecified altered mental status type R41.82 780.97   2. Syncope R55 780.2   3. Fall W19.XXXA E888.9   4. Encounter for central line placement Z45.2 V58.81   5. Sepsis, due to unspecified organism A41.9 038.9     995.91   6. Pneumonia of left lower lobe due to infectious organism J18.1 486   7. Sepsis A41.9 038.9     995.91         Disposition:   Disposition: Admitted  Condition: Fair    I, Irlanda Braswell,  personally performed the services described in this documentation. All medical record entries made by the scribe were at my direction and in my presence.  I have reviewed the chart and agree that the record reflects my personal performance and is accurate and complete. Irlanda Braswell M.D. 7:36 AM07/15/2019                     Irlanda Braswell MD  07/15/19 1229

## 2019-07-15 NOTE — PROGRESS NOTES
Pharmacist Renal Dose Adjustment Note    Thierry Ramos Jr. is a 85 y.o. male being treated with the medication Zosyn    Patient Data:    Vital Signs (Most Recent):  Temp: 96.7 °F (35.9 °C)(oral unable to register and pt refused rectal temp) (07/15/19 1134)  Pulse: 72 (07/15/19 1000)  Resp: (!) 25 (07/15/19 1000)  BP: (!) 140/63 (07/15/19 1000)  SpO2: 98 % (07/15/19 0950)   Vital Signs (72h Range):  Temp:  [96.7 °F (35.9 °C)-98.5 °F (36.9 °C)]   Pulse:  [53-78]   Resp:  [20-38]   BP: ()/(37-85)   SpO2:  [95 %-100 %]      Recent Labs   Lab 07/08/19  1354 07/15/19  0802   CREATININE 1.0 1.1     Serum creatinine: 1.1 mg/dL 07/15/19 0802  Estimated creatinine clearance: 47.2 mL/min    Medication:Zosyn dose: 3.375 gm frequency Q 8 hrs will be changed to medication:Zosyn dose: 4.5 gm frequency:Q 8 hrs    Pharmacist's Name: Yaneth Borrego  Pharmacist's Extension:[phone]

## 2019-07-15 NOTE — PLAN OF CARE
Problem: Physical Therapy Goal  Goal: Physical Therapy Goal  Goals to be met by: 8/15/2019     Patient will increase functional independence with mobility by performin. Supine to sit with Modified [place]  2. Sit to stand transfer with Modified [place]  3. Bed to chair transfer with Modified [place] using Rolling Walker  4. Gait  x 20 feet with Modified [place] using Rolling Walker.     Outcome: Ongoing (interventions implemented as appropriate)  Recommendations TBD

## 2019-07-15 NOTE — PT/OT/SLP EVAL
Occupational Therapy   Evaluation    Name: Thierry Ramos Jr.  MRN: [id number]  Admitting Diagnosis:  Sepsis due to pneumonia      Recommendations:     Discharge Recommendations: other (see comments)(TBD)  Discharge Equipment Recommendations:  other (see comments)(TBD)  Barriers to discharge:  None    Assessment:     Thierry Ramos Jr. is a 85 y.o. male with a medical diagnosis of Sepsis due to pneumonia.  He presents with performance deficits affecting function: weakness, impaired endurance, impaired self care skills, impaired functional mobilty, gait instability, impaired balance, decreased lower extremity function, decreased upper extremity function, decreased ROM, impaired skin, edema, impaired cardiopulmonary response to activity.      Rehab Prognosis: Fair; patient would benefit from acute skilled OT services to address these deficits and reach maximum level of function.       Plan:     Patient to be seen 5 x/week to address the above listed problems via self-care/home management, therapeutic activities, therapeutic exercises  · Plan of Care Expires: 08/15/19  · Plan of Care Reviewed with: patient, spouse    Subjective     Chief Complaint: weakness, symptomatic low BP  Patient/Family Comments/goals: return to PLOF    Occupational Profile:  Living Environment: no concerns  Previous level of function: mod I  Roles and Routines:   Equipment Used at Home:     Assistance upon Discharge: family    Pain/Comfort:  · Pain Rating 1: 0/10  · Pain Rating Post-Intervention 1: 0/10    Patients cultural, spiritual, Worship conflicts given the current situation: no    Objective:     Communicated with: nurse prior to session.  Patient found HOB elevated with blood pressure cuff, central line, telemetry, michel catheter, pulse ox (continuous) upon OT entry to room.    General Precautions: Standard, fall   Orthopedic Precautions:    Braces:       Occupational Performance:    Bed Mobility:    · Patient completed Supine to Sit  with moderate assistance and 2 persons    Functional Mobility/Transfers:  · Unable 2/2 symptomatic BP seated EOB  · Functional Mobility: NT    Activities of Daily Living:  · Unable to assess    Cognitive/Visual Perceptual:  AO4  Physical Exam:  Pt with multiple bruises and skin tears throughout limbs; nurse notified of continued bleeding  AROM WFL by observation, strength NT    AMPAC 6 Click ADL:  [facility] Total Score: 10    Treatment & Education:  Pt/fam educated on role of OT/POC  BP in supine 110/53  MAP 76 HR 70  Pt moved to sit EOB  Seated EOB ~30 secs 83/48 MAP 60 HR 79--vitals completed after return to supine  Supine x 1-2 minutes 86/42 MAP 60 HR 73  Supine ~5 minutes 108/50 MAP 72 HR 63  Nurse notified of above  Education:    Patient left HOB elevated with all lines intact, call button in reach, nurse notified and spouse present    GOALS:   Multidisciplinary Problems     Occupational Therapy Goals        Problem: Occupational Therapy Goal    Goal Priority Disciplines Outcome Interventions   Occupational Therapy Goal     OT, PT/OT Ongoing (interventions implemented as appropriate)    Description:  Goals to be met by: 8/15     Patient will increase functional independence with ADLs by performing:    Feeding with Modified Esmeralda.  UE Dressing with Set-up Assistance.  Grooming while seated with Modified Esmeralda post setup.  Toileting from bedside commode with Minimal Assistance for hygiene and clothing management.   Sitting at edge of bed x15 minutes with Modified Esmeralda and no drop in BP.  Rolling to Bilateral with Modified Esmeralda.   Supine to sit with Modified Esmeralda.  Toilet transfer to bedside commode with Minimal Assistance.  Upper extremity exercise program x10 reps per handout, with independence.                      History:     Past Medical History:   Diagnosis Date    Actinic keratosis     Anemia     Basal cell carcinoma     Carotid stenosis     CLL (chronic lymphocytic  leukemia)     Diabetes mellitus 2014    Steroid related    Hyperlipidemia     Hypertension     Hypopituitarism     Hypothyroid     Immunosuppression 3/13/2015    Squamous Cell Carcinoma     on the right side of the face     Squamous cell carcinoma of skin of right temple 1/27/2016    Stroke     Syncope 2/12    Unspecified disorder of kidney and ureter        Past Surgical History:   Procedure Laterality Date    (ROTATION) FLAP N/A 3/23/2015    Performed by Joe Mcallister MD at [facility] OR 2ND FLR    BIOPSY-LYMPH NODE N/A 3/23/2015    Performed by Joe Mcallister MD at [facility] OR 2ND FLR    CAROTID ENDARTERECTOMY (L)  2/27/2007    COLONOSCOPY N/A 2/25/2014    Performed by EDLORES Lambert MD at [facility] ENDO (4TH FLR)    EXCISION TURBINATE, SUBMUCOUS      EXCISION-LESION-FACE Right 2/24/2016    Performed by Joe Mcallister MD at [facility] OR 2ND FLR    EXCISION-LESION-FACE N/A 3/23/2015    Performed by Joe Mcallister MD at [facility] OR 2ND FLR    FESS  9/16/2014    FULL THICKNESS SKIN GRAFT N/A 2/24/2016    Performed by Joe Mcallister MD at [facility] OR 2ND FLR    MAPPING-LYMPH NODE N/A 3/23/2015    Performed by Joe Mcallister MD at [facility] OR [address] [place] [address]    MAPPING-SENTINEL NODE N/A 2/24/2016    Performed by Joe Mcallister MD at [facility] OR 2ND FLR    Mohs excision   3/23/2015, 2/24/2016    combined with WLE forehead    NASAL SEPTUM SURGERY  9/16/2014    REPAIR-MOHS DEFECT-left nose Left 3/31/2017    Performed by Jermaine Dsouza III, MD at [facility] OR 2ND FLR    RESECTION-TURBINATES (SMR) Bilateral 9/16/2014    Performed by Jermaine Dsouza III, MD at [facility] OR 2ND FLR    right ureter surgery  1995    SENTINEL LYMPH NODE BIOPSY  3/23/2015, 2/24/2016    SEPTOPLASTY Bilateral 9/16/2014    Performed by Jermaine Dsouza III, MD at [facility] OR 2ND FLR    SINUS SURGERY  9/16/2014    septo, ESS, BITSMR    SINUS SURGERY FUNCTIONAL ENDOSCOPIC WITH NAVIGATION with bilateral maxillary, ethmoids, sphenoids, right frontal, possible left NF  duct-balloons Bilateral 9/16/2014    Performed by Jermaine [facility] III, [facility] at [facility] OR [facility] FLR    Transphenoidal surgery         Time Tracking:     OT Date of Treatment: 07/15/19  OT Start Time: 1328  OT Stop Time: 1348  OT Total Time (min): 20 min w/PT    Billable Minutes:Evaluation 20    Landon Stewart OT  7/15/2019

## 2019-07-15 NOTE — HPI
85 y.o. male with PMHx of DM, CLL, HTN, HLD, CVA, carotid stenosis, hypothyroid, hypopituitarism, squamous cell carcinoma who presents after syncopal episode. Patient reports worsening muscle weakness. Patient and spouse reports patient started becoming weak in beginning of June/2019 and it progressively worsened.  Reports weakness is especially in the legs.  He also reports leg swelling which has been associated to starting Ibrutinib for CLL.  Patient follows with Dr. Trejo for CLL and last seen 7/8/19 for the weakness and was told to hold the Ibrutinib until his next follow up appointment.  Spouse reports patient fell twice last night with loss of consciousness after the second fall.   Denies any fever, SOB, nausea, vomiting, diarrhea, chest pain or abdominal pain. ED findings: WBC 12.75, H/H 12.3/38.9, Lactic Acid 3.0, ALK Phos 258, AST//9, chest x-ray showed mild pulmonary opacity at the left lung base may relate to pulmonary infiltrate/airspace disease, EKG no ischemic changes.  Patient admitted for further medical management.

## 2019-07-15 NOTE — ASSESSMENT & PLAN NOTE
Hemoglobin A1c: pending  Current BS: 84  POC glucose checks ac meals and nightly  Low dose SSI PRN  Hypoglycemia protocol PRN  Diabetic Diet

## 2019-07-15 NOTE — ASSESSMENT & PLAN NOTE
Immunosuppression  Edema of both legs    Follows with Dr. Trejo  Ibrutinib placed on hold by Dr. Trejo 2/2 to worsening weakness and leg swelling.

## 2019-07-15 NOTE — ASSESSMENT & PLAN NOTE
Altered mental status  Leukocytosis  Weakness      Chest X-ray showed left lung opacity   Continuous Cardiac monitoring  O2 @ 2L NC  Vanc and Zosyn  Blood Cultures: pending  Albuterol-ipratropium neb treatments Q4HR  Troponin: WNL  Lactic Acid: 3.0 Repeat: pending  IVFs NS@ 100cchr   PT/OT

## 2019-07-15 NOTE — ASSESSMENT & PLAN NOTE
History of CVA (cerebrovascular accident)    Denies chest pain or SOB  Continue aspirin, statin and plavix

## 2019-07-16 ENCOUNTER — ANESTHESIA EVENT (OUTPATIENT)
Dept: INTENSIVE CARE | Facility: HOSPITAL | Age: 84
DRG: 871 | End: 2019-07-16
Payer: MEDICARE

## 2019-07-16 ENCOUNTER — ANESTHESIA (OUTPATIENT)
Dept: INTENSIVE CARE | Facility: HOSPITAL | Age: 84
DRG: 871 | End: 2019-07-16
Payer: MEDICARE

## 2019-07-16 PROBLEM — Z71.89 GOALS OF CARE, COUNSELING/DISCUSSION: Status: ACTIVE | Noted: 2019-07-16

## 2019-07-16 PROBLEM — Z51.5 PALLIATIVE CARE ENCOUNTER: Status: ACTIVE | Noted: 2019-07-16

## 2019-07-16 PROBLEM — R34 OLIGURIA: Status: ACTIVE | Noted: 2019-07-16

## 2019-07-16 LAB
ABO + RH BLD: NORMAL
ALBUMIN SERPL BCP-MCNC: 2.4 G/DL (ref 3.5–5.2)
ALP SERPL-CCNC: 182 U/L (ref 55–135)
ALT SERPL W/O P-5'-P-CCNC: 58 U/L (ref 10–44)
ANION GAP SERPL CALC-SCNC: 11 MMOL/L (ref 8–16)
ANION GAP SERPL CALC-SCNC: 12 MMOL/L (ref 8–16)
ANISOCYTOSIS BLD QL SMEAR: SLIGHT
AST SERPL-CCNC: 247 U/L (ref 10–40)
BACTERIA #/AREA URNS HPF: NORMAL /HPF
BASOPHILS # BLD AUTO: ABNORMAL K/UL (ref 0–0.2)
BASOPHILS NFR BLD: 0 % (ref 0–1.9)
BILIRUB SERPL-MCNC: 1 MG/DL (ref 0.1–1)
BILIRUB UR QL STRIP: NEGATIVE
BLD GP AB SCN CELLS X3 SERPL QL: NORMAL
BLD PROD TYP BPU: NORMAL
BLOOD UNIT EXPIRATION DATE: NORMAL
BLOOD UNIT TYPE CODE: 5100
BLOOD UNIT TYPE: NORMAL
BUN SERPL-MCNC: 39 MG/DL (ref 8–23)
BUN SERPL-MCNC: 39 MG/DL (ref 8–23)
BURR CELLS BLD QL SMEAR: ABNORMAL
CALCIUM SERPL-MCNC: 7.9 MG/DL (ref 8.7–10.5)
CALCIUM SERPL-MCNC: 8.2 MG/DL (ref 8.7–10.5)
CHLORIDE SERPL-SCNC: 109 MMOL/L (ref 95–110)
CHLORIDE SERPL-SCNC: 110 MMOL/L (ref 95–110)
CK SERPL-CCNC: 81 U/L (ref 20–200)
CLARITY UR: CLEAR
CO2 SERPL-SCNC: 16 MMOL/L (ref 23–29)
CO2 SERPL-SCNC: 17 MMOL/L (ref 23–29)
CODING SYSTEM: NORMAL
COLOR UR: YELLOW
CREAT SERPL-MCNC: 1.7 MG/DL (ref 0.5–1.4)
CREAT SERPL-MCNC: 1.7 MG/DL (ref 0.5–1.4)
CREAT UR-MCNC: 72 MG/DL (ref 23–375)
DIFFERENTIAL METHOD: ABNORMAL
DISPENSE STATUS: NORMAL
EOSINOPHIL # BLD AUTO: ABNORMAL K/UL (ref 0–0.5)
EOSINOPHIL NFR BLD: 0 % (ref 0–8)
EOSINOPHIL NFR BLD: 0 % (ref 0–8)
EOSINOPHIL NFR BLD: 2 % (ref 0–8)
ERYTHROCYTE [DISTWIDTH] IN BLOOD BY AUTOMATED COUNT: 16.3 % (ref 11.5–14.5)
EST. GFR  (AFRICAN AMERICAN): 42 ML/MIN/1.73 M^2
EST. GFR  (AFRICAN AMERICAN): 42 ML/MIN/1.73 M^2
EST. GFR  (NON AFRICAN AMERICAN): 36 ML/MIN/1.73 M^2
EST. GFR  (NON AFRICAN AMERICAN): 36 ML/MIN/1.73 M^2
ESTIMATED AVG GLUCOSE: 114 MG/DL (ref 68–131)
GLUCOSE SERPL-MCNC: 163 MG/DL (ref 70–110)
GLUCOSE SERPL-MCNC: 177 MG/DL (ref 70–110)
GLUCOSE UR QL STRIP: NEGATIVE
HBA1C MFR BLD HPLC: 5.6 % (ref 4–5.6)
HCT VFR BLD AUTO: 26.3 % (ref 40–54)
HCT VFR BLD AUTO: 28.8 % (ref 40–54)
HCT VFR BLD AUTO: 30.1 % (ref 40–54)
HGB BLD-MCNC: 8.5 G/DL (ref 14–18)
HGB BLD-MCNC: 9.5 G/DL (ref 14–18)
HGB BLD-MCNC: 9.7 G/DL (ref 14–18)
HGB UR QL STRIP: ABNORMAL
HYPOCHROMIA BLD QL SMEAR: ABNORMAL
HYPOCHROMIA BLD QL SMEAR: ABNORMAL
INR PPP: 1.2 (ref 0.8–1.2)
KETONES UR QL STRIP: NEGATIVE
LACTATE SERPL-SCNC: 1.6 MMOL/L (ref 0.5–2.2)
LACTATE SERPL-SCNC: 3 MMOL/L (ref 0.5–2.2)
LEUKOCYTE ESTERASE UR QL STRIP: ABNORMAL
LYMPHOCYTES # BLD AUTO: ABNORMAL K/UL (ref 1–4.8)
LYMPHOCYTES NFR BLD: 15 % (ref 18–48)
LYMPHOCYTES NFR BLD: 20 % (ref 18–48)
LYMPHOCYTES NFR BLD: 43 % (ref 18–48)
MAGNESIUM SERPL-MCNC: 1.3 MG/DL (ref 1.6–2.6)
MCH RBC QN AUTO: 29.2 PG (ref 27–31)
MCH RBC QN AUTO: 29.5 PG (ref 27–31)
MCH RBC QN AUTO: 30 PG (ref 27–31)
MCHC RBC AUTO-ENTMCNC: 32.2 G/DL (ref 32–36)
MCHC RBC AUTO-ENTMCNC: 32.3 G/DL (ref 32–36)
MCHC RBC AUTO-ENTMCNC: 33 G/DL (ref 32–36)
MCV RBC AUTO: 90 FL (ref 82–98)
MCV RBC AUTO: 91 FL (ref 82–98)
MCV RBC AUTO: 92 FL (ref 82–98)
METAMYELOCYTES NFR BLD MANUAL: 1 %
METAMYELOCYTES NFR BLD MANUAL: 1 %
METAMYELOCYTES NFR BLD MANUAL: 3 %
MICROSCOPIC COMMENT: NORMAL
MONOCYTES # BLD AUTO: ABNORMAL K/UL (ref 0.3–1)
MONOCYTES NFR BLD: 6 % (ref 4–15)
MYELOCYTES NFR BLD MANUAL: 5 %
MYELOCYTES NFR BLD MANUAL: 6 %
MYELOCYTES NFR BLD MANUAL: 6 %
NEUTROPHILS # BLD AUTO: ABNORMAL K/UL (ref 1.8–7.7)
NEUTROPHILS NFR BLD: 35 % (ref 38–73)
NEUTROPHILS NFR BLD: 52 % (ref 38–73)
NEUTROPHILS NFR BLD: 58 % (ref 38–73)
NEUTS BAND NFR BLD MANUAL: 12 %
NEUTS BAND NFR BLD MANUAL: 15 %
NEUTS BAND NFR BLD MANUAL: 8 %
NITRITE UR QL STRIP: NEGATIVE
OVALOCYTES BLD QL SMEAR: ABNORMAL
OVALOCYTES BLD QL SMEAR: ABNORMAL
PH UR STRIP: 6 [PH] (ref 5–8)
PHOSPHATE SERPL-MCNC: 3.7 MG/DL (ref 2.7–4.5)
PLATELET # BLD AUTO: 10 K/UL (ref 150–350)
PLATELET # BLD AUTO: 8 K/UL (ref 150–350)
PLATELET # BLD AUTO: 9 K/UL (ref 150–350)
PLATELET BLD QL SMEAR: ABNORMAL
PMV BLD AUTO: ABNORMAL FL (ref 9.2–12.9)
POCT GLUCOSE: 157 MG/DL (ref 70–110)
POCT GLUCOSE: 171 MG/DL (ref 70–110)
POCT GLUCOSE: 182 MG/DL (ref 70–110)
POIKILOCYTOSIS BLD QL SMEAR: SLIGHT
POLYCHROMASIA BLD QL SMEAR: ABNORMAL
POTASSIUM SERPL-SCNC: 4.2 MMOL/L (ref 3.5–5.1)
POTASSIUM SERPL-SCNC: 4.4 MMOL/L (ref 3.5–5.1)
PROT SERPL-MCNC: 4.5 G/DL (ref 6–8.4)
PROT UR QL STRIP: ABNORMAL
PROTHROMBIN TIME: 12 SEC (ref 9–12.5)
RBC # BLD AUTO: 2.91 M/UL (ref 4.6–6.2)
RBC # BLD AUTO: 3.17 M/UL (ref 4.6–6.2)
RBC # BLD AUTO: 3.29 M/UL (ref 4.6–6.2)
RBC #/AREA URNS HPF: 3 /HPF (ref 0–4)
SODIUM SERPL-SCNC: 137 MMOL/L (ref 136–145)
SODIUM SERPL-SCNC: 138 MMOL/L (ref 136–145)
SODIUM UR-SCNC: 29 MMOL/L (ref 20–250)
SP GR UR STRIP: <=1.005 (ref 1–1.03)
SQUAMOUS #/AREA URNS HPF: 3 /HPF
TRANS PLATPHERESIS VOL PATIENT: NORMAL ML
URATE SERPL-MCNC: 8.3 MG/DL (ref 3.4–7)
URN SPEC COLLECT METH UR: ABNORMAL
UROBILINOGEN UR STRIP-ACNC: NEGATIVE EU/DL
UUN UR-MCNC: 490 MG/DL (ref 140–1050)
WBC # BLD AUTO: 19.89 K/UL (ref 3.9–12.7)
WBC # BLD AUTO: 24.62 K/UL (ref 3.9–12.7)
WBC # BLD AUTO: 25.61 K/UL (ref 3.9–12.7)
WBC #/AREA URNS HPF: 2 /HPF (ref 0–5)

## 2019-07-16 PROCEDURE — 63600175 PHARM REV CODE 636 W HCPCS: Mod: HCNC | Performed by: EMERGENCY MEDICINE

## 2019-07-16 PROCEDURE — 25000003 PHARM REV CODE 250: Mod: HCNC | Performed by: FAMILY MEDICINE

## 2019-07-16 PROCEDURE — 85027 COMPLETE CBC AUTOMATED: CPT | Mod: 91,HCNC

## 2019-07-16 PROCEDURE — 99223 1ST HOSP IP/OBS HIGH 75: CPT | Mod: HCNC,,, | Performed by: INTERNAL MEDICINE

## 2019-07-16 PROCEDURE — 84540 ASSAY OF URINE/UREA-N: CPT | Mod: HCNC

## 2019-07-16 PROCEDURE — S0030 INJECTION, METRONIDAZOLE: HCPCS | Mod: HCNC | Performed by: INTERNAL MEDICINE

## 2019-07-16 PROCEDURE — 83735 ASSAY OF MAGNESIUM: CPT | Mod: HCNC

## 2019-07-16 PROCEDURE — 83036 HEMOGLOBIN GLYCOSYLATED A1C: CPT | Mod: HCNC

## 2019-07-16 PROCEDURE — 84100 ASSAY OF PHOSPHORUS: CPT | Mod: HCNC

## 2019-07-16 PROCEDURE — 84550 ASSAY OF BLOOD/URIC ACID: CPT | Mod: HCNC

## 2019-07-16 PROCEDURE — 83605 ASSAY OF LACTIC ACID: CPT | Mod: HCNC

## 2019-07-16 PROCEDURE — 80048 BASIC METABOLIC PNL TOTAL CA: CPT | Mod: HCNC

## 2019-07-16 PROCEDURE — 86920 COMPATIBILITY TEST SPIN: CPT | Mod: HCNC

## 2019-07-16 PROCEDURE — 25000003 PHARM REV CODE 250: Mod: HCNC | Performed by: INTERNAL MEDICINE

## 2019-07-16 PROCEDURE — 82570 ASSAY OF URINE CREATININE: CPT | Mod: HCNC

## 2019-07-16 PROCEDURE — 25000003 PHARM REV CODE 250: Mod: HCNC | Performed by: EMERGENCY MEDICINE

## 2019-07-16 PROCEDURE — 63600175 PHARM REV CODE 636 W HCPCS: Mod: HCNC | Performed by: NURSE PRACTITIONER

## 2019-07-16 PROCEDURE — 81000 URINALYSIS NONAUTO W/SCOPE: CPT | Mod: HCNC

## 2019-07-16 PROCEDURE — 84300 ASSAY OF URINE SODIUM: CPT | Mod: HCNC

## 2019-07-16 PROCEDURE — 25000003 PHARM REV CODE 250: Mod: HCNC | Performed by: NURSE PRACTITIONER

## 2019-07-16 PROCEDURE — 63600175 PHARM REV CODE 636 W HCPCS: Mod: HCNC | Performed by: FAMILY MEDICINE

## 2019-07-16 PROCEDURE — 63600175 PHARM REV CODE 636 W HCPCS: Mod: HCNC | Performed by: INTERNAL MEDICINE

## 2019-07-16 PROCEDURE — 82550 ASSAY OF CK (CPK): CPT | Mod: HCNC

## 2019-07-16 PROCEDURE — 20000000 HC ICU ROOM: Mod: HCNC

## 2019-07-16 PROCEDURE — 83605 ASSAY OF LACTIC ACID: CPT | Mod: 91,HCNC

## 2019-07-16 PROCEDURE — 25000242 PHARM REV CODE 250 ALT 637 W/ HCPCS: Mod: HCNC | Performed by: NURSE PRACTITIONER

## 2019-07-16 PROCEDURE — 85610 PROTHROMBIN TIME: CPT | Mod: HCNC

## 2019-07-16 PROCEDURE — 85007 BL SMEAR W/DIFF WBC COUNT: CPT | Mod: 91,HCNC

## 2019-07-16 PROCEDURE — 94640 AIRWAY INHALATION TREATMENT: CPT | Mod: HCNC

## 2019-07-16 PROCEDURE — P9035 PLATELET PHERES LEUKOREDUCED: HCPCS | Mod: HCNC

## 2019-07-16 PROCEDURE — 87086 URINE CULTURE/COLONY COUNT: CPT | Mod: HCNC

## 2019-07-16 PROCEDURE — 86850 RBC ANTIBODY SCREEN: CPT | Mod: HCNC

## 2019-07-16 PROCEDURE — 63600175 PHARM REV CODE 636 W HCPCS: Mod: HCNC,JG | Performed by: INTERNAL MEDICINE

## 2019-07-16 PROCEDURE — 99223 PR INITIAL HOSPITAL CARE,LEVL III: ICD-10-PCS | Mod: HCNC,,, | Performed by: INTERNAL MEDICINE

## 2019-07-16 PROCEDURE — 80053 COMPREHEN METABOLIC PANEL: CPT | Mod: HCNC

## 2019-07-16 RX ORDER — ACETAMINOPHEN 325 MG/1
650 TABLET ORAL ONCE
Status: COMPLETED | OUTPATIENT
Start: 2019-07-16 | End: 2019-07-16

## 2019-07-16 RX ORDER — CEFEPIME HYDROCHLORIDE 2 G/50ML
2 INJECTION, SOLUTION INTRAVENOUS
Status: DISCONTINUED | OUTPATIENT
Start: 2019-07-16 | End: 2019-07-17

## 2019-07-16 RX ORDER — METRONIDAZOLE 500 MG/100ML
500 INJECTION, SOLUTION INTRAVENOUS
Status: DISCONTINUED | OUTPATIENT
Start: 2019-07-16 | End: 2019-07-22

## 2019-07-16 RX ORDER — HYDROCODONE BITARTRATE AND ACETAMINOPHEN 500; 5 MG/1; MG/1
TABLET ORAL
Status: DISCONTINUED | OUTPATIENT
Start: 2019-07-16 | End: 2019-07-25 | Stop reason: HOSPADM

## 2019-07-16 RX ORDER — DEXAMETHASONE 4 MG/1
40 TABLET ORAL
Status: COMPLETED | OUTPATIENT
Start: 2019-07-16 | End: 2019-07-19

## 2019-07-16 RX ORDER — HYDROCODONE BITARTRATE AND ACETAMINOPHEN 500; 5 MG/1; MG/1
TABLET ORAL
Status: DISCONTINUED | OUTPATIENT
Start: 2019-07-16 | End: 2019-07-16 | Stop reason: SDUPTHER

## 2019-07-16 RX ORDER — MAGNESIUM SULFATE HEPTAHYDRATE 40 MG/ML
2 INJECTION, SOLUTION INTRAVENOUS ONCE
Status: COMPLETED | OUTPATIENT
Start: 2019-07-16 | End: 2019-07-16

## 2019-07-16 RX ORDER — DEXAMETHASONE SODIUM PHOSPHATE 100 MG/10ML
40 INJECTION INTRAMUSCULAR; INTRAVENOUS EVERY 24 HOURS
Status: DISCONTINUED | OUTPATIENT
Start: 2019-07-16 | End: 2019-07-16

## 2019-07-16 RX ORDER — DIPHENHYDRAMINE HCL 25 MG
25 CAPSULE ORAL ONCE
Status: COMPLETED | OUTPATIENT
Start: 2019-07-16 | End: 2019-07-16

## 2019-07-16 RX ORDER — PREDNISONE 2.5 MG/1
7.5 TABLET ORAL DAILY
Status: DISCONTINUED | OUTPATIENT
Start: 2019-07-20 | End: 2019-07-20

## 2019-07-16 RX ADMIN — Medication 0.3 MCG/KG/MIN: at 03:07

## 2019-07-16 RX ADMIN — CETIRIZINE HYDROCHLORIDE 10 MG: 10 TABLET, FILM COATED ORAL at 04:07

## 2019-07-16 RX ADMIN — SODIUM CHLORIDE, SODIUM LACTATE, POTASSIUM CHLORIDE, AND CALCIUM CHLORIDE 500 ML: .6; .31; .03; .02 INJECTION, SOLUTION INTRAVENOUS at 05:07

## 2019-07-16 RX ADMIN — LEVOTHYROXINE SODIUM 125 MCG: 25 TABLET ORAL at 05:07

## 2019-07-16 RX ADMIN — ACETAMINOPHEN 650 MG: 325 TABLET ORAL at 01:07

## 2019-07-16 RX ADMIN — LORAZEPAM 0.5 MG: 2 INJECTION INTRAMUSCULAR; INTRAVENOUS at 05:07

## 2019-07-16 RX ADMIN — IPRATROPIUM BROMIDE AND ALBUTEROL SULFATE 3 ML: .5; 3 SOLUTION RESPIRATORY (INHALATION) at 07:07

## 2019-07-16 RX ADMIN — PREDNISONE 7.5 MG: 2.5 TABLET ORAL at 05:07

## 2019-07-16 RX ADMIN — ERYTHROMYCIN: 5 OINTMENT OPHTHALMIC at 09:07

## 2019-07-16 RX ADMIN — HYPROMELLOSE 2910 1 DROP: 5 SOLUTION OPHTHALMIC at 09:07

## 2019-07-16 RX ADMIN — CEFEPIME HYDROCHLORIDE 2 G: 2 INJECTION, POWDER, FOR SOLUTION INTRAVENOUS at 09:07

## 2019-07-16 RX ADMIN — DEXAMETHASONE 40 MG: 4 TABLET ORAL at 06:07

## 2019-07-16 RX ADMIN — Medication 0.45 MCG/KG/MIN: at 01:07

## 2019-07-16 RX ADMIN — VANCOMYCIN HYDROCHLORIDE 750 MG: 750 INJECTION, POWDER, LYOPHILIZED, FOR SOLUTION INTRAVENOUS at 11:07

## 2019-07-16 RX ADMIN — AZITHROMYCIN MONOHYDRATE 500 MG: 500 INJECTION, POWDER, LYOPHILIZED, FOR SOLUTION INTRAVENOUS at 03:07

## 2019-07-16 RX ADMIN — DIPHENHYDRAMINE HYDROCHLORIDE 25 MG: 25 CAPSULE ORAL at 01:07

## 2019-07-16 RX ADMIN — PANTOPRAZOLE SODIUM 40 MG: 40 TABLET, DELAYED RELEASE ORAL at 04:07

## 2019-07-16 RX ADMIN — Medication 500 MCG: at 04:07

## 2019-07-16 RX ADMIN — IPRATROPIUM BROMIDE AND ALBUTEROL SULFATE 3 ML: .5; 3 SOLUTION RESPIRATORY (INHALATION) at 04:07

## 2019-07-16 RX ADMIN — MAGNESIUM SULFATE IN WATER 2 G: 40 INJECTION, SOLUTION INTRAVENOUS at 08:07

## 2019-07-16 RX ADMIN — PIPERACILLIN AND TAZOBACTAM 4.5 G: 4; .5 INJECTION, POWDER, LYOPHILIZED, FOR SOLUTION INTRAVENOUS; PARENTERAL at 09:07

## 2019-07-16 RX ADMIN — METRONIDAZOLE 500 MG: 500 INJECTION, SOLUTION INTRAVENOUS at 05:07

## 2019-07-16 RX ADMIN — IPRATROPIUM BROMIDE AND ALBUTEROL SULFATE 3 ML: .5; 3 SOLUTION RESPIRATORY (INHALATION) at 11:07

## 2019-07-16 RX ADMIN — PIPERACILLIN AND TAZOBACTAM 4.5 G: 4; .5 INJECTION, POWDER, LYOPHILIZED, FOR SOLUTION INTRAVENOUS; PARENTERAL at 01:07

## 2019-07-16 RX ADMIN — SODIUM CHLORIDE: 0.9 INJECTION, SOLUTION INTRAVENOUS at 01:07

## 2019-07-16 RX ADMIN — ATORVASTATIN CALCIUM 20 MG: 20 TABLET, FILM COATED ORAL at 04:07

## 2019-07-16 RX ADMIN — HUMAN IMMUNOGLOBULIN G 70 G: 10 LIQUID INTRAVENOUS at 01:07

## 2019-07-16 NOTE — PROGRESS NOTES
[facility] Hospital Medicine  Progress Note    Patient Name: Thierry Ramos Jr.  MRN: [id number]  Patient Class: IP- Inpatient   Admission Date: 7/15/2019  Length of Stay: 1 days  Attending Physician: Neli Chapman*  Primary Care Provider: AKILA Wooten MD        Subjective:     Principal Problem:Sepsis due to pneumonia      HPI:  85 y.o. male with PMHx of DM, CLL, HTN, HLD, CVA, carotid stenosis, hypothyroid, hypopituitarism, squamous cell carcinoma who presents after syncopal episode. Patient reports worsening muscle weakness. Patient and spouse reports patient started becoming weak in beginning of June/2019 and it progressively worsened.  Reports weakness is especially in the legs.  He also reports leg swelling which has been associated to starting Ibrutinib for CLL.  Patient follows with Dr. Trejo for CLL and last seen 7/8/19 for the weakness and was told to hold the Ibrutinib until his next follow up appointment.  Spouse reports patient fell twice last night with loss of consciousness after the second fall.   Denies any fever, SOB, nausea, vomiting, diarrhea, chest pain or abdominal pain. ED findings: WBC 12.75, H/H 12.3/38.9, Lactic Acid 3.0, ALK Phos 258, AST//9, chest x-ray showed mild pulmonary opacity at the left lung base may relate to pulmonary infiltrate/airspace disease, EKG no ischemic changes.  Patient admitted for further medical management.       Overview/Hospital Course:  WBC increased to 25. Platelets fell from 120 to 8, so H/O was consulted and patient was transfused 1 unit of platelets. Platelets increased only to 9. Hem onc planned for IVIG. Patient was oliguric with rising Cr, so nephrology was consulted. ID was consulted for sepsis type presentation appearing out of proportion to the equivocal CXR seen on admission.     Interval History: Resting in bed with family at bedside, also seen with H/O Dr. El.     Review of Systems   Unable to perform ROS:  Acuity of condition     Objective:     Vital Signs (Most Recent):  Temp: 97.7 °F (36.5 °C) (07/16/19 1102)  Pulse: 84 (07/16/19 1302)  Resp: (!) 25 (07/16/19 1302)  BP: (!) 145/54 (07/16/19 1202)  SpO2: 95 % (07/16/19 1302) Vital Signs (24h Range):  Temp:  [97.3 °F (36.3 °C)-100.1 °F (37.8 °C)] 97.7 °F (36.5 °C)  Pulse:  [66-95] 84  Resp:  [10-37] 25  SpO2:  [94 %-100 %] 95 %  BP: ()/(40-78) 145/54  Arterial Line BP: (0-153)/(0-47) 141/45     Weight: 71 kg (156 lb 8.4 oz)  Body mass index is 21.83 kg/m².    Intake/Output Summary (Last 24 hours) at 7/16/2019 1328  Last data filed at 7/16/2019 1200  Gross per 24 hour   Intake 2323.83 ml   Output 730 ml   Net 1593.83 ml      Physical Exam   Constitutional: He appears lethargic. He appears distressed.   Pulmonary/Chest: Effort normal. No respiratory distress.   Musculoskeletal: He exhibits no edema.   Neurological: He appears lethargic.   Skin: Bruising and ecchymosis noted. He is not diaphoretic.       Significant Labs:   CBC:   Recent Labs   Lab 07/16/19  0538 07/16/19  0735 07/16/19  1057   WBC 25.61* 24.62* 19.89*   HGB 9.7* 9.5* 8.5*   HCT 30.1* 28.8* 26.3*   PLT 8* 10* 9*     CMP:   Recent Labs   Lab 07/15/19  0802 07/16/19  0200 07/16/19  0735    137 138   K 3.9 4.4 4.2    109 110   CO2 25 17* 16*   GLU 84 177* 163*   BUN 34* 39* 39*   CREATININE 1.1 1.7* 1.7*   CALCIUM 9.2 8.2* 7.9*   PROT 5.5* 4.5*  --    ALBUMIN 2.8* 2.4*  --    BILITOT 0.8 1.0  --    ALKPHOS 258* 182*  --    * 247*  --    ALT 91* 58*  --    ANIONGAP 10 11 12   EGFRNONAA >60 36* 36*     Lactic Acid:   Recent Labs   Lab 07/15/19  0855 07/15/19  1556 07/16/19  0200   LACTATE 3.0* 2.3* 3.0*       Significant Imaging: I have reviewed all pertinent imaging results/findings within the past 24 hours.      Assessment/Plan:      * Sepsis due to pneumonia  Metabolic encephalopathy  Septic shock  Leukocytosis  Weakness  Chest X-ray showed left lung opacity, very mild.    -Vanc  and Zosyn for now, ID consulted  -Blood Cultures: NGTD  -Albuterol-ipratropium neb treatments Q4HR  -Lactic Acid: 3.0 > 2.3 > 3.0; repeat now    Abnormal liver enzymes  Likely 2/2 shock liver in the setting of septic shock.    Central hypothyroidism  -Continue synthroid    CLL (chronic lymphocytic leukemia)  Immunosuppression  Edema of both legs  Follows with Dr. Trejo. Ibrutinib placed on hold by Dr. Trejo 2/2 to worsening weakness and leg swelling.  -Consult hem/onc    Hypertension  SBP  .  -Requiring pressors    Immunosuppression  Noted      Mixed dyslipidemia  History of CVA (cerebrovascular accident)  Denies chest pain or SOB  -Continue aspirin, statin and plavix    Panhypopituitarism  -Continue prednisone, will d/w hem onc    Thrombocytopenia  Plt 120 > 9.   -Transfused platelets with little response  -Further management per Hem Onc, IVIG    Type 2 diabetes, controlled, with neuropathy  Hemoglobin A1c: 5.6  -POC glucose checks ACHS  -Low dose SSI PRN  -Hypoglycemia protocol PRN  -Diabetic Diet              LUZ (acute kidney injury)  Oliguria  Cr 1.0 > 1.7. UOP <25 cc/hr.   -Nephrology consult    Thrombocytopenia, unspecified          VTE Risk Mitigation (From admission, onward)        Ordered     IP VTE HIGH RISK PATIENT  Once      07/15/19 1149          Critical care time spent on the evaluation and treatment of severe organ dysfunction, review of pertinent labs and imaging studies, discussions with consulting providers and discussions with patient/family: 45 minutes.      Gloria Frias PA-C  Department of Hospital Medicine   [facility]

## 2019-07-16 NOTE — PROGRESS NOTES
Patient became confused after shift change when it became dark, possibly due to sundowning. Patient attempted several times to get out of bed. It's continually becoming worse and becoming an issue when taking care of another critical patient. I called DUSTIN Redmond requesting for something to keep patient calm. Per NP, she will review the chart and order something for patient.

## 2019-07-16 NOTE — ED NOTES
Pt now suddenly unresponsive again. Episode lasted 1-2 minutes. Sinus sherry on monitor. Pt again hypotensive. HOB now flat. Dr. Braswell informed

## 2019-07-16 NOTE — PROGRESS NOTES
Pharmacokinetic Initial Assessment: IV Vancomycin    Assessment/Plan:    Initiate intravenous vancomycin with loading dose of 2000 mg once followed by a maintenance dose of vancomycin 750mg IV every 24 hours  Desired empiric serum trough concentration is 10 to 20 mcg/mL.  Draw vancomycin trough level 30 min prior to third dose on 7/17 at approximately 1100   Pharmacy will continue to follow and monitor vancomycin.      Please contact pharmacy at extension [phone] with any questions regarding this assessment.     Thank you for the consult,   Jose Angel Subramanian     Patient brief summary:  Thierry Ramos Jr. is a 85 y.o. male initiated on antimicrobial therapy with IV Vancomycin for treatment of suspected lower respiratory infection    Drug Allergies:   Review of patient's allergies indicates:   Allergen Reactions    Ace inhibitors Swelling     angioedema    Divalproex Hives     Rash under arms, body creases       Actual Body Weight:   71kg    Renal Function:   Estimated Creatinine Clearance: 31.9 mL/min (A) (based on SCr of 1.7 mg/dL (H)).,     Dialysis Method (if applicable):        CBC (last 72 hours):  Recent Labs   Lab Result Units 07/15/19  0855 07/16/19  0200 07/16/19  0538 07/16/19  0735   WBC K/uL 12.75*  --  25.61* 24.62*   Hemoglobin g/dL 12.3*  --  9.7* 9.5*   Hemoglobin A1C %  --  5.6  --   --    Hematocrit % 38.9*  --  30.1* 28.8*   Platelets K/uL 120*  --  8* 10*   Gran% % 42.0  --  52.0 58.0   Lymph% % 44.0  --  20.0 15.0*   Mono% % 11.0  --  6.0 6.0   Eosinophil% % 0.0  --  0.0 2.0   Basophil% % 0.0  --  0.0 0.0   Differential Method  Manual  --  Manual Manual       Metabolic Panel (last 72 hours):  Recent Labs   Lab Result Units 07/15/19  0802 07/15/19  0926 07/16/19  0200 07/16/19  0735   Sodium mmol/L 142  --  137 138   Potassium mmol/L 3.9  --  4.4 4.2   Chloride mmol/L 107  --  109 110   CO2 mmol/L 25  --  17* 16*   Glucose mg/dL 84  --  177* 163*   Glucose, UA   --  Negative  --   --    BUN, Bld  mg/dL 34*  --  39* 39*   Creatinine mg/dL 1.1  --  1.7* 1.7*   Albumin g/dL 2.8*  --  2.4*  --    Total Bilirubin mg/dL 0.8  --  1.0  --    Alkaline Phosphatase U/L 258*  --  182*  --    AST U/L 163*  --  247*  --    ALT U/L 91*  --  58*  --    Magnesium mg/dL 1.8  --  1.3*  --    Phosphorus mg/dL  --   --  3.7  --        Drug levels (last 3 results):  No results for input(s): VANCOMYCINRA, VANCOMYCINPE, VANCOMYCINTR in the last 72 hours.    Microbiologic Results:  Microbiology Results (last 7 days)       Procedure Component Value Units Date/Time    Blood culture x two cultures. Draw prior to antibiotics. [[id number]] Collected:  07/15/19 0855    Order Status:  Completed Specimen:  Blood from Peripheral, Hand, Left Updated:  07/15/19 2115     Blood Culture, Routine No Growth to date    Narrative:       Aerobic and anaerobic    Blood culture x two cultures. Draw prior to antibiotics. [[id number]] Collected:  07/15/19 0859    Order Status:  Completed Specimen:  Blood from Peripheral, Forearm, Left Updated:  07/15/19 2115     Blood Culture, Routine No Growth to date    Narrative:       Aerobic and anaerobic

## 2019-07-16 NOTE — CONSULTS
"[facility]  Hematology/Oncology  Consult Note    Patient Name: Thierry Ramos Jr.  MRN: [id number]  Admission Date: 7/15/2019  Hospital Length of Stay: 1 days  Code Status: Full Code   Attending Provider: Neli Chapman*  Consulting Provider: Braxton El MD  Primary Care Physician: AKILA Wooten MD  Principal Problem:Sepsis due to pneumonia    Inpatient consult to Hematology/Oncology  Consult performed by: Braxton El MD  Consult ordered by: Neli Chapman MD  Reason for consult: thrombocytopenia        Subjective:     HPI:  85 year-old male with chronic lymphocytic leukemia, most recently on ibrutinib but held for the past week, was admitted on 7/15/19 for syncope and fall x 2. He is currently in the ICU being treated for possible sepsis with hypotension. Platelet count had decreased over the past 24 hours from 120 k/uL to < 10 k/uL. 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     Oncology Treatment Plan:   [No treatment plan]    Medications:  Continuous Infusions:   norepinephrine bitartrate-D5W 0.3 mcg/kg/min (07/16/19 1502)     Scheduled Meds:   albuterol-ipratropium  3 mL Nebulization Q4H WAKE    artificial tears  1 drop Both Eyes BID    aspirin  81 mg Oral Daily    atorvastatin  20 mg Oral Daily    azithromycin  500 mg Intravenous Q24H    ceFEPime (MAXIPIME) IVPB  2 g Intravenous Q12H    cetirizine  10 mg Oral Daily    clopidogrel  75 mg Oral Daily    cyanocobalamin  500 mcg Oral Daily    erythromycin   Both Eyes QHS    levothyroxine  125 mcg Oral Before breakfast    metronidazole  500 mg Intravenous Q8H    pantoprazole  40 mg Oral Daily    predniSONE  7.5 mg Oral Daily    vancomycin (VANCOCIN) IVPB  750 mg Intravenous Q24H     PRN Meds:sodium chloride, sodium chloride, sodium chloride, acetaminophen, Dextrose " 10% Bolus, Dextrose 10% Bolus, glucagon (human recombinant), glucose, glucose, insulin aspart U-100, ondansetron, sodium chloride 0.9%     Review of patient's allergies indicates:   Allergen Reactions    Ace inhibitors Swelling     angioedema    Divalproex Hives     Rash under arms, body creases        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    Hyperlipidemia     Hypertension     Hypopituitarism     Hypothyroid     Immunosuppression 3/13/2015    Squamous Cell Carcinoma     on the right side of the face     Squamous cell carcinoma of skin of right temple 1/27/2016    Stroke     Syncope 2/12    Unspecified disorder of kidney and ureter      Past Surgical History:   Procedure Laterality Date    (ROTATION) FLAP N/A 3/23/2015    Performed by Joe Mcallister MD at [facility] OR 2ND FLR    BIOPSY-LYMPH NODE N/A 3/23/2015    Performed by Joe Mcallister MD at [facility] OR 2ND FLR    CAROTID ENDARTERECTOMY (L)  2/27/2007    COLONOSCOPY N/A 2/25/2014    Performed by DELORES Lambert MD at [facility] ENDO (4TH FLR)    EXCISION TURBINATE, SUBMUCOUS      EXCISION-LESION-FACE Right 2/24/2016    Performed by Joe Mcallister MD at [facility] OR 2ND FLR    EXCISION-LESION-FACE N/A 3/23/2015    Performed by Joe Mcallister MD at [facility] OR 2ND FLR    FESS  9/16/2014    FULL THICKNESS SKIN GRAFT N/A 2/24/2016    Performed by Joe Mcallister MD at [facility] OR 2ND FLR    MAPPING-LYMPH NODE N/A 3/23/2015    Performed by Joe Mcallister MD at [facility] OR 2ND FLR    MAPPING-SENTINEL NODE N/A 2/24/2016    Performed by Joe Mcallister MD at [facility] OR 2ND FLR    Mohs excision   3/23/2015, 2/24/2016    combined with WLE forehead    NASAL SEPTUM SURGERY  9/16/2014    REPAIR-MOHS DEFECT-left nose Left 3/31/2017    [facility]     "RESECTION-TURBINATES (SMR) Bilateral 9/16/2014    Performed by Jermaine Dsouza III, MD at [facility] OR 2ND FLR    right ureter surgery  1995    SENTINEL LYMPH NODE BIOPSY  3/23/2015, 2/24/2016    SEPTOPLASTY Bilateral 9/16/2014    Performed by Jermaine Dsouza III, MD at [facility] OR 2ND FLR    SINUS SURGERY  9/16/2014    septo, ESS, BITSMR    SINUS SURGERY FUNCTIONAL ENDOSCOPIC WITH NAVIGATION with bilateral maxillary, ethmoids, sphenoids, right frontal, possible left NF duct-balloons Bilateral 9/16/2014    Performed by Jermaine Dsouza III, MD at [facility] OR 2ND FLR    Transphenoidal surgery       Family History     Problem Relation (Age of Onset)    Cancer Mother, Father, Brother    Colon cancer Father    No Known Problems Sister, Maternal Aunt, Maternal Uncle, Paternal Aunt, Paternal Uncle, Maternal Grandmother, Maternal Grandfather, Paternal Grandmother, Paternal Grandfather        Tobacco Use    Smoking status: Never Smoker    Smokeless tobacco: Never Used   Substance and Sexual Activity    Alcohol use: Yes     Alcohol/week: 1.2 oz     Types: 1 Glasses of wine, 1 Cans of beer per week     Comment: "rarely"    Drug use: No    Sexual activity: Not Currently       Review of Systems   Constitutional: Positive for fatigue.   HENT: Negative for sore throat.    Eyes: Negative for visual disturbance.   Respiratory: Positive for shortness of breath.    Cardiovascular: Positive for leg swelling. Negative for chest pain.   Gastrointestinal: Negative for abdominal pain.   Genitourinary: Negative for dysuria.   Musculoskeletal: Negative for back pain.   Skin: Negative for rash.   Neurological: Positive for weakness.   Psychiatric/Behavioral: The patient is nervous/anxious.      Objective:     Vital Signs (Most Recent):  Temp: 97.6 °F (36.4 °C) (07/16/19 1520)  Pulse: 72 (07/16/19 1640)  Resp: 18 (07/16/19 1640)  BP: (!) 118/52 (07/16/19 1602)  SpO2: 98 % (07/16/19 1640) Vital Signs (24h Range):  Temp:  [97.3 °F (36.3 °C)-100.1 " °F (37.8 °C)] 97.6 °F (36.4 °C)  Pulse:  [70-95] 72  Resp:  [10-37] 18  SpO2:  [94 %-100 %] 98 %  BP: ()/(40-78) 118/52  Arterial Line BP: (0-153)/(0-47) 149/47     Weight: 71 kg (156 lb 8.4 oz)  Body mass index is 21.83 kg/m².  Body surface area is 1.89 meters squared.      Intake/Output Summary (Last 24 hours) at 7/16/2019 1714  Last data filed at 7/16/2019 1600  Gross per 24 hour   Intake 2323.83 ml   Output 745 ml   Net 1578.83 ml       Physical Exam   Constitutional: He is oriented to person, place, and time. He appears well-developed and well-nourished.   HENT:   Head: Normocephalic and atraumatic.   Eyes: Pupils are equal, round, and reactive to light. EOM are normal.   Neck: Normal range of motion. Neck supple.   Cardiovascular: Normal rate and regular rhythm.   Pulmonary/Chest:   Supplemental oxygen via face mask   Abdominal: Soft. Bowel sounds are normal.   Neurological: He is alert and oriented to person, place, and time.   Skin: Skin is warm and dry.   Ecchymoses throughout body   Psychiatric: He has a normal mood and affect. His behavior is normal. Judgment and thought content normal.   Nursing note and vitals reviewed.      Significant Labs:   Labs have been reviewed.    Lab Results   Component Value Date    WBC 19.89 (H) 07/16/2019    HGB 8.5 (L) 07/16/2019    HCT 26.3 (L) 07/16/2019    MCV 90 07/16/2019    PLT 9 (LL) 07/16/2019         Assessment/Plan:     Immune thrombocytopenia  - I reviewed the peripheral smear. Rare enlarged platelets are noted.  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  - I spoke to his hematologist Dr. Marr. She recommended pulse dexamethasone, so I will place order for dexamethasone 40mg PO daily x 4 days.  - I ordered a dose of IV immune globulin as well.  - he did not respond to platelet transfusion, which is consistent with immune thrombocytopenia.  Recommend further transfusion only in the setting of life-threatening bleed.        Thank you for your consult.     Braxton El MD  Hematology/Oncology  [facility]

## 2019-07-16 NOTE — ASSESSMENT & PLAN NOTE
- I reviewed the peripheral smear. Rare enlarged platelets are noted.  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  - I spoke to his hematologist Dr. Marr. She recommended pulse dexamethasone, so I will place order for dexamethasone 40mg PO daily x 4 days.  - I ordered a dose of IV immune globulin as well.  - he did not respond to platelet transfusion, which is consistent with immune thrombocytopenia. Recommend further transfusion only in the setting of life-threatening bleed.

## 2019-07-16 NOTE — HOSPITAL COURSE
WBC increased to 25. Platelets fell from 120 to 8, so H/O was consulted and patient was transfused 1 unit of platelets. Platelets increased only to 9. Hem onc gave IVIG and recommended no further transfusion except in life threatening bleed. Patient was oliguric with rising Cr, so nephrology was consulted. The next day Cr remained stable and UOP increased. ID was consulted, and recommended vanc, cefepime, and azithromycin, and flagyl. Abdominal US was unremarkable. Pt was transfused 2 units PRBC on 7/18 after he pulled his art line and started bleeding. Had episode of afib RVR, worsening hypoxia, and hypontension late 7/18 which resolved. CXR was repeated on 7/19 showing increased interstitial opacities. Lasix dose was increased to 80 TID and then decreased after good UOP. Respiratory status improved. Pt was transferred to the floor on 7/21. Platelets continued to improved. PT/OT recommended SNF placement.  7/23/19  Platelets trending down, appreciate hematology recommendations, continue IV antibiotics.  7/24/19  Platelet count trending up but still very low, hematology follow.  7/25/19 Course of antibiotics complete, platelet count maintaining over 30, patient okay discharge, however, he is currently declining SNF placement.  Patient and spouse in agreement to discharge home with home health.

## 2019-07-16 NOTE — PLAN OF CARE
Problem: Adult Inpatient Plan of Care  Goal: Plan of Care Review  Outcome: Ongoing (interventions implemented as appropriate)  Patient on RA with sats as documented.  Patient given aerosol treatment with no adverse reactions noted. Patient instructed on proper use.  Will continue to monitor.

## 2019-07-16 NOTE — ASSESSMENT & PLAN NOTE
Hemoglobin A1c: 5.6  -POC glucose checks ACHS  -Low dose SSI PRN  -Hypoglycemia protocol PRN  -Diabetic Diet

## 2019-07-16 NOTE — SUBJECTIVE & OBJECTIVE
Interval History: Resting in bed with family at bedside, also seen with H/O Dr. El.     Review of Systems   Unable to perform ROS: Acuity of condition     Objective:     Vital Signs (Most Recent):  Temp: 97.7 °F (36.5 °C) (07/16/19 1102)  Pulse: 84 (07/16/19 1302)  Resp: (!) 25 (07/16/19 1302)  BP: (!) 145/54 (07/16/19 1202)  SpO2: 95 % (07/16/19 1302) Vital Signs (24h Range):  Temp:  [97.3 °F (36.3 °C)-100.1 °F (37.8 °C)] 97.7 °F (36.5 °C)  Pulse:  [66-95] 84  Resp:  [10-37] 25  SpO2:  [94 %-100 %] 95 %  BP: ()/(40-78) 145/54  Arterial Line BP: (0-153)/(0-47) 141/45     Weight: 71 kg (156 lb 8.4 oz)  Body mass index is 21.83 kg/m².    Intake/Output Summary (Last 24 hours) at 7/16/2019 1328  Last data filed at 7/16/2019 1200  Gross per 24 hour   Intake 2323.83 ml   Output 730 ml   Net 1593.83 ml      Physical Exam   Constitutional: He appears lethargic. He appears distressed.   Pulmonary/Chest: Effort normal. No respiratory distress.   Musculoskeletal: He exhibits no edema.   Neurological: He appears lethargic.   Skin: Bruising and ecchymosis noted. He is not diaphoretic.       Significant Labs:   CBC:   Recent Labs   Lab 07/16/19  0538 07/16/19  0735 07/16/19  1057   WBC 25.61* 24.62* 19.89*   HGB 9.7* 9.5* 8.5*   HCT 30.1* 28.8* 26.3*   PLT 8* 10* 9*     CMP:   Recent Labs   Lab 07/15/19  0802 07/16/19  0200 07/16/19  0735    137 138   K 3.9 4.4 4.2    109 110   CO2 25 17* 16*   GLU 84 177* 163*   BUN 34* 39* 39*   CREATININE 1.1 1.7* 1.7*   CALCIUM 9.2 8.2* 7.9*   PROT 5.5* 4.5*  --    ALBUMIN 2.8* 2.4*  --    BILITOT 0.8 1.0  --    ALKPHOS 258* 182*  --    * 247*  --    ALT 91* 58*  --    ANIONGAP 10 11 12   EGFRNONAA >60 36* 36*     Lactic Acid:   Recent Labs   Lab 07/15/19  0855 07/15/19  1556 07/16/19  0200   LACTATE 3.0* 2.3* 3.0*       Significant Imaging: I have reviewed all pertinent imaging results/findings within the past 24 hours.

## 2019-07-16 NOTE — PLAN OF CARE
Problem: Adult Inpatient Plan of Care  Goal: Plan of Care Review  No respiratory distress noted at this time. Will continue to monitor pt.

## 2019-07-16 NOTE — PLAN OF CARE
Hematology/Oncology Note:    Consult is acknowledged.     Lab Results   Component Value Date    WBC 24.62 (H) 07/16/2019    HGB 9.5 (L) 07/16/2019    HCT 28.8 (L) 07/16/2019    MCV 91 07/16/2019    PLT 10 (LL) 07/16/2019     1. Thrombocytopenia  Platelet count on 7/15/19 was 120 k/uL. Such a precipitous drop could be a drug-induced thrombocytopenia vs immune thrombocytopenia in the setting of chronic lymphocytic leukemia.  - I will review the peripheral smear.   - agree with platelet transfusion. If he does not respond to platelet transfusion (check a post-transfusion platelet count about 30 minutes after transfusion is complete), that perhaps is more evidence of an antibody-driven/immune cause of thrombocytopenia.     I will see him later today and write a full consult note.    Braxton El M.D.  Hematology/Oncology  [facility] - [address], [address]  [place] [address]  Phone: [phone]  Fax: [phone]

## 2019-07-16 NOTE — ED NOTES
20g IV started in left AC and labs drawn from site. Less than 1 minute after IV started pt suddenly noted to be staring off into space. Pt not answering when his name is called, only groans in response to his name. Vital signs reassessed. Pt hypotensive. Dr. Braswell notified. IV fluids started, per verbal order. Pt awakened after approximately 1 minute and was able to speak and answer questions after awakening.

## 2019-07-16 NOTE — ED NOTES
Left AC IV has infiltrated. Discontinued per protocol. Pressure dressing applied. Sonosite brought to bedside. Dr. Braswell informed and will place IV under US guidance.

## 2019-07-16 NOTE — PLAN OF CARE
Problem: Adult Inpatient Plan of Care  Goal: Plan of Care Review  Outcome: Ongoing (interventions implemented as appropriate)       07/16/19 0139   Plan of Care Review   Plan of Care Reviewed With Patient  Safety: call light within reach, pt. Oriented to room & instructed how to notify nurse if assistance is needed, current questions/concerns addressed, bed in lowest position with wheels locked & side rails up x3. Pt. Educated regarding fall prevention and taking appropriate action to prevent falls. Activity: repositions self in bed, encouraged activity, encouraged ROM, encouraged independency. Neurological: oriented x1 (self only) Respiratory: room air, O2 sat WNL, no acute changes Cardiac: HR stable, BP normotensive, afebrile Intake/Output: michel, decreasing UOP, no BM overnight. Diet: diabetic diet  Pain: controlled with PRN medication Skin: bruised from fall Plan: all questions/concerns were addressed, verbal education provided, meds reviewed, labs and vitals are being monitored.   Progress no change          Problem: Fall Injury Risk  Goal: Absence of Fall and Fall-Related Injury  Outcome: Ongoing (interventions implemented as appropriate)  Intervention: Identify and Manage Contributors to Fall Injury Risk       07/15/19 1738 07/15/19 2315   Identify and Manage Contributors to Fall Injury Risk   Self-Care Promotion independence encouraged;BADL personal objects within reach;BADL personal routines maintained  --    Manage Acute Allergic Reaction   Medication Review/Management  --  medications reviewed      Intervention: Promote Injury-Free Environment       07/15/19 2318 07/16/19 0115   Optimize Balance and Safe Activity   Safety Promotion/Fall Prevention  --  assistive device/personal item within reach;bed alarm set;lighting adjusted;medications reviewed;side rails raised x 3;instructed to call staff for mobility   Optimize [place] and Functional Mobility   Environmental Safety Modification assistive  device/personal items within reach;clutter free environment maintained;lighting adjusted;room organization consistent  --            Problem: Diabetes Comorbidity  Goal: Blood Glucose Level Within Desired Range  Outcome: Ongoing (interventions implemented as appropriate)  Intervention: Maintain Glycemic Control       07/15/19 2315   Monitor and Manage Ketoacidosis   Glycemic Management blood glucose monitoring            Problem: Infection  Goal: Infection Symptom Resolution  Outcome: Ongoing (interventions implemented as appropriate)  Intervention: Prevent or Manage Infection       07/15/19 2315 07/16/19 0139   Prevent or Manage Infection   Fever Reduction/Comfort Measures  --  lightweight bedding;lightweight clothing   Infection Management aseptic technique maintained  --    Manage Diarrhea   Isolation Precautions protective environment maintained  --             Problem: Skin Injury Risk Increased  Goal: Skin Health and Integrity  Outcome: Ongoing (interventions implemented as appropriate)  Intervention: Optimize Skin Protection       07/16/19 0115 07/16/19 0139   Prevent Additional Skin Injury   Head of Bed (HOB) HOB at 30-45 degrees  --    Pressure Reduction Devices  --  specialty bed utilized   Pressure Reduction Techniques  --  frequent weight shift encouraged;heels elevated off bed   Monitor and Manage Hypervolemia   Skin Protection  --  adhesive use limited;incontinence pads utilized;tubing/devices free from skin contact

## 2019-07-16 NOTE — PT/OT/SLP PROGRESS
Occupational Therapy  Missed Visit    Patient Name:  Thierry Ramos Jr.   MRN:  [id number]    Patient not seen today secondary to  nursing hold; in 4 pt restraints. Will follow-up .    Landon Stewart OT  7/16/2019

## 2019-07-16 NOTE — PROGRESS NOTES
DUSTIN Redmond was notified regarding mag of 1.3 and lactic acid of 3.0. Requested for mag replacement. Per Nyla, day shift team will follow up with the labs.

## 2019-07-16 NOTE — HPI
"85 year-old male with chronic lymphocytic leukemia, most recently on ibrutinib but held for the past week, was admitted on 7/15/19 for syncope and fall x 2. He is currently in the ICU being treated for possible sepsis with hypotension. Platelet count had decreased over the past 24 hours from 120 k/uL to < 10 k/uL. 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   "

## 2019-07-16 NOTE — PLAN OF CARE
Problem: Adult Inpatient Plan of Care  Goal: Plan of Care Review  POC reviewed with pt and family at 1500. Oncology, Infectious Disease, Nephrology, and Palliative care consulted today. Platelets given x1 unit with no increase in critically low platelet count, refer to lab results. IGG started today. Urine output 25-30cc, refer to urine lab results. BUE and BLE restraints d/c per policy, refer to charting. Pt with multiple skin tear, dressings are saturated and required changing today, InnMD karissa aware orders received to repeat labs in AM.  Pt and spouse verbalized understanding. Questions and concerns addressed.Pt progressing toward goals. Will continue to monitor. See flowsheets for full assessment and VS info.

## 2019-07-16 NOTE — CONSULTS
Consult Note  Palliative Care      Consult Requested By: Neli Chapman*  Reason for Consult: Family support    SUBJECTIVE:     History of Present Illness:  Disease Process: Cancer    85 y.o. male with PMHx of DM, CLL, HTN, HLD, CVA, carotid stenosis, hypothyroid, hypopituitarism, squamous cell carcinoma who presents after syncopal episode. Patient reports worsening muscle weakness. Patient and spouse reports patient started becoming weak in beginning of June/2019 and it progressively worsened.  Reports weakness is especially in the legs.  He also reports leg swelling which has been associated to starting Ibrutinib for CLL.  Patient follows with Dr. Trejo for CLL and last seen 7/8/19 for the weakness and was told to hold the Ibrutinib until his next follow up appointment.  Spouse reports patient fell twice last night with loss of consciousness after the second fall.   Denies any fever, SOB, nausea, vomiting, diarrhea, chest pain or abdominal pain. ED findings: WBC 12.75, H/H 12.3/38.9, Lactic Acid 3.0, ALK Phos 258, AST//9, chest x-ray showed mild pulmonary opacity at the left lung base may relate to pulmonary infiltrate/airspace disease, EKG no ischemic changes.  Patient admitted for further medical management.    Patient with worsening clinical status, sudden worsening of thrombocytopenia, LUZ, confusion. Family by darryl and concerned with sudden change  Palliative care has been consulted for GOC and emotional support  Palliative care met with family updated on labs, imaging studies and addressed questions and concerns. Emotional comfort provided.         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    Hyperlipidemia     Hypertension     Hypopituitarism     Hypothyroid     Immunosuppression 3/13/2015    Squamous Cell Carcinoma      on the right side of the face     Squamous cell carcinoma of skin of right temple 1/27/2016    Stroke     Syncope 2/12    Unspecified disorder of kidney and ureter      Past Surgical History:   Procedure Laterality Date    (ROTATION) FLAP N/A 3/23/2015    Performed by [facility] at [facility] 2ND FLR    BIOPSY-LYMPH NODE N/A 3/23/2015    Performed by [facility]ister MD at [facility] OR [address] [place] [address]    CAROTID ENDARTERECTOMY (L)  2/27/2007    COLONOSCOPY N/A 2/25/2014    Performed by DELORES Lambert MD at [facility] ENDO (4TH FLR)    EXCISION TURBINATE, SUBMUCOUS      EXCISION-LESION-FACE Right 2/24/2016    Performed by [facility]ister MD at [facility] OR 2ND FLR    EXCISION-LESION-FACE N/A 3/23/2015    Performed by Joe [facility]ister MD at [facility] OR 2ND FLR    FESS  9/16/2014    FULL THICKNESS SKIN GRAFT N/A 2/24/2016    Performed by Joe Mcallister MD at [facility] OR [address] [place] [address]    MAPPING-LYMPH NODE N/A 3/23/2015    Performed by Joe [facility]ister MD at [facility] OR [address] [place] [address]    MAPPING-SENTINEL NODE N/A 2/24/2016    Performed by Joe Mcallister MD at [facility] OR [address] [place] [address]    Mohs excision   3/23/2015, 2/24/2016    combined with WLE forehead    NASAL SEPTUM SURGERY  9/16/2014    REPAIR-MOHS DEFECT-left nose Left 3/31/2017    Performed by Jermaine Dsouza III, MD at [facility] OR [address] [place] [address]    RESECTION-TURBINATES (SMR) Bilateral 9/16/2014    Performed by Jermaine Dsouza III, MD at [facility] OR [address] [place] [address]    right ureter surgery  1995    SENTINEL LYMPH NODE BIOPSY  3/23/2015, 2/24/2016    SEPTOPLASTY Bilateral 9/16/2014    [facility]    SINUS SURGERY  9/16/2014    septo, ESS, BITSMR    SINUS SURGERY FUNCTIONAL ENDOSCOPIC WITH NAVIGATION with bilateral maxillary, ethmoids, sphenoids, right frontal, possible left NF duct-balloons Bilateral 9/16/2014    Performed by Jermaine Dsouza III, MD at [facility] OR [address] [place] [address]    Transphenoidal surgery       Family History   Problem Relation Age of Onset    Cancer Mother          "breast    Colon cancer Father     Cancer Father         colon    Cancer Brother         leukemia    No Known Problems Sister     No Known Problems Maternal Aunt     No Known Problems Maternal Uncle     No Known Problems Paternal Aunt     No Known Problems Paternal Uncle     No Known Problems Maternal Grandmother     No Known Problems Maternal Grandfather     No Known Problems Paternal Grandmother     No Known Problems Paternal Grandfather     Amblyopia Neg Hx     Blindness Neg Hx     Cataracts Neg Hx     Diabetes Neg Hx     Glaucoma Neg Hx     Hypertension Neg Hx     Macular degeneration Neg Hx     Retinal detachment Neg Hx     Strabismus Neg Hx     Stroke Neg Hx     Thyroid disease Neg Hx     Allergic rhinitis Neg Hx     Allergies Neg Hx     Angioedema Neg Hx     Asthma Neg Hx     Atopy Neg Hx     Eczema Neg Hx     Immunodeficiency Neg Hx     Rhinitis Neg Hx     Urticaria Neg Hx      Social History     Tobacco Use    Smoking status: Never Smoker    Smokeless tobacco: Never Used   Substance Use Topics    Alcohol use: Yes     Alcohol/week: 1.2 oz     Types: 1 Glasses of wine, 1 Cans of beer per week     Comment: "rarely"    Drug use: No       Mental Status: Disoriented    ECOG Performance Status Grade: 2 - Ambulates, capable of self care only    Review of Systems:  Review of systems not obtained due to patient factors mentation.    OBJECTIVE:     Pain Assessment: Unable to asses    Decision-Making Capacity: Family answered questions, Patient unable to communicate due to disease severity/cognitive impairment    Advanced Directives:  Living Will: Yes. Copy on chart: No  Do Not Resuscitate Status: Yes  Medical Power of : Yes. Agent's Name: spouse. Agent's Contact Number:   Registered Organ Donor: No    Living Arrangements: Lives with spouse    Psychosocial, Spiritual, Cultural:  Patient's most important priorities:  Unable to asses    Patient's biggest concerns/fears:  Unable " to asses    Previous death/end of life care history:  Unable to asses    Patient's goals/hopes:  Unable to asses    ASSESSMENT/PLAN:       Recommendations:  Continue present treatment  Code status Full  Palliative care will continue to p[rovide emotional support for patient and family      > 50% of 40 min visit spent in chart review, face to face discussion of goals of care with family, symptom assessment, coordination of care and emotional support.     Neli Chapman MD  Palliative Medicine  [facility]

## 2019-07-16 NOTE — ASSESSMENT & PLAN NOTE
Metabolic encephalopathy  Septic shock  Leukocytosis  Weakness  Chest X-ray showed left lung opacity, very mild.    -Vanc and Zosyn for now, ID consulted  -Blood Cultures: NGTD  -Albuterol-ipratropium neb treatments Q4HR  -Lactic Acid: 3.0 > 2.3 > 3.0; repeat now

## 2019-07-16 NOTE — PROGRESS NOTES
Contacted DUSTIN Redmond regarding concerns about patient's urine output decreasing to 25 cc over the past two hours. Per Nyla, will increase IV fluids to 100 ml/hr. Will implement the order.

## 2019-07-16 NOTE — ASSESSMENT & PLAN NOTE
Immunosuppression  Edema of both legs  Follows with Dr. Trejo. Ibrutinib placed on hold by Dr. Trejo 2/2 to worsening weakness and leg swelling.  -Consult hem/onc

## 2019-07-16 NOTE — SUBJECTIVE & OBJECTIVE
Oncology Treatment Plan:   [No treatment plan]    Medications:  Continuous Infusions:   norepinephrine bitartrate-D5W 0.3 mcg/kg/min (07/16/19 1502)     Scheduled Meds:   albuterol-ipratropium  3 mL Nebulization Q4H WAKE    artificial tears  1 drop Both Eyes BID    aspirin  81 mg Oral Daily    atorvastatin  20 mg Oral Daily    azithromycin  500 mg Intravenous Q24H    ceFEPime (MAXIPIME) IVPB  2 g Intravenous Q12H    cetirizine  10 mg Oral Daily    clopidogrel  75 mg Oral Daily    cyanocobalamin  500 mcg Oral Daily    erythromycin   Both Eyes QHS    levothyroxine  125 mcg Oral Before breakfast    metronidazole  500 mg Intravenous Q8H    pantoprazole  40 mg Oral Daily    predniSONE  7.5 mg Oral Daily    vancomycin (VANCOCIN) IVPB  750 mg Intravenous Q24H     PRN Meds:sodium chloride, sodium chloride, sodium chloride, acetaminophen, Dextrose 10% Bolus, Dextrose 10% Bolus, glucagon (human recombinant), glucose, glucose, insulin aspart U-100, ondansetron, sodium chloride 0.9%     Review of patient's allergies indicates:   Allergen Reactions    Ace inhibitors Swelling     angioedema    Divalproex Hives     Rash under arms, body creases        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    Hyperlipidemia     Hypertension     Hypopituitarism     Hypothyroid     Immunosuppression 3/13/2015    Squamous Cell Carcinoma     on the right side of the face     Squamous cell carcinoma of skin of right temple 1/27/2016    Stroke     Syncope 2/12    Unspecified disorder of kidney and ureter      Past Surgical History:   Procedure Laterality Date    (ROTATION) FLAP N/A 3/23/2015    Performed by [facility]allister [facility] at [facility] OR 2ND FLR    BIOPSY-LYMPH NODE N/A 3/23/2015    Performed by [facility] MD at [facility] OR 2ND FLR    CAROTID ENDARTERECTOMY  (L)  2/27/2007    COLONOSCOPY N/A 2/25/2014    Performed by DELORES Lambert MD at [facility] ENDO (4TH FLR)    EXCISION TURBINATE, SUBMUCOUS      EXCISION-LESION-FACE Right 2/24/2016    Performed by [facility] MD at [facility] OR 2ND FLR    EXCISION-LESION-FACE N/A 3/23/2015    Performed by [facility]ister MD at [facility] OR 2ND FLR    FESS  9/16/2014    FULL THICKNESS SKIN GRAFT N/A 2/24/2016    Performed by Joe Mcallister MD at [facility] [place] 2ND [place]    MAPPING-LYMPH NODE N/A 3/23/2015    Performed by [facility] MD at [facility] OR [address] [place] [address]    MAPPING-SENTINEL NODE N/A 2/24/2016    Performed by [facility] MD at [facility] OR 2ND [facility]    Mohs excision   3/23/2015, 2/24/2016    combined with WLE forehead    NASAL SEPTUM SURGERY  9/16/2014    REPAIR-MOHS DEFECT-left nose Left 3/31/2017    Performed by Jermaine Dsouza III, MD at [facility] OR 2ND FLR    RESECTION-TURBINATES (SMR) Bilateral 9/16/2014    Performed by Jermaine Dsouza III, MD at [facility] OR [address] [place] [address]    right ureter surgery  1995    SENTINEL LYMPH NODE BIOPSY  3/23/2015, 2/24/2016    SEPTOPLASTY Bilateral 9/16/2014    [facility]    SINUS SURGERY  9/16/2014    septo, ESS, BITSMR    SINUS SURGERY FUNCTIONAL ENDOSCOPIC WITH NAVIGATION with bilateral maxillary, ethmoids, sphenoids, right frontal, possible left NF duct-balloons Bilateral 9/16/2014    Performed by Jermaine Dsouza III, MD at [facility] OR 2ND FLR    Transphenoidal surgery       Family History     Problem Relation (Age of Onset)    Cancer Mother, Father, Brother    Colon cancer Father    No Known Problems Sister, Maternal Aunt, Maternal Uncle, Paternal Aunt, Paternal Uncle, Maternal Grandmother, Maternal Grandfather, Paternal Grandmother, Paternal Grandfather        Tobacco Use    Smoking status: Never Smoker    Smokeless tobacco: Never Used   Substance and Sexual Activity    Alcohol use: Yes     Alcohol/week: 1.2 oz     Types: 1 Glasses of wine, 1 Cans of beer per week  "    Comment: "rarely"    Drug use: No    Sexual activity: Not Currently       Review of Systems   Constitutional: Positive for fatigue.   HENT: Negative for sore throat.    Eyes: Negative for visual disturbance.   Respiratory: Positive for shortness of breath.    Cardiovascular: Positive for leg swelling. Negative for chest pain.   Gastrointestinal: Negative for abdominal pain.   Genitourinary: Negative for dysuria.   Musculoskeletal: Negative for back pain.   Skin: Negative for rash.   Neurological: Positive for weakness.   Psychiatric/Behavioral: The patient is nervous/anxious.      Objective:     Vital Signs (Most Recent):  Temp: 97.6 °F (36.4 °C) (07/16/19 1520)  Pulse: 72 (07/16/19 1640)  Resp: 18 (07/16/19 1640)  BP: (!) 118/52 (07/16/19 1602)  SpO2: 98 % (07/16/19 1640) Vital Signs (24h Range):  Temp:  [97.3 °F (36.3 °C)-100.1 °F (37.8 °C)] 97.6 °F (36.4 °C)  Pulse:  [70-95] 72  Resp:  [10-37] 18  SpO2:  [94 %-100 %] 98 %  BP: ()/(40-78) 118/52  Arterial Line BP: (0-153)/(0-47) 149/47     Weight: 71 kg (156 lb 8.4 oz)  Body mass index is 21.83 kg/m².  Body surface area is 1.89 meters squared.      Intake/Output Summary (Last 24 hours) at 7/16/2019 1714  Last data filed at 7/16/2019 1600  Gross per 24 hour   Intake 2323.83 ml   Output 745 ml   Net 1578.83 ml       Physical Exam   Constitutional: He is oriented to person, place, and time. He appears well-developed and well-nourished.   HENT:   Head: Normocephalic and atraumatic.   Eyes: Pupils are equal, round, and reactive to light. EOM are normal.   Neck: Normal range of motion. Neck supple.   Cardiovascular: Normal rate and regular rhythm.   Pulmonary/Chest:   Supplemental oxygen via face mask   Abdominal: Soft. Bowel sounds are normal.   Neurological: He is alert and oriented to person, place, and time.   Skin: Skin is warm and dry.   Ecchymoses throughout body   Psychiatric: He has a normal mood and affect. His behavior is normal. Judgment and " thought content normal.   Nursing note and vitals reviewed.      Significant Labs:   Labs have been reviewed.    Lab Results   Component Value Date    WBC 19.89 (H) 07/16/2019    HGB 8.5 (L) 07/16/2019    HCT 26.3 (L) 07/16/2019    MCV 90 07/16/2019    PLT 9 (LL) 07/16/2019

## 2019-07-16 NOTE — ASSESSMENT & PLAN NOTE
History of CVA (cerebrovascular accident)  Denies chest pain or SOB  -Continue aspirin, statin and plavix

## 2019-07-16 NOTE — PT/OT/SLP PROGRESS
Physical Therapy      Patient Name:  Thierry Ramos Jr.   MRN:  [id number]    Patient not seen today secondary to nursing hold 4 point restraints. Will follow up as able    Cb Tineo, PT

## 2019-07-16 NOTE — CONSULTS
[facility] Infectious Disease Consult     Primary Team: Medicine  Consultant Attending: Larissa  Date of Admit: 7/15/2019    Reason for Consult     Sepsis    History of Present Illness   Thierry Ramos Jr. is a 85 y.o. male with a relevant history of  DM, CLL, HTN, HLD, CVA, hypopituitarism    The patient presented to [facility] on 7/15/2019 with a primary complaint of syncope, generalized weakness worse in the legs, leg swelling (on ibrutinib). Also complained of acute on chronic cough. When he presented she had a leukocytosis, no fever, occasionally documented hypotension but a normal UA and mild opacity on the left lung field.     At bedside he was altered but did not have many complaints apart from cough, weakness, and hypotension/presyncope when sitting up    Review of Systems (positives in bold):  Constitutional: wt loss, fever, chills, night sweats, fatigue, malaise  HEENT: dysphonia, epistaxis, tinnitus, vertigo, otalgia, otorrhea, visual changes, lacrimation, changes in hearing, rhinorrhea, sore throat  Urogenital: frequency, nocturia, dysuria, hematuria, retention, incontinence, discharge  Neurological: headaches, syncope, weakness, numbness, paresthesia, dysequilibrium, falls, amnesia, dysarthria, facial assymetry  Gastrointestinal: anorexia, nausea, vomiting, melena, hematochezia, abdominal pain, hematemesis, diarrhea, constipation, dyspepsia, dysphagia, odynophagia, dysgeusia  Respiratory: dyspnea, cough, sputum production, wheeze, hemoptysis, cyanosis, apnea  Integumentary: hypo/hyperpigmentation, rash, lesions, pruritus, alopecia, nail changes  Cardiovascular: chest pain, orthopnea, cyanosis, edema, claudication, syncope, palpitations  Hematological: bleeding, bruising, lymphadenopathy  Psych: insomnia, mood changes, hallucinations, anxiety  Reproductive: erectile dysfunction in men, abnormal uterine bleeding in women, changes in libido  Immune/Allergy: urticaria, localized  pain/erythema/warmth/swelling  Musculoskeletal: arthralgia, myalgia, joint swelling, stiffness, wasting, deformity, weakness, back pain  Endocrine: gynecomastia, galactorrhea, weight gain, heat/cold intolerance, polyphagia, polydipsia, polyuria    Allergies:  Review of patient's allergies indicates:   Allergen Reactions    Ace inhibitors Swelling     angioedema    Divalproex Hives     Rash under arms, body creases     Medications:   In-Hospital Scheduled Medications:   acetaminophen  650 mg Oral Once    albuterol-ipratropium  3 mL Nebulization Q4H WAKE    artificial tears  1 drop Both Eyes BID    aspirin  81 mg Oral Daily    atorvastatin  20 mg Oral Daily    cetirizine  10 mg Oral Daily    clopidogrel  75 mg Oral Daily    cyanocobalamin  500 mcg Oral Daily    diphenhydrAMINE  25 mg Oral Once    erythromycin   Both Eyes QHS    Immune Globulin G (IGG)-PRO-IGA 10 % injection (Privigen)  1,000 mg/kg (Dosing Weight) Intravenous Once    levothyroxine  125 mcg Oral Before breakfast    pantoprazole  40 mg Oral Daily    piperacillin-tazobactam (ZOSYN) IVPB  4.5 g Intravenous Q8H    predniSONE  7.5 mg Oral Daily    vancomycin (VANCOCIN) IVPB  750 mg Intravenous Q24H      In-Hospital PRN Medications:  sodium chloride, sodium chloride, sodium chloride, acetaminophen, Dextrose 10% Bolus, Dextrose 10% Bolus, glucagon (human recombinant), glucose, glucose, insulin aspart U-100, ondansetron, sodium chloride 0.9%   In-Hospital IV Infusion Medications:   norepinephrine bitartrate-D5W 0.45 mcg/kg/min (07/16/19 0900)     Relevant Home Medications:    Antibiotics and Day Number of Therapy:  Piptazo  Vanc    Past Medical History:  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    Hyperlipidemia     Hypertension     Hypopituitarism     Hypothyroid      Immunosuppression 3/13/2015    Squamous Cell Carcinoma     on the right side of the face     Squamous cell carcinoma of skin of right temple 1/27/2016    Stroke     Syncope 2/12    Unspecified disorder of kidney and ureter      Past Surgical History/ObGyn Hx if gender appropriate:  Past Surgical History:   Procedure Laterality Date    (ROTATION) FLAP N/A 3/23/2015    Performed by [facility] at [facility] OR 2ND FLR    BIOPSY-LYMPH NODE N/A 3/23/2015    Performed by [facility] at [facility] OR 2ND FLR    CAROTID ENDARTERECTOMY (L)  2/27/2007    COLONOSCOPY N/A 2/25/2014    Performed by DELORES Lambert MD at [facility] ENDO (4TH FLR)    EXCISION TURBINATE, SUBMUCOUS      EXCISION-LESION-FACE Right 2/24/2016    Per[facility] by [facility] OR 2ND FLR    EXCISION-LESION-FACE N/A 3/23/2015    Performed by [facility] at [facility] OR 2ND FLR    FESS  9/16/2014    FULL THICKNESS SKIN GRAFT N/A 2/24/2016    Per[facility] by [facility] OR 2ND FLR    MAPPING-LYMPH NODE N/A 3/23/2015    Performed by [facility] OR [facility]    MAPPING-SENTINEL NODE N/A 2/24/2016    Performed by [facility] at [facility] OR 2ND FLR    Mohs excision   3/23/2015, 2/24/2016    combined with WLE forehead    NASAL SEPTUM SURGERY  9/16/2014    REPAIR-MOHS DEFECT-left nose Left 3/31/2017    Performed by Jermaine Dsouza III, MD at [facility] OR 2ND FLR    RESECTION-TURBINATES (SMR) Bilateral 9/16/2014    Performed by Jermaine D[facility]za III, MD at [facility] OR 2ND FLR    right ureter surgery  1995    SENTINEL LYMPH NODE BIOPSY  3/23/2015, 2/24/2016    SEPTOPLASTY Bilateral 9/16/2014    Performed by Jermaine [facility] III, [facility] at [facility] OR 2ND FLR    SINUS SURGERY  9/16/2014    septo, ESS, BITSMR    SINUS SURGERY FUNCTIONAL ENDOSCOPIC WITH NAVIGATION with bilateral maxillary, ethmoids, sphenoids, right frontal, possible left NF duct-balloons Bilateral 9/16/2014    Performed by Jermaine D[facility]za III, MD at [facility] OR  "2ND FLR    Transphenoidal surgery       Family History:  Family History   Problem Relation Age of Onset    Cancer Mother         breast    Colon cancer Father     Cancer Father         colon    Cancer Brother         leukemia    No Known Problems Sister     No Known Problems Maternal Aunt     No Known Problems Maternal Uncle     No Known Problems Paternal Aunt     No Known Problems Paternal Uncle     No Known Problems Maternal Grandmother     No Known Problems Maternal Grandfather     No Known Problems Paternal Grandmother     No Known Problems Paternal Grandfather     Amblyopia Neg Hx     Blindness Neg Hx     Cataracts Neg Hx     Diabetes Neg Hx     Glaucoma Neg Hx     Hypertension Neg Hx     Macular degeneration Neg Hx     Retinal detachment Neg Hx     Strabismus Neg Hx     Stroke Neg Hx     Thyroid disease Neg Hx     Allergic rhinitis Neg Hx     Allergies Neg Hx     Angioedema Neg Hx     Asthma Neg Hx     Atopy Neg Hx     Eczema Neg Hx     Immunodeficiency Neg Hx     Rhinitis Neg Hx     Urticaria Neg Hx      Social History:  Social History     Tobacco Use    Smoking status: Never Smoker    Smokeless tobacco: Never Used   Substance Use Topics    Alcohol use: Yes     Alcohol/week: 1.2 oz     Types: 1 Glasses of wine, 1 Cans of beer per week     Comment: "rarely"    Drug use: No     Objective   Last 24 Hour Vital Signs:  BP  Min: 61/42  Max: 163/62  Temp  Av.5 °F (36.9 °C)  Min: 97.3 °F (36.3 °C)  Max: 100.1 °F (37.8 °C)  Pulse  Av.8  Min: 66  Max: 95  Resp  Av.5  Min: 10  Max: 37  SpO2  Av.4 %  Min: 94 %  Max: 100 %  Weight  Av kg (156 lb 8.4 oz)  Min: 71 kg (156 lb 8.4 oz)  Max: 71 kg (156 lb 8.4 oz)    Lines, Drains, Airway:  Right IJ     Physical Examination:  Examination  General: ill appearing, comfortable   HEENT: normal oral mucosa, normal dentition, conjunctiva normal, pupils normal, extraocular motion normal, right temporal scar  Neck: no " thyromegaly, no JVD   Cardiac: no murmurs, pulse regular    Pulmonary/Chest: chest clear, no respiratory distress   GI/Rectal: no organomegaly, no masses, non tender, normal bowel sounds, rectal exam deferred   : deferred   Msk: normal motor screening exam  Vascular: normal peripheral perfusion   Lymph nodes: none palpated  Skin/ Extremities: no rash, no pedal edema, no ulceration    Neurology/ Mental status: alert confused     Laboratory:  CBC:   Lab Results   Component Value Date    WBC 19.89 (H) 07/16/2019    HGB 8.5 (L) 07/16/2019    PLT 9 (LL) 07/16/2019    MCV 90 07/16/2019    RDW 16.3 (H) 07/16/2019     Estimated Creatinine Clearance: 31.9 mL/min (A) (based on SCr of 1.7 mg/dL (H)).    Microbiology Data:  Blood Clx no growth     Other Results:  EKG  Normal Qtc    Radiology:  Mild left lower lung field opacity    Assessment     Thierry Ramos Jr. is a 85 y.o. male who presented with shock and syncope/presyncope and leukocytosis. He does have a history of CLL and has had leukocytosis in the past. Also has a history of hypopituitarism and has been on prednisone.    There is no obvious source of septic shock after review of his lab data/imaging/symptoms/exam findings. Even his abnormal CXR and history of acute on chronic change in the character of his cough does not point strongly toward pneumonia as he does not have a significant hypoxia and the opacity on the CXR is subtle. Also his chest exam is unremarkable. However, it still remains a possible source and therefore needs to be covered broadly as well as for atypicals.     A search for other sources continues, including urine (although urinalysis was not impressive) and soft tissue infections since he has multiple abrasions on his skin. Other causes of hypotension in this patient with pre-existing endocrine disorders (hypoadrenalism) and other causes of leukocytosis (CLL) are being entertained.      Recommendations     Possible pneumonia  - add  azithromycin for atypical coverage for 5 days  - agree with pip tazo for empiric coverage of possible pneumonia, but will switch to cefepime 2gq12 and flagyl 500mg IV q8 due to less nephrotoxicity and less effect on platelets   - continue vancomycin with goal trough 15-20  - if patient does not clinically improve within 48-72 hours post admission, would do non contrast CT chest to further define the left lower lung field abnormality seen on Xray  - check sputum cultures   - check procalcitonin    Shock, likely septic  - check abd US with specific focus on hepatobiliary system since he has abnormal alk phos  - send urine culture even though UA was only mildly abnormal   - consider low cortisol levels as a contributing factor in this patient with hypopituitarism    Thank you for this consult. We will follow.      Castro Nunez  Fellow, [facility]

## 2019-07-17 LAB
ALBUMIN SERPL BCP-MCNC: 2 G/DL (ref 3.5–5.2)
ALP SERPL-CCNC: 156 U/L (ref 55–135)
ALT SERPL W/O P-5'-P-CCNC: 37 U/L (ref 10–44)
ANION GAP SERPL CALC-SCNC: 7 MMOL/L (ref 8–16)
ANISOCYTOSIS BLD QL SMEAR: SLIGHT
AST SERPL-CCNC: 139 U/L (ref 10–40)
BACTERIA UR CULT: NO GROWTH
BASOPHILS # BLD AUTO: ABNORMAL K/UL (ref 0–0.2)
BASOPHILS NFR BLD: 0 % (ref 0–1.9)
BILIRUB SERPL-MCNC: 1 MG/DL (ref 0.1–1)
BUN SERPL-MCNC: 38 MG/DL (ref 8–23)
BURR CELLS BLD QL SMEAR: ABNORMAL
CALCIUM SERPL-MCNC: 7.9 MG/DL (ref 8.7–10.5)
CHLORIDE SERPL-SCNC: 109 MMOL/L (ref 95–110)
CO2 SERPL-SCNC: 18 MMOL/L (ref 23–29)
CREAT SERPL-MCNC: 1.6 MG/DL (ref 0.5–1.4)
DIFFERENTIAL METHOD: ABNORMAL
EOSINOPHIL # BLD AUTO: ABNORMAL K/UL (ref 0–0.5)
EOSINOPHIL NFR BLD: 0 % (ref 0–8)
ERYTHROCYTE [DISTWIDTH] IN BLOOD BY AUTOMATED COUNT: 16.3 % (ref 11.5–14.5)
EST. GFR  (AFRICAN AMERICAN): 45 ML/MIN/1.73 M^2
EST. GFR  (NON AFRICAN AMERICAN): 39 ML/MIN/1.73 M^2
GLUCOSE SERPL-MCNC: 223 MG/DL (ref 70–110)
HCT VFR BLD AUTO: 23.6 % (ref 40–54)
HGB BLD-MCNC: 7.9 G/DL (ref 14–18)
HYPOCHROMIA BLD QL SMEAR: ABNORMAL
LYMPHOCYTES # BLD AUTO: ABNORMAL K/UL (ref 1–4.8)
LYMPHOCYTES NFR BLD: 26 % (ref 18–48)
MAGNESIUM SERPL-MCNC: 1.7 MG/DL (ref 1.6–2.6)
MCH RBC QN AUTO: 29.8 PG (ref 27–31)
MCHC RBC AUTO-ENTMCNC: 33.5 G/DL (ref 32–36)
MCV RBC AUTO: 89 FL (ref 82–98)
METAMYELOCYTES NFR BLD MANUAL: 2 %
MONOCYTES # BLD AUTO: ABNORMAL K/UL (ref 0.3–1)
MONOCYTES NFR BLD: 2 % (ref 4–15)
MYELOCYTES NFR BLD MANUAL: 1 %
NEUTROPHILS # BLD AUTO: ABNORMAL K/UL (ref 1.8–7.7)
NEUTROPHILS NFR BLD: 62 % (ref 38–73)
NEUTS BAND NFR BLD MANUAL: 7 %
OVALOCYTES BLD QL SMEAR: ABNORMAL
PHOSPHATE SERPL-MCNC: 3.4 MG/DL (ref 2.7–4.5)
PLATELET # BLD AUTO: 12 K/UL (ref 150–350)
PLATELET BLD QL SMEAR: ABNORMAL
PMV BLD AUTO: ABNORMAL FL (ref 9.2–12.9)
POCT GLUCOSE: 203 MG/DL (ref 70–110)
POCT GLUCOSE: 209 MG/DL (ref 70–110)
POCT GLUCOSE: 215 MG/DL (ref 70–110)
POIKILOCYTOSIS BLD QL SMEAR: SLIGHT
POLYCHROMASIA BLD QL SMEAR: ABNORMAL
POTASSIUM SERPL-SCNC: 4 MMOL/L (ref 3.5–5.1)
PROCALCITONIN SERPL IA-MCNC: 0.38 NG/ML
PROT SERPL-MCNC: 5.8 G/DL (ref 6–8.4)
RBC # BLD AUTO: 2.65 M/UL (ref 4.6–6.2)
SODIUM SERPL-SCNC: 134 MMOL/L (ref 136–145)
VANCOMYCIN TROUGH SERPL-MCNC: 9.4 UG/ML (ref 10–22)
WBC # BLD AUTO: 12.54 K/UL (ref 3.9–12.7)

## 2019-07-17 PROCEDURE — 63600175 PHARM REV CODE 636 W HCPCS: Mod: HCNC | Performed by: FAMILY MEDICINE

## 2019-07-17 PROCEDURE — 80202 ASSAY OF VANCOMYCIN: CPT | Mod: HCNC

## 2019-07-17 PROCEDURE — 83735 ASSAY OF MAGNESIUM: CPT | Mod: HCNC

## 2019-07-17 PROCEDURE — 25000242 PHARM REV CODE 250 ALT 637 W/ HCPCS: Mod: HCNC | Performed by: NURSE PRACTITIONER

## 2019-07-17 PROCEDURE — 97530 THERAPEUTIC ACTIVITIES: CPT | Mod: HCNC | Performed by: PHYSICAL THERAPIST

## 2019-07-17 PROCEDURE — 85027 COMPLETE CBC AUTOMATED: CPT | Mod: HCNC

## 2019-07-17 PROCEDURE — 97110 THERAPEUTIC EXERCISES: CPT | Mod: HCNC | Performed by: PHYSICAL THERAPIST

## 2019-07-17 PROCEDURE — 99233 PR SUBSEQUENT HOSPITAL CARE,LEVL III: ICD-10-PCS | Mod: HCNC,,, | Performed by: INTERNAL MEDICINE

## 2019-07-17 PROCEDURE — 63600175 PHARM REV CODE 636 W HCPCS: Mod: HCNC | Performed by: NURSE PRACTITIONER

## 2019-07-17 PROCEDURE — 20000000 HC ICU ROOM: Mod: HCNC

## 2019-07-17 PROCEDURE — 63600175 PHARM REV CODE 636 W HCPCS: Mod: HCNC | Performed by: INTERNAL MEDICINE

## 2019-07-17 PROCEDURE — 97110 THERAPEUTIC EXERCISES: CPT | Mod: HCNC

## 2019-07-17 PROCEDURE — 85007 BL SMEAR W/DIFF WBC COUNT: CPT | Mod: HCNC

## 2019-07-17 PROCEDURE — 99233 SBSQ HOSP IP/OBS HIGH 50: CPT | Mod: HCNC,,, | Performed by: INTERNAL MEDICINE

## 2019-07-17 PROCEDURE — 25000003 PHARM REV CODE 250: Mod: HCNC | Performed by: NURSE PRACTITIONER

## 2019-07-17 PROCEDURE — 80053 COMPREHEN METABOLIC PANEL: CPT | Mod: HCNC

## 2019-07-17 PROCEDURE — 94640 AIRWAY INHALATION TREATMENT: CPT | Mod: HCNC

## 2019-07-17 PROCEDURE — S0030 INJECTION, METRONIDAZOLE: HCPCS | Mod: HCNC | Performed by: INTERNAL MEDICINE

## 2019-07-17 PROCEDURE — 84145 PROCALCITONIN (PCT): CPT | Mod: HCNC

## 2019-07-17 PROCEDURE — 84100 ASSAY OF PHOSPHORUS: CPT | Mod: HCNC

## 2019-07-17 PROCEDURE — 94761 N-INVAS EAR/PLS OXIMETRY MLT: CPT | Mod: HCNC

## 2019-07-17 PROCEDURE — 25000003 PHARM REV CODE 250: Mod: HCNC | Performed by: INTERNAL MEDICINE

## 2019-07-17 RX ORDER — HALOPERIDOL 5 MG/ML
2 INJECTION INTRAMUSCULAR ONCE
Status: COMPLETED | OUTPATIENT
Start: 2019-07-18 | End: 2019-07-17

## 2019-07-17 RX ORDER — BENZONATATE 100 MG/1
100 CAPSULE ORAL 3 TIMES DAILY PRN
Status: DISCONTINUED | OUTPATIENT
Start: 2019-07-17 | End: 2019-07-25 | Stop reason: HOSPADM

## 2019-07-17 RX ORDER — CEFEPIME HYDROCHLORIDE 2 G/50ML
2 INJECTION, SOLUTION INTRAVENOUS EVERY 24 HOURS
Status: DISCONTINUED | OUTPATIENT
Start: 2019-07-18 | End: 2019-07-25

## 2019-07-17 RX ADMIN — LEVOTHYROXINE SODIUM 125 MCG: 25 TABLET ORAL at 06:07

## 2019-07-17 RX ADMIN — IPRATROPIUM BROMIDE AND ALBUTEROL SULFATE 3 ML: .5; 3 SOLUTION RESPIRATORY (INHALATION) at 04:07

## 2019-07-17 RX ADMIN — CETIRIZINE HYDROCHLORIDE 10 MG: 10 TABLET, FILM COATED ORAL at 08:07

## 2019-07-17 RX ADMIN — HYPROMELLOSE 2910 1 DROP: 5 SOLUTION OPHTHALMIC at 08:07

## 2019-07-17 RX ADMIN — VANCOMYCIN HYDROCHLORIDE 750 MG: 750 INJECTION, POWDER, LYOPHILIZED, FOR SOLUTION INTRAVENOUS at 12:07

## 2019-07-17 RX ADMIN — Medication 500 MCG: at 08:07

## 2019-07-17 RX ADMIN — ERYTHROMYCIN 1 INCH: 5 OINTMENT OPHTHALMIC at 09:07

## 2019-07-17 RX ADMIN — CEFEPIME HYDROCHLORIDE 2 G: 2 INJECTION, POWDER, FOR SOLUTION INTRAVENOUS at 07:07

## 2019-07-17 RX ADMIN — DEXAMETHASONE 40 MG: 4 TABLET ORAL at 05:07

## 2019-07-17 RX ADMIN — METRONIDAZOLE 500 MG: 500 INJECTION, SOLUTION INTRAVENOUS at 09:07

## 2019-07-17 RX ADMIN — INSULIN ASPART 1 UNITS: 100 INJECTION, SOLUTION INTRAVENOUS; SUBCUTANEOUS at 09:07

## 2019-07-17 RX ADMIN — PANTOPRAZOLE SODIUM 40 MG: 40 TABLET, DELAYED RELEASE ORAL at 08:07

## 2019-07-17 RX ADMIN — HYPROMELLOSE 2910 1 DROP: 5 SOLUTION OPHTHALMIC at 09:07

## 2019-07-17 RX ADMIN — INSULIN ASPART 2 UNITS: 100 INJECTION, SOLUTION INTRAVENOUS; SUBCUTANEOUS at 07:07

## 2019-07-17 RX ADMIN — HALOPERIDOL LACTATE 2 MG: 5 INJECTION, SOLUTION INTRAMUSCULAR at 11:07

## 2019-07-17 RX ADMIN — IPRATROPIUM BROMIDE AND ALBUTEROL SULFATE 3 ML: .5; 3 SOLUTION RESPIRATORY (INHALATION) at 12:07

## 2019-07-17 RX ADMIN — METRONIDAZOLE 500 MG: 500 INJECTION, SOLUTION INTRAVENOUS at 03:07

## 2019-07-17 RX ADMIN — IPRATROPIUM BROMIDE AND ALBUTEROL SULFATE 3 ML: .5; 3 SOLUTION RESPIRATORY (INHALATION) at 07:07

## 2019-07-17 RX ADMIN — AZITHROMYCIN MONOHYDRATE 500 MG: 500 INJECTION, POWDER, LYOPHILIZED, FOR SOLUTION INTRAVENOUS at 02:07

## 2019-07-17 RX ADMIN — METRONIDAZOLE 500 MG: 500 INJECTION, SOLUTION INTRAVENOUS at 05:07

## 2019-07-17 RX ADMIN — ATORVASTATIN CALCIUM 20 MG: 20 TABLET, FILM COATED ORAL at 08:07

## 2019-07-17 RX ADMIN — BENZONATATE 100 MG: 100 CAPSULE ORAL at 09:07

## 2019-07-17 NOTE — PROGRESS NOTES
"[facility]  Hematology/Oncology  Progress Note    Patient Name: Thierry Ramos Jr.  Admission Date: 7/15/2019  Hospital Length of Stay: 2 days  Code Status: Full Code     Subjective:     HPI:  85 year-old male with chronic lymphocytic leukemia, most recently on ibrutinib but held for the past week, was admitted on 7/15/19 for syncope and fall x 2. He is currently in the ICU being treated for possible sepsis with hypotension. Platelet count had decreased over the past 24 hours from 120 k/uL to < 10 k/uL. 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     Interval History:   - clinically, he has improved. Norepinephrine was discontinued, as his blood pressure has improved. He is more alert today. Platelet count remains decreased at 12 k/uL.    Oncology Treatment Plan:   [No treatment plan]    Medications:  Continuous Infusions:   norepinephrine bitartrate-D5W Stopped (07/17/19 0800)     Scheduled Meds:   albuterol-ipratropium  3 mL Nebulization Q4H WAKE    artificial tears  1 drop Both Eyes BID    aspirin  81 mg Oral Daily    atorvastatin  20 mg Oral Daily    azithromycin  500 mg Intravenous Q24H    ceFEPime (MAXIPIME) IVPB  2 g Intravenous Q12H    cetirizine  10 mg Oral Daily    clopidogrel  75 mg Oral Daily    cyanocobalamin  500 mcg Oral Daily    dexAMETHasone  40 mg Oral Q24H    erythromycin   Both Eyes QHS    levothyroxine  125 mcg Oral Before breakfast    metronidazole  500 mg Intravenous Q8H    pantoprazole  40 mg Oral Daily    [START ON 7/20/2019] predniSONE  7.5 mg Oral Daily    vancomycin (VANCOCIN) IVPB  750 mg Intravenous Q24H     PRN Meds:sodium chloride, sodium chloride, sodium chloride, acetaminophen, Dextrose 10% Bolus, Dextrose 10% Bolus, glucagon (human recombinant), glucose, glucose, insulin aspart U-100, ondansetron, sodium " chloride 0.9%     Review of Systems   Constitutional: Positive for fatigue.   HENT: Negative for sore throat.    Eyes: Negative for visual disturbance.   Respiratory: Negative for shortness of breath.    Cardiovascular: Positive for leg swelling. Negative for chest pain.   Gastrointestinal: Negative for abdominal pain.   Genitourinary: Negative for dysuria.   Musculoskeletal: Negative for back pain.   Skin: Negative for rash.   Neurological: Positive for weakness.   Psychiatric/Behavioral: The patient is not nervous/anxious.      Objective:     Vital Signs (Most Recent):  Temp: 98.2 °F (36.8 °C) (07/17/19 0703)  Pulse: 82 (07/17/19 0902)  Resp: (!) 27 (07/17/19 0902)  BP: (!) 111/54 (07/17/19 0802)  SpO2: 99 % (07/17/19 0902) Vital Signs (24h Range):  Temp:  [97.6 °F (36.4 °C)-98.2 °F (36.8 °C)] 98.2 °F (36.8 °C)  Pulse:  [70-99] 82  Resp:  [16-49] 27  SpO2:  [89 %-100 %] 99 %  BP: (111-167)/(44-72) 111/54  Arterial Line BP: (109-179)/(40-95) 123/48     Weight: 71 kg (156 lb 8.4 oz)  Body mass index is 21.83 kg/m².  Body surface area is 1.89 meters squared.      Intake/Output Summary (Last 24 hours) at 7/17/2019 1002  Last data filed at 7/17/2019 0800  Gross per 24 hour   Intake 1296.86 ml   Output 1885 ml   Net -588.14 ml       Physical Exam   Constitutional: He is oriented to person, place, and time. He appears well-developed and well-nourished.   HENT:   Head: Normocephalic and atraumatic.   Eyes: Pupils are equal, round, and reactive to light. EOM are normal.   Neck: Normal range of motion. Neck supple.   Cardiovascular: Normal rate and regular rhythm.   Pulmonary/Chest: Effort normal and breath sounds normal.   Abdominal: Soft. Bowel sounds are normal.   Neurological: He is alert and oriented to person, place, and time.   Skin: Skin is warm and dry.   Ecchymoses throughout body   Psychiatric: He has a normal mood and affect. His behavior is normal. Judgment and thought content normal.   Nursing note and vitals  reviewed.      Significant Labs:   Labs have been reviewed.    Lab Results   Component Value Date    WBC 12.54 07/17/2019    HGB 7.9 (L) 07/17/2019    HCT 23.6 (L) 07/17/2019    MCV 89 07/17/2019    PLT 12 (LL) 07/17/2019     Assessment/Plan:     Immune thrombocytopenia  - I reviewed the peripheral smear. Rare enlarged platelets are noted.  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  - I spoke to his hematologist Dr. Marr on 7/16/19. She recommended pulse dexamethasone, so I placed an order for dexamethasone 40mg PO daily x 4 days.  - he has received a dose of IV immune globulin as well.  - clinically, he is improving. His platelet count remains decreased. Continue dexamethasone and monitor platelet counts daily. I anticipate that it will start to improve in another day or so.        Braxton El MD  Hematology/Oncology  [facility]

## 2019-07-17 NOTE — SUBJECTIVE & OBJECTIVE
Interval History: Pt is alert and interactive today. Reports he feels much better.     Review of Systems   Respiratory: Positive for cough. Negative for shortness of breath.    Cardiovascular: Negative for chest pain and leg swelling.   Gastrointestinal: Negative for abdominal distention and abdominal pain.   Genitourinary: Negative for decreased urine volume and difficulty urinating.   Psychiatric/Behavioral: Negative for agitation and confusion.     Objective:     Vital Signs (Most Recent):  Temp: 97.7 °F (36.5 °C) (07/17/19 1103)  Pulse: 86 (07/17/19 1302)  Resp: (!) 29 (07/17/19 1302)  BP: (!) 102/49 (07/17/19 1202)  SpO2: 100 % (07/17/19 1302) Vital Signs (24h Range):  Temp:  [97.6 °F (36.4 °C)-98.2 °F (36.8 °C)] 97.7 °F (36.5 °C)  Pulse:  [70-99] 86  Resp:  [18-49] 29  SpO2:  [89 %-100 %] 100 %  BP: (102-167)/(44-72) 102/49  Arterial Line BP: (109-179)/(41-95) 124/45     Weight: 71 kg (156 lb 8.4 oz)  Body mass index is 21.83 kg/m².    Intake/Output Summary (Last 24 hours) at 7/17/2019 1328  Last data filed at 7/17/2019 1236  Gross per 24 hour   Intake 2166.86 ml   Output 2085 ml   Net 81.86 ml      Physical Exam   Constitutional: He is oriented to person, place, and time.   Cardiovascular: Normal rate and regular rhythm.   Pulmonary/Chest: Effort normal. He has no wheezes. He has no rales.   Abdominal: Soft. Bowel sounds are normal.   Musculoskeletal: He exhibits no edema.   Neurological: He is alert and oriented to person, place, and time.   Skin: Skin is warm and dry. Ecchymosis noted.       Significant Labs:   BMP:   Recent Labs   Lab 07/17/19  0400   *   *   K 4.0      CO2 18*   BUN 38*   CREATININE 1.6*   CALCIUM 7.9*   MG 1.7     CBC:   Recent Labs   Lab 07/16/19  0735 07/16/19  1057 07/17/19  0400   WBC 24.62* 19.89* 12.54   HGB 9.5* 8.5* 7.9*   HCT 28.8* 26.3* 23.6*   PLT 10* 9* 12*       Significant Imaging: I have reviewed all pertinent imaging results/findings within the past 24  hours.

## 2019-07-17 NOTE — PROGRESS NOTES
Pharmacokinetic Assessment Follow Up: IV Vancomycin    Vancomycin serum concentration assessment(s):    The trough level was drawned correctly and can be used to guide therapy at this time.    Vancomycin Regimen Plan:    Re-dose when the random level is less than 15 mcg/mL, next level to be drawn at 0400 on 7/18     Pharmacy will continue to follow and monitor vancomycin.    Please contact pharmacy at extension [phone] for questions regarding this assessment.    Thank you for the consult,   Jose Angel Subramanian     Patient brief summary:  Thierry Ramos Jr. is a 85 y.o. male initiated on antimicrobial therapy with IV Vancomycin for treatment of suspected lower respiratory infection    The patient received a loading dose, followed by the current treatment regimen: random levels with plan to redose when level is less than 15 mcg/mL    Drug Allergies:   Review of patient's allergies indicates:   Allergen Reactions    Ace inhibitors Swelling     angioedema    Divalproex Hives     Rash under arms, body creases       Actual Body Weight:   71kg    Renal Function:   Estimated Creatinine Clearance: 33.9 mL/min (A) (based on SCr of 1.6 mg/dL (H)).,     Dialysis Method (if applicable):        CBC (last 72 hours):  Recent Labs   Lab Result Units 07/15/19  0855 07/16/19  0200 07/16/19  0538 07/16/19  0735 07/16/19  1057 07/17/19  0400   WBC K/uL 12.75*  --  25.61* 24.62* 19.89* 12.54   Hemoglobin g/dL 12.3*  --  9.7* 9.5* 8.5* 7.9*   Hemoglobin A1C %  --  5.6  --   --   --   --    Hematocrit % 38.9*  --  30.1* 28.8* 26.3* 23.6*   Platelets K/uL 120*  --  8* 10* 9* 12*   Gran% % 42.0  --  52.0 58.0 35.0* 62.0   Lymph% % 44.0  --  20.0 15.0* 43.0 26.0   Mono% % 11.0  --  6.0 6.0 6.0 2.0*   Eosinophil% % 0.0  --  0.0 2.0 0.0 0.0   Basophil% % 0.0  --  0.0 0.0 0.0 0.0   Differential Method  Manual  --  Manual Manual Manual Manual       Metabolic Panel (last 72 hours):  Recent Labs   Lab Result Units 07/15/19  0802 07/15/19  0920  07/16/19  0200 07/16/19  0735 07/16/19  1300 07/16/19  1301 07/17/19  0400   Sodium mmol/L 142  --  137 138  --   --  134*   Sodium Urine Random mmol/L  --   --   --   --  29  --   --    Potassium mmol/L 3.9  --  4.4 4.2  --   --  4.0   Chloride mmol/L 107  --  109 110  --   --  109   CO2 mmol/L 25  --  17* 16*  --   --  18*   Glucose mg/dL 84  --  177* 163*  --   --  223*   Glucose, UA   --  Negative  --   --   --  Negative  --    BUN, Bld mg/dL 34*  --  39* 39*  --   --  38*   Creatinine mg/dL 1.1  --  1.7* 1.7*  --   --  1.6*   Creatinine, Random Ur mg/dL  --   --   --   --  72.0  --   --    Albumin g/dL 2.8*  --  2.4*  --   --   --  2.0*   Total Bilirubin mg/dL 0.8  --  1.0  --   --   --  1.0   Alkaline Phosphatase U/L 258*  --  182*  --   --   --  156*   AST U/L 163*  --  247*  --   --   --  139*   ALT U/L 91*  --  58*  --   --   --  37   Magnesium mg/dL 1.8  --  1.3*  --   --   --  1.7   Phosphorus mg/dL  --   --  3.7  --   --   --  3.4       Vancomycin Administrations:  vancomycin given in the last 96 hours                     vancomycin 750 mg in dextrose 5 % 250 mL IVPB (ready to mix system) (mg) 750 mg New Bag 07/17/19 1236     750 mg New Bag 07/16/19 1106                      Drug levels (last 3 results):  Recent Labs   Lab Result Units 07/17/19  1041   Vancomycin-Trough ug/mL 9.4*       Microbiologic Results:  Microbiology Results (last 7 days)       Procedure Component Value Units Date/Time    Blood culture x two cultures. Draw prior to antibiotics. [[id number]] Collected:  07/15/19 0855    Order Status:  Completed Specimen:  Blood from Peripheral, Hand, Left Updated:  07/17/19 1412     Blood Culture, Routine No Growth to date      No Growth to date      No Growth to date    Narrative:       Aerobic and anaerobic    Blood culture x two cultures. Draw prior to antibiotics. [[id number]] Collected:  07/15/19 0859    Order Status:  Completed Specimen:  Blood from Peripheral, Forearm, Left Updated:  07/17/19  1412     Blood Culture, Routine No Growth to date      No Growth to date      No Growth to date    Narrative:       Aerobic and anaerobic    Urine Culture High Risk [[id number]] Collected:  07/16/19 1705    Order Status:  Sent Specimen:  Urine, Catheterized Updated:  07/16/19 1945    Culture, Respiratory with Gram Stain [[id number]]     Order Status:  No result Specimen:  Respiratory from Sputum, Expectorated

## 2019-07-17 NOTE — ANESTHESIA PROCEDURE NOTES
Arterial    Diagnosis: sepsis    Patient location during procedure: ICU  Procedure start time: 7/15/2019 6:37 PM  Timeout: 7/15/2019 6:37 PM  Procedure end time: 7/15/2019 6:47 PM    Staffing  Authorizing Provider: Geoff Perez MD  Performing Provider: Geoff Perez MD    Anesthesiologist was present at the time of the procedure.    Preanesthetic Checklist  Completed: patient identified, site marked, surgical consent, pre-op evaluation, timeout performed, IV checked, risks and benefits discussed, monitors and equipment checked and anesthesia consent givenArterial  Skin Prep: chlorhexidine gluconate  Orientation: right  Location: radial  Catheter Size: 20 G  Catheter placement by Ultrasound guidance. Heme positive aspiration all ports.  Vessel Caliber: medium, small, patent  Needle advanced into vessel with real time Ultrasound guidance.Insertion Attempts: 2  Assessment  Dressing: secured with tape and tegaderm

## 2019-07-17 NOTE — ASSESSMENT & PLAN NOTE
- I reviewed the peripheral smear. Rare enlarged platelets are noted.  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  - I spoke to his hematologist Dr. Marr on 7/16/19. She recommended pulse dexamethasone, so I placed an order for dexamethasone 40mg PO daily x 4 days.  - he has received a dose of IV immune globulin as well.  - clinically, he is improving. His platelet count remains decreased. Continue dexamethasone and monitor platelet counts daily. I anticipate that it will start to improve in another day or so.

## 2019-07-17 NOTE — PROGRESS NOTES
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, chronic cough (he and wife state sinus infections), GERD, HLD, steroid-related DM, HTN    Much more alert today - no complaints except for weakness  AF x 24 hrs    -167 / 40-70   O2 % % RA  Alert and oriented: no sinus tenderness, neck supple  Heart RRR, lungs - large airway sounds - good movement  Abdomen: not distended, soft non tender    LABS:  WBC down to 31102 from 19k Hct 38               Creat 1.6 (stable but up from 1.1) LFT's improved    No positive BC - UC pending  No sputum culture yet  CXR - official read is bilateral atelectasis    IMP: Elderly gentleman admitted with sepsis picture -  clinical improved but still without definitive source of infection         Several significant underlying illnesses - now o pulse dexamethasone for torey low platelet count (s/o IVIG x 1)    REC:  For now continue broad-spectrum antibiotics but will adjust cefepime dose for GFR < 10 per renal rec's;  Discussed with clinical pharmacy - recommend cefepime new dose at 2 grams q 24hr   Consider stopping azithromycin after 3 days (7/18)   If no source becomes apparent will slowly de-escalate

## 2019-07-17 NOTE — PLAN OF CARE
Problem: Adult Inpatient Plan of Care  Goal: Plan of Care Review  POC reviewed with pt and family at 1500. Pt weaned off Levo Gtt, refer to charting. PT and OT consults and evals completed today. Pt verbalized understanding. Questions and concerns addressed. No acute events today. Pt progressing toward goals. Will continue to monitor. See flowsheets for full assessment and VS info.

## 2019-07-17 NOTE — PT/OT/SLP PROGRESS
Occupational Therapy   Treatment    Name: Thierry Ramos Jr.  MRN: [id number]  Admitting Diagnosis:  Sepsis due to pneumonia       Recommendations:     Discharge Recommendations: other (see comments)(TBD)  Discharge Equipment Recommendations:  other (see comments)(TBD)  Barriers to discharge:  None    Assessment:     Thierry Ramos Jr. is a 85 y.o. male with a medical diagnosis of Sepsis due to pneumonia.  He presents with performance deficits affecting function are weakness, impaired endurance, impaired self care skills, impaired functional mobilty, gait instability, impaired balance, impaired cognition, decreased lower extremity function, decreased upper extremity function, decreased coordination, decreased ROM, impaired fine motor, impaired skin, impaired cardiopulmonary response to activity.     Rehab Prognosis:  Fair; patient would benefit from acute skilled OT services to address these deficits and reach maximum level of function.       Plan:     Patient to be seen 5 x/week to address the above listed problems via self-care/home management, therapeutic activities, therapeutic exercises  · Plan of Care Expires: 08/15/19  · Plan of Care Reviewed with: patient, family    Subjective     Pain/Comfort:  Pain Rating 1: 0/10  Pain Rating Post-Intervention 1: 0/10    Objective:     Communicated with: nurse prior to session.  Patient found HOB elevated with blood pressure cuff, michel catheter, arterial line, SCD, telemetry, peripheral IV, pulse ox (continuous) upon OT entry to room.    General Precautions: Standard, fall   Orthopedic Precautions:    Braces:       Occupational Performance:     AMPA 6 Click ADL: 6    Treatment & Education:  Pt performed AAROM BUE shld flex, chest press 10 reps each, AROM elbow, wrist and finger flex/ext.  Pt educated on ways to perform AROM within available range to his ability and tolerance.  Educated on BLE there ex also.  Pt required increased time and rest breaks to complete.       Patient left HOB elevated with all lines intact, call button in reach, nurse notified and family presentEducation:      GOALS:   Multidisciplinary Problems     Occupational Therapy Goals        Problem: Occupational Therapy Goal    Goal Priority Disciplines Outcome Interventions   Occupational Therapy Goal     OT, PT/OT Ongoing (interventions implemented as appropriate)    Description:  Goals to be met by: 8/15     Patient will increase functional independence with ADLs by performing:    Feeding with Modified [place].  UE Dressing with Set-up Assistance.  Grooming while seated with Modified [place] post setup.  Toileting from bedside commode with Minimal Assistance for hygiene and clothing management.   Sitting at edge of bed x15 minutes with Modified [place] and no drop in BP.  Rolling to Bilateral with Modified [place].   Supine to sit with Modified [place].  Toilet transfer to bedside commode with Minimal Assistance.  Upper extremity exercise program x10 reps per handout, with independence.                      Time Tracking:     OT Date of Treatment: 07/17/19  OT Start Time: 1346  OT Stop Time: 1410  OT Total Time (min): 24 min    Billable Minutes:Therapeutic Exercise 23    Landon Stewart OT  7/17/2019

## 2019-07-17 NOTE — SUBJECTIVE & OBJECTIVE
Interval History:   - clinically, he has improved. Norepinephrine was discontinued, as his blood pressure has improved. He is more alert today. Platelet count remains decreased at 12 k/uL.    Oncology Treatment Plan:   [No treatment plan]    Medications:  Continuous Infusions:   norepinephrine bitartrate-D5W Stopped (07/17/19 0800)     Scheduled Meds:   albuterol-ipratropium  3 mL Nebulization Q4H WAKE    artificial tears  1 drop Both Eyes BID    aspirin  81 mg Oral Daily    atorvastatin  20 mg Oral Daily    azithromycin  500 mg Intravenous Q24H    ceFEPime (MAXIPIME) IVPB  2 g Intravenous Q12H    cetirizine  10 mg Oral Daily    clopidogrel  75 mg Oral Daily    cyanocobalamin  500 mcg Oral Daily    dexAMETHasone  40 mg Oral Q24H    erythromycin   Both Eyes QHS    levothyroxine  125 mcg Oral Before breakfast    metronidazole  500 mg Intravenous Q8H    pantoprazole  40 mg Oral Daily    [START ON 7/20/2019] predniSONE  7.5 mg Oral Daily    vancomycin (VANCOCIN) IVPB  750 mg Intravenous Q24H     PRN Meds:sodium chloride, sodium chloride, sodium chloride, acetaminophen, Dextrose 10% Bolus, Dextrose 10% Bolus, glucagon (human recombinant), glucose, glucose, insulin aspart U-100, ondansetron, sodium chloride 0.9%     Review of Systems   Constitutional: Positive for fatigue.   HENT: Negative for sore throat.    Eyes: Negative for visual disturbance.   Respiratory: Negative for shortness of breath.    Cardiovascular: Positive for leg swelling. Negative for chest pain.   Gastrointestinal: Negative for abdominal pain.   Genitourinary: Negative for dysuria.   Musculoskeletal: Negative for back pain.   Skin: Negative for rash.   Neurological: Positive for weakness.   Psychiatric/Behavioral: The patient is not nervous/anxious.      Objective:     Vital Signs (Most Recent):  Temp: 98.2 °F (36.8 °C) (07/17/19 0703)  Pulse: 82 (07/17/19 0902)  Resp: (!) 27 (07/17/19 0902)  BP: (!) 111/54 (07/17/19 0802)  SpO2: 99 %  (07/17/19 0902) Vital Signs (24h Range):  Temp:  [97.6 °F (36.4 °C)-98.2 °F (36.8 °C)] 98.2 °F (36.8 °C)  Pulse:  [70-99] 82  Resp:  [16-49] 27  SpO2:  [89 %-100 %] 99 %  BP: (111-167)/(44-72) 111/54  Arterial Line BP: (109-179)/(40-95) 123/48     Weight: 71 kg (156 lb 8.4 oz)  Body mass index is 21.83 kg/m².  Body surface area is 1.89 meters squared.      Intake/Output Summary (Last 24 hours) at 7/17/2019 1002  Last data filed at 7/17/2019 0800  Gross per 24 hour   Intake 1296.86 ml   Output 1885 ml   Net -588.14 ml       Physical Exam   Constitutional: He is oriented to person, place, and time. He appears well-developed and well-nourished.   HENT:   Head: Normocephalic and atraumatic.   Eyes: Pupils are equal, round, and reactive to light. EOM are normal.   Neck: Normal range of motion. Neck supple.   Cardiovascular: Normal rate and regular rhythm.   Pulmonary/Chest: Effort normal and breath sounds normal.   Abdominal: Soft. Bowel sounds are normal.   Neurological: He is alert and oriented to person, place, and time.   Skin: Skin is warm and dry.   Ecchymoses throughout body   Psychiatric: He has a normal mood and affect. His behavior is normal. Judgment and thought content normal.   Nursing note and vitals reviewed.      Significant Labs:   Labs have been reviewed.    Lab Results   Component Value Date    WBC 12.54 07/17/2019    HGB 7.9 (L) 07/17/2019    HCT 23.6 (L) 07/17/2019    MCV 89 07/17/2019    PLT 12 (LL) 07/17/2019

## 2019-07-17 NOTE — PLAN OF CARE
Problem: Physical Therapy Goal  Goal: Physical Therapy Goal  Goals to be met by: 8/15/2019     Patient will increase functional independence with mobility by performin. Supine to sit with Modified [place]  2. Sit to stand transfer with Modified [place]  3. Bed to chair transfer with Modified [place] using Rolling Walker  4. Gait  x 20 feet with Modified [place] using Rolling Walker.      Outcome: Ongoing (interventions implemented as appropriate)  Pt would benefit from skilled PT to progress functional goals

## 2019-07-17 NOTE — PROGRESS NOTES
[facility] Medicine  Progress Note    Patient Name: Thierry Ramos Jr.  MRN: [id number]  Patient Class: IP- Inpatient   Admission Date: 7/15/2019  Length of Stay: 2 days  Attending Physician: Neli Chapman*  Primary Care Provider: AKILA Wooten MD        Subjective:     Principal Problem:Sepsis due to pneumonia      HPI:  85 y.o. male with PMHx of DM, CLL, HTN, HLD, CVA, carotid stenosis, hypothyroid, hypopituitarism, squamous cell carcinoma who presents after syncopal episode. Patient reports worsening muscle weakness. Patient and spouse reports patient started becoming weak in beginning of June/2019 and it progressively worsened.  Reports weakness is especially in the legs.  He also reports leg swelling which has been associated to starting Ibrutinib for CLL.  Patient follows with Dr. Trejo for CLL and last seen 7/8/19 for the weakness and was told to hold the Ibrutinib until his next follow up appointment.  Spouse reports patient fell twice last night with loss of consciousness after the second fall.   Denies any fever, SOB, nausea, vomiting, diarrhea, chest pain or abdominal pain. ED findings: WBC 12.75, H/H 12.3/38.9, Lactic Acid 3.0, ALK Phos 258, AST//9, chest x-ray showed mild pulmonary opacity at the left lung base may relate to pulmonary infiltrate/airspace disease, EKG no ischemic changes.  Patient admitted for further medical management.       Overview/Hospital Course:  WBC increased to 25. Platelets fell from 120 to 8, so H/O was consulted and patient was transfused 1 unit of platelets. Platelets increased only to 9. Hem onc gave IVIG and recommended no further transfusion except in life threatening bleed. Patient was oliguric with rising Cr, so nephrology was consulted. The next day Cr remained stable and UOP increased. ID was consulted, and recommended vanc, cefepime, and azithromycin and additional testing.     Interval History: Pt is alert and  interactive today. Reports he feels much better.     Review of Systems   Respiratory: Positive for cough. Negative for shortness of breath.    Cardiovascular: Negative for chest pain and leg swelling.   Gastrointestinal: Negative for abdominal distention and abdominal pain.   Genitourinary: Negative for decreased urine volume and difficulty urinating.   Psychiatric/Behavioral: Negative for agitation and confusion.     Objective:     Vital Signs (Most Recent):  Temp: 97.7 °F (36.5 °C) (07/17/19 1103)  Pulse: 86 (07/17/19 1302)  Resp: (!) 29 (07/17/19 1302)  BP: (!) 102/49 (07/17/19 1202)  SpO2: 100 % (07/17/19 1302) Vital Signs (24h Range):  Temp:  [97.6 °F (36.4 °C)-98.2 °F (36.8 °C)] 97.7 °F (36.5 °C)  Pulse:  [70-99] 86  Resp:  [18-49] 29  SpO2:  [89 %-100 %] 100 %  BP: (102-167)/(44-72) 102/49  Arterial Line BP: (109-179)/(41-95) 124/45     Weight: 71 kg (156 lb 8.4 oz)  Body mass index is 21.83 kg/m².    Intake/Output Summary (Last 24 hours) at 7/17/2019 1328  Last data filed at 7/17/2019 1236  Gross per 24 hour   Intake 2166.86 ml   Output 2085 ml   Net 81.86 ml      Physical Exam   Constitutional: He is oriented to person, place, and time.   Cardiovascular: Normal rate and regular rhythm.   Pulmonary/Chest: Effort normal. He has no wheezes. He has no rales.   Abdominal: Soft. Bowel sounds are normal.   Musculoskeletal: He exhibits no edema.   Neurological: He is alert and oriented to person, place, and time.   Skin: Skin is warm and dry. Ecchymosis noted.       Significant Labs:   BMP:   Recent Labs   Lab 07/17/19  0400   *   *   K 4.0      CO2 18*   BUN 38*   CREATININE 1.6*   CALCIUM 7.9*   MG 1.7     CBC:   Recent Labs   Lab 07/16/19  0735 07/16/19  1057 07/17/19  0400   WBC 24.62* 19.89* 12.54   HGB 9.5* 8.5* 7.9*   HCT 28.8* 26.3* 23.6*   PLT 10* 9* 12*       Significant Imaging: I have reviewed all pertinent imaging results/findings within the past 24 hours.      Assessment/Plan:       * Sepsis due to pneumonia  Metabolic encephalopathy  Septic shock  Leukocytosis  Weakness  Chest X-ray showed left lung opacity, very mild.    -ID consult: Vanc, cefepime, and azithro  -Blood Cultures: NGTD  -Albuterol-ipratropium neb treatments Q4HR  -Lactic Acid: 3.0 > 2.3 > 3.0 > 1.6, no longer needing pressors    Abnormal liver enzymes  Likely 2/2 shock liver in the setting of septic shock. Improving    Central hypothyroidism  -Continue synthroid    CLL (chronic lymphocytic leukemia)  Immunosuppression  Edema of both legs  Follows with Dr. Trejo. Ibrutinib placed on hold by Dr. Trejo 2/2 to worsening weakness and leg swelling.  -Consult hem/onc    Hypertension  SBP  102-167  -Required pressors, hold home meds for now    Immunosuppression  Noted      Mixed dyslipidemia  History of CVA (cerebrovascular accident)  Denies chest pain or SOB  -Continue aspirin, statin and plavix    Panhypopituitarism  -Will order pulse dexamethasone    Immune thrombocytopenia  Plt 120 > 9 > 12.   -Transfused platelets with little response; likely immune  -Further management per Hem Onc with IVIG    Type 2 diabetes, controlled, with neuropathy  Hemoglobin A1c: 5.6  -POC glucose checks ACHS  -Low dose SSI PRN  -Hypoglycemia protocol PRN  -Diabetic Diet    LUZ (acute kidney injury)  Oliguria  Cr 1.0 > 1.7. UOP improving  -Nephrology consult    Thrombocytopenia, unspecified          VTE Risk Mitigation (From admission, onward)        Ordered     IP VTE HIGH RISK PATIENT  Once      07/15/19 0691          Critical care time spent on the evaluation and treatment of severe organ dysfunction, review of pertinent labs and imaging studies, discussions with consulting providers and discussions with patient/family: 45 minutes.      Gloria Frias PA-C  Department of Hospital Medicine   [facility]

## 2019-07-17 NOTE — PT/OT/SLP PROGRESS
Physical Therapy Treatment    Patient Name:  Thierry Ramos Jr.   MRN:  [id number]    Recommendations:     Discharge Recommendations:  (TBD pending progress)   Discharge Equipment Recommendations: (TBD)   Barriers to discharge: decreased functional mobility    Assessment:     Thierry Ramos Jr. is a 85 y.o. male admitted with a medical diagnosis of Sepsis due to pneumonia.  He presents with the following impairments/functional limitations:  weakness, impaired endurance, impaired self care skills, impaired functional mobilty, gait instability, impaired balance, decreased lower extremity function, impaired skin.  Pt required Mod assist of 1 for supine to sit.  Pt ambulated 2' to HOB with Mod assist of 1 with Right hand on bed rail.  Pt requires Min assist for sit to stand.  BP higher in sitting than supine.  BP decreased post standing with SOB noted but O2 Sat abov 94.    Rehab Prognosis: Good; patient would benefit from acute skilled PT services to address these deficits and reach maximum level of function.    Recent Surgery: * No surgery found *      Plan:     During this hospitalization, patient to be seen 6 x/week to address the identified rehab impairments via gait training, therapeutic activities, therapeutic exercises, neuromuscular re-education, wheelchair management/training and progress toward the following goals:    · Plan of Care Expires:  08/17/19    Subjective     Chief Complaint: wants to move  Patient/Family Comments/goals: wants to move  Pain/Comfort:  · Pain Rating 1: 0/10      Objective:     Communicated with nurse prior to session.  Patient found HOB elevated with blood pressure cuff, telemetry, pulse ox (continuous), michel catheter, bed alarm upon PT entry to room.     General Precautions: Standard, fall   Orthopedic Precautions:N/A   Braces:       Functional Mobility:  Pt rolled bilaterally with Min assist.  Pt scooted to HOB with supervision. Pt required Mod assist of 1 for supine to sit.  Pt  ambulated 2' to HOB with Mod assist of 1 with Right hand on bed rail.  Pt requires Min assist for sit to stand.  BP higher in sitting than supine.  BP decreased post standing with SOB noted but O2 Sat abov 94.        AM-PAC 6 CLICK MOBILITY  Turning over in bed (including adjusting bedclothes, sheets and blankets)?: 3  Sitting down on and standing up from a chair with arms (e.g., wheelchair, bedside commode, etc.): 3  Moving from lying on back to sitting on the side of the bed?: 2  Moving to and from a bed to a chair (including a wheelchair)?: 2  Need to walk in hospital room?: 2  Climbing 3-5 steps with a railing?: 1  Basic Mobility Total Score: 13       Therapeutic Activities and Exercises:   Pt sat at EOB 10 minutes total.  Pt performed LAQ 10 x 3, AP 10 x 2.    Patient left HOB elevated with all lines intact, call button in reach, bed alarm on and nurse present..    GOALS:   Multidisciplinary Problems     Physical Therapy Goals        Problem: Physical Therapy Goal    Goal Priority Disciplines Outcome Goal Variances Interventions   Physical Therapy Goal     PT, PT/OT Ongoing (interventions implemented as appropriate)     Description:  Goals to be met by: 8/15/2019     Patient will increase functional independence with mobility by performin. Supine to sit with Modified [place]  2. Sit to stand transfer with Modified [place]  3. Bed to chair transfer with Modified [place] using Rolling Walker  4. Gait  x 20 feet with Modified [place] using Rolling Walker.              Problem: Physical Therapy Goal    Goal Priority Disciplines Outcome Goal Variances Interventions   Physical Therapy Goal     PT, PT/OT                      Time Tracking:     PT Received On: 19  PT Start Time: 1000     PT Stop Time: 1035  PT Total Time (min): 35 min     Billable Minutes: Therapeutic Activity 18 and Therapeutic Exercise 17    Treatment Type: Treatment  PT/PTA: PT     PTA Visit Number: 0     Mary Carmen BILLINGS  Ty, PT  07/17/2019

## 2019-07-17 NOTE — ASSESSMENT & PLAN NOTE
Metabolic encephalopathy  Septic shock  Leukocytosis  Weakness  Chest X-ray showed left lung opacity, very mild.    -ID consult: Vanc, cefepime, and azithro  -Blood Cultures: NGTD  -Albuterol-ipratropium neb treatments Q4HR  -Lactic Acid: 3.0 > 2.3 > 3.0 > 1.6, no longer needing pressors

## 2019-07-17 NOTE — ASSESSMENT & PLAN NOTE
Plt 120 > 9 > 12.   -Transfused platelets with little response; likely immune  -Further management per Hem Onc with IVIG

## 2019-07-17 NOTE — PLAN OF CARE
Problem: Occupational Therapy Goal  Goal: Occupational Therapy Goal  Goals to be met by: 8/15     Patient will increase functional independence with ADLs by performing:    Feeding with Modified [place].  UE Dressing with Set-up Assistance.  Grooming while seated with Modified [place] post setup.  Toileting from bedside commode with Minimal Assistance for hygiene and clothing management.   Sitting at edge of bed x15 minutes with Modified [place] and no drop in BP.  Rolling to Bilateral with Modified [place].   Supine to sit with Modified [place].  Toilet transfer to bedside commode with Minimal Assistance.  Upper extremity exercise program x10 reps per handout, with independence.     Outcome: Ongoing (interventions implemented as appropriate)  Pt alert, oriented and cooperative    Cont POC

## 2019-07-17 NOTE — PLAN OF CARE
met with patient and his spouse, Lorin Beauchamp (cell [phone]) in ICU. Spouse reported patient is independent with his ADLs.    No DME. No HH.  Spouse interested in SNF at d/c. Prefers [facility] SNF if this the recommendation.    This  put name on white board. Discharge planning brochure and business card given to patient's spouse.  Patient verbalized understanding and voiced no questions.     07/17/19 1441   Discharge Assessment   Assessment Type Discharge Planning Assessment   Confirmed/corrected address and phone number on facesheet? Yes   Assessment information obtained from? Caregiver   Expected Length of Stay (days) 5   Communicated expected length of stay with patient/caregiver yes   Prior to hospitilization cognitive status: Alert/Oriented   Prior to hospitalization functional status: Independent   Current cognitive status: Alert/Oriented   Current Functional Status: Needs Assistance   Lives With spouse   Able to Return to Prior Arrangements   (TBD)   Is patient able to care for self after discharge? Unable to determine at this time (comments)   Who are your caregiver(s) and their phone number(s)?   (Spouse: Yolie Ramos [phone])   Patient's perception of discharge disposition admitted as an inpatient   Readmission Within the Last 30 Days no previous admission in last 30 days   Patient currently being followed by outpatient case management? No   Patient currently receives any other outside agency services? No   Equipment Currently Used at Home none   Do you have any problems affording any of your prescribed medications? No   Is the patient taking medications as prescribed? yes   Does the patient have transportation home? Yes   Transportation Anticipated family or friend will provide   Does the patient receive services at the [facility]? No   Discharge Plan A Skilled Nursing Facility   Discharge Plan B Home Health   DME Needed Upon Discharge    (TBD)   Patient/Family in  Agreement with Plan yes

## 2019-07-17 NOTE — CONSULTS
Nephrology Consult  H&P      Consult Requested By: Neli Chapman*  Reason for Consult: ARF    SUBJECTIVE:     History of Present Illness:  Patient is a 85 y.o. male presents with syncopal episode and AMS  CLL, HTN, HLD, CVA, carotid stenosis, hypothyroid, hypopituitarism, squamous cell carcinoma    Weakness, especially in the legsbeginning of June/2019 .   He also reports leg swelling which has been associated to starting Ibrutinib for CLL.    Spouse reports patient fell twice last night with loss of consciousness after the second fall.       Review of Systems   Unable to perform ROS: Critical illness   limited denies any pain and per family LE edema better then usual    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    Hyperlipidemia     Hypertension     Hypopituitarism     Hypothyroid     Immunosuppression 3/13/2015    Squamous Cell Carcinoma     on the right side of the face     Squamous cell carcinoma of skin of right temple 1/27/2016    Stroke     Syncope 2/12    Unspecified disorder of kidney and ureter      Past Surgical History:   Procedure Laterality Date    (ROTATION) FLAP N/A 3/23/2015    Performed by oJe Mcallister MD at [facility] OR 2ND FLR    BIOPSY-LYMPH NODE N/A 3/23/2015    Performed by Joe Mcallister MD at [facility] OR 2ND FLR    CAROTID ENDARTERECTOMY (L)  2/27/2007    COLONOSCOPY N/A 2/25/2014    Performed by DELORES Lambert MD at [facility] ENDO (4TH FLR)    EXCISION TURBINATE, SUBMUCOUS      EXCISION-LESION-FACE Right 2/24/2016    Performed by Joe [facility]allister MD at [facility] OR 2ND FLR    EXCISION-LESION-FACE N/A 3/23/2015    Performed by Joe Mcallister MD at [facility] OR 2ND FLR    FESS  9/16/2014    FULL THICKNESS SKIN GRAFT N/A 2/24/2016    Performed by Joe [facility]allister MD at [facility] OR 2ND FLR    MAPPING-LYMPH NODE N/A  3/23/2015    Performed by Joe [facility]ister MD at [facility] OR [address] [place] [address]    MAPPING-SENTINEL NODE N/A 2/24/2016    Performed by Joe Mcallister MD at [facility] OR [address] [place] [address]    Mohs excision   3/23/2015, 2/24/2016    combined with WLE forehead    NASAL SEPTUM SURGERY  9/16/2014    REPAIR-MOHS DEFECT-left nose Left 3/31/2017    Performed by Jermaine Dsouza III, MD at [facility] OR [address] [place] [address]    RESECTION-TURBINATES (SMR) Bilateral 9/16/2014    Performed by Quitmanlauren Dsouza III, MD at [facility] OR [address] [place] [address]    right ureter surgery  1995    SENTINEL LYMPH NODE BIOPSY  3/23/2015, 2/24/2016    SEPTOPLASTY Bilateral 9/16/2014    Performed by Jermaine Dsouza III, MD at [facility] OR [address] [place] [address]    SINUS SURGERY  9/16/2014    septo, ESS, \[facility] [place] [facility]\""    SINUS SURGERY FUNCTIONAL ENDOSCOPIC WITH NAVIGATION with bilateral maxillary, ethmoids, sphenoids, right frontal, possible left NF duct-balloons Bilateral 9/16/2014    Performed by Jermaine Dsouza III, MD at [facility] OR [address] [place] [address]    Transphenoidal surgery       Family History   Problem Relation Age of Onset    Cancer Mother         breast    Colon cancer Father     Cancer Father         colon    Cancer Brother         leukemia    No Known Problems Sister     No Known Problems Maternal Aunt     No Known Problems Maternal Uncle     No Known Problems Paternal Aunt     No Known Problems Paternal Uncle     No Known Problems Maternal Grandmother     No Known Problems Maternal Grandfather     No Known Problems Paternal Grandmother     No Known Problems Paternal Grandfather     Amblyopia Neg Hx     Blindness Neg Hx     Cataracts Neg Hx     Diabetes Neg Hx     Glaucoma Neg Hx     Hypertension Neg Hx     Macular degeneration Neg Hx     Retinal detachment Neg Hx     Strabismus Neg Hx     Stroke Neg Hx     Thyroid disease Neg Hx     Allergic rhinitis Neg Hx     Allergies Neg Hx     Angioedema Neg Hx     Asthma Neg Hx     Atopy Neg Hx     Eczema Neg Hx     Immunodeficiency Neg Hx     Rhinitis Neg Hx   "   Urticaria Neg Hx      Social History     Tobacco Use    Smoking status: Never Smoker    Smokeless tobacco: Never Used   Substance Use Topics    Alcohol use: Yes     Alcohol/week: 1.2 oz     Types: 1 Glasses of wine, 1 Cans of beer per week     Comment: "rarely"    Drug use: No       Review of patient's allergies indicates:   Allergen Reactions    Ace inhibitors Swelling     angioedema    Divalproex Hives     Rash under arms, body creases            OBJECTIVE:     Vital Signs (Most Recent)  Vitals:    07/16/19 2030 07/16/19 2045 07/16/19 2100 07/16/19 2115   BP:       BP Location:       Patient Position:       Pulse: 83 82 81 78   Resp: (!) 33 (!) 33 (!) 35 (!) 32   Temp:       TempSrc:       SpO2: (!) 93% (!) 94% 95% (!) 94%   Weight:       Height:             Date 07/16/19 0700 - 07/17/19 0659   Shift 9318-9193 0779-8498 0796-0443 24 Hour Total   INTAKE   I.V.(mL/kg) 440(6.2) 190.1(2.7)  630.1(8.9)   IV Piggyback  850  850   Shift Total(mL/kg) 440(6.2) 1040.1(14.6)  1480.1(20.8)   OUTPUT   Urine(mL/kg/hr) 175(0.3) 205  380   Shift Total(mL/kg) 175(2.5) 205(2.9)  380(5.4)   Weight (kg) 71 71 71 71           Medications:   albuterol-ipratropium  3 mL Nebulization Q4H WAKE    artificial tears  1 drop Both Eyes BID    aspirin  81 mg Oral Daily    atorvastatin  20 mg Oral Daily    azithromycin  500 mg Intravenous Q24H    ceFEPime (MAXIPIME) IVPB  2 g Intravenous Q12H    cetirizine  10 mg Oral Daily    clopidogrel  75 mg Oral Daily    cyanocobalamin  500 mcg Oral Daily    dexAMETHasone  40 mg Oral Q24H    erythromycin   Both Eyes QHS    levothyroxine  125 mcg Oral Before breakfast    metronidazole  500 mg Intravenous Q8H    pantoprazole  40 mg Oral Daily    [START ON 7/20/2019] predniSONE  7.5 mg Oral Daily    vancomycin (VANCOCIN) IVPB  750 mg Intravenous Q24H           Physical Exam   Constitutional: He is well-developed, well-nourished, and in no distress. No distress.   HENT:   Head: " Normocephalic and atraumatic.   Mouth/Throat: Oropharynx is clear and moist.   Eyes: EOM are normal. No scleral icterus.   Neck: Neck supple. No JVD present.   Cardiovascular: Normal rate and regular rhythm. Exam reveals no friction rub.   No murmur heard.  Pulmonary/Chest: Effort normal and breath sounds normal. No respiratory distress. He has no wheezes. He has no rales.   Abdominal: Soft. Bowel sounds are normal. He exhibits no distension. There is no tenderness.   Musculoskeletal: He exhibits edema (2+ BLE).   Neurological:   Somnolent, does not open eyes but somewhat verbal, follows commands but not consistently   Skin: Skin is warm and dry. No rash noted. He is not diaphoretic. No erythema.   Psychiatric: Affect normal.       Laboratory:  Recent Labs   Lab 07/16/19  0538 07/16/19  0735 07/16/19  1057   WBC 25.61* 24.62* 19.89*   HGB 9.7* 9.5* 8.5*   HCT 30.1* 28.8* 26.3*   PLT 8* 10* 9*   MONO 6.0  CANCELED 6.0  CANCELED 6.0  CANCELED     Recent Labs   Lab 07/15/19  0802 07/16/19  0200 07/16/19  0735    137 138   K 3.9 4.4 4.2    109 110   CO2 25 17* 16*   BUN 34* 39* 39*   CREATININE 1.1 1.7* 1.7*   CALCIUM 9.2 8.2* 7.9*   PHOS  --  3.7  --        Diagnostic Results:  X-Ray: Reviewed  US: Reviewed  Echo: Reviewed  ASSESSMENT/PLAN:      LUZ on CKD 2 Baseline creatinine- 1.1  ATN from hypoperfusion  Volume overloaded  Stop IVF  OK to use PRN boluses (LR not NS because of hyperchloremia and acodosis) to assess volume responsiveness especially if pressors requirement going up    Check uric acid, CPK, renal US  Careful with vanc check lvl before giving next dose and potentially avoid vanco if possible     Avoid nephrotoxins, dose all meds ad for GFr < 10  No indication for urgent RRT. Monitor closely. Strict ins/outs, daily weight     MAx concentrate levophed    Monitor acidosis if worsenign might need bicarb gtt vs RRT          Thank you for consult, will follow  With any question please call  [phone]  Ananya Escobar    [facility]  FARHEEN Guadarrama MD, JEFF MCCORMACK MD,   MD RACHEL Cruz, NP  [address]  [place], [address]  [phone]

## 2019-07-17 NOTE — PLAN OF CARE
Problem: Adult Inpatient Plan of Care  Goal: Plan of Care Review  Outcome: Ongoing (interventions implemented as appropriate)  Afebrile during the night. Remains on Levo - able to wean down. Finished IGG therapy at beginning of shift. AAO - disoriented to situation during the night, but able to re-orient. Son's at bedside. Denies any pain or SOB. VSS - BP MAP >65. Skin very discolored - bruises, maroons, red. Wound dressings changed and arms wrapped in kirlex. Urine output adequate. Safety precautions in place.

## 2019-07-18 LAB
ALBUMIN SERPL BCP-MCNC: 2.2 G/DL (ref 3.5–5.2)
ALLENS TEST: ABNORMAL
ALP SERPL-CCNC: 179 U/L (ref 55–135)
ALT SERPL W/O P-5'-P-CCNC: 41 U/L (ref 10–44)
ANION GAP SERPL CALC-SCNC: 15 MMOL/L (ref 8–16)
ANISOCYTOSIS BLD QL SMEAR: SLIGHT
AST SERPL-CCNC: 96 U/L (ref 10–40)
BASOPHILS # BLD AUTO: ABNORMAL K/UL (ref 0–0.2)
BASOPHILS NFR BLD: 0 % (ref 0–1.9)
BILIRUB SERPL-MCNC: 1.1 MG/DL (ref 0.1–1)
BUN SERPL-MCNC: 47 MG/DL (ref 8–23)
CALCIUM SERPL-MCNC: 8.4 MG/DL (ref 8.7–10.5)
CHLORIDE SERPL-SCNC: 109 MMOL/L (ref 95–110)
CO2 SERPL-SCNC: 12 MMOL/L (ref 23–29)
CREAT SERPL-MCNC: 1.5 MG/DL (ref 0.5–1.4)
DIFFERENTIAL METHOD: ABNORMAL
EOSINOPHIL # BLD AUTO: ABNORMAL K/UL (ref 0–0.5)
EOSINOPHIL NFR BLD: 3 % (ref 0–8)
ERYTHROCYTE [DISTWIDTH] IN BLOOD BY AUTOMATED COUNT: 16.5 % (ref 11.5–14.5)
EST. GFR  (AFRICAN AMERICAN): 48 ML/MIN/1.73 M^2
EST. GFR  (NON AFRICAN AMERICAN): 42 ML/MIN/1.73 M^2
GLUCOSE SERPL-MCNC: 131 MG/DL (ref 70–110)
HCO3 UR-SCNC: 14.1 MMOL/L (ref 24–28)
HCT VFR BLD AUTO: 22.1 % (ref 40–54)
HGB BLD-MCNC: 7.3 G/DL (ref 14–18)
HGB BLD-MCNC: 7.8 G/DL (ref 14–18)
HYPOCHROMIA BLD QL SMEAR: ABNORMAL
LYMPHOCYTES # BLD AUTO: ABNORMAL K/UL (ref 1–4.8)
LYMPHOCYTES NFR BLD: 18 % (ref 18–48)
MAGNESIUM SERPL-MCNC: 1.9 MG/DL (ref 1.6–2.6)
MCH RBC QN AUTO: 29.4 PG (ref 27–31)
MCHC RBC AUTO-ENTMCNC: 33 G/DL (ref 32–36)
MCV RBC AUTO: 89 FL (ref 82–98)
MONOCYTES # BLD AUTO: ABNORMAL K/UL (ref 0.3–1)
MONOCYTES NFR BLD: 1 % (ref 4–15)
NEUTROPHILS NFR BLD: 77 % (ref 38–73)
NEUTS BAND NFR BLD MANUAL: 1 %
OVALOCYTES BLD QL SMEAR: ABNORMAL
PCO2 BLDA: 24 MMHG (ref 35–45)
PH SMN: 7.38 [PH] (ref 7.35–7.45)
PHOSPHATE SERPL-MCNC: 4.2 MG/DL (ref 2.7–4.5)
PLATELET # BLD AUTO: 22 K/UL (ref 150–350)
PLATELET BLD QL SMEAR: ABNORMAL
PMV BLD AUTO: ABNORMAL FL (ref 9.2–12.9)
PO2 BLDA: 180 MMHG (ref 80–100)
POC BE: -11 MMOL/L
POC SATURATED O2: 100 % (ref 95–100)
POC TCO2: 15 MMOL/L (ref 23–27)
POCT GLUCOSE: 134 MG/DL (ref 70–110)
POCT GLUCOSE: 157 MG/DL (ref 70–110)
POCT GLUCOSE: 176 MG/DL (ref 70–110)
POCT GLUCOSE: 203 MG/DL (ref 70–110)
POIKILOCYTOSIS BLD QL SMEAR: SLIGHT
POLYCHROMASIA BLD QL SMEAR: ABNORMAL
POTASSIUM SERPL-SCNC: 4.5 MMOL/L (ref 3.5–5.1)
PROT SERPL-MCNC: 5.6 G/DL (ref 6–8.4)
RBC # BLD AUTO: 2.48 M/UL (ref 4.6–6.2)
SAMPLE: ABNORMAL
SITE: ABNORMAL
SODIUM SERPL-SCNC: 136 MMOL/L (ref 136–145)
VANCOMYCIN SERPL-MCNC: 11.1 UG/ML
WBC # BLD AUTO: 21.17 K/UL (ref 3.9–12.7)

## 2019-07-18 PROCEDURE — 82803 BLOOD GASES ANY COMBINATION: CPT | Mod: HCNC

## 2019-07-18 PROCEDURE — 99233 PR SUBSEQUENT HOSPITAL CARE,LEVL III: ICD-10-PCS | Mod: HCNC,,, | Performed by: INTERNAL MEDICINE

## 2019-07-18 PROCEDURE — 63600175 PHARM REV CODE 636 W HCPCS: Mod: HCNC | Performed by: CLINIC/CENTER

## 2019-07-18 PROCEDURE — 25000003 PHARM REV CODE 250: Mod: HCNC | Performed by: INTERNAL MEDICINE

## 2019-07-18 PROCEDURE — S0030 INJECTION, METRONIDAZOLE: HCPCS | Mod: HCNC | Performed by: INTERNAL MEDICINE

## 2019-07-18 PROCEDURE — 63600175 PHARM REV CODE 636 W HCPCS: Mod: HCNC | Performed by: INTERNAL MEDICINE

## 2019-07-18 PROCEDURE — 93010 ELECTROCARDIOGRAM REPORT: CPT | Mod: HCNC,,, | Performed by: STUDENT IN AN ORGANIZED HEALTH CARE EDUCATION/TRAINING PROGRAM

## 2019-07-18 PROCEDURE — 27100171 HC OXYGEN HIGH FLOW UP TO 24 HOURS: Mod: HCNC

## 2019-07-18 PROCEDURE — 94761 N-INVAS EAR/PLS OXIMETRY MLT: CPT | Mod: HCNC

## 2019-07-18 PROCEDURE — 85018 HEMOGLOBIN: CPT | Mod: HCNC

## 2019-07-18 PROCEDURE — 83735 ASSAY OF MAGNESIUM: CPT | Mod: HCNC

## 2019-07-18 PROCEDURE — 84100 ASSAY OF PHOSPHORUS: CPT | Mod: HCNC

## 2019-07-18 PROCEDURE — 36430 TRANSFUSION BLD/BLD COMPNT: CPT | Mod: HCNC

## 2019-07-18 PROCEDURE — 27100092 HC HIGH FLOW DELIVERY CANNULA: Mod: HCNC

## 2019-07-18 PROCEDURE — 99900035 HC TECH TIME PER 15 MIN (STAT): Mod: HCNC

## 2019-07-18 PROCEDURE — 25000003 PHARM REV CODE 250: Mod: HCNC | Performed by: STUDENT IN AN ORGANIZED HEALTH CARE EDUCATION/TRAINING PROGRAM

## 2019-07-18 PROCEDURE — 93010 EKG 12-LEAD: ICD-10-PCS | Mod: HCNC,,, | Performed by: STUDENT IN AN ORGANIZED HEALTH CARE EDUCATION/TRAINING PROGRAM

## 2019-07-18 PROCEDURE — 99233 SBSQ HOSP IP/OBS HIGH 50: CPT | Mod: HCNC,,, | Performed by: INTERNAL MEDICINE

## 2019-07-18 PROCEDURE — 25000003 PHARM REV CODE 250: Mod: HCNC | Performed by: NURSE PRACTITIONER

## 2019-07-18 PROCEDURE — 94640 AIRWAY INHALATION TREATMENT: CPT | Mod: HCNC

## 2019-07-18 PROCEDURE — 85025 COMPLETE CBC W/AUTO DIFF WBC: CPT | Mod: HCNC

## 2019-07-18 PROCEDURE — 25000003 PHARM REV CODE 250: Mod: HCNC | Performed by: FAMILY MEDICINE

## 2019-07-18 PROCEDURE — 93005 ELECTROCARDIOGRAM TRACING: CPT | Mod: HCNC

## 2019-07-18 PROCEDURE — 25000242 PHARM REV CODE 250 ALT 637 W/ HCPCS: Mod: HCNC | Performed by: NURSE PRACTITIONER

## 2019-07-18 PROCEDURE — 80202 ASSAY OF VANCOMYCIN: CPT | Mod: HCNC

## 2019-07-18 PROCEDURE — P9038 RBC IRRADIATED: HCPCS | Mod: HCNC

## 2019-07-18 PROCEDURE — 20000000 HC ICU ROOM: Mod: HCNC

## 2019-07-18 PROCEDURE — 63600175 PHARM REV CODE 636 W HCPCS: Mod: HCNC | Performed by: FAMILY MEDICINE

## 2019-07-18 PROCEDURE — 80053 COMPREHEN METABOLIC PANEL: CPT | Mod: HCNC

## 2019-07-18 PROCEDURE — 27000221 HC OXYGEN, UP TO 24 HOURS: Mod: HCNC

## 2019-07-18 PROCEDURE — 36600 WITHDRAWAL OF ARTERIAL BLOOD: CPT | Mod: HCNC

## 2019-07-18 PROCEDURE — 63600175 PHARM REV CODE 636 W HCPCS: Mod: HCNC | Performed by: NURSE PRACTITIONER

## 2019-07-18 PROCEDURE — 27201040 HC RC 50 FILTER: Mod: HCNC

## 2019-07-18 RX ORDER — VANCOMYCIN HCL IN 5 % DEXTROSE 1G/250ML
1000 PLASTIC BAG, INJECTION (ML) INTRAVENOUS
Status: DISCONTINUED | OUTPATIENT
Start: 2019-07-19 | End: 2019-07-18

## 2019-07-18 RX ORDER — FENTANYL CITRATE 50 UG/ML
50 INJECTION, SOLUTION INTRAMUSCULAR; INTRAVENOUS ONCE
Status: COMPLETED | OUTPATIENT
Start: 2019-07-18 | End: 2019-07-18

## 2019-07-18 RX ORDER — DIPHENHYDRAMINE HCL 12.5MG/5ML
12.5 ELIXIR ORAL DAILY PRN
Status: COMPLETED | OUTPATIENT
Start: 2019-07-18 | End: 2019-07-18

## 2019-07-18 RX ORDER — FUROSEMIDE 10 MG/ML
40 INJECTION INTRAMUSCULAR; INTRAVENOUS 2 TIMES DAILY
Status: DISCONTINUED | OUTPATIENT
Start: 2019-07-18 | End: 2019-07-19

## 2019-07-18 RX ORDER — HYDROCODONE BITARTRATE AND ACETAMINOPHEN 500; 5 MG/1; MG/1
TABLET ORAL
Status: DISCONTINUED | OUTPATIENT
Start: 2019-07-18 | End: 2019-07-25 | Stop reason: HOSPADM

## 2019-07-18 RX ORDER — DIPHENHYDRAMINE HCL 12.5MG/5ML
25 ELIXIR ORAL EVERY 4 HOURS PRN
Status: DISCONTINUED | OUTPATIENT
Start: 2019-07-18 | End: 2019-07-20

## 2019-07-18 RX ORDER — DILTIAZEM HYDROCHLORIDE 5 MG/ML
10 INJECTION INTRAVENOUS ONCE
Status: COMPLETED | OUTPATIENT
Start: 2019-07-18 | End: 2019-07-18

## 2019-07-18 RX ORDER — QUETIAPINE FUMARATE 25 MG/1
25 TABLET, FILM COATED ORAL ONCE
Status: DISCONTINUED | OUTPATIENT
Start: 2019-07-18 | End: 2019-07-20

## 2019-07-18 RX ORDER — HALOPERIDOL 5 MG/ML
5 INJECTION INTRAMUSCULAR ONCE
Status: COMPLETED | OUTPATIENT
Start: 2019-07-18 | End: 2019-07-18

## 2019-07-18 RX ORDER — QUETIAPINE FUMARATE 25 MG/1
25 TABLET, FILM COATED ORAL ONCE
Status: COMPLETED | OUTPATIENT
Start: 2019-07-18 | End: 2019-07-18

## 2019-07-18 RX ORDER — DILTIAZEM HCL 1 MG/ML
5 INJECTION, SOLUTION INTRAVENOUS CONTINUOUS
Status: DISCONTINUED | OUTPATIENT
Start: 2019-07-19 | End: 2019-07-21

## 2019-07-18 RX ORDER — FUROSEMIDE 10 MG/ML
40 INJECTION INTRAMUSCULAR; INTRAVENOUS 2 TIMES DAILY
Status: DISCONTINUED | OUTPATIENT
Start: 2019-07-18 | End: 2019-07-18

## 2019-07-18 RX ORDER — VANCOMYCIN HCL IN 5 % DEXTROSE 1G/250ML
1000 PLASTIC BAG, INJECTION (ML) INTRAVENOUS
Status: DISCONTINUED | OUTPATIENT
Start: 2019-07-18 | End: 2019-07-19

## 2019-07-18 RX ADMIN — CETIRIZINE HYDROCHLORIDE 10 MG: 10 TABLET, FILM COATED ORAL at 08:07

## 2019-07-18 RX ADMIN — HYPROMELLOSE 2910 1 DROP: 5 SOLUTION OPHTHALMIC at 09:07

## 2019-07-18 RX ADMIN — LEVOTHYROXINE SODIUM 125 MCG: 25 TABLET ORAL at 05:07

## 2019-07-18 RX ADMIN — DEXAMETHASONE 40 MG: 4 TABLET ORAL at 08:07

## 2019-07-18 RX ADMIN — Medication 500 MCG: at 08:07

## 2019-07-18 RX ADMIN — DIPHENHYDRAMINE HYDROCHLORIDE 25 MG: 12.5 SOLUTION ORAL at 08:07

## 2019-07-18 RX ADMIN — PANTOPRAZOLE SODIUM 40 MG: 40 TABLET, DELAYED RELEASE ORAL at 08:07

## 2019-07-18 RX ADMIN — DIPHENHYDRAMINE HYDROCHLORIDE 12.5 MG: 12.5 SOLUTION ORAL at 02:07

## 2019-07-18 RX ADMIN — METRONIDAZOLE 500 MG: 500 INJECTION, SOLUTION INTRAVENOUS at 06:07

## 2019-07-18 RX ADMIN — ONDANSETRON 4 MG: 2 INJECTION INTRAMUSCULAR; INTRAVENOUS at 08:07

## 2019-07-18 RX ADMIN — HYPROMELLOSE 2910 1 DROP: 5 SOLUTION OPHTHALMIC at 08:07

## 2019-07-18 RX ADMIN — INSULIN ASPART 1 UNITS: 100 INJECTION, SOLUTION INTRAVENOUS; SUBCUTANEOUS at 08:07

## 2019-07-18 RX ADMIN — HALOPERIDOL LACTATE 5 MG: 5 INJECTION, SOLUTION INTRAMUSCULAR at 03:07

## 2019-07-18 RX ADMIN — ERYTHROMYCIN: 5 OINTMENT OPHTHALMIC at 09:07

## 2019-07-18 RX ADMIN — DILTIAZEM HYDROCHLORIDE 10 MG: 5 INJECTION INTRAVENOUS at 10:07

## 2019-07-18 RX ADMIN — CLOPIDOGREL BISULFATE 75 MG: 75 TABLET ORAL at 08:07

## 2019-07-18 RX ADMIN — VANCOMYCIN HYDROCHLORIDE 1000 MG: 1 INJECTION, POWDER, LYOPHILIZED, FOR SOLUTION INTRAVENOUS at 12:07

## 2019-07-18 RX ADMIN — METRONIDAZOLE 500 MG: 500 INJECTION, SOLUTION INTRAVENOUS at 09:07

## 2019-07-18 RX ADMIN — BENZONATATE 100 MG: 100 CAPSULE ORAL at 05:07

## 2019-07-18 RX ADMIN — CEFEPIME HYDROCHLORIDE 2 G: 2 INJECTION, POWDER, FOR SOLUTION INTRAVENOUS at 08:07

## 2019-07-18 RX ADMIN — FUROSEMIDE 40 MG: 10 INJECTION, SOLUTION INTRAVENOUS at 12:07

## 2019-07-18 RX ADMIN — FENTANYL CITRATE 50 MCG: 50 INJECTION, SOLUTION INTRAMUSCULAR; INTRAVENOUS at 02:07

## 2019-07-18 RX ADMIN — ATORVASTATIN CALCIUM 20 MG: 20 TABLET, FILM COATED ORAL at 08:07

## 2019-07-18 RX ADMIN — IPRATROPIUM BROMIDE AND ALBUTEROL SULFATE 3 ML: .5; 3 SOLUTION RESPIRATORY (INHALATION) at 04:07

## 2019-07-18 RX ADMIN — QUETIAPINE 25 MG: 25 TABLET ORAL at 09:07

## 2019-07-18 RX ADMIN — AZITHROMYCIN MONOHYDRATE 500 MG: 500 INJECTION, POWDER, LYOPHILIZED, FOR SOLUTION INTRAVENOUS at 02:07

## 2019-07-18 RX ADMIN — IPRATROPIUM BROMIDE AND ALBUTEROL SULFATE 3 ML: .5; 3 SOLUTION RESPIRATORY (INHALATION) at 08:07

## 2019-07-18 RX ADMIN — IPRATROPIUM BROMIDE AND ALBUTEROL SULFATE 3 ML: .5; 3 SOLUTION RESPIRATORY (INHALATION) at 07:07

## 2019-07-18 RX ADMIN — IPRATROPIUM BROMIDE AND ALBUTEROL SULFATE 3 ML: .5; 3 SOLUTION RESPIRATORY (INHALATION) at 12:07

## 2019-07-18 RX ADMIN — METRONIDAZOLE 500 MG: 500 INJECTION, SOLUTION INTRAVENOUS at 02:07

## 2019-07-18 RX ADMIN — ASPIRIN 81 MG 81 MG: 81 TABLET ORAL at 08:07

## 2019-07-18 RX ADMIN — DILTIAZEM HYDROCHLORIDE 5 MG/HR: 5 INJECTION INTRAVENOUS at 11:07

## 2019-07-18 NOTE — PROGRESS NOTES
Progress Note  Nephrology      Consult Requested By: Neli Chapman*  Reason for Consult: ARF    SUBJECTIVE:     Review of Systems   Constitutional: Negative for chills and fever.   Respiratory: Negative for cough and shortness of breath.    Cardiovascular: Positive for leg swelling (but better then usual). Negative for chest pain.   Gastrointestinal: Negative for nausea.     Patient Active Problem List   Diagnosis    Mixed dyslipidemia    Hypertension    CLL (chronic lymphocytic leukemia)    Panhypopituitarism    Hyperuricemia    Prostate cancer screening    Neuropathy    Immune thrombocytopenia    LUZ (acute kidney injury)    History of CVA (cerebrovascular accident)    Central hypothyroidism    Chronic rhinosinusitis    Stroke-like episode    Polyneuropathy    Occlusion and stenosis of left carotid artery    Hypogammaglobulinemia    Squamous cell carcinoma of forehead    Immunosuppression    Squamous cell carcinoma of skin of face    Squamous cell carcinoma of skin of right temple    Type 2 diabetes, controlled, with neuropathy    Epiretinal membrane (ERM) of right eye    Weakness    Shortness of breath    Secondary adrenal insufficiency    Secondary male hypogonadism    Weakness    Edema of both legs    Hyperkalemia    Dehydration    Sepsis due to pneumonia    Altered mental status    Leukocytosis    Thrombocytopenia, unspecified    Abnormal liver enzymes    Oliguria    Palliative care encounter    Goals of care, counseling/discussion       OBJECTIVE:     Medications:   albuterol-ipratropium  3 mL Nebulization Q4H WAKE    artificial tears  1 drop Both Eyes BID    aspirin  81 mg Oral Daily    atorvastatin  20 mg Oral Daily    azithromycin  500 mg Intravenous Q24H    [START ON 7/18/2019] ceFEPime (MAXIPIME) IVPB  2 g Intravenous Daily    cetirizine  10 mg Oral Daily    clopidogrel  75 mg Oral Daily    cyanocobalamin  500 mcg Oral Daily    dexAMETHasone  40  mg Oral Q24H    erythromycin   Both Eyes QHS    levothyroxine  125 mcg Oral Before breakfast    metronidazole  500 mg Intravenous Q8H    pantoprazole  40 mg Oral Daily    [START ON 7/20/2019] predniSONE  7.5 mg Oral Daily    [START ON 7/19/2019] vancomycin (VANCOCIN) IVPB  750 mg Intravenous Q48H      norepinephrine bitartrate-D5W Stopped (07/17/19 0800)     Vitals:    07/17/19 2000   BP: (!) 104/52   Pulse: 83   Resp: (!) 31   Temp:      I/O last 3 completed shifts:  In: 2956.9 [P.O.:120; I.V.:836.9; IV Piggyback:2000]  Out: 3385 [Urine:3385]  Physical Exam   Constitutional: He is oriented to person, place, and time. He appears well-developed and well-nourished. No distress.   HENT:   Head: Normocephalic and atraumatic.   Eyes: EOM are normal. No scleral icterus.   Neck: Neck supple. No JVD present.   Cardiovascular: Regular rhythm and intact distal pulses. Exam reveals no friction rub.   No murmur heard.  Pulmonary/Chest: Effort normal. No respiratory distress. He has decreased breath sounds. He has no wheezes. He has no rales.   Abdominal: Soft. Bowel sounds are normal. He exhibits no distension. There is no tenderness.   Musculoskeletal: He exhibits edema.   Neurological: He is alert and oriented to person, place, and time.   Skin: Skin is warm and dry. No rash noted. He is not diaphoretic. No erythema.   Psychiatric: He has a normal mood and affect.     Laboratory:  Recent Labs   Lab 07/16/19  0735 07/16/19  1057 07/17/19  0400   WBC 24.62* 19.89* 12.54   HGB 9.5* 8.5* 7.9*   HCT 28.8* 26.3* 23.6*   PLT 10* 9* 12*   MONO 6.0  CANCELED 6.0  CANCELED 2.0*  Test Not Performed     Recent Labs   Lab 07/16/19  0200 07/16/19  0735 07/17/19  0400    138 134*   K 4.4 4.2 4.0    110 109   CO2 17* 16* 18*   BUN 39* 39* 38*   CREATININE 1.7* 1.7* 1.6*   CALCIUM 8.2* 7.9* 7.9*   PHOS 3.7  --  3.4     Labs reviewed  Diagnostic Results:  X-Ray: Reviewed  US: Reviewed  Echo: Reviewed      ASSESSMENT/PLAN:       LUZ on CKD 2 Baseline creatinine- 1.1  ATN from hypoperfusion   off levophed, UO much better, stabilizing   Check uric acid, CPK, renal US  Careful with vanc check lvl before giving next dose and potentially avoid vanco if possible     Avoid nephrotoxins, dose all meds ad for GFr < 10  No indication for urgent RRT. Monitor closely. Strict ins/outs, daily weight          Thank you for allowing me to participate in the care of your patients  With any question please call [phone]  Ananya Escobar    [facility]  FARHEEN uGadarrama MD, FACJEFF WILBURN MD,   MD RACHEL Cruz, NP  [address]  [place], [address] [phone]

## 2019-07-18 NOTE — PROGRESS NOTES
"[facility]  Hematology/Oncology  Progress Note    Patient Name: Thierry Ramos Jr.  Admission Date: 7/15/2019  Hospital Length of Stay: 3 days  Code Status: Full Code     Subjective:     HPI:  85 year-old male with chronic lymphocytic leukemia, most recently on ibrutinib but held for the past week, was admitted on 7/15/19 for syncope and fall x 2. He is currently in the ICU being treated for possible sepsis with hypotension. Platelet count had decreased over the past 24 hours from 120 k/uL to < 10 k/uL. 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     Interval History:   - patient had confusion overnight (possible side effect of high-dose steroids). He is drowsy this morning. Platelet count is 22 k/uL.      Oncology Treatment Plan:   [No treatment plan]    Medications:  Continuous Infusions:   norepinephrine bitartrate-D5W Stopped (07/17/19 0800)     Scheduled Meds:   albuterol-ipratropium  3 mL Nebulization Q4H WAKE    artificial tears  1 drop Both Eyes BID    aspirin  81 mg Oral Daily    atorvastatin  20 mg Oral Daily    azithromycin  500 mg Intravenous Q24H    ceFEPime (MAXIPIME) IVPB  2 g Intravenous Daily    cetirizine  10 mg Oral Daily    clopidogrel  75 mg Oral Daily    cyanocobalamin  500 mcg Oral Daily    dexAMETHasone  40 mg Oral Q24H    erythromycin   Both Eyes QHS    levothyroxine  125 mcg Oral Before breakfast    metronidazole  500 mg Intravenous Q8H    pantoprazole  40 mg Oral Daily    [START ON 7/20/2019] predniSONE  7.5 mg Oral Daily    QUEtiapine  25 mg Oral Once    [START ON 7/19/2019] vancomycin (VANCOCIN) IVPB  1,000 mg Intravenous Q48H     PRN Meds:sodium chloride, sodium chloride, sodium chloride, acetaminophen, benzonatate, Dextrose 10% Bolus, Dextrose 10% Bolus, glucagon (human recombinant), glucose, glucose, insulin " aspart U-100, ondansetron, sodium chloride 0.9%     Review of Systems   Constitutional: Positive for fatigue.   HENT: Negative for sore throat.    Eyes: Negative for visual disturbance.   Respiratory: Negative for shortness of breath.    Cardiovascular: Positive for leg swelling. Negative for chest pain.   Gastrointestinal: Negative for abdominal pain.   Genitourinary: Negative for dysuria.   Musculoskeletal: Negative for back pain.   Skin: Negative for rash.   Neurological: Positive for weakness.   Psychiatric/Behavioral: Positive for confusion. The patient is not nervous/anxious.      Objective:     Vital Signs (Most Recent):  Temp: 97.1 °F (36.2 °C) (07/18/19 0703)  Pulse: 85 (07/18/19 0724)  Resp: (!) 32 (07/18/19 0724)  BP: 92/64 (07/18/19 0703)  SpO2: 95 % (07/18/19 0724) Vital Signs (24h Range):  Temp:  [97.1 °F (36.2 °C)-97.7 °F (36.5 °C)] 97.1 °F (36.2 °C)  Pulse:  [] 85  Resp:  [20-46] 32  SpO2:  [91 %-100 %] 95 %  BP: ()/(45-72) 92/64  Arterial Line BP: ()/(34-62) 119/43     Weight: 77.2 kg (170 lb 3.1 oz)  Body mass index is 23.74 kg/m².  Body surface area is 1.97 meters squared.      Intake/Output Summary (Last 24 hours) at 7/18/2019 0937  Last data filed at 7/18/2019 0700  Gross per 24 hour   Intake 1200 ml   Output 1815 ml   Net -615 ml       Physical Exam   Constitutional: He appears well-developed.   Fatigued   HENT:   Head: Normocephalic and atraumatic.   Eyes: Pupils are equal, round, and reactive to light. EOM are normal.   Neck: Normal range of motion. Neck supple.   Cardiovascular: Normal rate and regular rhythm.   Pulmonary/Chest: Effort normal and breath sounds normal.   Abdominal: Soft. Bowel sounds are normal.   Neurological:   drowsy   Skin: Skin is warm and dry.   Ecchymoses throughout body   Psychiatric: He has a normal mood and affect. His behavior is normal. Judgment and thought content normal.   Nursing note and vitals reviewed.      Significant Labs:   Labs have  been reviewed.    Lab Results   Component Value Date    WBC 21.17 (H) 07/18/2019    HGB 7.3 (L) 07/18/2019    HCT 22.1 (L) 07/18/2019    MCV 89 07/18/2019    PLT 22 (LL) 07/18/2019     Assessment/Plan:     Immune thrombocytopenia  - I reviewed the peripheral smear. Rare enlarged platelets are noted.  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  - I spoke to his hematologist Dr. Marr on 7/16/19. She recommended pulse dexamethasone, so I placed an order for dexamethasone 40mg PO daily x 4 days.  - he has received a dose of IV immune globulin as well on 7/16/19.  - platelet count today is 22 k/uL. Continue dexamethasone and monitor platelet counts daily.         Braxton El MD  Hematology/Oncology  [facility]

## 2019-07-18 NOTE — NURSING
Pt restless and trying to climb out of bed. VSS. Family states pt did not sleep the previous night. Dr. Martinezocent notified. Will put new orders.

## 2019-07-18 NOTE — EICU
E-ICU    Pt requesting something for sleep    I would be very reluctant to give this patient a sleeping aid such as a benzodiazepine given his sepsis and confusion  Also would avoid melatonin given his hypopituitarism    Nurse stated pt believes he is going to have surgery and keeps asking when over and over    Given there may be a component of delerium I will try small dose of Haloperidol 2mg IV.    Addendum:    1:40 am  Pt c/o CP  /55, 88 NSR, 95% SAT  Fentanyl 50 mcg IV  Ordered ECG--->  WhenECG tech arrived he refused the ECG and said he has no more pain  Will repeat the Haldol at 5 mg

## 2019-07-18 NOTE — PT/OT/SLP PROGRESS
Physical Therapy      Patient Name:  Thierry Ramos Jr.   MRN:  [id number]  Patient not seen today secondary to pt being too restless to participate; pt in almost constant motion while in bed and with tangential speech.  Will follow up.    Eula Clements, PT   7/18/2019

## 2019-07-18 NOTE — PROGRESS NOTES
Pharmacokinetic Assessment Follow Up: IV Vancomycin    Vancomycin serum concentration assessment(s):    The random level was drawned correctly and can be used to guide therapy at this time.    Vancomycin Regimen Plan:    Re-dose when the random level is less than 15 mcg/mL, next level to be drawn at 7/19 on 0400     Pharmacy will continue to follow and monitor vancomycin.    Please contact pharmacy at extension [phone] for questions regarding this assessment.    Thank you for the consult,   Jose Angel Subramanian     Patient brief summary:  Thierry Ramos Jr. is a 85 y.o. male initiated on antimicrobial therapy with IV Vancomycin for treatment of suspected lower respiratory infection    The patient received a loading dose, followed by the current treatment regimen: random levels with plan to redose when level is less than 15 mcg/mL    Drug Allergies:   Review of patient's allergies indicates:   Allergen Reactions    Ace inhibitors Swelling     angioedema    Divalproex Hives     Rash under arms, body creases       Actual Body Weight:   77.2kg    Renal Function:   Estimated Creatinine Clearance: 36 mL/min (A) (based on SCr of 1.6 mg/dL (H)).,     Dialysis Method (if applicable):        CBC (last 72 hours):  Recent Labs   Lab Result Units 07/15/19  0855 07/16/19  0200 07/16/19  0538 07/16/19  0735 07/16/19  1057 07/17/19  0400 07/18/19  0515   WBC K/uL 12.75*  --  25.61* 24.62* 19.89* 12.54 21.17*   Hemoglobin g/dL 12.3*  --  9.7* 9.5* 8.5* 7.9* 7.3*   Hemoglobin A1C %  --  5.6  --   --   --   --   --    Hematocrit % 38.9*  --  30.1* 28.8* 26.3* 23.6* 22.1*   Platelets K/uL 120*  --  8* 10* 9* 12* 22*   Gran% % 42.0  --  52.0 58.0 35.0* 62.0 77.0*   Lymph% % 44.0  --  20.0 15.0* 43.0 26.0 18.0   Mono% % 11.0  --  6.0 6.0 6.0 2.0* 1.0*   Eosinophil% % 0.0  --  0.0 2.0 0.0 0.0 3.0   Basophil% % 0.0  --  0.0 0.0 0.0 0.0 0.0   Differential Method  Manual  --  Manual Manual Manual Manual Automated       Metabolic Panel (last 72  hours):  Recent Labs   Lab Result Units 07/15/19  0802 07/15/19  0926 07/16/19  0200 07/16/19  0735 07/16/19  1300 07/16/19  1301 07/17/19  0400   Sodium mmol/L 142  --  137 138  --   --  134*   Sodium Urine Random mmol/L  --   --   --   --  29  --   --    Potassium mmol/L 3.9  --  4.4 4.2  --   --  4.0   Chloride mmol/L 107  --  109 110  --   --  109   CO2 mmol/L 25  --  17* 16*  --   --  18*   Glucose mg/dL 84  --  177* 163*  --   --  223*   Glucose, UA   --  Negative  --   --   --  Negative  --    BUN, Bld mg/dL 34*  --  39* 39*  --   --  38*   Creatinine mg/dL 1.1  --  1.7* 1.7*  --   --  1.6*   Creatinine, Random Ur mg/dL  --   --   --   --  72.0  --   --    Albumin g/dL 2.8*  --  2.4*  --   --   --  2.0*   Total Bilirubin mg/dL 0.8  --  1.0  --   --   --  1.0   Alkaline Phosphatase U/L 258*  --  182*  --   --   --  156*   AST U/L 163*  --  247*  --   --   --  139*   ALT U/L 91*  --  58*  --   --   --  37   Magnesium mg/dL 1.8  --  1.3*  --   --   --  1.7   Phosphorus mg/dL  --   --  3.7  --   --   --  3.4       Vancomycin Administrations:  vancomycin given in the last 96 hours                     vancomycin 750 mg in dextrose 5 % 250 mL IVPB (ready to mix system) (mg) 750 mg New Bag 07/17/19 1236     750 mg New Bag 07/16/19 1106                      Drug levels (last 3 results):  Recent Labs   Lab Result Units 07/17/19  1041 07/18/19  0515   Vancomycin, Random ug/mL  --  11.1   Vancomycin-Trough ug/mL 9.4*  --        Microbiologic Results:  Microbiology Results (last 7 days)       Procedure Component Value Units Date/Time    Urine Culture High Risk [[id number]] Collected:  07/16/19 1705    Order Status:  Completed Specimen:  Urine, Catheterized Updated:  07/17/19 2216     Urine Culture, Routine No growth    Narrative:       Indicated criteria for high risk culture:->Other  Other (specify):->Septic shock    Blood culture x two cultures. Draw prior to antibiotics. [[id number]] Collected:  07/15/19 0855    Order  Status:  Completed Specimen:  Blood from Peripheral, Hand, Left Updated:  07/17/19 1412     Blood Culture, Routine No Growth to date      No Growth to date      No Growth to date    Narrative:       Aerobic and anaerobic    Blood culture x two cultures. Draw prior to antibiotics. [[id number]] Collected:  07/15/19 0859    Order Status:  Completed Specimen:  Blood from Peripheral, Forearm, Left Updated:  07/17/19 1412     Blood Culture, Routine No Growth to date      No Growth to date      No Growth to date    Narrative:       Aerobic and anaerobic    Culture, Respiratory with Gram Stain [[id number]]     Order Status:  No result Specimen:  Respiratory from Sputum, Expectorated

## 2019-07-18 NOTE — NURSING
Pt restless and agitated. O2 sats between 89-92%. Resp rate 42 bpm. Dr. Nava notified. Will put new orders.

## 2019-07-18 NOTE — PROGRESS NOTES
Per Dr. Nava, basic wound care orders in place for skin tears, and requested that pt not be disturbed today.

## 2019-07-18 NOTE — PLAN OF CARE
Notified eICU Dr. Parker, pt is complaining of chest pain 10/10. Pt is agitated.  order Stat EKG and fentanyl IV

## 2019-07-18 NOTE — SUBJECTIVE & OBJECTIVE
Interval History: Alert and disoriented, had difficult night and is intermittently agitated.     Review of Systems   Unable to perform ROS: Mental status change     Objective:     Vital Signs (Most Recent):  Temp: 97.3 °F (36.3 °C) (07/18/19 1103)  Pulse: 79 (07/18/19 1200)  Resp: (!) 28 (07/18/19 1200)  BP: 100/60 (07/18/19 1103)  SpO2: 99 % (07/18/19 1200) Vital Signs (24h Range):  Temp:  [97.1 °F (36.2 °C)-97.6 °F (36.4 °C)] 97.3 °F (36.3 °C)  Pulse:  [] 79  Resp:  [20-47] 28  SpO2:  [89 %-100 %] 99 %  BP: ()/(45-72) 100/60  Arterial Line BP: ()/(34-62) 49/44     Weight: 77.2 kg (170 lb 3.1 oz)  Body mass index is 23.74 kg/m².    Intake/Output Summary (Last 24 hours) at 7/18/2019 1204  Last data filed at 7/18/2019 0700  Gross per 24 hour   Intake 1200 ml   Output 1365 ml   Net -165 ml      Physical Exam   Cardiovascular: Normal rate and regular rhythm.   Pulmonary/Chest: Effort normal and breath sounds normal.   Abdominal: Soft. Bowel sounds are normal. There is no tenderness.   Musculoskeletal: He exhibits edema.   Neurological: He is alert.   Skin:   Diffuse ecchymoses       Significant Labs: All pertinent labs within the past 24 hours have been reviewed.    Significant Imaging: I have reviewed all pertinent imaging results/findings within the past 24 hours.

## 2019-07-18 NOTE — PT/OT/SLP PROGRESS
Occupational Therapy  Visit Attempt    Patient Name:  Thierry Ramos Jr.   MRN:  [id number]    Patient not seen today secondary to  too restless to participate.  Pt with tangential speech and almost constant motion while in bed.  Family educated on use of music/guided relaxation that may be beneficial to try with pt. Will follow-up .    Landon Stewart OT  7/18/2019

## 2019-07-18 NOTE — PLAN OF CARE
Problem: Adult Inpatient Plan of Care  Goal: Plan of Care Review  Outcome: Outcome(s) achieved Date Met: 07/18/19  Pt disoriented to situation. Reoriented pt. Restless/agitated throughout night. BUE dressing changes done 3 times due to skin weeping. Afebrile and on IV abx. Blood glucose monitored. Earl patent and draining clear, yellow urine. Call light within reach. Will continue to monitor.

## 2019-07-18 NOTE — ASSESSMENT & PLAN NOTE
- I reviewed the peripheral smear. Rare enlarged platelets are noted.  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  - I spoke to his hematologist Dr. Marr on 7/16/19. She recommended pulse dexamethasone, so I placed an order for dexamethasone 40mg PO daily x 4 days.  - he has received a dose of IV immune globulin as well on 7/16/19.  - platelet count today is 22 k/uL. Continue dexamethasone and monitor platelet counts daily.

## 2019-07-18 NOTE — SUBJECTIVE & OBJECTIVE
Interval History:   - patient had confusion overnight (possible side effect of high-dose steroids). He is drowsy this morning. Platelet count is 22 k/uL.      Oncology Treatment Plan:   [No treatment plan]    Medications:  Continuous Infusions:   norepinephrine bitartrate-D5W Stopped (07/17/19 0800)     Scheduled Meds:   albuterol-ipratropium  3 mL Nebulization Q4H WAKE    artificial tears  1 drop Both Eyes BID    aspirin  81 mg Oral Daily    atorvastatin  20 mg Oral Daily    azithromycin  500 mg Intravenous Q24H    ceFEPime (MAXIPIME) IVPB  2 g Intravenous Daily    cetirizine  10 mg Oral Daily    clopidogrel  75 mg Oral Daily    cyanocobalamin  500 mcg Oral Daily    dexAMETHasone  40 mg Oral Q24H    erythromycin   Both Eyes QHS    levothyroxine  125 mcg Oral Before breakfast    metronidazole  500 mg Intravenous Q8H    pantoprazole  40 mg Oral Daily    [START ON 7/20/2019] predniSONE  7.5 mg Oral Daily    QUEtiapine  25 mg Oral Once    [START ON 7/19/2019] vancomycin (VANCOCIN) IVPB  1,000 mg Intravenous Q48H     PRN Meds:sodium chloride, sodium chloride, sodium chloride, acetaminophen, benzonatate, Dextrose 10% Bolus, Dextrose 10% Bolus, glucagon (human recombinant), glucose, glucose, insulin aspart U-100, ondansetron, sodium chloride 0.9%     Review of Systems   Constitutional: Positive for fatigue.   HENT: Negative for sore throat.    Eyes: Negative for visual disturbance.   Respiratory: Negative for shortness of breath.    Cardiovascular: Positive for leg swelling. Negative for chest pain.   Gastrointestinal: Negative for abdominal pain.   Genitourinary: Negative for dysuria.   Musculoskeletal: Negative for back pain.   Skin: Negative for rash.   Neurological: Positive for weakness.   Psychiatric/Behavioral: Positive for confusion. The patient is not nervous/anxious.      Objective:     Vital Signs (Most Recent):  Temp: 97.1 °F (36.2 °C) (07/18/19 0703)  Pulse: 85 (07/18/19 0724)  Resp: (!) 32  (07/18/19 0724)  BP: 92/64 (07/18/19 0703)  SpO2: 95 % (07/18/19 0724) Vital Signs (24h Range):  Temp:  [97.1 °F (36.2 °C)-97.7 °F (36.5 °C)] 97.1 °F (36.2 °C)  Pulse:  [] 85  Resp:  [20-46] 32  SpO2:  [91 %-100 %] 95 %  BP: ()/(45-72) 92/64  Arterial Line BP: ()/(34-62) 119/43     Weight: 77.2 kg (170 lb 3.1 oz)  Body mass index is 23.74 kg/m².  Body surface area is 1.97 meters squared.      Intake/Output Summary (Last 24 hours) at 7/18/2019 0937  Last data filed at 7/18/2019 0700  Gross per 24 hour   Intake 1200 ml   Output 1815 ml   Net -615 ml       Physical Exam   Constitutional: He appears well-developed.   Fatigued   HENT:   Head: Normocephalic and atraumatic.   Eyes: Pupils are equal, round, and reactive to light. EOM are normal.   Neck: Normal range of motion. Neck supple.   Cardiovascular: Normal rate and regular rhythm.   Pulmonary/Chest: Effort normal and breath sounds normal.   Abdominal: Soft. Bowel sounds are normal.   Neurological:   drowsy   Skin: Skin is warm and dry.   Ecchymoses throughout body   Psychiatric: He has a normal mood and affect. His behavior is normal. Judgment and thought content normal.   Nursing note and vitals reviewed.      Significant Labs:   Labs have been reviewed.    Lab Results   Component Value Date    WBC 21.17 (H) 07/18/2019    HGB 7.3 (L) 07/18/2019    HCT 22.1 (L) 07/18/2019    MCV 89 07/18/2019    PLT 22 (LL) 07/18/2019

## 2019-07-18 NOTE — PROGRESS NOTES
Pharmacokinetic Assessment Follow Up: IV Vancomycin    Vancomycin serum concentration assessment(s):    The random level was drawned correctly and can be used to guide therapy at this time.    Vancomycin Regimen Plan:    Random level this AM <20, thus will redose with vancomycin 1gx1 and obtain random level with AM labs on 7/19 to assess need for redosing. Redose when <20.    Pharmacy will continue to follow and monitor vancomycin.    Please contact pharmacy at extension [phone] for questions regarding this assessment.    Thank you for the consult,   Magdalena Je     Patient brief summary:  Thierry Ramos Jr. is a 85 y.o. male initiated on antimicrobial therapy with IV Vancomycin for treatment of suspected lower respiratory infection    The patient received a loading dose, followed by the current treatment regimen: random levels with plan to redose when level is less than 20 mcg/mL    Drug Allergies:   Review of patient's allergies indicates:   Allergen Reactions    Ace inhibitors Swelling     angioedema    Divalproex Hives     Rash under arms, body creases       Actual Body Weight:   77kg    Renal Function:   Estimated Creatinine Clearance: 38.3 mL/min (A) (based on SCr of 1.5 mg/dL (H)).,     Dialysis Method (if applicable):  none     CBC (last 72 hours):  Recent Labs   Lab Result Units 07/16/19  0200 07/16/19  0538 07/16/19  0735 07/16/19  1057 07/17/19  0400 07/18/19  0515   WBC K/uL  --  25.61* 24.62* 19.89* 12.54 21.17*   Hemoglobin g/dL  --  9.7* 9.5* 8.5* 7.9* 7.3*   Hemoglobin A1C % 5.6  --   --   --   --   --    Hematocrit %  --  30.1* 28.8* 26.3* 23.6* 22.1*   Platelets K/uL  --  8* 10* 9* 12* 22*   Gran% %  --  52.0 58.0 35.0* 62.0 77.0*   Lymph% %  --  20.0 15.0* 43.0 26.0 18.0   Mono% %  --  6.0 6.0 6.0 2.0* 1.0*   Eosinophil% %  --  0.0 2.0 0.0 0.0 3.0   Basophil% %  --  0.0 0.0 0.0 0.0 0.0   Differential Method   --  Manual Manual Manual Manual Automated       Metabolic Panel (last 72 hours):  Recent  Labs   Lab Result Units 07/16/19  0200 07/16/19  0735 07/16/19  1300 07/16/19  1301 07/17/19  0400 07/18/19  0515   Sodium mmol/L 137 138  --   --  134* 136   Sodium Urine Random mmol/L  --   --  29  --   --   --    Potassium mmol/L 4.4 4.2  --   --  4.0 4.5   Chloride mmol/L 109 110  --   --  109 109   CO2 mmol/L 17* 16*  --   --  18* 12*   Glucose mg/dL 177* 163*  --   --  223* 131*   Glucose, UA   --   --   --  Negative  --   --    BUN, Bld mg/dL 39* 39*  --   --  38* 47*   Creatinine mg/dL 1.7* 1.7*  --   --  1.6* 1.5*   Creatinine, Random Ur mg/dL  --   --  72.0  --   --   --    Albumin g/dL 2.4*  --   --   --  2.0* 2.2*   Total Bilirubin mg/dL 1.0  --   --   --  1.0 1.1*   Alkaline Phosphatase U/L 182*  --   --   --  156* 179*   AST U/L 247*  --   --   --  139* 96*   ALT U/L 58*  --   --   --  37 41   Magnesium mg/dL 1.3*  --   --   --  1.7 1.9   Phosphorus mg/dL 3.7  --   --   --  3.4 4.2       Vancomycin Administrations:  vancomycin given in the last 96 hours                     vancomycin 750 mg in dextrose 5 % 250 mL IVPB (ready to mix system) (mg) 750 mg New Bag 07/17/19 1236     750 mg New Bag 07/16/19 1106                      Drug levels (last 3 results):  Recent Labs   Lab Result Units 07/17/19  1041 07/18/19  0515   Vancomycin, Random ug/mL  --  11.1   Vancomycin-Trough ug/mL 9.4*  --        Microbiologic Results:  Microbiology Results (last 7 days)       Procedure Component Value Units Date/Time    Urine Culture High Risk [[id number]] Collected:  07/16/19 1705    Order Status:  Completed Specimen:  Urine, Catheterized Updated:  07/17/19 2216     Urine Culture, Routine No growth    Narrative:       Indicated criteria for high risk culture:->Other  Other (specify):->Septic shock    Blood culture x two cultures. Draw prior to antibiotics. [[id number]] Collected:  07/15/19 0855    Order Status:  Completed Specimen:  Blood from Peripheral, Hand, Left Updated:  07/17/19 1412     Blood Culture, Routine No  Growth to date      No Growth to date      No Growth to date    Narrative:       Aerobic and anaerobic    Blood culture x two cultures. Draw prior to antibiotics. [[id number]] Collected:  07/15/19 0859    Order Status:  Completed Specimen:  Blood from Peripheral, Forearm, Left Updated:  07/17/19 1412     Blood Culture, Routine No Growth to date      No Growth to date      No Growth to date    Narrative:       Aerobic and anaerobic    Culture, Respiratory with Gram Stain [[id number]]     Order Status:  No result Specimen:  Respiratory from Sputum, Expectorated

## 2019-07-18 NOTE — PROGRESS NOTES
[facility] Consult     Primary Team: Medicine  Consultant Attending: Larissa  Date of Admit: 7/15/2019    History      Thierry Ramos Jr. is a 85 y.o. male with a relevant history of  DM, CLL, HTN, HLD, CVA, hypopituitarism    The patient presented to [facility] on 7/15/2019 with a primary complaint of syncope, generalized weakness worse in the legs, leg swelling (on ibrutinib). Also complained of acute on chronic cough. When he presented she had a leukocytosis, no fever, occasionally documented hypotension but a normal UA and mild opacity on the left lung field.     At bedside he was altered but did not have many complaints apart from cough, weakness, and hypotension/presyncope when sitting up    Interval/     Still having dyspnea    Allergies:  Review of patient's allergies indicates:   Allergen Reactions    Ace inhibitors Swelling     angioedema    Divalproex Hives     Rash under arms, body creases     Medications:   In-Hospital Scheduled Medications:   albuterol-ipratropium  3 mL Nebulization Q4H WAKE    artificial tears  1 drop Both Eyes BID    atorvastatin  20 mg Oral Daily    azithromycin  500 mg Intravenous Q24H    ceFEPime (MAXIPIME) IVPB  2 g Intravenous Daily    cetirizine  10 mg Oral Daily    cyanocobalamin  500 mcg Oral Daily    dexAMETHasone  40 mg Oral Q24H    erythromycin   Both Eyes QHS    furosemide  40 mg Intravenous BID    levothyroxine  125 mcg Oral Before breakfast    metronidazole  500 mg Intravenous Q8H    pantoprazole  40 mg Oral Daily    [START ON 7/20/2019] predniSONE  7.5 mg Oral Daily    QUEtiapine  25 mg Oral Once    vancomycin (VANCOCIN) IVPB  1,000 mg Intravenous Q48H      In-Hospital PRN Medications:  sodium chloride, sodium chloride, sodium chloride, sodium chloride, acetaminophen, benzonatate, Dextrose 10% Bolus, Dextrose 10% Bolus, diphenhydrAMINE, glucagon (human recombinant), glucose, glucose, insulin aspart U-100, ondansetron, sodium chloride 0.9%    In-Hospital IV Infusion Medications:   norepinephrine bitartrate-D5W Stopped (07/17/19 0800)     Relevant Home Medications:    Antibiotics and Day Number of Therapy:  Piptazo  Vanc    Past Medical History:  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    Hyperlipidemia     Hypertension     Hypopituitarism     Hypothyroid     Immunosuppression 3/13/2015    Squamous Cell Carcinoma     on the right side of the face     Squamous cell carcinoma of skin of right temple 1/27/2016    Stroke     Syncope 2/12    Unspecified disorder of kidney and ureter      Past Surgical History/ObGyn Hx if gender appropriate:  Past Surgical History:   Procedure Laterality Date    (ROTATION) FLAP N/A 3/23/2015    Performed by Joe Mcallister MD at [facility] OR 2ND FLR    BIOPSY-LYMPH NODE N/A 3/23/2015    Performed by Joe Mcallister MD at [facility] OR 2ND FLR    CAROTID ENDARTERECTOMY (L)  2/27/2007    COLONOSCOPY N/A 2/25/2014    Performed by DELORES Lambert MD at [facility] ENDO (4TH FLR)    EXCISION TURBINATE, SUBMUCOUS      EXCISION-LESION-FACE Right 2/24/2016    Performed by Joe Mcallister MD at [facility] OR 2ND FLR    EXCISION-LESION-FACE N/A 3/23/2015    Performed by Joe Mcallister MD at [facility] OR 2ND FLR    FESS  9/16/2014    FULL THICKNESS SKIN GRAFT N/A 2/24/2016    Performed by Joe Mcallister MD at [facility] OR 2ND FLR    MAPPING-LYMPH NODE N/A 3/23/2015    Performed by Joe Mcallister MD at [facility] OR 2ND FLR    MAPPING-SENTINEL NODE N/A 2/24/2016    Performed by Joe Mcallister MD at [facility] OR 2ND FLR    Mohs excision   3/23/2015, 2/24/2016    combined with WLE forehead    NASAL SEPTUM SURGERY  9/16/2014    REPAIR-MOHS DEFECT-left nose Left 3/31/2017    Performed by [place] MARY Dsouza III, MD at [facility] OR 2ND FLR    RESECTION-TURBINATES (SMR) Bilateral 9/16/2014    Performed  "by [facility]    right ureter surgery      SENTINEL LYMPH NODE BIOPSY  3/23/2015, 2016    SEPTOPLASTY Bilateral 2014    Performed by Jermaine [facility] III, [facility] at [facility] FLR    SINUS SURGERY  2014    septo, ESS, BITSMR    SINUS SURGERY FUNCTIONAL ENDOSCOPIC WITH NAVIGATION with bilateral maxillary, ethmoids, sphenoids, right frontal, possible left NF duct-balloons Bilateral 2014    [facility]    Transphenoidal surgery       Family History:  Family History   Problem Relation Age of Onset    Cancer Mother         breast    Colon cancer Father     Cancer Father         colon    Cancer Brother         leukemia    No Known Problems Sister     No Known Problems Maternal Aunt     No Known Problems Maternal Uncle     No Known Problems Paternal Aunt     No Known Problems Paternal Uncle     No Known Problems Maternal Grandmother     No Known Problems Maternal Grandfather     No Known Problems Paternal Grandmother     No Known Problems Paternal Grandfather     Amblyopia Neg Hx     Blindness Neg Hx     Cataracts Neg Hx     Diabetes Neg Hx     Glaucoma Neg Hx     Hypertension Neg Hx     Macular degeneration Neg Hx     Retinal detachment Neg Hx     Strabismus Neg Hx     Stroke Neg Hx     Thyroid disease Neg Hx     Allergic rhinitis Neg Hx     Allergies Neg Hx     Angioedema Neg Hx     Asthma Neg Hx     Atopy Neg Hx     Eczema Neg Hx     Immunodeficiency Neg Hx     Rhinitis Neg Hx     Urticaria Neg Hx      Social History:  Social History     Tobacco Use    Smoking status: Never Smoker    Smokeless tobacco: Never Used   Substance Use Topics    Alcohol use: Yes     Alcohol/week: 1.2 oz     Types: 1 Glasses of wine, 1 Cans of beer per week     Comment: "rarely"    Drug use: No     Objective   Last 24 Hour Vital Signs:  BP  Min: 92/64  Max: 145/64  Temp  Av.3 °F (36.3 °C)  Min: 97.1 °F " (36.2 °C)  Max: 97.6 °F (36.4 °C)  Pulse  Av.6  Min: 76  Max: 109  Resp  Av.8  Min: 20  Max: 47  SpO2  Av.2 %  Min: 89 %  Max: 100 %  Weight  Av.2 kg (170 lb 3.1 oz)  Min: 77.2 kg (170 lb 3.1 oz)  Max: 77.2 kg (170 lb 3.1 oz)    Lines, Drains, Airway:  Right IJ     Physical Examination:  Examination  General: ill appearing, comfortable   HEENT: normal oral mucosa, normal dentition, conjunctiva normal, pupils normal, extraocular motion normal, right temporal scar  Neck: no thyromegaly, no JVD   Cardiac: no murmurs, pulse regular    Pulmonary/Chest: chest clear, no respiratory distress   GI/Rectal: no organomegaly, no masses, non tender, normal bowel sounds, rectal exam deferred   : deferred   Msk: normal motor screening exam  Vascular: normal peripheral perfusion   Lymph nodes: none palpated  Skin/ Extremities: no rash, no pedal edema, no ulceration    Neurology/ Mental status: alert confused     Laboratory:  CBC:   Lab Results   Component Value Date    WBC 21.17 (H) 2019    HGB 7.3 (L) 2019    PLT 22 (LL) 2019    MCV 89 2019    RDW 16.5 (H) 2019     Estimated Creatinine Clearance: 38.3 mL/min (A) (based on SCr of 1.5 mg/dL (H)).    Microbiology Data:  Blood Clx no growth     Other Results:  EKG  Normal Qtc    Radiology:  Mild left lower lung field opacity    Elevated procal    Assessment     Thierry Ramos Jr. is a 85 y.o. male who presented with shock and syncope/presyncope and leukocytosis. He does have a history of CLL and has had leukocytosis in the past. Also has a history of hypopituitarism and has been on prednisone.    There is no obvious source of septic shock after review of his lab data/imaging/symptoms/exam findings. Even his abnormal CXR is subtle and his cough is acute on chronic with a normal chest exam. However, pneumonia remains a possible source and therefore needs to be covered broadly as well as for atypicals.     Other causes of hypotension  in this patient with pre-existing endocrine disorders and other causes of leukocytosis (such as CLL) are being entertained.      Recommendations     Possible pneumonia  - azithromycin 500mg IV for atypical coverage for 3 days  - continue cefepime 2gq12 and flagyl 500mg IV q8 due to less nephrotoxicity and less effect on platelets than piptazo  - continue vancomycin with goal trough 15-20  - if patient does not clinically improve within 48-72 hours post admission, would do non contrast CT chest to further define the left lower lung field abnormality seen on Xray  - check sputum culture     Shock, presumed due to sepsis  - get an Abd US to evaluate the hepatobiliary system given elevated liver enzymes     Thank you for this consult. We will follow.      Castro Nunez  Fellow, [facility]

## 2019-07-19 ENCOUNTER — ANESTHESIA EVENT (OUTPATIENT)
Dept: INTENSIVE CARE | Facility: HOSPITAL | Age: 84
DRG: 871 | End: 2019-07-19
Payer: MEDICARE

## 2019-07-19 ENCOUNTER — ANESTHESIA (OUTPATIENT)
Dept: INTENSIVE CARE | Facility: HOSPITAL | Age: 84
DRG: 871 | End: 2019-07-19
Payer: MEDICARE

## 2019-07-19 PROBLEM — J81.1 PULMONARY EDEMA: Status: ACTIVE | Noted: 2019-07-19

## 2019-07-19 LAB
ALBUMIN SERPL BCP-MCNC: 2.4 G/DL (ref 3.5–5.2)
ALP SERPL-CCNC: 149 U/L (ref 55–135)
ALT SERPL W/O P-5'-P-CCNC: 37 U/L (ref 10–44)
ANION GAP SERPL CALC-SCNC: 10 MMOL/L (ref 8–16)
ANISOCYTOSIS BLD QL SMEAR: SLIGHT
AORTIC ROOT ANNULUS: 3.51 CM
AST SERPL-CCNC: 81 U/L (ref 10–40)
AV INDEX (PROSTH): 0.46
AV MEAN GRADIENT: 10 MMHG
AV PEAK GRADIENT: 16 MMHG
AV VALVE AREA: 1.67 CM2
AV VELOCITY RATIO: 0.4
BASOPHILS # BLD AUTO: ABNORMAL K/UL (ref 0–0.2)
BASOPHILS NFR BLD: 0 % (ref 0–1.9)
BILIRUB SERPL-MCNC: 1.6 MG/DL (ref 0.1–1)
BLD PROD TYP BPU: NORMAL
BLD PROD TYP BPU: NORMAL
BLOOD UNIT EXPIRATION DATE: NORMAL
BLOOD UNIT EXPIRATION DATE: NORMAL
BLOOD UNIT TYPE CODE: 5100
BLOOD UNIT TYPE CODE: 5100
BLOOD UNIT TYPE: NORMAL
BLOOD UNIT TYPE: NORMAL
BSA FOR ECHO PROCEDURE: 1.85 M2
BUN SERPL-MCNC: 61 MG/DL (ref 8–23)
CALCIUM SERPL-MCNC: 8.4 MG/DL (ref 8.7–10.5)
CHLORIDE SERPL-SCNC: 108 MMOL/L (ref 95–110)
CO2 SERPL-SCNC: 17 MMOL/L (ref 23–29)
CODING SYSTEM: NORMAL
CODING SYSTEM: NORMAL
CREAT SERPL-MCNC: 1.4 MG/DL (ref 0.5–1.4)
CV ECHO LV RWT: 0.49 CM
DIFFERENTIAL METHOD: ABNORMAL
DISPENSE STATUS: NORMAL
DISPENSE STATUS: NORMAL
DOP CALC AO PEAK VEL: 1.98 M/S
DOP CALC AO VTI: 47.92 CM
DOP CALC LVOT AREA: 3.6 CM2
DOP CALC LVOT DIAMETER: 2.14 CM
DOP CALC LVOT PEAK VEL: 0.8 M/S
DOP CALC LVOT STROKE VOLUME: 79.84 CM3
DOP CALCLVOT PEAK VEL VTI: 22.21 CM
E WAVE DECELERATION TIME: 204.2 MSEC
E/A RATIO: 1.26
ECHO LV POSTERIOR WALL: 1.1 CM (ref 0.6–1.1)
EOSINOPHIL # BLD AUTO: ABNORMAL K/UL (ref 0–0.5)
EOSINOPHIL NFR BLD: 0 % (ref 0–8)
ERYTHROCYTE [DISTWIDTH] IN BLOOD BY AUTOMATED COUNT: 15.5 % (ref 11.5–14.5)
EST. GFR  (AFRICAN AMERICAN): 53 ML/MIN/1.73 M^2
EST. GFR  (NON AFRICAN AMERICAN): 45 ML/MIN/1.73 M^2
FRACTIONAL SHORTENING: 34 % (ref 28–44)
GLUCOSE SERPL-MCNC: 171 MG/DL (ref 70–110)
HCT VFR BLD AUTO: 27.2 % (ref 40–54)
HGB BLD-MCNC: 9.1 G/DL (ref 14–18)
HYPOCHROMIA BLD QL SMEAR: ABNORMAL
INTERVENTRICULAR SEPTUM: 1.29 CM (ref 0.6–1.1)
LA MAJOR: 3.7 CM
LA MINOR: 2.99 CM
LA WIDTH: 3.69 CM
LEFT ATRIUM SIZE: 3.28 CM
LEFT ATRIUM VOLUME INDEX: 17.4 ML/M2
LEFT ATRIUM VOLUME: 34.02 CM3
LEFT INTERNAL DIMENSION IN SYSTOLE: 2.95 CM (ref 2.1–4)
LEFT VENTRICLE DIASTOLIC VOLUME INDEX: 46.92 ML/M2
LEFT VENTRICLE DIASTOLIC VOLUME: 91.78 ML
LEFT VENTRICLE MASS INDEX: 100 G/M2
LEFT VENTRICLE SYSTOLIC VOLUME INDEX: 17.2 ML/M2
LEFT VENTRICLE SYSTOLIC VOLUME: 33.62 ML
LEFT VENTRICULAR INTERNAL DIMENSION IN DIASTOLE: 4.49 CM (ref 3.5–6)
LEFT VENTRICULAR MASS: 196.24 G
LV LATERAL E/E' RATIO: 14.33 M/S
LYMPHOCYTES # BLD AUTO: ABNORMAL K/UL (ref 1–4.8)
LYMPHOCYTES NFR BLD: 14 % (ref 18–48)
MAGNESIUM SERPL-MCNC: 2.1 MG/DL (ref 1.6–2.6)
MCH RBC QN AUTO: 29.4 PG (ref 27–31)
MCHC RBC AUTO-ENTMCNC: 33.5 G/DL (ref 32–36)
MCV RBC AUTO: 88 FL (ref 82–98)
METAMYELOCYTES NFR BLD MANUAL: 1 %
MONOCYTES # BLD AUTO: ABNORMAL K/UL (ref 0.3–1)
MONOCYTES NFR BLD: 5 % (ref 4–15)
MV PEAK A VEL: 0.68 M/S
MV PEAK E VEL: 0.86 M/S
MYELOCYTES NFR BLD MANUAL: 1 %
NEUTROPHILS NFR BLD: 70 % (ref 38–73)
NEUTS BAND NFR BLD MANUAL: 9 %
NUM UNITS TRANS PACKED RBC: NORMAL
NUM UNITS TRANS PACKED RBC: NORMAL
OVALOCYTES BLD QL SMEAR: ABNORMAL
PHOSPHATE SERPL-MCNC: 4.7 MG/DL (ref 2.7–4.5)
PISA TR MAX VEL: 3.1 M/S
PLATELET # BLD AUTO: 24 K/UL (ref 150–350)
PLATELET BLD QL SMEAR: ABNORMAL
PMV BLD AUTO: ABNORMAL FL (ref 9.2–12.9)
POCT GLUCOSE: 179 MG/DL (ref 70–110)
POCT GLUCOSE: 193 MG/DL (ref 70–110)
POCT GLUCOSE: 202 MG/DL (ref 70–110)
POCT GLUCOSE: 267 MG/DL (ref 70–110)
POIKILOCYTOSIS BLD QL SMEAR: SLIGHT
POLYCHROMASIA BLD QL SMEAR: ABNORMAL
POTASSIUM SERPL-SCNC: 4.3 MMOL/L (ref 3.5–5.1)
PROT SERPL-MCNC: 5.4 G/DL (ref 6–8.4)
RA MAJOR: 3.74 CM
RA PRESSURE: 3 MMHG
RBC # BLD AUTO: 3.1 M/UL (ref 4.6–6.2)
RIGHT VENTRICULAR END-DIASTOLIC DIMENSION: 2.67 CM
SODIUM SERPL-SCNC: 135 MMOL/L (ref 136–145)
TDI LATERAL: 0.06 M/S
TR MAX PG: 38 MMHG
TV REST PULMONARY ARTERY PRESSURE: 41 MMHG
VANCOMYCIN SERPL-MCNC: 12.7 UG/ML
WBC # BLD AUTO: 20.5 K/UL (ref 3.9–12.7)

## 2019-07-19 PROCEDURE — 85007 BL SMEAR W/DIFF WBC COUNT: CPT | Mod: HCNC

## 2019-07-19 PROCEDURE — 36415 COLL VENOUS BLD VENIPUNCTURE: CPT | Mod: HCNC

## 2019-07-19 PROCEDURE — 84100 ASSAY OF PHOSPHORUS: CPT | Mod: HCNC

## 2019-07-19 PROCEDURE — 94640 AIRWAY INHALATION TREATMENT: CPT | Mod: HCNC

## 2019-07-19 PROCEDURE — P9038 RBC IRRADIATED: HCPCS | Mod: HCNC

## 2019-07-19 PROCEDURE — 63600175 PHARM REV CODE 636 W HCPCS: Mod: HCNC | Performed by: INTERNAL MEDICINE

## 2019-07-19 PROCEDURE — 97530 THERAPEUTIC ACTIVITIES: CPT | Mod: HCNC

## 2019-07-19 PROCEDURE — 99233 PR SUBSEQUENT HOSPITAL CARE,LEVL III: ICD-10-PCS | Mod: HCNC,,, | Performed by: INTERNAL MEDICINE

## 2019-07-19 PROCEDURE — 25000242 PHARM REV CODE 250 ALT 637 W/ HCPCS: Mod: HCNC | Performed by: NURSE PRACTITIONER

## 2019-07-19 PROCEDURE — 25000003 PHARM REV CODE 250: Mod: HCNC | Performed by: INTERNAL MEDICINE

## 2019-07-19 PROCEDURE — 27100171 HC OXYGEN HIGH FLOW UP TO 24 HOURS: Mod: HCNC

## 2019-07-19 PROCEDURE — 25000003 PHARM REV CODE 250: Mod: HCNC | Performed by: STUDENT IN AN ORGANIZED HEALTH CARE EDUCATION/TRAINING PROGRAM

## 2019-07-19 PROCEDURE — 20000000 HC ICU ROOM: Mod: HCNC

## 2019-07-19 PROCEDURE — 27201040 HC RC 50 FILTER: Mod: HCNC

## 2019-07-19 PROCEDURE — 25000003 PHARM REV CODE 250: Mod: HCNC | Performed by: FAMILY MEDICINE

## 2019-07-19 PROCEDURE — 63600175 PHARM REV CODE 636 W HCPCS: Mod: HCNC | Performed by: PHYSICIAN ASSISTANT

## 2019-07-19 PROCEDURE — 80053 COMPREHEN METABOLIC PANEL: CPT | Mod: HCNC

## 2019-07-19 PROCEDURE — 25000003 PHARM REV CODE 250: Mod: HCNC | Performed by: NURSE PRACTITIONER

## 2019-07-19 PROCEDURE — 94761 N-INVAS EAR/PLS OXIMETRY MLT: CPT | Mod: HCNC

## 2019-07-19 PROCEDURE — 83735 ASSAY OF MAGNESIUM: CPT | Mod: HCNC

## 2019-07-19 PROCEDURE — 85027 COMPLETE CBC AUTOMATED: CPT | Mod: HCNC

## 2019-07-19 PROCEDURE — 63600175 PHARM REV CODE 636 W HCPCS: Mod: HCNC | Performed by: FAMILY MEDICINE

## 2019-07-19 PROCEDURE — 99233 SBSQ HOSP IP/OBS HIGH 50: CPT | Mod: HCNC,,, | Performed by: INTERNAL MEDICINE

## 2019-07-19 PROCEDURE — 80202 ASSAY OF VANCOMYCIN: CPT | Mod: HCNC

## 2019-07-19 PROCEDURE — S0030 INJECTION, METRONIDAZOLE: HCPCS | Mod: HCNC | Performed by: INTERNAL MEDICINE

## 2019-07-19 PROCEDURE — 36000 PLACE NEEDLE IN VEIN: CPT | Mod: HCNC | Performed by: FAMILY MEDICINE

## 2019-07-19 RX ORDER — FUROSEMIDE 10 MG/ML
80 INJECTION INTRAMUSCULAR; INTRAVENOUS 3 TIMES DAILY
Status: DISCONTINUED | OUTPATIENT
Start: 2019-07-19 | End: 2019-07-20

## 2019-07-19 RX ORDER — FUROSEMIDE 10 MG/ML
40 INJECTION INTRAMUSCULAR; INTRAVENOUS 2 TIMES DAILY
Status: DISCONTINUED | OUTPATIENT
Start: 2019-07-19 | End: 2019-07-19

## 2019-07-19 RX ORDER — FUROSEMIDE 10 MG/ML
80 INJECTION INTRAMUSCULAR; INTRAVENOUS 2 TIMES DAILY
Status: DISCONTINUED | OUTPATIENT
Start: 2019-07-19 | End: 2019-07-19

## 2019-07-19 RX ORDER — SODIUM CITRATE AND CITRIC ACID MONOHYDRATE 334; 500 MG/5ML; MG/5ML
15 SOLUTION ORAL 3 TIMES DAILY
Status: DISCONTINUED | OUTPATIENT
Start: 2019-07-19 | End: 2019-07-25 | Stop reason: HOSPADM

## 2019-07-19 RX ORDER — VANCOMYCIN HCL IN 5 % DEXTROSE 1G/250ML
15 PLASTIC BAG, INJECTION (ML) INTRAVENOUS
Status: DISCONTINUED | OUTPATIENT
Start: 2019-07-19 | End: 2019-07-19

## 2019-07-19 RX ADMIN — SODIUM CITRATE AND CITRIC ACID MONOHYDRATE 15 ML: 500; 334 SOLUTION ORAL at 03:07

## 2019-07-19 RX ADMIN — ATORVASTATIN CALCIUM 20 MG: 20 TABLET, FILM COATED ORAL at 08:07

## 2019-07-19 RX ADMIN — CEFEPIME HYDROCHLORIDE 2 G: 2 INJECTION, POWDER, FOR SOLUTION INTRAVENOUS at 08:07

## 2019-07-19 RX ADMIN — INSULIN ASPART 3 UNITS: 100 INJECTION, SOLUTION INTRAVENOUS; SUBCUTANEOUS at 11:07

## 2019-07-19 RX ADMIN — SODIUM CITRATE AND CITRIC ACID MONOHYDRATE 15 ML: 500; 334 SOLUTION ORAL at 09:07

## 2019-07-19 RX ADMIN — CETIRIZINE HYDROCHLORIDE 10 MG: 10 TABLET, FILM COATED ORAL at 08:07

## 2019-07-19 RX ADMIN — DEXAMETHASONE 40 MG: 4 TABLET ORAL at 06:07

## 2019-07-19 RX ADMIN — LEVOTHYROXINE SODIUM 125 MCG: 25 TABLET ORAL at 06:07

## 2019-07-19 RX ADMIN — METRONIDAZOLE 500 MG: 500 INJECTION, SOLUTION INTRAVENOUS at 06:07

## 2019-07-19 RX ADMIN — FUROSEMIDE 40 MG: 10 INJECTION, SOLUTION INTRAVENOUS at 08:07

## 2019-07-19 RX ADMIN — HYPROMELLOSE 2910 1 DROP: 5 SOLUTION OPHTHALMIC at 09:07

## 2019-07-19 RX ADMIN — FUROSEMIDE 80 MG: 10 INJECTION, SOLUTION INTRAVENOUS at 09:07

## 2019-07-19 RX ADMIN — Medication 500 MCG: at 08:07

## 2019-07-19 RX ADMIN — IPRATROPIUM BROMIDE AND ALBUTEROL SULFATE 3 ML: .5; 3 SOLUTION RESPIRATORY (INHALATION) at 08:07

## 2019-07-19 RX ADMIN — INSULIN ASPART 2 UNITS: 100 INJECTION, SOLUTION INTRAVENOUS; SUBCUTANEOUS at 04:07

## 2019-07-19 RX ADMIN — HYPROMELLOSE 2910 1 DROP: 5 SOLUTION OPHTHALMIC at 08:07

## 2019-07-19 RX ADMIN — IPRATROPIUM BROMIDE AND ALBUTEROL SULFATE 3 ML: .5; 3 SOLUTION RESPIRATORY (INHALATION) at 04:07

## 2019-07-19 RX ADMIN — IPRATROPIUM BROMIDE AND ALBUTEROL SULFATE 3 ML: .5; 3 SOLUTION RESPIRATORY (INHALATION) at 11:07

## 2019-07-19 RX ADMIN — DIPHENHYDRAMINE HYDROCHLORIDE 25 MG: 12.5 SOLUTION ORAL at 01:07

## 2019-07-19 RX ADMIN — IPRATROPIUM BROMIDE AND ALBUTEROL SULFATE 3 ML: .5; 3 SOLUTION RESPIRATORY (INHALATION) at 07:07

## 2019-07-19 RX ADMIN — METRONIDAZOLE 500 MG: 500 INJECTION, SOLUTION INTRAVENOUS at 10:07

## 2019-07-19 RX ADMIN — METRONIDAZOLE 500 MG: 500 INJECTION, SOLUTION INTRAVENOUS at 01:07

## 2019-07-19 RX ADMIN — VANCOMYCIN HYDROCHLORIDE 1250 MG: 1.25 INJECTION, POWDER, LYOPHILIZED, FOR SOLUTION INTRAVENOUS at 09:07

## 2019-07-19 RX ADMIN — ERYTHROMYCIN: 5 OINTMENT OPHTHALMIC at 09:07

## 2019-07-19 RX ADMIN — PANTOPRAZOLE SODIUM 40 MG: 40 TABLET, DELAYED RELEASE ORAL at 08:07

## 2019-07-19 NOTE — PROGRESS NOTES
Progress Note  Nephrology      Consult Requested By: Neli Chapman*  Reason for Consult: ARF    SUBJECTIVE:     Review of Systems   Constitutional: Negative for chills and fever.   Respiratory: Negative for cough and shortness of breath.    Cardiovascular: Positive for leg swelling (but better then usual). Negative for chest pain.   Gastrointestinal: Negative for nausea.     Patient Active Problem List   Diagnosis    Mixed dyslipidemia    Hypertension    CLL (chronic lymphocytic leukemia)    Panhypopituitarism    Hyperuricemia    Prostate cancer screening    Neuropathy    Immune thrombocytopenia    LUZ (acute kidney injury)    History of CVA (cerebrovascular accident)    Central hypothyroidism    Chronic rhinosinusitis    Stroke-like episode    Polyneuropathy    Occlusion and stenosis of left carotid artery    Hypogammaglobulinemia    Squamous cell carcinoma of forehead    Immunosuppression    Squamous cell carcinoma of skin of face    Squamous cell carcinoma of skin of right temple    Type 2 diabetes, controlled, with neuropathy    Epiretinal membrane (ERM) of right eye    Weakness    Shortness of breath    Secondary adrenal insufficiency    Secondary male hypogonadism    Weakness    Edema of both legs    Hyperkalemia    Dehydration    Sepsis due to pneumonia    Altered mental status    Leukocytosis    Thrombocytopenia, unspecified    Abnormal liver enzymes    Oliguria    Palliative care encounter    Goals of care, counseling/discussion       OBJECTIVE:     Medications:   albuterol-ipratropium  3 mL Nebulization Q4H WAKE    artificial tears  1 drop Both Eyes BID    atorvastatin  20 mg Oral Daily    azithromycin  500 mg Intravenous Q24H    ceFEPime (MAXIPIME) IVPB  2 g Intravenous Daily    cetirizine  10 mg Oral Daily    cyanocobalamin  500 mcg Oral Daily    dexAMETHasone  40 mg Oral Q24H    erythromycin   Both Eyes QHS    furosemide  40 mg Intravenous BID     levothyroxine  125 mcg Oral Before breakfast    metronidazole  500 mg Intravenous Q8H    pantoprazole  40 mg Oral Daily    [START ON 7/20/2019] predniSONE  7.5 mg Oral Daily    QUEtiapine  25 mg Oral Once    vancomycin (VANCOCIN) IVPB  1,000 mg Intravenous Q48H      norepinephrine bitartrate-D5W Stopped (07/17/19 0800)     Vitals:    07/18/19 1900   BP: 120/83   Pulse:    Resp:    Temp: 97.6 °F (36.4 °C)     I/O last 3 completed shifts:  In: 2426.7 [P.O.:370; I.V.:206.7; IV Piggyback:1850]  Out: 3150 [Urine:3150]  Physical Exam   Constitutional: He is oriented to person, place, and time. He appears well-developed and well-nourished. No distress.   HENT:   Head: Normocephalic and atraumatic.   Eyes: EOM are normal. No scleral icterus.   Neck: Neck supple. No JVD present.   Cardiovascular: Regular rhythm and intact distal pulses. Exam reveals no friction rub.   No murmur heard.  Pulmonary/Chest: Effort normal. No respiratory distress. He has decreased breath sounds. He has no wheezes. He has no rales.   Abdominal: Soft. Bowel sounds are normal. He exhibits no distension. There is no tenderness.   Musculoskeletal: He exhibits edema.   Neurological: He is alert and oriented to person, place, and time.   Skin: Skin is warm and dry. No rash noted. He is not diaphoretic. No erythema.   Psychiatric: He has a normal mood and affect.     Laboratory:  Recent Labs   Lab 07/16/19  1057 07/17/19  0400 07/18/19  0515   WBC 19.89* 12.54 21.17*   HGB 8.5* 7.9* 7.3*   HCT 26.3* 23.6* 22.1*   PLT 9* 12* 22*   MONO 6.0  CANCELED 2.0*  Test Not Performed 1.0*  CANCELED     Recent Labs   Lab 07/16/19  0200 07/16/19  0735 07/17/19  0400 07/18/19  0515    138 134* 136   K 4.4 4.2 4.0 4.5    110 109 109   CO2 17* 16* 18* 12*   BUN 39* 39* 38* 47*   CREATININE 1.7* 1.7* 1.6* 1.5*   CALCIUM 8.2* 7.9* 7.9* 8.4*   PHOS 3.7  --  3.4 4.2     Labs reviewed  Diagnostic Results:  X-Ray: Reviewed  US: Reviewed  Echo:  Reviewed      ASSESSMENT/PLAN:      LUZ on CKD 2 Baseline creatinine- 1.1  ATN from hypoperfusion   off levophed, UO much better, stabilizing     Careful with vanc check lvl before giving next dose and potentially avoid vanco if possible. Todays lvl 11.1     Avoid nephrotoxins, dose all meds ad for GFr < 10  No indication for urgent RRT. Monitor closely. Strict ins/outs, daily weight          Thank you for allowing me to participate in the care of your patients  With any question please call [phone]  Ananya Escobar    [facility]  FARHEEN Guadarrama MD, FACJEFF WILBURN MD,   MD RACHEL Cruz, NP  [address]  [place], [address] [phone]

## 2019-07-19 NOTE — SUBJECTIVE & OBJECTIVE
Interval History:   - he is less confused today. Platelet count today is 24 k/uL. He is scheduled for his fourth day of dexamethasone today.    Oncology Treatment Plan:   [No treatment plan]    Medications:  Continuous Infusions:   dilTIAZem Stopped (07/19/19 0140)    norepinephrine bitartrate-D5W Stopped (07/17/19 0800)     Scheduled Meds:   albuterol-ipratropium  3 mL Nebulization Q4H WAKE    artificial tears  1 drop Both Eyes BID    atorvastatin  20 mg Oral Daily    ceFEPime (MAXIPIME) IVPB  2 g Intravenous Daily    cetirizine  10 mg Oral Daily    cyanocobalamin  500 mcg Oral Daily    dexAMETHasone  40 mg Oral Q24H    erythromycin   Both Eyes QHS    furosemide  40 mg Intravenous BID    levothyroxine  125 mcg Oral Before breakfast    metronidazole  500 mg Intravenous Q8H    pantoprazole  40 mg Oral Daily    [START ON 7/20/2019] predniSONE  7.5 mg Oral Daily    QUEtiapine  25 mg Oral Once    sodium citrate-citric acid 500-334 mg/5 ml  15 mL Oral TID    vancomycin (VANCOCIN) IVPB  1,250 mg Intravenous Q24H     PRN Meds:sodium chloride, sodium chloride, sodium chloride, sodium chloride, acetaminophen, benzonatate, Dextrose 10% Bolus, Dextrose 10% Bolus, diphenhydrAMINE, glucagon (human recombinant), glucose, glucose, insulin aspart U-100, ondansetron, sodium chloride 0.9%     Review of Systems   Constitutional: Positive for fatigue.   HENT: Negative for sore throat.    Eyes: Negative for visual disturbance.   Respiratory: Negative for shortness of breath.    Cardiovascular: Positive for leg swelling. Negative for chest pain.   Gastrointestinal: Negative for abdominal pain.   Genitourinary: Negative for dysuria.   Musculoskeletal: Negative for back pain.   Skin: Negative for rash.   Neurological: Positive for weakness.   Psychiatric/Behavioral: Positive for confusion (improved). The patient is not nervous/anxious.      Objective:     Vital Signs (Most Recent):  Temp: 96.8 °F (36 °C) (07/19/19  0700)  Pulse: 76 (07/19/19 1200)  Resp: (!) 32 (07/19/19 1200)  BP: (!) 146/69 (07/19/19 1200)  SpO2: 96 % (07/19/19 1200) Vital Signs (24h Range):  Temp:  [96.7 °F (35.9 °C)-97.9 °F (36.6 °C)] 96.8 °F (36 °C)  Pulse:  [] 76  Resp:  [22-57] 32  SpO2:  [87 %-100 %] 96 %  BP: (101-159)/(55-84) 146/69     Weight: 76 kg (167 lb 8.8 oz)  Body mass index is 23.37 kg/m².  Body surface area is 1.95 meters squared.      Intake/Output Summary (Last 24 hours) at 7/19/2019 1417  Last data filed at 7/19/2019 1300  Gross per 24 hour   Intake 1440 ml   Output 2125 ml   Net -685 ml       Physical Exam   Constitutional: He is oriented to person, place, and time. He appears well-developed.   Fatigued   HENT:   Head: Normocephalic and atraumatic.   Eyes: Pupils are equal, round, and reactive to light. EOM are normal.   Neck: Normal range of motion. Neck supple.   Cardiovascular: Normal rate and regular rhythm.   Pulmonary/Chest: Effort normal and breath sounds normal.   Abdominal: Soft. Bowel sounds are normal.   Neurological: He is alert and oriented to person, place, and time.   Skin: Skin is warm and dry.   Ecchymoses throughout body   Psychiatric: He has a normal mood and affect. His behavior is normal. Judgment and thought content normal.   Nursing note and vitals reviewed.      Significant Labs:   Labs have been reviewed.    Lab Results   Component Value Date    WBC 20.50 (H) 07/19/2019    HGB 9.1 (L) 07/19/2019    HCT 27.2 (L) 07/19/2019    MCV 88 07/19/2019    PLT 24 (LL) 07/19/2019

## 2019-07-19 NOTE — PLAN OF CARE
Problem: Physical Therapy Goal  Goal: Physical Therapy Goal  Goals to be met by: 8/15/2019     Patient will increase functional independence with mobility by performin. Supine to sit with Modified [place]  2. Sit to stand transfer with Modified [place]  3. Bed to chair transfer with Modified [place] using Rolling Walker  4. Gait  x 20 feet with Modified [place] using Rolling Walker.      Outcome: Ongoing (interventions implemented as appropriate)  Patient with decreased tolerance to therapy; rolling in bed but unable to transition to EOB sitting 2/2 fatigue, weakness, SOB; positioned bed in chair position; vitals stable; cont with therapy to pt tolerance.

## 2019-07-19 NOTE — ANESTHESIA PROCEDURE NOTES
Peripheral IV Insertion    Diagnosis: I99.8 Other disorder of circulatory system    Patient location during procedure: ICU  Procedure start time: 7/19/2019 3:05 AM  Procedure end time: 7/19/2019 3:10 AM    Staffing  Authorizing Provider: Ankush Ying MD  Performing Provider: Ankush Ying MD    Anesthesiologist was present at the time of the procedure.    Preanesthetic Checklist  Completed: patient identifiedPeripheral IV Insertion  Skin Prep: chlorhexidine gluconate  Local Infiltration: none  Orientation: right  Location: hand  Catheter Size: 20 G  Catheter placement by Anatomical landmarks. Heme positive aspiration all ports.Insertion Attempts: 1  Assessment  Dressing: secured with tape and tegaderm  Patient: Tolerated well  Line flushed easily.

## 2019-07-19 NOTE — PLAN OF CARE
Problem: Adult Inpatient Plan of Care  Goal: Plan of Care Review  Outcome: Revised  Pt currently on comfort flow at 20 L/min with 35%. O2 sats remain between . Pt remains tachypneic but denies SOB. VSS. Pt restraints were d/c.  Pt has remained calm and cooperative since discontinuation. Skin tears were cleansed with wound cleanser and redressed X2. Family at bedside. Bed alarm set. Will continue to monitor and handoff report to oncoming nurse to assume care.

## 2019-07-19 NOTE — ASSESSMENT & PLAN NOTE
Metabolic encephalopathy  Septic shock  Leukocytosis  Weakness  Chest X-ray showed left lung opacity, very mild.    -ID consult: Vanc, cefepime, and azithro (complete), flagyl  -Blood Cultures: NGTD  -Albuterol-ipratropium neb treatments Q4HR  -Lactic Acid: 3.0 > 1.6, no longer needing pressors  -Abdominal US: unremarkable  -Leukocytosis likely compounded by high dose steroid

## 2019-07-19 NOTE — PROGRESS NOTES
Sputum specimen cup placed at bedside.  Instructed patient to cough any sputum into the cup and inform the nurse/respiratory when obtained.

## 2019-07-19 NOTE — PROGRESS NOTES
[facility] Consult     Primary Team: Medicine  Consultant Attending: Larissa  Date of Admit: 7/15/2019    History      Thierry Ramos Jr. is a 85 y.o. male with a relevant history of  DM, CLL, HTN, HLD, CVA, hypopituitarism    The patient presented to [facility] on 7/15/2019 with a primary complaint of syncope, generalized weakness worse in the legs, leg swelling (on ibrutinib). Also complained of acute on chronic cough. When he presented she had a leukocytosis, no fever, occasionally documented hypotension but a normal UA and mild opacity on the left lung field.     At bedside he was altered but did not have many complaints apart from cough, weakness, and hypotension/presyncope when sitting up    Interval     CXR shows patchy opacity not present on prior  FiO2 is 50% on which is an increase from 6L NC yesterday    Allergies:  Review of patient's allergies indicates:   Allergen Reactions    Ace inhibitors Swelling     angioedema    Divalproex Hives     Rash under arms, body creases     Medications:   In-Hospital Scheduled Medications:   albuterol-ipratropium  3 mL Nebulization Q4H WAKE    artificial tears  1 drop Both Eyes BID    atorvastatin  20 mg Oral Daily    azithromycin  500 mg Intravenous Q24H    ceFEPime (MAXIPIME) IVPB  2 g Intravenous Daily    cetirizine  10 mg Oral Daily    cyanocobalamin  500 mcg Oral Daily    dexAMETHasone  40 mg Oral Q24H    erythromycin   Both Eyes QHS    furosemide  40 mg Intravenous BID    levothyroxine  125 mcg Oral Before breakfast    metronidazole  500 mg Intravenous Q8H    pantoprazole  40 mg Oral Daily    [START ON 7/20/2019] predniSONE  7.5 mg Oral Daily    QUEtiapine  25 mg Oral Once    vancomycin (VANCOCIN) IVPB  1,250 mg Intravenous Q24H      In-Hospital PRN Medications:  sodium chloride, sodium chloride, sodium chloride, sodium chloride, acetaminophen, benzonatate, Dextrose 10% Bolus, Dextrose 10% Bolus, diphenhydrAMINE, glucagon (human  recombinant), glucose, glucose, insulin aspart U-100, ondansetron, sodium chloride 0.9%   In-Hospital IV Infusion Medications:   dilTIAZem Stopped (07/19/19 0140)    norepinephrine bitartrate-D5W Stopped (07/17/19 0800)     Relevant Home Medications:    Antibiotics and Day Number of Therapy:  Piptazo  Vanc    Past Medical History:  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    Hyperlipidemia     Hypertension     Hypopituitarism     Hypothyroid     Immunosuppression 3/13/2015    Squamous Cell Carcinoma     on the right side of the face     Squamous cell carcinoma of skin of right temple 1/27/2016    Stroke     Syncope 2/12    Unspecified disorder of kidney and ureter      Past Surgical History/ObGyn Hx if gender appropriate:  Past Surgical History:   Procedure Laterality Date    (ROTATION) FLAP N/A 3/23/2015    Performed by Joe [facility]allister MD at [facility] OR 2ND FLR    BIOPSY-LYMPH NODE N/A 3/23/2015    Performed by Joe [facility]ister MD at [facility] OR 2ND FLR    CAROTID ENDARTERECTOMY (L)  2/27/2007    COLONOSCOPY N/A 2/25/2014    Performed by DELORES Lambert MD at [facility] ENDO (4TH FLR)    EXCISION TURBINATE, SUBMUCOUS      EXCISION-LESION-FACE Right 2/24/2016    Performed by Joe Mcallister MD at [facility] OR 2ND FLR    EXCISION-LESION-FACE N/A 3/23/2015    Performed by Joe Mcallister MD at [facility] OR 2ND FLR    FESS  9/16/2014    FULL THICKNESS SKIN GRAFT N/A 2/24/2016    Performed by [facility]ister MD at [facility] OR 2ND FLR    MAPPING-LYMPH NODE N/A 3/23/2015    Performed by Joe Mcallister MD at [facility] OR 2ND FLR    MAPPING-SENTINEL NODE N/A 2/24/2016    Performed by Joe Mcallister MD at [facility] OR 2ND FLR    Mohs excision   3/23/2015, 2/24/2016    combined with WLE forehead    NASAL SEPTUM SURGERY  9/16/2014    REPAIR-MOHS DEFECT-left nose Left  "3/31/2017    Performed by Jermaine Dsouza III, MD at [facility] OR 2ND FLR    RESECTION-TURBINATES (SMR) Bilateral 9/16/2014    Performed by Jermaine Dsouza III, MD at [facility] OR 2ND FLR    right ureter surgery  1995    SENTINEL LYMPH NODE BIOPSY  3/23/2015, 2/24/2016    SEPTOPLASTY Bilateral 9/16/2014    Performed by Jermaine Dsouza III, MD at [facility] OR 2ND FLR    SINUS SURGERY  9/16/2014    septo, ESS, BITSMR    SINUS SURGERY FUNCTIONAL ENDOSCOPIC WITH NAVIGATION with bilateral maxillary, ethmoids, sphenoids, right frontal, possible left NF duct-balloons Bilateral 9/16/2014    Performed by Jermaine Dsouza III, MD at [facility] OR 2ND FLR    Transphenoidal surgery       Family History:  Family History   Problem Relation Age of Onset    Cancer Mother         breast    Colon cancer Father     Cancer Father         colon    Cancer Brother         leukemia    No Known Problems Sister     No Known Problems Maternal Aunt     No Known Problems Maternal Uncle     No Known Problems Paternal Aunt     No Known Problems Paternal Uncle     No Known Problems Maternal Grandmother     No Known Problems Maternal Grandfather     No Known Problems Paternal Grandmother     No Known Problems Paternal Grandfather     Amblyopia Neg Hx     Blindness Neg Hx     Cataracts Neg Hx     Diabetes Neg Hx     Glaucoma Neg Hx     Hypertension Neg Hx     Macular degeneration Neg Hx     Retinal detachment Neg Hx     Strabismus Neg Hx     Stroke Neg Hx     Thyroid disease Neg Hx     Allergic rhinitis Neg Hx     Allergies Neg Hx     Angioedema Neg Hx     Asthma Neg Hx     Atopy Neg Hx     Eczema Neg Hx     Immunodeficiency Neg Hx     Rhinitis Neg Hx     Urticaria Neg Hx      Social History:  Social History     Tobacco Use    Smoking status: Never Smoker    Smokeless tobacco: Never Used   Substance Use Topics    Alcohol use: Yes     Alcohol/week: 1.2 oz     Types: 1 Glasses of wine, 1 Cans of beer per week     Comment: "rarely" "    Drug use: No     Objective   Last 24 Hour Vital Signs:  BP  Min: 100/60  Max: 159/74  Temp  Av.4 °F (36.3 °C)  Min: 96.7 °F (35.9 °C)  Max: 97.9 °F (36.6 °C)  Pulse  Av.5  Min: 70  Max: 134  Resp  Av.3  Min: 22  Max: 57  SpO2  Av.2 %  Min: 87 %  Max: 100 %  Weight  Av kg (167 lb 8.8 oz)  Min: 76 kg (167 lb 8.8 oz)  Max: 76 kg (167 lb 8.8 oz)    Lines, Drains, Airway:  Right IJ     Physical Examination:  Examination  General: ill appearing, comfortable   HEENT: normal oral mucosa, normal dentition, conjunctiva normal, pupils normal, extraocular motion normal, right temporal scar  Neck: no thyromegaly, no JVD   Cardiac: no murmurs, pulse regular    Pulmonary/Chest: chest clear, no respiratory distress   GI/Rectal: no organomegaly, no masses, non tender, normal bowel sounds, rectal exam deferred   : deferred   Msk: normal motor screening exam  Vascular: normal peripheral perfusion   Lymph nodes: none palpated  Skin/ Extremities: no rash, no pedal edema, no ulceration    Neurology/ Mental status: alert confused     Laboratory:  CBC:   Lab Results   Component Value Date    WBC 20.50 (H) 2019    HGB 9.1 (L) 2019    PLT 24 (LL) 2019    MCV 88 2019    RDW 15.5 (H) 2019     Estimated Creatinine Clearance: 41.1 mL/min (based on SCr of 1.4 mg/dL).    Microbiology Data:  Blood Clx no growth     Other Results:  EKG  Normal Qtc    Radiology:  Mild left lower lung field opacity    Elevated procal    Abd US  Trace upper abdominal ascites.  Apparent right-sided pleural effusion.  Simple right renal cyst.    CT head  No extra-axial fluid collections or intracranial hemorrhage.  No mass effect.  Several small foci of low attenuation in the basal ganglia/periventricular white matter, suggesting remote lacunar type infarcts.  Cerebral volume loss noted.  Thinning of the corpus callosum noted.  Small amount of encephalomalacia in the base of the left frontal lobe suggesting  remote injury.  Prior left frontal craniotomy noted.  Mild mucosal thickening in the paranasal sinuses.  Mastoid air cells are clear.    Assessment     Thierry Ramos Jr. is a 85 y.o. male who presented with shock and syncope/presyncope and leukocytosis. He does have a history of CLL and has had leukocytosis in the past. Also has a history of hypopituitarism and has been on prednisone.    Although there are alternative explanations for his leukocytosis (CLL, steroids) and hypotension (endocrine) current possible sources of sepsis include pneumonia (worsening CXR and hypoxia) and soft tissue infection (serous discharge from swollen right arm).     Recommendations     Possible pneumonia  - completed azithromycin 500mg IV for atypical coverage for 3 days, no further azithromycin needed  - continue cefepime 2gq12 and flagyl 500mg IV q8 for a total of 7 days  - continue vancomycin with goal trough 15-20 for a total of 7 days  - consider non contrast CT chest   - check sputum culture     Resolving shock, presumed due to sepsis  - monitor right arm with its serous drainage, needs wound care and possibly surgical eval if persistent/worsening     Thank you for this consult. We will follow.      Castro Nunez  Fellow, [facility]

## 2019-07-19 NOTE — PLAN OF CARE
Problem: Occupational Therapy Goal  Goal: Occupational Therapy Goal  Goals to be met by: 8/15     Patient will increase functional independence with ADLs by performing:    Feeding with Modified [place].  UE Dressing with Set-up Assistance.  Grooming while seated with Modified [place] post setup.  Toileting from bedside commode with Minimal Assistance for hygiene and clothing management.   Sitting at edge of bed x15 minutes with Modified [place] and no drop in BP.  Rolling to Bilateral with Modified [place].   Supine to sit with Modified [place].  Toilet transfer to bedside commode with Minimal Assistance.  Upper extremity exercise program x10 reps per handout, with independence.     Outcome: Ongoing (interventions implemented as appropriate)  Thierry Ramos Jr. is a 85 y.o. male with a medical diagnosis of Sepsis due to pneumonia.   Performance deficits affecting function are weakness, impaired endurance, impaired self care skills, impaired functional mobilty, impaired balance, decreased coordination, decreased upper extremity function, decreased lower extremity function, impaired skin, edema, impaired cardiopulmonary response to activity.  Pt found in bed with spouse feeding him.  RN ok'ed tx and pt agreeable.  Pt with decreased tolerance of therapy.  Unable to transition to EOB secondary to weakness and SOB.  Therefore, positioned pt in chair position in bed. Vitals stable. Continue OT services to address functional goals, progressing as able.    RANDI Richardson/L

## 2019-07-19 NOTE — PROGRESS NOTES
Pharmacokinetic Assessment Follow Up: IV Vancomycin    Vancomycin serum concentration assessment(s):    The measurement is below the desired definitive target range of 15 to 20 mcg/mL.    Vancomycin Regimen Plan:    Change regimen to Vancomycin 1250 mg IV every 24hour with next serum trough concentration measured at 0930 prior to 3rd dose on 7/21     Pharmacy will continue to follow and monitor vancomycin.    Please contact pharmacy at extension [phone] for questions regarding this assessment.    Thank you for the consult,   Magdalena Wooten     Patient brief summary:  Thierry Ramos Jr. is a 85 y.o. male initiated on antimicrobial therapy with IV Vancomycin for treatment of suspected lower respiratory infection    The patient received a loading dose, followed by the current treatment regimen: random levels with plan to redose when level is less than 20 mcg/mL    Drug Allergies:   Review of patient's allergies indicates:   Allergen Reactions    Ace inhibitors Swelling     angioedema    Divalproex Hives     Rash under arms, body creases       Actual Body Weight:   76kg    Renal Function:   Estimated Creatinine Clearance: 41.1 mL/min (based on SCr of 1.4 mg/dL).,     Dialysis Method (if applicable):  none     CBC (last 72 hours):  Recent Labs   Lab Result Units 07/16/19  1057 07/17/19  0400 07/18/19  0515 07/18/19  2146 07/19/19  0703   WBC K/uL 19.89* 12.54 21.17*  --  20.50*   Hemoglobin g/dL 8.5* 7.9* 7.3* 7.8* 9.1*   Hematocrit % 26.3* 23.6* 22.1*  --  27.2*   Platelets K/uL 9* 12* 22*  --  24*   Gran% % 35.0* 62.0 77.0*  --  70.0   Lymph% % 43.0 26.0 18.0  --  14.0*   Mono% % 6.0 2.0* 1.0*  --  5.0   Eosinophil% % 0.0 0.0 3.0  --  0.0   Basophil% % 0.0 0.0 0.0  --  0.0   Differential Method  Manual Manual Automated  --  Manual       Metabolic Panel (last 72 hours):  Recent Labs   Lab Result Units 07/16/19  1300 07/16/19  1301 07/17/19  0400 07/18/19  0515 07/19/19  0634   Sodium mmol/L  --   --  134* 136 135*   Sodium  Urine Random mmol/L 29  --   --   --   --    Potassium mmol/L  --   --  4.0 4.5 4.3   Chloride mmol/L  --   --  109 109 108   CO2 mmol/L  --   --  18* 12* 17*   Glucose mg/dL  --   --  223* 131* 171*   Glucose, UA   --  Negative  --   --   --    BUN, Bld mg/dL  --   --  38* 47* 61*   Creatinine mg/dL  --   --  1.6* 1.5* 1.4   Creatinine, Random Ur mg/dL 72.0  --   --   --   --    Albumin g/dL  --   --  2.0* 2.2* 2.4*   Total Bilirubin mg/dL  --   --  1.0 1.1* 1.6*   Alkaline Phosphatase U/L  --   --  156* 179* 149*   AST U/L  --   --  139* 96* 81*   ALT U/L  --   --  37 41 37   Magnesium mg/dL  --   --  1.7 1.9 2.1   Phosphorus mg/dL  --   --  3.4 4.2 4.7*       Vancomycin Administrations:  vancomycin given in the last 96 hours                     vancomycin in dextrose 5 % 1 gram/250 mL IVPB 1,000 mg (mg) 1,000 mg New Bag 07/18/19 1214    vancomycin 750 mg in dextrose 5 % 250 mL IVPB (ready to mix system) (mg) 750 mg New Bag 07/17/19 1236     750 mg New Bag 07/16/19 1106                      Drug levels (last 3 results):  Recent Labs   Lab Result Units 07/17/19  1041 07/18/19  0515 07/19/19  0634   Vancomycin, Random ug/mL  --  11.1 12.7   Vancomycin-Trough ug/mL 9.4*  --   --        Microbiologic Results:  Microbiology Results (last 7 days)       Procedure Component Value Units Date/Time    Blood culture x two cultures. Draw prior to antibiotics. [[id number]] Collected:  07/15/19 0855    Order Status:  Completed Specimen:  Blood from Peripheral, Hand, Left Updated:  07/18/19 1412     Blood Culture, Routine No Growth to date      No Growth to date      No Growth to date      No Growth to date    Narrative:       Aerobic and anaerobic    Blood culture x two cultures. Draw prior to antibiotics. [[id number]] Collected:  07/15/19 0859    Order Status:  Completed Specimen:  Blood from Peripheral, Forearm, Left Updated:  07/18/19 1412     Blood Culture, Routine No Growth to date      No Growth to date      No Growth to  date      No Growth to date    Narrative:       Aerobic and anaerobic    Urine Culture High Risk [[id number]] Collected:  07/16/19 1705    Order Status:  Completed Specimen:  Urine, Catheterized Updated:  07/17/19 2216     Urine Culture, Routine No growth    Narrative:       Indicated criteria for high risk culture:->Other  Other (specify):->Septic shock    Culture, Respiratory with Gram Stain [[id number]]     Order Status:  No result Specimen:  Respiratory from Sputum, Expectorated

## 2019-07-19 NOTE — ANESTHESIA PROCEDURE NOTES
Peripheral IV Insertion    Diagnosis: I99.8 Other disorder of circulatory system    Patient location during procedure: ICU  Procedure start time: 7/19/2019 3:00 AM  Procedure end time: 7/19/2019 3:05 AM    Staffing  Authorizing Provider: Ankush Ying MD  Performing Provider: Ankush Ying MD      Preanesthetic Checklist  Completed: patient identifiedPeripheral IV Insertion  Skin Prep: chlorhexidine gluconate  Local Infiltration: none  Orientation: left  Location: hand  Catheter Size: 20 G  Catheter placement by Anatomical landmarks. Heme positive aspiration all ports.Insertion Attempts: 1  Assessment  Dressing: secured with tape and tegaderm  Patient: Tolerated well  Line flushed easily.

## 2019-07-19 NOTE — ASSESSMENT & PLAN NOTE
- I reviewed the peripheral smear. Rare enlarged platelets are noted.  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  - I spoke to his hematologist Dr. Marr on 7/16/19. She recommended pulse dexamethasone, so I placed an order for dexamethasone 40mg PO daily x 4 days.  - he has received a dose of IV immune globulin as well on 7/16/19.  - platelet count today is 24 k/uL. He received his fourth dose of dexamethasone today.  - follow up platelet count.

## 2019-07-19 NOTE — NURSING
Spoke with Dr. Erwin about central line out. Says d/c diltiazem gtt now that pt has converted to sr.

## 2019-07-19 NOTE — ASSESSMENT & PLAN NOTE
Immunosuppression  Edema of both legs  Follows with Dr. Trejo. Ibrutinib placed on hold by Dr. Trejo 2/2 to worsening weakness and leg swelling.  -Consult hem/onc, appreciate recs

## 2019-07-19 NOTE — ASSESSMENT & PLAN NOTE
Afib with RVR  Hypoxia  Compounded by recent blood transfusion  -Check echo: ok  -Continue lasix  -Strict I/O  -CT chest pending  -Continue to wean O2

## 2019-07-19 NOTE — PROGRESS NOTES
"After Pt's breathing tx pt is complaining of SOB and stated he's "not getting relief". Sats were 87% on 6L NC, placed pt on NRB 15L and called eICU. sats are 100% on 15L NRB  "

## 2019-07-19 NOTE — ASSESSMENT & PLAN NOTE
Metabolic encephalopathy  Septic shock  Leukocytosis  Weakness  Chest X-ray showed left lung opacity, very mild.    -ID consult: Vanc, cefepime, and azithro, flagyl  -Blood Cultures: NGTD  -Albuterol-ipratropium neb treatments Q4HR  -Lactic Acid: 3.0 > 2.3 > 3.0 > 1.6, no longer needing pressors

## 2019-07-19 NOTE — NURSING
Pt c/o SOB. Pt O2 sats between 86-90%. Pt agitated and restless. Pt pulled out babar. Pressure applied to site for 10 mins followed by  pressure dressing. Dr. Nava notified. Will send NP to bedside.

## 2019-07-19 NOTE — PT/OT/SLP PROGRESS
Occupational Therapy   Treatment    Name: Thierry Ramos Jr.  MRN: [id number]  Admitting Diagnosis:  Sepsis due to pneumonia       Recommendations:     Discharge Recommendations: other (see comments)(TBD)  Discharge Equipment Recommendations:  other (see comments)(TBD)  Barriers to discharge:  None    Assessment:     Thierry Ramos Jr. is a 85 y.o. male with a medical diagnosis of Sepsis due to pneumonia.   Performance deficits affecting function are weakness, impaired endurance, impaired self care skills, impaired functional mobilty, impaired balance, decreased coordination, decreased upper extremity function, decreased lower extremity function, impaired skin, edema, impaired cardiopulmonary response to activity.  Pt found in bed with spouse feeding him.  RN ok'ed tx and pt agreeable.  Pt with decreased tolerance of therapy.  Unable to transition to EOB secondary to weakness and SOB.  Therefore, positioned pt in chair position in bed. Vitals stable. Continue OT services to address functional goals, progressing as able.      Rehab Prognosis:  Fair; patient would benefit from acute skilled OT services to address these deficits and reach maximum level of function.       Plan:     Patient to be seen 5 x/week to address the above listed problems via self-care/home management, therapeutic activities, therapeutic exercises  · Plan of Care Expires: 08/15/19  · Plan of Care Reviewed with: patient, spouse    Subjective     Pain/Comfort:  · Pain Rating 1: 0/10  · Pain Rating Post-Intervention 1: 0/10    Objective:     Communicated with: RN prior to session.  Patient found HOB elevated with michel catheter, peripheral IV(multiple ICU lines and monitors) upon OT entry to room.    General Precautions: Standard, fall   Orthopedic Precautions:N/A   Braces: N/A     Occupational Performance:     Bed Mobility:    · Patient completed Rolling/Turning to Left with  moderate assistance and with side rail  · Patient completed  Rolling/Turning to Right with moderate assistance and with side rail  · Patient completed Scooting/Bridging with total assistance, 2 persons and supine to HOB     Activities of Daily Living:  · Feeding:  total assistance spouse feeding pt      [facility] 6 Click ADL: 8    Treatment & Education:  Pt found with pads and gown wet.  RN notified and checked michel.  Pt rolled R<>L with Mod A using SR to change linens.  Pt able to bridge laterally to L side of bed with SBA and rolled to L side with Mod A using SR.  Once side lying pt declined up to sit secondary to weakness and SOB.  Therefore, positioned bed in chair position.       Patient left with bed in chair position with all lines intact, RN notified and spouse presentEducation:      GOALS:   Multidisciplinary Problems     Occupational Therapy Goals        Problem: Occupational Therapy Goal    Goal Priority Disciplines Outcome Interventions   Occupational Therapy Goal     OT, PT/OT Ongoing (interventions implemented as appropriate)    Description:  Goals to be met by: 8/15     Patient will increase functional independence with ADLs by performing:    Feeding with Modified [place].  UE Dressing with Set-up Assistance.  Grooming while seated with Modified [place] post setup.  Toileting from bedside commode with Minimal Assistance for hygiene and clothing management.   Sitting at edge of bed x15 minutes with Modified [place] and no drop in BP.  Rolling to Bilateral with Modified [place].   Supine to sit with Modified [place].  Toilet transfer to bedside commode with Minimal Assistance.  Upper extremity exercise program x10 reps per handout, with independence.                      Time Tracking:     OT Date of Treatment: 07/19/19  OT Start Time: 1152  OT Stop Time: 1231  OT Total Time (min): 39 min    Billable Minutes:Therapeutic Activity 16   *cotx with PT    NEL Richardson  7/19/2019

## 2019-07-19 NOTE — EICU
Notified of hypoxemia and now afib RVR    86 y/o M with CLL most recently on ibrutinib being managed as severe sepsis after presenting with syncopal episodes.  Overnight with increasing O2 requirement and now patient is in afib RVR 130s, /81. Ongoing diuresis.    Camera assessment:  Patient reportedly more comfortable on comfort flow 20 FiO2 40L    Telemetry:  BP /81    O2 92%    Data:  CXR pulmonary edema   No significant electrolyte abnormality    · Patient now in afib RVR with good response to cardizem bolus.  Will start patient on cardizem drip. EKG ordered  · Increasing O2 requirement with worsening CXR on broad spectrum antibiotics, steroids now requiring comfort flow.

## 2019-07-19 NOTE — NURSING
NP at bedside. STAT Xray and ABG's ordered. Non rebreather applied. Pt O2 sats between %. Pt HOB at 90 degrees. Pt calming down. VSS. Will continue to monitor.   1120 Dr. Nava at bedside.

## 2019-07-19 NOTE — NURSING
Central line pulled per pt. Pressure dressing applied to site. Sutures removed by RN. Called E-ICU to notify MD. Awaiting orders for anesthesia consult for new line placement and restraint orders. No orders noted at this time. Will continue to monitor.

## 2019-07-19 NOTE — ASSESSMENT & PLAN NOTE
Plt 120 > 9 > 12 > 22.   -Transfused platelets with little response; likely immune  -Further management per Hem Onc with IVIG

## 2019-07-19 NOTE — PROGRESS NOTES
[facility] Medicine  Progress Note    Patient Name: Thierry Ramos Jr.  MRN: [id number]  Patient Class: IP- Inpatient   Admission Date: 7/15/2019  Length of Stay: 4 days  Attending Physician: Neli Chapman*  Primary Care Provider: AKILA Wooten MD        Subjective:     Principal Problem:Sepsis due to pneumonia      HPI:  85 y.o. male with PMHx of DM, CLL, HTN, HLD, CVA, carotid stenosis, hypothyroid, hypopituitarism, squamous cell carcinoma who presents after syncopal episode. Patient reports worsening muscle weakness. Patient and spouse reports patient started becoming weak in beginning of June/2019 and it progressively worsened.  Reports weakness is especially in the legs.  He also reports leg swelling which has been associated to starting Ibrutinib for CLL.  Patient follows with Dr. Trejo for CLL and last seen 7/8/19 for the weakness and was told to hold the Ibrutinib until his next follow up appointment.  Spouse reports patient fell twice last night with loss of consciousness after the second fall.   Denies any fever, SOB, nausea, vomiting, diarrhea, chest pain or abdominal pain. ED findings: WBC 12.75, H/H 12.3/38.9, Lactic Acid 3.0, ALK Phos 258, AST//9, chest x-ray showed mild pulmonary opacity at the left lung base may relate to pulmonary infiltrate/airspace disease, EKG no ischemic changes.  Patient admitted for further medical management.       Overview/Hospital Course:  WBC increased to 25. Platelets fell from 120 to 8, so H/O was consulted and patient was transfused 1 unit of platelets. Platelets increased only to 9. Hem onc gave IVIG and recommended no further transfusion except in life threatening bleed. Patient was oliguric with rising Cr, so nephrology was consulted. The next day Cr remained stable and UOP increased. ID was consulted, and recommended vanc, cefepime, and azithromycin and additional testing.     Interval History: Alert and disoriented,  had difficult night and is intermittently agitated.     Review of Systems   Unable to perform ROS: Mental status change     Objective:     Vital Signs (Most Recent):  Temp: 97.3 °F (36.3 °C) (07/18/19 1103)  Pulse: 79 (07/18/19 1200)  Resp: (!) 28 (07/18/19 1200)  BP: 100/60 (07/18/19 1103)  SpO2: 99 % (07/18/19 1200) Vital Signs (24h Range):  Temp:  [97.1 °F (36.2 °C)-97.6 °F (36.4 °C)] 97.3 °F (36.3 °C)  Pulse:  [] 79  Resp:  [20-47] 28  SpO2:  [89 %-100 %] 99 %  BP: ()/(45-72) 100/60  Arterial Line BP: ()/(34-62) 49/44     Weight: 77.2 kg (170 lb 3.1 oz)  Body mass index is 23.74 kg/m².    Intake/Output Summary (Last 24 hours) at 7/18/2019 1204  Last data filed at 7/18/2019 0700  Gross per 24 hour   Intake 1200 ml   Output 1365 ml   Net -165 ml      Physical Exam   Cardiovascular: Normal rate and regular rhythm.   Pulmonary/Chest: Effort normal and breath sounds normal.   Abdominal: Soft. Bowel sounds are normal. There is no tenderness.   Musculoskeletal: He exhibits edema.   Neurological: He is alert.   Skin:   Diffuse ecchymoses       Significant Labs: All pertinent labs within the past 24 hours have been reviewed.    Significant Imaging: I have reviewed all pertinent imaging results/findings within the past 24 hours.      Assessment/Plan:      * Sepsis due to pneumonia  Metabolic encephalopathy  Septic shock  Leukocytosis  Weakness  Chest X-ray showed left lung opacity, very mild.    -ID consult: Vanc, cefepime, and azithro, flagyl  -Blood Cultures: NGTD  -Albuterol-ipratropium neb treatments Q4HR  -Lactic Acid: 3.0 > 2.3 > 3.0 > 1.6, no longer needing pressors    Abnormal liver enzymes  Likely 2/2 shock liver in the setting of septic shock. Improving but will also check liver US    Central hypothyroidism  -Continue synthroid    CLL (chronic lymphocytic leukemia)  Immunosuppression  Edema of both legs  Follows with Dr. Trejo. Ibrutinib placed on hold by Dr. Trejo 2/2 to worsening weakness and  leg swelling.  -Consult hem/onc    Hypertension  SBP  102-167  -Required pressors, hold home meds for now    Immunosuppression  Noted      Mixed dyslipidemia  History of CVA (cerebrovascular accident)  Denies chest pain or SOB  -Continue statin    Panhypopituitarism  -Will order pulse dexamethasone    Immune thrombocytopenia  Plt 120 > 9 > 12 > 22.   -Transfused platelets with little response; likely immune  -Further management per Hem Onc with IVIG    Type 2 diabetes, controlled, with neuropathy  Hemoglobin A1c: 5.6  -POC glucose checks ACHS  -Low dose SSI PRN  -Hypoglycemia protocol PRN  -Diabetic Diet    LUZ (acute kidney injury)  Oliguria  Cr 1.0 > 1.7. UOP improving  -Nephrology consult    Thrombocytopenia, unspecified          VTE Risk Mitigation (From admission, onward)        Ordered     IP VTE HIGH RISK PATIENT  Once      07/15/19 1149          Critical care time spent on the evaluation and treatment of severe organ dysfunction, review of pertinent labs and imaging studies, discussions with consulting providers and discussions with patient/family: 45 minutes.      Gloria Frias PA-C  Department of Hospital Medicine   [facility]

## 2019-07-19 NOTE — PROGRESS NOTES
Progress Note  Nephrology      Consult Requested By: Neli Chapman*  Reason for Consult: ARF    SUBJECTIVE:     Review of Systems   Constitutional: Negative for chills and fever.   Respiratory: Negative for cough and shortness of breath.    Cardiovascular: Negative for chest pain and leg swelling.   Gastrointestinal: Negative for nausea.     Patient Active Problem List   Diagnosis    Mixed dyslipidemia    Hypertension    CLL (chronic lymphocytic leukemia)    Panhypopituitarism    Hyperuricemia    Prostate cancer screening    Neuropathy    Immune thrombocytopenia    LUZ (acute kidney injury)    History of CVA (cerebrovascular accident)    Central hypothyroidism    Chronic rhinosinusitis    Stroke-like episode    Polyneuropathy    Occlusion and stenosis of left carotid artery    Hypogammaglobulinemia    Squamous cell carcinoma of forehead    Immunosuppression    Squamous cell carcinoma of skin of face    Squamous cell carcinoma of skin of right temple    Type 2 diabetes, controlled, with neuropathy    Epiretinal membrane (ERM) of right eye    Weakness    Shortness of breath    Secondary adrenal insufficiency    Secondary male hypogonadism    Weakness    Edema of both legs    Hyperkalemia    Dehydration    Sepsis due to pneumonia    Altered mental status    Leukocytosis    Thrombocytopenia, unspecified    Abnormal liver enzymes    Oliguria    Palliative care encounter    Goals of care, counseling/discussion    Pulmonary edema       OBJECTIVE:     Medications:   albuterol-ipratropium  3 mL Nebulization Q4H WAKE    artificial tears  1 drop Both Eyes BID    atorvastatin  20 mg Oral Daily    ceFEPime (MAXIPIME) IVPB  2 g Intravenous Daily    cetirizine  10 mg Oral Daily    cyanocobalamin  500 mcg Oral Daily    dexAMETHasone  40 mg Oral Q24H    erythromycin   Both Eyes QHS    furosemide  80 mg Intravenous TID    levothyroxine  125 mcg Oral Before breakfast     metronidazole  500 mg Intravenous Q8H    pantoprazole  40 mg Oral Daily    [START ON 7/20/2019] predniSONE  7.5 mg Oral Daily    QUEtiapine  25 mg Oral Once    sodium citrate-citric acid 500-334 mg/5 ml  15 mL Oral TID    vancomycin (VANCOCIN) IVPB  1,250 mg Intravenous Q24H      dilTIAZem Stopped (07/19/19 0140)    norepinephrine bitartrate-D5W Stopped (07/17/19 0800)     Vitals:    07/19/19 1700   BP: (!) 118/58   Pulse: 78   Resp: (!) 32   Temp:      I/O last 3 completed shifts:  In: 1790 [P.O.:580; I.V.:11; Blood:249; IV Piggyback:950]  Out: 2465 [Urine:2465]  Physical Exam   Constitutional: He is oriented to person, place, and time. He appears well-developed and well-nourished. No distress.   HENT:   Head: Normocephalic and atraumatic.   Eyes: EOM are normal. No scleral icterus.   Neck: Neck supple. No JVD present.   Cardiovascular: Regular rhythm and intact distal pulses. Exam reveals no friction rub.   No murmur heard.  Pulmonary/Chest: Effort normal. No respiratory distress. He has decreased breath sounds. He has no wheezes. He has no rales.   Abdominal: Soft. Bowel sounds are normal. He exhibits no distension. There is no tenderness.   Musculoskeletal: He exhibits edema (1+ only).   Neurological: He is alert and oriented to person, place, and time.   Skin: Skin is warm and dry. No rash noted. He is not diaphoretic. No erythema.   Psychiatric: He has a normal mood and affect.     Laboratory:  Recent Labs   Lab 07/17/19  0400 07/18/19  0515 07/18/19  2146 07/19/19  0703   WBC 12.54 21.17*  --  20.50*   HGB 7.9* 7.3* 7.8* 9.1*   HCT 23.6* 22.1*  --  27.2*   PLT 12* 22*  --  24*   MONO 2.0*  Test Not Performed 1.0*  CANCELED  --  5.0  CANCELED     Recent Labs   Lab 07/17/19  0400 07/18/19  0515 07/19/19  0634   * 136 135*   K 4.0 4.5 4.3    109 108   CO2 18* 12* 17*   BUN 38* 47* 61*   CREATININE 1.6* 1.5* 1.4   CALCIUM 7.9* 8.4* 8.4*   PHOS 3.4 4.2 4.7*     Labs reviewed  Diagnostic  Results:  X-Ray: Reviewed  US: Reviewed  Echo: Reviewed      ASSESSMENT/PLAN:      LUZ on CKD 2 Baseline creatinine- 1.1  ATN from hypoperfusion   improving,          Avoid nephrotoxins, dose all meds ad for GFr < 10  No indication for urgent RRT. Monitor closely. Strict ins/outs, daily weight   acidosis  - start bicitra      will sign off  Thank you for allowing me to participate in the care of your patients  With any question please call [phone]  Ananya Escobar    [facility]  FARHEEN Guadarrama MD, JEFF MCCORMACK MD,   MD RACHEL Cruz, NP  [address]  [place], [address] [phone]

## 2019-07-19 NOTE — PROGRESS NOTES
[facility] Hospital Medicine  Progress Note    Patient Name: Thierry Ramos Jr.  MRN: [id number]  Patient Class: IP- Inpatient   Admission Date: 7/15/2019  Length of Stay: 4 days  Attending Physician: Neli Chapman*  Primary Care Provider: AKILA Wooten MD        Subjective:     Principal Problem:Sepsis due to pneumonia      HPI:  85 y.o. male with PMHx of DM, CLL, HTN, HLD, CVA, carotid stenosis, hypothyroid, hypopituitarism, squamous cell carcinoma who presents after syncopal episode. Patient reports worsening muscle weakness. Patient and spouse reports patient started becoming weak in beginning of June/2019 and it progressively worsened.  Reports weakness is especially in the legs.  He also reports leg swelling which has been associated to starting Ibrutinib for CLL.  Patient follows with Dr. Trejo for CLL and last seen 7/8/19 for the weakness and was told to hold the Ibrutinib until his next follow up appointment.  Spouse reports patient fell twice last night with loss of consciousness after the second fall.   Denies any fever, SOB, nausea, vomiting, diarrhea, chest pain or abdominal pain. ED findings: WBC 12.75, H/H 12.3/38.9, Lactic Acid 3.0, ALK Phos 258, AST//9, chest x-ray showed mild pulmonary opacity at the left lung base may relate to pulmonary infiltrate/airspace disease, EKG no ischemic changes.  Patient admitted for further medical management.       Overview/Hospital Course:  WBC increased to 25. Platelets fell from 120 to 8, so H/O was consulted and patient was transfused 1 unit of platelets. Platelets increased only to 9. Hem onc gave IVIG and recommended no further transfusion except in life threatening bleed. Patient was oliguric with rising Cr, so nephrology was consulted. The next day Cr remained stable and UOP increased. ID was consulted, and recommended vanc, cefepime, and azithromycin, and flagyl. Abdominal US was unremarkable. Pt was transfused 2 units  PRBC on 7/18 after he pulled his art line and started bleeding. Had episode of afib RVR, worsening hypoxia, and hypontension late 7/18 which resolved. CXR was repeated on 7/19.     Interval History: Pt alert, joking, oriented to situation. Says he is feeling much better and remembers being agitated overnight. Requests restraint removal.     Review of Systems   Respiratory: Positive for cough and shortness of breath.    Cardiovascular: Negative for chest pain and leg swelling.   Gastrointestinal: Negative for abdominal distention and abdominal pain.   Genitourinary: Negative for decreased urine volume and difficulty urinating.   Psychiatric/Behavioral: Positive for agitation, behavioral problems and confusion.     Objective:     Vital Signs (Most Recent):  Temp: 96.8 °F (36 °C) (07/19/19 0700)  Pulse: 81 (07/19/19 0900)  Resp: (!) 28 (07/19/19 0900)  BP: (!) 150/71 (07/19/19 0900)  SpO2: 97 % (07/19/19 0900) Vital Signs (24h Range):  Temp:  [96.7 °F (35.9 °C)-97.9 °F (36.6 °C)] 96.8 °F (36 °C)  Pulse:  [] 81  Resp:  [22-57] 28  SpO2:  [87 %-100 %] 97 %  BP: (100-159)/(55-84) 150/71  Arterial Line BP: (155)/(62) 155/62     Weight: 76 kg (167 lb 8.8 oz)  Body mass index is 23.37 kg/m².    Intake/Output Summary (Last 24 hours) at 7/19/2019 1015  Last data filed at 7/19/2019 0900  Gross per 24 hour   Intake 1340 ml   Output 1985 ml   Net -645 ml      Physical Exam   Cardiovascular: Normal rate and regular rhythm.   Pulmonary/Chest: Effort normal. He has rales.   Coarse breath sounds throughout   Musculoskeletal: He exhibits no edema.   Neurological: He is alert.   Skin: Skin is warm and dry.   Ecchymoses throughout  Serous drainage on sheets and dressing       Significant Labs:   BMP:   Recent Labs   Lab 07/19/19  0634   *   *   K 4.3      CO2 17*   BUN 61*   CREATININE 1.4   CALCIUM 8.4*   MG 2.1     CBC:   Recent Labs   Lab 07/18/19  0515 07/18/19  2146 07/19/19  0703   WBC 21.17*  --  20.50*    HGB 7.3* 7.8* 9.1*   HCT 22.1*  --  27.2*   PLT 22*  --  24*       Significant Imaging: I have reviewed all pertinent imaging results/findings within the past 24 hours.      Assessment/Plan:      * Sepsis due to pneumonia  Metabolic encephalopathy  Septic shock  Leukocytosis  Weakness  Chest X-ray showed left lung opacity, very mild.    -ID consult: Vanc, cefepime, and azithro (complete), flagyl  -Blood Cultures: NGTD  -Albuterol-ipratropium neb treatments Q4HR  -Lactic Acid: 3.0 > 1.6, no longer needing pressors  -Abdominal US: unremarkable  -Leukocytosis likely compounded by high dose steroid    Abnormal liver enzymes  Likely 2/2 shock liver in the setting of septic shock. Improving but will also check liver US, which was unremarkable.     Central hypothyroidism  -Continue synthroid    CLL (chronic lymphocytic leukemia)  Immunosuppression  Edema of both legs  Follows with Dr. Trejo. Ibrutinib placed on hold by Dr. Trejo 2/2 to worsening weakness and leg swelling.  -Consult hem/onc, appreciate recs    Hypertension  -159  -Required pressors, hold home meds for now    Immunosuppression  Noted      Mixed dyslipidemia  History of CVA (cerebrovascular accident)  Denies chest pain or SOB  -Continue statin    Panhypopituitarism  -Will order pulse dexamethasone    Immune thrombocytopenia  Plt 120 > 9 > 12 > 22.   -Transfused platelets with little response; likely immune  -Further management per Hem Onc with IVIG    Type 2 diabetes, controlled, with neuropathy  Hemoglobin A1c: 5.6  -POC glucose checks ACHS  -Low dose SSI PRN  -Hypoglycemia protocol PRN  -Diabetic Diet    LUZ (acute kidney injury)  Oliguria  Cr 1.0 > 1.7. UOP improving  -Nephrology consult    Pulmonary edema  Afib with RVR  Compounded by recent blood transfusion  -Check echo  -Continue lasix    Thrombocytopenia, unspecified        VTE Risk Mitigation (From admission, onward)        Ordered     IP VTE HIGH RISK PATIENT  Once      07/15/19 0885           Critical care time spent on the evaluation and treatment of severe organ dysfunction, review of pertinent labs and imaging studies, discussions with consulting providers and discussions with patient/family: 55 minutes.      Gloria Frias PA-C  Department of Hospital Medicine   [facility]

## 2019-07-19 NOTE — SUBJECTIVE & OBJECTIVE
Interval History: Pt alert, joking, oriented to situation. Says he is feeling much better and remembers being agitated overnight. Requests restraint removal.     Review of Systems   Respiratory: Positive for cough and shortness of breath.    Cardiovascular: Negative for chest pain and leg swelling.   Gastrointestinal: Negative for abdominal distention and abdominal pain.   Genitourinary: Negative for decreased urine volume and difficulty urinating.   Psychiatric/Behavioral: Positive for agitation, behavioral problems and confusion.     Objective:     Vital Signs (Most Recent):  Temp: 96.8 °F (36 °C) (07/19/19 0700)  Pulse: 81 (07/19/19 0900)  Resp: (!) 28 (07/19/19 0900)  BP: (!) 150/71 (07/19/19 0900)  SpO2: 97 % (07/19/19 0900) Vital Signs (24h Range):  Temp:  [96.7 °F (35.9 °C)-97.9 °F (36.6 °C)] 96.8 °F (36 °C)  Pulse:  [] 81  Resp:  [22-57] 28  SpO2:  [87 %-100 %] 97 %  BP: (100-159)/(55-84) 150/71  Arterial Line BP: (155)/(62) 155/62     Weight: 76 kg (167 lb 8.8 oz)  Body mass index is 23.37 kg/m².    Intake/Output Summary (Last 24 hours) at 7/19/2019 1015  Last data filed at 7/19/2019 0900  Gross per 24 hour   Intake 1340 ml   Output 1985 ml   Net -645 ml      Physical Exam   Cardiovascular: Normal rate and regular rhythm.   Pulmonary/Chest: Effort normal. He has rales.   Coarse breath sounds throughout   Musculoskeletal: He exhibits no edema.   Neurological: He is alert.   Skin: Skin is warm and dry.   Ecchymoses throughout  Serous drainage on sheets and dressing       Significant Labs:   BMP:   Recent Labs   Lab 07/19/19  0634   *   *   K 4.3      CO2 17*   BUN 61*   CREATININE 1.4   CALCIUM 8.4*   MG 2.1     CBC:   Recent Labs   Lab 07/18/19  0515 07/18/19  2146 07/19/19  0703   WBC 21.17*  --  20.50*   HGB 7.3* 7.8* 9.1*   HCT 22.1*  --  27.2*   PLT 22*  --  24*       Significant Imaging: I have reviewed all pertinent imaging results/findings within the past 24 hours.

## 2019-07-19 NOTE — PT/OT/SLP PROGRESS
Physical Therapy Treatment    Patient Name:  Thierry Ramos Jr.   MRN:  [id number]    Recommendations:     Discharge Recommendations:  (TBD)   Discharge Equipment Recommendations: (TBD)   Barriers to discharge: Significant decline in functional status; increased physical burden on caregiver    Assessment:     Thierry Ramos Jr. is a 85 y.o. male admitted with a medical diagnosis of Sepsis due to pneumonia.  He presents with the following impairments/functional limitations:  weakness, impaired endurance, gait instability, impaired functional mobilty, impaired self care skills, impaired balance, decreased coordination, decreased upper extremity function, decreased lower extremity function, impaired skin, edema, impaired cardiopulmonary response to activity Patient with decreased tolerance to therapy; rolling in bed but unable to transition to EOB sitting 2/2 fatigue, weakness, SOB; positioned bed in chair position; vitals stable; cont with therapy to pt tolerance..    Rehab Prognosis: Fair; patient would benefit from acute skilled PT services to address these deficits and reach maximum level of function.    Recent Surgery: * No surgery found *      Plan:     During this hospitalization, patient to be seen 6 x/week to address the identified rehab impairments via gait training, therapeutic activities, therapeutic exercises, neuromuscular re-education, wheelchair management/training and progress toward the following goals:    · Plan of Care Expires:  08/17/19    Subjective     Pain/Comfort:  · Pain Rating 1: 0/10  · Pain Rating Post-Intervention 1: 0/10      Objective:     Communicated with nurse prior to session.  Patient found with michel catheter, peripheral IV, SCD(multiple ICU lines and monitoring) upon PT entry to room.     General Precautions: Standard, fall   Orthopedic Precautions:N/A   Braces: N/A     Functional Mobility:  · Bed Mobility:     · Rolling Left:  moderate assistance and use of side rail  · Rolling  Right: moderate assistance and use of side rail  · Scooting: total assistance, of toward HOB in supine; SBA for pt with low bridge and shift laterally toward L side    AM-PAC 6 CLICK MOBILITY  Turning over in bed (including adjusting bedclothes, sheets and blankets)?: 2  Sitting down on and standing up from a chair with arms (e.g., wheelchair, bedside commode, etc.): 1  Moving from lying on back to sitting on the side of the bed?: 1  Moving to and from a bed to a chair (including a wheelchair)?: 1  Need to walk in hospital room?: 1  Climbing 3-5 steps with a railing?: 1  Basic Mobility Total Score: 7       Therapeutic Activities and Exercises:   Patient found with gown and pads wet; RN notified and michel checked; pt rolled R/L with modA with use of side rail to change linens and cleanse pt; pt able to bridge and shift laterally toward L side; pt rolled L with increasing RR- unable to effectively purse lip breathe- then declined to sit 2/2 SOB, fatigue, weakness; pt rolled to back and with pt assisting with LEs, transferred to HOB with drawsheet and total A x 2; bed then placed in chair position.    Patient left with bed in chair position with all lines intact, call button in reach, nurse notified and wife present..    GOALS:   Multidisciplinary Problems     Physical Therapy Goals        Problem: Physical Therapy Goal    Goal Priority Disciplines Outcome Goal Variances Interventions   Physical Therapy Goal     PT, PT/OT Ongoing (interventions implemented as appropriate)     Description:  Goals to be met by: 8/15/2019     Patient will increase functional independence with mobility by performin. Supine to sit with Modified [place]  2. Sit to stand transfer with Modified [place]  3. Bed to chair transfer with Modified [place] using Rolling Walker  4. Gait  x 20 feet with Modified [place] using Rolling Walker.              Problem: Physical Therapy Goal    Goal Priority Disciplines Outcome Goal  Variances Interventions   Physical Therapy Goal     PT, PT/OT                      Time Tracking:     PT Received On: 07/19/19  PT Start Time: 1152     PT Stop Time: 1231  PT Total Time (min): 39 min with OT    Billable Minutes: Therapeutic Activity 23 minutes    Treatment Type: Treatment  PT/PTA: PT     PTA Visit Number: 0     Eula Clements, PT  07/19/2019

## 2019-07-19 NOTE — ASSESSMENT & PLAN NOTE
Likely 2/2 shock liver in the setting of septic shock. Improving but will also check liver US, which was unremarkable.

## 2019-07-19 NOTE — PROGRESS NOTES
"[facility]  Hematology/Oncology  Progress Note    Patient Name: Thierry Ramos Jr.  Admission Date: 7/15/2019  Hospital Length of Stay: 4 days  Code Status: Full Code     Subjective:     HPI:  85 year-old male with chronic lymphocytic leukemia, most recently on ibrutinib but held for the past week, was admitted on 7/15/19 for syncope and fall x 2. He is currently in the ICU being treated for possible sepsis with hypotension. Platelet count had decreased over the past 24 hours from 120 k/uL to < 10 k/uL. 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     Interval History:   - he is less confused today. Platelet count today is 24 k/uL. He is scheduled for his fourth day of dexamethasone today.    Oncology Treatment Plan:   [No treatment plan]    Medications:  Continuous Infusions:   dilTIAZem Stopped (07/19/19 0140)    norepinephrine bitartrate-D5W Stopped (07/17/19 0800)     Scheduled Meds:   albuterol-ipratropium  3 mL Nebulization Q4H WAKE    artificial tears  1 drop Both Eyes BID    atorvastatin  20 mg Oral Daily    ceFEPime (MAXIPIME) IVPB  2 g Intravenous Daily    cetirizine  10 mg Oral Daily    cyanocobalamin  500 mcg Oral Daily    dexAMETHasone  40 mg Oral Q24H    erythromycin   Both Eyes QHS    furosemide  40 mg Intravenous BID    levothyroxine  125 mcg Oral Before breakfast    metronidazole  500 mg Intravenous Q8H    pantoprazole  40 mg Oral Daily    [START ON 7/20/2019] predniSONE  7.5 mg Oral Daily    QUEtiapine  25 mg Oral Once    sodium citrate-citric acid 500-334 mg/5 ml  15 mL Oral TID    vancomycin (VANCOCIN) IVPB  1,250 mg Intravenous Q24H     PRN Meds:sodium chloride, sodium chloride, sodium chloride, sodium chloride, acetaminophen, benzonatate, Dextrose 10% Bolus, Dextrose 10% Bolus, diphenhydrAMINE, glucagon (human " recombinant), glucose, glucose, insulin aspart U-100, ondansetron, sodium chloride 0.9%     Review of Systems   Constitutional: Positive for fatigue.   HENT: Negative for sore throat.    Eyes: Negative for visual disturbance.   Respiratory: Negative for shortness of breath.    Cardiovascular: Positive for leg swelling. Negative for chest pain.   Gastrointestinal: Negative for abdominal pain.   Genitourinary: Negative for dysuria.   Musculoskeletal: Negative for back pain.   Skin: Negative for rash.   Neurological: Positive for weakness.   Psychiatric/Behavioral: Positive for confusion (improved). The patient is not nervous/anxious.      Objective:     Vital Signs (Most Recent):  Temp: 96.8 °F (36 °C) (07/19/19 0700)  Pulse: 76 (07/19/19 1200)  Resp: (!) 32 (07/19/19 1200)  BP: (!) 146/69 (07/19/19 1200)  SpO2: 96 % (07/19/19 1200) Vital Signs (24h Range):  Temp:  [96.7 °F (35.9 °C)-97.9 °F (36.6 °C)] 96.8 °F (36 °C)  Pulse:  [] 76  Resp:  [22-57] 32  SpO2:  [87 %-100 %] 96 %  BP: (101-159)/(55-84) 146/69     Weight: 76 kg (167 lb 8.8 oz)  Body mass index is 23.37 kg/m².  Body surface area is 1.95 meters squared.      Intake/Output Summary (Last 24 hours) at 7/19/2019 1417  Last data filed at 7/19/2019 1300  Gross per 24 hour   Intake 1440 ml   Output 2125 ml   Net -685 ml       Physical Exam   Constitutional: He is oriented to person, place, and time. He appears well-developed.   Fatigued   HENT:   Head: Normocephalic and atraumatic.   Eyes: Pupils are equal, round, and reactive to light. EOM are normal.   Neck: Normal range of motion. Neck supple.   Cardiovascular: Normal rate and regular rhythm.   Pulmonary/Chest: Effort normal and breath sounds normal.   Abdominal: Soft. Bowel sounds are normal.   Neurological: He is alert and oriented to person, place, and time.   Skin: Skin is warm and dry.   Ecchymoses throughout body   Psychiatric: He has a normal mood and affect. His behavior is normal. Judgment and  thought content normal.   Nursing note and vitals reviewed.      Significant Labs:   Labs have been reviewed.    Lab Results   Component Value Date    WBC 20.50 (H) 07/19/2019    HGB 9.1 (L) 07/19/2019    HCT 27.2 (L) 07/19/2019    MCV 88 07/19/2019    PLT 24 (LL) 07/19/2019     Assessment/Plan:     Immune thrombocytopenia  - I reviewed the peripheral smear. Rare enlarged platelets are noted.  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  - I spoke to his hematologist Dr. Marr on 7/16/19. She recommended pulse dexamethasone, so I placed an order for dexamethasone 40mg PO daily x 4 days.  - he has received a dose of IV immune globulin as well on 7/16/19.  - platelet count today is 24 k/uL. He received his fourth dose of dexamethasone today.  - follow up platelet count.         Braxton El MD  Hematology/Oncology  [facility]

## 2019-07-20 LAB
ALBUMIN SERPL BCP-MCNC: 2.4 G/DL (ref 3.5–5.2)
ALP SERPL-CCNC: 164 U/L (ref 55–135)
ALT SERPL W/O P-5'-P-CCNC: 39 U/L (ref 10–44)
ANION GAP SERPL CALC-SCNC: 11 MMOL/L (ref 8–16)
AST SERPL-CCNC: 65 U/L (ref 10–40)
BACTERIA BLD CULT: NORMAL
BACTERIA BLD CULT: NORMAL
BACTERIA SPEC AEROBE CULT: NORMAL
BASOPHILS # BLD AUTO: 0.06 K/UL (ref 0–0.2)
BASOPHILS NFR BLD: 0.4 % (ref 0–1.9)
BILIRUB SERPL-MCNC: 1.4 MG/DL (ref 0.1–1)
BUN SERPL-MCNC: 71 MG/DL (ref 8–23)
CALCIUM SERPL-MCNC: 8.5 MG/DL (ref 8.7–10.5)
CHLORIDE SERPL-SCNC: 108 MMOL/L (ref 95–110)
CO2 SERPL-SCNC: 19 MMOL/L (ref 23–29)
CREAT SERPL-MCNC: 1.5 MG/DL (ref 0.5–1.4)
DIFFERENTIAL METHOD: ABNORMAL
EOSINOPHIL # BLD AUTO: 0 K/UL (ref 0–0.5)
EOSINOPHIL NFR BLD: 0 % (ref 0–8)
ERYTHROCYTE [DISTWIDTH] IN BLOOD BY AUTOMATED COUNT: 15.9 % (ref 11.5–14.5)
EST. GFR  (AFRICAN AMERICAN): 48 ML/MIN/1.73 M^2
EST. GFR  (NON AFRICAN AMERICAN): 42 ML/MIN/1.73 M^2
GLUCOSE SERPL-MCNC: 230 MG/DL (ref 70–110)
GRAM STN SPEC: NORMAL
GRAM STN SPEC: NORMAL
HCT VFR BLD AUTO: 26.6 % (ref 40–54)
HGB BLD-MCNC: 9 G/DL (ref 14–18)
LYMPHOCYTES # BLD AUTO: 5.3 K/UL (ref 1–4.8)
LYMPHOCYTES NFR BLD: 37.9 % (ref 18–48)
MAGNESIUM SERPL-MCNC: 2.2 MG/DL (ref 1.6–2.6)
MCH RBC QN AUTO: 29.5 PG (ref 27–31)
MCHC RBC AUTO-ENTMCNC: 33.8 G/DL (ref 32–36)
MCV RBC AUTO: 87 FL (ref 82–98)
MONOCYTES # BLD AUTO: 0.3 K/UL (ref 0.3–1)
MONOCYTES NFR BLD: 2.4 % (ref 4–15)
NEUTROPHILS # BLD AUTO: 8 K/UL (ref 1.8–7.7)
NEUTROPHILS NFR BLD: 59.3 % (ref 38–73)
PHOSPHATE SERPL-MCNC: 4.9 MG/DL (ref 2.7–4.5)
PLATELET # BLD AUTO: 32 K/UL (ref 150–350)
PLATELET BLD QL SMEAR: ABNORMAL
PMV BLD AUTO: ABNORMAL FL (ref 9.2–12.9)
POCT GLUCOSE: 233 MG/DL (ref 70–110)
POCT GLUCOSE: 245 MG/DL (ref 70–110)
POCT GLUCOSE: 274 MG/DL (ref 70–110)
POCT GLUCOSE: 297 MG/DL (ref 70–110)
POTASSIUM SERPL-SCNC: 3.9 MMOL/L (ref 3.5–5.1)
PROT SERPL-MCNC: 5.4 G/DL (ref 6–8.4)
RBC # BLD AUTO: 3.05 M/UL (ref 4.6–6.2)
SODIUM SERPL-SCNC: 138 MMOL/L (ref 136–145)
WBC # BLD AUTO: 14.05 K/UL (ref 3.9–12.7)

## 2019-07-20 PROCEDURE — 63600175 PHARM REV CODE 636 W HCPCS: Mod: HCNC | Performed by: FAMILY MEDICINE

## 2019-07-20 PROCEDURE — 84100 ASSAY OF PHOSPHORUS: CPT | Mod: HCNC

## 2019-07-20 PROCEDURE — 83735 ASSAY OF MAGNESIUM: CPT | Mod: HCNC

## 2019-07-20 PROCEDURE — 36415 COLL VENOUS BLD VENIPUNCTURE: CPT | Mod: HCNC

## 2019-07-20 PROCEDURE — 87070 CULTURE OTHR SPECIMN AEROBIC: CPT | Mod: HCNC

## 2019-07-20 PROCEDURE — 99233 SBSQ HOSP IP/OBS HIGH 50: CPT | Mod: HCNC,,, | Performed by: INTERNAL MEDICINE

## 2019-07-20 PROCEDURE — 25000003 PHARM REV CODE 250: Mod: HCNC | Performed by: FAMILY MEDICINE

## 2019-07-20 PROCEDURE — 99233 PR SUBSEQUENT HOSPITAL CARE,LEVL III: ICD-10-PCS | Mod: HCNC,,, | Performed by: INTERNAL MEDICINE

## 2019-07-20 PROCEDURE — 63600175 PHARM REV CODE 636 W HCPCS: Mod: HCNC | Performed by: INTERNAL MEDICINE

## 2019-07-20 PROCEDURE — 94761 N-INVAS EAR/PLS OXIMETRY MLT: CPT | Mod: HCNC

## 2019-07-20 PROCEDURE — S0030 INJECTION, METRONIDAZOLE: HCPCS | Mod: HCNC | Performed by: INTERNAL MEDICINE

## 2019-07-20 PROCEDURE — 80053 COMPREHEN METABOLIC PANEL: CPT | Mod: HCNC

## 2019-07-20 PROCEDURE — 27100092 HC HIGH FLOW DELIVERY CANNULA: Mod: HCNC

## 2019-07-20 PROCEDURE — 87449 NOS EACH ORGANISM AG IA: CPT | Mod: HCNC

## 2019-07-20 PROCEDURE — 25000003 PHARM REV CODE 250: Mod: HCNC | Performed by: NURSE PRACTITIONER

## 2019-07-20 PROCEDURE — 25000003 PHARM REV CODE 250: Mod: HCNC | Performed by: INTERNAL MEDICINE

## 2019-07-20 PROCEDURE — 27100171 HC OXYGEN HIGH FLOW UP TO 24 HOURS: Mod: HCNC

## 2019-07-20 PROCEDURE — 63600175 PHARM REV CODE 636 W HCPCS: Mod: HCNC | Performed by: PHYSICIAN ASSISTANT

## 2019-07-20 PROCEDURE — 85025 COMPLETE CBC W/AUTO DIFF WBC: CPT | Mod: HCNC

## 2019-07-20 PROCEDURE — 25000242 PHARM REV CODE 250 ALT 637 W/ HCPCS: Mod: HCNC | Performed by: NURSE PRACTITIONER

## 2019-07-20 PROCEDURE — 20000000 HC ICU ROOM: Mod: HCNC

## 2019-07-20 PROCEDURE — 94640 AIRWAY INHALATION TREATMENT: CPT | Mod: HCNC

## 2019-07-20 PROCEDURE — 25000003 PHARM REV CODE 250: Mod: HCNC | Performed by: HOSPITALIST

## 2019-07-20 PROCEDURE — 87205 SMEAR GRAM STAIN: CPT | Mod: HCNC

## 2019-07-20 RX ORDER — FUROSEMIDE 10 MG/ML
40 INJECTION INTRAMUSCULAR; INTRAVENOUS 2 TIMES DAILY
Status: DISCONTINUED | OUTPATIENT
Start: 2019-07-20 | End: 2019-07-21

## 2019-07-20 RX ORDER — PREDNISONE 20 MG/1
20 TABLET ORAL DAILY
Status: DISCONTINUED | OUTPATIENT
Start: 2019-07-21 | End: 2019-07-24

## 2019-07-20 RX ORDER — PREDNISONE 10 MG/1
10 TABLET ORAL ONCE
Status: COMPLETED | OUTPATIENT
Start: 2019-07-20 | End: 2019-07-20

## 2019-07-20 RX ORDER — RAMELTEON 8 MG/1
8 TABLET ORAL NIGHTLY PRN
Status: DISCONTINUED | OUTPATIENT
Start: 2019-07-20 | End: 2019-07-25 | Stop reason: HOSPADM

## 2019-07-20 RX ADMIN — PREDNISONE 7.5 MG: 2.5 TABLET ORAL at 08:07

## 2019-07-20 RX ADMIN — ERYTHROMYCIN: 5 OINTMENT OPHTHALMIC at 09:07

## 2019-07-20 RX ADMIN — SODIUM CITRATE AND CITRIC ACID MONOHYDRATE 15 ML: 500; 334 SOLUTION ORAL at 08:07

## 2019-07-20 RX ADMIN — FUROSEMIDE 40 MG: 10 INJECTION, SOLUTION INTRAVENOUS at 05:07

## 2019-07-20 RX ADMIN — IPRATROPIUM BROMIDE AND ALBUTEROL SULFATE 3 ML: .5; 3 SOLUTION RESPIRATORY (INHALATION) at 12:07

## 2019-07-20 RX ADMIN — BENZONATATE 100 MG: 100 CAPSULE ORAL at 12:07

## 2019-07-20 RX ADMIN — METRONIDAZOLE 500 MG: 500 INJECTION, SOLUTION INTRAVENOUS at 11:07

## 2019-07-20 RX ADMIN — CEFEPIME HYDROCHLORIDE 2 G: 2 INJECTION, POWDER, FOR SOLUTION INTRAVENOUS at 08:07

## 2019-07-20 RX ADMIN — PREDNISONE 10 MG: 10 TABLET ORAL at 12:07

## 2019-07-20 RX ADMIN — SODIUM CITRATE AND CITRIC ACID MONOHYDRATE 15 ML: 500; 334 SOLUTION ORAL at 03:07

## 2019-07-20 RX ADMIN — PANTOPRAZOLE SODIUM 40 MG: 40 TABLET, DELAYED RELEASE ORAL at 08:07

## 2019-07-20 RX ADMIN — IPRATROPIUM BROMIDE AND ALBUTEROL SULFATE 3 ML: .5; 3 SOLUTION RESPIRATORY (INHALATION) at 04:07

## 2019-07-20 RX ADMIN — CETIRIZINE HYDROCHLORIDE 10 MG: 10 TABLET, FILM COATED ORAL at 08:07

## 2019-07-20 RX ADMIN — HYPROMELLOSE 2910 1 DROP: 5 SOLUTION OPHTHALMIC at 09:07

## 2019-07-20 RX ADMIN — INSULIN ASPART 3 UNITS: 100 INJECTION, SOLUTION INTRAVENOUS; SUBCUTANEOUS at 04:07

## 2019-07-20 RX ADMIN — HYPROMELLOSE 2910 1 DROP: 5 SOLUTION OPHTHALMIC at 08:07

## 2019-07-20 RX ADMIN — BENZONATATE 100 MG: 100 CAPSULE ORAL at 07:07

## 2019-07-20 RX ADMIN — SODIUM CITRATE AND CITRIC ACID MONOHYDRATE 15 ML: 500; 334 SOLUTION ORAL at 09:07

## 2019-07-20 RX ADMIN — METRONIDAZOLE 500 MG: 500 INJECTION, SOLUTION INTRAVENOUS at 05:07

## 2019-07-20 RX ADMIN — METRONIDAZOLE 500 MG: 500 INJECTION, SOLUTION INTRAVENOUS at 03:07

## 2019-07-20 RX ADMIN — INSULIN ASPART 3 UNITS: 100 INJECTION, SOLUTION INTRAVENOUS; SUBCUTANEOUS at 11:07

## 2019-07-20 RX ADMIN — IPRATROPIUM BROMIDE AND ALBUTEROL SULFATE 3 ML: .5; 3 SOLUTION RESPIRATORY (INHALATION) at 07:07

## 2019-07-20 RX ADMIN — ATORVASTATIN CALCIUM 20 MG: 20 TABLET, FILM COATED ORAL at 08:07

## 2019-07-20 RX ADMIN — FUROSEMIDE 80 MG: 10 INJECTION, SOLUTION INTRAVENOUS at 08:07

## 2019-07-20 RX ADMIN — Medication 500 MCG: at 08:07

## 2019-07-20 RX ADMIN — INSULIN ASPART 3 UNITS: 100 INJECTION, SOLUTION INTRAVENOUS; SUBCUTANEOUS at 09:07

## 2019-07-20 RX ADMIN — VANCOMYCIN HYDROCHLORIDE 1250 MG: 1.25 INJECTION, POWDER, LYOPHILIZED, FOR SOLUTION INTRAVENOUS at 11:07

## 2019-07-20 RX ADMIN — RAMELTEON 8 MG: 8 TABLET, FILM COATED ORAL at 09:07

## 2019-07-20 RX ADMIN — INSULIN ASPART 2 UNITS: 100 INJECTION, SOLUTION INTRAVENOUS; SUBCUTANEOUS at 06:07

## 2019-07-20 RX ADMIN — LEVOTHYROXINE SODIUM 125 MCG: 25 TABLET ORAL at 06:07

## 2019-07-20 NOTE — CONSULTS
Today`s Date: 7/20/2019     Admit Date: 7/15/2019    Admitting Physician: Neli Chapman*    Patient`s Name: Thierry Ramos Jr. , 85 y.o. male    Reason for consultation  Wound care   Patient Active Problem List:     Mixed dyslipidemia     Hypertension     CLL (chronic lymphocytic leukemia)     Panhypopituitarism     Hyperuricemia     Prostate cancer screening     Neuropathy     Immune thrombocytopenia     LUZ (acute kidney injury)     History of CVA (cerebrovascular accident)     Central hypothyroidism     Chronic rhinosinusitis     Stroke-like episode     Polyneuropathy     Occlusion and stenosis of left carotid artery     Hypogammaglobulinemia     Squamous cell carcinoma of forehead     Immunosuppression     Squamous cell carcinoma of skin of face     Squamous cell carcinoma of skin of right temple     Type 2 diabetes, controlled, with neuropathy     Epiretinal membrane (ERM) of right eye     Weakness     Shortness of breath     Secondary adrenal insufficiency     Secondary male hypogonadism     Weakness     Edema of both legs     Hyperkalemia     Dehydration     Sepsis due to pneumonia     Altered mental status     Leukocytosis     Thrombocytopenia, unspecified     Abnormal liver enzymes     Oliguria     Palliative care encounter     Goals of care, counseling/discussion     Pulmonary edema      Past Medical History:  No date: Actinic keratosis  No date: Anemia  No date: Basal cell carcinoma  No date: Carotid stenosis  No date: CLL (chronic lymphocytic leukemia)  2014: Diabetes mellitus      Comment:  Steroid related  No date: Hyperlipidemia  No date: Hypertension  No date: Hypopituitarism  No date: Hypothyroid  3/13/2015: Immunosuppression  No date: Squamous Cell Carcinoma      Comment:  on the right side of the face   1/27/2016: Squamous cell carcinoma of skin of right temple  No date: Stroke  2/12: Syncope  No date: Unspecified disorder of kidney and ureter    Past Surgical History:  3/23/2015:  (ROTATION) FLAP; N/A      Comment:  Performed by Joe Mcallister MD at [facility] OR [address] [place] [address]  3/23/2015: BIOPSY-LYMPH NODE; N/A      Comment:  Performed by Joe Mcallister MD at [facility] OR [address] [place] [address]  2/27/2007: CAROTID ENDARTERECTOMY (L)  2/25/2014: COLONOSCOPY; N/A      Comment:  Performed by DELORES Lambert MD at [facility] ENDO (4TH FLR)  No date: EXCISION TURBINATE, SUBMUCOUS  2/24/2016: EXCISION-LESION-FACE; Right      Comment:  Performed by Joe Mcallister MD at [facility] OR [address] [place] [address]  3/23/2015: EXCISION-LESION-FACE; N/A      Comment:  Performed by Joe Mcallister MD at [facility] OR [address] [place] [address]  9/16/2014: FESS  2/24/2016: FULL THICKNESS SKIN GRAFT; N/A      Comment:  Performed by Joe Mcallister MD at [facility] OR [address] [place] [address]  3/23/2015: MAPPING-LYMPH NODE; N/A      Comment:  Performed by Joe Mcallister MD at [facility] OR [address] [place] [address]  2/24/2016: MAPPING-SENTINEL NODE; N/A      Comment:  Performed by Joe Mcallister MD at [facility] OR [address] [place] [address]  3/23/2015, 2/24/2016: Mohs excision       Comment:  combined with WLE forehead  9/16/2014: NASAL SEPTUM SURGERY  3/31/2017: REPAIR-MOHS DEFECT-left nose; Left      Comment:  Performed by Jermaine Dsouza III, MD at [facility] OR [address] [place] [address]  9/16/2014: RESECTION-TURBINATES (SMR); Bilateral      Comment:  Performed by Jermaine Dsouza III, MD at [facility] OR [address] [place] [address]  1995: right ureter surgery  3/23/2015, 2/24/2016: SENTINEL LYMPH NODE BIOPSY  9/16/2014: SEPTOPLASTY; Bilateral      Comment:  Performed by Jermaine Dsouza III, MD at [facility] OR [address] [place] [address]  9/16/2014: SINUS SURGERY      Comment:  septo, ESS, [facility]  9/16/2014: SINUS SURGERY FUNCTIONAL ENDOSCOPIC WITH NAVIGATION with   bilateral maxillary, ethmoids, sphenoids, right frontal, possible   left NF duct-balloons; Bilateral      Comment:  Performed by Jermaine Dsouza III, MD at [facility] OR [address] [place] [address]  No date: Transphenoidal surgery    Prior to Admission medications :  Medication ibrutinib (IMBRUVICA) 140 mg Cap, Sig Take 2 capsules (280 mg) by mouth once daily., Start Date 1/15/19, End Date ,  Taking? Yes, Authorizing Provider Joaquin Wooten Jr., MD    Medication ACCU-CHEK FASTCLIX Misc, Sig TEST  BLOOD  GLUCOSE FOUR TIMES DAILY, Start Date 3/6/17, End Date , Taking? , Authorizing Provider CAROLE Wooten MD    Medication alcohol swabs PadM, Sig APPLY 1 PAD TOPICALLY  AS NEEDED., Start Date 3/6/17, End Date , Taking? , Authorizing Provider CAROLE Wooten MD    Medication aspirin 81 MG Chew, Sig 3 days a week, Start Date 10/30/18, End Date , Taking? , Authorizing Provider Lou Chambers MD    Medication atorvastatin (LIPITOR) 20 MG tablet, Sig TAKE 1 TABLET EVERY DAY, Start Date 5/23/19, End Date , Taking? , Authorizing Provider Lou Chambers MD    Medication clopidogrel (PLAVIX) 75 mg tablet, Sig TAKE 1 TABLET EVERY DAY, Start Date 5/23/19, End Date , Taking? , Authorizing Provider Lou Chambers MD    Medication cyanocobalamin (VITAMIN B-12) 500 MCG tablet, Sig Take 1 tablet by mouth Daily., Start Date 7/6/12, End Date , Taking? , Authorizing Provider Historical Provider, MD    Medication cycloSPORINE (RESTASIS) 0.05 % ophthalmic emulsion, Sig Place 0.4 mLs (1 drop total) into both eyes 2 (two) times daily., Start Date 5/27/19, End Date 5/26/20, Taking? , Authorizing Provider Marcell Monge, POOL    Medication DOCOSAHEXANOIC ACID/EPA (FISH OIL ORAL), Sig Take by mouth once daily., Start Date , End Date , Taking? , Authorizing Provider Historical Provider, MD    Medication erythromycin (ROMYCIN) ophthalmic ointment, Sig Apply to eyelids and lashes OU QHS, Start Date 5/9/19, End Date , Taking? , Authorizing Provider Melvin Ying MD    Medication fexofenadine (ALLEGRA) 60 MG tablet, Sig Take 1 tablet (60 mg total) by mouth once daily., Start Date 7/8/19, End Date 7/7/20, Taking? , Authorizing Provider Sandra Trejo, DUSTIN    Medication fluorouracil (EFUDEX) 5 % cream, Sig Use hs for 2 weeks on right cheek, Start Date 4/20/18, End Date , Taking? , Authorizing Provider Karissa Monsivais,  MD    Medication levothyroxine (SYNTHROID) 125 MCG tablet, Sig TAKE 1 TABLET ONE TIME DAILY, Start Date 9/6/18, End Date , Taking? , Authorizing Provider Kindra Churchill NP    Medication losartan (COZAAR) 25 MG tablet, Sig Take 1 tablet (25 mg total) by mouth once daily., Start Date 3/19/19, End Date , Taking? , Authorizing Provider CAROLE Wooten MD    Medication multivitamin with minerals tablet, Sig Take 1 tablet by mouth once daily., Start Date , End Date , Taking? , Authorizing Provider Celia Jordan MD    Medication pantoprazole (PROTONIX) 40 MG tablet, Sig TAKE 1 TABLET EVERY DAY, Start Date 5/8/18, End Date , Taking? , Authorizing Provider CAROLE Wooten MD    Medication pantoprazole (PROTONIX) 40 MG tablet, Sig TAKE 1 TABLET EVERY DAY, Start Date 5/23/19, End Date , Taking? , Authorizing Provider CAROLE Wooten MD    Medication predniSONE (DELTASONE) 2.5 MG tablet, Sig Take 1 tablet (2.5 mg total) by mouth once daily. In addition to 5mg tablets, for total of 7.5mg daily., Start Date 5/30/18, End Date , Taking? , Authorizing Provider Eleanor Reardon MD    Medication predniSONE (DELTASONE) 5 MG tablet, Sig TAKE 1 TABLET (5 MG TOTAL) BY MOUTH ONCE DAILY., Start Date 5/23/19, End Date , Taking? , Authorizing Provider Radha Mcneal MD    Medication tazarotene (AVAGE) 0.1 % cream, Sig Apply topically every evening., Start Date 12/13/16, End Date 5/14/19, Taking? , Authorizing Provider Karissa Monsivais MD    Medication testosterone cypionate (DEPOTESTOTERONE CYPIONATE) 100 mg/mL injection, Sig Inject 0.5 mLs (50 mg total) into the muscle every 14 (fourteen) days., Start Date 5/14/19, End Date 11/12/19, Taking? , Authorizing Provider Cee Farmer MD    Medication testosterone cypionate (DEPOTESTOTERONE CYPIONATE) 200 mg/mL injection, Sig , Start Date 5/23/19, End Date , Taking? , Authorizing Provider Celia Jordan MD      No current facility-administered medications on file  prior to encounter.   Current Outpatient Medications on File Prior to Encounter:  ibrutinib (IMBRUVICA) 140 mg Cap, Take 2 capsules (280 mg) by mouth once daily., Disp: 60 capsule, Rfl: 9  ACCU-CHEK FASTCLIX Misc, TEST  BLOOD  GLUCOSE FOUR TIMES DAILY, Disp: 408 each, Rfl: 6  alcohol swabs PadM, APPLY 1 PAD TOPICALLY  AS NEEDED., Disp: 300 each, Rfl: 3  aspirin 81 MG Chew, 3 days a week, Disp: , Rfl:   atorvastatin (LIPITOR) 20 MG tablet, TAKE 1 TABLET EVERY DAY, Disp: 90 tablet, Rfl: 3  clopidogrel (PLAVIX) 75 mg tablet, TAKE 1 TABLET EVERY DAY, Disp: 90 tablet, Rfl: 3  cyanocobalamin (VITAMIN B-12) 500 MCG tablet, Take 1 tablet by mouth Daily., Disp: , Rfl:   cycloSPORINE (RESTASIS) 0.05 % ophthalmic emulsion, Place 0.4 mLs (1 drop total) into both eyes 2 (two) times daily., Disp: 180 each, Rfl: 3  DOCOSAHEXANOIC ACID/EPA (FISH OIL ORAL), Take by mouth once daily., Disp: , Rfl:   erythromycin (ROMYCIN) ophthalmic ointment, Apply to eyelids and lashes OU QHS, Disp: 1 Tube, Rfl: 6  fexofenadine (ALLEGRA) 60 MG tablet, Take 1 tablet (60 mg total) by mouth once daily., Disp: , Rfl: 0  fluorouracil (EFUDEX) 5 % cream, Use hs for 2 weeks on right cheek, Disp: 40 g, Rfl: 3  levothyroxine (SYNTHROID) 125 MCG tablet, TAKE 1 TABLET ONE TIME DAILY, Disp: 90 tablet, Rfl: 3  losartan (COZAAR) 25 MG tablet, Take 1 tablet (25 mg total) by mouth once daily., Disp: 90 tablet, Rfl: 3  multivitamin with minerals tablet, Take 1 tablet by mouth once daily., Disp: , Rfl:   pantoprazole (PROTONIX) 40 MG tablet, TAKE 1 TABLET EVERY DAY, Disp: 90 tablet, Rfl: 3  pantoprazole (PROTONIX) 40 MG tablet, TAKE 1 TABLET EVERY DAY, Disp: 90 tablet, Rfl: 3  predniSONE (DELTASONE) 2.5 MG tablet, Take 1 tablet (2.5 mg total) by mouth once daily. In addition to 5mg tablets, for total of 7.5mg daily., Disp: 90 tablet, Rfl: 3  predniSONE (DELTASONE) 5 MG tablet, TAKE 1 TABLET (5 MG TOTAL) BY MOUTH ONCE DAILY., Disp: 90 tablet, Rfl: 0  tazarotene  (AVAGE) 0.1 % cream, Apply topically every evening., Disp: 30 g, Rfl: 3  testosterone cypionate (DEPOTESTOTERONE CYPIONATE) 100 mg/mL injection, Inject 0.5 mLs (50 mg total) into the muscle every 14 (fourteen) days., Disp: 10 mL, Rfl: 5  testosterone cypionate (DEPOTESTOTERONE CYPIONATE) 200 mg/mL injection, , Disp: , Rfl:          Review of patient's allergies indicates:   -- Ace inhibitors -- Swelling    --  angioedema   -- Divalproex -- Hives    --  Rash under arms, body creases    Social History:   reports that he has never smoked. He has never used smokeless tobacco. He reports that he drinks about 1.2 oz of alcohol per week. He reports that he does not use drugs.     Review of patient's family history indicates:  Problem: Cancer      Relation: Mother          Age of Onset: (Not Specified)          Comment: breast  Problem: Colon cancer      Relation: Father          Age of Onset: (Not Specified)  Problem: Cancer      Relation: Father          Age of Onset: (Not Specified)          Comment: colon  Problem: Cancer      Relation: Brother          Age of Onset: (Not Specified)          Comment: leukemia  Problem: No Known Problems      Relation: Sister          Age of Onset: (Not Specified)  Problem: No Known Problems      Relation: Maternal Aunt          Age of Onset: (Not Specified)  Problem: No Known Problems      Relation: Maternal Uncle          Age of Onset: (Not Specified)  Problem: No Known Problems      Relation: Paternal Aunt          Age of Onset: (Not Specified)  Problem: No Known Problems      Relation: Paternal Uncle          Age of Onset: (Not Specified)  Problem: No Known Problems      Relation: Maternal Grandmother          Age of Onset: (Not Specified)  Problem: No Known Problems      Relation: Maternal Grandfather          Age of Onset: (Not Specified)  Problem: No Known Problems      Relation: Paternal Grandmother          Age of Onset: (Not Specified)  Problem: No Known Problems      Relation:  Paternal Grandfather          Age of Onset: (Not Specified)  Problem: Amblyopia      Relation: Neg Hx          Age of Onset: (Not Specified)  Problem: Blindness      Relation: Neg Hx          Age of Onset: (Not Specified)  Problem: Cataracts      Relation: Neg Hx          Age of Onset: (Not Specified)  Problem: Diabetes      Relation: Neg Hx          Age of Onset: (Not Specified)  Problem: Glaucoma      Relation: Neg Hx          Age of Onset: (Not Specified)  Problem: Hypertension      Relation: Neg Hx          Age of Onset: (Not Specified)  Problem: Macular degeneration      Relation: Neg Hx          Age of Onset: (Not Specified)  Problem: Retinal detachment      Relation: Neg Hx          Age of Onset: (Not Specified)  Problem: Strabismus      Relation: Neg Hx          Age of Onset: (Not Specified)  Problem: Stroke      Relation: Neg Hx          Age of Onset: (Not Specified)  Problem: Thyroid disease      Relation: Neg Hx          Age of Onset: (Not Specified)  Problem: Allergic rhinitis      Relation: Neg Hx          Age of Onset: (Not Specified)  Problem: Allergies      Relation: Neg Hx          Age of Onset: (Not Specified)  Problem: Angioedema      Relation: Neg Hx          Age of Onset: (Not Specified)  Problem: Asthma      Relation: Neg Hx          Age of Onset: (Not Specified)  Problem: Atopy      Relation: Neg Hx          Age of Onset: (Not Specified)  Problem: Eczema      Relation: Neg Hx          Age of Onset: (Not Specified)  Problem: Immunodeficiency      Relation: Neg Hx          Age of Onset: (Not Specified)  Problem: Rhinitis      Relation: Neg Hx          Age of Onset: (Not Specified)  Problem: Urticaria      Relation: Neg Hx          Age of Onset: (Not Specified)      PHYSICAL EXAMINATION  Temp:  [96.9 °F (36.1 °C)-98.5 °F (36.9 °C)] 97.7 °F (36.5 °C)  Pulse:  [71-93] 83  Resp:  [7-58] 30  SpO2:  [88 %-100 %] 97 %  BP: ()/(53-71) 124/57    General Condition:   awake and ater x 3    Head &  Neck  Anemia: None  Jaundice: None  Neck vein: Not distended  Carotid Bruits: none  Lymph nodes: none palpable  Thyroid: normal    Chest: normal    Heart: normal    Rt. Breast: not examined  Lt. Breast: not examined  Axillary lymph nodes: none    Abdomen: Soft,  None tender with no palpable mass or organ  Hernia: none    Rectal: Defered    Extremities: multiple superficial lacerations both arma and forearm , mild bleeding   Vascular: normal    Specific focus Examination    Imp: superficial bleeding wounds both forearm and upper arm, sepsis, low platelet count,     Plan: apply neosporin with mepalex border change every other day

## 2019-07-20 NOTE — PLAN OF CARE
Problem: Adult Inpatient Plan of Care  Goal: Plan of Care Review  Outcome: Ongoing (interventions implemented as appropriate)  The proper method of use, as well as anticipated side effects, of this aerosol treatment are discussed and demonstrated to the patient. Patient on oxygen with documented flow via comfort flow.  Will attempt to wean per O2 order protocol.

## 2019-07-20 NOTE — PLAN OF CARE
Problem: Adult Inpatient Plan of Care  Goal: Plan of Care Review  Outcome: Revised  Pt is currently on high flow nasal canula at 8L. Was successfully weaned off of comfort flow with no distress or SOB reported. O2 sats remain between %. Pt AAOx4. Pt lost both IV accesses. Right external cath placed by Dr. Edgar. Urinary cath intact and patent with UO between  ml/hr. General surgery consulted and examined pts wounds. Wounds cleansed with wound cleanser and redressed with new foam. Family at bedside. Bed alarm on. Will continue to monitor and handoff report to oncoming nurse to assume care.

## 2019-07-20 NOTE — ASSESSMENT & PLAN NOTE
Metabolic encephalopathy  Septic shock  Leukocytosis  Weakness  Chest X-ray showed left lung opacity, very mild.    -ID consult: Vanc, cefepime, and azithro (complete), flagyl  -Blood Cultures: NGTD  -Albuterol-ipratropium neb treatments Q4HR  -Lactic Acid: 3.0 > 1.6, no longer needing pressors  -Abdominal US: unremarkable  -Leukocytosis likely compounded by high dose steroid  -CT chest pending

## 2019-07-20 NOTE — SUBJECTIVE & OBJECTIVE
Interval History: Mental status continues to improve.     Review of Systems   Constitutional: Positive for fatigue. Negative for fever.   Respiratory: Positive for cough and shortness of breath.    Cardiovascular: Negative for chest pain and leg swelling.   Gastrointestinal: Negative for abdominal distention and abdominal pain.   Genitourinary: Negative for decreased urine volume and difficulty urinating.   Neurological: Positive for weakness (generalized). Negative for light-headedness.     Objective:     Vital Signs (Most Recent):  Temp: 97.7 °F (36.5 °C) (07/20/19 0800)  Pulse: 83 (07/20/19 0800)  Resp: (!) 30 (07/20/19 0800)  BP: (!) 124/57 (07/20/19 0800)  SpO2: 97 % (07/20/19 0800) Vital Signs (24h Range):  Temp:  [96.9 °F (36.1 °C)-98.5 °F (36.9 °C)] 97.7 °F (36.5 °C)  Pulse:  [71-93] 83  Resp:  [7-58] 30  SpO2:  [88 %-100 %] 97 %  BP: ()/(53-71) 124/57     Weight: 74.3 kg (163 lb 12.8 oz)  Body mass index is 22.85 kg/m².    Intake/Output Summary (Last 24 hours) at 7/20/2019 0917  Last data filed at 7/20/2019 0600  Gross per 24 hour   Intake 850 ml   Output 2685 ml   Net -1835 ml      Physical Exam   Constitutional: He is oriented to person, place, and time.   Cardiovascular: Normal rate and regular rhythm.   Pulmonary/Chest: Effort normal. He has rales (much improved).   Abdominal: Soft. Bowel sounds are normal.   Musculoskeletal: He exhibits no edema.   Neurological: He is alert and oriented to person, place, and time.   Skin: Skin is warm and dry.       Significant Labs:   BMP:   Recent Labs   Lab 07/20/19  0357   *      K 3.9      CO2 19*   BUN 71*   CREATININE 1.5*   CALCIUM 8.5*   MG 2.2     CBC:   Recent Labs   Lab 07/18/19  2146 07/19/19  0703 07/20/19  0357   WBC  --  20.50* 14.05*   HGB 7.8* 9.1* 9.0*   HCT  --  27.2* 26.6*   PLT  --  24* 32*       Significant Imaging: I have reviewed all pertinent imaging results/findings within the past 24 hours.

## 2019-07-20 NOTE — PROGRESS NOTES
"[facility]  Hematology/Oncology  Progress Note    Patient Name: Thierry Ramos Jr.  Admission Date: 7/15/2019  Hospital Length of Stay: 5 days  Code Status: Full Code     Subjective:     HPI:  85 year-old male with chronic lymphocytic leukemia, most recently on ibrutinib but held for the past week, was admitted on 7/15/19 for syncope and fall x 2. He is currently in the ICU being treated for possible sepsis with hypotension. Platelet count had decreased over the past 24 hours from 120 k/uL to < 10 k/uL. 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     Interval History:   - overall, he is slightly better. He has intermittent confusion. He is alert and oriented at time of my examination.  - platelet count today is 32 k/uL.    Oncology Treatment Plan:   [No treatment plan]    Medications:  Continuous Infusions:   dilTIAZem Stopped (07/19/19 0140)    norepinephrine bitartrate-D5W Stopped (07/17/19 0800)     Scheduled Meds:   albuterol-ipratropium  3 mL Nebulization Q4H WAKE    artificial tears  1 drop Both Eyes BID    atorvastatin  20 mg Oral Daily    ceFEPime (MAXIPIME) IVPB  2 g Intravenous Daily    cetirizine  10 mg Oral Daily    cyanocobalamin  500 mcg Oral Daily    erythromycin   Both Eyes QHS    furosemide  40 mg Intravenous BID    levothyroxine  125 mcg Oral Before breakfast    metronidazole  500 mg Intravenous Q8H    pantoprazole  40 mg Oral Daily    predniSONE  7.5 mg Oral Daily    QUEtiapine  25 mg Oral Once    sodium citrate-citric acid 500-334 mg/5 ml  15 mL Oral TID    vancomycin (VANCOCIN) IVPB  1,250 mg Intravenous Q24H     PRN Meds:sodium chloride, sodium chloride, sodium chloride, sodium chloride, acetaminophen, benzonatate, Dextrose 10% Bolus, Dextrose 10% Bolus, diphenhydrAMINE, glucagon (human recombinant), glucose, glucose, " insulin aspart U-100, ondansetron, sodium chloride 0.9%     Review of Systems   Constitutional: Positive for fatigue.   HENT: Negative for sore throat.    Eyes: Negative for visual disturbance.   Respiratory: Negative for shortness of breath.    Cardiovascular: Positive for leg swelling. Negative for chest pain.   Gastrointestinal: Negative for abdominal pain.   Genitourinary: Negative for dysuria.   Musculoskeletal: Negative for back pain.   Skin: Negative for rash.   Neurological: Positive for weakness.   Psychiatric/Behavioral: Positive for confusion (improved). The patient is not nervous/anxious.      Objective:     Vital Signs (Most Recent):  Temp: 97.7 °F (36.5 °C) (07/20/19 0800)  Pulse: 83 (07/20/19 0800)  Resp: (!) 30 (07/20/19 0800)  BP: (!) 124/57 (07/20/19 0800)  SpO2: 97 % (07/20/19 0800) Vital Signs (24h Range):  Temp:  [96.9 °F (36.1 °C)-98.5 °F (36.9 °C)] 97.7 °F (36.5 °C)  Pulse:  [71-93] 83  Resp:  [7-58] 30  SpO2:  [88 %-100 %] 97 %  BP: ()/(53-71) 124/57     Weight: 74.3 kg (163 lb 12.8 oz)  Body mass index is 22.85 kg/m².  Body surface area is 1.93 meters squared.      Intake/Output Summary (Last 24 hours) at 7/20/2019 1137  Last data filed at 7/20/2019 0600  Gross per 24 hour   Intake 500 ml   Output 2425 ml   Net -1925 ml       Physical Exam   Constitutional: He is oriented to person, place, and time. He appears well-developed.   Fatigued   HENT:   Head: Normocephalic and atraumatic.   Eyes: Pupils are equal, round, and reactive to light. EOM are normal.   Neck: Normal range of motion. Neck supple.   Cardiovascular: Normal rate and regular rhythm.   Pulmonary/Chest: Effort normal and breath sounds normal.   Abdominal: Soft. Bowel sounds are normal.   Neurological: He is alert and oriented to person, place, and time.   Skin: Skin is warm and dry.   Ecchymoses throughout body   Psychiatric: He has a normal mood and affect. His behavior is normal. Judgment and thought content normal.    Nursing note and vitals reviewed.      Significant Labs:   Labs have been reviewed.    Lab Results   Component Value Date    WBC 14.05 (H) 07/20/2019    HGB 9.0 (L) 07/20/2019    HCT 26.6 (L) 07/20/2019    MCV 87 07/20/2019    PLT 32 (LL) 07/20/2019     Assessment/Plan:     Immune thrombocytopenia  - I reviewed the peripheral smear. Rare enlarged platelets are noted.  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  - I spoke to his hematologist Dr. Marr on 7/16/19. She recommended pulse dexamethasone, so I placed an order for dexamethasone 40mg PO daily x 4 days.  - he has received a dose of IV immune globulin as well on 7/16/19.  - platelet count today is 32 k/uL. He completed the four doses of dexamethasone on 7/19/19.  - chronically, he takes low-dose prednisone. Until he is discharged and sees Dr. Marr in clinic, I recommend a moderate dose of prednisone, 20mg PO daily. I will place an order for 10mg PO today (he already received 7.5mg this morning) and 20mg PO starting tomorrow.  - follow up platelet count.           Braxton El MD  Hematology/Oncology  [facility]

## 2019-07-20 NOTE — PROGRESS NOTES
[facility] Hospital Medicine  Progress Note    Patient Name: Thierry Ramos Jr.  MRN: [id number]  Patient Class: IP- Inpatient   Admission Date: 7/15/2019  Length of Stay: 5 days  Attending Physician: Neli Chapman*  Primary Care Provider: AKILA Wooten MD        Subjective:     Principal Problem:Sepsis due to pneumonia      HPI:  85 y.o. male with PMHx of DM, CLL, HTN, HLD, CVA, carotid stenosis, hypothyroid, hypopituitarism, squamous cell carcinoma who presents after syncopal episode. Patient reports worsening muscle weakness. Patient and spouse reports patient started becoming weak in beginning of June/2019 and it progressively worsened.  Reports weakness is especially in the legs.  He also reports leg swelling which has been associated to starting Ibrutinib for CLL.  Patient follows with Dr. Trejo for CLL and last seen 7/8/19 for the weakness and was told to hold the Ibrutinib until his next follow up appointment.  Spouse reports patient fell twice last night with loss of consciousness after the second fall.   Denies any fever, SOB, nausea, vomiting, diarrhea, chest pain or abdominal pain. ED findings: WBC 12.75, H/H 12.3/38.9, Lactic Acid 3.0, ALK Phos 258, AST//9, chest x-ray showed mild pulmonary opacity at the left lung base may relate to pulmonary infiltrate/airspace disease, EKG no ischemic changes.  Patient admitted for further medical management.       Overview/Hospital Course:  WBC increased to 25. Platelets fell from 120 to 8, so H/O was consulted and patient was transfused 1 unit of platelets. Platelets increased only to 9. Hem onc gave IVIG and recommended no further transfusion except in life threatening bleed. Patient was oliguric with rising Cr, so nephrology was consulted. The next day Cr remained stable and UOP increased. ID was consulted, and recommended vanc, cefepime, and azithromycin, and flagyl. Abdominal US was unremarkable. Pt was transfused 2 units  PRBC on 7/18 after he pulled his art line and started bleeding. Had episode of afib RVR, worsening hypoxia, and hypontension late 7/18 which resolved. CXR was repeated on 7/19 showing increased interstitial opacities. Lasix dose was increased to 80 TID and then decreased after good UOP. Respiratory status improved.     Interval History: Mental status continues to improve.     Review of Systems   Constitutional: Positive for fatigue. Negative for fever.   Respiratory: Positive for cough and shortness of breath.    Cardiovascular: Negative for chest pain and leg swelling.   Gastrointestinal: Negative for abdominal distention and abdominal pain.   Genitourinary: Negative for decreased urine volume and difficulty urinating.   Neurological: Positive for weakness (generalized). Negative for light-headedness.     Objective:     Vital Signs (Most Recent):  Temp: 97.7 °F (36.5 °C) (07/20/19 0800)  Pulse: 83 (07/20/19 0800)  Resp: (!) 30 (07/20/19 0800)  BP: (!) 124/57 (07/20/19 0800)  SpO2: 97 % (07/20/19 0800) Vital Signs (24h Range):  Temp:  [96.9 °F (36.1 °C)-98.5 °F (36.9 °C)] 97.7 °F (36.5 °C)  Pulse:  [71-93] 83  Resp:  [7-58] 30  SpO2:  [88 %-100 %] 97 %  BP: ()/(53-71) 124/57     Weight: 74.3 kg (163 lb 12.8 oz)  Body mass index is 22.85 kg/m².    Intake/Output Summary (Last 24 hours) at 7/20/2019 0917  Last data filed at 7/20/2019 0600  Gross per 24 hour   Intake 850 ml   Output 2685 ml   Net -1835 ml      Physical Exam   Constitutional: He is oriented to person, place, and time.   Cardiovascular: Normal rate and regular rhythm.   Pulmonary/Chest: Effort normal. He has rales (much improved).   Abdominal: Soft. Bowel sounds are normal.   Musculoskeletal: He exhibits no edema.   Neurological: He is alert and oriented to person, place, and time.   Skin: Skin is warm and dry.       Significant Labs:   BMP:   Recent Labs   Lab 07/20/19  0357   *      K 3.9      CO2 19*   BUN 71*   CREATININE 1.5*    CALCIUM 8.5*   MG 2.2     CBC:   Recent Labs   Lab 07/18/19  2146 07/19/19  0703 07/20/19  0357   WBC  --  20.50* 14.05*   HGB 7.8* 9.1* 9.0*   HCT  --  27.2* 26.6*   PLT  --  24* 32*       Significant Imaging: I have reviewed all pertinent imaging results/findings within the past 24 hours.      Assessment/Plan:      * Sepsis due to pneumonia  Metabolic encephalopathy  Septic shock  Leukocytosis  Weakness  Chest X-ray showed left lung opacity, very mild.    -ID consult: Vanc, cefepime, and azithro (complete), flagyl  -Blood Cultures: NGTD  -Albuterol-ipratropium neb treatments Q4HR  -Lactic Acid: 3.0 > 1.6, no longer needing pressors  -Abdominal US: unremarkable  -Leukocytosis likely compounded by high dose steroid  -CT chest pending    Abnormal liver enzymes  Likely 2/2 shock liver in the setting of septic shock. Improving but will also check liver US, which was unremarkable.     Central hypothyroidism  -Continue synthroid    CLL (chronic lymphocytic leukemia)  Immunosuppression  Edema of both legs  Follows with Dr. Trejo. Ibrutinib placed on hold by Dr. Trejo 2/2 to worsening weakness and leg swelling.  -Consult hem/onc, appreciate recs    Hypertension  SBP   -Required pressors, hold home meds for now    Immunosuppression  Noted      Mixed dyslipidemia  History of CVA (cerebrovascular accident)  Denies chest pain or SOB  -Continue statin    Panhypopituitarism  -Will order pulse dexamethasone    Immune thrombocytopenia  Plt 120 > 9 > 32.   -Transfused platelets with little response; likely immune  -Further management per Hem Onc with IVIG    Type 2 diabetes, controlled, with neuropathy  Hemoglobin A1c: 5.6  -POC glucose checks ACHS  -Low dose SSI PRN  -Hypoglycemia protocol PRN  -Diabetic Diet    LUZ (acute kidney injury)  Oliguria  Cr 1.0 > 1.5. UOP improving  -Nephrology consult: signed off d/t improvement    Pulmonary edema  Afib with RVR  Hypoxia  Compounded by recent blood transfusion  -Check echo:  ok  -Continue lasix  -Strict I/O  -CT chest pending  -Continue to wean O2    Thrombocytopenia, unspecified          VTE Risk Mitigation (From admission, onward)        Ordered     IP VTE HIGH RISK PATIENT  Once      07/15/19 5089          Critical care time spent on the evaluation and treatment of severe organ dysfunction, review of pertinent labs and imaging studies, discussions with consulting providers and discussions with patient/family: 45 minutes.      Gloria Frias PA-C  Department of Hospital Medicine   [facility]

## 2019-07-20 NOTE — ASSESSMENT & PLAN NOTE
Plt 120 > 9 > 32.   -Transfused platelets with little response; likely immune  -Further management per Hem Onc with IVIG

## 2019-07-20 NOTE — PROGRESS NOTES
Infectious Disease Follow up Note    Subjective and Interval History:  Overall improved today. Answering appropriately, joking, etc. Breathing comfortably and no complaint of chest pain or shortness of breath. No acute events last p.m.      Review of Symptoms:  Constitutional: Denies fevers, chills, or weakness.  Cardiovascular: Denies chest pain  Respiratory: Denies shortness of breath, cough      Objective:    Medications:  Antibiotics:   Antibiotics (From admission, onward)    Start     Stop Route Frequency Ordered    07/19/19 1000  vancomycin (VANCOCIN) 1,250 mg in dextrose 5 % 250 mL IVPB      -- IV Every 24 hours (non-standard times) 07/19/19 0835    07/18/19 0900  ceFEPIme in dextrose 5% 2 gram/50 mL IVPB 2 g      -- IV Daily 07/17/19 1349    07/16/19 1800  metronidazole IVPB 500 mg      -- IV Every 8 hours (non-standard times) 07/16/19 1640    07/15/19 2100  erythromycin 5 mg/gram (0.5 %) ophthalmic ointment      -- Both Eyes Nightly 07/15/19 1149          Physical Exam:  VS (24h):   Vitals:    07/20/19 1300   BP: 134/63   Pulse: 88   Resp: (!) 30   Temp:      Temp:  [97.7 °F (36.5 °C)-98.5 °F (36.9 °C)]       General: Afebrile, alert, comfortable, no acute distress.   HEENT: PERRL. EOMI, no scleral icterus. Posterior pharynx petechiae.  Pulmonary: Non labored,clear to auscultation A/P/L. No wheezing, crackles, or rhonchi.  Cardiac: normal S1 & S2 w/o rubs/murmurs/gallops. No JVD.   Abdominal: Non-tender, non-distended.Bowel sounds present x 4. No appreciable hepatosplenomegaly. No guarding or rebound tenderness  Extremities: Moves all extremities x 4. No peripheral edema. 2+ pulses.  Skin: Extensive bruising of right flank, side of hip, thigh and leg secondary to fall. Bruising of hands and arms due to decreased platelets, IVs, etc.  Neurological:  Alert and oriented x 4.  CN II-XII grossly intact, sensation intact x 4.     Lines:  [REMOVED]      Arterial Line 07/15/19 1844 Right Radial (Removed)   Site  Assessment Clean;Dry;Intact;No redness;No swelling 7/18/2019  7:10 AM   Line Status Pulsatile blood flow 7/18/2019  7:10 AM   Art Line Waveform Appropriate 7/18/2019  7:10 AM   Arterial Line Interventions Zeroed and calibrated;Leveled;Connections checked and tightened 7/18/2019  7:10 AM   Color/Movement/Sensation Capillary refill less than 3 sec 7/18/2019  7:10 AM   Dressing Type Transparent 7/18/2019  7:10 AM   Dressing Status Clean;Dry;Intact 7/18/2019  7:10 AM   Dressing Intervention Dressing reinforced 7/18/2019  7:10 AM   Dressing Change Due 07/19/19 7/18/2019  7:10 AM   Number of days: 2       Labs:  CBC:   Lab Results   Component Value Date    WBC 14.05 (H) 07/20/2019    WBC 20.50 (H) 07/19/2019    WBC 21.17 (H) 07/18/2019    WBC 12.54 07/17/2019    WBC 19.89 (H) 07/16/2019    HCT 26.6 (L) 07/20/2019    PLT 32 (LL) 07/20/2019        BMP:   Recent Labs   Lab 07/20/19  0357   *      K 3.9      CO2 19*   BUN 71*   CREATININE 1.5*   CALCIUM 8.5*   MG 2.2       LFT:   Lab Results   Component Value Date    ALT 39 07/20/2019    AST 65 (H) 07/20/2019    ALKPHOS 164 (H) 07/20/2019    BILITOT 1.4 (H) 07/20/2019         Microbiology x 7d:   Microbiology Results (last 7 days)     Procedure Component Value Units Date/Time    Culture, Respiratory with Gram Stain [[id number]] Collected:  07/20/19 0735    Order Status:  Sent Specimen:  Respiratory from Sputum, Expectorated Updated:  07/20/19 0929    Blood culture x two cultures. Draw prior to antibiotics. [[id number]] Collected:  07/15/19 0855    Order Status:  Completed Specimen:  Blood from Peripheral, Hand, Left Updated:  07/19/19 1412     Blood Culture, Routine No Growth to date      No Growth to date      No Growth to date      No Growth to date      No Growth to date    Narrative:       Aerobic and anaerobic    Blood culture x two cultures. Draw prior to antibiotics. [[id number]] Collected:  07/15/19 0859    Order Status:  Completed Specimen:  Blood  from Peripheral, Forearm, Left Updated:  07/19/19 1412     Blood Culture, Routine No Growth to date      No Growth to date      No Growth to date      No Growth to date      No Growth to date    Narrative:       Aerobic and anaerobic    Urine Culture High Risk [[id number]] Collected:  07/16/19 1705    Order Status:  Completed Specimen:  Urine, Catheterized Updated:  07/17/19 2216     Urine Culture, Routine No growth    Narrative:       Indicated criteria for high risk culture:->Other  Other (specify):->Septic shock    Culture, Respiratory with Gram Stain [[id number]]     Order Status:  Canceled Specimen:  Respiratory from Sputum, Expectorated             Imaging:  CXR with slightly worse pulmonary edema and no change in left lower lobe pneumonia      Assessment and Plan:  1. Left lower lobe pneumonia   Stable on CXR - CXR several months ago completely clear.    WBC responding - From 25,000 to 14,000 today   Low grad fever also resolved.   Recommend 10 days of antibiotics since he parameters are just improving. Continue cefepime/vanc/flagyl due to immuocompromised  State - Stop date 7/24/2019.    2. Pulmonary edema   Being managed with diuresis   Bilateral pleural effusions on CT but no fever, Leukocytosis resolved - No indication of infected pleural fluid or empyema. Secondary to  pulmonary edema.    3. Serous drainage from ulcer on arm.    No signs of infection - also covered with current antibiotics anyways   Seen by surgery - No debridement needed.       Will sign off. Please call for any questions. Thanks for the consult.      Deepika Vgiil MD  [facility]

## 2019-07-20 NOTE — SUBJECTIVE & OBJECTIVE
Interval History:   - overall, he is slightly better. He has intermittent confusion. He is alert and oriented at time of my examination.  - platelet count today is 32 k/uL.    Oncology Treatment Plan:   [No treatment plan]    Medications:  Continuous Infusions:   dilTIAZem Stopped (07/19/19 0140)    norepinephrine bitartrate-D5W Stopped (07/17/19 0800)     Scheduled Meds:   albuterol-ipratropium  3 mL Nebulization Q4H WAKE    artificial tears  1 drop Both Eyes BID    atorvastatin  20 mg Oral Daily    ceFEPime (MAXIPIME) IVPB  2 g Intravenous Daily    cetirizine  10 mg Oral Daily    cyanocobalamin  500 mcg Oral Daily    erythromycin   Both Eyes QHS    furosemide  40 mg Intravenous BID    levothyroxine  125 mcg Oral Before breakfast    metronidazole  500 mg Intravenous Q8H    pantoprazole  40 mg Oral Daily    predniSONE  7.5 mg Oral Daily    QUEtiapine  25 mg Oral Once    sodium citrate-citric acid 500-334 mg/5 ml  15 mL Oral TID    vancomycin (VANCOCIN) IVPB  1,250 mg Intravenous Q24H     PRN Meds:sodium chloride, sodium chloride, sodium chloride, sodium chloride, acetaminophen, benzonatate, Dextrose 10% Bolus, Dextrose 10% Bolus, diphenhydrAMINE, glucagon (human recombinant), glucose, glucose, insulin aspart U-100, ondansetron, sodium chloride 0.9%     Review of Systems   Constitutional: Positive for fatigue.   HENT: Negative for sore throat.    Eyes: Negative for visual disturbance.   Respiratory: Negative for shortness of breath.    Cardiovascular: Positive for leg swelling. Negative for chest pain.   Gastrointestinal: Negative for abdominal pain.   Genitourinary: Negative for dysuria.   Musculoskeletal: Negative for back pain.   Skin: Negative for rash.   Neurological: Positive for weakness.   Psychiatric/Behavioral: Positive for confusion (improved). The patient is not nervous/anxious.      Objective:     Vital Signs (Most Recent):  Temp: 97.7 °F (36.5 °C) (07/20/19 0800)  Pulse: 83 (07/20/19  0800)  Resp: (!) 30 (07/20/19 0800)  BP: (!) 124/57 (07/20/19 0800)  SpO2: 97 % (07/20/19 0800) Vital Signs (24h Range):  Temp:  [96.9 °F (36.1 °C)-98.5 °F (36.9 °C)] 97.7 °F (36.5 °C)  Pulse:  [71-93] 83  Resp:  [7-58] 30  SpO2:  [88 %-100 %] 97 %  BP: ()/(53-71) 124/57     Weight: 74.3 kg (163 lb 12.8 oz)  Body mass index is 22.85 kg/m².  Body surface area is 1.93 meters squared.      Intake/Output Summary (Last 24 hours) at 7/20/2019 1137  Last data filed at 7/20/2019 0600  Gross per 24 hour   Intake 500 ml   Output 2425 ml   Net -1925 ml       Physical Exam   Constitutional: He is oriented to person, place, and time. He appears well-developed.   Fatigued   HENT:   Head: Normocephalic and atraumatic.   Eyes: Pupils are equal, round, and reactive to light. EOM are normal.   Neck: Normal range of motion. Neck supple.   Cardiovascular: Normal rate and regular rhythm.   Pulmonary/Chest: Effort normal and breath sounds normal.   Abdominal: Soft. Bowel sounds are normal.   Neurological: He is alert and oriented to person, place, and time.   Skin: Skin is warm and dry.   Ecchymoses throughout body   Psychiatric: He has a normal mood and affect. His behavior is normal. Judgment and thought content normal.   Nursing note and vitals reviewed.      Significant Labs:   Labs have been reviewed.    Lab Results   Component Value Date    WBC 14.05 (H) 07/20/2019    HGB 9.0 (L) 07/20/2019    HCT 26.6 (L) 07/20/2019    MCV 87 07/20/2019    PLT 32 (LL) 07/20/2019

## 2019-07-20 NOTE — NURSING
Both of pts IV out when trying to give medications. Skin tear at site. Pt has mild bleeding. Pressure held for 5 mins. Dr. Nava notified. Pt has poor venous access. Order to consult Anesth.

## 2019-07-20 NOTE — ASSESSMENT & PLAN NOTE
- I reviewed the peripheral smear. Rare enlarged platelets are noted.  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  - I spoke to his hematologist Dr. Marr on 7/16/19. She recommended pulse dexamethasone, so I placed an order for dexamethasone 40mg PO daily x 4 days.  - he has received a dose of IV immune globulin as well on 7/16/19.  - platelet count today is 32 k/uL. He completed the four doses of dexamethasone on 7/19/19.  - chronically, he takes low-dose prednisone. Until he is discharged and sees Dr. Marr in clinic, I recommend a moderate dose of prednisone, 20mg PO daily. I will place an order for 10mg PO today (he already received 7.5mg this morning) and 20mg PO starting tomorrow.  - follow up platelet count.

## 2019-07-21 LAB
ALBUMIN SERPL BCP-MCNC: 2.5 G/DL (ref 3.5–5.2)
ALP SERPL-CCNC: 164 U/L (ref 55–135)
ALT SERPL W/O P-5'-P-CCNC: 44 U/L (ref 10–44)
ANION GAP SERPL CALC-SCNC: 13 MMOL/L (ref 8–16)
ANISOCYTOSIS BLD QL SMEAR: SLIGHT
AST SERPL-CCNC: 62 U/L (ref 10–40)
BASOPHILS # BLD AUTO: ABNORMAL K/UL (ref 0–0.2)
BASOPHILS NFR BLD: 0 % (ref 0–1.9)
BILIRUB SERPL-MCNC: 1.6 MG/DL (ref 0.1–1)
BUN SERPL-MCNC: 75 MG/DL (ref 8–23)
CALCIUM SERPL-MCNC: 8.6 MG/DL (ref 8.7–10.5)
CHLORIDE SERPL-SCNC: 107 MMOL/L (ref 95–110)
CO2 SERPL-SCNC: 22 MMOL/L (ref 23–29)
CREAT SERPL-MCNC: 1.5 MG/DL (ref 0.5–1.4)
DIFFERENTIAL METHOD: ABNORMAL
EOSINOPHIL # BLD AUTO: ABNORMAL K/UL (ref 0–0.5)
EOSINOPHIL NFR BLD: 0 % (ref 0–8)
ERYTHROCYTE [DISTWIDTH] IN BLOOD BY AUTOMATED COUNT: 15.9 % (ref 11.5–14.5)
EST. GFR  (AFRICAN AMERICAN): 48 ML/MIN/1.73 M^2
EST. GFR  (NON AFRICAN AMERICAN): 42 ML/MIN/1.73 M^2
GLUCOSE SERPL-MCNC: 210 MG/DL (ref 70–110)
HCT VFR BLD AUTO: 28.1 % (ref 40–54)
HGB BLD-MCNC: 9.3 G/DL (ref 14–18)
HYPOCHROMIA BLD QL SMEAR: ABNORMAL
LYMPHOCYTES # BLD AUTO: ABNORMAL K/UL (ref 1–4.8)
LYMPHOCYTES NFR BLD: 38 % (ref 18–48)
MAGNESIUM SERPL-MCNC: 2.1 MG/DL (ref 1.6–2.6)
MCH RBC QN AUTO: 29.4 PG (ref 27–31)
MCHC RBC AUTO-ENTMCNC: 33.1 G/DL (ref 32–36)
MCV RBC AUTO: 89 FL (ref 82–98)
METAMYELOCYTES NFR BLD MANUAL: 1 %
MONOCYTES # BLD AUTO: ABNORMAL K/UL (ref 0.3–1)
MONOCYTES NFR BLD: 5 % (ref 4–15)
MYELOCYTES NFR BLD MANUAL: 1 %
NEUTROPHILS # BLD AUTO: ABNORMAL K/UL (ref 1.8–7.7)
NEUTROPHILS NFR BLD: 55 % (ref 38–73)
NRBC BLD-RTO: ABNORMAL /100 WBC
OVALOCYTES BLD QL SMEAR: ABNORMAL
PHOSPHATE SERPL-MCNC: 4.9 MG/DL (ref 2.7–4.5)
PLATELET # BLD AUTO: 35 K/UL (ref 150–350)
PLATELET BLD QL SMEAR: ABNORMAL
PMV BLD AUTO: 12.2 FL (ref 9.2–12.9)
POCT GLUCOSE: 216 MG/DL (ref 70–110)
POCT GLUCOSE: 224 MG/DL (ref 70–110)
POCT GLUCOSE: 228 MG/DL (ref 70–110)
POCT GLUCOSE: 246 MG/DL (ref 70–110)
POIKILOCYTOSIS BLD QL SMEAR: SLIGHT
POLYCHROMASIA BLD QL SMEAR: ABNORMAL
POTASSIUM SERPL-SCNC: 3.6 MMOL/L (ref 3.5–5.1)
PROT SERPL-MCNC: 5.4 G/DL (ref 6–8.4)
RBC # BLD AUTO: 3.16 M/UL (ref 4.6–6.2)
SODIUM SERPL-SCNC: 142 MMOL/L (ref 136–145)
SPHEROCYTES BLD QL SMEAR: ABNORMAL
VANCOMYCIN TROUGH SERPL-MCNC: 17.1 UG/ML (ref 10–22)
WBC # BLD AUTO: 13.48 K/UL (ref 3.9–12.7)
WBC NRBC COR # BLD: 12.14 K/UL (ref 3.9–12.7)

## 2019-07-21 PROCEDURE — 25000003 PHARM REV CODE 250: Mod: HCNC | Performed by: FAMILY MEDICINE

## 2019-07-21 PROCEDURE — 36415 COLL VENOUS BLD VENIPUNCTURE: CPT | Mod: HCNC

## 2019-07-21 PROCEDURE — 97530 THERAPEUTIC ACTIVITIES: CPT | Mod: HCNC

## 2019-07-21 PROCEDURE — 85027 COMPLETE CBC AUTOMATED: CPT | Mod: HCNC

## 2019-07-21 PROCEDURE — 80053 COMPREHEN METABOLIC PANEL: CPT | Mod: HCNC

## 2019-07-21 PROCEDURE — 94640 AIRWAY INHALATION TREATMENT: CPT | Mod: HCNC

## 2019-07-21 PROCEDURE — 27000221 HC OXYGEN, UP TO 24 HOURS: Mod: HCNC

## 2019-07-21 PROCEDURE — 63600175 PHARM REV CODE 636 W HCPCS: Mod: HCNC | Performed by: FAMILY MEDICINE

## 2019-07-21 PROCEDURE — 25000242 PHARM REV CODE 250 ALT 637 W/ HCPCS: Mod: HCNC | Performed by: NURSE PRACTITIONER

## 2019-07-21 PROCEDURE — 25000003 PHARM REV CODE 250: Mod: HCNC | Performed by: HOSPITALIST

## 2019-07-21 PROCEDURE — 25000003 PHARM REV CODE 250: Mod: HCNC | Performed by: INTERNAL MEDICINE

## 2019-07-21 PROCEDURE — 25000003 PHARM REV CODE 250: Mod: HCNC | Performed by: NURSE PRACTITIONER

## 2019-07-21 PROCEDURE — 11000001 HC ACUTE MED/SURG PRIVATE ROOM: Mod: HCNC

## 2019-07-21 PROCEDURE — 83735 ASSAY OF MAGNESIUM: CPT | Mod: HCNC

## 2019-07-21 PROCEDURE — S0030 INJECTION, METRONIDAZOLE: HCPCS | Mod: HCNC | Performed by: INTERNAL MEDICINE

## 2019-07-21 PROCEDURE — 63600175 PHARM REV CODE 636 W HCPCS: Mod: HCNC | Performed by: INTERNAL MEDICINE

## 2019-07-21 PROCEDURE — 94761 N-INVAS EAR/PLS OXIMETRY MLT: CPT | Mod: HCNC

## 2019-07-21 PROCEDURE — 85007 BL SMEAR W/DIFF WBC COUNT: CPT | Mod: HCNC

## 2019-07-21 PROCEDURE — 63600175 PHARM REV CODE 636 W HCPCS: Mod: HCNC | Performed by: PHYSICIAN ASSISTANT

## 2019-07-21 PROCEDURE — 97110 THERAPEUTIC EXERCISES: CPT | Mod: HCNC

## 2019-07-21 PROCEDURE — 84100 ASSAY OF PHOSPHORUS: CPT | Mod: HCNC

## 2019-07-21 PROCEDURE — 80202 ASSAY OF VANCOMYCIN: CPT | Mod: HCNC

## 2019-07-21 RX ADMIN — PANTOPRAZOLE SODIUM 40 MG: 40 TABLET, DELAYED RELEASE ORAL at 08:07

## 2019-07-21 RX ADMIN — IPRATROPIUM BROMIDE AND ALBUTEROL SULFATE 3 ML: .5; 3 SOLUTION RESPIRATORY (INHALATION) at 07:07

## 2019-07-21 RX ADMIN — VANCOMYCIN HYDROCHLORIDE 1250 MG: 1.25 INJECTION, POWDER, LYOPHILIZED, FOR SOLUTION INTRAVENOUS at 11:07

## 2019-07-21 RX ADMIN — SODIUM CITRATE AND CITRIC ACID MONOHYDRATE 15 ML: 500; 334 SOLUTION ORAL at 08:07

## 2019-07-21 RX ADMIN — METRONIDAZOLE 500 MG: 500 INJECTION, SOLUTION INTRAVENOUS at 09:07

## 2019-07-21 RX ADMIN — LEVOTHYROXINE SODIUM 125 MCG: 25 TABLET ORAL at 06:07

## 2019-07-21 RX ADMIN — RAMELTEON 8 MG: 8 TABLET, FILM COATED ORAL at 08:07

## 2019-07-21 RX ADMIN — HYPROMELLOSE 2910 1 DROP: 5 SOLUTION OPHTHALMIC at 09:07

## 2019-07-21 RX ADMIN — INSULIN ASPART 1 UNITS: 100 INJECTION, SOLUTION INTRAVENOUS; SUBCUTANEOUS at 10:07

## 2019-07-21 RX ADMIN — PREDNISONE 20 MG: 20 TABLET ORAL at 08:07

## 2019-07-21 RX ADMIN — CEFEPIME HYDROCHLORIDE 2 G: 2 INJECTION, POWDER, FOR SOLUTION INTRAVENOUS at 09:07

## 2019-07-21 RX ADMIN — IPRATROPIUM BROMIDE AND ALBUTEROL SULFATE 3 ML: .5; 3 SOLUTION RESPIRATORY (INHALATION) at 03:07

## 2019-07-21 RX ADMIN — METRONIDAZOLE 500 MG: 500 INJECTION, SOLUTION INTRAVENOUS at 01:07

## 2019-07-21 RX ADMIN — Medication 500 MCG: at 08:07

## 2019-07-21 RX ADMIN — ERYTHROMYCIN: 5 OINTMENT OPHTHALMIC at 08:07

## 2019-07-21 RX ADMIN — HYPROMELLOSE 2910 1 DROP: 5 SOLUTION OPHTHALMIC at 08:07

## 2019-07-21 RX ADMIN — METRONIDAZOLE 500 MG: 500 INJECTION, SOLUTION INTRAVENOUS at 05:07

## 2019-07-21 RX ADMIN — FUROSEMIDE 40 MG: 10 INJECTION, SOLUTION INTRAVENOUS at 08:07

## 2019-07-21 RX ADMIN — INSULIN ASPART 2 UNITS: 100 INJECTION, SOLUTION INTRAVENOUS; SUBCUTANEOUS at 02:07

## 2019-07-21 RX ADMIN — IPRATROPIUM BROMIDE AND ALBUTEROL SULFATE 3 ML: .5; 3 SOLUTION RESPIRATORY (INHALATION) at 12:07

## 2019-07-21 RX ADMIN — INSULIN ASPART 2 UNITS: 100 INJECTION, SOLUTION INTRAVENOUS; SUBCUTANEOUS at 05:07

## 2019-07-21 RX ADMIN — SODIUM CITRATE AND CITRIC ACID MONOHYDRATE 15 ML: 500; 334 SOLUTION ORAL at 04:07

## 2019-07-21 RX ADMIN — ATORVASTATIN CALCIUM 20 MG: 20 TABLET, FILM COATED ORAL at 08:07

## 2019-07-21 RX ADMIN — CETIRIZINE HYDROCHLORIDE 10 MG: 10 TABLET, FILM COATED ORAL at 08:07

## 2019-07-21 RX ADMIN — INSULIN ASPART 2 UNITS: 100 INJECTION, SOLUTION INTRAVENOUS; SUBCUTANEOUS at 11:07

## 2019-07-21 NOTE — ASSESSMENT & PLAN NOTE
Immunosuppression  Edema of both legs  Follows with Dr. Trejo. Ibrutinib placed on hold by Dr. Trjeo 2/2 to worsening weakness and leg swelling.  -Consult hem/onc, appreciate recs

## 2019-07-21 NOTE — ASSESSMENT & PLAN NOTE
Metabolic encephalopathy  Septic shock  Leukocytosis  Weakness  Chest X-ray showed left lung opacity, very mild.    -ID consult: Vanc, cefepime, and azithro (complete), flagyl  -Blood Cultures: NGTD  -Albuterol-ipratropium neb treatments Q4HR  -Lactic Acid: 3.0 > 1.6, no longer needing pressors  -Abdominal US: unremarkable  -Leukocytosis likely compounded by high dose steroid  -CT chest with bilateral ground glass opacities and pleural effusion

## 2019-07-21 NOTE — NURSING TRANSFER
Nursing Transfer Note      7/21/2019     Transfer To:  to [facility]    Transfer via bed    Transfer with cardiac monitoring    Transported by: [facility]    Medicines sent: yes    Chart send with patient: Yes    Notified: spouse    Patient reassessed at: 1015 7/21/19     Upon arrival to floor: cardiac monitor applied, patient oriented to room, call bell in reach and bed in lowest position

## 2019-07-21 NOTE — NURSING
Foam and gauze dressing to left forearm noted to be saturated with drainage. Dressings removed and changed. Patient tolerated well.

## 2019-07-21 NOTE — PLAN OF CARE
Problem: Physical Therapy Goal  Goal: Physical Therapy Goal  Goals to be met by: 8/15/2019     Patient will increase functional independence with mobility by performin. Supine to sit with Modified [place]  2. Sit to stand transfer with Modified [place]  3. Bed to chair transfer with Modified [place] using Rolling Walker  4. Gait  x 20 feet with Modified [place] using Rolling Walker.      Outcome: Ongoing (interventions implemented as appropriate)  Pt making progress with functional mobility as he completed stand with min A then CGA and completed SPT and side steps with RW and CGA. Pt required additional time for rest breaks and max cues throughout session for pursed lip breathing. Recommendations are ongoing pending pt's progress with functional mobility and activity tolerance.

## 2019-07-21 NOTE — PLAN OF CARE
VN cued into pt's room for introduction. VN informed pt and family that VN would be working along side bedside nurse and PCT throughout shift. Level of present pain assessed. At present no distress noted. Thoroughly discussed with patient and family today's plan of care. Discussed with patient High fall risk protocol and interventions that have been initiated and cont be in place for safety. Patient verbalized clear understanding and cooperation using teach back method. Patient sitting up in chair with chair alarm presently activated and in use. Will cont to be available to patient and intervene prn.

## 2019-07-21 NOTE — NURSING
Patient arrived to floor in bed with ICU nurse. Spouse at bedside. Patient AAOx4 and denies any pain. IV clean, dry, and intact. Tele monitor in place reading NSR. Bed locked and low, side rails up x2, and call bell in reach. Earl catheter in place draining clear yellow urine. 8L of high flow oxygen in place. No acute distress noted.

## 2019-07-21 NOTE — ASSESSMENT & PLAN NOTE
Afib with RVR  Hypoxia  Compounded by recent blood transfusion  -Check echo: ok  -D/C lasix for now  -Strict I/O  -CT chest ok  -Continue to wean O2

## 2019-07-21 NOTE — PLAN OF CARE
Problem: Adult Inpatient Plan of Care  Goal: Plan of Care Review  Outcome: Ongoing (interventions implemented as appropriate)  The proper method of use, as well as anticipated side effects, of this aerosol treatment are discussed and demonstrated to the patient. Patient on oxygen with documented flow.  Will attempt to wean per O2 order protocol.

## 2019-07-21 NOTE — PT/OT/SLP PROGRESS
"Physical Therapy Treatment    Patient Name:  Thierry Ramos Jr.   MRN:  [id number]    Recommendations:     Discharge Recommendations:  (ongoing pending progress)   Discharge Equipment Recommendations: (TBD)   Barriers to discharge: limited activity tolerance at this time    Assessment:     Thierry Ramos Jr. is a 85 y.o. male admitted with a medical diagnosis of Sepsis due to pneumonia.  He presents with the following impairments/functional limitations:  weakness, impaired endurance, impaired self care skills, impaired functional mobilty, gait instability, impaired balance, decreased lower extremity function, decreased upper extremity function, impaired skin, edema, impaired cardiopulmonary response to activity. Pt making progress with functional mobility as he completed stand with min A then CGA and completed SPT and side steps with RW and CGA. Pt required additional time for rest breaks and max cues throughout session for pursed lip breathing. Recommendations are ongoing pending pt's progress with functional mobility and activity tolerance.    Rehab Prognosis: Good; patient would benefit from acute skilled PT services to address these deficits and reach maximum level of function.    Recent Surgery: * No surgery found *      Plan:     During this hospitalization, patient to be seen 6 x/week to address the identified rehab impairments via gait training, therapeutic activities, therapeutic exercises, neuromuscular re-education and progress toward the following goals:    · Plan of Care Expires:  08/17/19    Subjective     Chief Complaint: reports feeling a little "foggy" initially upon standing which he reports resolves with continued standing or return to sitting  Patient/Family Comments/goals: pt denies pain throughout session  Pain/Comfort:  · Pain Rating 1: 0/10  · Pain Rating Post-Intervention 1: 0/10      Objective:     Communicated with JOCE Vigil prior to session.  Patient found HOB elevated with bed alarm, " peripheral IV, telemetry, oxygen(8 L/min high flow) upon PT entry to room.     General Precautions: Standard, fall, respiratory   Orthopedic Precautions:N/A   Braces: N/A     Functional Mobility:  · Bed Mobility:     · Scooting: contact guard assistance  · Supine to Sit: moderate assistance and maximal assistance  · Transfers:     · Sit to Stand:  min A on 1st bout, CGA 2nd bout with rolling walker  · Bed to Chair: contact guard assistance with  rolling walker  using  Stand Pivot  · Gait: pt took 4-5 side steps right and steps during SPT from bed>chair with RW and CGA/min A. Cues for RW management and pt with minimal foot clearance and step elngth duirng activity. Cues for pursed lip breathing and upright posture.      AM-PAC 6 CLICK MOBILITY  Turning over in bed (including adjusting bedclothes, sheets and blankets)?: 3  Sitting down on and standing up from a chair with arms (e.g., wheelchair, bedside commode, etc.): 3  Moving from lying on back to sitting on the side of the bed?: 3  Moving to and from a bed to a chair (including a wheelchair)?: 3  Need to walk in hospital room?: 2  Climbing 3-5 steps with a railing?: 1  Basic Mobility Total Score: 15       Therapeutic Activities and Exercises:  Pt instructed in APs and heel slides in supine x 10 reps: cues for pacing of exercises and pursed lip breathing as pt initially completing exercises at a fast pace.  Pt sat EOB with SBA for sitting balance, cues for pursed lip breathing and upright posture. Pt with no complaints but noted increased RR and work of breathing which improved with improved breathing.  Instructed in 5-7 reps BLE LAQs and hip flexion.  Completed 2 stands then side stepping and SPT to chair on 2nd bout, initial cues for upright posture and breathing.  Left up in recliner chair, RN notified of pt's assist level and to use RW when assisting pt back to bed.    Patient left up in chair with all lines intact, call button in reach, chair alarm on, RN  notified and pt's wife present..    GOALS:   Multidisciplinary Problems     Physical Therapy Goals        Problem: Physical Therapy Goal    Goal Priority Disciplines Outcome Goal Variances Interventions   Physical Therapy Goal     PT, PT/OT Ongoing (interventions implemented as appropriate)     Description:  Goals to be met by: 8/15/2019     Patient will increase functional independence with mobility by performin. Supine to sit with Modified [place]  2. Sit to stand transfer with Modified [place]  3. Bed to chair transfer with Modified [place] using Rolling Walker  4. Gait  x 20 feet with Modified [place] using Rolling Walker.              Problem: Physical Therapy Goal    Goal Priority Disciplines Outcome Goal Variances Interventions   Physical Therapy Goal     PT, PT/OT                      Time Tracking:     PT Received On: 19  PT Start Time: 1050     PT Stop Time: 1125  PT Total Time (min): 35 min     Billable Minutes: Therapeutic Activity 15 and Therapeutic Exercise 20    Treatment Type: Treatment  PT/PTA: PT     PTA Visit Number: 0     Elham Johnson, PT  2019

## 2019-07-21 NOTE — PLAN OF CARE
Brief Hematology/Oncology Update:    Labs have been reviewed.    Lab Results   Component Value Date    WBC 13.48 (H) 07/21/2019    HGB 9.3 (L) 07/21/2019    HCT 28.1 (L) 07/21/2019    MCV 89 07/21/2019    PLT 35 (LL) 07/21/2019     Assessment/Plan:    Immune thrombocytopenia  - I reviewed the peripheral smear. Rare enlarged platelets are noted.  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  - I spoke to his hematologist Dr. Marr on 7/16/19. She recommended pulse dexamethasone, so I placed an order for dexamethasone 40mg PO daily x 4 days.  - he has received a dose of IV immune globulin as well on 7/16/19.  - platelet count today is 35 k/uL. He completed the four doses of dexamethasone on 7/19/19.  - chronically, he takes low-dose prednisone. Until he is discharged and sees Dr. Marr in clinic, I recommended a moderate dose of prednisone, 20mg PO daily.  - continue prednisone 20mg PO daily.      Braxton El M.D.  Hematology/Oncology  [facility] - [address], [address]  [place] [address]  Phone: [phone]  Fax: [phone]

## 2019-07-21 NOTE — PROGRESS NOTES
"Surgery follow up  /62 (BP Location: Left leg, Patient Position: Lying)   Pulse 71   Temp 97 °F (36.1 °C) (Oral)   Resp 16   Ht 5' 11" (1.803 m)   Wt 73.5 kg (162 lb 0.6 oz)   SpO2 100%   BMI 22.60 kg/m²   I/O last 3 completed shifts:  In: 1310 [P.O.:510; IV Piggyback:800]  Out: 5766 [Urine:5765; Stool:1]  I/O this shift:  In: -   Out: 950 [Urine:950]  All wounds dressing intact.continue present treatment.  "

## 2019-07-21 NOTE — PROGRESS NOTES
[facility] Hospital Medicine  Progress Note    Patient Name: Thierry Ramos Jr.  MRN: [id number]  Patient Class: IP- Inpatient   Admission Date: 7/15/2019  Length of Stay: 6 days  Attending Physician: Neli Chapman*  Primary Care Provider: AKILA Wooten MD        Subjective:     Principal Problem:Sepsis due to pneumonia      HPI:  85 y.o. male with PMHx of DM, CLL, HTN, HLD, CVA, carotid stenosis, hypothyroid, hypopituitarism, squamous cell carcinoma who presents after syncopal episode. Patient reports worsening muscle weakness. Patient and spouse reports patient started becoming weak in beginning of June/2019 and it progressively worsened.  Reports weakness is especially in the legs.  He also reports leg swelling which has been associated to starting Ibrutinib for CLL.  Patient follows with Dr. Trejo for CLL and last seen 7/8/19 for the weakness and was told to hold the Ibrutinib until his next follow up appointment.  Spouse reports patient fell twice last night with loss of consciousness after the second fall.   Denies any fever, SOB, nausea, vomiting, diarrhea, chest pain or abdominal pain. ED findings: WBC 12.75, H/H 12.3/38.9, Lactic Acid 3.0, ALK Phos 258, AST//9, chest x-ray showed mild pulmonary opacity at the left lung base may relate to pulmonary infiltrate/airspace disease, EKG no ischemic changes.  Patient admitted for further medical management.       Overview/Hospital Course:  WBC increased to 25. Platelets fell from 120 to 8, so H/O was consulted and patient was transfused 1 unit of platelets. Platelets increased only to 9. Hem onc gave IVIG and recommended no further transfusion except in life threatening bleed. Patient was oliguric with rising Cr, so nephrology was consulted. The next day Cr remained stable and UOP increased. ID was consulted, and recommended vanc, cefepime, and azithromycin, and flagyl. Abdominal US was unremarkable. Pt was transfused 2 units  PRBC on 7/18 after he pulled his art line and started bleeding. Had episode of afib RVR, worsening hypoxia, and hypontension late 7/18 which resolved. CXR was repeated on 7/19 showing increased interstitial opacities. Lasix dose was increased to 80 TID and then decreased after good UOP. Respiratory status improved. Pt was transferred to the floor on 7/21.    Interval History: Wants to get up and move.    Review of Systems   Respiratory: Negative for cough and shortness of breath.    Cardiovascular: Negative for chest pain and leg swelling.   Gastrointestinal: Negative for abdominal distention and abdominal pain.   Genitourinary: Negative for decreased urine volume and difficulty urinating.   Neurological: Positive for weakness (generalized).     Objective:     Vital Signs (Most Recent):  Temp: 97 °F (36.1 °C) (07/21/19 1022)  Pulse: 70 (07/21/19 1219)  Resp: 18 (07/21/19 1219)  BP: 134/62 (07/21/19 1022)  SpO2: 100 % (07/21/19 1219) Vital Signs (24h Range):  Temp:  [97 °F (36.1 °C)-98.4 °F (36.9 °C)] 97 °F (36.1 °C)  Pulse:  [63-89] 70  Resp:  [16-69] 18  SpO2:  [96 %-100 %] 100 %  BP: (124-200)/() 134/62     Weight: 73.5 kg (162 lb 0.6 oz)  Body mass index is 22.6 kg/m².    Intake/Output Summary (Last 24 hours) at 7/21/2019 1326  Last data filed at 7/21/2019 1102  Gross per 24 hour   Intake 410 ml   Output 3766 ml   Net -3356 ml      Physical Exam   Cardiovascular: Normal rate and regular rhythm.   Pulmonary/Chest: Effort normal and breath sounds normal.   Abdominal: Soft. Bowel sounds are normal.   Musculoskeletal: He exhibits no edema.   Neurological: He is alert.   Skin: Skin is warm and dry.       Significant Labs:   BMP:   Recent Labs   Lab 07/21/19  0353   *      K 3.6      CO2 22*   BUN 75*   CREATININE 1.5*   CALCIUM 8.6*   MG 2.1     CBC:   Recent Labs   Lab 07/20/19  0357 07/21/19  0353   WBC 14.05* 13.48*   HGB 9.0* 9.3*   HCT 26.6* 28.1*   PLT 32* 35*       Significant Imaging:  I have reviewed all pertinent imaging results/findings within the past 24 hours.      Assessment/Plan:      * Sepsis due to pneumonia  Metabolic encephalopathy  Septic shock  Leukocytosis  Weakness  Chest X-ray showed left lung opacity, very mild.    -ID consult: Vanc, cefepime, and azithro (complete), flagyl  -Blood Cultures: NGTD  -Albuterol-ipratropium neb treatments Q4HR  -Lactic Acid: 3.0 > 1.6, no longer needing pressors  -Abdominal US: unremarkable  -Leukocytosis likely compounded by high dose steroid  -CT chest with bilateral ground glass opacities and pleural effusion    Abnormal liver enzymes  Likely 2/2 shock liver in the setting of septic shock. Improving but will also check liver US, which was unremarkable.     Central hypothyroidism  -Continue synthroid    CLL (chronic lymphocytic leukemia)  Immunosuppression  Edema of both legs  Follows with Dr. Trejo. Ibrutinib placed on hold by Dr. Trejo 2/2 to worsening weakness and leg swelling.  -Consult hem/onc, appreciate recs    Hypertension  -200  -Required pressors, hold home meds for now    Immunosuppression  Noted      Mixed dyslipidemia  History of CVA (cerebrovascular accident)  Denies chest pain or SOB  -Continue statin    Panhypopituitarism  -Will order pulse dexamethasone    Immune thrombocytopenia  Plt 120 > 9 > 32.   -Transfused platelets with little response; likely immune  -Further management per Hem Onc with IVIG    Type 2 diabetes, controlled, with neuropathy  Hemoglobin A1c: 5.6  -POC glucose checks ACHS  -Low dose SSI PRN  -Hypoglycemia protocol PRN  -Diabetic Diet    LUZ (acute kidney injury)  Oliguria  Cr 1.0 > 1.5. UOP improving  -Nephrology consult: signed off d/t improvement    Pulmonary edema  Afib with RVR  Hypoxia  Compounded by recent blood transfusion  -Check echo: ok  -D/C lasix for now  -Strict I/O  -CT chest ok  -Continue to wean O2    Thrombocytopenia, unspecified          VTE Risk Mitigation (From admission, onward)         Ordered     IP VTE HIGH RISK PATIENT  Once      07/15/19 1149                Gloria Frias PA-C  Department of Hospital Medicine   [facility]

## 2019-07-21 NOTE — SUBJECTIVE & OBJECTIVE
Interval History: Wants to get up and move.    Review of Systems   Respiratory: Negative for cough and shortness of breath.    Cardiovascular: Negative for chest pain and leg swelling.   Gastrointestinal: Negative for abdominal distention and abdominal pain.   Genitourinary: Negative for decreased urine volume and difficulty urinating.   Neurological: Positive for weakness (generalized).     Objective:     Vital Signs (Most Recent):  Temp: 97 °F (36.1 °C) (07/21/19 1022)  Pulse: 70 (07/21/19 1219)  Resp: 18 (07/21/19 1219)  BP: 134/62 (07/21/19 1022)  SpO2: 100 % (07/21/19 1219) Vital Signs (24h Range):  Temp:  [97 °F (36.1 °C)-98.4 °F (36.9 °C)] 97 °F (36.1 °C)  Pulse:  [63-89] 70  Resp:  [16-69] 18  SpO2:  [96 %-100 %] 100 %  BP: (124-200)/() 134/62     Weight: 73.5 kg (162 lb 0.6 oz)  Body mass index is 22.6 kg/m².    Intake/Output Summary (Last 24 hours) at 7/21/2019 1326  Last data filed at 7/21/2019 1102  Gross per 24 hour   Intake 410 ml   Output 3766 ml   Net -3356 ml      Physical Exam   Cardiovascular: Normal rate and regular rhythm.   Pulmonary/Chest: Effort normal and breath sounds normal.   Abdominal: Soft. Bowel sounds are normal.   Musculoskeletal: He exhibits no edema.   Neurological: He is alert.   Skin: Skin is warm and dry.       Significant Labs:   BMP:   Recent Labs   Lab 07/21/19  0353   *      K 3.6      CO2 22*   BUN 75*   CREATININE 1.5*   CALCIUM 8.6*   MG 2.1     CBC:   Recent Labs   Lab 07/20/19 0357 07/21/19  0353   WBC 14.05* 13.48*   HGB 9.0* 9.3*   HCT 26.6* 28.1*   PLT 32* 35*       Significant Imaging: I have reviewed all pertinent imaging results/findings within the past 24 hours.

## 2019-07-21 NOTE — PROGRESS NOTES
Pharmacokinetic Assessment Follow Up: IV Vancomycin    Vancomycin serum concentration assessment(s):    The trough level was drawned correctly and can be used to guide therapy at this time.    Vancomycin Regimen Plan:    Continue regimen to Vancomycin 1250 mg IV every 24 hour with next serum trough concentration measured at 7-24-19 prior to 4th dose on 0900     Pharmacy will continue to follow and monitor vancomycin.    Please contact pharmacy at extension [phone] for questions regarding this assessment.    Thank you for the consult,   Philippe MANJARREZ Fareed     Patient brief summary:  Thierry Ramos Jr. is a 85 y.o. male initiated on antimicrobial therapy with IV Vancomycin for treatment of suspected lower respiratory infection    The patient received a loading dose, followed by the current treatment regimen: vancomycin 1250 mg IV every 24 hours    Drug Allergies:   Review of patient's allergies indicates:   Allergen Reactions    Ace inhibitors Swelling     angioedema    Divalproex Hives     Rash under arms, body creases       Actual Body Weight:   73.5 kg    Renal Function:   Estimated Creatinine Clearance: 37.4 mL/min (A) (based on SCr of 1.5 mg/dL (H)).,     Dialysis Method (if applicable):  N/A     CBC (last 72 hours):  Recent Labs   Lab Result Units 07/18/19  2146 07/19/19  0703 07/20/19  0357 07/21/19  0353   WBC K/uL  --  20.50* 14.05* 13.48*   Hemoglobin g/dL 7.8* 9.1* 9.0* 9.3*   Hematocrit %  --  27.2* 26.6* 28.1*   Platelets K/uL  --  24* 32* 35*   Gran% %  --  70.0 59.3 55.0   Lymph% %  --  14.0* 37.9 38.0   Mono% %  --  5.0 2.4* 5.0   Eosinophil% %  --  0.0 0.0 0.0   Basophil% %  --  0.0 0.4 0.0   Differential Method   --  Manual Automated Manual       Metabolic Panel (last 72 hours):  Recent Labs   Lab Result Units 07/19/19  0634 07/20/19  0357 07/21/19  0353   Sodium mmol/L 135* 138 142   Potassium mmol/L 4.3 3.9 3.6   Chloride mmol/L 108 108 107   CO2 mmol/L 17* 19* 22*   Glucose mg/dL 171* 230* 210*   BUN,  Bld mg/dL 61* 71* 75*   Creatinine mg/dL 1.4 1.5* 1.5*   Albumin g/dL 2.4* 2.4* 2.5*   Total Bilirubin mg/dL 1.6* 1.4* 1.6*   Alkaline Phosphatase U/L 149* 164* 164*   AST U/L 81* 65* 62*   ALT U/L 37 39 44   Magnesium mg/dL 2.1 2.2 2.1   Phosphorus mg/dL 4.7* 4.9* 4.9*       Vancomycin Administrations:  vancomycin given in the last 96 hours                     vancomycin (VANCOCIN) 1,250 mg in dextrose 5 % 250 mL IVPB (mg) 1,250 mg New Bag 07/20/19 1153     1,250 mg New Bag 07/19/19 0947                      Drug levels (last 3 results):  Recent Labs   Lab Result Units 07/19/19  0634 07/21/19  0933   Vancomycin, Random ug/mL 12.7  --    Vancomycin-Trough ug/mL  --  17.1       Microbiologic Results:  Microbiology Results (last 7 days)       Procedure Component Value Units Date/Time    Culture, Respiratory with Gram Stain [[id number]] Collected:  07/20/19 0735    Order Status:  Completed Specimen:  Respiratory from Sputum, Expectorated Updated:  07/20/19 1706     Respiratory Culture Specimen inadequate - culture not performed     Gram Stain (Respiratory) >10epis/lfp and <than many WBC's     Gram Stain (Respiratory) Predominance of oropharyngeal kelly. Please recollect.    Narrative:       Please induce if unable to expectorate    Blood culture x two cultures. Draw prior to antibiotics. [[id number]] Collected:  07/15/19 0855    Order Status:  Completed Specimen:  Blood from Peripheral, Hand, Left Updated:  07/20/19 1412     Blood Culture, Routine No growth after 5 days.    Narrative:       Aerobic and anaerobic    Blood culture x two cultures. Draw prior to antibiotics. [[id number]] Collected:  07/15/19 0859    Order Status:  Completed Specimen:  Blood from Peripheral, Forearm, Left Updated:  07/20/19 1412     Blood Culture, Routine No growth after 5 days.    Narrative:       Aerobic and anaerobic    Urine Culture High Risk [[id number]] Collected:  07/16/19 1705    Order Status:  Completed Specimen:  Urine, Catheterized  Updated:  07/17/19 2216     Urine Culture, Routine No growth    Narrative:       Indicated criteria for high risk culture:->Other  Other (specify):->Septic shock    Culture, Respiratory with Gram Stain [[id number]]     Order Status:  Canceled Specimen:  Respiratory from Sputum, Expectorated

## 2019-07-21 NOTE — NURSING
Earl catheter removed. Catheter intact and patient tolerated well. Condom catheter placed on patient.

## 2019-07-22 LAB
ALBUMIN SERPL BCP-MCNC: 2.5 G/DL (ref 3.5–5.2)
ALP SERPL-CCNC: 156 U/L (ref 55–135)
ALT SERPL W/O P-5'-P-CCNC: 53 U/L (ref 10–44)
ANION GAP SERPL CALC-SCNC: 8 MMOL/L (ref 8–16)
ANISOCYTOSIS BLD QL SMEAR: SLIGHT
AST SERPL-CCNC: 62 U/L (ref 10–40)
BASOPHILS # BLD AUTO: ABNORMAL K/UL (ref 0–0.2)
BASOPHILS NFR BLD: 0 % (ref 0–1.9)
BILIRUB SERPL-MCNC: 1.8 MG/DL (ref 0.1–1)
BUN SERPL-MCNC: 72 MG/DL (ref 8–23)
CALCIUM SERPL-MCNC: 8.5 MG/DL (ref 8.7–10.5)
CHLORIDE SERPL-SCNC: 108 MMOL/L (ref 95–110)
CO2 SERPL-SCNC: 26 MMOL/L (ref 23–29)
CREAT SERPL-MCNC: 1.1 MG/DL (ref 0.5–1.4)
DIFFERENTIAL METHOD: ABNORMAL
EOSINOPHIL # BLD AUTO: ABNORMAL K/UL (ref 0–0.5)
EOSINOPHIL NFR BLD: 0 % (ref 0–8)
ERYTHROCYTE [DISTWIDTH] IN BLOOD BY AUTOMATED COUNT: 17.2 % (ref 11.5–14.5)
EST. GFR  (AFRICAN AMERICAN): >60 ML/MIN/1.73 M^2
EST. GFR  (NON AFRICAN AMERICAN): >60 ML/MIN/1.73 M^2
GLUCOSE SERPL-MCNC: 117 MG/DL (ref 70–110)
HCT VFR BLD AUTO: 30.3 % (ref 40–54)
HGB BLD-MCNC: 9.9 G/DL (ref 14–18)
HYPOCHROMIA BLD QL SMEAR: ABNORMAL
LYMPHOCYTES # BLD AUTO: ABNORMAL K/UL (ref 1–4.8)
LYMPHOCYTES NFR BLD: 23 % (ref 18–48)
MAGNESIUM SERPL-MCNC: 2.2 MG/DL (ref 1.6–2.6)
MCH RBC QN AUTO: 29.8 PG (ref 27–31)
MCHC RBC AUTO-ENTMCNC: 32.7 G/DL (ref 32–36)
MCV RBC AUTO: 91 FL (ref 82–98)
MONOCYTES # BLD AUTO: ABNORMAL K/UL (ref 0.3–1)
MONOCYTES NFR BLD: 2 % (ref 4–15)
MYELOCYTES NFR BLD MANUAL: 5 %
NEUTROPHILS NFR BLD: 66 % (ref 38–73)
NEUTS BAND NFR BLD MANUAL: 4 %
OVALOCYTES BLD QL SMEAR: ABNORMAL
PHOSPHATE SERPL-MCNC: 4.5 MG/DL (ref 2.7–4.5)
PLATELET # BLD AUTO: 53 K/UL (ref 150–350)
PLATELET BLD QL SMEAR: ABNORMAL
PMV BLD AUTO: ABNORMAL FL (ref 9.2–12.9)
POCT GLUCOSE: 114 MG/DL (ref 70–110)
POCT GLUCOSE: 159 MG/DL (ref 70–110)
POCT GLUCOSE: 213 MG/DL (ref 70–110)
POCT GLUCOSE: 227 MG/DL (ref 70–110)
POIKILOCYTOSIS BLD QL SMEAR: SLIGHT
POLYCHROMASIA BLD QL SMEAR: ABNORMAL
POTASSIUM SERPL-SCNC: 3.7 MMOL/L (ref 3.5–5.1)
PROT SERPL-MCNC: 5.2 G/DL (ref 6–8.4)
RBC # BLD AUTO: 3.32 M/UL (ref 4.6–6.2)
SODIUM SERPL-SCNC: 142 MMOL/L (ref 136–145)
WBC # BLD AUTO: 8.85 K/UL (ref 3.9–12.7)

## 2019-07-22 PROCEDURE — 94640 AIRWAY INHALATION TREATMENT: CPT | Mod: HCNC

## 2019-07-22 PROCEDURE — 25000003 PHARM REV CODE 250: Mod: HCNC | Performed by: FAMILY MEDICINE

## 2019-07-22 PROCEDURE — 25000003 PHARM REV CODE 250: Mod: HCNC | Performed by: NURSE PRACTITIONER

## 2019-07-22 PROCEDURE — 11000001 HC ACUTE MED/SURG PRIVATE ROOM: Mod: HCNC

## 2019-07-22 PROCEDURE — 25000242 PHARM REV CODE 250 ALT 637 W/ HCPCS: Mod: HCNC | Performed by: NURSE PRACTITIONER

## 2019-07-22 PROCEDURE — 85007 BL SMEAR W/DIFF WBC COUNT: CPT | Mod: HCNC

## 2019-07-22 PROCEDURE — 36415 COLL VENOUS BLD VENIPUNCTURE: CPT | Mod: HCNC

## 2019-07-22 PROCEDURE — 25000003 PHARM REV CODE 250: Mod: HCNC | Performed by: HOSPITALIST

## 2019-07-22 PROCEDURE — 30200315 PPD INTRADERMAL TEST REV CODE 302: Mod: HCNC | Performed by: HOSPITALIST

## 2019-07-22 PROCEDURE — S0030 INJECTION, METRONIDAZOLE: HCPCS | Mod: HCNC | Performed by: INTERNAL MEDICINE

## 2019-07-22 PROCEDURE — 25000003 PHARM REV CODE 250: Mod: HCNC | Performed by: INTERNAL MEDICINE

## 2019-07-22 PROCEDURE — 86580 TB INTRADERMAL TEST: CPT | Mod: HCNC | Performed by: HOSPITALIST

## 2019-07-22 PROCEDURE — 85027 COMPLETE CBC AUTOMATED: CPT | Mod: HCNC

## 2019-07-22 PROCEDURE — 97530 THERAPEUTIC ACTIVITIES: CPT | Mod: HCNC

## 2019-07-22 PROCEDURE — 84100 ASSAY OF PHOSPHORUS: CPT | Mod: HCNC

## 2019-07-22 PROCEDURE — 99233 SBSQ HOSP IP/OBS HIGH 50: CPT | Mod: HCNC,,, | Performed by: INTERNAL MEDICINE

## 2019-07-22 PROCEDURE — 27000221 HC OXYGEN, UP TO 24 HOURS: Mod: HCNC

## 2019-07-22 PROCEDURE — 63600175 PHARM REV CODE 636 W HCPCS: Mod: HCNC | Performed by: INTERNAL MEDICINE

## 2019-07-22 PROCEDURE — 97116 GAIT TRAINING THERAPY: CPT | Mod: HCNC

## 2019-07-22 PROCEDURE — 80053 COMPREHEN METABOLIC PANEL: CPT | Mod: HCNC

## 2019-07-22 PROCEDURE — 99233 PR SUBSEQUENT HOSPITAL CARE,LEVL III: ICD-10-PCS | Mod: HCNC,,, | Performed by: INTERNAL MEDICINE

## 2019-07-22 PROCEDURE — 83735 ASSAY OF MAGNESIUM: CPT | Mod: HCNC

## 2019-07-22 PROCEDURE — 94761 N-INVAS EAR/PLS OXIMETRY MLT: CPT | Mod: HCNC

## 2019-07-22 PROCEDURE — 63600175 PHARM REV CODE 636 W HCPCS: Mod: HCNC | Performed by: FAMILY MEDICINE

## 2019-07-22 RX ORDER — METRONIDAZOLE 500 MG/1
500 TABLET ORAL
Status: DISCONTINUED | OUTPATIENT
Start: 2019-07-22 | End: 2019-07-25

## 2019-07-22 RX ADMIN — METRONIDAZOLE 500 MG: 500 INJECTION, SOLUTION INTRAVENOUS at 01:07

## 2019-07-22 RX ADMIN — SODIUM CITRATE AND CITRIC ACID MONOHYDRATE 15 ML: 500; 334 SOLUTION ORAL at 09:07

## 2019-07-22 RX ADMIN — PANTOPRAZOLE SODIUM 40 MG: 40 TABLET, DELAYED RELEASE ORAL at 10:07

## 2019-07-22 RX ADMIN — CETIRIZINE HYDROCHLORIDE 10 MG: 10 TABLET, FILM COATED ORAL at 10:07

## 2019-07-22 RX ADMIN — ERYTHROMYCIN: 5 OINTMENT OPHTHALMIC at 09:07

## 2019-07-22 RX ADMIN — IPRATROPIUM BROMIDE AND ALBUTEROL SULFATE 3 ML: .5; 3 SOLUTION RESPIRATORY (INHALATION) at 07:07

## 2019-07-22 RX ADMIN — VANCOMYCIN HYDROCHLORIDE 1250 MG: 1.25 INJECTION, POWDER, LYOPHILIZED, FOR SOLUTION INTRAVENOUS at 11:07

## 2019-07-22 RX ADMIN — PREDNISONE 20 MG: 20 TABLET ORAL at 10:07

## 2019-07-22 RX ADMIN — TUBERCULIN PURIFIED PROTEIN DERIVATIVE 5 UNITS: 5 INJECTION, SOLUTION INTRADERMAL at 06:07

## 2019-07-22 RX ADMIN — INSULIN ASPART 1 UNITS: 100 INJECTION, SOLUTION INTRAVENOUS; SUBCUTANEOUS at 09:07

## 2019-07-22 RX ADMIN — IPRATROPIUM BROMIDE AND ALBUTEROL SULFATE 3 ML: .5; 3 SOLUTION RESPIRATORY (INHALATION) at 08:07

## 2019-07-22 RX ADMIN — METRONIDAZOLE 500 MG: 500 TABLET ORAL at 06:07

## 2019-07-22 RX ADMIN — ATORVASTATIN CALCIUM 20 MG: 20 TABLET, FILM COATED ORAL at 10:07

## 2019-07-22 RX ADMIN — IPRATROPIUM BROMIDE AND ALBUTEROL SULFATE 3 ML: .5; 3 SOLUTION RESPIRATORY (INHALATION) at 04:07

## 2019-07-22 RX ADMIN — LEVOTHYROXINE SODIUM 125 MCG: 25 TABLET ORAL at 05:07

## 2019-07-22 RX ADMIN — CEFEPIME HYDROCHLORIDE 2 G: 2 INJECTION, POWDER, FOR SOLUTION INTRAVENOUS at 10:07

## 2019-07-22 RX ADMIN — Medication 500 MCG: at 10:07

## 2019-07-22 RX ADMIN — SODIUM CITRATE AND CITRIC ACID MONOHYDRATE 15 ML: 500; 334 SOLUTION ORAL at 05:07

## 2019-07-22 RX ADMIN — SODIUM CITRATE AND CITRIC ACID MONOHYDRATE 15 ML: 500; 334 SOLUTION ORAL at 10:07

## 2019-07-22 RX ADMIN — IPRATROPIUM BROMIDE AND ALBUTEROL SULFATE 3 ML: .5; 3 SOLUTION RESPIRATORY (INHALATION) at 12:07

## 2019-07-22 RX ADMIN — HYPROMELLOSE 2910 1 DROP: 5 SOLUTION OPHTHALMIC at 10:07

## 2019-07-22 RX ADMIN — HYPROMELLOSE 2910 1 DROP: 5 SOLUTION OPHTHALMIC at 09:07

## 2019-07-22 RX ADMIN — INSULIN ASPART 2 UNITS: 100 INJECTION, SOLUTION INTRAVENOUS; SUBCUTANEOUS at 07:07

## 2019-07-22 RX ADMIN — METRONIDAZOLE 500 MG: 500 INJECTION, SOLUTION INTRAVENOUS at 10:07

## 2019-07-22 NOTE — PLAN OF CARE
07/22/19 1636   Post-Acute Status   Post-Acute Authorization Placement  (snf)   Post-Acute Placement Status Referrals Sent  (The sw faxed the pt's info to [facility] and [facility])

## 2019-07-22 NOTE — PROGRESS NOTES
"[facility]  Hematology/Oncology  Progress Note    Patient Name: Thierry Ramos Jr.  Admission Date: 7/15/2019  Hospital Length of Stay: 7 days  Code Status: Full Code     Subjective:     HPI:  85 year-old male with chronic lymphocytic leukemia, most recently on ibrutinib but held for the past week, was admitted on 7/15/19 for syncope and fall x 2. He is currently in the ICU being treated for possible sepsis with hypotension. Platelet count had decreased over the past 24 hours from 120 k/uL to < 10 k/uL. 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     Interval History:   - he was transferred out the ICU. He complains of generalized weakness, dizziness upon standing.  - platelet count today is 53 k/uL    Oncology Treatment Plan:   [No treatment plan]    Medications:  Continuous Infusions:  Scheduled Meds:   albuterol-ipratropium  3 mL Nebulization Q4H WAKE    artificial tears  1 drop Both Eyes BID    atorvastatin  20 mg Oral Daily    ceFEPime (MAXIPIME) IVPB  2 g Intravenous Daily    cetirizine  10 mg Oral Daily    cyanocobalamin  500 mcg Oral Daily    erythromycin   Both Eyes QHS    levothyroxine  125 mcg Oral Before breakfast    metroNIDAZOLE  500 mg Oral Q8H    pantoprazole  40 mg Oral Daily    predniSONE  20 mg Oral Daily    sodium citrate-citric acid 500-334 mg/5 ml  15 mL Oral TID    vancomycin (VANCOCIN) IVPB  1,250 mg Intravenous Q24H     PRN Meds:sodium chloride, sodium chloride, sodium chloride, sodium chloride, acetaminophen, benzonatate, Dextrose 10% Bolus, Dextrose 10% Bolus, glucagon (human recombinant), glucose, glucose, insulin aspart U-100, ondansetron, ramelteon, sodium chloride 0.9%     Review of Systems   Constitutional: Positive for fatigue.   HENT: Negative for sore throat.    Eyes: Negative for visual disturbance. "   Respiratory: Negative for shortness of breath.    Cardiovascular: Positive for leg swelling. Negative for chest pain.   Gastrointestinal: Negative for abdominal pain.   Genitourinary: Negative for dysuria.   Musculoskeletal: Negative for back pain.   Skin: Negative for rash.   Neurological: Positive for weakness.   Psychiatric/Behavioral: The patient is not nervous/anxious.      Objective:     Vital Signs (Most Recent):  Temp: 96.8 °F (36 °C) (07/22/19 1130)  Pulse: 87 (07/22/19 1243)  Resp: 18 (07/22/19 1243)  BP: (!) 119/56 (07/22/19 1130)  SpO2: 98 % (07/22/19 1243) Vital Signs (24h Range):  Temp:  [96 °F (35.6 °C)-97.5 °F (36.4 °C)] 96.8 °F (36 °C)  Pulse:  [62-87] 87  Resp:  [16-24] 18  SpO2:  [98 %-100 %] 98 %  BP: (117-179)/(56-78) 119/56     Weight: 73.1 kg (161 lb 2.5 oz)  Body mass index is 22.48 kg/m².  Body surface area is 1.91 meters squared.      Intake/Output Summary (Last 24 hours) at 7/22/2019 1546  Last data filed at 7/22/2019 0900  Gross per 24 hour   Intake 180 ml   Output 765 ml   Net -585 ml       Physical Exam   Constitutional: He is oriented to person, place, and time. He appears well-developed.   Fatigued   HENT:   Head: Normocephalic and atraumatic.   Eyes: Pupils are equal, round, and reactive to light. EOM are normal.   Neck: Normal range of motion. Neck supple.   Cardiovascular: Normal rate and regular rhythm.   Pulmonary/Chest: Effort normal and breath sounds normal.   Abdominal: Soft. Bowel sounds are normal.   Neurological: He is alert and oriented to person, place, and time.   Skin: Skin is warm and dry.   Ecchymoses throughout body   Psychiatric: He has a normal mood and affect. His behavior is normal. Judgment and thought content normal.   Nursing note and vitals reviewed.    Significant Labs:   Labs have been reviewed.    Lab Results   Component Value Date    WBC 8.85 07/22/2019    HGB 9.9 (L) 07/22/2019    HCT 30.3 (L) 07/22/2019    MCV 91 07/22/2019    PLT 53 (L) 07/22/2019      Assessment/Plan:     Immune thrombocytopenia  - I reviewed the peripheral smear. Rare enlarged platelets are noted.  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  - I spoke to his hematologist Dr. Marr on 7/16/19. She recommended pulse dexamethasone, so I placed an order for dexamethasone 40mg PO daily x 4 days.  - he has received a dose of IV immune globulin as well on 7/16/19.  - He completed the four doses of dexamethasone on 7/19/19.  - chronically, he takes low-dose prednisone. Until he is discharged and sees Dr. Marr in clinic, I recommended a moderate dose of prednisone, 20mg PO daily.   - Labs have been reviewed. Platelet count today is 53 k/uL.   - follow up platelet count.         Braxton El MD  Hematology/Oncology  [facility]

## 2019-07-22 NOTE — PLAN OF CARE
Plan of care reviewed with patient, understanding verbalized.  Pt remains SR on tele.  No complaints overnight. Bed alarm on, call light in reach, fall precautions in place.

## 2019-07-22 NOTE — PLAN OF CARE
Problem: Occupational Therapy Goal  Goal: Occupational Therapy Goal  Goals to be met by: 8/15     Patient will increase functional independence with ADLs by performing:    Feeding with Modified [place].  UE Dressing with Set-up Assistance.  Grooming while seated with Modified [place] post setup.  Toileting from bedside commode with Minimal Assistance for hygiene and clothing management.   Sitting at edge of bed x15 minutes with Modified [place] and no drop in BP.  Rolling to Bilateral with Modified [place].   Supine to sit with Modified [place].  Toilet transfer to bedside commode with Minimal Assistance.  Upper extremity exercise program x10 reps per handout, with independence.     Outcome: Ongoing (interventions implemented as appropriate)  Pt progressing well towards goals and demonstrated ability to sit EOB ~15 minutes SBA but BP elevated during task. Cont OT POC

## 2019-07-22 NOTE — PROGRESS NOTES
[facility] Hospital Medicine  Progress Note    Patient Name: Thierry Ramos Jr.  MRN: [id number]  Patient Class: IP- Inpatient   Admission Date: 7/15/2019  Length of Stay: 7 days  Attending Physician: Mark Landry DO  Primary Care Provider: AKILA Wooten MD        Subjective:     Principal Problem:Sepsis due to pneumonia      HPI:  85 y.o. male with PMHx of DM, CLL, HTN, HLD, CVA, carotid stenosis, hypothyroid, hypopituitarism, squamous cell carcinoma who presents after syncopal episode. Patient reports worsening muscle weakness. Patient and spouse reports patient started becoming weak in beginning of June/2019 and it progressively worsened.  Reports weakness is especially in the legs.  He also reports leg swelling which has been associated to starting Ibrutinib for CLL.  Patient follows with Dr. Trejo for CLL and last seen 7/8/19 for the weakness and was told to hold the Ibrutinib until his next follow up appointment.  Spouse reports patient fell twice last night with loss of consciousness after the second fall.   Denies any fever, SOB, nausea, vomiting, diarrhea, chest pain or abdominal pain. ED findings: WBC 12.75, H/H 12.3/38.9, Lactic Acid 3.0, ALK Phos 258, AST//9, chest x-ray showed mild pulmonary opacity at the left lung base may relate to pulmonary infiltrate/airspace disease, EKG no ischemic changes.  Patient admitted for further medical management.       Overview/Hospital Course:  WBC increased to 25. Platelets fell from 120 to 8, so H/O was consulted and patient was transfused 1 unit of platelets. Platelets increased only to 9. Hem onc gave IVIG and recommended no further transfusion except in life threatening bleed. Patient was oliguric with rising Cr, so nephrology was consulted. The next day Cr remained stable and UOP increased. ID was consulted, and recommended vanc, cefepime, and azithromycin, and flagyl. Abdominal US was unremarkable. Pt was transfused 2 units PRBC on  7/18 after he pulled his art line and started bleeding. Had episode of afib RVR, worsening hypoxia, and hypontension late 7/18 which resolved. CXR was repeated on 7/19 showing increased interstitial opacities. Lasix dose was increased to 80 TID and then decreased after good UOP. Respiratory status improved. Pt was transferred to the floor on 7/21. Platelets continued to improved. PT/OT recommended SNF placement.     Interval History: Discussed expectation that patient will not return to 100%. He is understanding but is very sad and finds it difficult to face his mortality. He reports that he is very emotional and having episodes of crying.     Review of Systems   Respiratory: Negative for cough and shortness of breath.    Cardiovascular: Negative for chest pain and leg swelling.   Genitourinary: Negative for decreased urine volume and difficulty urinating.   Musculoskeletal: Negative for arthralgias and myalgias.   Neurological: Positive for weakness.   Psychiatric/Behavioral: Negative for agitation and confusion.     Objective:     Vital Signs (Most Recent):  Temp: 96.8 °F (36 °C) (07/22/19 1130)  Pulse: 87 (07/22/19 1243)  Resp: 18 (07/22/19 1243)  BP: (!) 119/56 (07/22/19 1130)  SpO2: 98 % (07/22/19 1243) Vital Signs (24h Range):  Temp:  [96 °F (35.6 °C)-97.5 °F (36.4 °C)] 96.8 °F (36 °C)  Pulse:  [62-87] 87  Resp:  [16-24] 18  SpO2:  [98 %-100 %] 98 %  BP: (117-179)/(56-78) 119/56     Weight: 73.1 kg (161 lb 2.5 oz)  Body mass index is 22.48 kg/m².    Intake/Output Summary (Last 24 hours) at 7/22/2019 1530  Last data filed at 7/22/2019 0900  Gross per 24 hour   Intake 180 ml   Output 765 ml   Net -585 ml      Physical Exam   Constitutional: He is oriented to person, place, and time. No distress.   Cardiovascular: Normal rate and regular rhythm.   Pulmonary/Chest: Effort normal and breath sounds normal.   Abdominal: Soft. Bowel sounds are normal.   Musculoskeletal: He exhibits no edema.   Neurological: He is  alert and oriented to person, place, and time.   Skin: He is not diaphoretic.   Diffuse ecchymoses   Psychiatric:   Tearful       Significant Labs:   CBC:   Recent Labs   Lab 07/21/19  0353 07/22/19  0600   WBC 13.48* 8.85   HGB 9.3* 9.9*   HCT 28.1* 30.3*   PLT 35* 53*       Significant Imaging: I have reviewed all pertinent imaging results/findings within the past 24 hours.      Assessment/Plan:      * Sepsis due to pneumonia  Metabolic encephalopathy  Septic shock  Leukocytosis  Weakness  Chest X-ray showed left lung opacity, very mild.    -ID consult: Vanc, cefepime, and azithro (complete), flagyl  -Blood Cultures: NGTD  -Albuterol-ipratropium neb treatments Q4HR  -Lactic Acid: 3.0 > 1.6, required pressors on admission   -Abdominal US: unremarkable  -CT chest with bilateral ground glass opacities and pleural effusion    Abnormal liver enzymes  Likely 2/2 shock liver in the setting of septic shock. Improving but will also check liver US, which was unremarkable.     Central hypothyroidism  -Continue synthroid    CLL (chronic lymphocytic leukemia)  Immunosuppression  Edema of both legs  Follows with Dr. Trejo. Ibrutinib placed on hold by Dr. Trejo 2/2 to worsening weakness and leg swelling.  -Consult hem/onc, appreciate recs    Hypertension  -179  -Required pressors, hold home meds for now    Immunosuppression  Noted      Mixed dyslipidemia  History of CVA (cerebrovascular accident)  Denies chest pain or SOB  -Continue statin    Panhypopituitarism  -Will order pulse dexamethasone    Immune thrombocytopenia  Plt 120 > 9 > 52.   -Transfused platelets with little response; likely immune  -Further management per Hem Onc with IVIG    Type 2 diabetes, controlled, with neuropathy  Hemoglobin A1c: 5.6  -POC glucose checks ACHS  -Low dose SSI PRN  -Hypoglycemia protocol PRN  -Diabetic Diet    LUZ (acute kidney injury)  Oliguria  Cr 1.0 > 1.5. UOP improving  -Nephrology consult: signed off d/t improvement    Pulmonary  edema  Afib with RVR  Hypoxia  Compounded by recent blood transfusion  -Check echo: ok  -D/C lasix for now  -Strict I/O  -CT chest with edema and effusions  -Continue to wean O2    Thrombocytopenia, unspecified          VTE Risk Mitigation (From admission, onward)        Ordered     IP VTE HIGH RISK PATIENT  Once      07/15/19 4431                Gloria Frias PA-C  Department of Hospital Medicine   [facility]

## 2019-07-22 NOTE — PLAN OF CARE
CM visited with pt and wife at bedside, discussed skilled nursing options.  Pt's dispo will depend largely on hospital coarse and needs at time of d/c if improvement made.  SNF at NH may not be option given his immunosuppression and CLL status.  CM will continue to follow with primary team.    In the event skilled nursing is an option, OSNF is first option.  2nd [facility], 3rd [facility].

## 2019-07-22 NOTE — PLAN OF CARE
Problem: Adult Inpatient Plan of Care  Goal: Plan of Care Review  Outcome: Ongoing (interventions implemented as appropriate)  Plan of care reviewed with patient. Patient voiced understanding. NSR on monitor. No acute distress noted. Side rails x 2, bed in lowest position, call bell within reach, pt advised to call for assistance. Maintain bed alarm for patient safety. Son at bedside.

## 2019-07-22 NOTE — PLAN OF CARE
Problem: Physical Therapy Goal  Goal: Physical Therapy Goal  Goals to be met by: 8/15/2019     Patient will increase functional independence with mobility by performin. Supine to sit with Modified [place]  2. Sit to stand transfer with Modified [place]  3. Bed to chair transfer with Modified [place] using Rolling Walker  4. Gait  x 20 feet with Modified [place] using Rolling Walker.      Outcome: Ongoing (interventions implemented as appropriate)  Recommendation SNF

## 2019-07-22 NOTE — PROGRESS NOTES
Pharmacist Intervention IV to PO Note    Thierry Ramos Jr. is a 85 y.o. male being treated with IV medication metronidazole    Patient Data:    Vital Signs (Most Recent):  Temp: 96.8 °F (36 °C) (07/22/19 1130)  Pulse: 87 (07/22/19 1243)  Resp: 18 (07/22/19 1243)  BP: (!) 119/56 (07/22/19 1130)  SpO2: 98 % (07/22/19 1243)   Vital Signs (72h Range):  Temp:  [96 °F (35.6 °C)-98.5 °F (36.9 °C)]   Pulse:  [62-95]   Resp:  [7-69]   BP: ()/()   SpO2:  [88 %-100 %]      CBC:  Recent Labs   Lab 07/20/19  0357 07/21/19  0353 07/22/19  0600   WBC 14.05* 13.48* 8.85   RBC 3.05* 3.16* 3.32*   HGB 9.0* 9.3* 9.9*   HCT 26.6* 28.1* 30.3*   PLT 32* 35* 53*   MCV 87 89 91   MCH 29.5 29.4 29.8   MCHC 33.8 33.1 32.7     CMP:     Recent Labs   Lab 07/20/19 0357 07/21/19  0353 07/22/19  0600   * 210* 117*   CALCIUM 8.5* 8.6* 8.5*   ALBUMIN 2.4* 2.5* 2.5*   PROT 5.4* 5.4* 5.2*    142 142   K 3.9 3.6 3.7   CO2 19* 22* 26    107 108   BUN 71* 75* 72*   CREATININE 1.5* 1.5* 1.1   ALKPHOS 164* 164* 156*   ALT 39 44 53*   AST 65* 62* 62*   BILITOT 1.4* 1.6* 1.8*       Dietary Orders:  Diet Orders            Diet diabetic [facility]; 2000 Calorie: Diabetic starting at 07/15 1147            Based on the following criteria, this patient qualifies for intravenous to oral conversion:  [x] The patients gastrointestinal tract is functioning (tolerating medications via oral or enteral route for 24 hours and tolerating food or enteral feeds for 24 hours).  [x] The patient is hemodynamically stable for 24 hours (heart rate <100 beats per minute, systolic blood pressure >99 mm Hg, and respiratory rate <20 breaths per minute).  [x] The patient shows clinical improvement (afebrile for at least 24 hours and white blood cell count downtrending or normalized). Additionally, the patient must be non-neutropenic (absolute neutrophil count >500 cells/mm3).  [x] For antimicrobials, the patient has received IV therapy for  at least 24 hours.    IV medication flagyl will be changed to oral medication flagyl    Pharmacist's Name: Jose Angel Subramanian  Pharmacist's Extension: [phone]

## 2019-07-22 NOTE — ASSESSMENT & PLAN NOTE
Afib with RVR  Hypoxia  Compounded by recent blood transfusion  -Check echo: ok  -D/C lasix for now  -Strict I/O  -CT chest with edema and effusions  -Continue to wean O2

## 2019-07-22 NOTE — SUBJECTIVE & OBJECTIVE
Interval History:   - he was transferred out the ICU. He complains of generalized weakness, dizziness upon standing.  - platelet count today is 53 k/uL    Oncology Treatment Plan:   [No treatment plan]    Medications:  Continuous Infusions:  Scheduled Meds:   albuterol-ipratropium  3 mL Nebulization Q4H WAKE    artificial tears  1 drop Both Eyes BID    atorvastatin  20 mg Oral Daily    ceFEPime (MAXIPIME) IVPB  2 g Intravenous Daily    cetirizine  10 mg Oral Daily    cyanocobalamin  500 mcg Oral Daily    erythromycin   Both Eyes QHS    levothyroxine  125 mcg Oral Before breakfast    metroNIDAZOLE  500 mg Oral Q8H    pantoprazole  40 mg Oral Daily    predniSONE  20 mg Oral Daily    sodium citrate-citric acid 500-334 mg/5 ml  15 mL Oral TID    vancomycin (VANCOCIN) IVPB  1,250 mg Intravenous Q24H     PRN Meds:sodium chloride, sodium chloride, sodium chloride, sodium chloride, acetaminophen, benzonatate, Dextrose 10% Bolus, Dextrose 10% Bolus, glucagon (human recombinant), glucose, glucose, insulin aspart U-100, ondansetron, ramelteon, sodium chloride 0.9%     Review of Systems   Constitutional: Positive for fatigue.   HENT: Negative for sore throat.    Eyes: Negative for visual disturbance.   Respiratory: Negative for shortness of breath.    Cardiovascular: Positive for leg swelling. Negative for chest pain.   Gastrointestinal: Negative for abdominal pain.   Genitourinary: Negative for dysuria.   Musculoskeletal: Negative for back pain.   Skin: Negative for rash.   Neurological: Positive for weakness.   Psychiatric/Behavioral: The patient is not nervous/anxious.      Objective:     Vital Signs (Most Recent):  Temp: 96.8 °F (36 °C) (07/22/19 1130)  Pulse: 87 (07/22/19 1243)  Resp: 18 (07/22/19 1243)  BP: (!) 119/56 (07/22/19 1130)  SpO2: 98 % (07/22/19 1243) Vital Signs (24h Range):  Temp:  [96 °F (35.6 °C)-97.5 °F (36.4 °C)] 96.8 °F (36 °C)  Pulse:  [62-87] 87  Resp:  [16-24] 18  SpO2:  [98 %-100 %] 98  %  BP: (117-179)/(56-78) 119/56     Weight: 73.1 kg (161 lb 2.5 oz)  Body mass index is 22.48 kg/m².  Body surface area is 1.91 meters squared.      Intake/Output Summary (Last 24 hours) at 7/22/2019 1546  Last data filed at 7/22/2019 0900  Gross per 24 hour   Intake 180 ml   Output 765 ml   Net -585 ml       Physical Exam   Constitutional: He is oriented to person, place, and time. He appears well-developed.   Fatigued   HENT:   Head: Normocephalic and atraumatic.   Eyes: Pupils are equal, round, and reactive to light. EOM are normal.   Neck: Normal range of motion. Neck supple.   Cardiovascular: Normal rate and regular rhythm.   Pulmonary/Chest: Effort normal and breath sounds normal.   Abdominal: Soft. Bowel sounds are normal.   Neurological: He is alert and oriented to person, place, and time.   Skin: Skin is warm and dry.   Ecchymoses throughout body   Psychiatric: He has a normal mood and affect. His behavior is normal. Judgment and thought content normal.   Nursing note and vitals reviewed.    Significant Labs:   Labs have been reviewed.    Lab Results   Component Value Date    WBC 8.85 07/22/2019    HGB 9.9 (L) 07/22/2019    HCT 30.3 (L) 07/22/2019    MCV 91 07/22/2019    PLT 53 (L) 07/22/2019

## 2019-07-22 NOTE — SUBJECTIVE & OBJECTIVE
Interval History: Discussed expectation that patient will not return to 100%. He is understanding but is very sad and finds it difficult to face his mortality. He reports that he is very emotional and having episodes of crying.     Review of Systems   Respiratory: Negative for cough and shortness of breath.    Cardiovascular: Negative for chest pain and leg swelling.   Genitourinary: Negative for decreased urine volume and difficulty urinating.   Musculoskeletal: Negative for arthralgias and myalgias.   Neurological: Positive for weakness.   Psychiatric/Behavioral: Negative for agitation and confusion.     Objective:     Vital Signs (Most Recent):  Temp: 96.8 °F (36 °C) (07/22/19 1130)  Pulse: 87 (07/22/19 1243)  Resp: 18 (07/22/19 1243)  BP: (!) 119/56 (07/22/19 1130)  SpO2: 98 % (07/22/19 1243) Vital Signs (24h Range):  Temp:  [96 °F (35.6 °C)-97.5 °F (36.4 °C)] 96.8 °F (36 °C)  Pulse:  [62-87] 87  Resp:  [16-24] 18  SpO2:  [98 %-100 %] 98 %  BP: (117-179)/(56-78) 119/56     Weight: 73.1 kg (161 lb 2.5 oz)  Body mass index is 22.48 kg/m².    Intake/Output Summary (Last 24 hours) at 7/22/2019 1530  Last data filed at 7/22/2019 0900  Gross per 24 hour   Intake 180 ml   Output 765 ml   Net -585 ml      Physical Exam   Constitutional: He is oriented to person, place, and time. No distress.   Cardiovascular: Normal rate and regular rhythm.   Pulmonary/Chest: Effort normal and breath sounds normal.   Abdominal: Soft. Bowel sounds are normal.   Musculoskeletal: He exhibits no edema.   Neurological: He is alert and oriented to person, place, and time.   Skin: He is not diaphoretic.   Diffuse ecchymoses   Psychiatric:   Tearful       Significant Labs:   CBC:   Recent Labs   Lab 07/21/19  0353 07/22/19  0600   WBC 13.48* 8.85   HGB 9.3* 9.9*   HCT 28.1* 30.3*   PLT 35* 53*       Significant Imaging: I have reviewed all pertinent imaging results/findings within the past 24 hours.

## 2019-07-22 NOTE — ASSESSMENT & PLAN NOTE
- I reviewed the peripheral smear. Rare enlarged platelets are noted.  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  - I spoke to his hematologist Dr. Marr on 7/16/19. She recommended pulse dexamethasone, so I placed an order for dexamethasone 40mg PO daily x 4 days.  - he has received a dose of IV immune globulin as well on 7/16/19.  - He completed the four doses of dexamethasone on 7/19/19.  - chronically, he takes low-dose prednisone. Until he is discharged and sees Dr. Marr in clinic, I recommended a moderate dose of prednisone, 20mg PO daily.   - Labs have been reviewed. Platelet count today is 53 k/uL.   - follow up platelet count.

## 2019-07-22 NOTE — ASSESSMENT & PLAN NOTE
Plt 120 > 9 > 52.   -Transfused platelets with little response; likely immune  -Further management per Hem Onc with IVIG

## 2019-07-22 NOTE — PT/OT/SLP PROGRESS
Physical Therapy Treatment    Patient Name:  Thierry Ramos Jr.   MRN:  [id number]    Recommendations:     Discharge Recommendations:  nursing facility, skilled   Discharge Equipment Recommendations: other (see comments)(TBD)   Barriers to discharge: None    Assessment:     Thierry Ramos Jr. is a 85 y.o. male admitted with a medical diagnosis of Sepsis due to pneumonia.  He presents with the following impairments/functional limitations:  weakness, gait instability, decreased lower extremity function, decreased ROM, impaired functional mobilty, impaired skin, impaired self care skills, impaired balance, impaired endurance, impaired cardiopulmonary response to activity . Patient able to ambulate with RW and mod assist ~ 8 ft around bed to other side. Patient able to sit up in chair for lunch performing SPT using RW an steps from bed to chair min to mod assist.    Rehab Prognosis: Good; patient would benefit from acute skilled PT services to address these deficits and reach maximum level of function.    Recent Surgery: * No surgery found *      Plan:     During this hospitalization, patient to be seen 6 x/week to address the identified rehab impairments via gait training, therapeutic exercises, therapeutic activities and progress toward the following goals:    · Plan of Care Expires:  08/17/19    Subjective     Chief Complaint: weakness  Patient/Family Comments/goals: get well  Pain/Comfort:  · Pain Rating 1: 0/10  · Pain Rating Post-Intervention 1: 0/10      Objective:     Communicated with primary nurse prior to session.  Patient found HOB elevated with telemetry, oxygen, peripheral IV upon PT entry to room.     General Precautions: Standard, fall   Orthopedic Precautions:N/A   Braces: N/A     Functional Mobility:  · Bed Mobility:     · Supine to Sit: minimum assistance  · Sit to Supine: minimum assistance  · Transfers:     · Sit to Stand:  minimum assistance with rolling walker  · Gait: 8 ft with RW and mod  assist  · Balance: poor  + with RW      AM-PAC 6 CLICK MOBILITY  Turning over in bed (including adjusting bedclothes, sheets and blankets)?: 3  Sitting down on and standing up from a chair with arms (e.g., wheelchair, bedside commode, etc.): 3  Moving from lying on back to sitting on the side of the bed?: 3  Moving to and from a bed to a chair (including a wheelchair)?: 3  Need to walk in hospital room?: 2  Climbing 3-5 steps with a railing?: 1  Basic Mobility Total Score: 15       Therapeutic Activities and Exercises:   Reviewed AROM ex seated for BLE.    Patient left HOB elevated with all lines intact, call button in reach, bed alarm on and spouse present..    GOALS:   Multidisciplinary Problems     Physical Therapy Goals        Problem: Physical Therapy Goal    Goal Priority Disciplines Outcome Goal Variances Interventions   Physical Therapy Goal     PT, PT/OT Ongoing (interventions implemented as appropriate)     Description:  Goals to be met by: 8/15/2019     Patient will increase functional independence with mobility by performin. Supine to sit with Modified [place]  2. Sit to stand transfer with Modified [place]  3. Bed to chair transfer with Modified [place] using Rolling Walker  4. Gait  x 20 feet with Modified [place] using Rolling Walker.              Problem: Physical Therapy Goal    Goal Priority Disciplines Outcome Goal Variances Interventions   Physical Therapy Goal     PT, PT/OT                      Time Tracking:     PT Received On: 19  PT Start Time: 1135     PT Stop Time: 1210  PT Total Time (min): 35 min     Billable Minutes: Gait Training 15 and Therapeutic Activity 16    Treatment Type: Treatment  PT/PTA: PT     PTA Visit Number: 0     Cb Tineo, PT  2019

## 2019-07-22 NOTE — PT/OT/SLP PROGRESS
Occupational Therapy   Treatment    Name: Thierry Ramos Jr.  MRN: [id number]  Admitting Diagnosis:  Sepsis due to pneumonia       Recommendations:     Discharge Recommendations: nursing facility, skilled  Discharge Equipment Recommendations:  (TBD pending progress)  Barriers to discharge:  None    Assessment:     Thierry Ramos Jr. is a 85 y.o. male with a medical diagnosis of Sepsis due to pneumonia.  He presents with deconditioning, limited tolerance for EOB activity this afternoon 2/2 asymptomatic elevation in BP upon sitting EOB. Performance deficits affecting function are weakness, impaired self care skills, impaired balance, decreased coordination, decreased ROM, impaired skin, impaired endurance, impaired functional mobilty, decreased upper extremity function, decreased lower extremity function, impaired cardiopulmonary response to activity, edema, gait instability.     Rehab Prognosis:  Good; patient would benefit from acute skilled OT services to address these deficits and reach maximum level of function.       Plan:     Patient to be seen 5 x/week to address the above listed problems via self-care/home management, therapeutic activities, therapeutic exercises  · Plan of Care Expires: 08/15/19  · Plan of Care Reviewed with: patient, spouse    Subjective     Pain/Comfort:  · Pain Rating 1: 0/10    Objective:     Communicated with: nsg prior to session.  Patient found HOB elevated with peripheral IV, oxygen, SCD, telemetry upon OT entry to room.    General Precautions: Standard, fall   Orthopedic Precautions:N/A   Braces: N/A     Occupational Performance:     Bed Mobility:    · Patient completed Rolling/Turning to Left with  supervision and stand by assistance  · Patient completed Scooting/Bridging with supervision and stand by assistance  · Patient completed Supine to Sit with supervision and stand by assistance  · Patient completed Sit to Supine with supervision and stand by assistance     Functional  "Mobility/Transfers:  Functional Mobility: Pt with fair dynamic seated balance.    Activities of Daily Living:  · Lower Body Dressing: total assistance to don/doff B socks      [facility] 6 Click ADL: 12    Treatment & Education:  Pt participated in AROM therex seated EOB and completed 2x10 reps shoulder flex/ext, elbow flex/ext and required increased v/c to slow rate of therex and to take rest breaks/complete PLB. Pt with edema BUE distal >proximal and provided with BUE propped on pillows and education to continue therex to assist in edema management. Pt's spouse expressed preference for SNF "on [facility]." OT suggested speaking with Case Management for follow up on placement recommendations. Pt oriented and singing, conversing this date. Pt stated that he felt "tightness" in chest upon sitting EOB, no dizziness noted. Pt BP as follows:  Supine prior to ax: 123/70 ID 65  Seated EOB: 168/91 ID 69  Supine after session: 132/61 ID 70    Patient left HOB elevated with all lines intact, call button in reach, bed alarm on, nsg notified and spouse presentEducation:      GOALS:   Multidisciplinary Problems     Occupational Therapy Goals        Problem: Occupational Therapy Goal    Goal Priority Disciplines Outcome Interventions   Occupational Therapy Goal     OT, PT/OT Ongoing (interventions implemented as appropriate)    Description:  Goals to be met by: 8/15     Patient will increase functional independence with ADLs by performing:    Feeding with Modified [place].  UE Dressing with Set-up Assistance.  Grooming while seated with Modified [place] post setup.  Toileting from bedside commode with Minimal Assistance for hygiene and clothing management.   Sitting at edge of bed x15 minutes with Modified [place] and no drop in BP.  Rolling to Bilateral with Modified [place].   Supine to sit with Modified [place].  Toilet transfer to bedside commode with Minimal Assistance.  Upper extremity exercise " program x10 reps per handout, with independence.                      Time Tracking:     OT Date of Treatment: 07/22/19  OT Start Time: 1420  OT Stop Time: 1447  OT Total Time (min): 27 min    Billable Minutes:Therapeutic Activity 27    Lilliam Mckeon OT  7/22/2019

## 2019-07-22 NOTE — ASSESSMENT & PLAN NOTE
Metabolic encephalopathy  Septic shock  Leukocytosis  Weakness  Chest X-ray showed left lung opacity, very mild.    -ID consult: Vanc, cefepime, and azithro (complete), flagyl  -Blood Cultures: NGTD  -Albuterol-ipratropium neb treatments Q4HR  -Lactic Acid: 3.0 > 1.6, required pressors on admission   -Abdominal US: unremarkable  -CT chest with bilateral ground glass opacities and pleural effusion

## 2019-07-23 LAB
ALBUMIN SERPL BCP-MCNC: 2.3 G/DL (ref 3.5–5.2)
ALP SERPL-CCNC: 169 U/L (ref 55–135)
ALT SERPL W/O P-5'-P-CCNC: 56 U/L (ref 10–44)
ANION GAP SERPL CALC-SCNC: 7 MMOL/L (ref 8–16)
ANISOCYTOSIS BLD QL SMEAR: SLIGHT
AST SERPL-CCNC: 67 U/L (ref 10–40)
BASOPHILS # BLD AUTO: ABNORMAL K/UL (ref 0–0.2)
BASOPHILS NFR BLD: 0 % (ref 0–1.9)
BILIRUB SERPL-MCNC: 1.7 MG/DL (ref 0.1–1)
BUN SERPL-MCNC: 67 MG/DL (ref 8–23)
CALCIUM SERPL-MCNC: 8.4 MG/DL (ref 8.7–10.5)
CHLORIDE SERPL-SCNC: 108 MMOL/L (ref 95–110)
CO2 SERPL-SCNC: 26 MMOL/L (ref 23–29)
CREAT SERPL-MCNC: 1 MG/DL (ref 0.5–1.4)
DIFFERENTIAL METHOD: ABNORMAL
EOSINOPHIL # BLD AUTO: ABNORMAL K/UL (ref 0–0.5)
EOSINOPHIL NFR BLD: 0 % (ref 0–8)
ERYTHROCYTE [DISTWIDTH] IN BLOOD BY AUTOMATED COUNT: 17.6 % (ref 11.5–14.5)
EST. GFR  (AFRICAN AMERICAN): >60 ML/MIN/1.73 M^2
EST. GFR  (NON AFRICAN AMERICAN): >60 ML/MIN/1.73 M^2
GLUCOSE SERPL-MCNC: 106 MG/DL (ref 70–110)
HCT VFR BLD AUTO: 29.7 % (ref 40–54)
HGB BLD-MCNC: 9.6 G/DL (ref 14–18)
HYPOCHROMIA BLD QL SMEAR: ABNORMAL
LYMPHOCYTES # BLD AUTO: ABNORMAL K/UL (ref 1–4.8)
LYMPHOCYTES NFR BLD: 19 % (ref 18–48)
MAGNESIUM SERPL-MCNC: 2.3 MG/DL (ref 1.6–2.6)
MCH RBC QN AUTO: 29.7 PG (ref 27–31)
MCHC RBC AUTO-ENTMCNC: 32.3 G/DL (ref 32–36)
MCV RBC AUTO: 92 FL (ref 82–98)
MONOCYTES # BLD AUTO: ABNORMAL K/UL (ref 0.3–1)
MONOCYTES NFR BLD: 1 % (ref 4–15)
MYELOCYTES NFR BLD MANUAL: 1 %
NEUTROPHILS NFR BLD: 75 % (ref 38–73)
NEUTS BAND NFR BLD MANUAL: 4 %
NRBC BLD-RTO: ABNORMAL /100 WBC
OVALOCYTES BLD QL SMEAR: ABNORMAL
PHOSPHATE SERPL-MCNC: 3.4 MG/DL (ref 2.7–4.5)
PLATELET # BLD AUTO: 23 K/UL (ref 150–350)
PLATELET BLD QL SMEAR: ABNORMAL
PMV BLD AUTO: 10.6 FL (ref 9.2–12.9)
POCT GLUCOSE: 108 MG/DL (ref 70–110)
POCT GLUCOSE: 144 MG/DL (ref 70–110)
POCT GLUCOSE: 185 MG/DL (ref 70–110)
POCT GLUCOSE: 248 MG/DL (ref 70–110)
POCT GLUCOSE: 326 MG/DL (ref 70–110)
POIKILOCYTOSIS BLD QL SMEAR: SLIGHT
POLYCHROMASIA BLD QL SMEAR: ABNORMAL
POTASSIUM SERPL-SCNC: 3.6 MMOL/L (ref 3.5–5.1)
PROT SERPL-MCNC: 4.8 G/DL (ref 6–8.4)
RBC # BLD AUTO: 3.23 M/UL (ref 4.6–6.2)
SODIUM SERPL-SCNC: 141 MMOL/L (ref 136–145)
WBC # BLD AUTO: 6.42 K/UL (ref 3.9–12.7)

## 2019-07-23 PROCEDURE — 11000001 HC ACUTE MED/SURG PRIVATE ROOM: Mod: HCNC

## 2019-07-23 PROCEDURE — 84100 ASSAY OF PHOSPHORUS: CPT | Mod: HCNC

## 2019-07-23 PROCEDURE — 80053 COMPREHEN METABOLIC PANEL: CPT | Mod: HCNC

## 2019-07-23 PROCEDURE — 25000003 PHARM REV CODE 250: Mod: HCNC | Performed by: HOSPITALIST

## 2019-07-23 PROCEDURE — 36415 COLL VENOUS BLD VENIPUNCTURE: CPT | Mod: HCNC

## 2019-07-23 PROCEDURE — 94640 AIRWAY INHALATION TREATMENT: CPT | Mod: HCNC

## 2019-07-23 PROCEDURE — 25000242 PHARM REV CODE 250 ALT 637 W/ HCPCS: Mod: HCNC | Performed by: NURSE PRACTITIONER

## 2019-07-23 PROCEDURE — 63600175 PHARM REV CODE 636 W HCPCS: Mod: HCNC | Performed by: FAMILY MEDICINE

## 2019-07-23 PROCEDURE — 97116 GAIT TRAINING THERAPY: CPT | Mod: HCNC

## 2019-07-23 PROCEDURE — 83735 ASSAY OF MAGNESIUM: CPT | Mod: HCNC

## 2019-07-23 PROCEDURE — 97110 THERAPEUTIC EXERCISES: CPT | Mod: HCNC

## 2019-07-23 PROCEDURE — 94761 N-INVAS EAR/PLS OXIMETRY MLT: CPT | Mod: HCNC

## 2019-07-23 PROCEDURE — 63600175 PHARM REV CODE 636 W HCPCS: Mod: HCNC | Performed by: INTERNAL MEDICINE

## 2019-07-23 PROCEDURE — 25000003 PHARM REV CODE 250: Mod: HCNC | Performed by: INTERNAL MEDICINE

## 2019-07-23 PROCEDURE — 25000003 PHARM REV CODE 250: Mod: HCNC | Performed by: NURSE PRACTITIONER

## 2019-07-23 PROCEDURE — 97530 THERAPEUTIC ACTIVITIES: CPT | Mod: HCNC

## 2019-07-23 PROCEDURE — 85007 BL SMEAR W/DIFF WBC COUNT: CPT | Mod: HCNC

## 2019-07-23 PROCEDURE — 25000003 PHARM REV CODE 250: Mod: HCNC | Performed by: FAMILY MEDICINE

## 2019-07-23 PROCEDURE — 85027 COMPLETE CBC AUTOMATED: CPT | Mod: HCNC

## 2019-07-23 RX ADMIN — SODIUM CITRATE AND CITRIC ACID MONOHYDRATE 15 ML: 500; 334 SOLUTION ORAL at 08:07

## 2019-07-23 RX ADMIN — PREDNISONE 20 MG: 20 TABLET ORAL at 10:07

## 2019-07-23 RX ADMIN — VANCOMYCIN HYDROCHLORIDE 1250 MG: 1.25 INJECTION, POWDER, LYOPHILIZED, FOR SOLUTION INTRAVENOUS at 11:07

## 2019-07-23 RX ADMIN — LEVOTHYROXINE SODIUM 125 MCG: 25 TABLET ORAL at 05:07

## 2019-07-23 RX ADMIN — INSULIN ASPART 2 UNITS: 100 INJECTION, SOLUTION INTRAVENOUS; SUBCUTANEOUS at 08:07

## 2019-07-23 RX ADMIN — HYPROMELLOSE 2910 1 DROP: 5 SOLUTION OPHTHALMIC at 09:07

## 2019-07-23 RX ADMIN — PANTOPRAZOLE SODIUM 40 MG: 40 TABLET, DELAYED RELEASE ORAL at 10:07

## 2019-07-23 RX ADMIN — Medication 500 MCG: at 09:07

## 2019-07-23 RX ADMIN — METRONIDAZOLE 500 MG: 500 TABLET ORAL at 07:07

## 2019-07-23 RX ADMIN — IPRATROPIUM BROMIDE AND ALBUTEROL SULFATE 3 ML: .5; 3 SOLUTION RESPIRATORY (INHALATION) at 07:07

## 2019-07-23 RX ADMIN — SODIUM CITRATE AND CITRIC ACID MONOHYDRATE 15 ML: 500; 334 SOLUTION ORAL at 03:07

## 2019-07-23 RX ADMIN — IPRATROPIUM BROMIDE AND ALBUTEROL SULFATE 3 ML: .5; 3 SOLUTION RESPIRATORY (INHALATION) at 11:07

## 2019-07-23 RX ADMIN — ATORVASTATIN CALCIUM 20 MG: 20 TABLET, FILM COATED ORAL at 10:07

## 2019-07-23 RX ADMIN — CEFEPIME HYDROCHLORIDE 2 G: 2 INJECTION, POWDER, FOR SOLUTION INTRAVENOUS at 10:07

## 2019-07-23 RX ADMIN — SODIUM CITRATE AND CITRIC ACID MONOHYDRATE 15 ML: 500; 334 SOLUTION ORAL at 10:07

## 2019-07-23 RX ADMIN — IPRATROPIUM BROMIDE AND ALBUTEROL SULFATE 3 ML: .5; 3 SOLUTION RESPIRATORY (INHALATION) at 03:07

## 2019-07-23 RX ADMIN — CETIRIZINE HYDROCHLORIDE 10 MG: 10 TABLET, FILM COATED ORAL at 10:07

## 2019-07-23 RX ADMIN — METRONIDAZOLE 500 MG: 500 TABLET ORAL at 02:07

## 2019-07-23 RX ADMIN — HYPROMELLOSE 2910 1 DROP: 5 SOLUTION OPHTHALMIC at 08:07

## 2019-07-23 RX ADMIN — METRONIDAZOLE 500 MG: 500 TABLET ORAL at 10:07

## 2019-07-23 RX ADMIN — ERYTHROMYCIN: 5 OINTMENT OPHTHALMIC at 08:07

## 2019-07-23 NOTE — PLAN OF CARE
Brief Hematology/Oncology update:    Labs have been reviewed.    Lab Results   Component Value Date    WBC 6.42 07/23/2019    HGB 9.6 (L) 07/23/2019    HCT 29.7 (L) 07/23/2019    MCV 92 07/23/2019    PLT 23 (LL) 07/23/2019       Assessment/Plan:  1. Thrombocytopenia  - Labs have been reviewed. Platelet count today had decreased to 23 k/uL.  - continue prednisone 20mg PO daily. Recommend changing/discontinuing antibiotics if possible (i.e. Vancomycin) as they can cause drug-induced thrombocytopenia.  - if tomorrow's platelet count continues to decline, I would consider increasing the dose of prednisone to 50-60mg PO daily.  - continue to monitor.    Braxton El M.D.  Hematology/Oncology  [facility] - [address], [address]  [place] [address]  Phone: [phone]  Fax: [phone]

## 2019-07-23 NOTE — PLAN OF CARE
Problem: Occupational Therapy Goal  Goal: Occupational Therapy Goal  Goals to be met by: 8/15     Patient will increase functional independence with ADLs by performing:    Feeding with Modified [place].  UE Dressing with Set-up Assistance.  Grooming while seated with Modified [place] post setup.  Toileting from bedside commode with Minimal Assistance for hygiene and clothing management.   Sitting at edge of bed x15 minutes with Modified [place] and no drop in BP.  Rolling to Bilateral with Modified [place].   Supine to sit with Modified [place].  Toilet transfer to bedside commode with Minimal Assistance.  Upper extremity exercise program x10 reps per handout, with independence.     Outcome: Ongoing (interventions implemented as appropriate)  Patient with improved BUE ROM noted this date. Patient improving with activity tolerance and OOB tolerance. Patient will benefit from continued skilled OT to address deficits and improve performance in functional ADL tasks. Cont OT.

## 2019-07-23 NOTE — PT/OT/SLP PROGRESS
Occupational Therapy   Treatment    Name: Thierry Ramos Jr.  MRN: [id number]  Admitting Diagnosis:  Sepsis due to pneumonia       Recommendations:     Discharge Recommendations: nursing facility, skilled  Discharge Equipment Recommendations:  (Defer to SNF)  Barriers to discharge:  None    Assessment:   Patient with improved BUE ROM noted this date. Patient improving with activity tolerance and OOB tolerance. Patient will benefit from continued skilled OT to address deficits and improve performance in functional ADL tasks. Cont OT.    Thierry Ramos Jr. is a 85 y.o. male with a medical diagnosis of Sepsis due to pneumonia. Performance deficits affecting function are weakness, impaired endurance, impaired self care skills, impaired functional mobilty, gait instability, impaired balance, decreased upper extremity function, decreased lower extremity function, decreased ROM, impaired skin, impaired coordination, edema.     Rehab Prognosis:  Good and Fair; patient would benefit from acute skilled OT services to address these deficits and reach maximum level of function.       Plan:     Patient to be seen 5 x/week to address the above listed problems via self-care/home management, therapeutic activities, therapeutic exercises  · Plan of Care Expires: 08/15/19  · Plan of Care Reviewed with: patient, spouse    Subjective     Pain/Comfort:  · Pain Rating 1: 0/10  · Pain Rating Post-Intervention 1: 0/10    Objective:     Communicated with: Miah sevilla prior to session.  Patient found up in chair with peripheral IV, telemetry upon OT entry to room.    General Precautions: Standard, fall   Orthopedic Precautions:N/A   Braces: N/A     Bed Mobility:    · N/A patient UIC    Functional Mobility/Transfers:  · N/A    Activities of Daily Living:  · Grooming: completed earlier with wife      LIEN 6 Click ADL: 15    Treatment & Education:  Patient UI - had just recently stood to place donut pillow for pressure relief to sacrum.  Patient reported decreased edema of UEs and minimal weeping from RUE. Patient able to complete AROM x 10 reps: bicep curls, sh flexion, hand pumps and punches. Patient instructed in chair press-ups x 3 reps /c [place].    Patient left up in chair with all lines intact, call button in reach and nurse notifiedEducation:      GOALS:   Multidisciplinary Problems     Occupational Therapy Goals        Problem: Occupational Therapy Goal    Goal Priority Disciplines Outcome Interventions   Occupational Therapy Goal     OT, PT/OT Ongoing (interventions implemented as appropriate)    Description:  Goals to be met by: 8/15     Patient will increase functional independence with ADLs by performing:    Feeding with Modified [place].  UE Dressing with Set-up Assistance.  Grooming while seated with Modified [place] post setup.  Toileting from bedside commode with Minimal Assistance for hygiene and clothing management.   Sitting at edge of bed x15 minutes with Modified [place] and no drop in BP.  Rolling to Bilateral with Modified [place].   Supine to sit with Modified [place].  Toilet transfer to bedside commode with Minimal Assistance.  Upper extremity exercise program x10 reps per handout, with independence.                      Time Tracking:     OT Date of Treatment: 07/23/19  OT Start Time: 1058  OT Stop Time: 1121  OT Total Time (min): 23 min    Billable Minutes:Therapeutic Exercise 23    RANDI Cardoso/LUIS  7/23/2019

## 2019-07-23 NOTE — PLAN OF CARE
Problem: Adult Inpatient Plan of Care  Goal: Plan of Care Review  Outcome: Ongoing (interventions implemented as appropriate)  Pt on RA with documented sats.95. The proper method of use, as well as anticipated side effects, of this aerosol treatment are discussed and demonstrated to the patient. Will continue to monitor.

## 2019-07-23 NOTE — PLAN OF CARE
Problem: Physical Therapy Goal  Goal: Physical Therapy Goal  Goals to be met by: 8/15/2019     Patient will increase functional independence with mobility by performin. Supine to sit with Modified [place]  2. Sit to stand transfer with Modified [place]  3. Bed to chair transfer with Modified [place] using Rolling Walker  4. Gait  x 20 feet with Modified [place] using Rolling Walker.      Outcome: Ongoing (interventions implemented as appropriate)     Pt continues to work and progress toward established goals.

## 2019-07-23 NOTE — PHYSICIAN QUERY
PT Name: Thierry Ramos Jr.  MR #: [id number]    Physician Query Form - Respiratory Condition Clarification      CDS/: Sultana Head               Contact information:     This form is a permanent document in the medical record.    Query Date: July 23, 2019    By submitting this query, we are merely seeking further clarification of documentation. Please utilize your independent clinical judgment when addressing the question(s) below.    The Medical record contains the following:   Indicators   Supporting Clinical Findings Location in Medical Record   X Pulmonary Edema documented Pulmonary edema  Afib with RVR  Hypoxia  Compounded by recent blood transfusion   [facility] medicine 7/22 342 pm  Gloria COULTER/  Mark Landry MD   X Wheezing, Productive cough, SOB, AGOSTO, Use of accessory muscles documented Pt currently on comfort flow at 20 L/min with 35%. O2 sats remain between . Pt remains tachypneic but denies SOB. [facility] RN note 7/19 648 pm   X Radiology Findings Chest X-ray showed left lung opacity, very mild.   [facility] medicine 7/22 342 pm  Gloria Landry MD   X Hypoxia , Hypoxemia Hypoxic documented  (worsening CXR and hypoxia)  Attestation Nelijim Martinezocent Nicolette Vickers MD 7/19 739 pm    Respiratory Distress documented     X RR                  PaO2                  PaCO2                     O2 sat Respiratory rate 20-47 7/14- 7/17   16-32  7/19 - 7/23 Vital signs   X Treatment: -Check echo: ok  -D/C lasix for now  -Strict I/O  -CT chest with edema and effusions  -Continue to wean O2  Lasix dose was increased to 80 TID and then decreased after good UOP [facility] medicine 7/22 342 pm  Gloria Landry MD   X Supplemental O2: -Continue to wean O2 [facility] medicine 7/22 342 pm  Gloria Landry MD    Oxygen dependence      Other:         Provider, please specify diagnosis or diagnoses associated with above clinical findings.    [ X   ]  Acute pulmonary edema, cardiac cause (specify cardiac condition): __a fib_________________     [    ] Acute pulmonary edema, non-cardiac cause (specify causative condition): ___________________     [    ] Other respiratory diagnosis (please specify): _________________________________      [ [   ] ] Clinically Undetermined         Please document in your progress notes daily for the duration of treatment, until resolved, and include in your discharge summary.

## 2019-07-23 NOTE — PLAN OF CARE
Problem: Adult Inpatient Plan of Care  Goal: Plan of Care Review  Outcome: Ongoing (interventions implemented as appropriate)  1910H Patient is awake and orientedx4. Care plan explained to patient he verbalized understanding. On room air, 2L via nasal cannula saturation at 100%. Hooked to heart monitor running normal sinus rhythm at 70-80bpm. Patient has a 20G left wrist flushed and saline locked. Maintained on diabetic diet. Bruising noted throughout body. Excoriation on right leg and perineum area. Incontinence care done. No complaints of pain. The abdomen is non-distended. Patient denies shortness of breath/cough.  Due medications given. Encouraged to turn every 2 hours as tolerated. Maintained on fall risk precaution. Bed in lowest position, bed alarm on, call light/personal items within reach and instructed to call for help when needed. Will continue to monitor.

## 2019-07-23 NOTE — PT/OT/SLP PROGRESS
Physical Therapy Treatment    Patient Name:  Thierry Ramos Jr.   MRN:  [id number]    Recommendations:     Discharge Recommendations:  nursing facility, skilled   Discharge Equipment Recommendations: ( TBD pending progress)   Barriers to discharge: None    Assessment:     Thierry Ramos Jr. is a 85 y.o. male admitted with a medical diagnosis of Sepsis due to pneumonia.  He presents with the following impairments/functional limitations:  weakness, impaired endurance, impaired self care skills, impaired functional mobilty, gait instability, impaired balance, decreased coordination, decreased upper extremity function, decreased lower extremity function, decreased safety awareness, edema, decreased ROM, impaired skin, impaired cardiopulmonary response to activity. Pt able to perform ambulation training by 2 trials ~8-10 ft to bathroom and ~10-15 ft from bathroom to sit in B/S chair with RW and close mod A needed. BP after ambulation 118/55 SpO2 level 98%. Would benefit from continued PT services to increase pt's functional mobility addressing all impairments listed above.    Rehab Prognosis: Good; patient would benefit from acute skilled PT services to address these deficits and reach maximum level of function.    Recent Surgery: * No surgery found *      Plan:     During this hospitalization, patient to be seen 6 x/week to address the identified rehab impairments via gait training, therapeutic activities, therapeutic exercises and progress toward the following goals:    · Plan of Care Expires:  08/17/19    Subjective     Chief Complaint: None expressed  Patient/Family Comments/goals: None expressed  Pain/Comfort:  · Pain Rating 1: 0/10      Objective:     Communicated with  nurse prior to session.  Patient found supine with peripheral IV, telemetry upon PT entry to room.     General Precautions: Standard, fall   Orthopedic Precautions:N/A   Braces: N/A     Functional Mobility:  · Bed Mobility:     · Scooting: contact  "guard assistance and minimum assistance  · Supine to Sit: contact guard assistance and minimum assistance  · Transfers:     · Sit to Stand:  minimum assistance with rolling walker  · Gait:  by 2 trials ~8-10 ft and ~10-15 ft both with RW and close mod A.      AM-PAC 6 CLICK MOBILITY  Turning over in bed (including adjusting bedclothes, sheets and blankets)?: 3  Sitting down on and standing up from a chair with arms (e.g., wheelchair, bedside commode, etc.): 3  Moving from lying on back to sitting on the side of the bed?: 3  Moving to and from a bed to a chair (including a wheelchair)?: 3  Need to walk in hospital room?: 2  Climbing 3-5 steps with a railing?: 1  Basic Mobility Total Score: 15       Therapeutic Activities and Exercises:   Pt able to perform ambulation training by 2 trials with RW and close mod A. Verbal cueing needed for RW safety during ambulation. ~8-10 ft to in room bathroom and ~10-15 ft from bathroom to sit in B/S chair. Pt requested to be able to sit on toilet. PTA cleaned pt as pt unable to perform self hygiene when finished. Pt stated he was feeling "off" while ambulating. Nurse called into room BP taken and 118/55 SpO2 98%. Seated therapeutic exercises 1 x 10 reps hip add/abd and LAQ's BLE's AROM/AAROM to ensure pt performing correct movement and achieving full ROM available. Received gentle manual stretching to bilateral hamstrings while in sitting position 1 x 5 reps each BLE with an approximate 8-10 second hold.    Patient left up in chair with all lines intact, call button in reach, chair alarm on,  nurse notified and  wife present..    GOALS:   Multidisciplinary Problems     Physical Therapy Goals        Problem: Physical Therapy Goal    Goal Priority Disciplines Outcome Goal Variances Interventions   Physical Therapy Goal     PT, PT/OT Ongoing (interventions implemented as appropriate)     Description:  Goals to be met by: 8/15/2019     Patient will increase functional independence with " mobility by performin. Supine to sit with Modified [place]  2. Sit to stand transfer with Modified [place]  3. Bed to chair transfer with Modified [place] using Rolling Walker  4. Gait  x 20 feet with Modified [place] using Rolling Walker.              Problem: Physical Therapy Goal    Goal Priority Disciplines Outcome Goal Variances Interventions   Physical Therapy Goal     PT, PT/OT                      Time Tracking:     PT Received On: 19  PT Start Time: 831     PT Stop Time: 909  PT Total Time (min): 38 min     Billable Minutes: Gait Training  15, Therapeutic Activity  10 and Therapeutic Exercise  13    Treatment Type: Treatment  PT/PTA: PTA     PTA Visit Number: 1     Kirstie Nielsen, PTA  2019

## 2019-07-23 NOTE — CHAPLAIN
"Nurse Practictioner asked me to visit patient and his wife.  They greeted me pleasantly, and I asked if I could sit down and visit. He told me he is Jewish but does not practice in the Gnosticism because it is "corrupt".  He recalled travels to [place] and the all of the elaborate gold embellishments displayed in the residence of the [place].  He went on.  Apparently, this patient and his wife have done extensive travelling since their retirements.  Patient worked for [employer], and he knew my uncle who was  of [employer]. Patient is currently living with much uncertainty, spoke about his illnesses, and anticipates going for rehab before he returns home.  They appeared comfortable talking with me, and with a lighter mood.  "

## 2019-07-23 NOTE — PROGRESS NOTES
"Surgery follow up  /61 (BP Location: Left leg, Patient Position: Lying)   Pulse 62   Temp 96.8 °F (36 °C) (Axillary)   Resp 18   Ht 5' 11" (1.803 m)   Wt 73.1 kg (161 lb 2.5 oz)   SpO2 98%   BMI 22.48 kg/m²    I/O last 3 completed shifts:  In: 180 [P.O.:180]  Out: 1715 [Urine:1714; Stool:1]  I/O this shift:  In: -   Out: 301 [Urine:300; Stool:1]  Recent Results (from the past 336 hour(s))   CBC auto differential    Collection Time: 07/22/19  6:00 AM   Result Value Ref Range    WBC 8.85 3.90 - 12.70 K/uL    Hemoglobin 9.9 (L) 14.0 - 18.0 g/dL    Hematocrit 30.3 (L) 40.0 - 54.0 %    Platelets 53 (L) 150 - 350 K/uL   CBC auto differential    Collection Time: 07/21/19  3:53 AM   Result Value Ref Range    WBC 13.48 (H) 3.90 - 12.70 K/uL    Hemoglobin 9.3 (L) 14.0 - 18.0 g/dL    Hematocrit 28.1 (L) 40.0 - 54.0 %    Platelets 35 (LL) 150 - 350 K/uL   CBC auto differential    Collection Time: 07/20/19  3:57 AM   Result Value Ref Range    WBC 14.05 (H) 3.90 - 12.70 K/uL    Hemoglobin 9.0 (L) 14.0 - 18.0 g/dL    Hematocrit 26.6 (L) 40.0 - 54.0 %    Platelets 32 (LL) 150 - 350 K/uL     Recent Results (from the past 336 hour(s))   Basic metabolic panel    Collection Time: 07/16/19  7:35 AM   Result Value Ref Range    Sodium 138 136 - 145 mmol/L    Potassium 4.2 3.5 - 5.1 mmol/L    Chloride 110 95 - 110 mmol/L    CO2 16 (L) 23 - 29 mmol/L    BUN, Bld 39 (H) 8 - 23 mg/dL    Creatinine 1.7 (H) 0.5 - 1.4 mg/dL    Calcium 7.9 (L) 8.7 - 10.5 mg/dL    Anion Gap 12 8 - 16 mmol/L   wounds dry and intact continue present treatment.          "

## 2019-07-23 NOTE — PLAN OF CARE
VN note: VN cued into patient's room for introduction. Patient asleep at this time, wife at bedside. VN informed wife that VN would be working closely along side bedside nurse, PCT, and the rest of care team and making rounds throughout the shift. She verbalized understanding. Allowed time for questions. VN will continue to be available to patient and intervene prn.       07/23/19 1401   Type of Frequent Check   Type Patient Rounds;Other (see comments)  (VN Rounds)   Safety/Activity   Patient Rounds bed in low position;visualized patient   Safety Promotion/Fall Prevention family to remain at bedside;side rails raised x 2   Activity Management activity adjusted per tolerance   Positioning   Body Position supine   Head of Bed (HOB) HOB at 60-90 degrees   Assessments (Pre/Post)   Level of Consciousness (AVPU)   (Patient asleep at this time.)

## 2019-07-23 NOTE — PLAN OF CARE
Problem: Adult Inpatient Plan of Care  Goal: Plan of Care Review  Patient on oxygen with documented flow.  Will attempt to wean per O2 order protocol. The proper method of use, as well as anticipated side effects, of this aerosol treatment are discussed and demonstrated to the patient.

## 2019-07-24 ENCOUNTER — PATIENT MESSAGE (OUTPATIENT)
Dept: HEMATOLOGY/ONCOLOGY | Facility: CLINIC | Age: 84
End: 2019-07-24

## 2019-07-24 PROBLEM — R34 OLIGURIA: Status: RESOLVED | Noted: 2019-07-16 | Resolved: 2019-07-24

## 2019-07-24 LAB
1,3 BETA GLUCAN SPEC-MCNC: >500 PG/ML
ALBUMIN SERPL BCP-MCNC: 2.4 G/DL (ref 3.5–5.2)
ALP SERPL-CCNC: 214 U/L (ref 55–135)
ALT SERPL W/O P-5'-P-CCNC: 69 U/L (ref 10–44)
ANION GAP SERPL CALC-SCNC: 9 MMOL/L (ref 8–16)
ANISOCYTOSIS BLD QL SMEAR: SLIGHT
AST SERPL-CCNC: 92 U/L (ref 10–40)
BASOPHILS # BLD AUTO: ABNORMAL K/UL (ref 0–0.2)
BASOPHILS NFR BLD: 0 % (ref 0–1.9)
BILIRUB SERPL-MCNC: 1.8 MG/DL (ref 0.1–1)
BUN SERPL-MCNC: 62 MG/DL (ref 8–23)
CALCIUM SERPL-MCNC: 8.6 MG/DL (ref 8.7–10.5)
CHLORIDE SERPL-SCNC: 110 MMOL/L (ref 95–110)
CO2 SERPL-SCNC: 23 MMOL/L (ref 23–29)
CREAT SERPL-MCNC: 1 MG/DL (ref 0.5–1.4)
DACRYOCYTES BLD QL SMEAR: ABNORMAL
DIFFERENTIAL METHOD: ABNORMAL
EOSINOPHIL # BLD AUTO: ABNORMAL K/UL (ref 0–0.5)
EOSINOPHIL NFR BLD: 1 % (ref 0–8)
ERYTHROCYTE [DISTWIDTH] IN BLOOD BY AUTOMATED COUNT: 18.1 % (ref 11.5–14.5)
EST. GFR  (AFRICAN AMERICAN): >60 ML/MIN/1.73 M^2
EST. GFR  (NON AFRICAN AMERICAN): >60 ML/MIN/1.73 M^2
GLUCOSE SERPL-MCNC: 99 MG/DL (ref 70–110)
HCT VFR BLD AUTO: 33.5 % (ref 40–54)
HGB BLD-MCNC: 10.8 G/DL (ref 14–18)
HYPOCHROMIA BLD QL SMEAR: ABNORMAL
LYMPHOCYTES # BLD AUTO: ABNORMAL K/UL (ref 1–4.8)
LYMPHOCYTES NFR BLD: 38 % (ref 18–48)
MAGNESIUM SERPL-MCNC: 2.5 MG/DL (ref 1.6–2.6)
MCH RBC QN AUTO: 29.9 PG (ref 27–31)
MCHC RBC AUTO-ENTMCNC: 32.2 G/DL (ref 32–36)
MCV RBC AUTO: 93 FL (ref 82–98)
MONOCYTES # BLD AUTO: ABNORMAL K/UL (ref 0.3–1)
MONOCYTES NFR BLD: 4 % (ref 4–15)
MYELOCYTES NFR BLD MANUAL: 2 %
NEUTROPHILS NFR BLD: 54 % (ref 38–73)
NEUTS BAND NFR BLD MANUAL: 1 %
NRBC BLD-RTO: 4 /100 WBC
OVALOCYTES BLD QL SMEAR: ABNORMAL
PHOSPHATE SERPL-MCNC: 3.2 MG/DL (ref 2.7–4.5)
PLATELET # BLD AUTO: 32 K/UL (ref 150–350)
PLATELET BLD QL SMEAR: ABNORMAL
PMV BLD AUTO: ABNORMAL FL (ref 9.2–12.9)
POCT GLUCOSE: 174 MG/DL (ref 70–110)
POCT GLUCOSE: 212 MG/DL (ref 70–110)
POCT GLUCOSE: 310 MG/DL (ref 70–110)
POCT GLUCOSE: 99 MG/DL (ref 70–110)
POIKILOCYTOSIS BLD QL SMEAR: SLIGHT
POLYCHROMASIA BLD QL SMEAR: ABNORMAL
POTASSIUM SERPL-SCNC: 4.8 MMOL/L (ref 3.5–5.1)
PROT SERPL-MCNC: 5 G/DL (ref 6–8.4)
RBC # BLD AUTO: 3.61 M/UL (ref 4.6–6.2)
SODIUM SERPL-SCNC: 142 MMOL/L (ref 136–145)
TB INDURATION 48 - 72 HR READ: 0 MM
WBC # BLD AUTO: 10.83 K/UL (ref 3.9–12.7)

## 2019-07-24 PROCEDURE — 99233 SBSQ HOSP IP/OBS HIGH 50: CPT | Mod: HCNC,,, | Performed by: INTERNAL MEDICINE

## 2019-07-24 PROCEDURE — 94799 UNLISTED PULMONARY SVC/PX: CPT | Mod: HCNC

## 2019-07-24 PROCEDURE — 85027 COMPLETE CBC AUTOMATED: CPT | Mod: HCNC

## 2019-07-24 PROCEDURE — 80053 COMPREHEN METABOLIC PANEL: CPT | Mod: HCNC

## 2019-07-24 PROCEDURE — 25000003 PHARM REV CODE 250: Mod: HCNC | Performed by: HOSPITALIST

## 2019-07-24 PROCEDURE — 83735 ASSAY OF MAGNESIUM: CPT | Mod: HCNC

## 2019-07-24 PROCEDURE — 94640 AIRWAY INHALATION TREATMENT: CPT | Mod: HCNC

## 2019-07-24 PROCEDURE — 94761 N-INVAS EAR/PLS OXIMETRY MLT: CPT | Mod: HCNC

## 2019-07-24 PROCEDURE — 97535 SELF CARE MNGMENT TRAINING: CPT | Mod: HCNC

## 2019-07-24 PROCEDURE — 63600175 PHARM REV CODE 636 W HCPCS: Mod: HCNC | Performed by: INTERNAL MEDICINE

## 2019-07-24 PROCEDURE — 25000003 PHARM REV CODE 250: Mod: HCNC | Performed by: INTERNAL MEDICINE

## 2019-07-24 PROCEDURE — 63600175 PHARM REV CODE 636 W HCPCS: Mod: HCNC | Performed by: NURSE PRACTITIONER

## 2019-07-24 PROCEDURE — 25000003 PHARM REV CODE 250: Mod: HCNC | Performed by: NURSE PRACTITIONER

## 2019-07-24 PROCEDURE — 99233 PR SUBSEQUENT HOSPITAL CARE,LEVL III: ICD-10-PCS | Mod: HCNC,,, | Performed by: INTERNAL MEDICINE

## 2019-07-24 PROCEDURE — 85007 BL SMEAR W/DIFF WBC COUNT: CPT | Mod: HCNC

## 2019-07-24 PROCEDURE — 97110 THERAPEUTIC EXERCISES: CPT | Mod: HCNC

## 2019-07-24 PROCEDURE — 63600175 PHARM REV CODE 636 W HCPCS: Mod: HCNC | Performed by: FAMILY MEDICINE

## 2019-07-24 PROCEDURE — 36415 COLL VENOUS BLD VENIPUNCTURE: CPT | Mod: HCNC

## 2019-07-24 PROCEDURE — 25000003 PHARM REV CODE 250: Mod: HCNC | Performed by: FAMILY MEDICINE

## 2019-07-24 PROCEDURE — 11000001 HC ACUTE MED/SURG PRIVATE ROOM: Mod: HCNC

## 2019-07-24 PROCEDURE — 97116 GAIT TRAINING THERAPY: CPT | Mod: HCNC

## 2019-07-24 PROCEDURE — 84100 ASSAY OF PHOSPHORUS: CPT | Mod: HCNC

## 2019-07-24 PROCEDURE — 25000242 PHARM REV CODE 250 ALT 637 W/ HCPCS: Mod: HCNC | Performed by: NURSE PRACTITIONER

## 2019-07-24 RX ORDER — PREDNISONE 20 MG/1
20 TABLET ORAL DAILY
Status: DISCONTINUED | OUTPATIENT
Start: 2019-07-25 | End: 2019-07-25 | Stop reason: HOSPADM

## 2019-07-24 RX ADMIN — HYPROMELLOSE 2910 1 DROP: 5 SOLUTION OPHTHALMIC at 09:07

## 2019-07-24 RX ADMIN — HYPROMELLOSE 2910 1 DROP: 5 SOLUTION OPHTHALMIC at 08:07

## 2019-07-24 RX ADMIN — IPRATROPIUM BROMIDE AND ALBUTEROL SULFATE 3 ML: .5; 3 SOLUTION RESPIRATORY (INHALATION) at 11:07

## 2019-07-24 RX ADMIN — METRONIDAZOLE 500 MG: 500 TABLET ORAL at 03:07

## 2019-07-24 RX ADMIN — METRONIDAZOLE 500 MG: 500 TABLET ORAL at 05:07

## 2019-07-24 RX ADMIN — METRONIDAZOLE 500 MG: 500 TABLET ORAL at 10:07

## 2019-07-24 RX ADMIN — IPRATROPIUM BROMIDE AND ALBUTEROL SULFATE 3 ML: .5; 3 SOLUTION RESPIRATORY (INHALATION) at 07:07

## 2019-07-24 RX ADMIN — SODIUM CITRATE AND CITRIC ACID MONOHYDRATE 15 ML: 500; 334 SOLUTION ORAL at 08:07

## 2019-07-24 RX ADMIN — SODIUM CITRATE AND CITRIC ACID MONOHYDRATE 15 ML: 500; 334 SOLUTION ORAL at 03:07

## 2019-07-24 RX ADMIN — LEVOTHYROXINE SODIUM 125 MCG: 25 TABLET ORAL at 05:07

## 2019-07-24 RX ADMIN — Medication 500 MCG: at 09:07

## 2019-07-24 RX ADMIN — PANTOPRAZOLE SODIUM 40 MG: 40 TABLET, DELAYED RELEASE ORAL at 09:07

## 2019-07-24 RX ADMIN — ACETAMINOPHEN 650 MG: 325 TABLET ORAL at 11:07

## 2019-07-24 RX ADMIN — CEFEPIME HYDROCHLORIDE 2 G: 2 INJECTION, POWDER, FOR SOLUTION INTRAVENOUS at 09:07

## 2019-07-24 RX ADMIN — INSULIN ASPART 2 UNITS: 100 INJECTION, SOLUTION INTRAVENOUS; SUBCUTANEOUS at 09:07

## 2019-07-24 RX ADMIN — ERYTHROMYCIN: 5 OINTMENT OPHTHALMIC at 08:07

## 2019-07-24 RX ADMIN — PREDNISONE 50 MG: 20 TABLET ORAL at 09:07

## 2019-07-24 RX ADMIN — SODIUM CITRATE AND CITRIC ACID MONOHYDRATE 15 ML: 500; 334 SOLUTION ORAL at 09:07

## 2019-07-24 RX ADMIN — IPRATROPIUM BROMIDE AND ALBUTEROL SULFATE 3 ML: .5; 3 SOLUTION RESPIRATORY (INHALATION) at 03:07

## 2019-07-24 RX ADMIN — ATORVASTATIN CALCIUM 20 MG: 20 TABLET, FILM COATED ORAL at 09:07

## 2019-07-24 RX ADMIN — VANCOMYCIN HYDROCHLORIDE 1250 MG: 1.25 INJECTION, POWDER, LYOPHILIZED, FOR SOLUTION INTRAVENOUS at 09:07

## 2019-07-24 RX ADMIN — CETIRIZINE HYDROCHLORIDE 10 MG: 10 TABLET, FILM COATED ORAL at 09:07

## 2019-07-24 NOTE — PLAN OF CARE
CM contacted [facility], informed no beds available.  [facility] has accepted pt for admission once medically ready for d/c.      Primary team to continue to monitor platelet count, goal is to maintain 30 or above.    Norma Samayoa RN    [phone]

## 2019-07-24 NOTE — PT/OT/SLP PROGRESS
"Physical Therapy Treatment    Patient Name:  Thierry Ramos Jr.   MRN:  [id number]    Recommendations:     Discharge Recommendations:  nursing facility, skilled   Discharge Equipment Recommendations: ( TBD pending progress (Defer to SNF))   Barriers to discharge: None    Assessment:     Thierry Ramos Jr. is a 85 y.o. male admitted with a medical diagnosis of Sepsis due to pneumonia.  He presents with the following impairments/functional limitations:  weakness, impaired endurance, impaired self care skills, impaired functional mobilty, gait instability, impaired balance, decreased coordination, decreased upper extremity function, decreased lower extremity function, pain, decreased safety awareness, decreased ROM, edema, impaired skin. Pt able to slightly increase ambulation distance this session. Performed ambulation training ~30 ft with RW and close mod/min A. Demonstrates a narrow base of support, decreased frannie speed, and a shuffling gait. Round wounds located bilaterally at posterior, medial/lateral aspects of ankle/heel. Nursing aware and permission given for PTA to cover with padded dressing. Would benefit from continued PT services to increase pt's functional mobility addressing all impairments listed above.    Rehab Prognosis: Good; patient would benefit from acute skilled PT services to address these deficits and reach maximum level of function.    Recent Surgery: * No surgery found *      Plan:     During this hospitalization, patient to be seen 6 x/week to address the identified rehab impairments via gait training, therapeutic activities, therapeutic exercises and progress toward the following goals:    · Plan of Care Expires:  08/17/19    Subjective     Chief Complaint: "My heels hurt"  Patient/Family Comments/goals: None expressed  Pain/Comfort:  · Pain Rating 1: 0/10      Objective:     Communicated with nurse prior to session.  Patient found supine with telemetry, peripheral IV upon PT entry to " room.     General Precautions: Standard, fall   Orthopedic Precautions:N/A   Braces: N/A     Functional Mobility:  · Bed Mobility:     · Rolling Left:  contact guard assistance  · Scooting: stand by assistance and contact guard assistance  · Supine to Sit: contact guard assistance and minimum assistance  · Transfers:     · Sit to Stand:  contact guard assistance and minimum assistance with rolling walker  · Gait:  ~30 ft with RW and close mod/min A. Demonstrates a shuffling gait, narrow base of support, and decreased frannie speed.      AM-PAC 6 CLICK MOBILITY  Turning over in bed (including adjusting bedclothes, sheets and blankets)?: 3  Sitting down on and standing up from a chair with arms (e.g., wheelchair, bedside commode, etc.): 3  Moving from lying on back to sitting on the side of the bed?: 3  Moving to and from a bed to a chair (including a wheelchair)?: 3  Need to walk in hospital room?: 2  Climbing 3-5 steps with a railing?: 1  Basic Mobility Total Score: 15       Therapeutic Activities and Exercises:   Pt with complaints of bilateral heels hurting. Upon visual inspection pt has a raised round wound bilaterally at posterior and medial/lateral aspects of lower ankle/heel. Nursing called in room  and gave permission for PTA to cover with a padded dressing stating she will inform doctor. Educated the pt and his wife to 'float' bilateral heels off of bed when in supine. Performed ambulation training ~30 ft with RW and close mod/min A. Pt demonstrates a narrow base of support, a shuffling like gait, and decreased frannie speed. Received gentle manual stretching to bilateral hamstrings in a sitting position 1 x 5 reps with an approximate 8-10 second hold each.    Patient left up in chair with all lines intact, call button in reach, chair alarm on,  nurse notified and  wife  present..    GOALS:   Multidisciplinary Problems     Physical Therapy Goals        Problem: Physical Therapy Goal    Goal Priority  Disciplines Outcome Goal Variances Interventions   Physical Therapy Goal     PT, PT/OT Ongoing (interventions implemented as appropriate)     Description:  Goals to be met by: 8/15/2019     Patient will increase functional independence with mobility by performin. Supine to sit with Modified [place]  2. Sit to stand transfer with Modified [place]  3. Bed to chair transfer with Modified [place] using Rolling Walker  4. Gait  x 20 feet with Modified [place] using Rolling Walker.              Problem: Physical Therapy Goal    Goal Priority Disciplines Outcome Goal Variances Interventions   Physical Therapy Goal     PT, PT/OT                      Time Tracking:     PT Received On: 19  PT Start Time: 819     PT Stop Time: 849  PT Total Time (min): 30 min     Billable Minutes: Gait Training  20 and Therapeutic Exercise  10    Treatment Type: Treatment  PT/PTA: PTA     PTA Visit Number: 2     Kirstie Nielsen, PTA  2019

## 2019-07-24 NOTE — PLAN OF CARE
Problem: Physical Therapy Goal  Goal: Physical Therapy Goal  Goals to be met by: 8/15/2019     Patient will increase functional independence with mobility by performin. Supine to sit with Modified [place]  2. Sit to stand transfer with Modified [place]  3. Bed to chair transfer with Modified [place] using Rolling Walker  4. Gait  x 20 feet with Modified [place] using Rolling Walker.      Outcome: Ongoing (interventions implemented as appropriate)      Pt continues to work and progress toward established goals.

## 2019-07-24 NOTE — ASSESSMENT & PLAN NOTE
Plt 120 > 9 > 52>23  -Transfused platelets with little response; likely immune  -Further management per Hem Onc:  Per hematology, continue daily prednisone, follow labs, consider deescalating antibiotic, however, antibiotics end tomorrow.  Monitor

## 2019-07-24 NOTE — ASSESSMENT & PLAN NOTE
Oliguria-resolved  Cr 1.0 > 1.5.>1.0. UOP improving  -Nephrology consult: signed off d/t improvement

## 2019-07-24 NOTE — PLAN OF CARE
Problem: Adult Inpatient Plan of Care  Goal: Plan of Care Review  Outcome: Ongoing (interventions implemented as appropriate)  1905H Patient is awake and orientedx4. Care plan explained to patient, and son; they verbalized understanding. On room air, O2 saturation at 98%. Hooked to heart monitor running normal sinus rhythm at 68-80bpm. Patient has a 20G left wrist flushed and saline locked. Maintained on diabetic diet. Incontinence care done. No complaints of pain. Patient denies shortness of breath but had a nonproductive cough. Due medications given. Encouraged/assisted to turn every 2 hours as tolerated. Maintained on fall risk precaution. Bed in lowest position, bed alarm on, call light/personal items within reach and instructed to call for help when needed. Will continue to monitor.

## 2019-07-24 NOTE — SUBJECTIVE & OBJECTIVE
Interval History: Concerned about platelet count.    Review of Systems   Constitutional: Negative for chills, fatigue and fever.   HENT: Negative for congestion, postnasal drip and rhinorrhea.    Respiratory: Negative for chest tightness and shortness of breath.    Cardiovascular: Negative for chest pain.   Gastrointestinal: Negative for abdominal distention and abdominal pain.   Genitourinary: Negative for difficulty urinating.   Musculoskeletal: Negative for arthralgias and myalgias.   Skin: Negative for color change.   Neurological: Negative for weakness, light-headedness and headaches.   Hematological: Does not bruise/bleed easily.   Psychiatric/Behavioral: Negative for agitation.     Objective:     Vital Signs (Most Recent):  Temp: 96.8 °F (36 °C) (07/24/19 1159)  Pulse: 76 (07/24/19 1600)  Resp: 18 (07/24/19 1508)  BP: (!) 159/65 (07/24/19 1159)  SpO2: 99 % (07/24/19 1508) Vital Signs (24h Range):  Temp:  [96.4 °F (35.8 °C)-97 °F (36.1 °C)] 96.8 °F (36 °C)  Pulse:  [59-80] 76  Resp:  [17-18] 18  SpO2:  [96 %-99 %] 99 %  BP: (116-159)/(57-67) 159/65     Weight: 73.9 kg (162 lb 14.7 oz)  Body mass index is 22.72 kg/m².    Intake/Output Summary (Last 24 hours) at 7/24/2019 1653  Last data filed at 7/24/2019 0552  Gross per 24 hour   Intake 365 ml   Output 625 ml   Net -260 ml      Physical Exam   Constitutional: He appears well-developed and well-nourished.   HENT:   Head: Normocephalic and atraumatic.   Eyes: EOM are normal.   Neck: Normal range of motion. Neck supple.   Cardiovascular: Normal rate.   Pulmonary/Chest: Effort normal and breath sounds normal.   Abdominal: Soft. Bowel sounds are normal.   Musculoskeletal: Normal range of motion.   Neurological: He is alert.   Skin: Skin is warm and dry.   Psychiatric: He has a normal mood and affect.       Significant Labs:   BMP:   Recent Labs   Lab 07/24/19  0454   GLU 99      K 4.8      CO2 23   BUN 62*   CREATININE 1.0   CALCIUM 8.6*   MG 2.5      CBC:   Recent Labs   Lab 07/23/19 0452 07/24/19 0454   WBC 6.42 10.83   HGB 9.6* 10.8*   HCT 29.7* 33.5*   PLT 23* 32*     CMP:   Recent Labs   Lab 07/23/19 0452 07/24/19 0454    142   K 3.6 4.8    110   CO2 26 23    99   BUN 67* 62*   CREATININE 1.0 1.0   CALCIUM 8.4* 8.6*   PROT 4.8* 5.0*   ALBUMIN 2.3* 2.4*   BILITOT 1.7* 1.8*   ALKPHOS 169* 214*   AST 67* 92*   ALT 56* 69*   ANIONGAP 7* 9   EGFRNONAA >60 >60     Magnesium:   Recent Labs   Lab 07/23/19 0452 07/24/19 0454   MG 2.3 2.5       Significant Imaging: I have reviewed all pertinent imaging results/findings within the past 24 hours.

## 2019-07-24 NOTE — PROGRESS NOTES
[facility] Hospital Medicine  Progress Note    Patient Name: Thierry Ramos Jr.  MRN: [id number]  Patient Class: IP- Inpatient   Admission Date: 7/15/2019  Length of Stay: 9 days  Attending Physician: Mark Landry DO  Primary Care Provider: AKILA Wooten MD        Subjective:     Principal Problem:Sepsis due to pneumonia      HPI:  85 y.o. male with PMHx of DM, CLL, HTN, HLD, CVA, carotid stenosis, hypothyroid, hypopituitarism, squamous cell carcinoma who presents after syncopal episode. Patient reports worsening muscle weakness. Patient and spouse reports patient started becoming weak in beginning of June/2019 and it progressively worsened.  Reports weakness is especially in the legs.  He also reports leg swelling which has been associated to starting Ibrutinib for CLL.  Patient follows with Dr. Trejo for CLL and last seen 7/8/19 for the weakness and was told to hold the Ibrutinib until his next follow up appointment.  Spouse reports patient fell twice last night with loss of consciousness after the second fall.   Denies any fever, SOB, nausea, vomiting, diarrhea, chest pain or abdominal pain. ED findings: WBC 12.75, H/H 12.3/38.9, Lactic Acid 3.0, ALK Phos 258, AST//9, chest x-ray showed mild pulmonary opacity at the left lung base may relate to pulmonary infiltrate/airspace disease, EKG no ischemic changes.  Patient admitted for further medical management.     Overview/Hospital Course:  WBC increased to 25. Platelets fell from 120 to 8, so H/O was consulted and patient was transfused 1 unit of platelets. Platelets increased only to 9. Hem onc gave IVIG and recommended no further transfusion except in life threatening bleed. Patient was oliguric with rising Cr, so nephrology was consulted. The next day Cr remained stable and UOP increased. ID was consulted, and recommended vanc, cefepime, and azithromycin, and flagyl. Abdominal US was unremarkable. Pt was transfused 2 units PRBC on  7/18 after he pulled his art line and started bleeding. Had episode of afib RVR, worsening hypoxia, and hypontension late 7/18 which resolved. CXR was repeated on 7/19 showing increased interstitial opacities. Lasix dose was increased to 80 TID and then decreased after good UOP. Respiratory status improved. Pt was transferred to the floor on 7/21. Platelets continued to improved. PT/OT recommended SNF placement.  7/23/19  Platelets trending down, appreciate hematology recommendations, continue IV antibiotics.  7/24/19  Platelet count trending up but still very low, hematology follow.        Interval History: Concerned about platelet count.    Review of Systems   Constitutional: Negative for chills, fatigue and fever.   HENT: Negative for congestion, postnasal drip and rhinorrhea.    Respiratory: Negative for chest tightness and shortness of breath.    Cardiovascular: Negative for chest pain.   Gastrointestinal: Negative for abdominal distention and abdominal pain.   Genitourinary: Negative for difficulty urinating.   Musculoskeletal: Negative for arthralgias and myalgias.   Skin: Negative for color change.   Neurological: Negative for weakness, light-headedness and headaches.   Hematological: Does not bruise/bleed easily.   Psychiatric/Behavioral: Negative for agitation.     Objective:     Vital Signs (Most Recent):  Temp: 96.8 °F (36 °C) (07/24/19 1159)  Pulse: 76 (07/24/19 1600)  Resp: 18 (07/24/19 1508)  BP: (!) 159/65 (07/24/19 1159)  SpO2: 99 % (07/24/19 1508) Vital Signs (24h Range):  Temp:  [96.4 °F (35.8 °C)-97 °F (36.1 °C)] 96.8 °F (36 °C)  Pulse:  [59-80] 76  Resp:  [17-18] 18  SpO2:  [96 %-99 %] 99 %  BP: (116-159)/(57-67) 159/65     Weight: 73.9 kg (162 lb 14.7 oz)  Body mass index is 22.72 kg/m².    Intake/Output Summary (Last 24 hours) at 7/24/2019 1659  Last data filed at 7/24/2019 0552  Gross per 24 hour   Intake 365 ml   Output 625 ml   Net -260 ml      Physical Exam   Constitutional: He appears  well-developed and well-nourished.   HENT:   Head: Normocephalic and atraumatic.   Eyes: EOM are normal.   Neck: Normal range of motion. Neck supple.   Cardiovascular: Normal rate.   Pulmonary/Chest: Effort normal and breath sounds normal.   Abdominal: Soft. Bowel sounds are normal.   Musculoskeletal: Normal range of motion.   Neurological: He is alert.   Skin: Skin is warm and dry.   Psychiatric: He has a normal mood and affect.       Significant Labs:   BMP:   Recent Labs   Lab 07/24/19 0454   GLU 99      K 4.8      CO2 23   BUN 62*   CREATININE 1.0   CALCIUM 8.6*   MG 2.5     CBC:   Recent Labs   Lab 07/23/19 0452 07/24/19 0454   WBC 6.42 10.83   HGB 9.6* 10.8*   HCT 29.7* 33.5*   PLT 23* 32*     CMP:   Recent Labs   Lab 07/23/19 0452 07/24/19 0454    142   K 3.6 4.8    110   CO2 26 23    99   BUN 67* 62*   CREATININE 1.0 1.0   CALCIUM 8.4* 8.6*   PROT 4.8* 5.0*   ALBUMIN 2.3* 2.4*   BILITOT 1.7* 1.8*   ALKPHOS 169* 214*   AST 67* 92*   ALT 56* 69*   ANIONGAP 7* 9   EGFRNONAA >60 >60     Magnesium:   Recent Labs   Lab 07/23/19 0452 07/24/19 0454   MG 2.3 2.5       Significant Imaging: I have reviewed all pertinent imaging results/findings within the past 24 hours.      Assessment/Plan:      * Sepsis due to pneumonia  Metabolic encephalopathy  Septic shock  Leukocytosis  Weakness  Chest X-ray showed left lung opacity, very mild.    -ID consult: Vanc, cefepime, and azithro (complete), flagyl  -Blood Cultures: NGTD  -Albuterol-ipratropium neb treatments Q4HR  -Lactic Acid: 3.0 > 1.6, required pressors on admission   -Abdominal US: unremarkable  -CT chest with bilateral ground glass opacities and pleural effusion    Pulmonary edema  Afib with RVR  Hypoxia  Compounded by recent blood transfusion  -Check echo: ok  -D/C lasix for now  -Strict I/O  -CT chest with edema and effusions  -Continue to wean O2    Palliative care encounter        Abnormal liver enzymes  Likely 2/2 shock  liver in the setting of septic shock. Improving but will also check liver US, which was unremarkable.     Thrombocytopenia, unspecified        Weakness  PT/OT: SNF      Type 2 diabetes, controlled, with neuropathy  Hemoglobin A1c: 5.6  -POC glucose checks ACHS  -Low dose SSI PRN  -Hypoglycemia protocol PRN  -Diabetic Diet    Immunosuppression  Noted      Central hypothyroidism  -Continue synthroid    Immune thrombocytopenia  Plt 120 > 9 > 52>23>32  -Transfused platelets with little response; likely immune  -Further management per Hem Onc:  Per hematology, continue daily prednisone, follow labs, consider deescalating antibiotic, antibiotics end today  Monitor    Panhypopituitarism  -continue oral steroid    CLL (chronic lymphocytic leukemia)  Immunosuppression  Edema of both legs  Follows with Dr. Trejo. Ibrutinib placed on hold by Dr. Trejo 2/2 to worsening weakness and leg swelling.  -Consult hem/onc, appreciate recs    Hypertension  -159  -hold home meds for now    Mixed dyslipidemia  History of CVA (cerebrovascular accident)  Denies chest pain or SOB  -Continue statin      VTE Risk Mitigation (From admission, onward)        Ordered     IP VTE HIGH RISK PATIENT  Once      07/15/19 1149                Sarah Monroy NP  Department of Hospital Medicine   [facility]

## 2019-07-24 NOTE — PT/OT/SLP PROGRESS
Occupational Therapy   Treatment    Name: Thierry Ramos Jr.  MRN: [id number]  Admitting Diagnosis:  Sepsis due to pneumonia       Recommendations:     Discharge Recommendations: nursing facility, skilled  Discharge Equipment Recommendations:  (Defer to SNF)  Barriers to discharge:  None    Assessment:   Patient with increased c/o fatigue and dizziness after having BM on toilet. Patient will benefit from continued skilled OT to address deficits and improve performance in functional ADL tasks. Cont OT.    Thierry Ramos Jr. is a 85 y.o. male with a medical diagnosis of Sepsis due to pneumonia. Performance deficits affecting function are weakness, impaired endurance, impaired self care skills, impaired functional mobilty, gait instability, impaired balance, decreased upper extremity function, decreased lower extremity function, pain, decreased safety awareness, decreased ROM, impaired skin, edema.     Rehab Prognosis:  Good; patient would benefit from acute skilled OT services to address these deficits and reach maximum level of function.       Plan:     Patient to be seen 5 x/week to address the above listed problems via self-care/home management, therapeutic activities, therapeutic exercises  · Plan of Care Expires: 08/15/19  · Plan of Care Reviewed with: patient    Subjective     Pain/Comfort:  · Pain Rating 1: 0/10    Objective:     Communicated with: Miah sevilla prior to session.  Patient found up in chair with telemetry upon OT entry to room.    General Precautions: Standard, fall   Orthopedic Precautions:N/A   Braces: N/A      Bed Mobility:    · Patient completed Scooting/Bridging with maxA of 2 to HOB via drawsheet  · Patient completed Sit to Supine with moderate assistance and with leg lift     Functional Mobility/Transfers:  · Patient completed Sit <> Stand Transfer with minimum assistance  with  rolling walker   · Patient completed Toilet Transfer Step Transfer technique with minimum assistance and  moderate assistance with  rolling walker    Activities of Daily Living:  · Grooming: supervision HH  · Toileting: contact guard assistance and minimum assistance for balance during hygiene after BM    [facility] 6 Click ADL: 15    Treatment & Education:  Patient finishing PT session and UIC, requesting to use the bathroom. Patient ambulated to bathroom using RW /c CGA and slow pace. Increased time for BM and hygiene. Patient c/o dizziness while on toilet and was able to return to EOB. Patient reporting some SOB and increased fatigue. Unable to scoot laterally to HOB 2* fatigue.     Patient left HOB elevated with all lines intact, call button in reach, bed alarm on, nurse notified and wife presentEducation:      GOALS:   Multidisciplinary Problems     Occupational Therapy Goals        Problem: Occupational Therapy Goal    Goal Priority Disciplines Outcome Interventions   Occupational Therapy Goal     OT, PT/OT Ongoing (interventions implemented as appropriate)    Description:  Goals to be met by: 8/15     Patient will increase functional independence with ADLs by performing:    Feeding with Modified [place].  UE Dressing with Set-up Assistance.  Grooming while seated with Modified [place] post setup.  Toileting from bedside commode with Minimal Assistance for hygiene and clothing management.   Sitting at edge of bed x15 minutes with Modified [place] and no drop in BP.  Rolling to Bilateral with Modified [place].   Supine to sit with Modified [place].  Toilet transfer to bedside commode with Minimal Assistance.  Upper extremity exercise program x10 reps per handout, with independence.                      Time Tracking:     OT Date of Treatment: 07/24/19  OT Start Time: 0848  OT Stop Time: 0911  OT Total Time (min): 23 min    Billable Minutes:Self Care/Home Management 23    NEL Cardoso  7/24/2019

## 2019-07-24 NOTE — PROGRESS NOTES
[facility] Hospital Medicine  Progress Note    Patient Name: Thierry Ramos Jr.  MRN: [id number]  Patient Class: IP- Inpatient   Admission Date: 7/15/2019  Length of Stay: 8 days  Attending Physician: Mark Landry DO  Primary Care Provider: AKILA Wooten MD        Subjective:     Principal Problem:Sepsis due to pneumonia      HPI:  85 y.o. male with PMHx of DM, CLL, HTN, HLD, CVA, carotid stenosis, hypothyroid, hypopituitarism, squamous cell carcinoma who presents after syncopal episode. Patient reports worsening muscle weakness. Patient and spouse reports patient started becoming weak in beginning of June/2019 and it progressively worsened.  Reports weakness is especially in the legs.  He also reports leg swelling which has been associated to starting Ibrutinib for CLL.  Patient follows with Dr. Trejo for CLL and last seen 7/8/19 for the weakness and was told to hold the Ibrutinib until his next follow up appointment.  Spouse reports patient fell twice last night with loss of consciousness after the second fall.   Denies any fever, SOB, nausea, vomiting, diarrhea, chest pain or abdominal pain. ED findings: WBC 12.75, H/H 12.3/38.9, Lactic Acid 3.0, ALK Phos 258, AST//9, chest x-ray showed mild pulmonary opacity at the left lung base may relate to pulmonary infiltrate/airspace disease, EKG no ischemic changes.  Patient admitted for further medical management.     Overview/Hospital Course:  WBC increased to 25. Platelets fell from 120 to 8, so H/O was consulted and patient was transfused 1 unit of platelets. Platelets increased only to 9. Hem onc gave IVIG and recommended no further transfusion except in life threatening bleed. Patient was oliguric with rising Cr, so nephrology was consulted. The next day Cr remained stable and UOP increased. ID was consulted, and recommended vanc, cefepime, and azithromycin, and flagyl. Abdominal US was unremarkable. Pt was transfused 2 units PRBC on  7/18 after he pulled his art line and started bleeding. Had episode of afib RVR, worsening hypoxia, and hypontension late 7/18 which resolved. CXR was repeated on 7/19 showing increased interstitial opacities. Lasix dose was increased to 80 TID and then decreased after good UOP. Respiratory status improved. Pt was transferred to the floor on 7/21. Platelets continued to improved. PT/OT recommended SNF placement.  7/23/19  Platelets trending down, appreciate hematology recommendations, continue IV antibiotics.        Interval History:  No complaints or concerns.    Review of Systems   Constitutional: Negative for chills and fever.   HENT: Negative for congestion, postnasal drip and rhinorrhea.    Eyes: Negative for visual disturbance.   Respiratory: Negative for chest tightness and shortness of breath.    Cardiovascular: Negative for chest pain.   Gastrointestinal: Negative for abdominal distention and abdominal pain.   Genitourinary: Negative for difficulty urinating.   Musculoskeletal: Negative for arthralgias and myalgias.   Neurological: Negative for weakness and headaches.   Psychiatric/Behavioral: Negative for agitation.     Objective:     Vital Signs (Most Recent):  Temp: 96.8 °F (36 °C) (07/23/19 1521)  Pulse: 80 (07/23/19 1928)  Resp: 18 (07/23/19 1928)  BP: (!) 142/60 (07/23/19 1521)  SpO2: 98 % (07/23/19 1928) Vital Signs (24h Range):  Temp:  [96.7 °F (35.9 °C)-97.1 °F (36.2 °C)] 96.8 °F (36 °C)  Pulse:  [62-80] 80  Resp:  [16-22] 18  SpO2:  [93 %-100 %] 98 %  BP: (117-146)/(56-74) 142/60     Weight: 73.3 kg (161 lb 9.6 oz)  Body mass index is 22.54 kg/m².    Intake/Output Summary (Last 24 hours) at 7/23/2019 2010  Last data filed at 7/23/2019 1400  Gross per 24 hour   Intake 550 ml   Output 600 ml   Net -50 ml      Physical Exam   Constitutional: He is oriented to person, place, and time. He appears well-developed and well-nourished.   HENT:   Head: Normocephalic and atraumatic.   Eyes: EOM are normal.    Neck: Normal range of motion. Neck supple.   Cardiovascular: Normal rate.   Pulmonary/Chest: Effort normal and breath sounds normal.   Abdominal: Soft. Bowel sounds are normal.   Musculoskeletal: Normal range of motion.   Neurological: He is alert and oriented to person, place, and time.   Skin: Skin is warm and dry.   Psychiatric: He has a normal mood and affect.       Significant Labs:   BMP:   Recent Labs   Lab 07/23/19 0452         K 3.6      CO2 26   BUN 67*   CREATININE 1.0   CALCIUM 8.4*   MG 2.3     CBC:   Recent Labs   Lab 07/22/19 0600 07/23/19 0452   WBC 8.85 6.42   HGB 9.9* 9.6*   HCT 30.3* 29.7*   PLT 53* 23*     CMP:   Recent Labs   Lab 07/22/19 0600 07/23/19 0452    141   K 3.7 3.6    108   CO2 26 26   * 106   BUN 72* 67*   CREATININE 1.1 1.0   CALCIUM 8.5* 8.4*   PROT 5.2* 4.8*   ALBUMIN 2.5* 2.3*   BILITOT 1.8* 1.7*   ALKPHOS 156* 169*   AST 62* 67*   ALT 53* 56*   ANIONGAP 8 7*   EGFRNONAA >60 >60     Magnesium:   Recent Labs   Lab 07/22/19 0600 07/23/19 0452   MG 2.2 2.3       Significant Imaging: I have reviewed all pertinent imaging results/findings within the past 24 hours.      Assessment/Plan:      * Sepsis due to pneumonia  Metabolic encephalopathy  Septic shock  Leukocytosis  Weakness  Chest X-ray showed left lung opacity, very mild.    -ID consult: Vanc, cefepime, and azithro (complete), flagyl  -Blood Cultures: NGTD  -Albuterol-ipratropium neb treatments Q4HR  -Lactic Acid: 3.0 > 1.6, required pressors on admission   -Abdominal US: unremarkable  -CT chest with bilateral ground glass opacities and pleural effusion    Pulmonary edema  Afib with RVR  Hypoxia  Compounded by recent blood transfusion  -Check echo: ok  -D/C lasix for now  -Strict I/O  -CT chest with edema and effusions  -Continue to wean O2    Abnormal liver enzymes  Likely 2/2 shock liver in the setting of septic shock. Improving but will also check liver US, which was  unremarkable.     Thrombocytopenia, unspecified        Weakness  PT/OT: SNF      Type 2 diabetes, controlled, with neuropathy  Hemoglobin A1c: 5.6  -POC glucose checks ACHS  -Low dose SSI PRN  -Hypoglycemia protocol PRN  -Diabetic Diet    Immunosuppression  Noted      Central hypothyroidism  -Continue synthroid    Immune thrombocytopenia  Plt 120 > 9 > 52>23  -Transfused platelets with little response; likely immune  -Further management per Hem Onc:  Per hematology, continue daily prednisone, follow labs, consider deescalating antibiotic, however, antibiotics end tomorrow.  Monitor    Panhypopituitarism  -continue oral steroid    CLL (chronic lymphocytic leukemia)  Immunosuppression  Edema of both legs  Follows with Dr. Trejo. Ibrutinib placed on hold by Dr. Trejo 2/2 to worsening weakness and leg swelling.  -Consult hem/onc, appreciate recs    Hypertension  -146  -hold home meds for now    Mixed dyslipidemia  History of CVA (cerebrovascular accident)  Denies chest pain or SOB  -Continue statin      VTE Risk Mitigation (From admission, onward)        Ordered     IP VTE HIGH RISK PATIENT  Once      07/15/19 1147                Sarah Monroy NP  Department of Hospital Medicine   [facility]

## 2019-07-24 NOTE — PROGRESS NOTES
The Sw spoke to Christina at [facility] and she states she will inform Nida the pt's ready for d/c so she can submit for auth. They will reach out to the pt's wife to arrange a time to sign admit papers.

## 2019-07-24 NOTE — ASSESSMENT & PLAN NOTE
- I reviewed the peripheral smear. Rare enlarged platelets are noted.  - I spoke to his hematologist Dr. Marr on 7/16/19. She recommended pulse dexamethasone, so I placed an order for dexamethasone 40mg PO daily x 4 days.  - he has received a dose of IV immune globulin as well on 7/16/19.  - He completed the four doses of dexamethasone on 7/19/19.  - Labs have been reviewed. Platelet count had decreased yesterday to 23 k/uL, but today it has increased to 32 k/uL.   - continue prednisone 20mg PO daily. Recommend changing/discontinuing antibiotics if possible (i.e. Vancomycin) as they can cause drug-induced thrombocytopenia.  - continue to monitor.

## 2019-07-24 NOTE — SUBJECTIVE & OBJECTIVE
Interval History:   - he worked out this morning with physical therapy, so he is exhausted at time of my examination.  - platelet count had increased to 32 k/uL.    Oncology Treatment Plan:   [No treatment plan]    Medications:  Continuous Infusions:  Scheduled Meds:   albuterol-ipratropium  3 mL Nebulization Q4H WAKE    artificial tears  1 drop Both Eyes BID    atorvastatin  20 mg Oral Daily    ceFEPime (MAXIPIME) IVPB  2 g Intravenous Daily    cetirizine  10 mg Oral Daily    cyanocobalamin  500 mcg Oral Daily    erythromycin   Both Eyes QHS    levothyroxine  125 mcg Oral Before breakfast    metroNIDAZOLE  500 mg Oral Q8H    pantoprazole  40 mg Oral Daily    predniSONE  50 mg Oral Daily    sodium citrate-citric acid 500-334 mg/5 ml  15 mL Oral TID    vancomycin (VANCOCIN) IVPB  1,250 mg Intravenous Q24H     PRN Meds:sodium chloride, sodium chloride, sodium chloride, sodium chloride, acetaminophen, benzonatate, Dextrose 10% Bolus, Dextrose 10% Bolus, glucagon (human recombinant), glucose, glucose, insulin aspart U-100, ondansetron, ramelteon, sodium chloride 0.9%     Review of Systems   Constitutional: Positive for fatigue.   HENT: Negative for sore throat.    Eyes: Negative for visual disturbance.   Respiratory: Negative for shortness of breath.    Cardiovascular: Positive for leg swelling. Negative for chest pain.   Gastrointestinal: Negative for abdominal pain.   Genitourinary: Negative for dysuria.   Musculoskeletal: Negative for back pain.   Skin: Negative for rash.   Neurological: Positive for weakness.   Psychiatric/Behavioral: The patient is not nervous/anxious.      Objective:     Vital Signs (Most Recent):  Temp: 96.8 °F (36 °C) (07/24/19 1159)  Pulse: 75 (07/24/19 1159)  Resp: 18 (07/24/19 1159)  BP: (!) 159/65 (07/24/19 1159)  SpO2: 96 % (07/24/19 1119) Vital Signs (24h Range):  Temp:  [96.4 °F (35.8 °C)-97 °F (36.1 °C)] 96.8 °F (36 °C)  Pulse:  [59-80] 75  Resp:  [16-22] 18  SpO2:  [96 %-99  %] 96 %  BP: (116-159)/(57-67) 159/65     Weight: 73.9 kg (162 lb 14.7 oz)  Body mass index is 22.72 kg/m².  Body surface area is 1.92 meters squared.      Intake/Output Summary (Last 24 hours) at 7/24/2019 1208  Last data filed at 7/24/2019 0552  Gross per 24 hour   Intake 615 ml   Output 625 ml   Net -10 ml       Physical Exam   Constitutional: He is oriented to person, place, and time. He appears well-developed.   Fatigued   HENT:   Head: Normocephalic and atraumatic.   Eyes: Pupils are equal, round, and reactive to light. EOM are normal.   Neck: Normal range of motion. Neck supple.   Cardiovascular: Normal rate and regular rhythm.   Pulmonary/Chest: Effort normal and breath sounds normal.   Abdominal: Soft. Bowel sounds are normal.   Neurological: He is alert and oriented to person, place, and time.   Skin: Skin is warm and dry.   Ecchymoses throughout body   Psychiatric: He has a normal mood and affect. His behavior is normal. Judgment and thought content normal.   Nursing note and vitals reviewed.      Significant Labs:   Labs have been reviewed.    Lab Results   Component Value Date    WBC 10.83 07/24/2019    HGB 10.8 (L) 07/24/2019    HCT 33.5 (L) 07/24/2019    MCV 93 07/24/2019    PLT 32 (LL) 07/24/2019

## 2019-07-24 NOTE — PLAN OF CARE
Problem: Adult Inpatient Plan of Care  Goal: Plan of Care Review  Outcome: Ongoing (interventions implemented as appropriate)  Patient is currently on room air with acceptable O2 saturations. Will continue to monitor. Neb Tx given.The proper method of use, as well as anticipated side effects, of this aerosol treatment are discussed and demonstrated to the patient.

## 2019-07-24 NOTE — PLAN OF CARE
Problem: Adult Inpatient Plan of Care  Goal: Plan of Care Review  Patient on room air with documented SATS and in no apparent distress. Will continue to monitor.  The proper method of use, as well as anticipated side effects, of this aerosol treatment are discussed and demonstrated to the patient.

## 2019-07-24 NOTE — SUBJECTIVE & OBJECTIVE
Interval History:  No complaints or concerns.    Review of Systems   Constitutional: Negative for chills and fever.   HENT: Negative for congestion, postnasal drip and rhinorrhea.    Eyes: Negative for visual disturbance.   Respiratory: Negative for chest tightness and shortness of breath.    Cardiovascular: Negative for chest pain.   Gastrointestinal: Negative for abdominal distention and abdominal pain.   Genitourinary: Negative for difficulty urinating.   Musculoskeletal: Negative for arthralgias and myalgias.   Neurological: Negative for weakness and headaches.   Psychiatric/Behavioral: Negative for agitation.     Objective:     Vital Signs (Most Recent):  Temp: 96.8 °F (36 °C) (07/23/19 1521)  Pulse: 80 (07/23/19 1928)  Resp: 18 (07/23/19 1928)  BP: (!) 142/60 (07/23/19 1521)  SpO2: 98 % (07/23/19 1928) Vital Signs (24h Range):  Temp:  [96.7 °F (35.9 °C)-97.1 °F (36.2 °C)] 96.8 °F (36 °C)  Pulse:  [62-80] 80  Resp:  [16-22] 18  SpO2:  [93 %-100 %] 98 %  BP: (117-146)/(56-74) 142/60     Weight: 73.3 kg (161 lb 9.6 oz)  Body mass index is 22.54 kg/m².    Intake/Output Summary (Last 24 hours) at 7/23/2019 2010  Last data filed at 7/23/2019 1400  Gross per 24 hour   Intake 550 ml   Output 600 ml   Net -50 ml      Physical Exam   Constitutional: He is oriented to person, place, and time. He appears well-developed and well-nourished.   HENT:   Head: Normocephalic and atraumatic.   Eyes: EOM are normal.   Neck: Normal range of motion. Neck supple.   Cardiovascular: Normal rate.   Pulmonary/Chest: Effort normal and breath sounds normal.   Abdominal: Soft. Bowel sounds are normal.   Musculoskeletal: Normal range of motion.   Neurological: He is alert and oriented to person, place, and time.   Skin: Skin is warm and dry.   Psychiatric: He has a normal mood and affect.       Significant Labs:   BMP:   Recent Labs   Lab 07/23/19  0452         K 3.6      CO2 26   BUN 67*   CREATININE 1.0   CALCIUM 8.4*    MG 2.3     CBC:   Recent Labs   Lab 07/22/19  0600 07/23/19  0452   WBC 8.85 6.42   HGB 9.9* 9.6*   HCT 30.3* 29.7*   PLT 53* 23*     CMP:   Recent Labs   Lab 07/22/19 0600 07/23/19 0452    141   K 3.7 3.6    108   CO2 26 26   * 106   BUN 72* 67*   CREATININE 1.1 1.0   CALCIUM 8.5* 8.4*   PROT 5.2* 4.8*   ALBUMIN 2.5* 2.3*   BILITOT 1.8* 1.7*   ALKPHOS 156* 169*   AST 62* 67*   ALT 53* 56*   ANIONGAP 8 7*   EGFRNONAA >60 >60     Magnesium:   Recent Labs   Lab 07/22/19 0600 07/23/19 0452   MG 2.2 2.3       Significant Imaging: I have reviewed all pertinent imaging results/findings within the past 24 hours.

## 2019-07-24 NOTE — ASSESSMENT & PLAN NOTE
Plt 120 > 9 > 52>23>32  -Transfused platelets with little response; likely immune  -Further management per Hem Onc:  Per hematology, continue daily prednisone, follow labs, consider deescalating antibiotic, antibiotics end today  Monitor

## 2019-07-24 NOTE — PROGRESS NOTES
"[facility]  Hematology/Oncology  Progress Note    Patient Name: Thierry Ramos Jr.  Admission Date: 7/15/2019  Hospital Length of Stay: 9 days  Code Status: Full Code     Subjective:     HPI:  85 year-old male with chronic lymphocytic leukemia, most recently on ibrutinib but held for the past week, was admitted on 7/15/19 for syncope and fall x 2. He is currently in the ICU being treated for possible sepsis with hypotension. Platelet count had decreased over the past 24 hours from 120 k/uL to < 10 k/uL. 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     Interval History:   - he worked out this morning with physical therapy, so he is exhausted at time of my examination.  - platelet count had increased to 32 k/uL.    Oncology Treatment Plan:   [No treatment plan]    Medications:  Continuous Infusions:  Scheduled Meds:   albuterol-ipratropium  3 mL Nebulization Q4H WAKE    artificial tears  1 drop Both Eyes BID    atorvastatin  20 mg Oral Daily    ceFEPime (MAXIPIME) IVPB  2 g Intravenous Daily    cetirizine  10 mg Oral Daily    cyanocobalamin  500 mcg Oral Daily    erythromycin   Both Eyes QHS    levothyroxine  125 mcg Oral Before breakfast    metroNIDAZOLE  500 mg Oral Q8H    pantoprazole  40 mg Oral Daily    predniSONE  50 mg Oral Daily    sodium citrate-citric acid 500-334 mg/5 ml  15 mL Oral TID    vancomycin (VANCOCIN) IVPB  1,250 mg Intravenous Q24H     PRN Meds:sodium chloride, sodium chloride, sodium chloride, sodium chloride, acetaminophen, benzonatate, Dextrose 10% Bolus, Dextrose 10% Bolus, glucagon (human recombinant), glucose, glucose, insulin aspart U-100, ondansetron, ramelteon, sodium chloride 0.9%     Review of Systems   Constitutional: Positive for fatigue.   HENT: Negative for sore throat.    Eyes: Negative for visual disturbance. "   Respiratory: Negative for shortness of breath.    Cardiovascular: Positive for leg swelling. Negative for chest pain.   Gastrointestinal: Negative for abdominal pain.   Genitourinary: Negative for dysuria.   Musculoskeletal: Negative for back pain.   Skin: Negative for rash.   Neurological: Positive for weakness.   Psychiatric/Behavioral: The patient is not nervous/anxious.      Objective:     Vital Signs (Most Recent):  Temp: 96.8 °F (36 °C) (07/24/19 1159)  Pulse: 75 (07/24/19 1159)  Resp: 18 (07/24/19 1159)  BP: (!) 159/65 (07/24/19 1159)  SpO2: 96 % (07/24/19 1119) Vital Signs (24h Range):  Temp:  [96.4 °F (35.8 °C)-97 °F (36.1 °C)] 96.8 °F (36 °C)  Pulse:  [59-80] 75  Resp:  [16-22] 18  SpO2:  [96 %-99 %] 96 %  BP: (116-159)/(57-67) 159/65     Weight: 73.9 kg (162 lb 14.7 oz)  Body mass index is 22.72 kg/m².  Body surface area is 1.92 meters squared.      Intake/Output Summary (Last 24 hours) at 7/24/2019 1208  Last data filed at 7/24/2019 0552  Gross per 24 hour   Intake 615 ml   Output 625 ml   Net -10 ml       Physical Exam   Constitutional: He is oriented to person, place, and time. He appears well-developed.   Fatigued   HENT:   Head: Normocephalic and atraumatic.   Eyes: Pupils are equal, round, and reactive to light. EOM are normal.   Neck: Normal range of motion. Neck supple.   Cardiovascular: Normal rate and regular rhythm.   Pulmonary/Chest: Effort normal and breath sounds normal.   Abdominal: Soft. Bowel sounds are normal.   Neurological: He is alert and oriented to person, place, and time.   Skin: Skin is warm and dry.   Ecchymoses throughout body   Psychiatric: He has a normal mood and affect. His behavior is normal. Judgment and thought content normal.   Nursing note and vitals reviewed.      Significant Labs:   Labs have been reviewed.    Lab Results   Component Value Date    WBC 10.83 07/24/2019    HGB 10.8 (L) 07/24/2019    HCT 33.5 (L) 07/24/2019    MCV 93 07/24/2019    PLT 32 (LL)  07/24/2019         Assessment/Plan:     Immune thrombocytopenia  - I reviewed the peripheral smear. Rare enlarged platelets are noted.  - I spoke to his hematologist Dr. Marr on 7/16/19. She recommended pulse dexamethasone, so I placed an order for dexamethasone 40mg PO daily x 4 days.  - he has received a dose of IV immune globulin as well on 7/16/19.  - He completed the four doses of dexamethasone on 7/19/19.  - Labs have been reviewed. Platelet count had decreased yesterday to 23 k/uL, but today it has increased to 32 k/uL.   - continue prednisone 20mg PO daily. Recommend changing/discontinuing antibiotics if possible (i.e. Vancomycin) as they can cause drug-induced thrombocytopenia.  - continue to monitor.            Braxton El MD  Hematology/Oncology  [facility]

## 2019-07-25 ENCOUNTER — TELEPHONE (OUTPATIENT)
Dept: HEMATOLOGY/ONCOLOGY | Facility: CLINIC | Age: 84
End: 2019-07-25

## 2019-07-25 VITALS
SYSTOLIC BLOOD PRESSURE: 122 MMHG | WEIGHT: 170.88 LBS | RESPIRATION RATE: 18 BRPM | HEIGHT: 71 IN | DIASTOLIC BLOOD PRESSURE: 65 MMHG | BODY MASS INDEX: 23.92 KG/M2 | OXYGEN SATURATION: 96 % | HEART RATE: 84 BPM | TEMPERATURE: 97 F

## 2019-07-25 PROBLEM — A41.9 SEPSIS DUE TO PNEUMONIA: Status: RESOLVED | Noted: 2019-07-15 | Resolved: 2019-07-25

## 2019-07-25 PROBLEM — D72.829 LEUKOCYTOSIS: Status: RESOLVED | Noted: 2019-07-15 | Resolved: 2019-07-25

## 2019-07-25 PROBLEM — J18.9 SEPSIS DUE TO PNEUMONIA: Status: RESOLVED | Noted: 2019-07-15 | Resolved: 2019-07-25

## 2019-07-25 LAB
ALBUMIN SERPL BCP-MCNC: 2.4 G/DL (ref 3.5–5.2)
ALP SERPL-CCNC: 230 U/L (ref 55–135)
ALT SERPL W/O P-5'-P-CCNC: 64 U/L (ref 10–44)
ANION GAP SERPL CALC-SCNC: 8 MMOL/L (ref 8–16)
ANISOCYTOSIS BLD QL SMEAR: SLIGHT
AST SERPL-CCNC: 87 U/L (ref 10–40)
BASOPHILS # BLD AUTO: ABNORMAL K/UL (ref 0–0.2)
BASOPHILS NFR BLD: 0 % (ref 0–1.9)
BILIRUB SERPL-MCNC: 1.9 MG/DL (ref 0.1–1)
BUN SERPL-MCNC: 61 MG/DL (ref 8–23)
BURR CELLS BLD QL SMEAR: ABNORMAL
CALCIUM SERPL-MCNC: 8.9 MG/DL (ref 8.7–10.5)
CHLORIDE SERPL-SCNC: 109 MMOL/L (ref 95–110)
CO2 SERPL-SCNC: 25 MMOL/L (ref 23–29)
CREAT SERPL-MCNC: 0.9 MG/DL (ref 0.5–1.4)
DACRYOCYTES BLD QL SMEAR: ABNORMAL
DIFFERENTIAL METHOD: ABNORMAL
EOSINOPHIL # BLD AUTO: ABNORMAL K/UL (ref 0–0.5)
EOSINOPHIL NFR BLD: 0 % (ref 0–8)
ERYTHROCYTE [DISTWIDTH] IN BLOOD BY AUTOMATED COUNT: 18.8 % (ref 11.5–14.5)
EST. GFR  (AFRICAN AMERICAN): >60 ML/MIN/1.73 M^2
EST. GFR  (NON AFRICAN AMERICAN): >60 ML/MIN/1.73 M^2
GLUCOSE SERPL-MCNC: 95 MG/DL (ref 70–110)
HCT VFR BLD AUTO: 36.4 % (ref 40–54)
HGB BLD-MCNC: 11.5 G/DL (ref 14–18)
HYPOCHROMIA BLD QL SMEAR: ABNORMAL
LYMPHOCYTES # BLD AUTO: ABNORMAL K/UL (ref 1–4.8)
LYMPHOCYTES NFR BLD: 22 % (ref 18–48)
MAGNESIUM SERPL-MCNC: 2.3 MG/DL (ref 1.6–2.6)
MCH RBC QN AUTO: 29.8 PG (ref 27–31)
MCHC RBC AUTO-ENTMCNC: 31.6 G/DL (ref 32–36)
MCV RBC AUTO: 94 FL (ref 82–98)
METAMYELOCYTES NFR BLD MANUAL: 1 %
MONOCYTES # BLD AUTO: ABNORMAL K/UL (ref 0.3–1)
MONOCYTES NFR BLD: 2 % (ref 4–15)
MYELOCYTES NFR BLD MANUAL: 1 %
NEUTROPHILS NFR BLD: 67 % (ref 38–73)
NEUTS BAND NFR BLD MANUAL: 7 %
OVALOCYTES BLD QL SMEAR: ABNORMAL
PHOSPHATE SERPL-MCNC: 3.3 MG/DL (ref 2.7–4.5)
PLATELET # BLD AUTO: 31 K/UL (ref 150–350)
PLATELET BLD QL SMEAR: ABNORMAL
PMV BLD AUTO: ABNORMAL FL (ref 9.2–12.9)
POCT GLUCOSE: 100 MG/DL (ref 70–110)
POCT GLUCOSE: 153 MG/DL (ref 70–110)
POIKILOCYTOSIS BLD QL SMEAR: SLIGHT
POLYCHROMASIA BLD QL SMEAR: ABNORMAL
POTASSIUM SERPL-SCNC: 4.8 MMOL/L (ref 3.5–5.1)
PROT SERPL-MCNC: 5.1 G/DL (ref 6–8.4)
RBC # BLD AUTO: 3.86 M/UL (ref 4.6–6.2)
SODIUM SERPL-SCNC: 142 MMOL/L (ref 136–145)
WBC # BLD AUTO: 9.07 K/UL (ref 3.9–12.7)

## 2019-07-25 PROCEDURE — 85027 COMPLETE CBC AUTOMATED: CPT | Mod: HCNC

## 2019-07-25 PROCEDURE — 83735 ASSAY OF MAGNESIUM: CPT | Mod: HCNC

## 2019-07-25 PROCEDURE — 25000003 PHARM REV CODE 250: Mod: HCNC | Performed by: NURSE PRACTITIONER

## 2019-07-25 PROCEDURE — 94761 N-INVAS EAR/PLS OXIMETRY MLT: CPT | Mod: HCNC

## 2019-07-25 PROCEDURE — 80053 COMPREHEN METABOLIC PANEL: CPT | Mod: HCNC

## 2019-07-25 PROCEDURE — 25000003 PHARM REV CODE 250: Mod: HCNC | Performed by: HOSPITALIST

## 2019-07-25 PROCEDURE — 84100 ASSAY OF PHOSPHORUS: CPT | Mod: HCNC

## 2019-07-25 PROCEDURE — 94640 AIRWAY INHALATION TREATMENT: CPT | Mod: HCNC

## 2019-07-25 PROCEDURE — 85007 BL SMEAR W/DIFF WBC COUNT: CPT | Mod: HCNC

## 2019-07-25 PROCEDURE — 25000242 PHARM REV CODE 250 ALT 637 W/ HCPCS: Mod: HCNC | Performed by: NURSE PRACTITIONER

## 2019-07-25 PROCEDURE — 94799 UNLISTED PULMONARY SVC/PX: CPT | Mod: HCNC

## 2019-07-25 PROCEDURE — 97530 THERAPEUTIC ACTIVITIES: CPT | Mod: HCNC

## 2019-07-25 PROCEDURE — 97116 GAIT TRAINING THERAPY: CPT | Mod: HCNC

## 2019-07-25 PROCEDURE — 36415 COLL VENOUS BLD VENIPUNCTURE: CPT | Mod: HCNC

## 2019-07-25 PROCEDURE — 97110 THERAPEUTIC EXERCISES: CPT | Mod: HCNC

## 2019-07-25 PROCEDURE — 63600175 PHARM REV CODE 636 W HCPCS: Mod: HCNC | Performed by: NURSE PRACTITIONER

## 2019-07-25 PROCEDURE — 25000003 PHARM REV CODE 250: Mod: HCNC | Performed by: INTERNAL MEDICINE

## 2019-07-25 RX ORDER — PREDNISONE 20 MG/1
20 TABLET ORAL DAILY
Qty: 30 TABLET | Refills: 0 | Status: SHIPPED | OUTPATIENT
Start: 2019-07-26 | End: 2019-08-25

## 2019-07-25 RX ADMIN — IPRATROPIUM BROMIDE AND ALBUTEROL SULFATE 3 ML: .5; 3 SOLUTION RESPIRATORY (INHALATION) at 07:07

## 2019-07-25 RX ADMIN — HYPROMELLOSE 2910 1 DROP: 5 SOLUTION OPHTHALMIC at 09:07

## 2019-07-25 RX ADMIN — SODIUM CITRATE AND CITRIC ACID MONOHYDRATE 15 ML: 500; 334 SOLUTION ORAL at 09:07

## 2019-07-25 RX ADMIN — IPRATROPIUM BROMIDE AND ALBUTEROL SULFATE 3 ML: .5; 3 SOLUTION RESPIRATORY (INHALATION) at 03:07

## 2019-07-25 RX ADMIN — LEVOTHYROXINE SODIUM 125 MCG: 25 TABLET ORAL at 05:07

## 2019-07-25 RX ADMIN — METRONIDAZOLE 500 MG: 500 TABLET ORAL at 01:07

## 2019-07-25 RX ADMIN — CETIRIZINE HYDROCHLORIDE 10 MG: 10 TABLET, FILM COATED ORAL at 09:07

## 2019-07-25 RX ADMIN — IPRATROPIUM BROMIDE AND ALBUTEROL SULFATE 3 ML: .5; 3 SOLUTION RESPIRATORY (INHALATION) at 11:07

## 2019-07-25 RX ADMIN — Medication 500 MCG: at 09:07

## 2019-07-25 RX ADMIN — PREDNISONE 20 MG: 20 TABLET ORAL at 09:07

## 2019-07-25 RX ADMIN — PANTOPRAZOLE SODIUM 40 MG: 40 TABLET, DELAYED RELEASE ORAL at 09:07

## 2019-07-25 RX ADMIN — ATORVASTATIN CALCIUM 20 MG: 20 TABLET, FILM COATED ORAL at 09:07

## 2019-07-25 NOTE — ASSESSMENT & PLAN NOTE
Plt 120 > 9 > 52>23>32>31  -Transfused platelets with little response; likely immune  -Further management per Hem Onc:  Per hematology, continue daily prednisone, follow labs, antibiotics complete therefore no need for de-escalation.  Platelets remain greater than 30, hematology ok with discharging and following up with clinic. Home health to draw labs and fax results to hematology.

## 2019-07-25 NOTE — PLAN OF CARE
D/C rounds complete. All questions answered.  Nurse to discuss d/c medications.  Discussed need to keep f/u appts, adherence to medication regimen for health maintenance, verbalized understanding.    RW being delivered to bedside for d/c home.  BSC has been delivered to bedside.     07/25/19 1224   Final Note   Assessment Type Final Discharge Note   Anticipated Discharge Disposition Home-Health  ([facility])   Hospital Follow Up  Appt(s) scheduled? Yes   Discharge plans and expectations educations in teach back method with documentation complete? Yes       Future Appointments   Date Time Provider Department Center   7/30/2019 10:00 AM Kelly Marr MD [facility] DARIANA [place]   8/6/2019 10:15 AM LAB, [place] [facility] [place]   9/19/2019  8:30 AM Lou Chambers MD [facility] CARDIO [place]   10/10/2019  9:30 AM Melvin Ying MD [facility] [place], RN    [phone]

## 2019-07-25 NOTE — PLAN OF CARE
"CM visited with pt and family, pt is adamant at this time he will return home with home health.  Pt states his desire is to have PT/OT to get him stronger, be "on my back porch" and relax at home.  Wife states she has spoken with her daughter and they will manage help at home.  Pt has walker, has access to rollator and declining w/c at this time.  Pt states all he needs is a BSC which will be ordered by primary team.  Pt will need labs twice a week with results called to Dr Marr in Oncology.      Discussed with primary team and will decide if d/c will be today vs tomorrow.    Norma Samayoa, RN    [phone]  "

## 2019-07-25 NOTE — ASSESSMENT & PLAN NOTE
Immunosuppression  Edema of both legs  Follows with Dr. Trejo. Ibrutinib placed on hold by Dr. Trejo 2/2 to worsening weakness and leg swelling.  -Patient has scheduled hematology follow up

## 2019-07-25 NOTE — ASSESSMENT & PLAN NOTE
Hemoglobin A1c: 5.6  -POC glucose checks ACHS  -Diabetic Diet  -Continue home regimen  -home health for further diabetic education

## 2019-07-25 NOTE — PLAN OF CARE
Problem: Adult Inpatient Plan of Care  Goal: Plan of Care Review  Outcome: Ongoing (interventions implemented as appropriate)  Plan of care reviewed patient verbalized understanding. Medications medications, IV antibiotics infused. Blood glucose monitoring per sliding scale. Cardiac monitoring NSR 70. Dressing changed with gauze and mepilex upper extremities.TB read at 16:27 normal. Safety maintained bed in the lowest position, call bell within reach, bed alarm on.

## 2019-07-25 NOTE — TELEPHONE ENCOUNTER
Attempted to call back.  Message left informing Shana appt was moved up to 7/30 at 10am.      ----- Message from Norma Samayoa RN sent at 7/25/2019 11:17 AM CDT -----  Contact: Norma HOBSON case manager [phone]  Pt will d/c home today, SNF recommended but will d/c home with home health.  Can we get a sooner f/u with your clinic?  Home Health will draw labs twice a week with results to Dr Marr.    Norma Samayoa RN    [phone]

## 2019-07-25 NOTE — PT/OT/SLP PROGRESS
"Physical Therapy Treatment    Patient Name:  Thierry Ramos Jr.   MRN:  [id number]    Recommendations:     Discharge Recommendations:  nursing facility, skilled   Discharge Equipment Recommendations: (TBD pending progress, defer to SNF)   Barriers to discharge: None    Assessment:     Thierry Ramos Jr. is a 85 y.o. male admitted with a medical diagnosis of Sepsis due to pneumonia.  He presents with the following impairments/functional limitations:  weakness, impaired endurance, impaired self care skills, impaired functional mobilty, impaired balance, decreased coordination, decreased upper extremity function, decreased lower extremity function, decreased safety awareness, impaired skin.  Pt demonstrated an increase in ambulation today with standing and seated rest breaks secondary to decreased strength and endurance.  Pt given vc's for proper technique with RW use throughout tx, but was adamant about doing it "my way."  Pt was pleasant to work with, and would continue to benefit from P.T. To address impairments listed above.    Rehab Prognosis: Fair; patient would benefit from acute skilled PT services to address these deficits and reach maximum level of function.    Recent Surgery: * No surgery found *      Plan:     During this hospitalization, patient to be seen 6 x/week to address the identified rehab impairments via gait training, therapeutic activities, therapeutic exercises and progress toward the following goals:    · Plan of Care Expires:  08/17/19    Subjective       Patient/Family Comments/goals: Pt agreed to tx.  Pain/Comfort: No pain  · Pain Rating 1: 0/10      Objective:     Communicated with RN(Cain) prior to session.  Patient found supine with telemetry, peripheral IV upon PT entry to room.     General Precautions: Standard, fall   Orthopedic Precautions:N/A   Braces:       Functional Mobility:  · Bed Mobility:     · Rolling Left:  stand by assistance with bed rail for assistance  · Scooting: " stand by assistance  to EOB  · Supine to Sit: stand by assistance and contact guard assistance with increased time to perform  · Sit to Supine: stand by assistance  · Sit to Stand: CGA with RW and vc's for proper, safe hand placement  · Gait: 45ft x 2 with Rw  Close CGA with standing rest between bouts and seated rest after last bout.  Pt then ambulated 80ft x 2 with RW and close CGA with a standing rest break between bouts and returned supine after last bout.  Pt ambulates with occasional WBOS, decreased step length with B mildly flexed knees, decreased frannie, and mild trunk flexion.        Rest breaks during gait secondary to mild SOB, decreased strength and endurance. Pt was instructed in pursed lip breathing during rest breaks.    AM-PAC 6 CLICK MOBILITY  Turning over in bed (including adjusting bedclothes, sheets and blankets)?: 3  Sitting down on and standing up from a chair with arms (e.g., wheelchair, bedside commode, etc.): 3  Moving from lying on back to sitting on the side of the bed?: 3  Moving to and from a bed to a chair (including a wheelchair)?: 3  Need to walk in hospital room?: 3  Climbing 3-5 steps with a railing?: 1  Basic Mobility Total Score: 16       Therapeutic Activities and Exercises:   SEated BLE therex: AP, LAQ, hip flexion/ ABd/ADD x 15 reps.  A    Patient left supine with all lines intact and RN notified.    GOALS:   Multidisciplinary Problems     Physical Therapy Goals        Problem: Physical Therapy Goal    Goal Priority Disciplines Outcome Goal Variances Interventions   Physical Therapy Goal     PT, PT/OT Ongoing (interventions implemented as appropriate)     Description:  Goals to be met by: 8/15/2019     Patient will increase functional independence with mobility by performin. Supine to sit with Modified [place]  2. Sit to stand transfer with Modified [place]  3. Bed to chair transfer with Modified [place] using Rolling Walker  4. Gait  x 20 feet with  Modified [place] using Rolling Walker.              Problem: Physical Therapy Goal    Goal Priority Disciplines Outcome Goal Variances Interventions   Physical Therapy Goal     PT, PT/OT                      Time Tracking:     PT Received On: 07/25/19  PT Start Time: 1400     PT Stop Time: 1438  PT Total Time (min): 38 min     Billable Minutes: Gait Training 20, Therapeutic Activity 8, Therapeutic Exercise 10       PT/PTA: PTA     PTA Visit Number: 3     Fani Melchor, PTA  07/25/2019

## 2019-07-25 NOTE — PLAN OF CARE
Problem: Adult Inpatient Plan of Care  Goal: Plan of Care Review  Outcome: Ongoing (interventions implemented as appropriate)  Patient is currently on room air with acceptable O2 saturations. Will continue to monitor.Neb tx given. The proper method of use, as well as anticipated side effects, of this aerosol treatment are discussed and demonstrated to the patient. Incentive Spirometry performed.

## 2019-07-25 NOTE — PT/OT/SLP PROGRESS
Occupational Therapy  Visit Attempts    Patient Name:  Thierry Ramos Jr.   MRN:  [id number]    14:03 - Patient working with PT. After PT session, patient reporting increased fatigue. Will follow-up tomorrow's date.    NEL Cardoso  7/25/2019

## 2019-07-25 NOTE — PLAN OF CARE
[facility] HOME HEALTH ORDERS  FACE TO FACE ENCOUNTER    Patient Name: Thierry Ramos Jr.  YOB: 1934    PCP: AKILA Wooten MD   PCP Address: [address] [facility] [address] / [place] [address]  PCP Phone Number: [phone]  PCP Fax: [phone]    Encounter Date: 07/25/2019    Admit to Home Health    Diagnoses:  Active Hospital Problems    Diagnosis  POA    Pulmonary edema [J81.1]  Yes    Palliative care encounter [Z51.5]  Not Applicable    Goals of care, counseling/discussion [Z71.89]  Not Applicable    Altered mental status [R41.82]  Yes    Thrombocytopenia, unspecified [D69.6]  Yes    Abnormal liver enzymes [R74.8]  Yes    Edema of both legs [R60.0]  Yes    Weakness [R53.1]  Yes    Type 2 diabetes, controlled, 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 TGs shot up to 400 when he developed diabetes in 2014.  On atorvastatin 3 days a week because of statin induced myalgias.         Resolved Hospital Problems    Diagnosis Date Resolved POA    *Sepsis due to pneumonia [J18.9, A41.9] 07/25/2019 Yes    Oliguria [R34] 07/24/2019 Yes    Leukocytosis [D72.829] 07/25/2019 Yes    LUZ (acute kidney injury) [N17.9] 07/23/2019 No       Future Appointments   Date Time Provider Department Center   8/6/2019 10:15 AM LAB, [place] [facility] [place]   8/7/2019  8:30 AM Kelly Marr MD [facility] DARIANA [place]   9/19/2019  8:30 AM Lou Chambers MD [facility] CARDIO [place]   10/10/2019  9:30 AM Melvin Ying MD [facility] Tommy Ornelas     Follow-up Information      Kelly Marr MD On 8/7/2019.    Specialty:  Hematology and Oncology  Why:  @8:30am  Contact information:  [address]  [facility] [address]  [phone]                     I have seen and examined this patient face to face today. My clinical findings that support the need for the home health skilled services and home bound status are the following:  Weakness/numbness causing balance and gait disturbance due to Weakness/Debility making it taxing to leave home.    Allergies:  Review of patient's allergies indicates:   Allergen Reactions    Ace inhibitors Swelling     angioedema    Divalproex Hives     Rash under arms, body creases       Diet: diabetic diet: 2000 calorie    Activities: activity as tolerated     LABS:  CBC Twice Weekly (Mondays and Thrusdays), Next on Monday, Fax Results to Dr. Marr    Nursing:   SN to complete comprehensive assessment including routine vital signs. Instruct on disease process and s/s of complications to report to MD. Review/verify medication list sent home with the patient at time of discharge  and instruct patient/caregiver as needed. Frequency may be adjusted depending on start of care date.    Notify MD if SBP > 160 or < 90; DBP > 90 or < 50; HR > 120 or < 50; Temp > 101;       CONSULTS:    Physical Therapy to evaluate and treat. Evaluate for home safety and equipment needs; Establish/upgrade home exercise program. Perform / instruct on therapeutic exercises, gait training, transfer training, and Range of Motion.  Occupational Therapy to evaluate and treat. Evaluate home environment for safety and equipment needs. Perform/Instruct on transfers, ADL training, ROM, and therapeutic exercises.   to evaluate for community resources/long-range planning.  Aide to provide assistance with personal care, ADLs, and vital signs.     Consult AIM    MISCELLANEOUS CARE:  Diabetic Care:   SN to perform and educate Diabetic management with blood glucose monitoring:,  Fingerstick blood sugar AC and HS and Report CBG < 60 or > 350 to physician.    WOUND CARE ORDERS: clean skin tear wounds and change dressing weekly, educate patient and family.      Medications: Review discharge medications with patient and family and provide education.      Current Discharge Medication List      CONTINUE these medications which have CHANGED    Details   predniSONE (DELTASONE) 20 MG tablet Take 1 tablet (20 mg total) by mouth once daily.  Qty: 30 tablet, Refills: 0    Associated Diagnoses: Immune thrombocytopenia; CLL (chronic lymphocytic leukemia); Panhypopituitarism; Secondary adrenal insufficiency         CONTINUE these medications which have NOT CHANGED    Details   ibrutinib (IMBRUVICA) 140 mg Cap Take 2 capsules (280 mg) by mouth once daily.  Qty: 60 capsule, Refills: 9    Comments: capsules ok per Dr Wooten due to insurance preference - CML  Associated Diagnoses: CLL (chronic lymphocytic leukemia)      ACCU-CHEK FASTCLIX Misc TEST  BLOOD  GLUCOSE FOUR TIMES DAILY  Qty: 408 each, Refills: 6      alcohol swabs PadM APPLY 1 PAD TOPICALLY  AS NEEDED.  Qty: 300 each, Refills: 3             atorvastatin (LIPITOR) 20 MG tablet TAKE 1 TABLET EVERY DAY  Qty: 90 tablet, Refills: 3             cyanocobalamin (VITAMIN B-12) 500 MCG tablet Take 1 tablet by mouth Daily.      cycloSPORINE (RESTASIS) 0.05 % ophthalmic emulsion Place 0.4 mLs (1 drop total) into both eyes 2 (two) times daily.  Qty: 180 each, Refills: 3    Associated Diagnoses: Keratoconjunctivitis sicca, not specified as Sjögren's, bilateral      DOCOSAHEXANOIC ACID/EPA (FISH OIL ORAL) Take by mouth once daily.      erythromycin (ROMYCIN) ophthalmic ointment Apply to eyelids and lashes OU QHS  Qty: 1 Tube, Refills: 6      fexofenadine (ALLEGRA) 60 MG tablet Take 1 tablet (60 mg total) by mouth once daily.  Refills: 0    Associated Diagnoses: Seasonal allergies      fluorouracil (EFUDEX) 5 % cream Use hs for 2 weeks on right cheek  Qty: 40 g,  Refills: 3      levothyroxine (SYNTHROID) 125 MCG tablet TAKE 1 TABLET ONE TIME DAILY  Qty: 90 tablet, Refills: 3    Associated Diagnoses: Panhypopituitarism; Central hypothyroidism      losartan (COZAAR) 25 MG tablet Take 1 tablet (25 mg total) by mouth once daily.  Qty: 90 tablet, Refills: 3      multivitamin with minerals tablet Take 1 tablet by mouth once daily.      pantoprazole (PROTONIX) 40 MG tablet TAKE 1 TABLET EVERY DAY  Qty: 90 tablet, Refills: 3      tazarotene (AVAGE) 0.1 % cream Apply topically every evening.  Qty: 30 g, Refills: 3    Associated Diagnoses: Multiple actinic keratoses      testosterone cypionate (DEPOTESTOTERONE CYPIONATE) 100 mg/mL injection Inject 0.5 mLs (50 mg total) into the muscle every 14 (fourteen) days.  Qty: 10 mL, Refills: 5    Associated Diagnoses: Secondary male hypogonadism      testosterone cypionate (DEPOTESTOTERONE CYPIONATE) 200 mg/mL injection              I certify that this patient is confined to his home and needs intermittent skilled nursing care, physical therapy and occupational therapy.      ASAF Zepeda FNP-C   Nurse Practitioner- Hospitalist  Department of Hospital Medicine  [facility]

## 2019-07-25 NOTE — ASSESSMENT & PLAN NOTE
Oliguria-resolved  Cr 1.0 > 1.5.>1.0>0.9. UOP improving  -Nephrology consult: signed off d/t improvement

## 2019-07-25 NOTE — DISCHARGE SUMMARY
[facility] Hospital Medicine  Discharge Summary      Patient Name: Thierry Ramos Jr.  MRN: [id number]  Admission Date: 7/15/2019  Hospital Length of Stay: 10 days  Discharge Date and Time:  07/25/2019 6:41 PM  Attending Physician: No att. providers found   Discharging Provider: Sarah Monroy NP  Primary Care Provider: AKILA Wooten MD      HPI:   85 y.o. male with PMHx of DM, CLL, HTN, HLD, CVA, carotid stenosis, hypothyroid, hypopituitarism, squamous cell carcinoma who presents after syncopal episode. Patient reports worsening muscle weakness. Patient and spouse reports patient started becoming weak in beginning of June/2019 and it progressively worsened.  Reports weakness is especially in the legs.  He also reports leg swelling which has been associated to starting Ibrutinib for CLL.  Patient follows with Dr. Trejo for CLL and last seen 7/8/19 for the weakness and was told to hold the Ibrutinib until his next follow up appointment.  Spouse reports patient fell twice last night with loss of consciousness after the second fall.   Denies any fever, SOB, nausea, vomiting, diarrhea, chest pain or abdominal pain. ED findings: WBC 12.75, H/H 12.3/38.9, Lactic Acid 3.0, ALK Phos 258, AST//9, chest x-ray showed mild pulmonary opacity at the left lung base may relate to pulmonary infiltrate/airspace disease, EKG no ischemic changes.  Patient admitted for further medical management.     * No surgery found *      Hospital Course:   WBC increased to 25. Platelets fell from 120 to 8, so H/O was consulted and patient was transfused 1 unit of platelets. Platelets increased only to 9. Hem onc gave IVIG and recommended no further transfusion except in life threatening bleed. Patient was oliguric with rising Cr, so nephrology was consulted. The next day Cr remained stable and UOP increased. ID was consulted, and recommended vanc, cefepime, and azithromycin, and flagyl. Abdominal US was unremarkable. Pt  was transfused 2 units PRBC on 7/18 after he pulled his art line and started bleeding. Had episode of afib RVR, worsening hypoxia, and hypontension late 7/18 which resolved. CXR was repeated on 7/19 showing increased interstitial opacities. Lasix dose was increased to 80 TID and then decreased after good UOP. Respiratory status improved. Pt was transferred to the floor on 7/21. Platelets continued to improved. PT/OT recommended SNF placement.  7/23/19  Platelets trending down, appreciate hematology recommendations, continue IV antibiotics.  7/24/19  Platelet count trending up but still very low, hematology follow.  7/25/19 Course of antibiotics complete, platelet count maintaining over 30, patient okay discharge, however, he is currently declining SNF placement.  Patient and spouse in agreement to discharge home with home health.         Consults:   Consults (From admission, onward)        Status Ordering Provider     Inpatient consult to Hematology/Oncology  Once     Provider:  Sameer Pfeiffer MD    Completed NELI CHAPMAN     Inpatient consult to Infectious Diseases  Once     Provider:  Castro Nunez MD    Completed NELI CHAPMAN.     Inpatient consult to Nephrology-[facility] (S[facility]adarr[facility] Nimkevych)  Once     Provider:  Ananya Escobar MD    Completed DHARMESH PÉREZ     Inpatient consult to Palliative Care  Once     Provider:  Neli Chapman MD    Completed DHARMESH PÉREZ.          * Sepsis due to pneumonia-resolved as of 7/25/2019  Metabolic encephalopathy  Septic shock  Leukocytosis  Weakness  Chest X-ray showed left lung opacity, very mild.    -ID consult: Vanc, cefepime, and azithro (complete), flagyl: antibiotic course complete  -Blood Cultures: NGTD  -Abdominal US: unremarkable  -CT chest with bilateral ground glass opacities and pleural effusion    Pulmonary edema  Afib with RVR  Hypoxia    Currently Denies SOB or chest pain, weaned O2  -Check echo:  ok      Goals of care, counseling/discussion        Palliative care encounter        Abnormal liver enzymes  Likely 2/2 shock liver in the setting of septic shock. Improving but will also check liver US, which was unremarkable.     Altered mental status        Edema of both legs        Weakness  PT/OT: SNF, patient declining SNF placement at this, discharging home with home health.      Type 2 diabetes, controlled, with neuropathy  Hemoglobin A1c: 5.6  -POC glucose checks ACHS  -Diabetic Diet  -Continue home regimen  -home health for further diabetic education    Immunosuppression  Noted      Central hypothyroidism  -Continue synthroid    Immune thrombocytopenia  Plt 120 > 9 > 52>23>32>31  -Transfused platelets with little response; likely immune  -Further management per Hem Onc:  Per hematology, continue daily prednisone, follow labs, antibiotics complete therefore no need for de-escalation.  Platelets remain greater than 30, hematology ok with discharging and following up with clinic. Home health to draw labs and fax results to hematology.    Panhypopituitarism  -continue oral steroid    CLL (chronic lymphocytic leukemia)  Immunosuppression  Edema of both legs  Follows with Dr. Trejo. Ibrutinib placed on hold by Dr. Trejo 2/2 to worsening weakness and leg swelling.  -Patient has scheduled hematology follow up    Hypertension  -159  -hold home meds for now    Mixed dyslipidemia  History of CVA (cerebrovascular accident)  Denies chest pain or SOB  -Continue statin    Oliguria-resolved as of 7/24/2019        Leukocytosis-resolved as of 7/25/2019        LUZ (acute kidney injury)-resolved as of 7/23/2019  Oliguria-resolved  Cr 1.0 > 1.5.>1.0>0.9. UOP improving  -Nephrology consult: signed off d/t improvement      Final Active Diagnoses:    Diagnosis Date Noted POA    Pulmonary edema [J81.1] 07/19/2019 Yes    Palliative care encounter [Z51.5] 07/16/2019 Not Applicable    Goals of care, counseling/discussion  "[Z71.89] 07/16/2019 Not Applicable    Altered mental status [R41.82] 07/15/2019 Yes    Thrombocytopenia, unspecified [D69.6] 07/15/2019 Yes    Abnormal liver enzymes [R74.8] 07/15/2019 Yes    Edema of both legs [R60.0] 07/08/2019 Yes    Weakness [R53.1] 09/01/2018 Yes    Type 2 diabetes, controlled, with neuropathy [E11.40] 03/02/2016 Yes    Immunosuppression [D89.9] 03/13/2015 Yes    Central hypothyroidism [E03.8] 05/31/2014 Yes    Immune thrombocytopenia [D69.3] 05/31/2014 Yes    History of CVA (cerebrovascular accident) [Z86.73] 05/31/2014 Not Applicable    CLL (chronic lymphocytic leukemia) [C91.90] 12/04/2012 Yes    Panhypopituitarism [E23.0] 12/04/2012 Yes    Hypertension [I10] 07/26/2012 Yes     Chronic    Mixed dyslipidemia [E78.2] 07/26/2012 Yes      Problems Resolved During this Admission:    Diagnosis Date Noted Date Resolved POA    PRINCIPAL PROBLEM:  Sepsis due to pneumonia [J18.9, A41.9] 07/15/2019 07/25/2019 Yes    Oliguria [R34] 07/16/2019 07/24/2019 Yes    Leukocytosis [D72.829] 07/15/2019 07/25/2019 Yes    LUZ (acute kidney injury) [N17.9] 05/31/2014 07/23/2019 No       Discharged Condition: stable    [place] [facility]    Follow Up:  Follow-up Information     Kelly Marr MD On 7/30/2019.    Specialty:  Hematology and Oncology  Why:  10:00 am   Contact information:  [address] [facility] [address]  [phone]             [facility].    Specialties:  DME Provider, Home Health Services  Why:  Home Health  Contact information:  [address] [address]  [phone]                 Patient Instructions:      COMMODE FOR HOME USE     Order Specific Question Answer Comments   Type: Standard    Height: 5' 11" (1.803 m)    Weight: 77.5 kg (170 lb 13.7 oz)    Does patient have medical equipment at home? none    Length of need (1-99 months): 12      COMMODE FOR HOME USE     Order Specific Question Answer Comments   Type: Standard  " "  Height: 5' 11" (1.803 m)    Weight: 77.5 kg (170 lb 13.7 oz)    Does patient have medical equipment at home? none    Length of need (1-99 months): 12      WALKER FOR HOME USE     Order Specific Question Answer Comments   Type of Walker: Adult (5'4"-6'6")    With wheels? Yes    Height: 5' 11" (1.803 m)    Weight: 77.5 kg (170 lb 13.7 oz)    Length of need (1-99 months): 99    Does patient have medical equipment at home? none    Please check all that apply: Patient is unable to safely ambulate without equipment.      Ambulatory consult to Hematology   Referral Priority: Routine Referral Type: Consultation   Referral Reason: Specialty Services Required   Requested Specialty: Hematology   Number of Visits Requested: 1     Diet diabetic     Notify your health care provider if you experience any of the following:  temperature >100.4     Notify your health care provider if you experience any of the following:  persistent nausea and vomiting or diarrhea     Notify your health care provider if you experience any of the following:  severe uncontrolled pain     Notify your health care provider if you experience any of the following:  redness, tenderness, or signs of infection (pain, swelling, redness, odor or green/yellow discharge around incision site)     Notify your health care provider if you experience any of the following:  difficulty breathing or increased cough     Notify your health care provider if you experience any of the following:  severe persistent headache     Notify your health care provider if you experience any of the following:  worsening rash     Notify your health care provider if you experience any of the following:  persistent dizziness, light-headedness, or visual disturbances     Notify your health care provider if you experience any of the following:  increased confusion or weakness     Activity as tolerated       Significant Diagnostic Studies: Labs:   BMP:   Recent Labs   Lab 07/24/19  0454 " 07/25/19  0617   GLU 99 95    142   K 4.8 4.8    109   CO2 23 25   BUN 62* 61*   CREATININE 1.0 0.9   CALCIUM 8.6* 8.9   MG 2.5 2.3   , CMP   Recent Labs   Lab 07/24/19  0454 07/25/19  0617    142   K 4.8 4.8    109   CO2 23 25   GLU 99 95   BUN 62* 61*   CREATININE 1.0 0.9   CALCIUM 8.6* 8.9   PROT 5.0* 5.1*   ALBUMIN 2.4* 2.4*   BILITOT 1.8* 1.9*   ALKPHOS 214* 230*   AST 92* 87*   ALT 69* 64*   ANIONGAP 9 8   ESTGFRAFRICA >60 >60   EGFRNONAA >60 >60    and CBC   Recent Labs   Lab 07/24/19  0454 07/25/19  0617   WBC 10.83 9.07   HGB 10.8* 11.5*   HCT 33.5* 36.4*   PLT 32* 31*       Pending Diagnostic Studies:     None         Medications:  Reconciled Home Medications:      Medication List      CHANGE how you take these medications    predniSONE 20 MG tablet  Commonly known as:  DELTASONE  Take 1 tablet (20 mg total) by mouth once daily.  Start taking on:  7/26/2019  What changed:    · medication strength  · how much to take  · additional instructions  · Another medication with the same name was removed. Continue taking this medication, and follow the directions you see here.        CONTINUE taking these medications    ACCU-CHEK FASTCLIX LANCING DEV Misc  Generic drug:  lancets  TEST  BLOOD  GLUCOSE FOUR TIMES DAILY     alcohol swabs Padm  APPLY 1 PAD TOPICALLY  AS NEEDED.     atorvastatin 20 MG tablet  Commonly known as:  LIPITOR  TAKE 1 TABLET EVERY DAY     cycloSPORINE 0.05 % ophthalmic emulsion  Commonly known as:  RESTASIS  Place 0.4 mLs (1 drop total) into both eyes 2 (two) times daily.     erythromycin ophthalmic ointment  Commonly known as:  ROMYCIN  Apply to eyelids and lashes OU QHS     fexofenadine 60 MG tablet  Commonly known as:  ALLEGRA  Take 1 tablet (60 mg total) by mouth once daily.     FISH OIL ORAL  Take by mouth once daily.     fluorouracil 5 % cream  Commonly known as:  EFUDEX  Use hs for 2 weeks on right cheek     IMBRUVICA 140 mg Cap  Generic drug:  ibrutinib  Take 2  capsules (280 mg) by mouth once daily.     levothyroxine 125 MCG tablet  Commonly known as:  SYNTHROID  TAKE 1 TABLET ONE TIME DAILY     losartan 25 MG tablet  Commonly known as:  COZAAR  Take 1 tablet (25 mg total) by mouth once daily.     multivitamin with minerals tablet  Take 1 tablet by mouth once daily.     pantoprazole 40 MG tablet  Commonly known as:  PROTONIX  TAKE 1 TABLET EVERY DAY     tazarotene 0.1 % cream  Commonly known as:  AVAGE  Apply topically every evening.     * testosterone cypionate 100 mg/mL injection  Commonly known as:  DEPOTESTOTERONE CYPIONATE  Inject 0.5 mLs (50 mg total) into the muscle every 14 (fourteen) days.     * testosterone cypionate 200 mg/mL injection  Commonly known as:  DEPOTESTOTERONE CYPIONATE     VITAMIN B-12 500 MCG tablet  Generic drug:  cyanocobalamin  Take 1 tablet by mouth Daily.         * This list has 2 medication(s) that are the same as other medications prescribed for you. Read the directions carefully, and ask your doctor or other care provider to review them with you.            STOP taking these medications    aspirin 81 MG Chew     clopidogrel 75 mg tablet  Commonly known as:  PLAVIX            Indwelling Lines/Drains at time of discharge:   Lines/Drains/Airways          None          Time spent on the discharge of patient: 45 minutes  Patient was seen and examined on the date of discharge and determined to be suitable for discharge.         Sarah Monroy NP  Department of Hospital Medicine  [facility]

## 2019-07-25 NOTE — DISCHARGE INSTRUCTIONS
Sepsis (English) View Edit Remove   [facility] IP AVS PNEUMONIA DISCHARGE INSTRUCTIONS View Edit Remove   Thrombocytopenia (English) View Edit Remove   Diabetes, Diet (English) View Edit Remove   Prednisone tablets (English) View Edit Remove   Pulmonary Edema (English) View Edit Remove

## 2019-07-25 NOTE — PLAN OF CARE
VN note: VN cued into patient's room. Patient's wife at bedside. VN went in to review discharge papers. VN reviewed with wife and patient change, continue, and stop medications. No new medications ordered. Follow-up information given. Patient kept interrupting VN when reviewing discharge papers. VN also educated them on thrombocytopenia, signs and symptoms, and when to seek medical attention. Wife verbalized understanding and all questions answered. Refer to clinical references for further education. Prescription given. Wife also informed VN therapy had informed her he needs a walker. I called Norma with CM. She stated will have walker delivered to room. Patient and wife updated.

## 2019-07-25 NOTE — ASSESSMENT & PLAN NOTE
Metabolic encephalopathy  Septic shock  Leukocytosis  Weakness  Chest X-ray showed left lung opacity, very mild.    -ID consult: Vanc, cefepime, and azithro (complete), flagyl: antibiotic course complete  -Blood Cultures: NGTD  -Abdominal US: unremarkable  -CT chest with bilateral ground glass opacities and pleural effusion

## 2019-07-25 NOTE — PLAN OF CARE
Problem: Physical Therapy Goal  Goal: Physical Therapy Goal  Goals to be met by: 8/15/2019     Patient will increase functional independence with mobility by performin. Supine to sit with Modified [place]  2. Sit to stand transfer with Modified [place]  3. Bed to chair transfer with Modified [place] using Rolling Walker  4. Gait  x 20 feet with Modified [place] using Rolling Walker.      Outcome: Ongoing (interventions implemented as appropriate)  Continue working toward goals.

## 2019-07-26 ENCOUNTER — PATIENT MESSAGE (OUTPATIENT)
Dept: HEMATOLOGY/ONCOLOGY | Facility: CLINIC | Age: 84
End: 2019-07-26

## 2019-07-26 ENCOUNTER — PATIENT OUTREACH (OUTPATIENT)
Dept: ADMINISTRATIVE | Facility: CLINIC | Age: 84
End: 2019-07-26

## 2019-07-26 NOTE — PROGRESS NOTES
C3 nurse attempted to contact patient. No answer. The following message was left for the patient to return the call:  Good morning I am a nurse calling on behalf of [facility] from the [facility].  This is a Transitional Care Call for Thierry Ramos Jr.. When you have a moment please contact us at [phone] or [phone] Monday through Friday, between the hours of 8 am to 4 pm. We look forward to speaking with you. On behalf of [facility] have a nice day.    The patient does not have a scheduled HOSFU appointment within 7-14 days post hospital discharge date 7/25/19. Message sent to Physician staff to assist with HOSFU appointment scheduling.

## 2019-07-29 DIAGNOSIS — C91.10 CLL (CHRONIC LYMPHOCYTIC LEUKEMIA): Primary | ICD-10-CM

## 2019-07-30 ENCOUNTER — TELEPHONE (OUTPATIENT)
Dept: HEMATOLOGY/ONCOLOGY | Facility: CLINIC | Age: 84
End: 2019-07-30

## 2019-07-30 ENCOUNTER — LAB VISIT (OUTPATIENT)
Dept: LAB | Facility: HOSPITAL | Age: 84
End: 2019-07-30
Attending: INTERNAL MEDICINE
Payer: MEDICARE

## 2019-07-30 ENCOUNTER — OFFICE VISIT (OUTPATIENT)
Dept: HEMATOLOGY/ONCOLOGY | Facility: CLINIC | Age: 84
End: 2019-07-30
Payer: MEDICARE

## 2019-07-30 VITALS
TEMPERATURE: 98 F | DIASTOLIC BLOOD PRESSURE: 65 MMHG | HEART RATE: 79 BPM | SYSTOLIC BLOOD PRESSURE: 142 MMHG | OXYGEN SATURATION: 98 % | RESPIRATION RATE: 16 BRPM

## 2019-07-30 DIAGNOSIS — C91.10 CLL (CHRONIC LYMPHOCYTIC LEUKEMIA): ICD-10-CM

## 2019-07-30 DIAGNOSIS — C91.10 CLL (CHRONIC LYMPHOCYTIC LEUKEMIA): Primary | ICD-10-CM

## 2019-07-30 LAB
ALBUMIN SERPL BCP-MCNC: 2.3 G/DL (ref 3.5–5.2)
ALP SERPL-CCNC: 330 U/L (ref 55–135)
ALT SERPL W/O P-5'-P-CCNC: 79 U/L (ref 10–44)
ANION GAP SERPL CALC-SCNC: 6 MMOL/L (ref 8–16)
ANISOCYTOSIS BLD QL SMEAR: SLIGHT
AST SERPL-CCNC: 85 U/L (ref 10–40)
BASOPHILS # BLD AUTO: 0.05 K/UL (ref 0–0.2)
BASOPHILS NFR BLD: 0.5 % (ref 0–1.9)
BILIRUB SERPL-MCNC: 1.7 MG/DL (ref 0.1–1)
BUN SERPL-MCNC: 46 MG/DL (ref 8–23)
CALCIUM SERPL-MCNC: 8.3 MG/DL (ref 8.7–10.5)
CHLORIDE SERPL-SCNC: 110 MMOL/L (ref 95–110)
CO2 SERPL-SCNC: 23 MMOL/L (ref 23–29)
CREAT SERPL-MCNC: 0.9 MG/DL (ref 0.5–1.4)
DACRYOCYTES BLD QL SMEAR: ABNORMAL
DIFFERENTIAL METHOD: ABNORMAL
EOSINOPHIL # BLD AUTO: 0.1 K/UL (ref 0–0.5)
EOSINOPHIL NFR BLD: 1 % (ref 0–8)
ERYTHROCYTE [DISTWIDTH] IN BLOOD BY AUTOMATED COUNT: 18.6 % (ref 11.5–14.5)
EST. GFR  (AFRICAN AMERICAN): >60 ML/MIN/1.73 M^2
EST. GFR  (NON AFRICAN AMERICAN): >60 ML/MIN/1.73 M^2
GLUCOSE SERPL-MCNC: 118 MG/DL (ref 70–110)
HCT VFR BLD AUTO: 31.8 % (ref 40–54)
HGB BLD-MCNC: 10.7 G/DL (ref 14–18)
HYPOCHROMIA BLD QL SMEAR: ABNORMAL
IMM GRANULOCYTES # BLD AUTO: 0.31 K/UL (ref 0–0.04)
IMM GRANULOCYTES NFR BLD AUTO: 2.9 % (ref 0–0.5)
LYMPHOCYTES # BLD AUTO: 2.8 K/UL (ref 1–4.8)
LYMPHOCYTES NFR BLD: 25.7 % (ref 18–48)
MCH RBC QN AUTO: 31.6 PG (ref 27–31)
MCHC RBC AUTO-ENTMCNC: 33.6 G/DL (ref 32–36)
MCV RBC AUTO: 94 FL (ref 82–98)
MONOCYTES # BLD AUTO: 0.5 K/UL (ref 0.3–1)
MONOCYTES NFR BLD: 4.9 % (ref 4–15)
NEUTROPHILS # BLD AUTO: 7 K/UL (ref 1.8–7.7)
NEUTROPHILS NFR BLD: 65 % (ref 38–73)
NRBC BLD-RTO: 1 /100 WBC
OVALOCYTES BLD QL SMEAR: ABNORMAL
PLATELET # BLD AUTO: 24 K/UL (ref 150–350)
PLATELET BLD QL SMEAR: ABNORMAL
PMV BLD AUTO: ABNORMAL FL (ref 9.2–12.9)
POIKILOCYTOSIS BLD QL SMEAR: SLIGHT
POLYCHROMASIA BLD QL SMEAR: ABNORMAL
POTASSIUM SERPL-SCNC: 4.9 MMOL/L (ref 3.5–5.1)
PROT SERPL-MCNC: 4.6 G/DL (ref 6–8.4)
RBC # BLD AUTO: 3.39 M/UL (ref 4.6–6.2)
SODIUM SERPL-SCNC: 139 MMOL/L (ref 136–145)
URATE SERPL-MCNC: 5.6 MG/DL (ref 3.4–7)
WBC # BLD AUTO: 10.76 K/UL (ref 3.9–12.7)

## 2019-07-30 PROCEDURE — 1101F PR PT FALLS ASSESS DOC 0-1 FALLS W/OUT INJ PAST YR: ICD-10-PCS | Mod: HCNC,CPTII,S$GLB, | Performed by: INTERNAL MEDICINE

## 2019-07-30 PROCEDURE — 3078F DIAST BP <80 MM HG: CPT | Mod: HCNC,CPTII,S$GLB, | Performed by: INTERNAL MEDICINE

## 2019-07-30 PROCEDURE — 80053 COMPREHEN METABOLIC PANEL: CPT | Mod: HCNC

## 2019-07-30 PROCEDURE — 36415 COLL VENOUS BLD VENIPUNCTURE: CPT | Mod: HCNC

## 2019-07-30 PROCEDURE — 99999 PR PBB SHADOW E&M-EST. PATIENT-LVL III: ICD-10-PCS | Mod: PBBFAC,HCNC,, | Performed by: INTERNAL MEDICINE

## 2019-07-30 PROCEDURE — 84550 ASSAY OF BLOOD/URIC ACID: CPT | Mod: HCNC

## 2019-07-30 PROCEDURE — 85025 COMPLETE CBC W/AUTO DIFF WBC: CPT | Mod: HCNC

## 2019-07-30 PROCEDURE — 3078F PR MOST RECENT DIASTOLIC BLOOD PRESSURE < 80 MM HG: ICD-10-PCS | Mod: HCNC,CPTII,S$GLB, | Performed by: INTERNAL MEDICINE

## 2019-07-30 PROCEDURE — 1101F PT FALLS ASSESS-DOCD LE1/YR: CPT | Mod: HCNC,CPTII,S$GLB, | Performed by: INTERNAL MEDICINE

## 2019-07-30 PROCEDURE — 99499 RISK ADDL DX/OHS AUDIT: ICD-10-PCS | Mod: HCNC,S$GLB,, | Performed by: INTERNAL MEDICINE

## 2019-07-30 PROCEDURE — 99215 OFFICE O/P EST HI 40 MIN: CPT | Mod: HCNC,S$GLB,, | Performed by: INTERNAL MEDICINE

## 2019-07-30 PROCEDURE — 3077F SYST BP >= 140 MM HG: CPT | Mod: HCNC,CPTII,S$GLB, | Performed by: INTERNAL MEDICINE

## 2019-07-30 PROCEDURE — 99215 PR OFFICE/OUTPT VISIT, EST, LEVL V, 40-54 MIN: ICD-10-PCS | Mod: HCNC,S$GLB,, | Performed by: INTERNAL MEDICINE

## 2019-07-30 PROCEDURE — 3077F PR MOST RECENT SYSTOLIC BLOOD PRESSURE >= 140 MM HG: ICD-10-PCS | Mod: HCNC,CPTII,S$GLB, | Performed by: INTERNAL MEDICINE

## 2019-07-30 PROCEDURE — 99999 PR PBB SHADOW E&M-EST. PATIENT-LVL III: CPT | Mod: PBBFAC,HCNC,, | Performed by: INTERNAL MEDICINE

## 2019-07-30 PROCEDURE — 99499 UNLISTED E&M SERVICE: CPT | Mod: HCNC,S$GLB,, | Performed by: INTERNAL MEDICINE

## 2019-07-30 NOTE — Clinical Note
Needs to start rituximab weekly for 4 weeks ASAPCBC and type and screen with each weekly treatmentHBV surface anitbody, antigen and core antibody with first treatmentReturn visit with CBC , type and screen , and CMP 2 weeks after starting Rituximab

## 2019-07-30 NOTE — PLAN OF CARE
START OFF PATHWAY REGIMEN - Lymphoma and CLL            Rituximab (Rituxan(R))           Additional Orders: Hepatitis B&C testing recommended prior to rituximab   use on all patients. Final concentration of rituximab must be between 1 and 4   mg/ml.    **Always confirm dose/schedule in your pharmacy ordering system**    Patient Characteristics:  Chronic Lymphocytic Leukemia (CLL), [facility]  Disease Type: Chronic Lymphocytic Leukemia (CLL)  Disease Type: Not Applicable  Disease Type: Not Applicable  Line of therapy: [facility]  LEAVITT Stage: Unknown  Intent of Therapy:  Non-Curative / Palliative Intent, Discussed with Patient

## 2019-07-30 NOTE — PROGRESS NOTES
Subjective:       Patient ID: Thierry Ramos Jr. is a 85 y.o. male.    Chief Complaint: No chief complaint on file.    Mr. Ramos has a history of CLL diagnosed in April 2012 by Dr Joaquin Wooten. In April 2013, there was an abrupt rise in his white blood cell count to 93,000 and he had a modest number of prolymphocytes in his peripheral smear.  He had generalized urticaria, which was thought to be paraneoplastic.  At that time, he was treated with 5 courses of fludarabine, cyclophosphamide and Rituxan.  The fludarabine doses were decreased somewhat because of mild renal impairment.  The sixth treatment was with Rituxan alone because he developed thrombocytopenia, which has persisted in varying degrees since then.     His major hematologic problem and much of his treatment since 2013, has been related to thrombocytopenia.  It became more severe in early 2016, at a time when he was taking both aspirin and Plavix.  A bone marrow examination had no evidence of myelodysplasia and 35% of the bone marrow cells were small lymphocytes.  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  The FISH studies for myelodysplastic disorders were negative.     Dr. 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     Subsequently, because of continued thrombocytopenia,  recommended treatment with ofatumumab, which he began in late June 2016.  At that time, his platelet count was 57,000.  He generally tolerated the drug well and the frequency of administration was gradually reduced over a period of more than 2 years.  Platelet counts during most of those 2 years were between 66,000-117,000.  The drug was discontinued in early November 2018.     He has had many episodes of  infection in the past, particularly recurrent sinusitis and he has had several surgical procedures on his sinuses.  Because of this, he has been receiving intravenous immunoglobulin monthly for years and   the frequency of pneumonia and severe sinusitis has diminished. Most recent dose of IVIG was 3/6/19.      After a pituitary surgery for a tumor in 2002, he experienced a TIA and then had 2 strokes.  He has had many nonmelanoma skin cancers excised.     He asked to be seen earlier than planned by Dr. Wooten in January because he was concerned about his recent blood counts.  His platelet count has declined modestly from the range of 66,000-107,000 seen in the past 9 months to a current value of 51,000.  His   white blood cell count remains normal and his lymphocyte percentage this week has been 61% and 45%.    He was started on a low dose ibrutinib 280 mg rather than 420mg due to concern for treatment related- further decrease in platelet count while on aspirin and plavix.     He has had no change in his activity level which includes daily jump rope, 125 pushups, 15 minutes on the stationary bike. He has not noted any enlarged lymph nodes. He and his wife are installing ivone after water damage at their home.    CBC and CMP are stable. 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 Left arm is bandaged due to skin fragility causing oozing wounds.     HPI  Review of Systems   Constitutional: Negative.    HENT: Negative.    Eyes: Negative.    Respiratory: Negative.    Cardiovascular: Positive for  leg swelling.   Gastrointestinal: Negative.    Endocrine: Negative.    Genitourinary: Negative.    Musculoskeletal: Positive for arthralgias.   Skin: Negative.    Allergic/Immunologic: Negative for environmental allergies, food allergies and immunocompromised state.   Neurological: Positive for weakness.   Hematological: Negative for adenopathy. Bruises/bleeds easily.   Psychiatric/Behavioral: Negative.        Objective:      Physical Exam   Constitutional: He is oriented to person, place, and time. He appears well-developed and well-nourished.   HENT:   Head: Normocephalic and atraumatic.   Eyes: Conjunctivae are normal. No scleral icterus.   Neck: Normal range of motion. Neck supple.   Cardiovascular: Normal rate and intact distal pulses.   Pulmonary/Chest: Effort normal. No respiratory distress.   Abdominal: Soft. He exhibits no distension. There is no tenderness.   Musculoskeletal: Normal range of motion. He exhibits edema.   +1-2 pitting edema to BLE   Neurological: He is alert and oriented to person, place, and time. No cranial nerve deficit.   Skin: Skin is warm and dry. Ecchymosis noted. There is erythema.   Psychiatric: He has a normal mood and affect. His behavior is normal.   Nursing note and vitals reviewed.      Assessment:       No diagnosis found.    Plan:       CLL  Ibrutinib 280mg daily has been stopped due to side effects of weakness and bruising.  Developed ITP since stopping therapy - mild response to steroids and platelets stable at 24K on prednisone 20mg daily  IVIG received in July 2019    Plan to start Rituximab 375mg/m2 weekly for 4 weeks, taper steroids pending response back to baseline of prednisone 7.5mg daily, then consider maintenance Rituxan every 8 weeks  CBC weekly       Distress Screening Results: Psychosocial Distress screening score of   noted and reviewed. No intervention indicated. Declines onc psych consult. States distress is related to current symptoms.

## 2019-08-01 ENCOUNTER — TELEPHONE (OUTPATIENT)
Dept: HEMATOLOGY/ONCOLOGY | Facility: CLINIC | Age: 84
End: 2019-08-01

## 2019-08-01 NOTE — TELEPHONE ENCOUNTER
Spoke with home health and they were not able to draw blood today confirmed with Dr. Marr that is is ok and that his Platelets are 24.

## 2019-08-05 ENCOUNTER — EXTERNAL HOME HEALTH (OUTPATIENT)
Dept: HOME HEALTH SERVICES | Facility: HOSPITAL | Age: 84
End: 2019-08-05
Payer: MEDICARE

## 2019-08-06 ENCOUNTER — TELEPHONE (OUTPATIENT)
Dept: HOME HEALTH SERVICES | Facility: HOSPITAL | Age: 84
End: 2019-08-06
Payer: MEDICARE

## 2019-08-06 ENCOUNTER — TELEPHONE (OUTPATIENT)
Dept: HEMATOLOGY/ONCOLOGY | Facility: CLINIC | Age: 84
End: 2019-08-06

## 2019-08-06 ENCOUNTER — LAB VISIT (OUTPATIENT)
Dept: LAB | Facility: HOSPITAL | Age: 84
End: 2019-08-06
Attending: INTERNAL MEDICINE
Payer: MEDICARE

## 2019-08-06 DIAGNOSIS — C91.10 CLL (CHRONIC LYMPHOCYTIC LEUKEMIA): ICD-10-CM

## 2019-08-06 LAB
ABO + RH BLD: NORMAL
ANISOCYTOSIS BLD QL SMEAR: SLIGHT
BASOPHILS # BLD AUTO: 0.02 K/UL (ref 0–0.2)
BASOPHILS NFR BLD: 0.5 % (ref 0–1.9)
BLD GP AB SCN CELLS X3 SERPL QL: NORMAL
DACRYOCYTES BLD QL SMEAR: ABNORMAL
DIFFERENTIAL METHOD: ABNORMAL
EOSINOPHIL # BLD AUTO: 0.1 K/UL (ref 0–0.5)
EOSINOPHIL NFR BLD: 2.4 % (ref 0–8)
ERYTHROCYTE [DISTWIDTH] IN BLOOD BY AUTOMATED COUNT: 19.4 % (ref 11.5–14.5)
HBV CORE AB SERPL QL IA: POSITIVE
HBV SURFACE AB SER-ACNC: POSITIVE M[IU]/ML
HBV SURFACE AG SERPL QL IA: NEGATIVE
HCT VFR BLD AUTO: 33.7 % (ref 40–54)
HGB BLD-MCNC: 10 G/DL (ref 14–18)
HYPOCHROMIA BLD QL SMEAR: ABNORMAL
IMM GRANULOCYTES # BLD AUTO: 0.1 K/UL (ref 0–0.04)
IMM GRANULOCYTES NFR BLD AUTO: 2.4 % (ref 0–0.5)
LYMPHOCYTES # BLD AUTO: 1.5 K/UL (ref 1–4.8)
LYMPHOCYTES NFR BLD: 34.8 % (ref 18–48)
MCH RBC QN AUTO: 31.3 PG (ref 27–31)
MCHC RBC AUTO-ENTMCNC: 29.7 G/DL (ref 32–36)
MCV RBC AUTO: 105 FL (ref 82–98)
MONOCYTES # BLD AUTO: 0.3 K/UL (ref 0.3–1)
MONOCYTES NFR BLD: 6.1 % (ref 4–15)
NEUTROPHILS # BLD AUTO: 2.3 K/UL (ref 1.8–7.7)
NEUTROPHILS NFR BLD: 53.8 % (ref 38–73)
NRBC BLD-RTO: 1 /100 WBC
OVALOCYTES BLD QL SMEAR: ABNORMAL
PLATELET # BLD AUTO: 38 K/UL (ref 150–350)
PMV BLD AUTO: 11.3 FL (ref 9.2–12.9)
POIKILOCYTOSIS BLD QL SMEAR: SLIGHT
POLYCHROMASIA BLD QL SMEAR: ABNORMAL
RBC # BLD AUTO: 3.2 M/UL (ref 4.6–6.2)
WBC # BLD AUTO: 4.25 K/UL (ref 3.9–12.7)

## 2019-08-06 PROCEDURE — 86901 BLOOD TYPING SEROLOGIC RH(D): CPT | Mod: HCNC

## 2019-08-06 PROCEDURE — 87340 HEPATITIS B SURFACE AG IA: CPT | Mod: HCNC

## 2019-08-06 PROCEDURE — 36415 COLL VENOUS BLD VENIPUNCTURE: CPT | Mod: HCNC,PO

## 2019-08-06 PROCEDURE — 86704 HEP B CORE ANTIBODY TOTAL: CPT | Mod: HCNC

## 2019-08-06 PROCEDURE — 86706 HEP B SURFACE ANTIBODY: CPT | Mod: HCNC

## 2019-08-06 PROCEDURE — 85025 COMPLETE CBC W/AUTO DIFF WBC: CPT | Mod: HCNC

## 2019-08-07 ENCOUNTER — PATIENT MESSAGE (OUTPATIENT)
Dept: HEMATOLOGY/ONCOLOGY | Facility: CLINIC | Age: 84
End: 2019-08-07

## 2019-08-09 ENCOUNTER — OFFICE VISIT (OUTPATIENT)
Dept: HEMATOLOGY/ONCOLOGY | Facility: CLINIC | Age: 84
End: 2019-08-09
Payer: MEDICARE

## 2019-08-09 VITALS
OXYGEN SATURATION: 100 % | SYSTOLIC BLOOD PRESSURE: 137 MMHG | HEART RATE: 68 BPM | DIASTOLIC BLOOD PRESSURE: 62 MMHG | HEIGHT: 71 IN | BODY MASS INDEX: 23.83 KG/M2 | TEMPERATURE: 98 F

## 2019-08-09 DIAGNOSIS — R76.8 HEPATITIS B CORE ANTIBODY POSITIVE: ICD-10-CM

## 2019-08-09 DIAGNOSIS — I27.20 PULMONARY HTN: ICD-10-CM

## 2019-08-09 DIAGNOSIS — D69.3 ACUTE ITP: Primary | ICD-10-CM

## 2019-08-09 DIAGNOSIS — C91.10 CLL (CHRONIC LYMPHOCYTIC LEUKEMIA): ICD-10-CM

## 2019-08-09 DIAGNOSIS — I70.0 AORTIC ATHEROSCLEROSIS: ICD-10-CM

## 2019-08-09 PROCEDURE — 3075F PR MOST RECENT SYSTOLIC BLOOD PRESS GE 130-139MM HG: ICD-10-PCS | Mod: HCNC,CPTII,S$GLB, | Performed by: NURSE PRACTITIONER

## 2019-08-09 PROCEDURE — 99215 OFFICE O/P EST HI 40 MIN: CPT | Mod: HCNC,S$GLB,, | Performed by: NURSE PRACTITIONER

## 2019-08-09 PROCEDURE — 99999 PR PBB SHADOW E&M-EST. PATIENT-LVL IV: ICD-10-PCS | Mod: PBBFAC,HCNC,, | Performed by: NURSE PRACTITIONER

## 2019-08-09 PROCEDURE — 99999 PR PBB SHADOW E&M-EST. PATIENT-LVL IV: CPT | Mod: PBBFAC,HCNC,, | Performed by: NURSE PRACTITIONER

## 2019-08-09 PROCEDURE — 1101F PT FALLS ASSESS-DOCD LE1/YR: CPT | Mod: HCNC,CPTII,S$GLB, | Performed by: NURSE PRACTITIONER

## 2019-08-09 PROCEDURE — 3075F SYST BP GE 130 - 139MM HG: CPT | Mod: HCNC,CPTII,S$GLB, | Performed by: NURSE PRACTITIONER

## 2019-08-09 PROCEDURE — 99215 PR OFFICE/OUTPT VISIT, EST, LEVL V, 40-54 MIN: ICD-10-PCS | Mod: HCNC,S$GLB,, | Performed by: NURSE PRACTITIONER

## 2019-08-09 PROCEDURE — 3078F PR MOST RECENT DIASTOLIC BLOOD PRESSURE < 80 MM HG: ICD-10-PCS | Mod: HCNC,CPTII,S$GLB, | Performed by: NURSE PRACTITIONER

## 2019-08-09 PROCEDURE — 1101F PR PT FALLS ASSESS DOC 0-1 FALLS W/OUT INJ PAST YR: ICD-10-PCS | Mod: HCNC,CPTII,S$GLB, | Performed by: NURSE PRACTITIONER

## 2019-08-09 PROCEDURE — 3078F DIAST BP <80 MM HG: CPT | Mod: HCNC,CPTII,S$GLB, | Performed by: NURSE PRACTITIONER

## 2019-08-09 PROCEDURE — 99499 UNLISTED E&M SERVICE: CPT | Mod: HCNC,S$GLB,, | Performed by: NURSE PRACTITIONER

## 2019-08-09 PROCEDURE — 99499 RISK ADDL DX/OHS AUDIT: ICD-10-PCS | Mod: HCNC,S$GLB,, | Performed by: NURSE PRACTITIONER

## 2019-08-09 NOTE — Clinical Note
Please schedule patient to see Hepatology ASAP for Hep B core antibody +, has ITP and needs Rituxan. Follow-up with Np or Dr. Marr a few days after Hepatology appt and schedule Rituxan for that day. thanks

## 2019-08-09 NOTE — PROGRESS NOTES
Subjective:       Patient ID: Thierry Ramos Jr. is a 85 y.o. male.    Chief Complaint: Follow-up (ITP and CLL)    Heme/Onc history:  Mr. Ramos has a history of CLL diagnosed in April 2012 by Dr Joaquin Wooten. In April 2013, there was an abrupt rise in his white blood cell count to 93,000 and he had a modest number of prolymphocytes in his peripheral smear.  He had generalized urticaria, which was thought to be paraneoplastic.  At that time, he was treated with 5 courses of fludarabine, cyclophosphamide and Rituxan.  The fludarabine doses were decreased somewhat because of mild renal impairment.  The sixth treatment was with Rituxan alone because he developed thrombocytopenia, which has persisted in varying degrees since then.     His major hematologic problem and much of his treatment since 2013, has been related to thrombocytopenia.  It became more severe in early 2016, at a time when he was taking both aspirin and Plavix.  A bone marrow examination had no evidence of myelodysplasia and 35% of the bone marrow cells were small lymphocytes.  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  The FISH studies for myelodysplastic disorders were negative. He was treated for possible immune thrombocytopenia with prednisone 60 mg daily for 2 weeks, but there was no improvement in his platelet count.  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     Subsequently, because of continued thrombocytopenia,  recommended treatment with ofatumumab, which he began in late June 2016.  At that time, his platelet count was 57,000.  He generally tolerated the drug well and the frequency of administration was gradually reduced over a period of more than 2 years.  Platelet counts during most of those 2 years were between 66,000-117,000.  The drug was discontinued in early November 2018.     He has had many episodes of  infection in the past, particularly recurrent sinusitis and he has had several surgical procedures on his sinuses.  Because of this, he has been receiving intravenous immunoglobulin monthly for years and the frequency of pneumonia and severe sinusitis has diminished. Most recent dose of IVIG 4/2019.      After a pituitary surgery for a tumor in 2002, he experienced a TIA and then had 2 strokes.  He has had many nonmelanoma skin cancers excised.     He asked to be seen earlier than planned by Dr. Wooten in January because he was concerned about his recent blood counts.  His platelet count has declined modestly from the range of 66,000-107,000 seen in the past 9 months to a current value of 51,000.  His white blood cell count remains normal and his lymphocyte percentage     He was started on a low dose ibrutinib 280 mg rather than 420mg due to concern for treatment related- further decrease in platelet count while on aspirin and plavix.     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 Left arm is bandaged due to skin fragility causing oozing wounds.     Today:  Very weak--- previously jumping rope, 125 pushups, 15 minutes on the stationary bike several times a week    HPI  Review of Systems   Constitutional: Positive for fatigue.   HENT: Negative.    Eyes: Negative.    Respiratory: Negative.    Cardiovascular: Positive for leg swelling.   Gastrointestinal: Negative.    Endocrine: Negative.    Genitourinary: Negative.    Skin: Negative.    Allergic/Immunologic: Negative for environmental allergies, food allergies and immunocompromised state.    Neurological: Positive for weakness.   Hematological: Negative for adenopathy. Bruises/bleeds easily.   Psychiatric/Behavioral: Negative.        Objective:      Physical Exam   Constitutional: He is oriented to person, place, and time. He appears well-developed and well-nourished.   HENT:   Head: Normocephalic and atraumatic.   Eyes: Conjunctivae are normal. No scleral icterus.   Neck: Normal range of motion. Neck supple.   Cardiovascular: Normal rate and intact distal pulses.   Pulmonary/Chest: Effort normal. No respiratory distress. He has no wheezes. He has rales.   Fine crackles throughout   Abdominal: Soft. He exhibits no distension. There is no tenderness.   Musculoskeletal: Normal range of motion. He exhibits edema.   +2-3 pitting edema to BLE   Neurological: He is alert and oriented to person, place, and time. No cranial nerve deficit.   Skin: Skin is warm and dry. Ecchymosis noted. There is erythema.   Fragile skin with generalized ecchymosis ti BUE   Psychiatric: He has a normal mood and affect. His behavior is normal.   Nursing note and vitals reviewed.      Assessment:       1. Acute ITP    2. Hepatitis B core antibody positive    3. CLL (chronic lymphocytic leukemia)    4. Pulmonary HTN    5. Aortic atherosclerosis        Plan:       CLL/ITP  Ibrutinib 280mg daily has been stopped due to side effects of weakness and bruising.  Developed worsening ITP since stopping therapy (has had chronic low platelets since FCR treatment in 2013)  mild response to steroids and platelets improved/stable at 38K on prednisone 20mg daily which will continue for now   IVIG received in July 2019  Plan per Dr. Marr was to start Rituximab 375mg/m2 weekly for 4 weeks today then consider maintenance Rituxan every 8 weeks pending response. Hepatitis B serology checked 8/6 in preparation for Rituxan therapy. Hep B surface antigen negative however Hep B core antibody + (was negative in 2013 and 2016), Hep B surface antibody +.  Given possible exposure/dormant Hep B will refer to Hepatology prior to proceeding with Rituxan to avoid activation of active Hepatitis during treatment. He also has elevated bili, AST/ALT and Alk phos today. This was discussed in detail with patient and family and they voiced understanding.  Plan to taper steroids pending response to Rituxan back to baseline of prednisone 7.5mg daily  CBC weekly with HH has been unable to obtain (3 nurses tried). He may need a central line in the near furture for labs and therapy. Discussed possible port placement    Pulmonary HTN  Noted on TTE-7/19/2019--Pulmonary hypertension present.  The estimated PA systolic pressure is 41 mm Hg    Chronic LE edema noted  On Cozaar 25 mg daily  Followed by Cardiology    Aortic atherosclerosis  Noted per chest -ray  On ASA and plavix however on hold due to platelet count <50,000    Hepatitis B core antibody +  Serology checked 8/6 in preparation for Rituxan therapy  Hep B surface antigen negative however Hep B core antibody + (was negative in 2013 and 2016), Hep B surface antibody +.   He also has elevated bili of 1.7, , AST 85/ALT 79 and Alk phos 330 today  Given possible exposure/dormant Hep B will refer to Hepatology prior to proceeding with Rituxan to avoid activation of active Hepatitis during treatment.     Follow-up: Refer to Hepatology. RTC after Hepatology consult for reassessment for Rituxan therapy    Distress Screening Results: Psychosocial Distress screening score of Distress Score: 0 noted and reviewed. No intervention indicated. Declines onc psych consult. States distress is related to current symptoms.     Plan of care discussed with collaborating physician Dr. Kem Leon NP  Hematology/BMT

## 2019-08-12 ENCOUNTER — PATIENT MESSAGE (OUTPATIENT)
Dept: HEMATOLOGY/ONCOLOGY | Facility: CLINIC | Age: 84
End: 2019-08-12

## 2019-08-12 ENCOUNTER — PATIENT MESSAGE (OUTPATIENT)
Dept: INTERNAL MEDICINE | Facility: CLINIC | Age: 84
End: 2019-08-12

## 2019-08-13 ENCOUNTER — OFFICE VISIT (OUTPATIENT)
Dept: INTERNAL MEDICINE | Facility: CLINIC | Age: 84
End: 2019-08-13
Payer: MEDICARE

## 2019-08-13 ENCOUNTER — LAB VISIT (OUTPATIENT)
Dept: LAB | Facility: HOSPITAL | Age: 84
End: 2019-08-13
Attending: INTERNAL MEDICINE
Payer: MEDICARE

## 2019-08-13 VITALS
RESPIRATION RATE: 16 BRPM | BODY MASS INDEX: 23.92 KG/M2 | HEIGHT: 71 IN | WEIGHT: 170.88 LBS | TEMPERATURE: 98 F | SYSTOLIC BLOOD PRESSURE: 116 MMHG | DIASTOLIC BLOOD PRESSURE: 54 MMHG

## 2019-08-13 DIAGNOSIS — E03.9 HYPOTHYROIDISM, UNSPECIFIED TYPE: ICD-10-CM

## 2019-08-13 DIAGNOSIS — E78.5 HYPERLIPIDEMIA, UNSPECIFIED HYPERLIPIDEMIA TYPE: ICD-10-CM

## 2019-08-13 DIAGNOSIS — E11.40 TYPE 2 DIABETES, CONTROLLED, WITH NEUROPATHY: ICD-10-CM

## 2019-08-13 DIAGNOSIS — C91.10 CLL (CHRONIC LYMPHOCYTIC LEUKEMIA): ICD-10-CM

## 2019-08-13 DIAGNOSIS — D69.3 IMMUNE THROMBOCYTOPENIA: ICD-10-CM

## 2019-08-13 DIAGNOSIS — I10 HYPERTENSION, UNSPECIFIED TYPE: ICD-10-CM

## 2019-08-13 DIAGNOSIS — E23.0 PANHYPOPITUITARISM: ICD-10-CM

## 2019-08-13 DIAGNOSIS — Z09 HOSPITAL DISCHARGE FOLLOW-UP: Primary | ICD-10-CM

## 2019-08-13 PROCEDURE — 99999 PR PBB SHADOW E&M-EST. PATIENT-LVL IV: CPT | Mod: PBBFAC,HCNC,GC, | Performed by: STUDENT IN AN ORGANIZED HEALTH CARE EDUCATION/TRAINING PROGRAM

## 2019-08-13 PROCEDURE — 85025 COMPLETE CBC W/AUTO DIFF WBC: CPT | Mod: HCNC

## 2019-08-13 PROCEDURE — 99999 PR PBB SHADOW E&M-EST. PATIENT-LVL IV: ICD-10-PCS | Mod: PBBFAC,HCNC,GC, | Performed by: STUDENT IN AN ORGANIZED HEALTH CARE EDUCATION/TRAINING PROGRAM

## 2019-08-13 PROCEDURE — 99495 TRANSJ CARE MGMT MOD F2F 14D: CPT | Mod: HCNC,GC,S$GLB, | Performed by: STUDENT IN AN ORGANIZED HEALTH CARE EDUCATION/TRAINING PROGRAM

## 2019-08-13 PROCEDURE — 99495 TCM SERVICES (MODERATE COMPLEXITY): ICD-10-PCS | Mod: HCNC,GC,S$GLB, | Performed by: STUDENT IN AN ORGANIZED HEALTH CARE EDUCATION/TRAINING PROGRAM

## 2019-08-13 PROCEDURE — 36415 COLL VENOUS BLD VENIPUNCTURE: CPT | Mod: HCNC,PO

## 2019-08-13 NOTE — PROGRESS NOTES
I have interviewed and examined the patient w/ the resident,   Dr. Hinton  I agree w/ the impression and plan as outlined above.  Josephine Singh MD- Staff

## 2019-08-13 NOTE — PROGRESS NOTES
CC: Hosp follow up        85 y.o. male patient presents in f/u to hospitalization       Admitted: 7/15/19       D/C: 7/25/19    Family and/or Caretaker present at visit? Wife is present  Diagnostic tests reviewed/disposition: CBC, CMP, X-ray chest, and liver ultrasound   Home health/community services discussion/referrals:  Currently has home health, refused SNF placement (PT/OT recommendation)  Establishment or re-establishment of referral orders for community resources: Currently has home health   Discussion with other health care providers: Heme/onc follow up on 8/23. Hepatology follow up on 8/19             Patient was seen in ED/ clinic            Dx: Septic shock       Tx: vancomycin, cefepime, azithromycin for 10 days       Disposition: Stable for discharge, SNF recommendation, patient opted for home with home health         Meds: Resume home medications, holding Ibrutinab per Dr. Trejo (heme/onc). Increased patient's prednisone from 7.5 to 20 mg daily. Discontinued aspirin and plavix        Lab: CBC, CMP        Imaging: Chest X-ray, Liver ultrasound         Follow up visits: Heme/onc follow up on 8/23, hepatology follow up on 8/19        Since discharge:       Sx: Lower extremity edema, fatigue. Only minor improvement from presenting complaints               ROS:         No fever, chills, or night sweats         No visual changes or d/c         No dysphagia or early satiety;   Appetite has been poor for some time         No cough, occasional wheezing         No PND occasional orthopnea          No chest pain or palpitations         LE edema, bilateral 2+ up to knees         No GERD or abdominal pain         No change in bowel or blood in stool         No change in urination or dysuria or hematuria         No heat or cold intolerance         Thin skin, multiple skin breaks          Easy bruising (prednisone use)          No depression or anxiety         No memory changes or confusion         No HA or focal  deficits         ADL's: Difficulty walking, needs walking cane or anything else to grab on to prior to walking          Remainder of review negative except as previously noted    PMHX: Reviewed  PSHX: Reviewed  FHX:    Reviewed  SHX:    Reviewed    PE:  VSS  GEN: WDWN, A&O, NAD, conversant and co-operative  EYES: Conjunctival palor, jaundice,  PERRL, EOMI     NECK: Supple w/o lymphadenopathy or thyromegaly  RESP: Efforts unlabored, lungs CTAP. Crackles in bilateral lower lung fields   CV:: Heart RRR w/o mgr, No carotid bruits noted, 1+/ dim pedal pulses , Bilateral lower extremity edema 2+ up to knees   GI: Abdomen BS+, soft, NT/ND, - HSM noted  MSK: Gait normal/impaired, + cane/walker/wheelchair; No CCE noted  NEURO: LIN. No facial asymmetry or confusion noted  SKIN: Ecchymosis, hematoma             Skin breakdown    IMPRESSION:    PLAN:    1. Hospital follow up  - Patient hospitalized for 10 days after complaints of generalized fatigue, lower extremity swelling, and syncopal episodes  - Treated for septic shock, hospital course complicated by LUZ, thrombocytopenia, and transaminitis  - Patient home with home health, follow up with hepatology on 8/19 and heme/onc on 8/23   - Post Hospital D/C Medications have been reconciled; increased prednisone dose to 20 mg, dc aspirin and plavix    2. Immune thrombocytopenia  - Patient's platelet count decreased to 8 during hospital stay that was largely unresponsive to platelet transfusion  - Got IVIG per heme/onc while inpatient, increased dose of steroids prior to discharge  - Continue 20 mg prednisone daily, heme/onc follow up 8/23    3. Chronic lymphocytic leukemia  - Patient feels that he has been symptomatic at the end of 5-6 months on Ibrutinab  - Followed by heme/onc     4. Diabetes Mellitus type 2  - Stable, controlled, last HA1c 5.6 in July of this year    5. Panhypopituitarism  - Continue increased dose of prednisone    6. Hypothyroidism  - Continue levothyroxine  125 mcg    7. Hyperlipidemia  - Continue atorvastatin 20 mg    8. Hypertension  - Normotensive at time of exam  - continue losartan

## 2019-08-14 ENCOUNTER — TELEPHONE (OUTPATIENT)
Dept: INTERNAL MEDICINE | Facility: CLINIC | Age: 84
End: 2019-08-14

## 2019-08-14 ENCOUNTER — TELEPHONE (OUTPATIENT)
Dept: HOME HEALTH SERVICES | Facility: HOSPITAL | Age: 84
End: 2019-08-14

## 2019-08-14 LAB
ANISOCYTOSIS BLD QL SMEAR: SLIGHT
BASOPHILS # BLD AUTO: 0.03 K/UL (ref 0–0.2)
BASOPHILS NFR BLD: 0.8 % (ref 0–1.9)
DACRYOCYTES BLD QL SMEAR: ABNORMAL
DIFFERENTIAL METHOD: ABNORMAL
EOSINOPHIL # BLD AUTO: 0 K/UL (ref 0–0.5)
EOSINOPHIL NFR BLD: 0.3 % (ref 0–8)
ERYTHROCYTE [DISTWIDTH] IN BLOOD BY AUTOMATED COUNT: 19.2 % (ref 11.5–14.5)
HCT VFR BLD AUTO: 35.3 % (ref 40–54)
HGB BLD-MCNC: 10.8 G/DL (ref 14–18)
HYPOCHROMIA BLD QL SMEAR: ABNORMAL
IMM GRANULOCYTES # BLD AUTO: 0.15 K/UL (ref 0–0.04)
IMM GRANULOCYTES NFR BLD AUTO: 3.8 % (ref 0–0.5)
LYMPHOCYTES # BLD AUTO: 1.4 K/UL (ref 1–4.8)
LYMPHOCYTES NFR BLD: 36 % (ref 18–48)
MCH RBC QN AUTO: 31.9 PG (ref 27–31)
MCHC RBC AUTO-ENTMCNC: 30.6 G/DL (ref 32–36)
MCV RBC AUTO: 104 FL (ref 82–98)
MONOCYTES # BLD AUTO: 0.5 K/UL (ref 0.3–1)
MONOCYTES NFR BLD: 12.4 % (ref 4–15)
NEUTROPHILS # BLD AUTO: 1.8 K/UL (ref 1.8–7.7)
NEUTROPHILS NFR BLD: 46.7 % (ref 38–73)
NRBC BLD-RTO: 2 /100 WBC
OVALOCYTES BLD QL SMEAR: ABNORMAL
PLATELET # BLD AUTO: 35 K/UL (ref 150–350)
PLATELET BLD QL SMEAR: ABNORMAL
PMV BLD AUTO: 10.9 FL (ref 9.2–12.9)
POIKILOCYTOSIS BLD QL SMEAR: SLIGHT
POLYCHROMASIA BLD QL SMEAR: ABNORMAL
RBC # BLD AUTO: 3.39 M/UL (ref 4.6–6.2)
WBC # BLD AUTO: 3.94 K/UL (ref 3.9–12.7)

## 2019-08-14 NOTE — TELEPHONE ENCOUNTER
Phoned patient on 8/14 at 12:50 pm to inform him of his CBC results. Patient is most concerned about his platelets which is currently at 35. He expressed his concern that it was 3 points lower than his labs 8 days ago. I assured him that his platelets are holding steady above 30 and this number can fluctuate. Remainder of blood counts remain stable.

## 2019-08-19 ENCOUNTER — OFFICE VISIT (OUTPATIENT)
Dept: HEPATOLOGY | Facility: CLINIC | Age: 84
End: 2019-08-19
Payer: MEDICARE

## 2019-08-19 ENCOUNTER — TELEPHONE (OUTPATIENT)
Dept: HEPATOLOGY | Facility: CLINIC | Age: 84
End: 2019-08-19

## 2019-08-19 VITALS
HEART RATE: 76 BPM | HEIGHT: 71 IN | BODY MASS INDEX: 22.63 KG/M2 | OXYGEN SATURATION: 98 % | DIASTOLIC BLOOD PRESSURE: 69 MMHG | SYSTOLIC BLOOD PRESSURE: 161 MMHG | WEIGHT: 161.63 LBS

## 2019-08-19 DIAGNOSIS — R76.8 HEPATITIS B CORE ANTIBODY POSITIVE: ICD-10-CM

## 2019-08-19 DIAGNOSIS — R60.0 LEG EDEMA: Primary | ICD-10-CM

## 2019-08-19 DIAGNOSIS — K74.00 HEPATIC FIBROSIS: ICD-10-CM

## 2019-08-19 DIAGNOSIS — R18.8 OTHER ASCITES: ICD-10-CM

## 2019-08-19 DIAGNOSIS — D84.9 IMMUNOSUPPRESSION: ICD-10-CM

## 2019-08-19 DIAGNOSIS — R79.89 ABNORMAL LFTS: ICD-10-CM

## 2019-08-19 PROCEDURE — 99499 RISK ADDL DX/OHS AUDIT: ICD-10-PCS | Mod: HCNC,S$GLB,, | Performed by: PHYSICIAN ASSISTANT

## 2019-08-19 PROCEDURE — 99204 OFFICE O/P NEW MOD 45 MIN: CPT | Mod: HCNC,S$GLB,, | Performed by: PHYSICIAN ASSISTANT

## 2019-08-19 PROCEDURE — 1101F PT FALLS ASSESS-DOCD LE1/YR: CPT | Mod: HCNC,CPTII,S$GLB, | Performed by: PHYSICIAN ASSISTANT

## 2019-08-19 PROCEDURE — 99999 PR PBB SHADOW E&M-EST. PATIENT-LVL IV: CPT | Mod: PBBFAC,HCNC,, | Performed by: PHYSICIAN ASSISTANT

## 2019-08-19 PROCEDURE — 99204 PR OFFICE/OUTPT VISIT, NEW, LEVL IV, 45-59 MIN: ICD-10-PCS | Mod: HCNC,S$GLB,, | Performed by: PHYSICIAN ASSISTANT

## 2019-08-19 PROCEDURE — 3077F PR MOST RECENT SYSTOLIC BLOOD PRESSURE >= 140 MM HG: ICD-10-PCS | Mod: HCNC,CPTII,S$GLB, | Performed by: PHYSICIAN ASSISTANT

## 2019-08-19 PROCEDURE — 99499 UNLISTED E&M SERVICE: CPT | Mod: HCNC,S$GLB,, | Performed by: PHYSICIAN ASSISTANT

## 2019-08-19 PROCEDURE — 3077F SYST BP >= 140 MM HG: CPT | Mod: HCNC,CPTII,S$GLB, | Performed by: PHYSICIAN ASSISTANT

## 2019-08-19 PROCEDURE — 1101F PR PT FALLS ASSESS DOC 0-1 FALLS W/OUT INJ PAST YR: ICD-10-PCS | Mod: HCNC,CPTII,S$GLB, | Performed by: PHYSICIAN ASSISTANT

## 2019-08-19 PROCEDURE — 99499 UNLISTED E&M SERVICE: CPT | Mod: HCNC,S$GLB,, | Performed by: INTERNAL MEDICINE

## 2019-08-19 PROCEDURE — 3078F PR MOST RECENT DIASTOLIC BLOOD PRESSURE < 80 MM HG: ICD-10-PCS | Mod: HCNC,CPTII,S$GLB, | Performed by: PHYSICIAN ASSISTANT

## 2019-08-19 PROCEDURE — 99499 RISK ADDL DX/OHS AUDIT: ICD-10-PCS | Mod: HCNC,S$GLB,, | Performed by: INTERNAL MEDICINE

## 2019-08-19 PROCEDURE — 99999 PR PBB SHADOW E&M-EST. PATIENT-LVL IV: ICD-10-PCS | Mod: PBBFAC,HCNC,, | Performed by: PHYSICIAN ASSISTANT

## 2019-08-19 PROCEDURE — 3078F DIAST BP <80 MM HG: CPT | Mod: HCNC,CPTII,S$GLB, | Performed by: PHYSICIAN ASSISTANT

## 2019-08-19 RX ORDER — FUROSEMIDE 20 MG/1
20 TABLET ORAL DAILY
Qty: 30 TABLET | Refills: 2 | Status: SHIPPED | OUTPATIENT
Start: 2019-08-19 | End: 2019-09-19

## 2019-08-19 RX ORDER — SPIRONOLACTONE 50 MG/1
50 TABLET, FILM COATED ORAL DAILY
Qty: 30 TABLET | Refills: 2 | Status: SHIPPED | OUTPATIENT
Start: 2019-08-19 | End: 2019-09-19

## 2019-08-19 NOTE — PROGRESS NOTES
I have personally performed a face to face diagnostic evaluation on this patient. I have reviewed and agree with today's findings and the care plan outlined by Mark Jones PA-C      My findings are as follows:  Patient presents with CLL about to be started on rituximab.    - HBcAb+ve  - ?cirrhosis- I note his edema and trace ascites as well as his mild pulmonary hypertension    1) Diuretics  2) anti- HBV treatment.   3) EGD to check for varices      he will return to Mark Jones PA-C  for follow-up.

## 2019-08-19 NOTE — PROGRESS NOTES
HEPATOLOGY CLINIC VISIT NOTE     REFERRING PROVIDER: Kavitha Leon    REASON FOR VISIT:    HISTORY: This is a 85 y.o. White male here for evaluation of HBcAb (+) in setting of immunosuppression with CLL. Pt followed by heme onc. HBcAb previously negative 04/2013 and 06/2016 and now positive HBcAb 08/2019. He is on IVIG as well as rituxan.     CLL diagnosed in April 2012 by Dr Joaquin Wooten. In April 2013, there was an abrupt rise in his white blood cell count to 93,000 and he had a modest number of prolymphocytes in his peripheral smear.  He had generalized urticaria, which was thought to be paraneoplastic.  At that time, he was treated with 5 courses of fludarabine, cyclophosphamide and Rituxan.  The fludarabine doses were decreased somewhat because of mild renal impairment.  The sixth treatment was with Rituxan alone because he developed thrombocytopenia, which has persisted in varying degrees since then.    AST 85, ALT 79, tbili 1.7, alk phos 330  PLTs 35    H/o ITP, currently PLTs 35k, previously on ofatumumab, which he began in late June 2016.  At that time, his platelet count was 57,000.  He generally tolerated the drug well and the frequency of administration was gradually reduced over a period of more than 2 years.  Platelet counts during most of those 2 years were between 66,000-117,000.  The drug was discontinued in early November 2018. He has taken pred for possible ITP in past, now on maintaince pred for pan hypopituitarism.     No known h/o liver disease. U/S in 2015 noting hepatic steatosis. Most recent U/S notes trace ascites. Labs show hyperbilirubinemia and thrombocytopenia. Spleen essentially normal on U/S. Echo with pulm HTN. He has significant edema to legs and arms, not currently on diuretics. Recently hospitalized, prior to hospitalization, elevated liver enzymes, tbili WNL, albumin 3.1-3.4.    Pt denies signs of hepatic decompensation including: jaundice, dark urine, abdominal distention,  hematemesis, melena, slowed mentation.    No formal fibrosis staging.     MELD-Na score: 14 at 7/18/2019  5:15 AM  MELD score: 13 at 7/18/2019  5:15 AM  Calculated from:  Serum Creatinine: 1.5 mg/dL at 7/18/2019  5:15 AM  Serum Sodium: 136 mmol/L at 7/18/2019  5:15 AM  Total Bilirubin: 1.1 mg/dL at 7/18/2019  5:15 AM  INR(ratio): 1.2 at 7/16/2019  7:35 AM  Age: 85 years    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    Hyperlipidemia     Hypertension     Hypopituitarism     Hypothyroid     Immunosuppression 3/13/2015    Squamous Cell Carcinoma     on the right side of the face     Squamous cell carcinoma of skin of right temple 1/27/2016    Stroke     Syncope 2/12    Unspecified disorder of kidney and ureter      Past Surgical History:   Procedure Laterality Date    (ROTATION) FLAP N/A 3/23/2015    [facility] 2ND FLR    BIOPSY-LYMPH NODE N/A 3/23/2015    Performed by Joe Mcallister MD at [facility] OR 2ND FLR    CAROTID ENDARTERECTOMY (L)  2/27/2007    COLONOSCOPY N/A 2/25/2014    Performed by DELORES Lambert MD at [facility] ENDO (4TH FLR)    EXCISION TURBINATE, SUBMUCOUS      EXCISION-LESION-FACE Right 2/24/2016    Per[facility] by [facility] at [facility] OR 2ND FLR    EXCISION-LESION-FACE N/A 3/23/2015    Per[facility] by [facility] at [facility] OR 2ND FLR    FESS  9/16/2014    FULL THICKNESS SKIN GRAFT N/A 2/24/2016    Per[facility] at [facility] OR 2ND FLR    MAPPING-LYMPH NODE N/A 3/23/2015    Per[facility] by [facility] at [facility] OR 2ND FLR    MAPPING-SENTINEL NODE N/A 2/24/2016    Per[facility] at [facility] OR 2ND FLR    Mohs excision   3/23/2015, 2/24/2016    combined with WLE forehead    NASAL SEPTUM SURGERY  9/16/2014    REPAIR-MOHS DEFECT-left nose Left 3/31/2017     "Performed by Jermaine Dsouza III, [facility] at [facility] OR [facility] FLR    RESECTION-TURBINATES (SMR) Bilateral 9/16/2014    Performed by Jermaine D[facility] III, [facility] at [facility] OR [facility] FLR    right ureter surgery  1995    SENTINEL LYMPH NODE BIOPSY  3/23/2015, 2/24/2016    SEPTOPLASTY Bilateral 9/16/2014    Performed by Jermaine Dsouza III, MD at [facility] OR [facility] FLR    SINUS SURGERY  9/16/2014    septo, ESS, BITSMR    SINUS SURGERY FUNCTIONAL ENDOSCOPIC WITH NAVIGATION with bilateral maxillary, ethmoids, sphenoids, right frontal, possible left NF duct-balloons Bilateral 9/16/2014    Performed by Jermaine D[facility]za III, MD at [facility] OR 2ND FLR    Transphenoidal surgery       FAMILY HISTORY: Negative for liver disease    SOCIAL HISTORY:   Retired- purchasing at [employer]    Social History     Tobacco Use   Smoking Status Never Smoker   Smokeless Tobacco Never Used     Social History     Substance and Sexual Activity   Alcohol Use Yes    Alcohol/week: 1.2 oz    Types: 1 Glasses of wine, 1 Cans of beer per week    Frequency: Never    Drinks per session: Patient refused    Binge frequency: Patient refused    Comment: "rarely"   denies occasional alcohol use      Social History     Substance and Sexual Activity   Drug Use No   denies     ROS:   No fever, chills, weight loss, fatigue  No chest pain, palpitations, dyspnea, cough  No abdominal pain, change in bowel pattern, nausea, vomiting  No dysuria, hematuria   (+) purpura to arms    No headaches, visual changes  (+) pitting lower extremity edema    PHYSICAL EXAM:  Friendly White male, in no acute distress; alert and oriented to person, place and time  VITALS: reviewed  HEENT: Sclerae anicteric.   NECK: Supple  CVS: Regular rate and rhythm. No murmurs  LUNGS: Normal respiratory effort. Clear bilaterally  ABDOMEN: Flat, soft, nontender. No organomegaly or masses. No ascites or hernias.   SKIN: Warm and dry. No jaundice, purpura to arms bilaterally  EXTREMITIES: (+) pitting edema to at least " knees  NEURO/PSYCH: Normal gate. Memory intact. Thought and speech pattern appropriate. Behavior normal. No depression or anxiety noted.    RECENT LABS:  Lab Results   Component Value Date    WBC 3.94 08/13/2019    HGB 10.8 (L) 08/13/2019    PLT 35 (LL) 08/13/2019     Lab Results   Component Value Date    INR 1.2 07/16/2019     Lab Results   Component Value Date    AST 85 (H) 07/30/2019    ALT 79 (H) 07/30/2019    BILITOT 1.7 (H) 07/30/2019    ALBUMIN 2.3 (L) 07/30/2019    ALKPHOS 330 (H) 07/30/2019    CREATININE 0.9 07/30/2019    BUN 46 (H) 07/30/2019     07/30/2019    K 4.9 07/30/2019     RECENT IMAGING:  US Abdomen Complete   Order: [id number]   Status:  Final result   Visible to patient:  Yes (Patient Portal)   Next appt:  08/23/2019 at 07:40 AM in Lab (LAB, [facility])   Details     Reading Physician Reading Date Result Priority   Geoff Samson MD 7/18/2019       Narrative     EXAMINATION:  US ABDOMEN COMPLETE    CLINICAL HISTORY:  elevatd lft and alk phos;    TECHNIQUE:  Complete abdominal ultrasound (including pancreas, aorta, liver, gallbladder, common bile duct, IVC, kidneys, and spleen) was performed.    COMPARISON:  None    FINDINGS:  Pancreas: The pancreas is obscured by overlying bowel gas.    Aorta: The visualized portion demonstrates no aneurysmal dilatation.    Liver: 14.3 cm, normal in size. Homogeneous parenchymal echotexture. No focal lesions.    Gallbladder: No calculi, wall thickening, or pericholecystic fluid.  Negative sonographic Dominguez's sign.    Biliary system: 3 mm common bile duct.  No intrahepatic ductal dilatation.    Inferior vena cava: The partially visualized IVC appears normal in appearance.    Right kidney: 10.4 cm. There is cortical thinning.  There is a 2.2 cm simple right renal cyst.  There is no hydronephrosis.    Left kidney: 10.5 cm. There is cortical thinning.  No hydronephrosis.    Spleen: 12.5 cm.  Normal in size with homogeneous  echotexture.    Miscellaneous: There is trace upper abdominal ascites.  There is an apparent right-sided pleural effusion.      Impression       Trace upper abdominal ascites.    Apparent right-sided pleural effusion.    Simple right renal cyst.           ASSESSMENT  85 y.o. White male with:  1. HBcAb  -- likely passive from IVIG exposure, however given high risk of reactivation with Rituxan, recommend prophylaxis, Vemlidy daily to [facility] for auth  -- recommend LFT and HBV DNA monitoring with Rituxan infusions  -- will need to continue at least 12 months post d/c of Rituxan     2. ASCITESLOWER LEG EDEMAABNORMAL LFTs  -- ?cirrhosis, would not pursue biopsy, ascites prohibitive to fibroscan  -- diuretics for edema, discussed low Na diet, may titrate   -- thrombocytopenia noted, spleen WNL, consider EGD  -- q6 months HCC screening  -- discussed signs of hepatic decompensation.   -- echo with pulm HTN    PLAN:  1. Lasix 20mg and sharron 50mg to pharmacy, BMP later this week, low Na diet  2. Vemlidy to [facility]  3. U/S in 6 months with AFP with f/u   4. Consider EGD- recent hospitalization, PLTs trending down, would like more medical optimization first, given age banding not likely     Thank you for allowing me to participate in the care of Thierry Ramos Jr.  Mark Jones PA-C

## 2019-08-19 NOTE — LETTER
August 19, 2019      Kavitha Leon, NP  [address] [facility] [address]           [facility] - Hepatology  [address]  [place] [address]  Phone: [phone]  Fax: [phone]          Patient: Thierry Ramos Jr.   MR Number: [id number]   YOB: 1934   Date of Visit: 8/19/2019       Dear Kavitha Leon:    Thank you for referring Thierry Ramos to me for evaluation. Attached you will find relevant portions of my assessment and plan of care.    If you have questions, please do not hesitate to call me. I look forward to following Thierry Ramos along with you.    Sincerely,    Mark Jones PA-C    Enclosure  CC:  No Recipients    If you would like to receive this communication electronically, please contact [email or web address] or [phone] to request more information on [employer] Link access.    For providers and/or their staff who would like to refer a patient to [facility], please contact us through our one-stop-shop provider referral line, [facility], at [phone].    If you feel you have received this communication in error or would no longer like to receive these types of communications, please e-mail [email or web address]

## 2019-08-20 ENCOUNTER — TELEPHONE (OUTPATIENT)
Dept: PHARMACY | Facility: CLINIC | Age: 84
End: 2019-08-20

## 2019-08-20 NOTE — TELEPHONE ENCOUNTER
Informed Spouse Lorin  that [facility] received prescription for Vemlidy and prior authorization is required.  [facility] will be back in touch once insurance determination is received. Patient's wife stated that he would need this on Wednesday or Thursday because he has an infusion scheduled for Friday.

## 2019-08-20 NOTE — TELEPHONE ENCOUNTER
Documentation Only:    Prior Authorization for Vemlidy has been approved until 12.31.2019    Patient co-pay: $58.74    Patient Assistance IS required.    Forwarding to patients assistance for review    AKF 4:19pm

## 2019-08-21 ENCOUNTER — TELEPHONE (OUTPATIENT)
Dept: PHARMACY | Facility: CLINIC | Age: 84
End: 2019-08-21

## 2019-08-21 NOTE — TELEPHONE ENCOUNTER
Confirmed 2 patient identifiers - name and . Patient received Vemlidy counseling over the phone on . Patient will start Vemlidy (tenofovir alafenamide) 25mg on . Patient will  Vemlidy from [facility] on  $ 58.74 copay. Address confirmed.     Vemlidy (tenofovir) 25mg- Take one tablet by mouth once daily WITH food.  Counseling was reviewed:   1. Patient MUST take Vemlidy at the SAME time every day with food to reduce stomach irritation.   2. Side effects include, but not limited to: headache, cough, upset stomach/vomiting, stomach pains, fatigue, back pain.   Headache: Patient may treat with OTC remedies. If Tylenol is used, dose should  not exceed 2000mg per day.   3. Medication list reviewed. No DDIs or allergies noted. Patient MUST contact myself or provider prior to starting any new OTC, herbal, or prescription drugs to avoid potential DDIs.     Discussed the importance of staying well hydrated while on therapy. Compliance stressed - patient to take missed doses as soon as remembered, but NOT to take 2 doses in one day. Patient will report questions or concerns to myself or practitioner. Patient verbalizes understanding. Patient plans to start Vemlidy on . Consultation included: indication; goals of treatment; administration; storage and handling; side effects; how to handle side effects; the importance of compliance; how to handle missed doses; the importance of laboratory monitoring; the importance of keeping all follow up appointments.  Patient understands to report any medication changes to [facility] and provider. All questions answered and addressed to patients satisfaction. I will f/u with her in 1 week from start, [facility] to contact patient in 3 weeks for refills.

## 2019-08-23 ENCOUNTER — OFFICE VISIT (OUTPATIENT)
Dept: HEMATOLOGY/ONCOLOGY | Facility: CLINIC | Age: 84
End: 2019-08-23
Payer: MEDICARE

## 2019-08-23 ENCOUNTER — INFUSION (OUTPATIENT)
Dept: INFUSION THERAPY | Facility: HOSPITAL | Age: 84
End: 2019-08-23
Attending: INTERNAL MEDICINE
Payer: MEDICARE

## 2019-08-23 VITALS
WEIGHT: 154.56 LBS | SYSTOLIC BLOOD PRESSURE: 136 MMHG | DIASTOLIC BLOOD PRESSURE: 65 MMHG | HEIGHT: 71 IN | TEMPERATURE: 98 F | HEART RATE: 67 BPM | BODY MASS INDEX: 21.64 KG/M2 | OXYGEN SATURATION: 100 % | RESPIRATION RATE: 17 BRPM

## 2019-08-23 VITALS
BODY MASS INDEX: 21.64 KG/M2 | HEART RATE: 68 BPM | DIASTOLIC BLOOD PRESSURE: 69 MMHG | SYSTOLIC BLOOD PRESSURE: 160 MMHG | TEMPERATURE: 98 F | RESPIRATION RATE: 16 BRPM | WEIGHT: 154.56 LBS | HEIGHT: 71 IN

## 2019-08-23 DIAGNOSIS — R76.8 HEPATITIS B CORE ANTIBODY POSITIVE: ICD-10-CM

## 2019-08-23 DIAGNOSIS — I70.0 AORTIC ATHEROSCLEROSIS: ICD-10-CM

## 2019-08-23 DIAGNOSIS — D69.3 ACUTE ITP: Primary | ICD-10-CM

## 2019-08-23 DIAGNOSIS — R74.01 TRANSAMINITIS: ICD-10-CM

## 2019-08-23 DIAGNOSIS — R60.0 EDEMA OF BOTH LEGS: ICD-10-CM

## 2019-08-23 DIAGNOSIS — I27.20 PULMONARY HTN: ICD-10-CM

## 2019-08-23 DIAGNOSIS — C91.10 CLL (CHRONIC LYMPHOCYTIC LEUKEMIA): Primary | ICD-10-CM

## 2019-08-23 DIAGNOSIS — C91.10 CLL (CHRONIC LYMPHOCYTIC LEUKEMIA): ICD-10-CM

## 2019-08-23 PROCEDURE — 3075F PR MOST RECENT SYSTOLIC BLOOD PRESS GE 130-139MM HG: ICD-10-PCS | Mod: HCNC,CPTII,S$GLB, | Performed by: NURSE PRACTITIONER

## 2019-08-23 PROCEDURE — 96375 TX/PRO/DX INJ NEW DRUG ADDON: CPT | Mod: HCNC

## 2019-08-23 PROCEDURE — 25000003 PHARM REV CODE 250: Mod: HCNC | Performed by: INTERNAL MEDICINE

## 2019-08-23 PROCEDURE — 96413 CHEMO IV INFUSION 1 HR: CPT | Mod: HCNC

## 2019-08-23 PROCEDURE — 96415 CHEMO IV INFUSION ADDL HR: CPT | Mod: HCNC

## 2019-08-23 PROCEDURE — 3075F SYST BP GE 130 - 139MM HG: CPT | Mod: HCNC,CPTII,S$GLB, | Performed by: NURSE PRACTITIONER

## 2019-08-23 PROCEDURE — 3078F PR MOST RECENT DIASTOLIC BLOOD PRESSURE < 80 MM HG: ICD-10-PCS | Mod: HCNC,CPTII,S$GLB, | Performed by: NURSE PRACTITIONER

## 2019-08-23 PROCEDURE — 1101F PT FALLS ASSESS-DOCD LE1/YR: CPT | Mod: HCNC,CPTII,S$GLB, | Performed by: NURSE PRACTITIONER

## 2019-08-23 PROCEDURE — 99214 PR OFFICE/OUTPT VISIT, EST, LEVL IV, 30-39 MIN: ICD-10-PCS | Mod: HCNC,S$GLB,, | Performed by: NURSE PRACTITIONER

## 2019-08-23 PROCEDURE — 96367 TX/PROPH/DG ADDL SEQ IV INF: CPT | Mod: HCNC

## 2019-08-23 PROCEDURE — 99499 UNLISTED E&M SERVICE: CPT | Mod: HCNC,S$GLB,, | Performed by: NURSE PRACTITIONER

## 2019-08-23 PROCEDURE — 1101F PR PT FALLS ASSESS DOC 0-1 FALLS W/OUT INJ PAST YR: ICD-10-PCS | Mod: HCNC,CPTII,S$GLB, | Performed by: NURSE PRACTITIONER

## 2019-08-23 PROCEDURE — S0028 INJECTION, FAMOTIDINE, 20 MG: HCPCS | Mod: HCNC | Performed by: INTERNAL MEDICINE

## 2019-08-23 PROCEDURE — 63600175 PHARM REV CODE 636 W HCPCS: Mod: JG,HCNC | Performed by: INTERNAL MEDICINE

## 2019-08-23 PROCEDURE — 99214 OFFICE O/P EST MOD 30 MIN: CPT | Mod: HCNC,S$GLB,, | Performed by: NURSE PRACTITIONER

## 2019-08-23 PROCEDURE — 99499 RISK ADDL DX/OHS AUDIT: ICD-10-PCS | Mod: HCNC,S$GLB,, | Performed by: NURSE PRACTITIONER

## 2019-08-23 PROCEDURE — 3078F DIAST BP <80 MM HG: CPT | Mod: HCNC,CPTII,S$GLB, | Performed by: NURSE PRACTITIONER

## 2019-08-23 PROCEDURE — 99999 PR PBB SHADOW E&M-EST. PATIENT-LVL III: CPT | Mod: PBBFAC,HCNC,, | Performed by: NURSE PRACTITIONER

## 2019-08-23 PROCEDURE — 99999 PR PBB SHADOW E&M-EST. PATIENT-LVL III: ICD-10-PCS | Mod: PBBFAC,HCNC,, | Performed by: NURSE PRACTITIONER

## 2019-08-23 RX ORDER — MEPERIDINE HYDROCHLORIDE 50 MG/ML
25 INJECTION INTRAMUSCULAR; INTRAVENOUS; SUBCUTANEOUS
Status: DISCONTINUED | OUTPATIENT
Start: 2019-08-23 | End: 2019-08-23 | Stop reason: HOSPADM

## 2019-08-23 RX ORDER — MEPERIDINE HYDROCHLORIDE 50 MG/ML
25 INJECTION INTRAMUSCULAR; INTRAVENOUS; SUBCUTANEOUS
Status: CANCELLED | OUTPATIENT
Start: 2019-08-23

## 2019-08-23 RX ORDER — ACETAMINOPHEN 325 MG/1
650 TABLET ORAL
Status: CANCELLED | OUTPATIENT
Start: 2019-08-23

## 2019-08-23 RX ORDER — SODIUM CHLORIDE 0.9 % (FLUSH) 0.9 %
10 SYRINGE (ML) INJECTION
Status: CANCELLED | OUTPATIENT
Start: 2019-08-23

## 2019-08-23 RX ORDER — ACETAMINOPHEN 325 MG/1
650 TABLET ORAL
Status: COMPLETED | OUTPATIENT
Start: 2019-08-23 | End: 2019-08-23

## 2019-08-23 RX ORDER — HEPARIN 100 UNIT/ML
500 SYRINGE INTRAVENOUS
Status: DISCONTINUED | OUTPATIENT
Start: 2019-08-23 | End: 2019-08-23 | Stop reason: HOSPADM

## 2019-08-23 RX ORDER — HEPARIN 100 UNIT/ML
500 SYRINGE INTRAVENOUS
Status: CANCELLED | OUTPATIENT
Start: 2019-08-23

## 2019-08-23 RX ORDER — FAMOTIDINE 10 MG/ML
20 INJECTION INTRAVENOUS
Status: COMPLETED | OUTPATIENT
Start: 2019-08-23 | End: 2019-08-23

## 2019-08-23 RX ORDER — SODIUM CHLORIDE 0.9 % (FLUSH) 0.9 %
10 SYRINGE (ML) INJECTION
Status: DISCONTINUED | OUTPATIENT
Start: 2019-08-23 | End: 2019-08-23 | Stop reason: HOSPADM

## 2019-08-23 RX ORDER — FAMOTIDINE 10 MG/ML
20 INJECTION INTRAVENOUS
Status: CANCELLED | OUTPATIENT
Start: 2019-08-23

## 2019-08-23 RX ADMIN — ACETAMINOPHEN 650 MG: 325 TABLET ORAL at 09:08

## 2019-08-23 RX ADMIN — DIPHENHYDRAMINE HYDROCHLORIDE 50 MG: 50 INJECTION, SOLUTION INTRAMUSCULAR; INTRAVENOUS at 09:08

## 2019-08-23 RX ADMIN — FAMOTIDINE 20 MG: 10 INJECTION INTRAVENOUS at 10:08

## 2019-08-23 RX ADMIN — RITUXIMAB 701 MG: 10 INJECTION, SOLUTION INTRAVENOUS at 10:08

## 2019-08-23 NOTE — PLAN OF CARE
Problem: Adult Inpatient Plan of Care  Goal: Plan of Care Review  Outcome: Ongoing (interventions implemented as appropriate)  Pt tolerated infusion. VSS throughout.PIV Flushed and saline locked. Pt has new skin tear on back of R. Elbow Wrapped with gauze. No questions at this time.

## 2019-08-23 NOTE — Clinical Note
Please add labs (CBC, CMP, uric acid, HBV DNA) & appointment with NP or Dr. Marr prior to scheduled rituxan infusion on 8/30

## 2019-08-23 NOTE — PLAN OF CARE
Problem: Thrombocytopenia Bleeding Risk (Chemotherapy Effects)  Goal: Absence of Bleeding  Outcome: Ongoing (interventions implemented as appropriate)  Pt has bruising on upper extremities extending from shoulders to knuckles. Reports it is due to low platelets and frequent sticks while in hospital. Also reports bumping into things and easily skin tearing and bruising. Veins are very hard upon palpation. IV started via three sticks but even working IV immediately bruised at site. MD made aware of patients interest in learning more about a port.

## 2019-08-27 ENCOUNTER — TELEPHONE (OUTPATIENT)
Dept: HEPATOLOGY | Facility: CLINIC | Age: 84
End: 2019-08-27

## 2019-08-27 NOTE — TELEPHONE ENCOUNTER
----- Message from Mark Jones PA-C sent at 8/26/2019  9:10 AM CDT -----  Labs stable. How is his swelling?

## 2019-08-27 NOTE — TELEPHONE ENCOUNTER
I spoke patient and msg from YFN Jones relayed.  Patient states swelling and mobility is improving.

## 2019-08-29 ENCOUNTER — TELEPHONE (OUTPATIENT)
Dept: PHARMACY | Facility: CLINIC | Age: 84
End: 2019-08-29

## 2019-08-29 PROBLEM — I27.20 PULMONARY HTN: Status: ACTIVE | Noted: 2019-08-29

## 2019-08-29 PROBLEM — I70.0 AORTIC ATHEROSCLEROSIS: Status: ACTIVE | Noted: 2019-08-29

## 2019-08-29 PROBLEM — R76.8 HEPATITIS B CORE ANTIBODY POSITIVE: Status: ACTIVE | Noted: 2019-08-29

## 2019-08-29 NOTE — PROGRESS NOTES
Subjective:       Patient ID: Thierry Ramos Jr. is a 85 y.o. male.    Chief Complaint: No chief complaint on file.    Heme/Onc history:  Mr. Ramos has a history of CLL diagnosed in April 2012 by Dr Joaquin Wooten. In April 2013, there was an abrupt rise in his white blood cell count to 93,000 and he had a modest number of prolymphocytes in his peripheral smear.  He had generalized urticaria, which was thought to be paraneoplastic.  At that time, he was treated with 5 courses of fludarabine, cyclophosphamide and Rituxan.  The fludarabine doses were decreased somewhat because of mild renal impairment.  The sixth treatment was with Rituxan alone because he developed thrombocytopenia, which has persisted in varying degrees since then.     His major hematologic problem and much of his treatment since 2013, has been related to thrombocytopenia.  It became more severe in early 2016, at a time when he was taking both aspirin and Plavix.  A bone marrow examination had no evidence of myelodysplasia and 35% of the bone marrow cells were small lymphocytes.  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  The FISH studies for myelodysplastic disorders were negative. He was treated for possible immune thrombocytopenia with prednisone 60 mg daily for 2 weeks, but there was no improvement in his platelet count.  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     Subsequently, because of continued thrombocytopenia,  recommended treatment with ofatumumab, which he began in late June 2016.  At that time, his platelet count was 57,000.  He generally tolerated the drug well and the frequency of administration was gradually reduced over a period of more than 2 years.  Platelet counts during most of those 2 years were between 66,000-117,000.  The drug was discontinued in early November 2018.     He has had many  episodes of infection in the past, particularly recurrent sinusitis and he has had several surgical procedures on his sinuses.  Because of this, he has been receiving intravenous immunoglobulin monthly for years and the frequency of pneumonia and severe sinusitis has diminished. Most recent dose of IVIG 4/2019.      After a pituitary surgery for a tumor in 2002, he experienced a TIA and then had 2 strokes.  He has had many nonmelanoma skin cancers excised.     He asked to be seen earlier than planned by Dr. Wooten in January because he was concerned about his recent blood counts.  His platelet count has declined modestly from the range of 66,000-107,000 seen in the past 9 months to a current value of 51,000.  His white blood cell count remains normal and his lymphocyte percentage     He was started on a low dose ibrutinib 280 mg rather than 420mg due to concern for treatment related- further decrease in platelet count while on aspirin and plavix.     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 Left arm is bandaged due to skin fragility causing oozing wounds.     Today:  Patient presents prior to cycle 2 of rituxan infusion. He states cycle 1 went okay, but he is tired. He continues to be very tired and fatigued. He reports that he fell when working in the grass this week, about three days ago. Denied dizziness, LOC, hitting head. States he did not hurt himself as the grass broke his fall. Pt continues tenofavir since 8/21/19 and denies and side effects. SOB continues to improve daily, and he is able to ride on his stationary bike. He denies fevers,  chest pain, sob, n/v/d/c.    HPI  Review of Systems   Constitutional: Positive for fatigue (tired). Negative for chills, diaphoresis and fever.   HENT: Positive for postnasal drip and rhinorrhea (d/t chronic sinus issues ). Negative for congestion, ear pain, mouth sores, nosebleeds, sinus pain, sore throat and trouble swallowing.    Eyes: Negative for pain, redness and visual disturbance.   Respiratory: Positive for shortness of breath (improving). Negative for cough, chest tightness, wheezing and stridor.    Cardiovascular: Positive for leg swelling (continues). Negative for chest pain and palpitations.   Gastrointestinal: Negative for abdominal distention, abdominal pain, blood in stool, constipation, diarrhea, nausea and vomiting.   Genitourinary: Negative for hematuria.   Musculoskeletal: Negative for arthralgias and back pain.   Skin: Negative for rash.   Allergic/Immunologic: Negative for environmental allergies, food allergies and immunocompromised state.   Neurological: Positive for weakness. Negative for dizziness (denies), light-headedness, numbness and headaches.        Fell this week   Hematological: Negative for adenopathy. Bruises/bleeds easily.   Psychiatric/Behavioral: Negative.  Negative for confusion.       Objective:      Physical Exam   Constitutional: He is oriented to person, place, and time. He appears well-developed and well-nourished.   HENT:   Head: Normocephalic and atraumatic.   Right Ear: External ear normal.   Left Ear: External ear normal.   Mouth/Throat: Oropharynx is clear and moist.   Eyes: Conjunctivae and EOM are normal. No scleral icterus.   Neck: Normal range of motion. Neck supple.   Cardiovascular: Normal rate and intact distal pulses.   Pulmonary/Chest: Effort normal. No respiratory distress. He has no wheezes (none heard today).   Fine crackles which were previously heard were not auscultated today   Abdominal: Soft. Bowel sounds are normal. He exhibits no distension. There  is no tenderness.   Musculoskeletal: Normal range of motion. He exhibits edema (+3 BLE edema ).   +2-3 pitting edema to BLE   Neurological: He is alert and oriented to person, place, and time. No cranial nerve deficit.   Skin: Skin is warm and dry. Ecchymosis noted. There is erythema.   Fragile skin with generalized ecchymosis to BUE and some peeling/flaking of skin on face   Psychiatric: He has a normal mood and affect. His behavior is normal. Judgment and thought content normal.   Vitals reviewed.      Assessment:       1. CLL (chronic lymphocytic leukemia)    2. Immune thrombocytopenia    3. Thrombocytopenia    4. Anemia in neoplastic disease    5. Hepatitis B core antibody positive    6. Pulmonary HTN    7. Aortic atherosclerosis    8. Edema of both legs    9. Chronic frontal sinusitis    10. Shortness of breath        Plan:       CLL/ITP  Ibrutinib 280mg daily has been stopped due to side effects of weakness and bruising.  Developed worsening ITP since stopping therapy (has had chronic low platelets since FCR treatment in 2013)  Mild response to steroids and platelets improved on prednisone 20mg daily which will continue for now. May be able to wean soon as plt decreasing  IVIG received in July 2019  Plan per Dr. Marr to start Rituximab 375mg/m2 weekly for 4 weeks today then consider maintenance Rituxan every 8 weeks pending response. Will proceed with cycle 2 today  Plan to taper steroids pending response to Rituxan back to baseline of prednisone 7.5mg daily  Discussed possible port or PICC line placement if unable to obtain IV due to difficult stick; pt expressed verbal discontent with idea at this time    Thrombocytopenia and anemia  2/2 disease  Hgb improved to 11.6; plt improved on rituxan and steroids to 67K  May be able to decrease steroids soon    Hepatitis B core antibody +/Transaminitis  Serology checked 8/6 in preparation for Rituxan therapy  Hep B surface antigen negative however Hep B core antibody  + (was negative in 2013 and 2016), Hep B surface antibody +  Seen by hepatology on 8/19/19   Liver function continues to improve; tbili normal; alk phos down to 192, AST now normal; ALT 50.  Per hepatology, Vemlidy daily (received and started 8/21/19)  -- recommend LFT and HBV DNA monitoring with Rituxan infusions; pending results today  -- will need to continue at least 12 months post d/c of Rituxan   -- U/S in 6 months with AFP with f/u (around 2/2020)    Pulmonary HTN  Noted on TTE-7/19/2019--Pulmonary hypertension present.  The estimated PA systolic pressure is 41 mm Hg    On Cozaar 25 mg daily  Followed by Cardiology    Aortic atherosclerosis  Noted per chest xray  On ASA and plavix however on hold due to platelet count <50,000    BLE edema  - continue daily lasix and spironolactone    Chronic SOB and chronic sinus issues  - Follows with ENT and takes inhalers, unsure of dosages and names. He will contact the MD who prescribed for a refill    Follow-up:   Please schedule labs (CBC, CMP, uric acid, HBV DNA) & appointment with NP or Dr. Marr prior to each already scheduled rituxan infusion on 9/6/19 and 9/13     Isatu Badillo, VALENTE, NP  Hematology/Oncology

## 2019-08-29 NOTE — TELEPHONE ENCOUNTER
"Vemlidy 7 Day Touchbase - Name/ confirmed.  Confirmed patient started medication as instructed on .  Patient confirms that they are taking Vemlidy every day at 3:00 pm with food.  Patient denies skipping or missing doses.  Patient reports "dragging during the day" since beginning therapy. He states he sleeps at night but feels tired during the day. Advised patient to inform us if his tiredness worsens. Patient reports no new medications, otc remedies, or allergies. Advised to call [facility] and provider if any issues arise.  No questions or concerns at this time. Patient aware [facility] will reach out ~ 7 days before refill is due.          "

## 2019-08-30 ENCOUNTER — INFUSION (OUTPATIENT)
Dept: INFUSION THERAPY | Facility: HOSPITAL | Age: 84
End: 2019-08-30
Attending: NURSE PRACTITIONER
Payer: MEDICARE

## 2019-08-30 ENCOUNTER — OFFICE VISIT (OUTPATIENT)
Dept: HEMATOLOGY/ONCOLOGY | Facility: CLINIC | Age: 84
End: 2019-08-30
Payer: MEDICARE

## 2019-08-30 VITALS
HEIGHT: 71 IN | RESPIRATION RATE: 17 BRPM | HEART RATE: 66 BPM | DIASTOLIC BLOOD PRESSURE: 70 MMHG | BODY MASS INDEX: 21.08 KG/M2 | OXYGEN SATURATION: 99 % | WEIGHT: 150.56 LBS | SYSTOLIC BLOOD PRESSURE: 157 MMHG | TEMPERATURE: 98 F

## 2019-08-30 VITALS
OXYGEN SATURATION: 98 % | DIASTOLIC BLOOD PRESSURE: 62 MMHG | RESPIRATION RATE: 17 BRPM | HEART RATE: 72 BPM | WEIGHT: 150.56 LBS | SYSTOLIC BLOOD PRESSURE: 130 MMHG | BODY MASS INDEX: 21.08 KG/M2 | TEMPERATURE: 98 F | HEIGHT: 71 IN

## 2019-08-30 DIAGNOSIS — J32.1 CHRONIC FRONTAL SINUSITIS: ICD-10-CM

## 2019-08-30 DIAGNOSIS — R60.0 EDEMA OF BOTH LEGS: ICD-10-CM

## 2019-08-30 DIAGNOSIS — D69.6 THROMBOCYTOPENIA: ICD-10-CM

## 2019-08-30 DIAGNOSIS — D69.3 IMMUNE THROMBOCYTOPENIA: ICD-10-CM

## 2019-08-30 DIAGNOSIS — C91.10 CLL (CHRONIC LYMPHOCYTIC LEUKEMIA): Primary | ICD-10-CM

## 2019-08-30 DIAGNOSIS — I70.0 AORTIC ATHEROSCLEROSIS: ICD-10-CM

## 2019-08-30 DIAGNOSIS — R76.8 HEPATITIS B CORE ANTIBODY POSITIVE: ICD-10-CM

## 2019-08-30 DIAGNOSIS — I27.20 PULMONARY HTN: ICD-10-CM

## 2019-08-30 DIAGNOSIS — R06.02 SHORTNESS OF BREATH: ICD-10-CM

## 2019-08-30 DIAGNOSIS — D63.0 ANEMIA IN NEOPLASTIC DISEASE: ICD-10-CM

## 2019-08-30 PROCEDURE — 99999 PR PBB SHADOW E&M-EST. PATIENT-LVL III: ICD-10-PCS | Mod: PBBFAC,HCNC,, | Performed by: NURSE PRACTITIONER

## 2019-08-30 PROCEDURE — 99999 PR PBB SHADOW E&M-EST. PATIENT-LVL III: CPT | Mod: PBBFAC,HCNC,, | Performed by: NURSE PRACTITIONER

## 2019-08-30 PROCEDURE — 3078F PR MOST RECENT DIASTOLIC BLOOD PRESSURE < 80 MM HG: ICD-10-PCS | Mod: HCNC,CPTII,S$GLB, | Performed by: NURSE PRACTITIONER

## 2019-08-30 PROCEDURE — 3075F SYST BP GE 130 - 139MM HG: CPT | Mod: HCNC,CPTII,S$GLB, | Performed by: NURSE PRACTITIONER

## 2019-08-30 PROCEDURE — 96367 TX/PROPH/DG ADDL SEQ IV INF: CPT | Mod: HCNC

## 2019-08-30 PROCEDURE — 63600175 PHARM REV CODE 636 W HCPCS: Mod: HCNC | Performed by: INTERNAL MEDICINE

## 2019-08-30 PROCEDURE — 1101F PR PT FALLS ASSESS DOC 0-1 FALLS W/OUT INJ PAST YR: ICD-10-PCS | Mod: HCNC,CPTII,S$GLB, | Performed by: NURSE PRACTITIONER

## 2019-08-30 PROCEDURE — 96375 TX/PRO/DX INJ NEW DRUG ADDON: CPT | Mod: HCNC

## 2019-08-30 PROCEDURE — S0028 INJECTION, FAMOTIDINE, 20 MG: HCPCS | Mod: HCNC | Performed by: INTERNAL MEDICINE

## 2019-08-30 PROCEDURE — 99499 UNLISTED E&M SERVICE: CPT | Mod: HCNC,S$GLB,, | Performed by: NURSE PRACTITIONER

## 2019-08-30 PROCEDURE — 96415 CHEMO IV INFUSION ADDL HR: CPT | Mod: HCNC

## 2019-08-30 PROCEDURE — 99215 PR OFFICE/OUTPT VISIT, EST, LEVL V, 40-54 MIN: ICD-10-PCS | Mod: HCNC,S$GLB,, | Performed by: NURSE PRACTITIONER

## 2019-08-30 PROCEDURE — 3075F PR MOST RECENT SYSTOLIC BLOOD PRESS GE 130-139MM HG: ICD-10-PCS | Mod: HCNC,CPTII,S$GLB, | Performed by: NURSE PRACTITIONER

## 2019-08-30 PROCEDURE — 25000003 PHARM REV CODE 250: Mod: HCNC | Performed by: INTERNAL MEDICINE

## 2019-08-30 PROCEDURE — 3078F DIAST BP <80 MM HG: CPT | Mod: HCNC,CPTII,S$GLB, | Performed by: NURSE PRACTITIONER

## 2019-08-30 PROCEDURE — 99499 RISK ADDL DX/OHS AUDIT: ICD-10-PCS | Mod: HCNC,S$GLB,, | Performed by: NURSE PRACTITIONER

## 2019-08-30 PROCEDURE — 99215 OFFICE O/P EST HI 40 MIN: CPT | Mod: HCNC,S$GLB,, | Performed by: NURSE PRACTITIONER

## 2019-08-30 PROCEDURE — 1101F PT FALLS ASSESS-DOCD LE1/YR: CPT | Mod: HCNC,CPTII,S$GLB, | Performed by: NURSE PRACTITIONER

## 2019-08-30 PROCEDURE — 96413 CHEMO IV INFUSION 1 HR: CPT | Mod: HCNC

## 2019-08-30 RX ORDER — SODIUM CHLORIDE 0.9 % (FLUSH) 0.9 %
10 SYRINGE (ML) INJECTION
Status: CANCELLED | OUTPATIENT
Start: 2019-08-30

## 2019-08-30 RX ORDER — MEPERIDINE HYDROCHLORIDE 50 MG/ML
25 INJECTION INTRAMUSCULAR; INTRAVENOUS; SUBCUTANEOUS
Status: DISCONTINUED | OUTPATIENT
Start: 2019-08-30 | End: 2019-08-30 | Stop reason: HOSPADM

## 2019-08-30 RX ORDER — ACETAMINOPHEN 325 MG/1
650 TABLET ORAL
Status: CANCELLED | OUTPATIENT
Start: 2019-08-30

## 2019-08-30 RX ORDER — ACETAMINOPHEN 325 MG/1
650 TABLET ORAL
Status: COMPLETED | OUTPATIENT
Start: 2019-08-30 | End: 2019-08-30

## 2019-08-30 RX ORDER — FAMOTIDINE 10 MG/ML
20 INJECTION INTRAVENOUS
Status: COMPLETED | OUTPATIENT
Start: 2019-08-30 | End: 2019-08-30

## 2019-08-30 RX ORDER — MEPERIDINE HYDROCHLORIDE 50 MG/ML
25 INJECTION INTRAMUSCULAR; INTRAVENOUS; SUBCUTANEOUS
Status: CANCELLED | OUTPATIENT
Start: 2019-08-30

## 2019-08-30 RX ORDER — SODIUM CHLORIDE 0.9 % (FLUSH) 0.9 %
10 SYRINGE (ML) INJECTION
Status: DISCONTINUED | OUTPATIENT
Start: 2019-08-30 | End: 2019-08-30 | Stop reason: HOSPADM

## 2019-08-30 RX ORDER — FAMOTIDINE 10 MG/ML
20 INJECTION INTRAVENOUS
Status: CANCELLED | OUTPATIENT
Start: 2019-08-30

## 2019-08-30 RX ORDER — PREDNISONE 20 MG/1
20 TABLET ORAL DAILY
COMMUNITY
End: 2019-09-05 | Stop reason: SDUPTHER

## 2019-08-30 RX ORDER — HEPARIN 100 UNIT/ML
500 SYRINGE INTRAVENOUS
Status: CANCELLED | OUTPATIENT
Start: 2019-08-30

## 2019-08-30 RX ORDER — HEPARIN 100 UNIT/ML
500 SYRINGE INTRAVENOUS
Status: DISCONTINUED | OUTPATIENT
Start: 2019-08-30 | End: 2019-08-30 | Stop reason: HOSPADM

## 2019-08-30 RX ADMIN — DIPHENHYDRAMINE HYDROCHLORIDE 50 MG: 50 INJECTION, SOLUTION INTRAMUSCULAR; INTRAVENOUS at 09:08

## 2019-08-30 RX ADMIN — RITUXIMAB 694 MG: 10 INJECTION, SOLUTION INTRAVENOUS at 09:08

## 2019-08-30 RX ADMIN — SODIUM CHLORIDE: 9 INJECTION, SOLUTION INTRAVENOUS at 09:08

## 2019-08-30 RX ADMIN — ACETAMINOPHEN 650 MG: 325 TABLET ORAL at 09:08

## 2019-08-30 RX ADMIN — FAMOTIDINE 20 MG: 10 INJECTION INTRAVENOUS at 09:08

## 2019-08-30 NOTE — PLAN OF CARE
Problem: Adult Inpatient Plan of Care  Goal: Plan of Care Review  Outcome: Ongoing (interventions implemented as appropriate)  Pt tolerated Rituxan infusion well, with no complications or s/s of adverse reaction. VS stable throughout infusion. Right hand PIV positive for blood return, flushed with normal saline and removed prior to discharge. Catheter tip intact. RTC 9/6/19, pt and wife verbalized understanding. Pt discharged with no distress noted, per wheelchair, accompanied by wife. AVS printed and given to pt.

## 2019-08-30 NOTE — Clinical Note
Follow-up: Please schedule labs (CBC, CMP, uric acid, HBV DNA) & appointment with NP or Dr. Marr prior to each already scheduled rituxan infusion on 9/6/19 and 9/13

## 2019-09-04 NOTE — PROGRESS NOTES
Subjective:       Patient ID: Thierry Ramos Jr. is a 85 y.o. male.    Chief Complaint: No chief complaint on file.    Heme/Onc history:  Mr. Ramos has a history of CLL diagnosed in April 2012 by Dr Joaquin Wooten. In April 2013, there was an abrupt rise in his white blood cell count to 93,000 and he had a modest number of prolymphocytes in his peripheral smear.  He had generalized urticaria, which was thought to be paraneoplastic.  At that time, he was treated with 5 courses of fludarabine, cyclophosphamide and Rituxan.  The fludarabine doses were decreased somewhat because of mild renal impairment.  The sixth treatment was with Rituxan alone because he developed thrombocytopenia, which has persisted in varying degrees since then.     His major hematologic problem and much of his treatment since 2013, has been related to thrombocytopenia.  It became more severe in early 2016, at a time when he was taking both aspirin and Plavix.  A bone marrow examination had no evidence of myelodysplasia and 35% of the bone marrow cells were small lymphocytes.  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  The FISH studies for myelodysplastic disorders were negative. He was treated for possible immune thrombocytopenia with prednisone 60 mg daily for 2 weeks, but there was no improvement in his platelet count.  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     Subsequently, because of continued thrombocytopenia,  recommended treatment with ofatumumab, which he began in late June 2016.  At that time, his platelet count was 57,000.  He generally tolerated the drug well and the frequency of administration was gradually reduced over a period of more than 2 years.  Platelet counts during most of those 2 years were between 66,000-117,000.  The drug was discontinued in early November 2018.     He has had many  episodes of infection in the past, particularly recurrent sinusitis and he has had several surgical procedures on his sinuses.  Because of this, he has been receiving intravenous immunoglobulin monthly for years and the frequency of pneumonia and severe sinusitis has diminished. Most recent dose of IVIG 4/2019.      After a pituitary surgery for a tumor in 2002, he experienced a TIA and then had 2 strokes.  He has had many nonmelanoma skin cancers excised.     He asked to be seen earlier than planned by Dr. Wooten in January because he was concerned about his recent blood counts.  His platelet count has declined modestly from the range of 66,000-107,000 seen in the past 9 months to a current value of 51,000.  His white blood cell count remains normal and his lymphocyte percentage     He was started on a low dose ibrutinib 280 mg rather than 420mg due to concern for treatment related- further decrease in platelet count while on aspirin and plavix.     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 Left arm is bandaged due to skin fragility causing oozing wounds.     Today:  Patient presents prior to cycle 3 of rituxan infusion. Tolerated cycle 1&2 without incident. He continues with fatigue and weakness. Wife reports another fall this week. Denies dizziness, LOC, or hitting head with fall. Pt continues tenofovir since 8/21/19 and denies and side effects. He denies fevers, chest pain, sob, n/v/d/c. He does complain of soreness to coccyx as well as thin skin with multiple skin tears.    Review of Systems   Constitutional: Positive for fatigue (tired).  Negative for chills, diaphoresis and fever.   HENT: Positive for postnasal drip and rhinorrhea (d/t chronic sinus issues ). Negative for congestion, ear pain, mouth sores, nosebleeds, sinus pain, sore throat and trouble swallowing.    Eyes: Negative for pain, redness and visual disturbance.   Respiratory: Negative for cough, chest tightness, shortness of breath, wheezing and stridor.    Cardiovascular: Negative for chest pain, palpitations and leg swelling.   Gastrointestinal: Negative for abdominal distention, abdominal pain, blood in stool, constipation, diarrhea, nausea and vomiting.   Genitourinary: Negative for hematuria.   Musculoskeletal: Negative for arthralgias and back pain.   Skin: Negative for rash.   Allergic/Immunologic: Negative for environmental allergies, food allergies and immunocompromised state.   Neurological: Positive for weakness. Negative for dizziness, light-headedness, numbness and headaches.        Fell this week   Hematological: Negative for adenopathy. Bruises/bleeds easily.   Psychiatric/Behavioral: Negative.  Negative for confusion.       Objective:      Physical Exam   Constitutional: He is oriented to person, place, and time. He appears well-developed and well-nourished.   HENT:   Head: Normocephalic and atraumatic.   Right Ear: External ear normal.   Left Ear: External ear normal.   Mouth/Throat: Oropharynx is clear and moist.   Eyes: Conjunctivae and EOM are normal. No scleral icterus.   Neck: Normal range of motion. Neck supple.   Cardiovascular: Normal rate and intact distal pulses.   Pulmonary/Chest: Effort normal and breath sounds normal. No respiratory distress. He has no wheezes.   Abdominal: Soft. Bowel sounds are normal. He exhibits no distension. There is no tenderness.   Musculoskeletal: Normal range of motion. He exhibits edema (+1 BLE).   Neurological: He is alert and oriented to person, place, and time. No cranial nerve deficit.   Skin: Skin is warm and dry. Ecchymosis  noted. There is erythema.   Fragile skin with generalized ecchymosis to BUE and some peeling/flaking of skin on face   Psychiatric: He has a normal mood and affect. His behavior is normal. Judgment and thought content normal.   Vitals reviewed.      Assessment:       1. Immune thrombocytopenia    2. CLL (chronic lymphocytic leukemia)    3. 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 Thrombocytopenia    6. Hepatitis B core antibody positive    7. Transaminitis    8. Pulmonary HTN    9. Aortic atherosclerosis    10. Edema of both legs    11. Chronic frontal sinusitis    12. Hyperkalemia    13. Frequent falls        Plan:       CLL/ITP  Ibrutinib 280mg daily has been stopped due to side effects of weakness and bruising.  Developed worsening ITP since stopping therapy (has had chronic low platelets since FCR treatment in 2013)  Mild response to steroids and platelets improved on prednisone 20mg daily. Will wean to 10mg daily today per Dr. Marr as platelets. Plan to taper steroids pending response to Rituxan back to baseline of prednisone 7.5mg daily improving (9/5/19  IVIG received in July 2019  Plan per Dr. Marr to start Rituximab 375mg/m2 weekly for 4 weeks (C1D1 8/23/19) then consider maintenance Rituxan every 8 weeks pending response. Will proceed with cycle 3 tomorrow  Discussed possible port or PICC line placement if unable to obtain IV due to difficult stick; pt expressed verbal discontent with idea at this time    Thrombocytopenia and anemia  2/2 disease  Hgb improved to 13.1; plt improved on rituxan and steroids to 92K  Start of steroid wean today (9/5/19) to 10mg daily    Hepatitis B core antibody +/Transaminitis  Serology checked 8/6 in preparation for Rituxan therapy  Hep B surface antigen negative however Hep B core antibody + (was negative in 2013 and 2016), Hep B surface antibody +  Seen by hepatology on 8/19/19   Liver function remains stable at this time;  improved from July  Per hepatology, Vemlidy 25mg daily (received and started 8/21/19)  -- recommend LFT and HBV DNA monitoring with Rituxan infusions; HBV pending today  -- last LFTs today are stable; HBV DNA not detected 8/30   -- will need to continue at least 12 months post d/c of Rituxan   -- U/S in 6 months with AFP with f/u (around 2/2020)    Pulmonary HTN  Noted on TTE-7/19/2019--Pulmonary hypertension present.  The estimated PA systolic pressure is 41 mm Hg    On Cozaar 25 mg daily  Followed by Cardiology    Aortic atherosclerosis  Noted per chest xray  Can resume ASA and plavix at this time as platelet count >50,000    BLE edema  Continue daily lasix; greatly improved  Due to elevated potassium today, will have patient hold spironolactone until potassium level is <5 again; may need dose reduction at time of restart    Chronic SOB and chronic sinus issues  Follows with ENT and takes inhalers, unsure of dosages and names. He will contact the MD who prescribed for a refill  Denies any SOB today at visit    Hyperkalemia  Patient states he has been taking his lasix  Will have patient hold spironolactone in setting of elevated K  Will repeat CMP on Monday to monitor K level    Pressure ulcer to coccyx/Mutiple skin tears  Due to chronic steroid use and thin/frail stature  Does use foam donut at home when sitting for long periods of time  Recommended to bring donut with him to clinic for rituxan infusions  Will refer to wound care clinic    Falls  Pt with physical therapy/home health in the past  Patient states they did nothing for him  Discussed reordering for patient due to multiple falls; refused at this time    Follow-up:   Please move 9/11 labs to [facility]; CMP only on Monday 9/9  Has labs (CBC, CMP, uric acid, HBV DNA) scheduled for Wednesday 9/11 & appointment with Dr. Marr on 9/13 @7am prior rituxan infusion @8am  Please schedule with wound clinic; referral placed    Tanisha Barreto  NP  Hematology/Oncology/BMT

## 2019-09-05 ENCOUNTER — LAB VISIT (OUTPATIENT)
Dept: LAB | Facility: HOSPITAL | Age: 84
End: 2019-09-05
Payer: MEDICARE

## 2019-09-05 ENCOUNTER — TELEPHONE (OUTPATIENT)
Dept: WOUND CARE | Facility: CLINIC | Age: 84
End: 2019-09-05

## 2019-09-05 ENCOUNTER — OFFICE VISIT (OUTPATIENT)
Dept: HEMATOLOGY/ONCOLOGY | Facility: CLINIC | Age: 84
End: 2019-09-05
Payer: MEDICARE

## 2019-09-05 VITALS
BODY MASS INDEX: 20.74 KG/M2 | DIASTOLIC BLOOD PRESSURE: 60 MMHG | OXYGEN SATURATION: 96 % | HEART RATE: 85 BPM | SYSTOLIC BLOOD PRESSURE: 131 MMHG | HEIGHT: 71 IN | TEMPERATURE: 98 F | WEIGHT: 148.13 LBS

## 2019-09-05 DIAGNOSIS — E03.8 CENTRAL HYPOTHYROIDISM: ICD-10-CM

## 2019-09-05 DIAGNOSIS — R74.01 TRANSAMINITIS: ICD-10-CM

## 2019-09-05 DIAGNOSIS — R29.6 FREQUENT FALLS: ICD-10-CM

## 2019-09-05 DIAGNOSIS — E23.0 PANHYPOPITUITARISM: ICD-10-CM

## 2019-09-05 DIAGNOSIS — R76.8 HEPATITIS B CORE ANTIBODY POSITIVE: ICD-10-CM

## 2019-09-05 DIAGNOSIS — I27.20 PULMONARY HTN: ICD-10-CM

## 2019-09-05 DIAGNOSIS — R60.0 EDEMA OF BOTH LEGS: ICD-10-CM

## 2019-09-05 DIAGNOSIS — D69.6 THROMBOCYTOPENIA: ICD-10-CM

## 2019-09-05 DIAGNOSIS — C91.10 CLL (CHRONIC LYMPHOCYTIC LEUKEMIA): ICD-10-CM

## 2019-09-05 DIAGNOSIS — E87.5 HYPERKALEMIA: ICD-10-CM

## 2019-09-05 DIAGNOSIS — D63.0 ANEMIA IN NEOPLASTIC DISEASE: ICD-10-CM

## 2019-09-05 DIAGNOSIS — D69.3 IMMUNE THROMBOCYTOPENIA: Primary | ICD-10-CM

## 2019-09-05 DIAGNOSIS — J32.1 CHRONIC FRONTAL SINUSITIS: ICD-10-CM

## 2019-09-05 DIAGNOSIS — L89.310 PRESSURE INJURY OF RIGHT BUTTOCK, UNSTAGEABLE: ICD-10-CM

## 2019-09-05 DIAGNOSIS — I70.0 AORTIC ATHEROSCLEROSIS: ICD-10-CM

## 2019-09-05 LAB
ALBUMIN SERPL BCP-MCNC: 3.2 G/DL (ref 3.5–5.2)
ALP SERPL-CCNC: 198 U/L (ref 55–135)
ALT SERPL W/O P-5'-P-CCNC: 70 U/L (ref 10–44)
ANION GAP SERPL CALC-SCNC: 9 MMOL/L (ref 8–16)
ANISOCYTOSIS BLD QL SMEAR: SLIGHT
AST SERPL-CCNC: 44 U/L (ref 10–40)
BASOPHILS # BLD AUTO: 0.05 K/UL (ref 0–0.2)
BASOPHILS NFR BLD: 1 % (ref 0–1.9)
BILIRUB SERPL-MCNC: 0.9 MG/DL (ref 0.1–1)
BUN SERPL-MCNC: 40 MG/DL (ref 8–23)
BURR CELLS BLD QL SMEAR: ABNORMAL
CALCIUM SERPL-MCNC: 10.3 MG/DL (ref 8.7–10.5)
CHLORIDE SERPL-SCNC: 102 MMOL/L (ref 95–110)
CO2 SERPL-SCNC: 29 MMOL/L (ref 23–29)
CREAT SERPL-MCNC: 1.3 MG/DL (ref 0.5–1.4)
DIFFERENTIAL METHOD: ABNORMAL
EOSINOPHIL # BLD AUTO: 0 K/UL (ref 0–0.5)
EOSINOPHIL NFR BLD: 0.8 % (ref 0–8)
ERYTHROCYTE [DISTWIDTH] IN BLOOD BY AUTOMATED COUNT: 15.6 % (ref 11.5–14.5)
EST. GFR  (AFRICAN AMERICAN): 57.5 ML/MIN/1.73 M^2
EST. GFR  (NON AFRICAN AMERICAN): 49.8 ML/MIN/1.73 M^2
GLUCOSE SERPL-MCNC: 197 MG/DL (ref 70–110)
HCT VFR BLD AUTO: 41.7 % (ref 40–54)
HGB BLD-MCNC: 13.1 G/DL (ref 14–18)
HYPOCHROMIA BLD QL SMEAR: ABNORMAL
IMM GRANULOCYTES # BLD AUTO: 0.12 K/UL (ref 0–0.04)
IMM GRANULOCYTES NFR BLD AUTO: 2.3 % (ref 0–0.5)
LYMPHOCYTES # BLD AUTO: 1.7 K/UL (ref 1–4.8)
LYMPHOCYTES NFR BLD: 33 % (ref 18–48)
MCH RBC QN AUTO: 31.1 PG (ref 27–31)
MCHC RBC AUTO-ENTMCNC: 31.4 G/DL (ref 32–36)
MCV RBC AUTO: 99 FL (ref 82–98)
MONOCYTES # BLD AUTO: 0.6 K/UL (ref 0.3–1)
MONOCYTES NFR BLD: 10.9 % (ref 4–15)
NEUTROPHILS # BLD AUTO: 2.7 K/UL (ref 1.8–7.7)
NEUTROPHILS NFR BLD: 52 % (ref 38–73)
NRBC BLD-RTO: 0 /100 WBC
OVALOCYTES BLD QL SMEAR: ABNORMAL
PLATELET # BLD AUTO: 92 K/UL (ref 150–350)
PMV BLD AUTO: 9.7 FL (ref 9.2–12.9)
POIKILOCYTOSIS BLD QL SMEAR: SLIGHT
POLYCHROMASIA BLD QL SMEAR: ABNORMAL
POTASSIUM SERPL-SCNC: 5.6 MMOL/L (ref 3.5–5.1)
PROT SERPL-MCNC: 5.9 G/DL (ref 6–8.4)
RBC # BLD AUTO: 4.21 M/UL (ref 4.6–6.2)
SODIUM SERPL-SCNC: 140 MMOL/L (ref 136–145)
URATE SERPL-MCNC: 6.2 MG/DL (ref 3.4–7)
WBC # BLD AUTO: 5.25 K/UL (ref 3.9–12.7)

## 2019-09-05 PROCEDURE — 1101F PR PT FALLS ASSESS DOC 0-1 FALLS W/OUT INJ PAST YR: ICD-10-PCS | Mod: HCNC,CPTII,S$GLB, | Performed by: NURSE PRACTITIONER

## 2019-09-05 PROCEDURE — 99214 PR OFFICE/OUTPT VISIT, EST, LEVL IV, 30-39 MIN: ICD-10-PCS | Mod: HCNC,S$GLB,, | Performed by: NURSE PRACTITIONER

## 2019-09-05 PROCEDURE — 85025 COMPLETE CBC W/AUTO DIFF WBC: CPT | Mod: HCNC

## 2019-09-05 PROCEDURE — 36415 COLL VENOUS BLD VENIPUNCTURE: CPT | Mod: HCNC

## 2019-09-05 PROCEDURE — 99499 RISK ADDL DX/OHS AUDIT: ICD-10-PCS | Mod: HCNC,S$GLB,, | Performed by: NURSE PRACTITIONER

## 2019-09-05 PROCEDURE — 80053 COMPREHEN METABOLIC PANEL: CPT | Mod: HCNC

## 2019-09-05 PROCEDURE — 3078F DIAST BP <80 MM HG: CPT | Mod: HCNC,CPTII,S$GLB, | Performed by: NURSE PRACTITIONER

## 2019-09-05 PROCEDURE — 99999 PR PBB SHADOW E&M-EST. PATIENT-LVL III: ICD-10-PCS | Mod: PBBFAC,HCNC,, | Performed by: NURSE PRACTITIONER

## 2019-09-05 PROCEDURE — 3075F PR MOST RECENT SYSTOLIC BLOOD PRESS GE 130-139MM HG: ICD-10-PCS | Mod: HCNC,CPTII,S$GLB, | Performed by: NURSE PRACTITIONER

## 2019-09-05 PROCEDURE — 1101F PT FALLS ASSESS-DOCD LE1/YR: CPT | Mod: HCNC,CPTII,S$GLB, | Performed by: NURSE PRACTITIONER

## 2019-09-05 PROCEDURE — 99214 OFFICE O/P EST MOD 30 MIN: CPT | Mod: HCNC,S$GLB,, | Performed by: NURSE PRACTITIONER

## 2019-09-05 PROCEDURE — 3078F PR MOST RECENT DIASTOLIC BLOOD PRESSURE < 80 MM HG: ICD-10-PCS | Mod: HCNC,CPTII,S$GLB, | Performed by: NURSE PRACTITIONER

## 2019-09-05 PROCEDURE — 99499 UNLISTED E&M SERVICE: CPT | Mod: HCNC,S$GLB,, | Performed by: NURSE PRACTITIONER

## 2019-09-05 PROCEDURE — 84550 ASSAY OF BLOOD/URIC ACID: CPT | Mod: HCNC

## 2019-09-05 PROCEDURE — 87517 HEPATITIS B DNA QUANT: CPT | Mod: HCNC

## 2019-09-05 PROCEDURE — 3075F SYST BP GE 130 - 139MM HG: CPT | Mod: HCNC,CPTII,S$GLB, | Performed by: NURSE PRACTITIONER

## 2019-09-05 PROCEDURE — 99999 PR PBB SHADOW E&M-EST. PATIENT-LVL III: CPT | Mod: PBBFAC,HCNC,, | Performed by: NURSE PRACTITIONER

## 2019-09-05 RX ORDER — PREDNISONE 10 MG/1
10 TABLET ORAL DAILY
Qty: 30 TABLET | Refills: 1 | Status: SHIPPED | OUTPATIENT
Start: 2019-09-05 | End: 2019-09-19 | Stop reason: DRUGHIGH

## 2019-09-05 RX ORDER — LEVOTHYROXINE SODIUM 125 UG/1
TABLET ORAL
Qty: 90 TABLET | Refills: 3 | Status: SHIPPED | OUTPATIENT
Start: 2019-09-05

## 2019-09-05 RX ORDER — HEPARIN 100 UNIT/ML
500 SYRINGE INTRAVENOUS
Status: CANCELLED | OUTPATIENT
Start: 2019-09-06

## 2019-09-05 RX ORDER — FAMOTIDINE 10 MG/ML
20 INJECTION INTRAVENOUS
Status: CANCELLED | OUTPATIENT
Start: 2019-09-06

## 2019-09-05 RX ORDER — ACETAMINOPHEN 325 MG/1
650 TABLET ORAL
Status: CANCELLED | OUTPATIENT
Start: 2019-09-06

## 2019-09-05 RX ORDER — SODIUM CHLORIDE 0.9 % (FLUSH) 0.9 %
10 SYRINGE (ML) INJECTION
Status: CANCELLED | OUTPATIENT
Start: 2019-09-06

## 2019-09-05 RX ORDER — MEPERIDINE HYDROCHLORIDE 50 MG/ML
25 INJECTION INTRAMUSCULAR; INTRAVENOUS; SUBCUTANEOUS
Status: CANCELLED | OUTPATIENT
Start: 2019-09-06

## 2019-09-05 NOTE — TELEPHONE ENCOUNTER
----- Message from Aura Paredes sent at 9/5/2019  9:55 AM CDT -----  Regarding: REFERRAL  Good morning,    We are referring this patient over for Pressure injury of right buttock, unstageable.  Referral is in system.  Please contact patient for scheduling.  Thanks.

## 2019-09-05 NOTE — Clinical Note
Please move 9/11 labs to [facility]; [facility] referral placed today

## 2019-09-06 ENCOUNTER — INFUSION (OUTPATIENT)
Dept: INFUSION THERAPY | Facility: HOSPITAL | Age: 84
End: 2019-09-06
Attending: INTERNAL MEDICINE
Payer: MEDICARE

## 2019-09-06 VITALS
RESPIRATION RATE: 18 BRPM | SYSTOLIC BLOOD PRESSURE: 136 MMHG | HEIGHT: 71 IN | DIASTOLIC BLOOD PRESSURE: 62 MMHG | TEMPERATURE: 98 F | BODY MASS INDEX: 20.74 KG/M2 | WEIGHT: 148.13 LBS | HEART RATE: 77 BPM | OXYGEN SATURATION: 96 %

## 2019-09-06 DIAGNOSIS — C91.10 CLL (CHRONIC LYMPHOCYTIC LEUKEMIA): Primary | ICD-10-CM

## 2019-09-06 LAB
HBV DNA SERPL NAA+PROBE-ACNC: <10 IU/ML
HBV DNA SERPL NAA+PROBE-LOG IU: <1 LOG (10) IU/ML
HBV DNA SERPL QL NAA+PROBE: NOT DETECTED

## 2019-09-06 PROCEDURE — 96375 TX/PRO/DX INJ NEW DRUG ADDON: CPT | Mod: HCNC

## 2019-09-06 PROCEDURE — S0028 INJECTION, FAMOTIDINE, 20 MG: HCPCS | Mod: HCNC | Performed by: INTERNAL MEDICINE

## 2019-09-06 PROCEDURE — 25000003 PHARM REV CODE 250: Mod: HCNC | Performed by: INTERNAL MEDICINE

## 2019-09-06 PROCEDURE — 96415 CHEMO IV INFUSION ADDL HR: CPT | Mod: HCNC

## 2019-09-06 PROCEDURE — 96367 TX/PROPH/DG ADDL SEQ IV INF: CPT | Mod: HCNC

## 2019-09-06 PROCEDURE — 96413 CHEMO IV INFUSION 1 HR: CPT | Mod: HCNC

## 2019-09-06 PROCEDURE — 63600175 PHARM REV CODE 636 W HCPCS: Mod: JG,HCNC | Performed by: INTERNAL MEDICINE

## 2019-09-06 RX ORDER — MEPERIDINE HYDROCHLORIDE 50 MG/ML
25 INJECTION INTRAMUSCULAR; INTRAVENOUS; SUBCUTANEOUS
Status: DISCONTINUED | OUTPATIENT
Start: 2019-09-06 | End: 2019-09-06 | Stop reason: HOSPADM

## 2019-09-06 RX ORDER — ACETAMINOPHEN 325 MG/1
650 TABLET ORAL
Status: COMPLETED | OUTPATIENT
Start: 2019-09-06 | End: 2019-09-06

## 2019-09-06 RX ORDER — FAMOTIDINE 10 MG/ML
20 INJECTION INTRAVENOUS
Status: COMPLETED | OUTPATIENT
Start: 2019-09-06 | End: 2019-09-06

## 2019-09-06 RX ADMIN — ACETAMINOPHEN 650 MG: 325 TABLET ORAL at 08:09

## 2019-09-06 RX ADMIN — FAMOTIDINE 20 MG: 10 INJECTION INTRAVENOUS at 08:09

## 2019-09-06 RX ADMIN — DIPHENHYDRAMINE HYDROCHLORIDE 50 MG: 50 INJECTION, SOLUTION INTRAMUSCULAR; INTRAVENOUS at 08:09

## 2019-09-06 RX ADMIN — RITUXIMAB 686 MG: 10 INJECTION, SOLUTION INTRAVENOUS at 09:09

## 2019-09-06 RX ADMIN — SODIUM CHLORIDE: 0.9 INJECTION, SOLUTION INTRAVENOUS at 08:09

## 2019-09-06 NOTE — PLAN OF CARE
Problem: Adult Inpatient Plan of Care  Goal: Plan of Care Review  Outcome: Ongoing (interventions implemented as appropriate)  Pt tolerated C3 rituxan without adverse effects. VSS. Verbalized understanding of RTC date. DC with wife via wheelchair.

## 2019-09-06 NOTE — PLAN OF CARE
Problem: Thrombocytopenia Bleeding Risk (Chemotherapy Effects)  Goal: Absence of Bleeding  Outcome: Ongoing (interventions implemented as appropriate)  Labs , hx, and medications reviewed. Assessment completed. Discussed plan of care with patient. Patient in agreement. VSS.  Chair reclined and warm blanket and snack offered. Will cont to monitor

## 2019-09-09 ENCOUNTER — LAB VISIT (OUTPATIENT)
Dept: LAB | Facility: HOSPITAL | Age: 84
End: 2019-09-09
Attending: INTERNAL MEDICINE
Payer: MEDICARE

## 2019-09-09 DIAGNOSIS — C91.10 CLL (CHRONIC LYMPHOCYTIC LEUKEMIA): ICD-10-CM

## 2019-09-09 LAB
ALBUMIN SERPL BCP-MCNC: 3 G/DL (ref 3.5–5.2)
ALP SERPL-CCNC: 178 U/L (ref 55–135)
ALT SERPL W/O P-5'-P-CCNC: 62 U/L (ref 10–44)
ANION GAP SERPL CALC-SCNC: 9 MMOL/L (ref 8–16)
AST SERPL-CCNC: 42 U/L (ref 10–40)
BILIRUB SERPL-MCNC: 0.8 MG/DL (ref 0.1–1)
BUN SERPL-MCNC: 39 MG/DL (ref 8–23)
CALCIUM SERPL-MCNC: 9.6 MG/DL (ref 8.7–10.5)
CHLORIDE SERPL-SCNC: 104 MMOL/L (ref 95–110)
CO2 SERPL-SCNC: 28 MMOL/L (ref 23–29)
CREAT SERPL-MCNC: 1.2 MG/DL (ref 0.5–1.4)
EST. GFR  (AFRICAN AMERICAN): >60 ML/MIN/1.73 M^2
EST. GFR  (NON AFRICAN AMERICAN): 54.8 ML/MIN/1.73 M^2
GLUCOSE SERPL-MCNC: 195 MG/DL (ref 70–110)
POTASSIUM SERPL-SCNC: 4.1 MMOL/L (ref 3.5–5.1)
PROT SERPL-MCNC: 5.8 G/DL (ref 6–8.4)
SODIUM SERPL-SCNC: 141 MMOL/L (ref 136–145)

## 2019-09-09 PROCEDURE — 80053 COMPREHEN METABOLIC PANEL: CPT | Mod: HCNC

## 2019-09-09 PROCEDURE — 36415 COLL VENOUS BLD VENIPUNCTURE: CPT | Mod: HCNC,PO

## 2019-09-13 ENCOUNTER — INFUSION (OUTPATIENT)
Dept: INFUSION THERAPY | Facility: HOSPITAL | Age: 84
End: 2019-09-13
Attending: INTERNAL MEDICINE
Payer: MEDICARE

## 2019-09-13 ENCOUNTER — OFFICE VISIT (OUTPATIENT)
Dept: HEMATOLOGY/ONCOLOGY | Facility: CLINIC | Age: 84
End: 2019-09-13
Payer: MEDICARE

## 2019-09-13 VITALS
OXYGEN SATURATION: 95 % | SYSTOLIC BLOOD PRESSURE: 139 MMHG | BODY MASS INDEX: 20.68 KG/M2 | TEMPERATURE: 98 F | HEIGHT: 71 IN | RESPIRATION RATE: 20 BRPM | HEART RATE: 103 BPM | WEIGHT: 147.69 LBS | DIASTOLIC BLOOD PRESSURE: 63 MMHG

## 2019-09-13 VITALS
BODY MASS INDEX: 20.68 KG/M2 | SYSTOLIC BLOOD PRESSURE: 112 MMHG | WEIGHT: 147.69 LBS | DIASTOLIC BLOOD PRESSURE: 55 MMHG | TEMPERATURE: 98 F | HEIGHT: 71 IN | HEART RATE: 72 BPM | RESPIRATION RATE: 18 BRPM

## 2019-09-13 DIAGNOSIS — C91.10 CLL (CHRONIC LYMPHOCYTIC LEUKEMIA): Primary | ICD-10-CM

## 2019-09-13 DIAGNOSIS — D69.3 IMMUNE THROMBOCYTOPENIA: ICD-10-CM

## 2019-09-13 PROCEDURE — 25000003 PHARM REV CODE 250: Mod: HCNC | Performed by: INTERNAL MEDICINE

## 2019-09-13 PROCEDURE — 96375 TX/PRO/DX INJ NEW DRUG ADDON: CPT | Mod: HCNC

## 2019-09-13 PROCEDURE — 99999 PR PBB SHADOW E&M-EST. PATIENT-LVL III: ICD-10-PCS | Mod: PBBFAC,HCNC,, | Performed by: INTERNAL MEDICINE

## 2019-09-13 PROCEDURE — 3075F PR MOST RECENT SYSTOLIC BLOOD PRESS GE 130-139MM HG: ICD-10-PCS | Mod: HCNC,CPTII,S$GLB, | Performed by: INTERNAL MEDICINE

## 2019-09-13 PROCEDURE — 99215 PR OFFICE/OUTPT VISIT, EST, LEVL V, 40-54 MIN: ICD-10-PCS | Mod: HCNC,S$GLB,, | Performed by: INTERNAL MEDICINE

## 2019-09-13 PROCEDURE — 1101F PT FALLS ASSESS-DOCD LE1/YR: CPT | Mod: HCNC,CPTII,S$GLB, | Performed by: INTERNAL MEDICINE

## 2019-09-13 PROCEDURE — 99215 OFFICE O/P EST HI 40 MIN: CPT | Mod: HCNC,S$GLB,, | Performed by: INTERNAL MEDICINE

## 2019-09-13 PROCEDURE — 1101F PR PT FALLS ASSESS DOC 0-1 FALLS W/OUT INJ PAST YR: ICD-10-PCS | Mod: HCNC,CPTII,S$GLB, | Performed by: INTERNAL MEDICINE

## 2019-09-13 PROCEDURE — 63600175 PHARM REV CODE 636 W HCPCS: Mod: HCNC | Performed by: INTERNAL MEDICINE

## 2019-09-13 PROCEDURE — 3075F SYST BP GE 130 - 139MM HG: CPT | Mod: HCNC,CPTII,S$GLB, | Performed by: INTERNAL MEDICINE

## 2019-09-13 PROCEDURE — 3078F DIAST BP <80 MM HG: CPT | Mod: HCNC,CPTII,S$GLB, | Performed by: INTERNAL MEDICINE

## 2019-09-13 PROCEDURE — 99499 UNLISTED E&M SERVICE: CPT | Mod: HCNC,S$GLB,, | Performed by: INTERNAL MEDICINE

## 2019-09-13 PROCEDURE — 99499 RISK ADDL DX/OHS AUDIT: ICD-10-PCS | Mod: HCNC,S$GLB,, | Performed by: INTERNAL MEDICINE

## 2019-09-13 PROCEDURE — S0028 INJECTION, FAMOTIDINE, 20 MG: HCPCS | Mod: HCNC | Performed by: INTERNAL MEDICINE

## 2019-09-13 PROCEDURE — 99999 PR PBB SHADOW E&M-EST. PATIENT-LVL III: CPT | Mod: PBBFAC,HCNC,, | Performed by: INTERNAL MEDICINE

## 2019-09-13 PROCEDURE — 3078F PR MOST RECENT DIASTOLIC BLOOD PRESSURE < 80 MM HG: ICD-10-PCS | Mod: HCNC,CPTII,S$GLB, | Performed by: INTERNAL MEDICINE

## 2019-09-13 PROCEDURE — 96413 CHEMO IV INFUSION 1 HR: CPT | Mod: HCNC

## 2019-09-13 PROCEDURE — 96367 TX/PROPH/DG ADDL SEQ IV INF: CPT | Mod: HCNC

## 2019-09-13 PROCEDURE — 96415 CHEMO IV INFUSION ADDL HR: CPT | Mod: HCNC

## 2019-09-13 RX ORDER — FAMOTIDINE 10 MG/ML
20 INJECTION INTRAVENOUS
Status: CANCELLED | OUTPATIENT
Start: 2019-09-13

## 2019-09-13 RX ORDER — SODIUM CHLORIDE 0.9 % (FLUSH) 0.9 %
10 SYRINGE (ML) INJECTION
Status: CANCELLED | OUTPATIENT
Start: 2019-09-13

## 2019-09-13 RX ORDER — HEPARIN 100 UNIT/ML
500 SYRINGE INTRAVENOUS
Status: CANCELLED | OUTPATIENT
Start: 2019-09-13

## 2019-09-13 RX ORDER — HEPARIN 100 UNIT/ML
500 SYRINGE INTRAVENOUS
Status: DISCONTINUED | OUTPATIENT
Start: 2019-09-13 | End: 2019-09-13 | Stop reason: HOSPADM

## 2019-09-13 RX ORDER — FAMOTIDINE 10 MG/ML
20 INJECTION INTRAVENOUS
Status: COMPLETED | OUTPATIENT
Start: 2019-09-13 | End: 2019-09-13

## 2019-09-13 RX ORDER — MEPERIDINE HYDROCHLORIDE 50 MG/ML
25 INJECTION INTRAMUSCULAR; INTRAVENOUS; SUBCUTANEOUS
Status: CANCELLED | OUTPATIENT
Start: 2019-09-13

## 2019-09-13 RX ORDER — ACETAMINOPHEN 325 MG/1
650 TABLET ORAL
Status: COMPLETED | OUTPATIENT
Start: 2019-09-13 | End: 2019-09-13

## 2019-09-13 RX ORDER — MEPERIDINE HYDROCHLORIDE 50 MG/ML
25 INJECTION INTRAMUSCULAR; INTRAVENOUS; SUBCUTANEOUS
Status: DISCONTINUED | OUTPATIENT
Start: 2019-09-13 | End: 2019-09-13 | Stop reason: HOSPADM

## 2019-09-13 RX ORDER — SODIUM CHLORIDE 0.9 % (FLUSH) 0.9 %
10 SYRINGE (ML) INJECTION
Status: DISCONTINUED | OUTPATIENT
Start: 2019-09-13 | End: 2019-09-13 | Stop reason: HOSPADM

## 2019-09-13 RX ORDER — ACETAMINOPHEN 325 MG/1
650 TABLET ORAL
Status: CANCELLED | OUTPATIENT
Start: 2019-09-13

## 2019-09-13 RX ADMIN — DIPHENHYDRAMINE HYDROCHLORIDE 50 MG: 50 INJECTION, SOLUTION INTRAMUSCULAR; INTRAVENOUS at 08:09

## 2019-09-13 RX ADMIN — FAMOTIDINE 20 MG: 10 INJECTION INTRAVENOUS at 08:09

## 2019-09-13 RX ADMIN — ACETAMINOPHEN 650 MG: 325 TABLET ORAL at 08:09

## 2019-09-13 RX ADMIN — SODIUM CHLORIDE: 0.9 INJECTION, SOLUTION INTRAVENOUS at 08:09

## 2019-09-13 RX ADMIN — RITUXIMAB 686 MG: 10 INJECTION, SOLUTION INTRAVENOUS at 09:09

## 2019-09-13 NOTE — NURSING
0805  Pt here for Rituxan infusion, accompanied by spouse, reports fall on 9/2/19 at home, tripped over object on floor, numerous healing skin tears on bilateral arms, spouse states pt reviewed ER visit, did not hit head, spouse is treating wounds w/ Neosporin; discussed treatment plan for today, all questions answered and pt agrees to proceed

## 2019-09-13 NOTE — PROGRESS NOTES
Subjective:       Patient ID: Thierry Ramos Jr. is a 85 y.o. male.    Chief Complaint: No chief complaint on file.    Heme/Onc history:  Mr. Ramos has a history of CLL diagnosed in April 2012 by Dr Joaquin Wooten. In April 2013, there was an abrupt rise in his white blood cell count to 93,000 and he had a modest number of prolymphocytes in his peripheral smear.  He had generalized urticaria, which was thought to be paraneoplastic.  At that time, he was treated with 5 courses of fludarabine, cyclophosphamide and Rituxan.  The fludarabine doses were decreased somewhat because of mild renal impairment.  The sixth treatment was with Rituxan alone because he developed thrombocytopenia, which has persisted in varying degrees since then.     His major hematologic problem and much of his treatment since 2013, has been related to thrombocytopenia.  It became more severe in early 2016, at a time when he was taking both aspirin and Plavix.  A bone marrow examination had no evidence of myelodysplasia and 35% of the bone marrow cells were small lymphocytes.  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  The FISH studies for myelodysplastic disorders were negative. He was treated for possible immune thrombocytopenia with prednisone 60 mg daily for 2 weeks, but there was no improvement in his platelet count.  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     Subsequently, because of continued thrombocytopenia,  recommended treatment with ofatumumab, which he began in late June 2016.  At that time, his platelet count was 57,000.  He generally tolerated the drug well and the frequency of administration was gradually reduced over a period of more than 2 years.  Platelet counts during most of those 2 years were between 66,000-117,000.  The drug was discontinued in early November 2018.     He has had many  episodes of infection in the past, particularly recurrent sinusitis and he has had several surgical procedures on his sinuses.  Because of this, he has been receiving intravenous immunoglobulin monthly for years and the frequency of pneumonia and severe sinusitis has diminished. Most recent dose of IVIG 4/2019.      After a pituitary surgery for a tumor in 2002, he experienced a TIA and then had 2 strokes.  He has had many nonmelanoma skin cancers excised.     He asked to be seen earlier than planned by Dr. Wooten in January because he was concerned about his recent blood counts.  His platelet count has declined modestly from the range of 66,000-107,000 seen in the past 9 months to a current value of 51,000.  His white blood cell count remains normal and his lymphocyte percentage     He was started on a low dose ibrutinib 280 mg rather than 420mg due to concern for treatment related- further decrease in platelet count while on aspirin and plavix.     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 Left arm is bandaged due to skin fragility causing oozing wounds.     Today:  Patient presents prior to cycle 4 of rituxan infusion. Platelet count has returned to baseline.  Reports he was placed on Lasix during hospital admission and now feels dehydrated with continued diuresis.    Review of Systems   Constitutional: Positive for fatigue (tired). Negative for chills, diaphoresis and fever.   HENT: Positive for postnasal drip and rhinorrhea (d/t chronic sinus issues ). Negative for congestion, ear pain, mouth sores, nosebleeds, sinus pain, sore throat and  trouble swallowing.    Eyes: Negative for pain, redness and visual disturbance.   Respiratory: Negative for cough, chest tightness, shortness of breath, wheezing and stridor.    Cardiovascular: Negative for chest pain, palpitations and leg swelling.   Gastrointestinal: Negative for abdominal distention, abdominal pain, blood in stool, constipation, diarrhea, nausea and vomiting.   Genitourinary: Negative for hematuria.   Musculoskeletal: Negative for arthralgias and back pain.   Skin: Negative for rash.   Allergic/Immunologic: Negative for environmental allergies, food allergies and immunocompromised state.   Neurological: Positive for weakness. Negative for dizziness, light-headedness, numbness and headaches.        Fell this week   Hematological: Negative for adenopathy. Bruises/bleeds easily.   Psychiatric/Behavioral: Negative.  Negative for confusion.       Objective:      Physical Exam   Constitutional: He is oriented to person, place, and time. He appears well-developed and well-nourished.   HENT:   Head: Normocephalic and atraumatic.   Right Ear: External ear normal.   Left Ear: External ear normal.   Mouth/Throat: Oropharynx is clear and moist.   Eyes: Conjunctivae and EOM are normal. No scleral icterus.   Neck: Normal range of motion. Neck supple.   Cardiovascular: Normal rate and intact distal pulses.   Pulmonary/Chest: Effort normal and breath sounds normal. No respiratory distress. He has no wheezes.   Abdominal: Soft. Bowel sounds are normal. He exhibits no distension. There is no tenderness.   Musculoskeletal: Normal range of motion. He exhibits edema (+1 BLE).   Neurological: He is alert and oriented to person, place, and time. No cranial nerve deficit.   Skin: Skin is warm and dry. Ecchymosis noted. There is erythema.   Fragile skin with generalized ecchymosis to BUE and some peeling/flaking of skin on face   Psychiatric: He has a normal mood and affect. His behavior is normal. Judgment and thought  content normal.   Vitals reviewed.      Assessment:       No diagnosis found.    Plan:       CLL/ITP  Complete 4th rituxan infusion today  Stop Lasix due to feeling weak and dehydrated on therapy    Return visit in 2 weeks for CBC, CMP and volume/BP check

## 2019-09-13 NOTE — PLAN OF CARE
Problem: Adult Inpatient Plan of Care  Goal: Plan of Care Review  Outcome: Ongoing (interventions implemented as appropriate)  Infusion completed, pt tolerated well; pt instructed to remain well hydrated; discussed when to contact MD, when to report to ER; spouse declined AVS, next appt not yet scheduled; pt and spouse verbalized understanding of all discussed

## 2019-09-17 ENCOUNTER — TELEPHONE (OUTPATIENT)
Dept: PHARMACY | Facility: CLINIC | Age: 84
End: 2019-09-17

## 2019-09-17 ENCOUNTER — TELEPHONE (OUTPATIENT)
Dept: HEPATOLOGY | Facility: CLINIC | Age: 84
End: 2019-09-17

## 2019-09-17 NOTE — TELEPHONE ENCOUNTER
----- Message from Janice Samayoa RN sent at 9/16/2019  5:30 PM CDT -----  Contact: Self [phone]      ----- Message -----  From: Marilia Keene  Sent: 9/16/2019  11:00 AM  To: [facility] Clinical Staff    .Medication question / problems.  PT wants to know when he can stop taking tenofovir alafenamide (VEMLIDY) 25 mg Tab. PT can be reached at [phone]. If he does not answer, pls leave the answer on the voicemail.

## 2019-09-17 NOTE — TELEPHONE ENCOUNTER
He must continue as long as he is taking rituxan for prevention of hepatitis B and must continue for 1 full year after stopping rituxan.

## 2019-09-17 NOTE — TELEPHONE ENCOUNTER
Vemlidy refill confirmed. Patient will  Vemlidy refill on . $58.74 copay- 004. Confirmed 2 patient identifiers - name and .     Spoke with patient and wife, patient has 4 doses of Vemlidy remaining and takes it daily.  Patient reports they are not having any side effects so far. No missed doses, no new medications, no new allergies or health conditions reported at this time. All questions answered and addressed to patient's satisfaction. Patient verbalized understanding.

## 2019-09-17 NOTE — TELEPHONE ENCOUNTER
Call placed to the home of the patient, Lorin wife of the patient stated to give her the message.  Message relayed from Mark COULTER.  Frontier asked when was the last dose of Rituxan? She said Fri 9/13/19.  We will need a new Rx for the Vemlidy. We have only 4 pills left.  Informed Mark sent a new prescription on 8/19/19 for a full year that should be ready.   We will be coming to the hospital in the next couple of days and I would like to [facility] it up [facility] the street.  Lorin given the telephone # to call the Pharmacy to have it ready for them.  Voiced understanding the message.

## 2019-09-18 ENCOUNTER — IMMUNIZATION (OUTPATIENT)
Dept: PHARMACY | Facility: CLINIC | Age: 84
End: 2019-09-18
Payer: MEDICARE

## 2019-09-18 ENCOUNTER — OFFICE VISIT (OUTPATIENT)
Dept: WOUND CARE | Facility: CLINIC | Age: 84
End: 2019-09-18
Payer: MEDICARE

## 2019-09-18 VITALS
BODY MASS INDEX: 20.58 KG/M2 | DIASTOLIC BLOOD PRESSURE: 80 MMHG | HEIGHT: 71 IN | HEART RATE: 103 BPM | WEIGHT: 147 LBS | TEMPERATURE: 97 F | SYSTOLIC BLOOD PRESSURE: 144 MMHG

## 2019-09-18 DIAGNOSIS — L89.322 DECUBITUS ULCER OF LEFT BUTTOCK, STAGE 2: ICD-10-CM

## 2019-09-18 DIAGNOSIS — R53.1 WEAKNESS: Primary | ICD-10-CM

## 2019-09-18 DIAGNOSIS — S51.812A SKIN TEAR OF LEFT FOREARM WITHOUT COMPLICATION, INITIAL ENCOUNTER: ICD-10-CM

## 2019-09-18 PROBLEM — E87.5 HYPERKALEMIA: Status: RESOLVED | Noted: 2019-07-08 | Resolved: 2019-09-18

## 2019-09-18 PROBLEM — R41.82 ALTERED MENTAL STATUS: Status: RESOLVED | Noted: 2019-07-15 | Resolved: 2019-09-18

## 2019-09-18 PROCEDURE — 3079F DIAST BP 80-89 MM HG: CPT | Mod: HCNC,CPTII,S$GLB, | Performed by: NURSE PRACTITIONER

## 2019-09-18 PROCEDURE — 3077F SYST BP >= 140 MM HG: CPT | Mod: HCNC,CPTII,S$GLB, | Performed by: NURSE PRACTITIONER

## 2019-09-18 PROCEDURE — 99203 OFFICE O/P NEW LOW 30 MIN: CPT | Mod: HCNC,S$GLB,, | Performed by: NURSE PRACTITIONER

## 2019-09-18 PROCEDURE — 3077F PR MOST RECENT SYSTOLIC BLOOD PRESSURE >= 140 MM HG: ICD-10-PCS | Mod: HCNC,CPTII,S$GLB, | Performed by: NURSE PRACTITIONER

## 2019-09-18 PROCEDURE — 3288F FALL RISK ASSESSMENT DOCD: CPT | Mod: HCNC,CPTII,S$GLB, | Performed by: NURSE PRACTITIONER

## 2019-09-18 PROCEDURE — 99999 PR PBB SHADOW E&M-EST. PATIENT-LVL V: ICD-10-PCS | Mod: PBBFAC,HCNC,, | Performed by: NURSE PRACTITIONER

## 2019-09-18 PROCEDURE — 99999 PR PBB SHADOW E&M-EST. PATIENT-LVL V: CPT | Mod: PBBFAC,HCNC,, | Performed by: NURSE PRACTITIONER

## 2019-09-18 PROCEDURE — 1100F PR PT FALLS ASSESS DOC 2+ FALLS/FALL W/INJURY/YR: ICD-10-PCS | Mod: HCNC,CPTII,S$GLB, | Performed by: NURSE PRACTITIONER

## 2019-09-18 PROCEDURE — 3079F PR MOST RECENT DIASTOLIC BLOOD PRESSURE 80-89 MM HG: ICD-10-PCS | Mod: HCNC,CPTII,S$GLB, | Performed by: NURSE PRACTITIONER

## 2019-09-18 PROCEDURE — 1100F PTFALLS ASSESS-DOCD GE2>/YR: CPT | Mod: HCNC,CPTII,S$GLB, | Performed by: NURSE PRACTITIONER

## 2019-09-18 PROCEDURE — 3288F PR FALLS RISK ASSESSMENT DOCUMENTED: ICD-10-PCS | Mod: HCNC,CPTII,S$GLB, | Performed by: NURSE PRACTITIONER

## 2019-09-18 PROCEDURE — 99203 PR OFFICE/OUTPT VISIT, NEW, LEVL III, 30-44 MIN: ICD-10-PCS | Mod: HCNC,S$GLB,, | Performed by: NURSE PRACTITIONER

## 2019-09-18 RX ORDER — PREDNISONE 2.5 MG/1
1 TABLET ORAL DAILY
COMMUNITY
Start: 2019-09-05 | End: 2020-01-31 | Stop reason: SDUPTHER

## 2019-09-18 NOTE — LETTER
September 18, 2019      Tanisha Barreto NP  [address]  [facility] [address]           [facility]  [address]  [facility] [address]  Phone: [phone]          Patient: Thierry Ramos Jr.   MR Number: [id number]   YOB: 1934   Date of Visit: 9/18/2019       Dear Tanisha Barreto:    Thank you for referring Thierry Ramos to me for evaluation. Attached you will find relevant portions of my assessment and plan of care.    If you have questions, please do not hesitate to call me. I look forward to following Thierry Ramos along with you.    Sincerely,    Britney Lobo NP    Enclosure  CC:  No Recipients    If you would like to receive this communication electronically, please contact [email or web address] or [phone] to request more information on [employer] access.    For providers and/or their staff who would like to refer a patient to [facility], please contact us through our one-stop-shop provider referral line, [facility], at [phone].    If you feel you have received this communication in error or would no longer like to receive these types of communications, please e-mail [email or web address]

## 2019-09-18 NOTE — PATIENT INSTRUCTIONS
Preventing Pressure Sores (Ulcers)  Pressure sores can develop quickly, even in healthy skin. Thats why taking steps to prevent them is so important. Taking pressure off the skin is the first step. That means changing positions often, supporting the body, and avoiding rubbing and sliding. Keeping the skin clean, eating well, and stretching the joints and muscles can also help prevent pressure sores.    Change Positions Often  Changing positions often allows blood to get to the skin and keep the tissue healthy.  IN A CHAIR  · Shift weight from side to side at least once an hour--every 15 minutes if you can.  · Ask about pads and cushions that reduce pressure on the skin.  IN BED  · Change positions at least every 2 hours, more often if you can.  · Use lightweight sheets and blankets to reduce pressure from above.  · Ask about special pads and mattresses that spread pressure over a larger area of the body.  Support the Body  Supporting the body spreads pressure over a larger area.  IN A CHAIR  · Lightly cushion the back and buttocks. Dont use donut-type cushions. They can cut off the blood supply to the skin.  · Lightly pad the footrest on a wheelchair.  IN BED  · When lying on your back, put pillows under the lower calves and ankles. Keep the elbows slightly bent.  · When lying on your side, put pillows behind the back, between the legs, and between the ankles. Keep elbows and knees slightly bent.  Avoid Rubbing/Sliding  Rubbing (friction) and sliding (shear) cause the skin to break down more easily.  IN A CHAIR  · Keep the feet on a footrest, so the thighs are horizontal. This keeps the buttocks from sliding forward.  · Support the shoulder blades and back with a pillow.  IN BED  · Keep the sheets smooth, dry, and free of crumbs. Use a sheepskin pad to prevent rubbing.  · Keep the feet and head slightly raised to avoid sliding. Dont raise the head more than 30 degrees, however, except to allow sitting up to  eat.  Keep the Skin Clean    Keeping the skin clean and soft also helps prevent pressure sores.  · Clean the skin of sweat, urine, or wound drainage    · Apply protective creams and use absorbent pads for someone who lacks bladder or bowel control.  Provide Good Food and Movement  Someone whos in a bed or a wheelchair most or all of the time needs to:  · Eat enough calories to maintain a stable weight.  · Get plenty of protein, vitamins, and iron, and drink lots of fluids each day.  · Get out of the bed or chair as much as possible.   © 8424-1691 [employer] Burgos, [address] [place] [address]. All rights reserved. This information is not intended as a substitute for professional medical care. Always follow your healthcare professional's instructions.    Nutrition and MyPlate: Protein Foods   This group includes foods that are high in protein. Protein helps the body build new cells and keeps tissues healthy. Most Americans get enough protein without even trying. It can be harder for vegetarians, but plenty of non-meat foods are rich in protein, too. Its best to get protein from a variety of sources.   Nutrient-Rich Choices   Theres a lot more to this food group than just meat and beans. It also includes nuts, seeds, and eggs. There are all sorts of nutrient-rich choices:   Chicken and turkey with the skin removed   Fish and shellfish   Lean beef, pork, or lamb (without visible fat)   Soy products, such as tofu, soybeans (edamame), tempeh, or soymilk   Black beans, kidney beans, knight beans, chickpeas (garbanzo beans), and lentils (Note: beans and peas count as both a protein and a vegetable)   Peanuts, almonds, walnuts, sesame seeds, and sunflower  seeds, as well as foods made from these (such as peanut butter or tahini)  Eggs and foods made with eggs (such as quiche or frittata)  What Makes Meat and Beans Less Healthy?   Fatty meat is not healthy. Before you cook meat, trim off all the fat you can  see. Chicken and turkey skin is also high in fat, and should be removed before cooking.   Breading and frying make food less healthy. This includes dishes like fried chicken, fried fish, and refried beans.   Sausage and lunch meats tend to be high in fat and salt. Buy low-fat, low-sodium versions.  One Small Change   Make a meal that includes a non-meat source of protein (such as tofu, lentils, or any other food listed above). Have a better idea? Write it here:   _____________________________________________________________   © 2000-2011 [facility] [facility], [address] [place] [address]. All rights reserved. This information is not intended as a substitute for professional medical care. Always follow your healthcare professional's instructions.      Wound Care  Taking proper care of your wound will help it heal. Your healthcare provider may show you how to clean and dress the wound. He or she will also explain how to tell if the wound is healing normally. Here are the basic steps:      A wound that's not healing normally may be dark in color or have white streaks.    Wash Your Hands  · Use liquid soap and lather for 2 minutes. Scrub between your fingers and under your nails.  · Rinse with warm water, keeping your fingers pointing down.  · Use a paper towel to dry your hands and to turn off the faucet.  Remove the Used Dressing  · Set up your supplies.  · Put on disposable gloves if youre dressing a wound for someone else or your wound is infected.  · Gently take off the old dressing. If you have a drain or tube in the wound, be careful not to pull on it.  · Loosen the tape by pulling gently toward the wound.  · Remove the dressing one layer at a time and put it in a plastic bag.  · Remove your gloves.  Inspect and Dress the Wound  · Each time you change the dressing, inspect the wound carefully to be sure its healing normally.  · Wash your hands again. Put on a new pair of gloves if youre dressing a  wound for someone else or if your wound is infected.  · Clean and dress the wound as directed by your doctor or nurse. If you have a drain or tube, be careful not to pull on it.  · Put all supplies in a plastic bag; seal the bag and put it in the trash.  · Be sure to wash your hands again.  Call your healthcare provider if you see any of the following signs of a problem:   · Bleeding that soaks the dressing  · Pink fluid weeping from the wound  · Increased drainage or drainage that is yellow, yellow-green, or foul-smelling  · Increased swelling or pain, or redness or swelling in the skin around the wound  · A change in the color of the wound  · An increase in the size of the wound  · A fever over 101.0°F, increased fatigue, or a loss of appetite.       © 5389-1042 Rhona Burgos, [address] [place] [address]. All rights reserved. This information is not intended as a substitute for professional medical care. Always follow your healthcare professional's instructions.      Change position every 2 hours.  Protein supplements 2-3 times daily in between meals.  Eat protein at every meal.    You may shower using a mild soap such as Dove.  Irrigate the wound with lukewarm water for 5 minutes and dry thoroughly.  Apply medihoney gel to the wound, cover with cotton gauze and secure with paper tape or an island dressing.  Change dressing daily.  Report any signs of infection.    Apply tegaderm to skin tears. Change every 7 days or as needed if the dressing start to leak.

## 2019-09-18 NOTE — PROGRESS NOTES
Subjective:       Patient ID: Thierry Ramos Jr. is a 85 y.o. male.    Chief Complaint: Wound Check    Wound Check     This is an 85 year old male referred by Tanisha Barreto NP for evaluation and management of a pressure ulcer to the left buttock and skin tear to the left forearm.  The buttock wound has been there for a year. The left arm wound occurred last night when he fell.  He has a history of frequent falls.  His medical history is significant for CLL treatment for this is being managed by [facility].  He also has thrombocytopenia, chronic rhinosinusitis, and hypertension. He has also had multiple surgeries for SCC. He is afebrile. He denies increased redness, swelling or purulent drainage. He does not complain of pain.  Review of Systems   Constitutional: Positive for chills. Negative for diaphoresis and fever.   HENT: Positive for postnasal drip. Negative for hearing loss, rhinorrhea, sinus pressure, sneezing, sore throat, tinnitus and trouble swallowing.    Eyes: Positive for visual disturbance.   Respiratory: Positive for cough and wheezing. Negative for apnea and shortness of breath.    Cardiovascular: Negative for chest pain, palpitations and leg swelling.   Gastrointestinal: Negative for constipation, diarrhea, nausea and vomiting.   Genitourinary: Negative for difficulty urinating, dysuria, frequency and hematuria.   Musculoskeletal: Negative for arthralgias, back pain and joint swelling.   Skin: Positive for wound.   Neurological: Positive for weakness and light-headedness. Negative for dizziness and headaches.   Hematological: Bruises/bleeds easily.   Psychiatric/Behavioral: Negative for confusion, decreased concentration, dysphoric mood and sleep disturbance. The patient is not nervous/anxious.        Objective:      Physical Exam   Constitutional: He is oriented to person, place, and time. He appears well-developed. No distress.   Very thin and frail   HENT:   Head: Normocephalic and atraumatic.    Mouth/Throat: Oropharynx is clear and moist.   Eyes: Pupils are equal, round, and reactive to light. Conjunctivae and EOM are normal. Right eye exhibits no discharge. Left eye exhibits no discharge. No scleral icterus.   Neck: No JVD present. No thyromegaly present.   Cardiovascular: Regular rhythm and normal heart sounds. Tachycardia present. Exam reveals no gallop and no friction rub.   No murmur heard.  Pulmonary/Chest: Effort normal. No respiratory distress. He has no wheezes. He has rhonchi. He has no rales.   Abdominal: Soft. Bowel sounds are normal. He exhibits no distension. There is no tenderness.   Musculoskeletal: He exhibits no edema or tenderness.   Neurological: He is alert and oriented to person, place, and time.   Skin: Skin is warm and dry. No rash noted. He is not diaphoretic. No erythema.   Psychiatric: He has a normal mood and affect. His behavior is normal. Judgment and thought content normal.   Nursing note and vitals reviewed.      ..  Lab Results   Component Value Date    ALBUMIN 3.0 (L) 09/09/2019    ALBUMIN 2.9 (L) 09/09/2019       Assessment:       1. Weakness    2. Decubitus ulcer of left buttock, stage 2    3. Skin tear of left forearm without complication, initial encounter               Plan:            Change position every 2 hours.  Protein supplements 2-3 times daily in between meals.  Eat protein at every meal.    Patient may shower using a mild soap such as Dove.  Irrigate the wound with lukewarm water for 5 minutes and dry thoroughly.  Apply medihoney gel to the wound, cover with cotton gauze and secure with paper tape or an island dressing.  Change dressing daily.  Report any signs of infection.    Apply tegaderm to skin tears. Change every 7 days or as needed if the dressing start to leak.    Left buttock    Left forearm

## 2019-09-19 ENCOUNTER — OFFICE VISIT (OUTPATIENT)
Dept: CARDIOLOGY | Facility: CLINIC | Age: 84
End: 2019-09-19
Payer: MEDICARE

## 2019-09-19 VITALS
HEIGHT: 71 IN | WEIGHT: 149.69 LBS | BODY MASS INDEX: 20.96 KG/M2 | HEART RATE: 94 BPM | SYSTOLIC BLOOD PRESSURE: 124 MMHG | DIASTOLIC BLOOD PRESSURE: 58 MMHG

## 2019-09-19 DIAGNOSIS — I35.0 NONRHEUMATIC AORTIC VALVE STENOSIS: ICD-10-CM

## 2019-09-19 DIAGNOSIS — I25.84 CORONARY ARTERY DISEASE DUE TO CALCIFIED CORONARY LESION: ICD-10-CM

## 2019-09-19 DIAGNOSIS — E78.00 PURE HYPERCHOLESTEROLEMIA: ICD-10-CM

## 2019-09-19 DIAGNOSIS — I25.10 CORONARY ARTERY DISEASE DUE TO CALCIFIED CORONARY LESION: ICD-10-CM

## 2019-09-19 DIAGNOSIS — D69.6 THROMBOCYTOPENIA, UNSPECIFIED: ICD-10-CM

## 2019-09-19 DIAGNOSIS — C91.10 CLL (CHRONIC LYMPHOCYTIC LEUKEMIA): ICD-10-CM

## 2019-09-19 DIAGNOSIS — I65.22 OCCLUSION AND STENOSIS OF LEFT CAROTID ARTERY: Primary | ICD-10-CM

## 2019-09-19 PROCEDURE — 99214 OFFICE O/P EST MOD 30 MIN: CPT | Mod: HCNC,S$GLB,, | Performed by: INTERNAL MEDICINE

## 2019-09-19 PROCEDURE — 99999 PR PBB SHADOW E&M-EST. PATIENT-LVL III: ICD-10-PCS | Mod: PBBFAC,HCNC,, | Performed by: INTERNAL MEDICINE

## 2019-09-19 PROCEDURE — 3074F PR MOST RECENT SYSTOLIC BLOOD PRESSURE < 130 MM HG: ICD-10-PCS | Mod: HCNC,CPTII,S$GLB, | Performed by: INTERNAL MEDICINE

## 2019-09-19 PROCEDURE — 99499 RISK ADDL DX/OHS AUDIT: ICD-10-PCS | Mod: HCNC,S$GLB,, | Performed by: INTERNAL MEDICINE

## 2019-09-19 PROCEDURE — 3078F DIAST BP <80 MM HG: CPT | Mod: HCNC,CPTII,S$GLB, | Performed by: INTERNAL MEDICINE

## 2019-09-19 PROCEDURE — 99214 PR OFFICE/OUTPT VISIT, EST, LEVL IV, 30-39 MIN: ICD-10-PCS | Mod: HCNC,S$GLB,, | Performed by: INTERNAL MEDICINE

## 2019-09-19 PROCEDURE — 1101F PR PT FALLS ASSESS DOC 0-1 FALLS W/OUT INJ PAST YR: ICD-10-PCS | Mod: HCNC,CPTII,S$GLB, | Performed by: INTERNAL MEDICINE

## 2019-09-19 PROCEDURE — 3078F PR MOST RECENT DIASTOLIC BLOOD PRESSURE < 80 MM HG: ICD-10-PCS | Mod: HCNC,CPTII,S$GLB, | Performed by: INTERNAL MEDICINE

## 2019-09-19 PROCEDURE — 99999 PR PBB SHADOW E&M-EST. PATIENT-LVL III: CPT | Mod: PBBFAC,HCNC,, | Performed by: INTERNAL MEDICINE

## 2019-09-19 PROCEDURE — 99499 UNLISTED E&M SERVICE: CPT | Mod: HCNC,S$GLB,, | Performed by: INTERNAL MEDICINE

## 2019-09-19 PROCEDURE — 3074F SYST BP LT 130 MM HG: CPT | Mod: HCNC,CPTII,S$GLB, | Performed by: INTERNAL MEDICINE

## 2019-09-19 PROCEDURE — 1101F PT FALLS ASSESS-DOCD LE1/YR: CPT | Mod: HCNC,CPTII,S$GLB, | Performed by: INTERNAL MEDICINE

## 2019-09-19 RX ORDER — MAGNESIUM HYDROXIDE 400 MG/5ML
1 SUSPENSION, ORAL (FINAL DOSE FORM) ORAL DAILY
COMMUNITY

## 2019-09-19 RX ORDER — NAPROXEN SODIUM 220 MG/1
81 TABLET, FILM COATED ORAL
COMMUNITY

## 2019-09-19 RX ORDER — CLOPIDOGREL BISULFATE 75 MG/1
75 TABLET ORAL DAILY
COMMUNITY
End: 2020-01-03 | Stop reason: SDUPTHER

## 2019-09-19 RX ORDER — ALBUTEROL SULFATE 90 UG/1
1 AEROSOL, METERED RESPIRATORY (INHALATION)
COMMUNITY
Start: 2019-08-30

## 2019-09-19 RX ORDER — BUDESONIDE AND FORMOTEROL FUMARATE DIHYDRATE 160; 4.5 UG/1; UG/1
1 AEROSOL RESPIRATORY (INHALATION)
COMMUNITY
Start: 2019-08-30

## 2019-09-19 RX ORDER — PREDNISONE 5 MG/1
5 TABLET ORAL DAILY
COMMUNITY
End: 2020-01-31 | Stop reason: SDUPTHER

## 2019-09-19 NOTE — PATIENT INSTRUCTIONS
Stop Plavix when platelets are less than 50 K  Stop aspirin when platelets are less than 20 K    Aortic valve calcification and narrowing is very mild I do not expect that to be a problem for you.  The swelling in the legs is almost entirely from low albumin levels. Add whey protein powder from [facility]     The calcification within the blood vessels of the heart is quite common.  I am sure there is blockage but it does not seem to be causing any problems. You should continue taking the statin and the blood thinners unless the platelet count drops

## 2019-09-19 NOTE — PROGRESS NOTES
Subjective:   Patient ID:  Thierry Ramos Jr. is a 85 y.o. male who presents for follow-up of Dyslipidemia      Problem List:  Dyslipidemia   Statin induced myalgias - 2006   Carotid disease with h/o stroke   - (L) CEA 2007   HTN  DM steroid related - 2014  Hypopituitarism s/p pituitary tumor resectrion  CLL  DVT 8/16    HPI:   Thierry Ramos Jr. is accompanied by his wife Yolie. He was hospitalized in 7/19 after falling while walking to the bathroom. Noted to have severe edema and thrombocytopenia w a platelet count of 7 K. Plavix was discontinued, but has been resumed. Takes low dose aspirin 3 days a week. Forearms covered in bruises.  Echo 7/19: Normal biventricular function, mild aortic stenosis. aortic valve area 1.7 cm², peak velocity 2 m/s and mean gradient 10 mm Hg.   On 20 mg of atorvastatin LDL is <70, HDL is in the 50s and triglycerides are within normal limits.      Review of Systems   Constitution: Positive for malaise/fatigue and weight loss. Negative for weight gain.   HENT: Negative for hearing loss and nosebleeds.    Cardiovascular: Positive for leg swelling. Negative for chest pain, dyspnea on exertion and palpitations.   Respiratory: Negative for cough and hemoptysis.    Hematologic/Lymphatic: Bruises/bleeds easily.   Musculoskeletal: Positive for falls. Negative for arthritis and muscle cramps.   Gastrointestinal: Negative for abdominal pain, heartburn and melena.   Genitourinary: Positive for frequency and nocturia. Negative for hematuria.   Neurological: Positive for excessive daytime sleepiness, loss of balance and weakness. Negative for headaches, light-headedness and seizures.   Psychiatric/Behavioral: Negative for depression. The patient is nervous/anxious.        Current Outpatient Medications   Medication Sig    aspirin 81 MG Chew Take 81 mg by mouth every Mon, Wed, Fri.    atorvastatin (LIPITOR) 20 MG tablet TAKE 1 TABLET EVERY DAY    clopidogrel (PLAVIX) 75 mg tablet Take 75 mg by  mouth once daily.    cyanocobalamin, vitamin B-12, 5,000 mcg TbDL Take 1 tablet by mouth once daily.    cycloSPORINE (RESTASIS) 0.05 % ophthalmic emulsion Place 0.4 mLs (1 drop total) into both eyes 2 (two) times daily.    DOCOSAHEXANOIC ACID/EPA (FISH OIL ORAL) Take 5 capsules by mouth once daily.     erythromycin (ROMYCIN) ophthalmic ointment Apply to eyelids and lashes [facility] QHS    fexofenadine (ALLEGRA) 60 MG tablet Take 1 tablet (60 mg total) by mouth once daily.    fluorouracil (EFUDEX) 5 % cream Use hs for 2 weeks on right cheek    guaifen/dextromethorphan/pe (ROBITUSSIN COUGH & COLD CF MAX ORAL) Take 15 mLs by mouth 2 (two) times daily.    levothyroxine (SYNTHROID) 125 MCG tablet TAKE 1 TABLET ONE TIME DAILY    losartan (COZAAR) 25 MG tablet Take 1 tablet (25 mg total) by mouth once daily.    multivitamin with minerals tablet Take 1 tablet by mouth once daily.    pantoprazole (PROTONIX) 40 MG tablet TAKE 1 TABLET EVERY DAY    predniSONE (DELTASONE) 2.5 MG tablet Take 1 tablet by mouth once daily. 5mg and 2.5mg qd    predniSONE (DELTASONE) 5 MG tablet Take 5 mg by mouth once daily.    sennosides/docusate sodium (EDEN-COLACE ORAL) Take 1 tablet by mouth once daily.    SYMBICORT 160-4.5 mcg/actuation HFAA Inhale 1 puff into the lungs as needed.    tazarotene (AVAGE) 0.1 % cream Apply topically every evening. (Patient taking differently: Apply topically as needed. )    tenofovir alafenamide (VEMLIDY) 25 mg Tab Take 25 mg by mouth once daily.    testosterone cypionate (DEPOTESTOTERONE CYPIONATE) 100 mg/mL injection Inject 0.5 mLs (50 mg total) into the muscle every 14 (fourteen) days.    VENTOLIN HFA 90 mcg/actuation inhaler Inhale 1 puff into the lungs as needed.         Social history:  Thierry Strongsteffanie Shay  reports that he has never smoked. He has never used smokeless tobacco. He reports that he drinks about 1.2 oz of alcohol per week. He reports that he does not use drugs.      Objective:  "    Physical Exam   Constitutional: He is oriented to person, place, and time. He appears well-developed and well-nourished.   BP (!) 124/58   Pulse 94   Ht 5' 11" (1.803 m)   Wt 67.9 kg (149 lb 11.1 oz)   BMI 20.88 kg/m²      HENT:   Head: Normocephalic.   Neck: No JVD present. Carotid bruit is not present.   Cardiovascular: Normal rate, regular rhythm, S1 normal and S2 normal.   Murmur heard.   Medium-pitched harsh systolic murmur is present with a grade of 1/6 at the upper right sternal border.  Pulses:       Radial pulses are 2+ on the right side, and 2+ on the left side.   Pulmonary/Chest: He has wheezes in the left lower field. Chest wall is not dull to percussion.   Abdominal: Soft. He exhibits no mass. There is no splenomegaly or hepatomegaly.   Musculoskeletal:        Right lower leg: He exhibits edema.        Left lower leg: He exhibits edema.   Neurological: He is alert and oriented to person, place, and time. Gait normal.   Skin: Bruising (both forearms) noted. Nails show no clubbing.   Psychiatric: He has a normal mood and affect. His speech is normal and behavior is normal.           Lab Results   Component Value Date    CHOL 136 08/30/2019    HDL 54 08/30/2019    LDLCALC 59.8 (L) 08/30/2019    TRIG 111 08/30/2019    CHOLHDL 39.7 08/30/2019       Component      Latest Ref Rng & Units 9/9/2019          10:44 AM   Sodium      136 - 145 mmol/L 141   Potassium      3.5 - 5.1 mmol/L 4.1   Chloride      95 - 110 mmol/L 104   CO2      23 - 29 mmol/L 28   Glucose      70 - 110 mg/dL 195 (H)   BUN, Bld      8 - 23 mg/dL 39 (H)   Creatinine      0.5 - 1.4 mg/dL 1.2   Calcium      8.7 - 10.5 mg/dL 9.6   PROTEIN TOTAL      6.0 - 8.4 g/dL 5.8 (L)   Albumin      3.5 - 5.2 g/dL 3.0 (L)   BILIRUBIN TOTAL      0.1 - 1.0 mg/dL 0.8   Alkaline Phosphatase      55 - 135 U/L 178 (H)   AST      10 - 40 U/L 42 (H)   ALT      10 - 44 U/L 62 (H)   Anion Gap      8 - 16 mmol/L 9   eGFR if African American      >60 " mL/min/1.73 m:2 >60.0   eGFR if non African American      >60 mL/min/1.73 m:2 54.8 (A)           Assessment and Plan:     Occlusion and stenosis of left carotid artery  Stable. No symptoms.  Stop clopidogrel when platelets are less than 50 K  Stop aspirin when platelets are less than 20 K    Coronary artery disease due to calcified coronary lesion  No angina.  -     Basic metabolic panel; Future; Expected date: 09/19/2020    Pure hypercholesterolemia  Continue atorvastatin.  -     Lipid panel; Future; Expected date: 09/19/2020  -     ALT (SGPT); Future; Expected date: 09/19/2020  -     AST (SGOT); Future; Expected date: 09/19/2020    Nonrheumatic aortic valve stenosis - mild  -     Echo; Future; Expected date: 09/19/2020    CLL (chronic lymphocytic leukemia)  Thrombocytopenia, unspecified         Follow up in about 1 year (around 9/19/2020).

## 2019-09-30 ENCOUNTER — OFFICE VISIT (OUTPATIENT)
Dept: HEMATOLOGY/ONCOLOGY | Facility: CLINIC | Age: 84
End: 2019-09-30
Payer: MEDICARE

## 2019-09-30 ENCOUNTER — LAB VISIT (OUTPATIENT)
Dept: LAB | Facility: HOSPITAL | Age: 84
End: 2019-09-30
Payer: MEDICARE

## 2019-09-30 VITALS
HEIGHT: 71 IN | RESPIRATION RATE: 18 BRPM | TEMPERATURE: 97 F | BODY MASS INDEX: 20.59 KG/M2 | HEART RATE: 89 BPM | WEIGHT: 147.06 LBS | DIASTOLIC BLOOD PRESSURE: 53 MMHG | OXYGEN SATURATION: 97 % | SYSTOLIC BLOOD PRESSURE: 108 MMHG

## 2019-09-30 DIAGNOSIS — D69.3 IMMUNE THROMBOCYTOPENIA: ICD-10-CM

## 2019-09-30 DIAGNOSIS — D69.6 THROMBOCYTOPENIA: ICD-10-CM

## 2019-09-30 DIAGNOSIS — D63.0 ANEMIA IN NEOPLASTIC DISEASE: ICD-10-CM

## 2019-09-30 DIAGNOSIS — C91.10 CLL (CHRONIC LYMPHOCYTIC LEUKEMIA): Primary | ICD-10-CM

## 2019-09-30 DIAGNOSIS — J32.1 CHRONIC FRONTAL SINUSITIS: ICD-10-CM

## 2019-09-30 DIAGNOSIS — C91.10 CLL (CHRONIC LYMPHOCYTIC LEUKEMIA): ICD-10-CM

## 2019-09-30 DIAGNOSIS — R76.8 HEPATITIS B CORE ANTIBODY POSITIVE: ICD-10-CM

## 2019-09-30 DIAGNOSIS — D80.1 HYPOGAMMAGLOBULINEMIA: ICD-10-CM

## 2019-09-30 LAB
ALBUMIN SERPL BCP-MCNC: 3.1 G/DL (ref 3.5–5.2)
ALP SERPL-CCNC: 133 U/L (ref 55–135)
ALT SERPL W/O P-5'-P-CCNC: 31 U/L (ref 10–44)
ANION GAP SERPL CALC-SCNC: 10 MMOL/L (ref 8–16)
AST SERPL-CCNC: 32 U/L (ref 10–40)
BASOPHILS # BLD AUTO: 0.05 K/UL (ref 0–0.2)
BASOPHILS NFR BLD: 0.7 % (ref 0–1.9)
BILIRUB SERPL-MCNC: 0.6 MG/DL (ref 0.1–1)
BUN SERPL-MCNC: 50 MG/DL (ref 8–23)
CALCIUM SERPL-MCNC: 9.8 MG/DL (ref 8.7–10.5)
CHLORIDE SERPL-SCNC: 105 MMOL/L (ref 95–110)
CO2 SERPL-SCNC: 26 MMOL/L (ref 23–29)
CREAT SERPL-MCNC: 1 MG/DL (ref 0.5–1.4)
DIFFERENTIAL METHOD: ABNORMAL
EOSINOPHIL # BLD AUTO: 0.1 K/UL (ref 0–0.5)
EOSINOPHIL NFR BLD: 1.2 % (ref 0–8)
ERYTHROCYTE [DISTWIDTH] IN BLOOD BY AUTOMATED COUNT: 15 % (ref 11.5–14.5)
EST. GFR  (AFRICAN AMERICAN): >60 ML/MIN/1.73 M^2
EST. GFR  (NON AFRICAN AMERICAN): >60 ML/MIN/1.73 M^2
GLUCOSE SERPL-MCNC: 138 MG/DL (ref 70–110)
HCT VFR BLD AUTO: 39.7 % (ref 40–54)
HGB BLD-MCNC: 12.7 G/DL (ref 14–18)
IMM GRANULOCYTES # BLD AUTO: 0.28 K/UL (ref 0–0.04)
IMM GRANULOCYTES NFR BLD AUTO: 3.7 % (ref 0–0.5)
LYMPHOCYTES # BLD AUTO: 2.5 K/UL (ref 1–4.8)
LYMPHOCYTES NFR BLD: 32.6 % (ref 18–48)
MCH RBC QN AUTO: 31.3 PG (ref 27–31)
MCHC RBC AUTO-ENTMCNC: 32 G/DL (ref 32–36)
MCV RBC AUTO: 98 FL (ref 82–98)
MONOCYTES # BLD AUTO: 0.8 K/UL (ref 0.3–1)
MONOCYTES NFR BLD: 10.3 % (ref 4–15)
NEUTROPHILS # BLD AUTO: 3.9 K/UL (ref 1.8–7.7)
NEUTROPHILS NFR BLD: 51.5 % (ref 38–73)
NRBC BLD-RTO: 0 /100 WBC
PLATELET # BLD AUTO: 116 K/UL (ref 150–350)
PMV BLD AUTO: 9.2 FL (ref 9.2–12.9)
POTASSIUM SERPL-SCNC: 4.3 MMOL/L (ref 3.5–5.1)
PROT SERPL-MCNC: 5.9 G/DL (ref 6–8.4)
RBC # BLD AUTO: 4.06 M/UL (ref 4.6–6.2)
SODIUM SERPL-SCNC: 141 MMOL/L (ref 136–145)
WBC # BLD AUTO: 7.54 K/UL (ref 3.9–12.7)

## 2019-09-30 PROCEDURE — 3074F PR MOST RECENT SYSTOLIC BLOOD PRESSURE < 130 MM HG: ICD-10-PCS | Mod: HCNC,CPTII,S$GLB, | Performed by: NURSE PRACTITIONER

## 2019-09-30 PROCEDURE — 3078F DIAST BP <80 MM HG: CPT | Mod: HCNC,CPTII,S$GLB, | Performed by: NURSE PRACTITIONER

## 2019-09-30 PROCEDURE — 99214 OFFICE O/P EST MOD 30 MIN: CPT | Mod: HCNC,S$GLB,, | Performed by: NURSE PRACTITIONER

## 2019-09-30 PROCEDURE — 36415 COLL VENOUS BLD VENIPUNCTURE: CPT | Mod: HCNC

## 2019-09-30 PROCEDURE — 1101F PR PT FALLS ASSESS DOC 0-1 FALLS W/OUT INJ PAST YR: ICD-10-PCS | Mod: HCNC,CPTII,S$GLB, | Performed by: NURSE PRACTITIONER

## 2019-09-30 PROCEDURE — 99214 PR OFFICE/OUTPT VISIT, EST, LEVL IV, 30-39 MIN: ICD-10-PCS | Mod: HCNC,S$GLB,, | Performed by: NURSE PRACTITIONER

## 2019-09-30 PROCEDURE — 3074F SYST BP LT 130 MM HG: CPT | Mod: HCNC,CPTII,S$GLB, | Performed by: NURSE PRACTITIONER

## 2019-09-30 PROCEDURE — 80053 COMPREHEN METABOLIC PANEL: CPT | Mod: HCNC

## 2019-09-30 PROCEDURE — 99999 PR PBB SHADOW E&M-EST. PATIENT-LVL V: CPT | Mod: PBBFAC,HCNC,, | Performed by: NURSE PRACTITIONER

## 2019-09-30 PROCEDURE — 85025 COMPLETE CBC W/AUTO DIFF WBC: CPT | Mod: HCNC

## 2019-09-30 PROCEDURE — 3078F PR MOST RECENT DIASTOLIC BLOOD PRESSURE < 80 MM HG: ICD-10-PCS | Mod: HCNC,CPTII,S$GLB, | Performed by: NURSE PRACTITIONER

## 2019-09-30 PROCEDURE — 1101F PT FALLS ASSESS-DOCD LE1/YR: CPT | Mod: HCNC,CPTII,S$GLB, | Performed by: NURSE PRACTITIONER

## 2019-09-30 PROCEDURE — 99499 RISK ADDL DX/OHS AUDIT: ICD-10-PCS | Mod: HCNC,S$GLB,, | Performed by: NURSE PRACTITIONER

## 2019-09-30 PROCEDURE — 99499 UNLISTED E&M SERVICE: CPT | Mod: HCNC,S$GLB,, | Performed by: NURSE PRACTITIONER

## 2019-09-30 PROCEDURE — 99999 PR PBB SHADOW E&M-EST. PATIENT-LVL V: ICD-10-PCS | Mod: PBBFAC,HCNC,, | Performed by: NURSE PRACTITIONER

## 2019-09-30 NOTE — PROGRESS NOTES
Subjective:       Patient ID: Thierry Ramos Jr. is a 85 y.o. male.    Chief Complaint: Follow-up (CLL)    Heme/Onc history:  Mr. Ramos has a history of CLL diagnosed in April 2012 by Dr Joaquin Wooten. In April 2013, there was an abrupt rise in his white blood cell count to 93,000 and he had a modest number of prolymphocytes in his peripheral smear.  He had generalized urticaria, which was thought to be paraneoplastic.  At that time, he was treated with 5 courses of fludarabine, cyclophosphamide and Rituxan.  The fludarabine doses were decreased somewhat because of mild renal impairment.  The sixth treatment was with Rituxan alone because he developed thrombocytopenia, which has persisted in varying degrees since then.     His major hematologic problem and much of his treatment since 2013, has been related to thrombocytopenia.  It became more severe in early 2016, at a time when he was taking both aspirin and Plavix.  A bone marrow examination had no evidence of myelodysplasia and 35% of the bone marrow cells were small lymphocytes.  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  The FISH studies for myelodysplastic disorders were negative. He was treated for possible immune thrombocytopenia with prednisone 60 mg daily for 2 weeks, but there was no improvement in his platelet count.  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     Subsequently, because of continued thrombocytopenia,  recommended treatment with ofatumumab, which he began in late June 2016.  At that time, his platelet count was 57,000.  He generally tolerated the drug well and the frequency of administration was gradually reduced over a period of more than 2 years.  Platelet counts during most of those 2 years were between 66,000-117,000.  The drug was discontinued in early November 2018.     He has had many episodes of  "infection in the past, particularly recurrent sinusitis and he has had several surgical procedures on his sinuses.  Because of this, he has been receiving intravenous immunoglobulin monthly for years and the frequency of pneumonia and severe sinusitis has diminished. Most recent dose of IVIG 4/2019.      After a pituitary surgery for a tumor in 2002, he experienced a TIA and then had 2 strokes.  He has had many nonmelanoma skin cancers excised.     He asked to be seen earlier than planned by Dr. Wooten in January because he was concerned about his recent blood counts.  His platelet count has declined modestly from the range of 66,000-107,000 seen in the past 9 months to a current value of 51,000.  His white blood cell count remains normal and his lymphocyte percentage     He was started on a low dose ibrutinib 280 mg rather than 420mg due to concern for treatment related- further decrease in platelet count while on aspirin and plavix.     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 Left arm is bandaged due to skin fragility causing oozing wounds.     Today:  Patient presents today after completing 4 weekly doses of IV Rituxan on 9/13/19. His platelet count has returned to baseline. He had stopped Lasix due to "feeling dehydrated" but has restarted this at 1/2 the dose.today his biggest complaint is pain to the sacral area from some skin breakdown. He is seeing wound care. He continues to report feeling very weak and is in a wheelchair again today, states he previously was very active and exercised frequently. Today he complaints of copious amounts of " sinus drainage and now with cough. States he has seen multiple ENT docs throughout the city and has not really gotten any relief from this. He has previously been treated with IVIG which last dose was given 7/12/2019 and his last immunoglobulin level was done 2015.    Review of Systems   Constitutional: Positive for fatigue. Negative for chills, diaphoresis and fever.   HENT: Positive for postnasal drip and rhinorrhea (d/t chronic sinus issues ). Negative for congestion, ear pain, mouth sores, nosebleeds, sinus pain, sore throat and trouble swallowing.    Eyes: Negative for pain, redness and visual disturbance.   Respiratory: Positive for cough. Negative for chest tightness, shortness of breath, wheezing and stridor.    Cardiovascular: Negative for chest pain, palpitations and leg swelling.   Gastrointestinal: Negative for abdominal distention, abdominal pain, blood in stool, constipation, diarrhea, nausea and vomiting.   Genitourinary: Negative for hematuria.   Musculoskeletal: Negative for arthralgias and back pain.   Skin: Positive for wound. Negative for rash.        Sacral followed by wound care   Allergic/Immunologic: Negative for environmental allergies, food allergies and immunocompromised state.   Neurological: Positive for weakness. Negative for dizziness, light-headedness, numbness and headaches.        Fell this week   Hematological: Negative for adenopathy. Bruises/bleeds easily.   Psychiatric/Behavioral: Negative.  Negative for confusion.       Objective:      Physical Exam   Constitutional: He is oriented to person, place, and time. He appears well-developed and well-nourished.   HENT:   Head: Normocephalic and atraumatic.   Right Ear: External ear normal.   Left Ear: External ear normal.   Mouth/Throat: Oropharynx is clear and moist.   Eyes: Conjunctivae and EOM are normal. No scleral icterus.   Neck: Normal range of motion. Neck supple.   Cardiovascular: Normal rate and intact distal pulses.    Pulmonary/Chest: Effort normal and breath sounds normal. No respiratory distress. He has no wheezes.   Abdominal: Soft. Bowel sounds are normal. He exhibits no distension. There is no tenderness.   Musculoskeletal: Normal range of motion. He exhibits edema (+1 BLE).   Neurological: He is alert and oriented to person, place, and time. No cranial nerve deficit.   Skin: Skin is warm and dry. Ecchymosis noted. There is erythema.   Fragile skin with generalized ecchymosis to BUE and some peeling/flaking of skin on face  Sacral wound--see wound care note for picture   Psychiatric: He has a normal mood and affect. His behavior is normal. Judgment and thought content normal.   Vitals reviewed.      Assessment:       1. CLL (chronic lymphocytic leukemia)    2. Immune thrombocytopenia    3. 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 Thrombocytopenia    6. Chronic frontal sinusitis    7. Hypogammaglobulinemia        Plan:       CLL/ITP  -Completed 4th Rituxan weekly infusion on 9/13/19, tolerate without difficulty  -platelets improve to 116,000 today, platelets were 24,000 prior to starting treatment  -Will discuss with Dr. Marr if she would like to proceed with maintenance Rituxan Hycela. Discussed with patient and he would like to proceed with maintenance if indicated    Hep B core antibody +  -followed by hepatology  -started tenofovir 8/2019, continue for 6 month after completion of chemo    Chronic sinusitis with hypogammaglobulinemia  -has received IVIG in the past for chronic infections, last given 7/2019  -will check immunoglobulins with next labs     Follow-up  Return visit in 2-3 weeks for CBC, CMP, immunoglobulins, MD visit and possible maintenance rituxan hycela    Kavitha Leon NP  Hematology/BMT

## 2019-09-30 NOTE — Clinical Note
Hey I saw this patient last week. Im just now finishing up on notes. The pharmacist had mentioned that you may be wanting to do maintenance Hycela on this patient? Patient was ok with it if you were planning to do it. I cant get it entered and scheduled for when he returns if that's the plan?

## 2019-10-10 ENCOUNTER — TELEPHONE (OUTPATIENT)
Dept: PHARMACY | Facility: CLINIC | Age: 84
End: 2019-10-10

## 2019-10-10 ENCOUNTER — OFFICE VISIT (OUTPATIENT)
Dept: OPHTHALMOLOGY | Facility: CLINIC | Age: 84
End: 2019-10-10
Payer: MEDICARE

## 2019-10-10 DIAGNOSIS — H04.123 DRY EYE SYNDROME OF BOTH EYES: ICD-10-CM

## 2019-10-10 DIAGNOSIS — H01.00B BLEPHARITIS OF UPPER AND LOWER EYELIDS OF BOTH EYES, UNSPECIFIED TYPE: Primary | ICD-10-CM

## 2019-10-10 DIAGNOSIS — H34.8112 CENTRAL RETINAL VEIN OCCLUSION OF RIGHT EYE, UNSPECIFIED COMPLICATION STATUS: ICD-10-CM

## 2019-10-10 DIAGNOSIS — H35.371 EPIRETINAL MEMBRANE, RIGHT: ICD-10-CM

## 2019-10-10 DIAGNOSIS — H01.00A BLEPHARITIS OF UPPER AND LOWER EYELIDS OF BOTH EYES, UNSPECIFIED TYPE: Primary | ICD-10-CM

## 2019-10-10 PROCEDURE — 92226 PR SPECIAL EYE EXAM, SUBSEQUENT: ICD-10-PCS | Mod: HCNC,RT,S$GLB, | Performed by: OPHTHALMOLOGY

## 2019-10-10 PROCEDURE — 92012 PR EYE EXAM, EST PATIENT,INTERMED: ICD-10-PCS | Mod: HCNC,S$GLB,, | Performed by: OPHTHALMOLOGY

## 2019-10-10 PROCEDURE — 99999 PR PBB SHADOW E&M-EST. PATIENT-LVL III: CPT | Mod: PBBFAC,HCNC,, | Performed by: OPHTHALMOLOGY

## 2019-10-10 PROCEDURE — 92012 INTRM OPH EXAM EST PATIENT: CPT | Mod: HCNC,S$GLB,, | Performed by: OPHTHALMOLOGY

## 2019-10-10 PROCEDURE — 92134 POSTERIOR SEGMENT OCT RETINA (OCULAR COHERENCE TOMOGRAPHY)-BOTH EYES: ICD-10-PCS | Mod: HCNC,S$GLB,, | Performed by: OPHTHALMOLOGY

## 2019-10-10 PROCEDURE — 99999 PR PBB SHADOW E&M-EST. PATIENT-LVL III: ICD-10-PCS | Mod: PBBFAC,HCNC,, | Performed by: OPHTHALMOLOGY

## 2019-10-10 PROCEDURE — 92226 PR SPECIAL EYE EXAM, SUBSEQUENT: CPT | Mod: HCNC,RT,S$GLB, | Performed by: OPHTHALMOLOGY

## 2019-10-10 PROCEDURE — 99499 UNLISTED E&M SERVICE: CPT | Mod: HCNC,S$GLB,, | Performed by: OPHTHALMOLOGY

## 2019-10-10 PROCEDURE — 92134 CPTRZ OPH DX IMG PST SGM RTA: CPT | Mod: HCNC,S$GLB,, | Performed by: OPHTHALMOLOGY

## 2019-10-10 PROCEDURE — 99499 RISK ADDL DX/OHS AUDIT: ICD-10-PCS | Mod: HCNC,S$GLB,, | Performed by: OPHTHALMOLOGY

## 2019-10-11 ENCOUNTER — HOSPITAL ENCOUNTER (INPATIENT)
Facility: HOSPITAL | Age: 84
LOS: 5 days | Discharge: HOME HEALTH | DRG: 178 | End: 2019-10-16
Attending: EMERGENCY MEDICINE | Admitting: FAMILY MEDICINE
Payer: MEDICARE

## 2019-10-11 DIAGNOSIS — J18.9 PNEUMONIA: ICD-10-CM

## 2019-10-11 DIAGNOSIS — C91.10 CLL (CHRONIC LYMPHOCYTIC LEUKEMIA): Chronic | ICD-10-CM

## 2019-10-11 DIAGNOSIS — J15.1 PNEUMONIA OF LEFT LOWER LOBE DUE TO PSEUDOMONAS SPECIES: ICD-10-CM

## 2019-10-11 DIAGNOSIS — R53.81 DEBILITY: Primary | ICD-10-CM

## 2019-10-11 DIAGNOSIS — R06.02 SOB (SHORTNESS OF BREATH): ICD-10-CM

## 2019-10-11 LAB
ALBUMIN SERPL BCP-MCNC: 3 G/DL (ref 3.5–5.2)
ALP SERPL-CCNC: 185 U/L (ref 55–135)
ALT SERPL W/O P-5'-P-CCNC: 43 U/L (ref 10–44)
ANION GAP SERPL CALC-SCNC: 11 MMOL/L (ref 8–16)
AST SERPL-CCNC: 38 U/L (ref 10–40)
BASOPHILS # BLD AUTO: 0.02 K/UL (ref 0–0.2)
BASOPHILS NFR BLD: 0.3 % (ref 0–1.9)
BILIRUB SERPL-MCNC: 0.8 MG/DL (ref 0.1–1)
BUN SERPL-MCNC: 31 MG/DL (ref 8–23)
CALCIUM SERPL-MCNC: 10.1 MG/DL (ref 8.7–10.5)
CHLORIDE SERPL-SCNC: 105 MMOL/L (ref 95–110)
CO2 SERPL-SCNC: 25 MMOL/L (ref 23–29)
CREAT SERPL-MCNC: 1.2 MG/DL (ref 0.5–1.4)
DIFFERENTIAL METHOD: ABNORMAL
EOSINOPHIL # BLD AUTO: 0.1 K/UL (ref 0–0.5)
EOSINOPHIL NFR BLD: 1.2 % (ref 0–8)
ERYTHROCYTE [DISTWIDTH] IN BLOOD BY AUTOMATED COUNT: 15.2 % (ref 11.5–14.5)
EST. GFR  (AFRICAN AMERICAN): >60 ML/MIN/1.73 M^2
EST. GFR  (NON AFRICAN AMERICAN): 55 ML/MIN/1.73 M^2
GLUCOSE SERPL-MCNC: 146 MG/DL (ref 70–110)
HCT VFR BLD AUTO: 39.3 % (ref 40–54)
HGB BLD-MCNC: 12.4 G/DL (ref 14–18)
INFLUENZA A, MOLECULAR: NEGATIVE
INFLUENZA B, MOLECULAR: NEGATIVE
LACTATE SERPL-SCNC: 2.9 MMOL/L (ref 0.5–2.2)
LYMPHOCYTES # BLD AUTO: 2.4 K/UL (ref 1–4.8)
LYMPHOCYTES NFR BLD: 31.8 % (ref 18–48)
MCH RBC QN AUTO: 30.8 PG (ref 27–31)
MCHC RBC AUTO-ENTMCNC: 31.6 G/DL (ref 32–36)
MCV RBC AUTO: 98 FL (ref 82–98)
MONOCYTES # BLD AUTO: 0.3 K/UL (ref 0.3–1)
MONOCYTES NFR BLD: 4.2 % (ref 4–15)
NEUTROPHILS # BLD AUTO: 4.6 K/UL (ref 1.8–7.7)
NEUTROPHILS NFR BLD: 62.5 % (ref 38–73)
PLATELET # BLD AUTO: 137 K/UL (ref 150–350)
PMV BLD AUTO: 9.6 FL (ref 9.2–12.9)
POCT GLUCOSE: 108 MG/DL (ref 70–110)
POCT GLUCOSE: 152 MG/DL (ref 70–110)
POCT GLUCOSE: 174 MG/DL (ref 70–110)
POTASSIUM SERPL-SCNC: 4.6 MMOL/L (ref 3.5–5.1)
PROT SERPL-MCNC: 6 G/DL (ref 6–8.4)
RBC # BLD AUTO: 4.02 M/UL (ref 4.6–6.2)
SODIUM SERPL-SCNC: 141 MMOL/L (ref 136–145)
SPECIMEN SOURCE: NORMAL
WBC # BLD AUTO: 7.62 K/UL (ref 3.9–12.7)

## 2019-10-11 PROCEDURE — 87205 SMEAR GRAM STAIN: CPT | Mod: HCNC

## 2019-10-11 PROCEDURE — 94640 AIRWAY INHALATION TREATMENT: CPT | Mod: HCNC

## 2019-10-11 PROCEDURE — 96360 HYDRATION IV INFUSION INIT: CPT | Mod: HCNC

## 2019-10-11 PROCEDURE — 87186 SC STD MICRODIL/AGAR DIL: CPT | Mod: HCNC

## 2019-10-11 PROCEDURE — 25000003 PHARM REV CODE 250: Mod: HCNC | Performed by: FAMILY MEDICINE

## 2019-10-11 PROCEDURE — 85025 COMPLETE CBC W/AUTO DIFF WBC: CPT | Mod: HCNC

## 2019-10-11 PROCEDURE — 96361 HYDRATE IV INFUSION ADD-ON: CPT | Mod: HCNC

## 2019-10-11 PROCEDURE — 25000003 PHARM REV CODE 250: Mod: HCNC | Performed by: EMERGENCY MEDICINE

## 2019-10-11 PROCEDURE — 99291 CRITICAL CARE FIRST HOUR: CPT | Mod: 25,HCNC

## 2019-10-11 PROCEDURE — 87070 CULTURE OTHR SPECIMN AEROBIC: CPT | Mod: HCNC

## 2019-10-11 PROCEDURE — 83605 ASSAY OF LACTIC ACID: CPT | Mod: HCNC

## 2019-10-11 PROCEDURE — 96365 THER/PROPH/DIAG IV INF INIT: CPT | Mod: HCNC

## 2019-10-11 PROCEDURE — 87077 CULTURE AEROBIC IDENTIFY: CPT | Mod: HCNC

## 2019-10-11 PROCEDURE — 63600175 PHARM REV CODE 636 W HCPCS: Mod: HCNC | Performed by: FAMILY MEDICINE

## 2019-10-11 PROCEDURE — 25000242 PHARM REV CODE 250 ALT 637 W/ HCPCS: Mod: HCNC | Performed by: EMERGENCY MEDICINE

## 2019-10-11 PROCEDURE — 93010 EKG 12-LEAD: ICD-10-PCS | Mod: HCNC,,, | Performed by: INTERNAL MEDICINE

## 2019-10-11 PROCEDURE — 87502 INFLUENZA DNA AMP PROBE: CPT | Mod: HCNC

## 2019-10-11 PROCEDURE — 93005 ELECTROCARDIOGRAM TRACING: CPT | Mod: HCNC

## 2019-10-11 PROCEDURE — 87040 BLOOD CULTURE FOR BACTERIA: CPT | Mod: 59,HCNC

## 2019-10-11 PROCEDURE — 93010 ELECTROCARDIOGRAM REPORT: CPT | Mod: HCNC,,, | Performed by: INTERNAL MEDICINE

## 2019-10-11 PROCEDURE — 80053 COMPREHEN METABOLIC PANEL: CPT | Mod: HCNC

## 2019-10-11 PROCEDURE — 96367 TX/PROPH/DG ADDL SEQ IV INF: CPT | Mod: HCNC

## 2019-10-11 PROCEDURE — 82962 GLUCOSE BLOOD TEST: CPT | Mod: HCNC

## 2019-10-11 PROCEDURE — 11000001 HC ACUTE MED/SURG PRIVATE ROOM: Mod: HCNC

## 2019-10-11 PROCEDURE — 63600175 PHARM REV CODE 636 W HCPCS: Mod: HCNC | Performed by: EMERGENCY MEDICINE

## 2019-10-11 RX ORDER — ATORVASTATIN CALCIUM 20 MG/1
20 TABLET, FILM COATED ORAL DAILY
Status: DISCONTINUED | OUTPATIENT
Start: 2019-10-11 | End: 2019-10-16 | Stop reason: HOSPADM

## 2019-10-11 RX ORDER — CETIRIZINE HYDROCHLORIDE 10 MG/1
10 TABLET ORAL NIGHTLY
Status: DISCONTINUED | OUTPATIENT
Start: 2019-10-11 | End: 2019-10-16 | Stop reason: HOSPADM

## 2019-10-11 RX ORDER — PREDNISONE 5 MG/1
5 TABLET ORAL DAILY
Status: DISCONTINUED | OUTPATIENT
Start: 2019-10-12 | End: 2019-10-16 | Stop reason: HOSPADM

## 2019-10-11 RX ORDER — ACETAMINOPHEN 325 MG/1
650 TABLET ORAL EVERY 8 HOURS PRN
Status: DISCONTINUED | OUTPATIENT
Start: 2019-10-11 | End: 2019-10-16 | Stop reason: HOSPADM

## 2019-10-11 RX ORDER — GLUCAGON 1 MG
1 KIT INJECTION
Status: DISCONTINUED | OUTPATIENT
Start: 2019-10-11 | End: 2019-10-16 | Stop reason: HOSPADM

## 2019-10-11 RX ORDER — CLOPIDOGREL BISULFATE 75 MG/1
75 TABLET ORAL DAILY
Status: DISCONTINUED | OUTPATIENT
Start: 2019-10-11 | End: 2019-10-16 | Stop reason: HOSPADM

## 2019-10-11 RX ORDER — PREDNISONE 2.5 MG/1
2.5 TABLET ORAL DAILY
Status: DISCONTINUED | OUTPATIENT
Start: 2019-10-12 | End: 2019-10-15

## 2019-10-11 RX ORDER — IPRATROPIUM BROMIDE AND ALBUTEROL SULFATE 2.5; .5 MG/3ML; MG/3ML
3 SOLUTION RESPIRATORY (INHALATION)
Status: COMPLETED | OUTPATIENT
Start: 2019-10-11 | End: 2019-10-11

## 2019-10-11 RX ORDER — ONDANSETRON 8 MG/1
8 TABLET, ORALLY DISINTEGRATING ORAL EVERY 8 HOURS PRN
Status: DISCONTINUED | OUTPATIENT
Start: 2019-10-11 | End: 2019-10-16 | Stop reason: HOSPADM

## 2019-10-11 RX ORDER — BENZONATATE 100 MG/1
100 CAPSULE ORAL ONCE
Status: COMPLETED | OUTPATIENT
Start: 2019-10-11 | End: 2019-10-11

## 2019-10-11 RX ORDER — PANTOPRAZOLE SODIUM 40 MG/1
40 TABLET, DELAYED RELEASE ORAL DAILY
Status: DISCONTINUED | OUTPATIENT
Start: 2019-10-12 | End: 2019-10-16 | Stop reason: HOSPADM

## 2019-10-11 RX ORDER — IBUPROFEN 200 MG
24 TABLET ORAL
Status: DISCONTINUED | OUTPATIENT
Start: 2019-10-11 | End: 2019-10-16 | Stop reason: HOSPADM

## 2019-10-11 RX ORDER — IBUPROFEN 200 MG
16 TABLET ORAL
Status: DISCONTINUED | OUTPATIENT
Start: 2019-10-11 | End: 2019-10-16 | Stop reason: HOSPADM

## 2019-10-11 RX ORDER — GUAIFENESIN 600 MG/1
600 TABLET, EXTENDED RELEASE ORAL 2 TIMES DAILY
Status: DISCONTINUED | OUTPATIENT
Start: 2019-10-11 | End: 2019-10-16 | Stop reason: HOSPADM

## 2019-10-11 RX ORDER — AMOXICILLIN 250 MG
1 CAPSULE ORAL 2 TIMES DAILY
Status: DISCONTINUED | OUTPATIENT
Start: 2019-10-11 | End: 2019-10-16 | Stop reason: HOSPADM

## 2019-10-11 RX ORDER — SODIUM CHLORIDE 0.9 % (FLUSH) 0.9 %
10 SYRINGE (ML) INJECTION
Status: DISCONTINUED | OUTPATIENT
Start: 2019-10-11 | End: 2019-10-16 | Stop reason: HOSPADM

## 2019-10-11 RX ORDER — ENOXAPARIN SODIUM 100 MG/ML
40 INJECTION SUBCUTANEOUS EVERY 24 HOURS
Status: DISCONTINUED | OUTPATIENT
Start: 2019-10-11 | End: 2019-10-16 | Stop reason: HOSPADM

## 2019-10-11 RX ORDER — NAPROXEN SODIUM 220 MG/1
81 TABLET, FILM COATED ORAL
Status: DISCONTINUED | OUTPATIENT
Start: 2019-10-14 | End: 2019-10-16 | Stop reason: HOSPADM

## 2019-10-11 RX ORDER — LOSARTAN POTASSIUM 25 MG/1
25 TABLET ORAL DAILY
Status: DISCONTINUED | OUTPATIENT
Start: 2019-10-11 | End: 2019-10-16 | Stop reason: HOSPADM

## 2019-10-11 RX ORDER — POLYETHYLENE GLYCOL 3350 17 G/17G
17 POWDER, FOR SOLUTION ORAL DAILY
Status: DISCONTINUED | OUTPATIENT
Start: 2019-10-11 | End: 2019-10-16 | Stop reason: HOSPADM

## 2019-10-11 RX ORDER — RAMELTEON 8 MG/1
8 TABLET ORAL NIGHTLY PRN
Status: DISCONTINUED | OUTPATIENT
Start: 2019-10-11 | End: 2019-10-16 | Stop reason: HOSPADM

## 2019-10-11 RX ORDER — ACETAMINOPHEN 325 MG/1
650 TABLET ORAL EVERY 4 HOURS PRN
Status: DISCONTINUED | OUTPATIENT
Start: 2019-10-11 | End: 2019-10-16 | Stop reason: HOSPADM

## 2019-10-11 RX ORDER — INSULIN ASPART 100 [IU]/ML
0-5 INJECTION, SOLUTION INTRAVENOUS; SUBCUTANEOUS
Status: DISCONTINUED | OUTPATIENT
Start: 2019-10-11 | End: 2019-10-16 | Stop reason: HOSPADM

## 2019-10-11 RX ADMIN — CEFTRIAXONE 1 G: 1 INJECTION, SOLUTION INTRAVENOUS at 09:10

## 2019-10-11 RX ADMIN — BENZONATATE 100 MG: 100 CAPSULE ORAL at 08:10

## 2019-10-11 RX ADMIN — CETIRIZINE HYDROCHLORIDE 10 MG: 10 TABLET, FILM COATED ORAL at 09:10

## 2019-10-11 RX ADMIN — SODIUM CHLORIDE, SODIUM LACTATE, POTASSIUM CHLORIDE, AND CALCIUM CHLORIDE 1000 ML: .6; .31; .03; .02 INJECTION, SOLUTION INTRAVENOUS at 08:10

## 2019-10-11 RX ADMIN — GUAIFENESIN 600 MG: 600 TABLET, EXTENDED RELEASE ORAL at 12:10

## 2019-10-11 RX ADMIN — AZITHROMYCIN MONOHYDRATE 500 MG: 500 INJECTION, POWDER, LYOPHILIZED, FOR SOLUTION INTRAVENOUS at 12:10

## 2019-10-11 RX ADMIN — IPRATROPIUM BROMIDE AND ALBUTEROL SULFATE 3 ML: .5; 3 SOLUTION RESPIRATORY (INHALATION) at 08:10

## 2019-10-11 RX ADMIN — SODIUM CHLORIDE, SODIUM LACTATE, POTASSIUM CHLORIDE, AND CALCIUM CHLORIDE 1000 ML: .6; .31; .03; .02 INJECTION, SOLUTION INTRAVENOUS at 11:10

## 2019-10-11 RX ADMIN — GUAIFENESIN 600 MG: 600 TABLET, EXTENDED RELEASE ORAL at 09:10

## 2019-10-11 NOTE — MEDICAL/APP STUDENT
"  History     Chief Complaint   Patient presents with    Fatigue     Reports has CLL and over the past 2 days has increased fatigue, worsening cough, chills and body aches.      Mr. Ramos is a 84 yo M with a PMHx of CLL, HTN, DM2, HLD, cerebrovascular disease, hypothyroidism, hypogammaglobinemia, and Hep B. His PCP is Dr. Juve Wooten and his oncologist is Dr. Kelly Marr.    Patient presented to the ED this morning complaining of recently increased fatigue and generalized weakness. Patient reports that for the past several weeks, he has been feeling weak and not like himself. However, his wife is now concerned because for the past couple of days he has seemed noticeably worse. She says that he has had low-grade temperatures at home (99.8F) with increased chills and a new cough producing yellow sputum. Patient admits that nothing in particular felt worse today but that his wife urged him to come in. He says that he has still been sleeping and eating well without any appetite changes. He denies any recent chest pain, shortness of breath, abdominal pain, nausea, vomiting, diarrhea, constipation, dysuria, urinary urgency or frequency, or any other complaints at this time.    He was seen by an NP at his oncologist's office about 2 weeks ago on 9/30/19. Per her note: "Patient presents today after completing 4 weekly doses of IV Rituxan on 9/13/19. His platelet count has returned to baseline. He had stopped Lasix due to "feeling dehydrated" but has restarted this at 1/2 the dose.today his biggest complaint is pain to the sacral area from some skin breakdown. He is seeing wound care. He continues to report feeling very weak and is in a wheelchair again today, states he previously was very active and exercised frequently. Today he complaints of copious amounts of sinus drainage and now with cough. States he has seen multiple ENT docs throughout the city and has not really gotten any relief from this. He has previously " "been treated with IVIG which last dose was given 7/12/2019 and his last immunoglobulin level was done 2015."    Patient confirms that he is still dealing with the issues that were noted during the aforementioned visit.       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    Hyperlipidemia     Hypertension     Hypopituitarism     Hypothyroid     Immunosuppression 3/13/2015    Squamous Cell Carcinoma     on the right side of the face     Squamous cell carcinoma of skin of right temple 1/27/2016    Stroke     Syncope 2/12    Unspecified disorder of kidney and ureter        Past Surgical History:   Procedure Laterality Date    CAROTID ENDARTERECTOMY (L)  2/27/2007    EXCISION TURBINATE, SUBMUCOUS      FESS  9/16/2014    Mohs excision   3/23/2015, 2/24/2016    combined with WLE forehead    NASAL SEPTUM SURGERY  9/16/2014    right ureter surgery  1995    SENTINEL LYMPH NODE BIOPSY  3/23/2015, 2/24/2016    SINUS SURGERY  9/16/2014    septo, ESS, BITSMR    Transphenoidal surgery         Family History   Problem Relation Age of Onset    Cancer Mother         breast    Colon cancer Father     Cancer Father         colon    Cancer Brother         leukemia    No Known Problems Sister     No Known Problems Maternal Aunt     No Known Problems Maternal Uncle     No Known Problems Paternal Aunt     No Known Problems Paternal Uncle     No Known Problems Maternal Grandmother     No Known Problems Maternal Grandfather     No Known Problems Paternal Grandmother     No Known Problems Paternal Grandfather     Amblyopia Neg Hx     Blindness Neg Hx     Cataracts Neg Hx     Diabetes Neg Hx     Glaucoma Neg Hx     Hypertension Neg Hx     Macular degeneration Neg Hx     Retinal detachment Neg Hx     Strabismus Neg Hx     Stroke Neg Hx     " "Thyroid disease Neg Hx     Allergic rhinitis Neg Hx     Allergies Neg Hx     Angioedema Neg Hx     Asthma Neg Hx     Atopy Neg Hx     Eczema Neg Hx     Immunodeficiency Neg Hx     Rhinitis Neg Hx     Urticaria Neg Hx        Social History     Tobacco Use    Smoking status: Never Smoker    Smokeless tobacco: Never Used   Substance Use Topics    Alcohol use: Yes     Alcohol/week: 2.0 standard drinks     Types: 1 Glasses of wine, 1 Cans of beer per week     Frequency: Never     Drinks per session: Patient refused     Binge frequency: Patient refused     Comment: "rarely"    Drug use: No     No current facility-administered medications on file prior to encounter.      Current Outpatient Medications on File Prior to Encounter   Medication Sig Dispense Refill    ACCU-CHEK FASTCLIX Misc TEST  BLOOD  GLUCOSE FOUR TIMES DAILY 408 each 6    alcohol swabs PadM APPLY 1 PAD TOPICALLY  AS NEEDED. 300 each 3    aspirin 81 MG Chew Take 81 mg by mouth every Mon, Wed, Fri.      atorvastatin (LIPITOR) 20 MG tablet TAKE 1 TABLET EVERY DAY 90 tablet 3    clopidogrel (PLAVIX) 75 mg tablet Take 75 mg by mouth once daily.      cyanocobalamin, vitamin B-12, 5,000 mcg TbDL Take 1 tablet by mouth once daily.      cycloSPORINE (RESTASIS) 0.05 % ophthalmic emulsion Place 0.4 mLs (1 drop total) into both eyes 2 (two) times daily. 180 each 3    DOCOSAHEXANOIC ACID/EPA (FISH OIL ORAL) Take 5 capsules by mouth once daily.       erythromycin (ROMYCIN) ophthalmic ointment Apply to eyelids and lashes OU QHS (Patient not taking: Reported on 9/30/2019) 1 Tube 6    fluorouracil (EFUDEX) 5 % cream Use hs for 2 weeks on right cheek (Patient not taking: Reported on 9/30/2019) 40 g 3    guaifen/dextromethorphan/pe (ROBITUSSIN COUGH & COLD CF MAX ORAL) Take 15 mLs by mouth 2 (two) times daily.      levothyroxine (SYNTHROID) 125 MCG tablet TAKE 1 TABLET ONE TIME DAILY 90 tablet 3    losartan (COZAAR) 25 MG tablet Take 1 tablet (25 mg " "total) by mouth once daily. 90 tablet 3    multivitamin with minerals tablet Take 1 tablet by mouth once daily.      pantoprazole (PROTONIX) 40 MG tablet TAKE 1 TABLET EVERY DAY 90 tablet 3    predniSONE (DELTASONE) 2.5 MG tablet Take 1 tablet by mouth once daily. 5mg and 2.5mg qd      predniSONE (DELTASONE) 5 MG tablet Take 5 mg by mouth once daily.      sennosides/docusate sodium (EDEN-COLACE ORAL) Take 1 tablet by mouth once daily.      SYMBICORT 160-4.5 mcg/actuation HFAA Inhale 1 puff into the lungs as needed.      tazarotene (AVAGE) 0.1 % cream Apply topically every evening. (Patient taking differently: Apply topically as needed. ) 30 g 3    tenofovir alafenamide (VEMLIDY) 25 mg Tab Take 25 mg by mouth once daily. 30 tablet 11    testosterone cypionate (DEPOTESTOTERONE CYPIONATE) 100 mg/mL injection Inject 0.5 mLs (50 mg total) into the muscle every 14 (fourteen) days. 10 mL 5    VENTOLIN HFA 90 mcg/actuation inhaler Inhale 1 puff into the lungs as needed.         Review of Systems   Constitutional: Positive for activity change, chills, fatigue and fever. Negative for appetite change.   HENT: Positive for congestion and postnasal drip. Negative for sore throat.    Eyes: Negative for photophobia and visual disturbance.   Respiratory: Positive for cough. Negative for shortness of breath.    Cardiovascular: Positive for leg swelling (improving with home Lasix). Negative for chest pain.   Gastrointestinal: Negative for abdominal distention, abdominal pain, blood in stool, constipation, diarrhea, nausea and vomiting.   Endocrine: Positive for cold intolerance.   Genitourinary: Negative for difficulty urinating, dysuria, frequency and urgency.   Skin: Positive for wound (chronic sacral wound).   Neurological: Negative for weakness and headaches.   All other systems reviewed and are negative.      Physical Exam   BP (!) 107/55   Pulse 110   Temp 98.5 °F (36.9 °C) (Oral)   Resp (!) 34   Ht 5' 11" (1.803 " m)   Wt 65.8 kg (145 lb)   SpO2 (!) 92%   BMI 20.22 kg/m²     Physical Exam    Nursing note and vitals reviewed.  Constitutional: He appears well-developed and well-nourished. He is cooperative.  Non-toxic appearance. No distress. Nasal cannula in place.   HENT:   Head: Normocephalic and atraumatic.   Mouth/Throat: Oropharynx is clear and moist. No oropharyngeal exudate.   Eyes: Conjunctivae and EOM are normal. Pupils are equal, round, and reactive to light.   Neck: Normal range of motion. Neck supple.   Cardiovascular: Normal rate, regular rhythm, normal heart sounds and intact distal pulses. Exam reveals no gallop and no friction rub.    No murmur heard.  Pulmonary/Chest: Effort normal. No accessory muscle usage. No respiratory distress. He has wheezes in the left lower field.   Abdominal: Soft. Bowel sounds are normal. He exhibits no distension. There is no tenderness.   Musculoskeletal: Normal range of motion. He exhibits edema (2+ bilateral lower extremities).   Neurological: He is alert and oriented to person, place, and time.   Skin: Skin is warm and dry. Capillary refill takes less than 2 seconds.   Psychiatric: He has a normal mood and affect. Thought content normal.       Recent Labs   Lab 10/11/19  0819   WBC 7.62   RBC 4.02*   HGB 12.4*   HCT 39.3*   *   MCV 98   MCH 30.8   MCHC 31.6*     Recent Labs   Lab 10/11/19  0819   CALCIUM 10.1   PROT 6.0      K 4.6   CO2 25      BUN 31*   CREATININE 1.2   ALKPHOS 185*   ALT 43   AST 38   BILITOT 0.8     Lactic acid: 2.9    Imaging:  XR CHEST AP PORTABLE    CLINICAL HISTORY:  Shortness of breath    TECHNIQUE:  Single frontal view of the chest was performed.    COMPARISON:  July 19, 2019    FINDINGS:  Nearly resolved bilateral middle and upper lung zone airspace opacities.    Increased bilateral lower lung zone interstitial and ill-defined airspace opacities, more prominent on the left.    Nearly resolved trace left effusion.  No right  effusion.    No pneumothorax.    No acute bone abnormality.      Impression       Since July 19, 2019, increased bilateral lower lung zone interstitial and ill-defined airspace opacities, more prominent on the left and suspicious for edema or infectious/inflammatory disease.    Nearly resolved bilateral middle and upper lung zone airspace opacities.       Assessment and Plan     1. Pneumonia

## 2019-10-11 NOTE — H&P
[facility]  History & Physical    Patient Name: Thierry Ramos Jr.  MRN: [id number]  Admission Date: 10/11/2019  Attending Physician: Neli Chapman MD  Primary Care Provider: AKILA Wooten MD         Patient information was obtained from patient, relative(s) and ER records.     Subjective:     Principal Problem:Pneumonia    Chief Complaint:   Chief Complaint   Patient presents with    Fatigue     Reports has CLL and over the past 2 days has increased fatigue, worsening cough, chills and body aches.         HPI:  Thierry Ramos, is an 84 y/o with a PMH of HTN, DM2, HLD, CVA, hypothyroidism, hypogammaglobinemia, Hep B, chronic sinusitis, and CLL s/p chemo Rituximab 2 weeks ago (9/30)presents with 2 days hx increasing fatigue, with associated productive cough, fever, chills, and body aches.  He denies nausea, diarrhea and vomiting. CXR reported nearly resolved bilateral middle and upper lung zone airspace opacities, lactic acid elevated.    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    Hyperlipidemia     Hypertension     Hypopituitarism     Hypothyroid     Immunosuppression 3/13/2015    Squamous Cell Carcinoma     on the right side of the face     Squamous cell carcinoma of skin of right temple 1/27/2016    Stroke     Syncope 2/12    Unspecified disorder of kidney and ureter        Past Surgical History:   Procedure Laterality Date    CAROTID ENDARTERECTOMY (L)  2/27/2007    EXCISION TURBINATE, SUBMUCOUS      FESS  9/16/2014    Mohs excision   3/23/2015, 2/24/2016    combined with WLE forehead    NASAL SEPTUM SURGERY  9/16/2014    right ureter surgery  1995    SENTINEL LYMPH NODE BIOPSY  3/23/2015, 2/24/2016    SINUS SURGERY  9/16/2014    septo, ESS, BITSMR    Transphenoidal surgery          Review of patient's allergies indicates:   Allergen Reactions    Ace inhibitors Swelling     angioedema    Divalproex Hives     Rash under arms, body creases       No current facility-administered medications on file prior to encounter.      Current Outpatient Medications on File Prior to Encounter   Medication Sig    ACCU-CHEK FASTCLIX Misc TEST  BLOOD  GLUCOSE FOUR TIMES DAILY    alcohol swabs PadM APPLY 1 PAD TOPICALLY  AS NEEDED.    aspirin 81 MG Chew Take 81 mg by mouth every Mon, Wed, Fri.    atorvastatin (LIPITOR) 20 MG tablet TAKE 1 TABLET EVERY DAY    clopidogrel (PLAVIX) 75 mg tablet Take 75 mg by mouth once daily.    cyanocobalamin, vitamin B-12, 5,000 mcg TbDL Take 1 tablet by mouth once daily.    cycloSPORINE (RESTASIS) 0.05 % ophthalmic emulsion Place 0.4 mLs (1 drop total) into both eyes 2 (two) times daily.    DOCOSAHEXANOIC ACID/EPA (FISH OIL ORAL) Take 5 capsules by mouth once daily.     erythromycin (ROMYCIN) ophthalmic ointment Apply to eyelids and lashes OU QHS (Patient not taking: Reported on 9/30/2019)    fluorouracil (EFUDEX) 5 % cream Use hs for 2 weeks on right cheek (Patient not taking: Reported on 9/30/2019)    guaifen/dextromethorphan/pe (ROBITUSSIN COUGH & COLD CF MAX ORAL) Take 15 mLs by mouth 2 (two) times daily.    levothyroxine (SYNTHROID) 125 MCG tablet TAKE 1 TABLET ONE TIME DAILY    losartan (COZAAR) 25 MG tablet Take 1 tablet (25 mg total) by mouth once daily.    multivitamin with minerals tablet Take 1 tablet by mouth once daily.    pantoprazole (PROTONIX) 40 MG tablet TAKE 1 TABLET EVERY DAY    predniSONE (DELTASONE) 2.5 MG tablet Take 1 tablet by mouth once daily. 5mg and 2.5mg qd    predniSONE (DELTASONE) 5 MG tablet Take 5 mg by mouth once daily.    sennosides/docusate sodium (EDEN-COLACE ORAL) Take 1 tablet by mouth once daily.    SYMBICORT 160-4.5 mcg/actuation HFAA Inhale 1 puff into the lungs as needed.    tazarotene (AVAGE) 0.1 % cream Apply  "topically every evening. (Patient taking differently: Apply topically as needed. )    tenofovir alafenamide (VEMLIDY) 25 mg Tab Take 25 mg by mouth once daily.    testosterone cypionate (DEPOTESTOTERONE CYPIONATE) 100 mg/mL injection Inject 0.5 mLs (50 mg total) into the muscle every 14 (fourteen) days.    VENTOLIN HFA 90 mcg/actuation inhaler Inhale 1 puff into the lungs as needed.     Family History     Problem Relation (Age of Onset)    Cancer Mother, Father, Brother    Colon cancer Father    No Known Problems Sister, Maternal Aunt, Maternal Uncle, Paternal Aunt, Paternal Uncle, Maternal Grandmother, Maternal Grandfather, Paternal Grandmother, Paternal Grandfather        Tobacco Use    Smoking status: Never Smoker    Smokeless tobacco: Never Used   Substance and Sexual Activity    Alcohol use: Yes     Alcohol/week: 2.0 standard drinks     Types: 1 Glasses of wine, 1 Cans of beer per week     Frequency: Never     Drinks per session: Patient refused     Binge frequency: Patient refused     Comment: "rarely"    Drug use: No    Sexual activity: Not Currently     Review of Systems   Constitutional: Negative for fever.   HENT: Positive for congestion and postnasal drip. Negative for ear discharge, sinus pressure and tinnitus.    Eyes: Negative for itching.   Respiratory: Positive for cough, shortness of breath and wheezing. Negative for apnea.    Cardiovascular: Positive for leg swelling. Negative for chest pain.   Gastrointestinal: Negative for abdominal distention, anal bleeding and blood in stool.   Endocrine: Negative for polyphagia and polyuria.   Genitourinary: Negative for dysuria and hematuria.   Musculoskeletal: Negative for arthralgias and gait problem.   Skin: Positive for color change and wound.   Neurological: Negative for syncope and light-headedness.   Psychiatric/Behavioral: Negative for behavioral problems, decreased concentration, sleep disturbance and suicidal ideas. The patient is not " nervous/anxious.      Objective:     Vital Signs (Most Recent):  Temp: 98.5 °F (36.9 °C) (10/11/19 0753)  Pulse: 110 (10/11/19 0932)  Resp: (!) 34 (10/11/19 0932)  BP: (!) 107/55 (10/11/19 0932)  SpO2: (!) 92 % (10/11/19 0932) Vital Signs (24h Range):  Temp:  [98.5 °F (36.9 °C)] 98.5 °F (36.9 °C)  Pulse:  [109-124] 110  Resp:  [22-39] 34  SpO2:  [90 %-94 %] 92 %  BP: (107-144)/(55-63) 107/55     Weight: 65.8 kg (145 lb)  Body mass index is 20.22 kg/m².    Physical Exam   Constitutional: He is oriented to person, place, and time. He appears well-developed and well-nourished.   HENT:   Head: Normocephalic and atraumatic.   Eyes: Pupils are equal, round, and reactive to light. EOM are normal.   Neck: Normal range of motion. Neck supple. No JVD present.   Cardiovascular: Normal rate, regular rhythm and normal heart sounds. Exam reveals no gallop and no friction rub.   No murmur heard.  Pulmonary/Chest: Effort normal. He has wheezes. He exhibits no tenderness.   Abdominal: Soft. Bowel sounds are normal. He exhibits no distension. There is no tenderness.   Musculoskeletal: Normal range of motion. He exhibits edema. He exhibits no tenderness.   Neurological: He is alert and oriented to person, place, and time.   Skin: Skin is warm. Capillary refill takes less than 2 seconds.   Psychiatric: He has a normal mood and affect.         CRANIAL NERVES     CN III, IV, VI   Pupils are equal, round, and reactive to light.  Extraocular motions are normal.        Significant Labs:   Blood Culture: No results for input(s): LABBLOO in the last 48 hours.  CBC:   Recent Labs   Lab 10/11/19  0819   WBC 7.62   HGB 12.4*   HCT 39.3*   *     CMP:   Recent Labs   Lab 10/11/19  0819      K 4.6      CO2 25   *   BUN 31*   CREATININE 1.2   CALCIUM 10.1   PROT 6.0   ALBUMIN 3.0*   BILITOT 0.8   ALKPHOS 185*   AST 38   ALT 43   ANIONGAP 11   EGFRNONAA 55*     Lactic Acid:   Recent Labs   Lab 10/11/19  0819   LACTATE 2.9*      Pathology Results  (Last 10 years)    None        Troponin: No results for input(s): TROPONINI in the last 48 hours.  TSH:   Recent Labs   Lab 07/15/19  0802   TSH 0.040*     Urine Culture: No results for input(s): LABURIN in the last 48 hours.  Urine Studies: No results for input(s): COLORU, APPEARANCEUA, PHUR, SPECGRAV, PROTEINUA, GLUCUA, KETONESU, BILIRUBINUA, OCCULTUA, NITRITE, UROBILINOGEN, LEUKOCYTESUR, RBCUA, WBCUA, BACTERIA, SQUAMEPITHEL, HYALINECASTS in the last 48 hours.    Invalid input(s): WRIGHTSUR    Significant Imaging: I have reviewed all pertinent imaging results/findings within the past 24 hours.    Assessment/Plan:     * Pneumonia  CXR: Nearly resolved bilateral middle and upper lung zone airspace opacities, lactic acid elevated.  Blood cx   respiratory cx  Flu text negative  Rocephin and Azithromycin  Nebs as needed  continue steroid  Supplemental oxygen: wean as needed   PT/OT    Type 2 diabetes, controlled, with neuropathy  HbA1c pending  Diet control  Low dose SSI  Accucheck        Chronic rhinosinusitis  Zyrtec   Cough medication      Central hypothyroidism  TSH is 0.040 on admission  Hold synthroid monitor      History of CVA (cerebrovascular accident)  Continue ASA and statin      CLL (chronic lymphocytic leukemia)  S/p Rituxan on 9/30  Continue Prednisone       Hypertension  Mixed lipidemia  Continue losartan and lipitor          VTE Risk Mitigation (From admission, onward)         Ordered     enoxaparin injection 40 mg  Daily      10/11/19 1123     IP VTE HIGH RISK PATIENT  Once      10/11/19 1123                   Neli Chapman MD  Department of Hospital Medicine   [facility]

## 2019-10-11 NOTE — HPI
Thierry Ramos Jr. is a 85 y.o. white man with panhypopituitarism, secondary adrenal insufficiency, secondary hypogonadism, central hypothyroidism, diabetes mellitus type 2 with neuropathy, hypertension, history of stroke on 12/16/06 status post left carotid endarterectomy on 2/27/07, dyslipidemia, chronic lymphocytic leukemia (treated with rituximab), hypogammaglobulinemia, immune thrombocytopenia, pulmonary hypertension, chronic rhinosinusitis. He lives in [place], [place]. He is . His primary care physician is Dr. CAROLE Wooten.   He presented to [facility] on 10/11/19 with 2 days of fatigue, productive cough, fever, chills, and body aches. Chest X-ray showed increased bilateral lower lung zone interstitial and ill-defined airspace opacities, more prominent on the left. Temperature and WBC count were normal. Lactate was 2.9. He was admitted to [facility].

## 2019-10-11 NOTE — SUBJECTIVE & OBJECTIVE
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    Hyperlipidemia     Hypertension     Hypopituitarism     Hypothyroid     Immunosuppression 3/13/2015    Squamous Cell Carcinoma     on the right side of the face     Squamous cell carcinoma of skin of right temple 1/27/2016    Stroke     Syncope 2/12    Unspecified disorder of kidney and ureter        Past Surgical History:   Procedure Laterality Date    CAROTID ENDARTERECTOMY (L)  2/27/2007    EXCISION TURBINATE, SUBMUCOUS      FESS  9/16/2014    Mohs excision   3/23/2015, 2/24/2016    combined with WLE forehead    NASAL SEPTUM SURGERY  9/16/2014    right ureter surgery  1995    SENTINEL LYMPH NODE BIOPSY  3/23/2015, 2/24/2016    SINUS SURGERY  9/16/2014    septo, ESS, BITSMR    Transphenoidal surgery         Review of patient's allergies indicates:   Allergen Reactions    Ace inhibitors Swelling     angioedema    Divalproex Hives     Rash under arms, body creases       No current facility-administered medications on file prior to encounter.      Current Outpatient Medications on File Prior to Encounter   Medication Sig    ACCU-CHEK FASTCLIX Misc TEST  BLOOD  GLUCOSE FOUR TIMES DAILY    alcohol swabs PadM APPLY 1 PAD TOPICALLY  AS NEEDED.    aspirin 81 MG Chew Take 81 mg by mouth every Mon, Wed, Fri.    atorvastatin (LIPITOR) 20 MG tablet TAKE 1 TABLET EVERY DAY    clopidogrel (PLAVIX) 75 mg tablet Take 75 mg by mouth once daily.    cyanocobalamin, vitamin B-12, 5,000 mcg TbDL Take 1 tablet by mouth once daily.    cycloSPORINE (RESTASIS) 0.05 % ophthalmic emulsion Place 0.4 mLs (1 drop total) into both eyes 2 (two) times daily.    DOCOSAHEXANOIC ACID/EPA (FISH OIL ORAL) Take 5 capsules by mouth once daily.     erythromycin (ROMYCIN) ophthalmic ointment Apply to eyelids and lashes  OU QHS (Patient not taking: Reported on 9/30/2019)    fluorouracil (EFUDEX) 5 % cream Use hs for 2 weeks on right cheek (Patient not taking: Reported on 9/30/2019)    guaifen/dextromethorphan/pe (ROBITUSSIN COUGH & COLD CF MAX ORAL) Take 15 mLs by mouth 2 (two) times daily.    levothyroxine (SYNTHROID) 125 MCG tablet TAKE 1 TABLET ONE TIME DAILY    losartan (COZAAR) 25 MG tablet Take 1 tablet (25 mg total) by mouth once daily.    multivitamin with minerals tablet Take 1 tablet by mouth once daily.    pantoprazole (PROTONIX) 40 MG tablet TAKE 1 TABLET EVERY DAY    predniSONE (DELTASONE) 2.5 MG tablet Take 1 tablet by mouth once daily. 5mg and 2.5mg qd    predniSONE (DELTASONE) 5 MG tablet Take 5 mg by mouth once daily.    sennosides/docusate sodium (EDEN-COLACE ORAL) Take 1 tablet by mouth once daily.    SYMBICORT 160-4.5 mcg/actuation HFAA Inhale 1 puff into the lungs as needed.    tazarotene (AVAGE) 0.1 % cream Apply topically every evening. (Patient taking differently: Apply topically as needed. )    tenofovir alafenamide (VEMLIDY) 25 mg Tab Take 25 mg by mouth once daily.    testosterone cypionate (DEPOTESTOTERONE CYPIONATE) 100 mg/mL injection Inject 0.5 mLs (50 mg total) into the muscle every 14 (fourteen) days.    VENTOLIN HFA 90 mcg/actuation inhaler Inhale 1 puff into the lungs as needed.     Family History     Problem Relation (Age of Onset)    Cancer Mother, Father, Brother    Colon cancer Father    No Known Problems Sister, Maternal Aunt, Maternal Uncle, Paternal Aunt, Paternal Uncle, Maternal Grandmother, Maternal Grandfather, Paternal Grandmother, Paternal Grandfather        Tobacco Use    Smoking status: Never Smoker    Smokeless tobacco: Never Used   Substance and Sexual Activity    Alcohol use: Yes     Alcohol/week: 2.0 standard drinks     Types: 1 Glasses of wine, 1 Cans of beer per week     Frequency: Never     Drinks per session: Patient refused     Binge frequency: Patient  "refused     Comment: "rarely"    Drug use: No    Sexual activity: Not Currently     Review of Systems   Constitutional: Negative for fever.   HENT: Positive for congestion and postnasal drip. Negative for ear discharge, sinus pressure and tinnitus.    Eyes: Negative for itching.   Respiratory: Positive for cough, shortness of breath and wheezing. Negative for apnea.    Cardiovascular: Positive for leg swelling. Negative for chest pain.   Gastrointestinal: Negative for abdominal distention, anal bleeding and blood in stool.   Endocrine: Negative for polyphagia and polyuria.   Genitourinary: Negative for dysuria and hematuria.   Musculoskeletal: Negative for arthralgias and gait problem.   Skin: Positive for color change and wound.   Neurological: Negative for syncope and light-headedness.   Psychiatric/Behavioral: Negative for behavioral problems, decreased concentration, sleep disturbance and suicidal ideas. The patient is not nervous/anxious.      Objective:     Vital Signs (Most Recent):  Temp: 98.5 °F (36.9 °C) (10/11/19 0753)  Pulse: 110 (10/11/19 0932)  Resp: (!) 34 (10/11/19 0932)  BP: (!) 107/55 (10/11/19 0932)  SpO2: (!) 92 % (10/11/19 0932) Vital Signs (24h Range):  Temp:  [98.5 °F (36.9 °C)] 98.5 °F (36.9 °C)  Pulse:  [109-124] 110  Resp:  [22-39] 34  SpO2:  [90 %-94 %] 92 %  BP: (107-144)/(55-63) 107/55     Weight: 65.8 kg (145 lb)  Body mass index is 20.22 kg/m².    Physical Exam   Constitutional: He is oriented to person, place, and time. He appears well-developed and well-nourished.   HENT:   Head: Normocephalic and atraumatic.   Eyes: Pupils are equal, round, and reactive to light. EOM are normal.   Neck: Normal range of motion. Neck supple. No JVD present.   Cardiovascular: Normal rate, regular rhythm and normal heart sounds. Exam reveals no gallop and no friction rub.   No murmur heard.  Pulmonary/Chest: Effort normal. He has wheezes. He exhibits no tenderness.   Abdominal: Soft. Bowel sounds are " normal. He exhibits no distension. There is no tenderness.   Musculoskeletal: Normal range of motion. He exhibits edema. He exhibits no tenderness.   Neurological: He is alert and oriented to person, place, and time.   Skin: Skin is warm. Capillary refill takes less than 2 seconds.   Psychiatric: He has a normal mood and affect.         CRANIAL NERVES     CN III, IV, VI   Pupils are equal, round, and reactive to light.  Extraocular motions are normal.        Significant Labs:   Blood Culture: No results for input(s): LABBLOO in the last 48 hours.  CBC:   Recent Labs   Lab 10/11/19  0819   WBC 7.62   HGB 12.4*   HCT 39.3*   *     CMP:   Recent Labs   Lab 10/11/19  0819      K 4.6      CO2 25   *   BUN 31*   CREATININE 1.2   CALCIUM 10.1   PROT 6.0   ALBUMIN 3.0*   BILITOT 0.8   ALKPHOS 185*   AST 38   ALT 43   ANIONGAP 11   EGFRNONAA 55*     Lactic Acid:   Recent Labs   Lab 10/11/19  0819   LACTATE 2.9*     Pathology Results  (Last 10 years)    None        Troponin: No results for input(s): TROPONINI in the last 48 hours.  TSH:   Recent Labs   Lab 07/15/19  0802   TSH 0.040*     Urine Culture: No results for input(s): LABURIN in the last 48 hours.  Urine Studies: No results for input(s): COLORU, APPEARANCEUA, PHUR, SPECGRAV, PROTEINUA, GLUCUA, KETONESU, BILIRUBINUA, OCCULTUA, NITRITE, UROBILINOGEN, LEUKOCYTESUR, RBCUA, WBCUA, BACTERIA, SQUAMEPITHEL, HYALINECASTS in the last 48 hours.    Invalid input(s): MESERET    Significant Imaging: I have reviewed all pertinent imaging results/findings within the past 24 hours.

## 2019-10-11 NOTE — ASSESSMENT & PLAN NOTE
CXR: Nearly resolved bilateral middle and upper lung zone airspace opacities, lactic acid elevated.  Blood cx   respiratory cx  Flu text negative  Rocephin and Azithromycin  Nebs as needed  continue steroid  Supplemental oxygen: wean as needed   PT/OT

## 2019-10-11 NOTE — ED NOTES
Dr. Nava at bedside assessing and updating pt with plan for inpatient admission. Verbalized understanding. Wife at bedside.

## 2019-10-11 NOTE — ED PROVIDER NOTES
Encounter Date: 10/11/2019    SCRIBE #1 NOTE: I, Michelle Ahumada, am scribing for, and in the presence of,  Dr. Marroquin. I have scribed the entire note.       History     Chief Complaint   Patient presents with    Fatigue     Reports has CLL and over the past 2 days has increased fatigue, worsening cough, chills and body aches.      Time seen by provider: 8:37 AM    This is a 85 y.o. male with a history of CLL who presents to the ED with complaint of increased fatigue over the past 2 days. Patient reports he's been having associated congestion, fever, chills, and body aches. His wife reports he has just finished a cycle of Rituximab about 2.5 weeks ago for his CLL. She says she noticed the patient become weaker after finishing the medication. Patient denies chest pain, abdominal pain, nausea, vomiting, diarrhea, or any other complaints at this time.     The history is provided by the patient and the spouse.     Review of patient's allergies indicates:   Allergen Reactions    Ace inhibitors Swelling     angioedema    Divalproex Hives     Rash under arms, body creases     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    Hyperlipidemia     Hypertension     Hypopituitarism     Hypothyroid     Immunosuppression 3/13/2015    Squamous Cell Carcinoma     on the right side of the face     Squamous cell carcinoma of skin of right temple 1/27/2016    Stroke     Syncope 2/12    Unspecified disorder of kidney and ureter      Past Surgical History:   Procedure Laterality Date    CAROTID ENDARTERECTOMY (L)  2/27/2007    EXCISION TURBINATE, SUBMUCOUS      FESS  9/16/2014    Mohs excision   3/23/2015, 2/24/2016    combined with WLE forehead    NASAL SEPTUM SURGERY  9/16/2014    right ureter surgery  1995    SENTINEL LYMPH NODE BIOPSY  3/23/2015,  "2/24/2016    SINUS SURGERY  9/16/2014    septo, ESS, BITSMR    Transphenoidal surgery       Family History   Problem Relation Age of Onset    Cancer Mother         breast    Colon cancer Father     Cancer Father         colon    Cancer Brother         leukemia    No Known Problems Sister     No Known Problems Maternal Aunt     No Known Problems Maternal Uncle     No Known Problems Paternal Aunt     No Known Problems Paternal Uncle     No Known Problems Maternal Grandmother     No Known Problems Maternal Grandfather     No Known Problems Paternal Grandmother     No Known Problems Paternal Grandfather     Amblyopia Neg Hx     Blindness Neg Hx     Cataracts Neg Hx     Diabetes Neg Hx     Glaucoma Neg Hx     Hypertension Neg Hx     Macular degeneration Neg Hx     Retinal detachment Neg Hx     Strabismus Neg Hx     Stroke Neg Hx     Thyroid disease Neg Hx     Allergic rhinitis Neg Hx     Allergies Neg Hx     Angioedema Neg Hx     Asthma Neg Hx     Atopy Neg Hx     Eczema Neg Hx     Immunodeficiency Neg Hx     Rhinitis Neg Hx     Urticaria Neg Hx      Social History     Tobacco Use    Smoking status: Never Smoker    Smokeless tobacco: Never Used   Substance Use Topics    Alcohol use: Yes     Alcohol/week: 2.0 standard drinks     Types: 1 Glasses of wine, 1 Cans of beer per week     Frequency: Never     Drinks per session: Patient refused     Binge frequency: Patient refused     Comment: "rarely"    Drug use: No     Review of Systems   Constitutional: Positive for chills, fatigue and fever.   HENT: Positive for congestion.    Musculoskeletal: Positive for myalgias.   All other systems reviewed and are negative.      Physical Exam     Initial Vitals [10/11/19 0753]   BP Pulse Resp Temp SpO2   134/62 (!) 119 (!) 22 98.5 °F (36.9 °C) (!) 90 %      MAP       --         Physical Exam    Nursing note and vitals reviewed.  Constitutional: He appears well-developed and well-nourished. No " distress.   HENT:   Head: Normocephalic and atraumatic.   Mouth/Throat: Oropharynx is clear and moist.   No pharyngeal erythema   Eyes: Conjunctivae and EOM are normal. Pupils are equal, round, and reactive to light.   Neck: Normal range of motion. Neck supple. No stridor present. No tracheal deviation present.   Cardiovascular: Regular rhythm and normal heart sounds. Tachycardia present.    No murmur heard.  Pulmonary/Chest: No respiratory distress. He has no wheezes.   Diminished breath sounds bilaterally   Abdominal: Soft. Bowel sounds are normal. There is no tenderness. There is no rebound and no guarding.   Musculoskeletal: Normal range of motion. He exhibits edema. He exhibits no tenderness.   Bilateral LE edema   Neurological: He is alert and oriented to person, place, and time. No sensory deficit.   Skin: Skin is warm and dry. Capillary refill takes less than 2 seconds.   Psychiatric: He has a normal mood and affect. His behavior is normal.         ED Course   Critical Care  Date/Time: 10/11/2019 10:16 AM  Performed by: Oscar Marroquin MD  Authorized by: Oscar Marroquin MD   Direct patient critical care time: 60 minutes  Total critical care time (exclusive of procedural time) : 60 minutes  Critical care was time spent personally by me on the following activities: blood draw for specimens, development of treatment plan with patient or surrogate, discussions with consultants, interpretation of cardiac output measurements, evaluation of patient's response to treatment, examination of patient, obtaining history from patient or surrogate, ordering and performing treatments and interventions, ordering and review of laboratory studies, ordering and review of radiographic studies, pulse oximetry and re-evaluation of patient's condition.        Labs Reviewed   CBC W/ AUTO DIFFERENTIAL - Abnormal; Notable for the following components:       Result Value    RBC 4.02 (*)     Hemoglobin 12.4 (*)      Hematocrit 39.3 (*)     Mean Corpuscular Hemoglobin Conc 31.6 (*)     RDW 15.2 (*)     Platelets 137 (*)     All other components within normal limits   COMPREHENSIVE METABOLIC PANEL - Abnormal; Notable for the following components:    Glucose 146 (*)     BUN, Bld 31 (*)     Albumin 3.0 (*)     Alkaline Phosphatase 185 (*)     eGFR if non  55 (*)     All other components within normal limits   LACTIC ACID, PLASMA - Abnormal; Notable for the following components:    Lactate (Lactic Acid) 2.9 (*)     All other components within normal limits   INFLUENZA A & B BY MOLECULAR   CULTURE, BLOOD   CULTURE, BLOOD     EKG Readings: (Independently Interpreted)   Sinus tachycardia, rate 112, no ST  elevation, normal T waves, no STEMI.       X-Rays:   Independently Interpreted Readings:   Other Readings:  Reviewed by myself, read by radiology.    Imaging Results          X-Ray Chest AP Portable (Final result)  Result time 10/11/19 08:47:03    Final result by Randy Mckeon MD (10/11/19 08:47:03)                 Impression:      Since July 19, 2019, increased bilateral lower lung zone interstitial and ill-defined airspace opacities, more prominent on the left and suspicious for edema or infectious/inflammatory disease.    Nearly resolved bilateral middle and upper lung zone airspace opacities.      Electronically signed by: Randy Mckeon MD  Date:    10/11/2019  Time:    08:47             Narrative:    EXAMINATION:  XR CHEST AP PORTABLE    CLINICAL HISTORY:  Shortness of breath    TECHNIQUE:  Single frontal view of the chest was performed.    COMPARISON:  July 19, 2019    FINDINGS:  Nearly resolved bilateral middle and upper lung zone airspace opacities.    Increased bilateral lower lung zone interstitial and ill-defined airspace opacities, more prominent on the left.    Nearly resolved trace left effusion.  No right effusion.    No pneumothorax.    No acute bone abnormality.                               Medical Decision Making:   Clinical Tests:   Lab Tests: Ordered and Reviewed  Radiological Study: Ordered and Reviewed  Medical Tests: Ordered and Reviewed                   ED Course as of Oct 11 1016   Fri Oct 11, 2019   0926 Discussed patient with Dr. Nava who will admit the patient.    [MA]      ED Course User Index  [MA] Michelle Ahumada     Clinical Impression:     1. SOB (shortness of breath)    2. Pneumonia      Disposition:   Disposition: Admitted  Condition: Fair       I, Dr. Oscar Marroquin, personally performed the services described in this documentation. All medical record entries made by the scribe were at my direction and in my presence. I have reviewed the chart and agree that the record reflects my personal performance and is accurate and complete. Oscar Marroquin MD.  12:18 PM 10/11/2019                   Oscar Marroquin MD  10/11/19 8071

## 2019-10-12 LAB
ANION GAP SERPL CALC-SCNC: 10 MMOL/L (ref 8–16)
BASOPHILS # BLD AUTO: ABNORMAL K/UL (ref 0–0.2)
BASOPHILS NFR BLD: 0 % (ref 0–1.9)
BUN SERPL-MCNC: 26 MG/DL (ref 8–23)
CALCIUM SERPL-MCNC: 9.6 MG/DL (ref 8.7–10.5)
CHLORIDE SERPL-SCNC: 104 MMOL/L (ref 95–110)
CO2 SERPL-SCNC: 26 MMOL/L (ref 23–29)
CREAT SERPL-MCNC: 0.9 MG/DL (ref 0.5–1.4)
DIFFERENTIAL METHOD: ABNORMAL
EOSINOPHIL # BLD AUTO: ABNORMAL K/UL (ref 0–0.5)
EOSINOPHIL NFR BLD: 0 % (ref 0–8)
ERYTHROCYTE [DISTWIDTH] IN BLOOD BY AUTOMATED COUNT: 15.3 % (ref 11.5–14.5)
EST. GFR  (AFRICAN AMERICAN): >60 ML/MIN/1.73 M^2
EST. GFR  (NON AFRICAN AMERICAN): >60 ML/MIN/1.73 M^2
GLUCOSE SERPL-MCNC: 80 MG/DL (ref 70–110)
HCT VFR BLD AUTO: 36.6 % (ref 40–54)
HGB BLD-MCNC: 11.6 G/DL (ref 14–18)
LYMPHOCYTES # BLD AUTO: ABNORMAL K/UL (ref 1–4.8)
LYMPHOCYTES NFR BLD: 47 % (ref 18–48)
MAGNESIUM SERPL-MCNC: 1.9 MG/DL (ref 1.6–2.6)
MCH RBC QN AUTO: 30.9 PG (ref 27–31)
MCHC RBC AUTO-ENTMCNC: 31.7 G/DL (ref 32–36)
MCV RBC AUTO: 97 FL (ref 82–98)
METAMYELOCYTES NFR BLD MANUAL: 1 %
MONOCYTES # BLD AUTO: ABNORMAL K/UL (ref 0.3–1)
MONOCYTES NFR BLD: 12 % (ref 4–15)
NEUTROPHILS # BLD AUTO: ABNORMAL K/UL (ref 1.8–7.7)
NEUTROPHILS NFR BLD: 40 % (ref 38–73)
PHOSPHATE SERPL-MCNC: 3.5 MG/DL (ref 2.7–4.5)
PLATELET # BLD AUTO: 113 K/UL (ref 150–350)
PMV BLD AUTO: 9 FL (ref 9.2–12.9)
POCT GLUCOSE: 145 MG/DL (ref 70–110)
POCT GLUCOSE: 164 MG/DL (ref 70–110)
POCT GLUCOSE: 185 MG/DL (ref 70–110)
POCT GLUCOSE: 71 MG/DL (ref 70–110)
POTASSIUM SERPL-SCNC: 5 MMOL/L (ref 3.5–5.1)
RBC # BLD AUTO: 3.76 M/UL (ref 4.6–6.2)
SODIUM SERPL-SCNC: 140 MMOL/L (ref 136–145)
WBC # BLD AUTO: 6.97 K/UL (ref 3.9–12.7)

## 2019-10-12 PROCEDURE — 36415 COLL VENOUS BLD VENIPUNCTURE: CPT | Mod: HCNC

## 2019-10-12 PROCEDURE — 25000242 PHARM REV CODE 250 ALT 637 W/ HCPCS: Mod: HCNC | Performed by: FAMILY MEDICINE

## 2019-10-12 PROCEDURE — 97161 PT EVAL LOW COMPLEX 20 MIN: CPT | Mod: HCNC

## 2019-10-12 PROCEDURE — 11000001 HC ACUTE MED/SURG PRIVATE ROOM: Mod: HCNC

## 2019-10-12 PROCEDURE — 94761 N-INVAS EAR/PLS OXIMETRY MLT: CPT | Mod: HCNC

## 2019-10-12 PROCEDURE — 63600175 PHARM REV CODE 636 W HCPCS: Mod: HCNC | Performed by: FAMILY MEDICINE

## 2019-10-12 PROCEDURE — 97530 THERAPEUTIC ACTIVITIES: CPT | Mod: HCNC

## 2019-10-12 PROCEDURE — 94640 AIRWAY INHALATION TREATMENT: CPT | Mod: HCNC

## 2019-10-12 PROCEDURE — 27000221 HC OXYGEN, UP TO 24 HOURS: Mod: HCNC

## 2019-10-12 PROCEDURE — 25000003 PHARM REV CODE 250: Mod: HCNC | Performed by: FAMILY MEDICINE

## 2019-10-12 PROCEDURE — 85007 BL SMEAR W/DIFF WBC COUNT: CPT | Mod: HCNC

## 2019-10-12 PROCEDURE — 80048 BASIC METABOLIC PNL TOTAL CA: CPT | Mod: HCNC

## 2019-10-12 PROCEDURE — 83735 ASSAY OF MAGNESIUM: CPT | Mod: HCNC

## 2019-10-12 PROCEDURE — 85027 COMPLETE CBC AUTOMATED: CPT | Mod: HCNC

## 2019-10-12 PROCEDURE — 97116 GAIT TRAINING THERAPY: CPT | Mod: HCNC

## 2019-10-12 PROCEDURE — 84100 ASSAY OF PHOSPHORUS: CPT | Mod: HCNC

## 2019-10-12 RX ORDER — FLUTICASONE PROPIONATE 50 MCG
2 SPRAY, SUSPENSION (ML) NASAL DAILY
Status: DISCONTINUED | OUTPATIENT
Start: 2019-10-12 | End: 2019-10-16 | Stop reason: HOSPADM

## 2019-10-12 RX ORDER — ALBUTEROL SULFATE 2.5 MG/.5ML
2.5 SOLUTION RESPIRATORY (INHALATION) EVERY 4 HOURS PRN
Status: DISCONTINUED | OUTPATIENT
Start: 2019-10-12 | End: 2019-10-16 | Stop reason: HOSPADM

## 2019-10-12 RX ORDER — MONTELUKAST SODIUM 10 MG/1
10 TABLET ORAL DAILY
Status: DISCONTINUED | OUTPATIENT
Start: 2019-10-12 | End: 2019-10-16 | Stop reason: HOSPADM

## 2019-10-12 RX ADMIN — PANTOPRAZOLE SODIUM 40 MG: 40 TABLET, DELAYED RELEASE ORAL at 08:10

## 2019-10-12 RX ADMIN — FLUTICASONE PROPIONATE 100 MCG: 50 SPRAY, METERED NASAL at 11:10

## 2019-10-12 RX ADMIN — SENNOSIDES, DOCUSATE SODIUM 1 TABLET: 50; 8.6 TABLET, FILM COATED ORAL at 08:10

## 2019-10-12 RX ADMIN — MONTELUKAST 10 MG: 10 TABLET, FILM COATED ORAL at 11:10

## 2019-10-12 RX ADMIN — ATORVASTATIN CALCIUM 20 MG: 20 TABLET, FILM COATED ORAL at 08:10

## 2019-10-12 RX ADMIN — PREDNISONE 5 MG: 5 TABLET ORAL at 08:10

## 2019-10-12 RX ADMIN — PREDNISONE 2.5 MG: 2.5 TABLET ORAL at 08:10

## 2019-10-12 RX ADMIN — GUAIFENESIN 600 MG: 600 TABLET, EXTENDED RELEASE ORAL at 08:10

## 2019-10-12 RX ADMIN — CEFTRIAXONE 1 G: 1 INJECTION, SOLUTION INTRAVENOUS at 08:10

## 2019-10-12 RX ADMIN — ALBUTEROL SULFATE 2.5 MG: 2.5 SOLUTION RESPIRATORY (INHALATION) at 11:10

## 2019-10-12 RX ADMIN — ENOXAPARIN SODIUM 40 MG: 100 INJECTION SUBCUTANEOUS at 05:10

## 2019-10-12 RX ADMIN — LOSARTAN POTASSIUM 25 MG: 25 TABLET, FILM COATED ORAL at 08:10

## 2019-10-12 RX ADMIN — CLOPIDOGREL BISULFATE 75 MG: 75 TABLET ORAL at 08:10

## 2019-10-12 RX ADMIN — ALBUTEROL SULFATE 2.5 MG: 2.5 SOLUTION RESPIRATORY (INHALATION) at 04:10

## 2019-10-12 RX ADMIN — CETIRIZINE HYDROCHLORIDE 10 MG: 10 TABLET, FILM COATED ORAL at 08:10

## 2019-10-12 RX ADMIN — AZITHROMYCIN MONOHYDRATE 500 MG: 500 INJECTION, POWDER, LYOPHILIZED, FOR SOLUTION INTRAVENOUS at 11:10

## 2019-10-12 RX ADMIN — POLYETHYLENE GLYCOL 3350 17 G: 17 POWDER, FOR SOLUTION ORAL at 08:10

## 2019-10-12 NOTE — ASSESSMENT & PLAN NOTE
Chronic Rhino sinusitis  . CXR: Nearly resolved bilateral middle and upper lung zone airspace opacities, lactic acid elevated.  Blood cx   respiratory cx  Flu text negative  Rocephin and Azithromycin  Nebs as needed  continue steroid  Supplemental oxygen: wean as needed   PT/OT  Add Singulair and Flonase

## 2019-10-12 NOTE — PLAN OF CARE
10/12/19 1755   Discharge Assessment   Assessment Type Discharge Planning Assessment   Confirmed/corrected address and phone number on facesheet? Yes   Assessment information obtained from? Patient;Caregiver   Prior to hospitilization cognitive status: Alert/Oriented   Prior to hospitalization functional status: Assistive Equipment;Independent   Current cognitive status: Alert/Oriented   Current Functional Status: Independent;Assistive Equipment   Lives With spouse   Able to Return to Prior Arrangements other (see comments)  (TBD)   Is patient able to care for self after discharge? Unable to determine at this time (comments)   Who are your caregiver(s) and their phone number(s)? Lorin Ramos(spouse)[phone] home  [phone] cell   Patient's perception of discharge disposition home or selfcare   Readmission Within the Last 30 Days no previous admission in last 30 days   Patient currently being followed by outpatient case management? No   Patient currently receives any other outside agency services? No   Equipment Currently Used at Home walker, rolling;rollator;bedside commode   Do you have any problems affording any of your prescribed medications? No   Is the patient taking medications as prescribed? yes   Does the patient have transportation home? Yes   Transportation Anticipated family or friend will provide   Does the patient receive services at the [facility]? No   Discharge Plan A Home with family   Discharge Plan B Home Health   DME Needed Upon Discharge    (TBD)   Patient/Family in Agreement with Plan yes   Principal Problem:Pneumonia     Chief Complaint:        Chief Complaint   Patient presents with    Fatigue       Reports has CLL and over the past 2 days has increased fatigue, worsening cough, chills and body aches.          HPI:  Thierry Ramos, is an 86 y/o with a PMH of HTN, DM2, HLD, CVA, hypothyroidism, hypogammaglobinemia, Hep B, chronic sinusitis, and CLL s/p chemo Rituximab 2 weeks ago  (9/30)presents with 2 days hx increasing fatigue, with associated productive cough, fever, chills, and body aches.  He denies nausea, diarrhea and vomiting. CXR reported nearly resolved bilateral middle and upper lung zone airspace opacities, lactic acid elevated.     The Sw met with the pt and his wife who at bedside to complete the assessment. The pt is usually independent with his adl's and his wife assists as needed. The pt uses dme at home. The pt states he used [facility] at his last admit and states they were garbage and doesn't want to use hh again. He states it's a waste of money to utilize hh and he doesn't want it at d/c. The pt's wife transports him around b/c she doesn't allow him to drive anymore. The pt has transportation home at d/c. The Sw wrote her contact info on the white board in the pt's room and gave them a d/c brochure. The Sw encouraged them to call should they have any further questions or concerns.

## 2019-10-12 NOTE — PROGRESS NOTES
[facility] Hospital Medicine  Progress Note    Patient Name: Thierry Ramos Jr.  MRN: [id number]  Patient Class: IP- Inpatient   Admission Date: 10/11/2019  Length of Stay: 1 days  Attending Physician: Neli Chapman*  Primary Care Provider: AKILA Wooten MD        Subjective:     Principal Problem:Pneumonia        HPI:   Thierry Ramos, is an 86 y/o with a PMH of HTN, DM2, HLD, CVA, hypothyroidism, hypogammaglobinemia, Hep B, chronic sinusitis, and CLL s/p chemo Rituximab 2 weeks ago (9/30)presents with 2 days hx increasing fatigue, with associated productive cough, fever, chills, and body aches.  He denies nausea, diarrhea and vomiting. CXR reported nearly resolved bilateral middle and upper lung zone airspace opacities, lactic acid elevated.    Overview/Hospital Course:  No notes on file    Interval History:  Awake and alert, reported increasing post nasal drip. Add Singulair and Flonase    Review of Systems   Constitutional: Negative for fever.   HENT: Negative for congestion.    Respiratory: Negative for cough, shortness of breath and wheezing.    Cardiovascular: Positive for leg swelling. Negative for chest pain.   Gastrointestinal: Negative for abdominal distention, anal bleeding and blood in stool.   Genitourinary: Negative for dysuria and hematuria.   Musculoskeletal: Negative for arthralgias and gait problem.   Skin: Positive for color change and wound.   Neurological: Negative for syncope and light-headedness.   Psychiatric/Behavioral: Negative for behavioral problems, decreased concentration, sleep disturbance and suicidal ideas. The patient is not nervous/anxious.      Objective:     Vital Signs (Most Recent):  Temp: 96.5 °F (35.8 °C) (10/12/19 0508)  Pulse: 75 (10/12/19 0508)  Resp: 16 (10/12/19 0508)  BP: (!) 155/68 (10/12/19 0508)  SpO2: 98 % (10/12/19 0508) Vital Signs (24h Range):  Temp:  [96.5 °F (35.8 °C)-98.5 °F (36.9 °C)] 96.5 °F (35.8 °C)  Pulse:  []  75  Resp:  [16-39] 16  SpO2:  [90 %-100 %] 98 %  BP: (107-155)/(55-71) 155/68     Weight: 70.9 kg (156 lb 4.9 oz)  Body mass index is 21.8 kg/m².    Intake/Output Summary (Last 24 hours) at 10/12/2019 0739  Last data filed at 10/11/2019 2138  Gross per 24 hour   Intake --   Output 400 ml   Net -400 ml      Physical Exam   Constitutional: He is oriented to person, place, and time. He appears well-developed and well-nourished.   HENT:   Head: Normocephalic and atraumatic.   Neck: Neck supple.   Cardiovascular: Normal rate, regular rhythm and normal heart sounds. Exam reveals no gallop and no friction rub.   No murmur heard.  Pulmonary/Chest: Effort normal. He has no wheezes. He exhibits no tenderness.   Abdominal: Soft. Bowel sounds are normal. He exhibits no distension. There is no tenderness.   Musculoskeletal: Normal range of motion. He exhibits edema. He exhibits no tenderness.   Neurological: He is alert and oriented to person, place, and time.   Skin: Skin is warm. Capillary refill takes less than 2 seconds.   Psychiatric: He has a normal mood and affect.       Significant Labs:   A1C:   Recent Labs   Lab 07/16/19  0200   HGBA1C 5.6     ABGs: No results for input(s): PH, PCO2, HCO3, POCSATURATED, BE, TOTALHB, COHB, METHB, O2HB, POCFIO2 in the last 48 hours.  Blood Culture:   Recent Labs   Lab 10/11/19  0832 10/11/19  0854   LABBLOO No Growth to date No Growth to date     BMP:   Recent Labs   Lab 10/12/19  0406   GLU 80      K 5.0      CO2 26   BUN 26*   CREATININE 0.9   CALCIUM 9.6   MG 1.9     CBC:   Recent Labs   Lab 10/11/19  0819   WBC 7.62   HGB 12.4*   HCT 39.3*   *     Lactic Acid:   Recent Labs   Lab 10/11/19  0819   LACTATE 2.9*     TSH:   Recent Labs   Lab 07/15/19  0802   TSH 0.040*     Urine Culture: No results for input(s): LABURIN in the last 48 hours.  Urine Studies: No results for input(s): COLORU, APPEARANCEUA, PHUR, SPECGRAV, PROTEINUA, GLUCUA, KETONESU, BILIRUBINUA,  OCCULTUA, NITRITE, UROBILINOGEN, LEUKOCYTESUR, RBCUA, WBCUA, BACTERIA, SQUAMEPITHEL, HYALINECASTS in the last 48 hours.    Invalid input(s): ANASTASIASUPAMELA    Significant Imaging: I have reviewed all pertinent imaging results/findings within the past 24 hours.      Assessment/Plan:      * Pneumonia  Chronic Rhino sinusitis  . CXR: Nearly resolved bilateral middle and upper lung zone airspace opacities, lactic acid elevated.  Blood cx   respiratory cx  Flu text negative  Rocephin and Azithromycin  Nebs as needed  continue steroid  Supplemental oxygen: wean as needed   PT/OT  Add Singulair and Flonase    Type 2 diabetes, controlled, with neuropathy  HbA1c pending  Diet control  Low dose SSI  Accucheck        Chronic rhinosinusitis  Zyrtec   Cough medication      Central hypothyroidism  TSH is 0.040 on admission  Hold synthroid monitor      History of CVA (cerebrovascular accident)  Continue ASA and statin      CLL (chronic lymphocytic leukemia)  S/p Rituxan on 9/30  Continue Prednisone       Hypertension  Mixed lipidemia  Continue losartan and lipitor          VTE Risk Mitigation (From admission, onward)         Ordered     enoxaparin injection 40 mg  Daily      10/11/19 1123     IP VTE HIGH RISK PATIENT  Once      10/11/19 1123                      Neli Chapman MD  Department of Hospital Medicine   [facility]

## 2019-10-12 NOTE — NURSING
Patient safety maintained. Medications administered per order. Assistance provided for use of bedside commode or to sit up on chair. Telemetry monitoring continued.

## 2019-10-12 NOTE — PLAN OF CARE
VN cued into room.  Pt resting comfortably in bed with call light in reach.  NADN.  No family at bedside.  NC on.  Pt stated he was unable to sleep last night.  Pt had no other needs or complaints at this time.  VN will continue to follow.

## 2019-10-12 NOTE — PROGRESS NOTES
Subjective:       Patient ID: Thierry Ramos Jr. is a 85 y.o. male      Chief Complaint   Patient presents with    Concerns About Ocular Health     History of Present Illness  HPI     DLS 7/10/19     3 mons ck       POHx  1. CRVO w/ ME OD        2. S/p mohs repair with mini [place] stent 2/8/17     3. Basal cell carcinoma of eyelid, right ( Dr. Lee)      Pt is here today with his wife, Yolie, stating just got out the hospital in   ICU for due to low platelets. Pt thinks that vision is not the same   anymore.  Thinks it's blurry.  Pt thinks it's due to medication   -eye pain  -f/f        Gtts None     Last edited by Melvin Ying MD on 10/10/2019 10:56 AM. (History)        Imaging:    See report    Assessment/Plan:     1. Central retinal vein occlusion of right eye, unspecified complication status  IRH completely resolved toay.  No ME/NV/VH  Observe  Pt with CLL  CV risk factor control    - Posterior Segment OCT Retina-Both eyes  - Posterior Segment OCT Retina-Both eyes; Future    2. Epiretinal membrane, right  No traction.  Observe  - Posterior Segment OCT Retina-Both eyes  - Posterior Segment OCT Retina-Both eyes; Future    3. Blepharitis of upper and lower eyelids of both eyes, unspecified type  Ilo HS/HS (pt felt as though it helped).  Hot compresses    4. Dry eye syndrome of both eyes  ATs up to 4/4  Continue Restasis    Follow up in about 6 months (around 4/10/2020), or if symptoms worsen or fail to improve, for OCT Mac, Comprehensive Examination.

## 2019-10-12 NOTE — PT/OT/SLP EVAL
Physical Therapy Evaluation    Patient Name:  Thierry Ramos Jr.   MRN:  [id number]    Recommendations:     Discharge Recommendations:  nursing facility, skilled(TBD pending medical progress)   Discharge Equipment Recommendations: (TBD)   Barriers to discharge: None  Pt's caregiver is his wife so limited physical ability to assist pt if increased burden of care demo'd by discharge    Assessment:     Thierry Ramos Jr. is a 85 y.o. male admitted with a medical diagnosis of Pneumonia.  He presents with the following impairments/functional limitations:  weakness, impaired endurance, impaired self care skills, decreased lower extremity function, impaired balance, gait instability, impaired functional mobilty, pain, impaired skin, edema, decreased coordination, impaired cardiopulmonary response to activity, decreased safety awareness.     Rec cont acute therapy services.  Discharge recs TBD dependent on pt's medical progress.  Rec SNF at current status.  However if pt progresses, may be appropriate for HH or OP therapy services at time of discharge.            Rehab Prognosis: Good; patient would benefit from acute skilled PT services to address these deficits and reach maximum level of function.    Recent Surgery: * No surgery found *      Plan:     During this hospitalization, patient to be seen 6 x/week to address the identified rehab impairments via gait training, therapeutic activities, therapeutic exercises and progress toward the following goals:    · Plan of Care Expires:  11/12/19    Subjective     Chief Complaint: c/o general fatigue, coughing w activity, and c/o soreness of back and buttock wound from general positioning but no specifics or numeric value provided  Patient/Family Comments/goals: pt to return home;  Pt's wife PLOF  Pain/Comfort:  · Pain Rating 1: other (see comments)(reports general discomfort at wound on center of buttock and LE and back stiffness but does not provide numeric value)  · Location  "- Orientation 1: generalized  · Pain Addressed 1: Reposition, Distraction, Nurse notified  · Pain Rating Post-Intervention 1: (no change following session reported)    Patients cultural, spiritual, Worship conflicts given the current situation: no    Living Environment:  Pt lives in [facility] with no RIGOBERTO with wife.  Pt amb independently in home and intermittently w RW at household levels depending oh how he is doing any given day per report.  Pt mod ind w ADL.   Pt's wife drives and pt will use rollator in stores so he has a seat for resting.  Reports fairly active lifestyle.     Equipment used at home: walker, rolling, rollator, bedside commode, shower chair.   Upon discharge, patient will have assistance from his wife as able.    Objective:     Communicated with nsg prior to session.  Patient found up in chair with peripheral IV, oxygen, telemetry(chair alarm)  upon PT entry to room.    General Precautions: Standard, fall   Orthopedic Precautions:N/A   Braces: N/A     Exams:  · Cognitive:  A & O x person, place and month/year of date        Follows one step commands and converses appropriately.  However pt demos a lack of insight into extent of deficits and safety awareness deficits repeatedly stating " I can walk if I want to.  I could walk right out of here if I needed to"  Ed consistently provided for importance of calling for staff to assist w line management and safety for mobility.  · AROM:  BLE WFL  · MMT:  B LE grossly 4/5 throughout with L DF 2+/5         Demo decreased mm endurance       Functional Mobility:  · Bed mobility:  Min/mod assist w VC  · Txfs:  Sit<>stand min assist w VC hand placement and UE use  · Gait:  amb w RW CGA to min assist w RW guidance w change direction and line management w O2 intact as pt amb 3 steps fwd/back and in place 20 steps x 3 reps w seated rest between w increased exertion breathing demo'd;  Initial 2-3steps pt demo clearing of surface in step but then perf all others " without full clearance of floor surface as fatigued and demo'd decreased safety       Therapeutic Activities and Exercises:   extensive time spend with pt and his wife with education for supportive or  foot wear rather than socks, safety w mobility genevieve importance of calling for assistance during hospital stay, safety w RW offering more stability than rollator use at this time, seat cushion options and pressure relief techniques for buttock;  Home environment and safety w mobility;  PT POC w varied possibilities upon discharge to be dependent of pt's progress and MD orders;  Bed mobility ed, txf training, stance bal and endurance act, pregait training and gait training, ed ind ex and x 8 reps AP and LAQ    AM-PAC 6 CLICK MOBILITY  Total Score:15     Patient left up in chair with all lines intact, call button in reach and chair alarm on w nsg informed.    GOALS:   Multidisciplinary Problems     Physical Therapy Goals        Problem: Physical Therapy Goal    Goal Priority Disciplines Outcome Goal Variances Interventions   Physical Therapy Goal     PT, PT/OT Ongoing, Progressing     Description:  Goals to be met by: discharge date     Patient will increase functional independence with mobility by performin. Supine to sit with Modified [place]  2. Sit to supine with Modified [place]  3. Sit to stand transfer with Modified [place]  4. Bed to chair transfer with Stand-by Assistance using Rolling Walker  5. Gait  x 150 feet with Contact Guard Assistance using Rolling Walker.   6. Pt to jose martin Sitting OOB chair > 1 hour.  7. Lower extremity exercise program x15 reps                    History:     Past Medical History:   Diagnosis Date    Actinic keratosis     Anemia     Basal cell carcinoma     Carotid stenosis     CLL (chronic lymphocytic leukemia)     Diabetes mellitus     Steroid related    Hyperlipidemia     Hypertension     Hypopituitarism     Hypothyroid     Immunosuppression  3/13/2015    Squamous Cell Carcinoma     on the right side of the face     Squamous cell carcinoma of skin of right temple 1/27/2016    Stroke     Syncope 2/12    Unspecified disorder of kidney and ureter        Past Surgical History:   Procedure Laterality Date    CAROTID ENDARTERECTOMY (L)  2/27/2007    EXCISION TURBINATE, SUBMUCOUS      FESS  9/16/2014    Mohs excision   3/23/2015, 2/24/2016    combined with WLE forehead    NASAL SEPTUM SURGERY  9/16/2014    right ureter surgery  1995    SENTINEL LYMPH NODE BIOPSY  3/23/2015, 2/24/2016    SINUS SURGERY  9/16/2014    septo, ESS, BITSMR    Transphenoidal surgery         Time Tracking:     PT Received On: 10/12/19  PT Start Time: 1330     PT Stop Time: 1415  PT Total Time (min): 45 min     Billable Minutes: Evaluation 15, Gait Training 15 and Therapeutic Activity 15      Yesenia Swann, PT  10/12/2019

## 2019-10-12 NOTE — PLAN OF CARE
Problem: Physical Therapy Goal  Goal: Physical Therapy Goal  Description  Goals to be met by: discharge date     Patient will increase functional independence with mobility by performin. Supine to sit with Modified [place]  2. Sit to supine with Modified [place]  3. Sit to stand transfer with Modified [place]  4. Bed to chair transfer with Stand-by Assistance using Rolling Walker  5. Gait  x 150 feet with Contact Guard Assistance using Rolling Walker.   6. Pt to jose martin Sitting OOB chair > 1 hour.  7. Lower extremity exercise program x15 reps   Outcome: Ongoing, Progressing     PT Evaluation performed.   Rec cont acute therapy services.  Discharge recs TBD dependent on pt's medical progress.  Rec SNF at current status.  However if pt progresses, may be appropriate for HH or OP therapy services at time of discharge.

## 2019-10-12 NOTE — PLAN OF CARE
Plan of care reviewed with patient, understanding verbalized. Pt remains SR on tele. No complaints of pain or acute distress noted. Instructed to call for any assistance, understanding verbalized. Bed alarm on, call light in reach, fall precautions in place.  Will continue to monitor.

## 2019-10-12 NOTE — NURSING
Patient safety maintained. Telemetry monitoring intimated. Monitoring glucose levels and treating as needed per order. Bedside commode and urinal set up on the room. Assistance provided to patient as needed. Dr. Hoang contacted to inform of patient arrival to the floor from ER and to check on resuming home medications.

## 2019-10-12 NOTE — TELEPHONE ENCOUNTER
FOR DOCUMENTATION ONLY:    Financial Assistance for V[facility] approved from 4/4/2019 to 10/1/2020    Source: [facility] [place]     ID: [id number]  BIN: [id number]  PCN: [id number]  GRP: [id number]    Amount: $4,000

## 2019-10-13 LAB
ANION GAP SERPL CALC-SCNC: 7 MMOL/L (ref 8–16)
BASOPHILS # BLD AUTO: 0.01 K/UL (ref 0–0.2)
BASOPHILS NFR BLD: 0.2 % (ref 0–1.9)
BUN SERPL-MCNC: 23 MG/DL (ref 8–23)
CALCIUM SERPL-MCNC: 10 MG/DL (ref 8.7–10.5)
CHLORIDE SERPL-SCNC: 101 MMOL/L (ref 95–110)
CO2 SERPL-SCNC: 32 MMOL/L (ref 23–29)
CREAT SERPL-MCNC: 0.9 MG/DL (ref 0.5–1.4)
DIFFERENTIAL METHOD: ABNORMAL
EOSINOPHIL # BLD AUTO: 0.1 K/UL (ref 0–0.5)
EOSINOPHIL NFR BLD: 1.4 % (ref 0–8)
ERYTHROCYTE [DISTWIDTH] IN BLOOD BY AUTOMATED COUNT: 15 % (ref 11.5–14.5)
EST. GFR  (AFRICAN AMERICAN): >60 ML/MIN/1.73 M^2
EST. GFR  (NON AFRICAN AMERICAN): >60 ML/MIN/1.73 M^2
GLUCOSE SERPL-MCNC: 99 MG/DL (ref 70–110)
HCT VFR BLD AUTO: 38.7 % (ref 40–54)
HGB BLD-MCNC: 12.4 G/DL (ref 14–18)
LYMPHOCYTES # BLD AUTO: 1.8 K/UL (ref 1–4.8)
LYMPHOCYTES NFR BLD: 31.4 % (ref 18–48)
MAGNESIUM SERPL-MCNC: 2 MG/DL (ref 1.6–2.6)
MCH RBC QN AUTO: 30.8 PG (ref 27–31)
MCHC RBC AUTO-ENTMCNC: 32 G/DL (ref 32–36)
MCV RBC AUTO: 96 FL (ref 82–98)
MONOCYTES # BLD AUTO: 0.5 K/UL (ref 0.3–1)
MONOCYTES NFR BLD: 9.4 % (ref 4–15)
NEUTROPHILS # BLD AUTO: 3.1 K/UL (ref 1.8–7.7)
NEUTROPHILS NFR BLD: 57.6 % (ref 38–73)
PHOSPHATE SERPL-MCNC: 3.9 MG/DL (ref 2.7–4.5)
PLATELET # BLD AUTO: 116 K/UL (ref 150–350)
PMV BLD AUTO: 9.2 FL (ref 9.2–12.9)
POCT GLUCOSE: 131 MG/DL (ref 70–110)
POCT GLUCOSE: 178 MG/DL (ref 70–110)
POCT GLUCOSE: 193 MG/DL (ref 70–110)
POCT GLUCOSE: 98 MG/DL (ref 70–110)
POTASSIUM SERPL-SCNC: 4.5 MMOL/L (ref 3.5–5.1)
RBC # BLD AUTO: 4.03 M/UL (ref 4.6–6.2)
SODIUM SERPL-SCNC: 140 MMOL/L (ref 136–145)
WBC # BLD AUTO: 5.66 K/UL (ref 3.9–12.7)

## 2019-10-13 PROCEDURE — 94761 N-INVAS EAR/PLS OXIMETRY MLT: CPT | Mod: HCNC

## 2019-10-13 PROCEDURE — 25000003 PHARM REV CODE 250: Mod: HCNC | Performed by: FAMILY MEDICINE

## 2019-10-13 PROCEDURE — 94640 AIRWAY INHALATION TREATMENT: CPT | Mod: HCNC

## 2019-10-13 PROCEDURE — 83735 ASSAY OF MAGNESIUM: CPT | Mod: HCNC

## 2019-10-13 PROCEDURE — 27000221 HC OXYGEN, UP TO 24 HOURS: Mod: HCNC

## 2019-10-13 PROCEDURE — 63600175 PHARM REV CODE 636 W HCPCS: Mod: HCNC | Performed by: FAMILY MEDICINE

## 2019-10-13 PROCEDURE — 97165 OT EVAL LOW COMPLEX 30 MIN: CPT | Mod: HCNC

## 2019-10-13 PROCEDURE — 80048 BASIC METABOLIC PNL TOTAL CA: CPT | Mod: HCNC

## 2019-10-13 PROCEDURE — 85025 COMPLETE CBC W/AUTO DIFF WBC: CPT | Mod: HCNC

## 2019-10-13 PROCEDURE — 36415 COLL VENOUS BLD VENIPUNCTURE: CPT | Mod: HCNC

## 2019-10-13 PROCEDURE — 25000242 PHARM REV CODE 250 ALT 637 W/ HCPCS: Mod: HCNC | Performed by: FAMILY MEDICINE

## 2019-10-13 PROCEDURE — 97116 GAIT TRAINING THERAPY: CPT | Mod: HCNC

## 2019-10-13 PROCEDURE — 84100 ASSAY OF PHOSPHORUS: CPT | Mod: HCNC

## 2019-10-13 PROCEDURE — 11000001 HC ACUTE MED/SURG PRIVATE ROOM: Mod: HCNC

## 2019-10-13 RX ADMIN — PREDNISONE 2.5 MG: 2.5 TABLET ORAL at 09:10

## 2019-10-13 RX ADMIN — ENOXAPARIN SODIUM 40 MG: 100 INJECTION SUBCUTANEOUS at 05:10

## 2019-10-13 RX ADMIN — SENNOSIDES, DOCUSATE SODIUM 1 TABLET: 50; 8.6 TABLET, FILM COATED ORAL at 09:10

## 2019-10-13 RX ADMIN — ALBUTEROL SULFATE 2.5 MG: 2.5 SOLUTION RESPIRATORY (INHALATION) at 08:10

## 2019-10-13 RX ADMIN — CEFTRIAXONE 1 G: 1 INJECTION, SOLUTION INTRAVENOUS at 09:10

## 2019-10-13 RX ADMIN — FLUTICASONE PROPIONATE 100 MCG: 50 SPRAY, METERED NASAL at 09:10

## 2019-10-13 RX ADMIN — AZITHROMYCIN MONOHYDRATE 500 MG: 500 INJECTION, POWDER, LYOPHILIZED, FOR SOLUTION INTRAVENOUS at 12:10

## 2019-10-13 RX ADMIN — PANTOPRAZOLE SODIUM 40 MG: 40 TABLET, DELAYED RELEASE ORAL at 09:10

## 2019-10-13 RX ADMIN — GUAIFENESIN 600 MG: 600 TABLET, EXTENDED RELEASE ORAL at 08:10

## 2019-10-13 RX ADMIN — ATORVASTATIN CALCIUM 20 MG: 20 TABLET, FILM COATED ORAL at 09:10

## 2019-10-13 RX ADMIN — PREDNISONE 5 MG: 5 TABLET ORAL at 09:10

## 2019-10-13 RX ADMIN — CETIRIZINE HYDROCHLORIDE 10 MG: 10 TABLET, FILM COATED ORAL at 08:10

## 2019-10-13 RX ADMIN — LOSARTAN POTASSIUM 25 MG: 25 TABLET, FILM COATED ORAL at 09:10

## 2019-10-13 RX ADMIN — CLOPIDOGREL BISULFATE 75 MG: 75 TABLET ORAL at 09:10

## 2019-10-13 RX ADMIN — SENNOSIDES, DOCUSATE SODIUM 1 TABLET: 50; 8.6 TABLET, FILM COATED ORAL at 08:10

## 2019-10-13 RX ADMIN — GUAIFENESIN 600 MG: 600 TABLET, EXTENDED RELEASE ORAL at 09:10

## 2019-10-13 RX ADMIN — MONTELUKAST 10 MG: 10 TABLET, FILM COATED ORAL at 09:10

## 2019-10-13 RX ADMIN — POLYETHYLENE GLYCOL 3350 17 G: 17 POWDER, FOR SOLUTION ORAL at 09:10

## 2019-10-13 NOTE — SUBJECTIVE & OBJECTIVE
Interval History:  Awake and alert, tolerated PT/OT this morning  Repeat CXR in AM and possible discharge home tomorrow.   Continue Rocephin/ Azithromycin and post nasal drip. Add Singulair and Flonase    Baseline, uses scooters in the grocery because his legs gets tired.    Review of Systems   Constitutional: Negative for fever.   HENT: Negative for congestion.    Respiratory: Negative for cough and shortness of breath.    Cardiovascular: Positive for leg swelling. Negative for chest pain.   Gastrointestinal: Negative for nausea.   Musculoskeletal: Negative for arthralgias and gait problem.   Skin: Positive for color change and wound.   Psychiatric/Behavioral: Negative for behavioral problems.     Objective:     Vital Signs (Most Recent):  Temp: 98 °F (36.7 °C) (10/13/19 0450)  Pulse: 77 (10/13/19 0740)  Resp: 16 (10/13/19 0450)  BP: (!) 158/70 (10/13/19 0450)  SpO2: 96 % (10/13/19 0450) Vital Signs (24h Range):  Temp:  [96.9 °F (36.1 °C)-98.4 °F (36.9 °C)] 98 °F (36.7 °C)  Pulse:  [69-88] 77  Resp:  [16-24] 16  SpO2:  [96 %-98 %] 96 %  BP: (128-158)/(62-73) 158/70     Weight: 70 kg (154 lb 5.2 oz)  Body mass index is 21.52 kg/m².    Intake/Output Summary (Last 24 hours) at 10/13/2019 0917  Last data filed at 10/13/2019 0000  Gross per 24 hour   Intake --   Output 970 ml   Net -970 ml      Physical Exam   Constitutional: He is oriented to person, place, and time. He appears well-developed and well-nourished.   HENT:   Head: Normocephalic and atraumatic.   Neck: Neck supple.   Cardiovascular: Normal rate, regular rhythm and normal heart sounds. Exam reveals no gallop and no friction rub.   No murmur heard.  Pulmonary/Chest: Effort normal. He has no wheezes. He exhibits no tenderness.   Abdominal: Soft. Bowel sounds are normal. He exhibits no distension. There is no tenderness.   Musculoskeletal: Normal range of motion. He exhibits edema. He exhibits no tenderness.   Neurological: He is alert and oriented to person,  place, and time.   Skin: Skin is warm. Capillary refill takes less than 2 seconds.   Psychiatric: He has a normal mood and affect.       Significant Labs:   A1C:   Recent Labs   Lab 07/16/19  0200   HGBA1C 5.6     ABGs: No results for input(s): PH, PCO2, HCO3, POCSATURATED, BE, TOTALHB, COHB, METHB, O2HB, POCFIO2 in the last 48 hours.  Blood Culture:   No results for input(s): LABBLOO in the last 48 hours.  BMP:   Recent Labs   Lab 10/13/19  0637   GLU 99      K 4.5      CO2 32*   BUN 23   CREATININE 0.9   CALCIUM 10.0   MG 2.0     CBC:   Recent Labs   Lab 10/12/19  0406 10/13/19  0637   WBC 6.97 5.66   HGB 11.6* 12.4*   HCT 36.6* 38.7*   * 116*     Lactic Acid:   No results for input(s): LACTATE in the last 48 hours.  TSH:   Recent Labs   Lab 07/15/19  0802   TSH 0.040*     Urine Culture: No results for input(s): LABURIN in the last 48 hours.  Urine Studies: No results for input(s): COLORU, APPEARANCEUA, PHUR, SPECGRAV, PROTEINUA, GLUCUA, KETONESU, BILIRUBINUA, OCCULTUA, NITRITE, UROBILINOGEN, LEUKOCYTESUR, RBCUA, WBCUA, BACTERIA, SQUAMEPITHEL, HYALINECASTS in the last 48 hours.    Invalid input(s): MESERET    Significant Imaging: I have reviewed all pertinent imaging results/findings within the past 24 hours.

## 2019-10-13 NOTE — PT/OT/SLP EVAL
Occupational Therapy   Evaluation    Name: Thierry Ramos Jr.  MRN: [id number]  Admitting Diagnosis:  Pneumonia      Recommendations:     Discharge Recommendations: home health PT, home health OT  Discharge Equipment Recommendations:  none  Barriers to discharge:  Decreased caregiver support    Assessment:     Thierry Ramos Jr. is a 85 y.o. male with a medical diagnosis of Pneumonia.  He presents with deconditioning. Performance deficits affecting function: weakness, impaired self care skills, impaired balance, impaired functional mobilty, impaired endurance, gait instability, impaired cognition, decreased safety awareness, pain, decreased ROM, impaired skin, impaired cardiopulmonary response to activity, decreased upper extremity function.      Pt would benefit from cont OT services in order to maximize functional independence. Recommending HHOT/PT       Rehab Prognosis: Good; patient would benefit from acute skilled OT services to address these deficits and reach maximum level of function.       Plan:     Patient to be seen 5 x/week to address the above listed problems via self-care/home management, therapeutic activities, therapeutic exercises  · Plan of Care Expires: 11/13/19  · Plan of Care Reviewed with: patient    Subjective     Chief Complaint: pt reporting pain in LUE from IV site   Patient/Family Comments/goals: return home     Occupational Profile:  Living Environment: wife in H, no steps, WIS   Previous level of function: pt reports mod I   Equipment Used at Home:  walker, rolling, rollator, shower chair, bedside commode TTB  Assistance upon Discharge: unknown     Pain/Comfort:  · Pain Rating 1: (did not rate)    Patients cultural, spiritual, Sikh conflicts given the current situation:      Objective:     Communicated with: cecy prior to session.      General Precautions: Standard, fall   Orthopedic Precautions:N/A   Braces: N/A     Occupational Performance:    Bed Mobility:    · Patient  completed Scooting/Bridging with contact guard assistance  · Patient completed Supine to Sit with contact guard assistance  · Patient completed Sit to Supine with contact guard assistance    Functional Mobility/Transfers:  · Patient completed Sit <> Stand Transfer with contact guard assistance  with  rolling walker   · Patient completed Toilet Transfer Step Transfer technique with minimum assistance with  bedside commode  · Functional Mobility: CGA with RW 2/2 impulsivity     Activities of Daily Living:  · Upper Body Dressing: minimum assistance    · Toileting: moderate assistance clothing management     Cognitive/Visual Perceptual:  Cognitive/Psychosocial Skills:     -       Oriented to: Person, Place, Time and Situation   -       Follows Commands/attention:Follows multistep  commands  -       Communication: clear/fluent  -       Memory: Impaired LTM  -       Safety awareness/insight to disability: impaired   -       Mood/Affect/Coping skills/emotional control: easily agitated     Physical Exam:  Balance:    -       Supervision sitting; CGA- Min A standing   Postural examination/scapula alignment:    -       Rounded shoulders  -       Forward head  -       Abnormal trunk flexion  Skin integrity: Bruising of BUEs   Dominant hand:    -       right  Upper Extremity Range of Motion:   Pt would not fully participate in range assessment; RUE appears WFL for pt's needs based on observed function; LUE would not raise at shoulder 2/2 pain; appears WFL distally   Upper Extremity Strength:  Not formally assessed; minimally 3+/5 Bilaterally based on observed function    Strength:  Good     AMPAC 6 Click ADL:  AMPAC Total Score: 18    Treatment & Education:  Pt educated on safety with RW and mobility prior to session; pt demonstrates poor safety awareness and impulsivity during session; increased agitation noted with continued = cuing for safety    Functional mobility performed in hallway with RW; CGA 2/2 impaired safety  awareness; unable to obtain O2 reading on RA while ambulating   Pt requires cues to maintain within RW boundaries  Returned to room and t/f to BSC with Min A 2/2 pt pushing RW aside and grabbing at touch point objects to t/f to/from EOB<-->BSC; mod A toileting   Education:    Patient left supine with all lines intact, call button in reach, bed alarm on and nsg notified    GOALS:   Multidisciplinary Problems     Occupational Therapy Goals        Problem: Occupational Therapy Goal    Goal Priority Disciplines Outcome Interventions   Occupational Therapy Goal     OT, PT/OT Ongoing, Progressing    Description:  Goals to be met by: 11/13/19     Patient will increase functional independence with ADLs by performing:    LE Dressing with Stand-by Assistance.  Grooming while standing with Stand-by Assistance.  Toileting from toilet with Stand-by Assistance for hygiene and clothing management.   Supine to sit with Stand-by Assistance.  Step transfer with Stand-by Assistance  Toilet transfer to toilet with Stand-by Assistance.  Increased functional strength to WFL for self care skills and functional mobility.  Upper extremity exercise program x10 reps per handout, with independence.                      History:     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    Hyperlipidemia     Hypertension     Hypopituitarism     Hypothyroid     Immunosuppression 3/13/2015    Squamous Cell Carcinoma     on the right side of the face     Squamous cell carcinoma of skin of right temple 1/27/2016    Stroke     Syncope 2/12    Unspecified disorder of kidney and ureter        Past Surgical History:   Procedure Laterality Date    CAROTID ENDARTERECTOMY (L)  2/27/2007    EXCISION TURBINATE, SUBMUCOUS      FESS  9/16/2014    Mohs excision   3/23/2015, 2/24/2016    combined  with WLE forehead    NASAL SEPTUM SURGERY  9/16/2014    right ureter surgery  1995    SENTINEL LYMPH NODE BIOPSY  3/23/2015, 2/24/2016    SINUS SURGERY  9/16/2014    septo, ESS, BITSMR    Transphenoidal surgery         Time Tracking:     OT Date of Treatment: 10/13/19  OT Start Time: 1329  OT Stop Time: 1345  OT Total Time (min): 16 min    Billable Minutes:Evaluation 16    Valeria Bernardo, OT  10/13/2019

## 2019-10-13 NOTE — PLAN OF CARE
Pt tolerated treatment well and is progressing towards goals.  Pt met one goal this treatment session.     Ean Tapia, PT, DPT  10/13/2019

## 2019-10-13 NOTE — PROGRESS NOTES
[facility] Hospital Medicine  Progress Note    Patient Name: Thierry Ramos Jr.  MRN: [id number]  Patient Class: IP- Inpatient   Admission Date: 10/11/2019  Length of Stay: 2 days  Attending Physician: Neli Chapman*  Primary Care Provider: AKILA Wooten MD        Subjective:     Principal Problem:Pneumonia        HPI:   Thierry Ramos, is an 86 y/o with a PMH of HTN, DM2, HLD, CVA, hypothyroidism, hypogammaglobinemia, Hep B, chronic sinusitis, and CLL s/p chemo Rituximab 2 weeks ago (9/30)presents with 2 days hx increasing fatigue, with associated productive cough, fever, chills, and body aches.  He denies nausea, diarrhea and vomiting. CXR reported nearly resolved bilateral middle and upper lung zone airspace opacities, lactic acid elevated.    Overview/Hospital Course:  No notes on file    Interval History:  Awake and alert, tolerated PT/OT this morning  Repeat CXR in AM and possible discharge home tomorrow.   Continue Rocephin/ Azithromycin and post nasal drip. Add Singulair and Flonase    Baseline, uses scooters in the grocery because his legs gets tired.    Review of Systems   Constitutional: Negative for fever.   HENT: Negative for congestion.    Respiratory: Negative for cough and shortness of breath.    Cardiovascular: Positive for leg swelling. Negative for chest pain.   Gastrointestinal: Negative for nausea.   Musculoskeletal: Negative for arthralgias and gait problem.   Skin: Positive for color change and wound.   Psychiatric/Behavioral: Negative for behavioral problems.     Objective:     Vital Signs (Most Recent):  Temp: 98 °F (36.7 °C) (10/13/19 0450)  Pulse: 77 (10/13/19 0740)  Resp: 16 (10/13/19 0450)  BP: (!) 158/70 (10/13/19 0450)  SpO2: 96 % (10/13/19 0450) Vital Signs (24h Range):  Temp:  [96.9 °F (36.1 °C)-98.4 °F (36.9 °C)] 98 °F (36.7 °C)  Pulse:  [69-88] 77  Resp:  [16-24] 16  SpO2:  [96 %-98 %] 96 %  BP: (128-158)/(62-73) 158/70     Weight: 70 kg (154 lb  5.2 oz)  Body mass index is 21.52 kg/m².    Intake/Output Summary (Last 24 hours) at 10/13/2019 0917  Last data filed at 10/13/2019 0000  Gross per 24 hour   Intake --   Output 970 ml   Net -970 ml      Physical Exam   Constitutional: He is oriented to person, place, and time. He appears well-developed and well-nourished.   HENT:   Head: Normocephalic and atraumatic.   Neck: Neck supple.   Cardiovascular: Normal rate, regular rhythm and normal heart sounds. Exam reveals no gallop and no friction rub.   No murmur heard.  Pulmonary/Chest: Effort normal. He has no wheezes. He exhibits no tenderness.   Abdominal: Soft. Bowel sounds are normal. He exhibits no distension. There is no tenderness.   Musculoskeletal: Normal range of motion. He exhibits edema. He exhibits no tenderness.   Neurological: He is alert and oriented to person, place, and time.   Skin: Skin is warm. Capillary refill takes less than 2 seconds.   Psychiatric: He has a normal mood and affect.       Significant Labs:   A1C:   Recent Labs   Lab 07/16/19  0200   HGBA1C 5.6     ABGs: No results for input(s): PH, PCO2, HCO3, POCSATURATED, BE, TOTALHB, COHB, METHB, O2HB, POCFIO2 in the last 48 hours.  Blood Culture:   No results for input(s): LABBLOO in the last 48 hours.  BMP:   Recent Labs   Lab 10/13/19  0637   GLU 99      K 4.5      CO2 32*   BUN 23   CREATININE 0.9   CALCIUM 10.0   MG 2.0     CBC:   Recent Labs   Lab 10/12/19  0406 10/13/19  0637   WBC 6.97 5.66   HGB 11.6* 12.4*   HCT 36.6* 38.7*   * 116*     Lactic Acid:   No results for input(s): LACTATE in the last 48 hours.  TSH:   Recent Labs   Lab 07/15/19  0802   TSH 0.040*     Urine Culture: No results for input(s): LABURIN in the last 48 hours.  Urine Studies: No results for input(s): COLORU, APPEARANCEUA, PHUR, SPECGRAV, PROTEINUA, GLUCUA, KETONESU, BILIRUBINUA, OCCULTUA, NITRITE, UROBILINOGEN, LEUKOCYTESUR, RBCUA, WBCUA, BACTERIA, SQUAMEPITHEL, HYALINECASTS in the last 48  hours.    Invalid input(s): MESERET    Significant Imaging: I have reviewed all pertinent imaging results/findings within the past 24 hours.      Assessment/Plan:      * Pneumonia  Chronic Rhino sinusitis  . CXR: Nearly resolved bilateral middle and upper lung zone airspace opacities, lactic acid elevated.  Blood cx   respiratory cx  Flu text negative  Rocephin and Azithromycin  Nebs as needed  continue steroid  Supplemental oxygen: wean as needed   PT/OT  Add Singulair and Flonase    Type 2 diabetes, controlled, with neuropathy  HbA1c pending  Diet control  Low dose SSI  Accucheck        Chronic rhinosinusitis  Zyrtec   Cough medication      Central hypothyroidism  TSH is 0.040 on admission  Hold synthroid monitor      History of CVA (cerebrovascular accident)  Continue ASA and statin      CLL (chronic lymphocytic leukemia)  S/p Rituxan on 9/30  Continue Prednisone       Hypertension  Mixed lipidemia  Continue losartan and lipitor          VTE Risk Mitigation (From admission, onward)         Ordered     enoxaparin injection 40 mg  Daily      10/11/19 1123     IP VTE HIGH RISK PATIENT  Once      10/11/19 1123                      Neli Chapman MD  Department of Hospital Medicine   [facility]

## 2019-10-13 NOTE — PLAN OF CARE
Patient on oxygen. No Distress Noted. Will continue to monitor. Sats = 99 on 2LPM Nasal Cannula. Flow turned down to 1LPM.

## 2019-10-13 NOTE — PLAN OF CARE
VN cued into pt's room for introduction. VN informed pt and wife that VN would be working along side bedside nurse and PCT throughout shift. Level of present pain assessed. At present no distress noted. Thoroughly discussed with patient and wife today's plan of care and possible discharge tomorrow. Discussed with patient High fall risk protocol and interventions that have been initiated and cont be in place for safety. Patient and wife verbalized clear understanding and cooperation using teach back method. Bed alarm presently activated and in use. Will cont to be available to patient and intervene prn.

## 2019-10-13 NOTE — PT/OT/SLP PROGRESS
"Physical Therapy Treatment    Patient Name:  Thierry Ramos Jr.   MRN:  [id number]    Recommendations:     Discharge Recommendations:  home health PT   Discharge Equipment Recommendations: none   Barriers to discharge: None    Assessment:     Thierry Ramos Jr. is a 85 y.o. male admitted with a medical diagnosis of Pneumonia.  He presents with the following impairments/functional limitations:  weakness, impaired endurance, impaired self care skills, impaired balance, gait instability, impaired functional mobilty. Pt ambulated ~300 feet with RW, initially pt required CGA; however, second half of trial pt ambulated with SBA. Pt would benefit from continued skilled acute care PT services as well as Home Health Physical Therapy upon hospital discharge to improve current impairments and return safely to a more independent functional mobility level.      Rehab Prognosis: Good; patient would benefit from acute skilled PT services to address these deficits and reach maximum level of function.    Recent Surgery: * No surgery found *      Plan:     During this hospitalization, patient to be seen 6 x/week to address the identified rehab impairments via gait training, therapeutic activities, therapeutic exercises and progress toward the following goals:    · Plan of Care Expires:  11/12/19    Subjective     Chief Complaint: "They wont let me move at all, they have all this stuff on me and tell me I have to stay in the bed and call when I need to get up."  Patient/Family Comments/goals: "Move and get out of this bed"   Pain/Comfort:  · Pain Rating 1: 0/10  · Pain Rating Post-Intervention 1: 0/10      Objective:     Communicated with JOCE Chance prior to session.  Patient found HOB elevated with telemetry, oxygen upon PT entry to room. Wife present.     General Precautions: Standard, fall   Orthopedic Precautions:N/A   Braces: N/A     Functional Mobility:  · Bed Mobility:     · Rolling Left:  supervision  · Supine to Sit: " supervision  · Transfers:     · Sit to Stand:  contact guard assistance with rolling walker  · Gait: Pt ambulated ~300 feet with RW and CGA/SBA. Pt demonstrates good balance and gait stability.   · Balance: Good.       AM-PAC 6 CLICK MOBILITY  Turning over in bed (including adjusting bedclothes, sheets and blankets)?: 4  Sitting down on and standing up from a chair with arms (e.g., wheelchair, bedside commode, etc.): 4  Moving from lying on back to sitting on the side of the bed?: 4  Moving to and from a bed to a chair (including a wheelchair)?: 4  Need to walk in hospital room?: 4  Climbing 3-5 steps with a railing?: 3  Basic Mobility Total Score: 23     Patient left HOB elevated with sitting up in bedside couch with wife. RN notified.     GOALS:   Multidisciplinary Problems     Physical Therapy Goals        Problem: Physical Therapy Goal    Goal Priority Disciplines Outcome Goal Variances Interventions   Physical Therapy Goal     PT, PT/OT Ongoing, Progressing     Description:  Goals to be met by: discharge date     Patient will increase functional independence with mobility by performin. Supine to sit with Modified [place]  2. Sit to supine with Modified [place]  3. Sit to stand transfer with Modified [place]  4. Bed to chair transfer with Stand-by Assistance using Rolling Walker  5. Gait  x 150 feet with Contact Guard Assistance using Rolling Walker. MET 10/13/19  6. Pt to jose martin Sitting OOB chair > 1 hour.   7. Lower extremity exercise program x15 reps                     Time Tracking:     PT Received On: 10/13/19  PT Start Time: 0830     PT Stop Time: 0855  PT Total Time (min): 25 min with OT.     Billable Minutes: Evaluation 25 Minutes       PT/PTA: PT     PTA Visit Number: 0     Ean Tapia PT, DPT  10/13/2019

## 2019-10-13 NOTE — PLAN OF CARE
Problem: Occupational Therapy Goal  Goal: Occupational Therapy Goal  Description  Goals to be met by: 11/13/19     Patient will increase functional independence with ADLs by performing:    LE Dressing with Stand-by Assistance.  Grooming while standing with Stand-by Assistance.  Toileting from toilet with Stand-by Assistance for hygiene and clothing management.   Supine to sit with Stand-by Assistance.  Step transfer with Stand-by Assistance  Toilet transfer to toilet with Stand-by Assistance.  Increased functional strength to WFL for self care skills and functional mobility.  Upper extremity exercise program x10 reps per handout, with independence.     Outcome: Ongoing, Progressing    Pt would benefit from cont OT services in order to maximize functional independence. Recommending HHOT/PT at d/c.

## 2019-10-14 ENCOUNTER — TELEPHONE (OUTPATIENT)
Dept: PHARMACY | Facility: CLINIC | Age: 84
End: 2019-10-14

## 2019-10-14 LAB
ANION GAP SERPL CALC-SCNC: 12 MMOL/L (ref 8–16)
BACTERIA SPEC AEROBE CULT: ABNORMAL
BASOPHILS # BLD AUTO: 0.03 K/UL (ref 0–0.2)
BASOPHILS NFR BLD: 0.5 % (ref 0–1.9)
BUN SERPL-MCNC: 23 MG/DL (ref 8–23)
CALCIUM SERPL-MCNC: 9.6 MG/DL (ref 8.7–10.5)
CHLORIDE SERPL-SCNC: 102 MMOL/L (ref 95–110)
CO2 SERPL-SCNC: 26 MMOL/L (ref 23–29)
CREAT SERPL-MCNC: 0.9 MG/DL (ref 0.5–1.4)
DIFFERENTIAL METHOD: ABNORMAL
EOSINOPHIL # BLD AUTO: 0.1 K/UL (ref 0–0.5)
EOSINOPHIL NFR BLD: 1.9 % (ref 0–8)
ERYTHROCYTE [DISTWIDTH] IN BLOOD BY AUTOMATED COUNT: 14.9 % (ref 11.5–14.5)
EST. GFR  (AFRICAN AMERICAN): >60 ML/MIN/1.73 M^2
EST. GFR  (NON AFRICAN AMERICAN): >60 ML/MIN/1.73 M^2
GLUCOSE SERPL-MCNC: 92 MG/DL (ref 70–110)
GRAM STN SPEC: ABNORMAL
HCT VFR BLD AUTO: 38.8 % (ref 40–54)
HGB BLD-MCNC: 12.3 G/DL (ref 14–18)
LYMPHOCYTES # BLD AUTO: 2 K/UL (ref 1–4.8)
LYMPHOCYTES NFR BLD: 31.9 % (ref 18–48)
MAGNESIUM SERPL-MCNC: 2 MG/DL (ref 1.6–2.6)
MCH RBC QN AUTO: 31.1 PG (ref 27–31)
MCHC RBC AUTO-ENTMCNC: 31.7 G/DL (ref 32–36)
MCV RBC AUTO: 98 FL (ref 82–98)
MONOCYTES # BLD AUTO: 0.8 K/UL (ref 0.3–1)
MONOCYTES NFR BLD: 12.1 % (ref 4–15)
NEUTROPHILS # BLD AUTO: 3.2 K/UL (ref 1.8–7.7)
NEUTROPHILS NFR BLD: 53.6 % (ref 38–73)
PHOSPHATE SERPL-MCNC: 3.8 MG/DL (ref 2.7–4.5)
PLATELET # BLD AUTO: 114 K/UL (ref 150–350)
PMV BLD AUTO: 8.8 FL (ref 9.2–12.9)
POCT GLUCOSE: 142 MG/DL (ref 70–110)
POCT GLUCOSE: 150 MG/DL (ref 70–110)
POCT GLUCOSE: 151 MG/DL (ref 70–110)
POCT GLUCOSE: 90 MG/DL (ref 70–110)
POTASSIUM SERPL-SCNC: 4.7 MMOL/L (ref 3.5–5.1)
RBC # BLD AUTO: 3.95 M/UL (ref 4.6–6.2)
SODIUM SERPL-SCNC: 140 MMOL/L (ref 136–145)
WBC # BLD AUTO: 6.34 K/UL (ref 3.9–12.7)

## 2019-10-14 PROCEDURE — 11000001 HC ACUTE MED/SURG PRIVATE ROOM: Mod: HCNC

## 2019-10-14 PROCEDURE — 36415 COLL VENOUS BLD VENIPUNCTURE: CPT | Mod: HCNC

## 2019-10-14 PROCEDURE — 83735 ASSAY OF MAGNESIUM: CPT | Mod: HCNC

## 2019-10-14 PROCEDURE — 94761 N-INVAS EAR/PLS OXIMETRY MLT: CPT | Mod: HCNC

## 2019-10-14 PROCEDURE — 97535 SELF CARE MNGMENT TRAINING: CPT | Mod: HCNC

## 2019-10-14 PROCEDURE — 84100 ASSAY OF PHOSPHORUS: CPT | Mod: HCNC

## 2019-10-14 PROCEDURE — 85025 COMPLETE CBC W/AUTO DIFF WBC: CPT | Mod: HCNC

## 2019-10-14 PROCEDURE — 25000242 PHARM REV CODE 250 ALT 637 W/ HCPCS: Mod: HCNC | Performed by: FAMILY MEDICINE

## 2019-10-14 PROCEDURE — 97530 THERAPEUTIC ACTIVITIES: CPT | Mod: HCNC

## 2019-10-14 PROCEDURE — 97116 GAIT TRAINING THERAPY: CPT | Mod: HCNC | Performed by: PHYSICAL THERAPIST

## 2019-10-14 PROCEDURE — 25000003 PHARM REV CODE 250: Mod: HCNC | Performed by: FAMILY MEDICINE

## 2019-10-14 PROCEDURE — 97110 THERAPEUTIC EXERCISES: CPT | Mod: HCNC | Performed by: PHYSICAL THERAPIST

## 2019-10-14 PROCEDURE — 80048 BASIC METABOLIC PNL TOTAL CA: CPT | Mod: HCNC

## 2019-10-14 PROCEDURE — 27000221 HC OXYGEN, UP TO 24 HOURS: Mod: HCNC

## 2019-10-14 PROCEDURE — 92610 EVALUATE SWALLOWING FUNCTION: CPT | Mod: HCNC

## 2019-10-14 PROCEDURE — 63600175 PHARM REV CODE 636 W HCPCS: Mod: HCNC | Performed by: FAMILY MEDICINE

## 2019-10-14 RX ORDER — CEFEPIME HYDROCHLORIDE 1 G/50ML
1 INJECTION, SOLUTION INTRAVENOUS
Status: DISCONTINUED | OUTPATIENT
Start: 2019-10-14 | End: 2019-10-15

## 2019-10-14 RX ORDER — LEVOTHYROXINE SODIUM 112 UG/1
112 TABLET ORAL
Status: DISCONTINUED | OUTPATIENT
Start: 2019-10-15 | End: 2019-10-16 | Stop reason: HOSPADM

## 2019-10-14 RX ORDER — LEVOFLOXACIN 750 MG/1
750 TABLET ORAL DAILY
Status: DISCONTINUED | OUTPATIENT
Start: 2019-10-14 | End: 2019-10-14

## 2019-10-14 RX ADMIN — CETIRIZINE HYDROCHLORIDE 10 MG: 10 TABLET, FILM COATED ORAL at 08:10

## 2019-10-14 RX ADMIN — MONTELUKAST 10 MG: 10 TABLET, FILM COATED ORAL at 09:10

## 2019-10-14 RX ADMIN — CEFEPIME HYDROCHLORIDE 1 G: 1 INJECTION, SOLUTION INTRAVENOUS at 03:10

## 2019-10-14 RX ADMIN — FLUTICASONE PROPIONATE 100 MCG: 50 SPRAY, METERED NASAL at 09:10

## 2019-10-14 RX ADMIN — ATORVASTATIN CALCIUM 20 MG: 20 TABLET, FILM COATED ORAL at 09:10

## 2019-10-14 RX ADMIN — GUAIFENESIN 600 MG: 600 TABLET, EXTENDED RELEASE ORAL at 08:10

## 2019-10-14 RX ADMIN — SENNOSIDES, DOCUSATE SODIUM 1 TABLET: 50; 8.6 TABLET, FILM COATED ORAL at 08:10

## 2019-10-14 RX ADMIN — ASPIRIN 81 MG 81 MG: 81 TABLET ORAL at 05:10

## 2019-10-14 RX ADMIN — PREDNISONE 5 MG: 5 TABLET ORAL at 09:10

## 2019-10-14 RX ADMIN — CLOPIDOGREL BISULFATE 75 MG: 75 TABLET ORAL at 09:10

## 2019-10-14 RX ADMIN — ENOXAPARIN SODIUM 40 MG: 100 INJECTION SUBCUTANEOUS at 05:10

## 2019-10-14 RX ADMIN — POLYETHYLENE GLYCOL 3350 17 G: 17 POWDER, FOR SOLUTION ORAL at 09:10

## 2019-10-14 RX ADMIN — LEVOFLOXACIN 750 MG: 750 TABLET, FILM COATED ORAL at 09:10

## 2019-10-14 RX ADMIN — LOSARTAN POTASSIUM 25 MG: 25 TABLET, FILM COATED ORAL at 09:10

## 2019-10-14 RX ADMIN — GUAIFENESIN 600 MG: 600 TABLET, EXTENDED RELEASE ORAL at 09:10

## 2019-10-14 RX ADMIN — PANTOPRAZOLE SODIUM 40 MG: 40 TABLET, DELAYED RELEASE ORAL at 09:10

## 2019-10-14 RX ADMIN — PREDNISONE 2.5 MG: 2.5 TABLET ORAL at 09:10

## 2019-10-14 RX ADMIN — SENNOSIDES, DOCUSATE SODIUM 1 TABLET: 50; 8.6 TABLET, FILM COATED ORAL at 09:10

## 2019-10-14 NOTE — PROGRESS NOTES
[facility] Hospital Medicine  Progress Note    Patient Name: Thierry Ramos Jr.  MRN: [id number]  Patient Class: IP- Inpatient   Admission Date: 10/11/2019  Length of Stay: 3 days  Attending Physician: Neli Chapman*  Primary Care Provider: AKILA Wooten MD        Subjective:     Principal Problem:Pneumonia        HPI:   Thierry Ramos, is an 86 y/o with a PMH of HTN, DM2, HLD, CVA, hypothyroidism, hypogammaglobinemia, Hep B, chronic sinusitis, and CLL s/p chemo Rituximab 2 weeks ago (9/30)presents with 2 days hx increasing fatigue, with associated productive cough, fever, chills, and body aches.  He denies nausea, diarrhea and vomiting. CXR reported nearly resolved bilateral middle and upper lung zone airspace opacities, lactic acid elevated.    Overview/Hospital Course:  Improving clinically, resp cx positive for pseudomonas sensitive for Cefepime    Interval History:  Awake and alert, looks a lot better, tolerated PT this morning. Plan for possible discharge today wife request for outpatient therapy as against home health.     Resp cx positive pseudomonas switched to cefepime  Continue Singulair and Flonase    Baseline, uses scooters in the grocery because his legs gets tired.    Review of Systems   Constitutional: Negative for fever.   HENT: Negative for congestion.    Respiratory: Negative for cough and shortness of breath.    Cardiovascular: Negative for chest pain and leg swelling.   Gastrointestinal: Negative for nausea.   Musculoskeletal: Negative for arthralgias and gait problem.   Skin: Positive for color change and wound.   Psychiatric/Behavioral: Negative for behavioral problems.     Objective:     Vital Signs (Most Recent):  Temp: 97.9 °F (36.6 °C) (10/14/19 0753)  Pulse: 73 (10/14/19 0753)  Resp: 18 (10/14/19 0753)  BP: (!) 140/63 (10/14/19 0753)  SpO2: 96 % (10/14/19 0753) Vital Signs (24h Range):  Temp:  [97 °F (36.1 °C)-98.3 °F (36.8 °C)] 97.9 °F (36.6 °C)  Pulse:   [60-81] 73  Resp:  [14-20] 18  SpO2:  [96 %-99 %] 96 %  BP: (120-149)/(56-67) 140/63     Weight: 70 kg (154 lb 5.2 oz)  Body mass index is 21.52 kg/m².    Intake/Output Summary (Last 24 hours) at 10/14/2019 0910  Last data filed at 10/14/2019 0800  Gross per 24 hour   Intake --   Output 800 ml   Net -800 ml      Physical Exam   Constitutional: He is oriented to person, place, and time. He appears well-developed and well-nourished.   HENT:   Head: Normocephalic and atraumatic.   Neck: Neck supple.   Cardiovascular: Normal rate, regular rhythm and normal heart sounds. Exam reveals no gallop and no friction rub.   No murmur heard.  Pulmonary/Chest: Effort normal. He has no wheezes. He exhibits no tenderness.   Abdominal: Soft. Bowel sounds are normal. He exhibits no distension. There is no tenderness.   Musculoskeletal: Normal range of motion. He exhibits edema. He exhibits no tenderness.   Neurological: He is alert and oriented to person, place, and time.   Skin: Skin is warm. Capillary refill takes less than 2 seconds.   Psychiatric: He has a normal mood and affect.       Significant Labs:   A1C:   Recent Labs   Lab 07/16/19  0200   HGBA1C 5.6     ABGs: No results for input(s): PH, PCO2, HCO3, POCSATURATED, BE, TOTALHB, COHB, METHB, O2HB, POCFIO2 in the last 48 hours.  Blood Culture:   No results for input(s): LABBLOO in the last 48 hours.  BMP:   Recent Labs   Lab 10/14/19  0516   GLU 92      K 4.7      CO2 26   BUN 23   CREATININE 0.9   CALCIUM 9.6   MG 2.0     CBC:   Recent Labs   Lab 10/13/19  0637 10/14/19  0516   WBC 5.66 6.34   HGB 12.4* 12.3*   HCT 38.7* 38.8*   * 114*     Lactic Acid:   No results for input(s): LACTATE in the last 48 hours.  TSH:   Recent Labs   Lab 07/15/19  0802   TSH 0.040*     Urine Culture: No results for input(s): LABURIN in the last 48 hours.  Urine Studies: No results for input(s): COLORU, APPEARANCEUA, PHUR, SPECGRAV, PROTEINUA, GLUCUA, KETONESU, BILIRUBINUA,  OCCULTUA, NITRITE, UROBILINOGEN, LEUKOCYTESUR, RBCUA, WBCUA, BACTERIA, SQUAMEPITHEL, HYALINECASTS in the last 48 hours.    Invalid input(s): ANASTASIASUPAMELA    Significant Imaging: I have reviewed all pertinent imaging results/findings within the past 24 hours.      Assessment/Plan:      * Pneumonia  Chronic Rhino sinusitis  . CXR: Nearly resolved bilateral middle and upper lung zone airspace opacities, lactic acid elevated.  Blood cx   respiratory cx  Flu text negative  Rocephin and Azithromycin  Nebs as needed  continue steroid  Supplemental oxygen: wean as needed   PT/OT  Add Singulair and Flonase    Type 2 diabetes, controlled, with neuropathy  HbA1c pending  Diet control  Low dose SSI  Accucheck        Chronic rhinosinusitis  Zyrtec   Cough medication      Central hypothyroidism  TSH is 0.040 on admission  Hold synthroid monitor      History of CVA (cerebrovascular accident)  Continue ASA and statin      CLL (chronic lymphocytic leukemia)  S/p Rituxan on 9/30  Continue Prednisone       Hypertension  Mixed lipidemia  Continue losartan and lipitor          VTE Risk Mitigation (From admission, onward)         Ordered     enoxaparin injection 40 mg  Daily      10/11/19 1123     IP VTE HIGH RISK PATIENT  Once      10/11/19 1123                      Neli Chapman MD  Department of Hospital Medicine   [facility]

## 2019-10-14 NOTE — PLAN OF CARE
VN cued into room.  Pt resting comfortably in bed with call light and personal belongings within reach.  NADN.  Family at bedside.  Meeting going on. VN will continue to be available as needed.

## 2019-10-14 NOTE — PT/OT/SLP EVAL
Speech Language Pathology Evaluation  Bedside Swallow    Patient Name:  Thierry Ramos Jr.   MRN:  [id number]  Admitting Diagnosis: Pneumonia    Recommendations:                 General Recommendations:  Diet f/u to ensure tolerance  Diet recommendations:  Regular, Thin   Aspiration Precautions: lingual sweep s/p intake, 1 bite/sip at a time, Alternating bites/sips, Frequent oral care, HOB to 90 degrees, Meds whole 1 at a time, Monitor for s/s of aspiration, Small bites/sips and Standard aspiration precautions   General Precautions: Standard, aspiration, fall, respiratory  Communication strategies:  none    History:     HPI:  Thierry Ramos, is an 84 y/o with a PMH of HTN, DM2, HLD, CVA, hypothyroidism, hypogammaglobinemia, Hep B, chronic sinusitis, and CLL s/p chemo Rituximab 2 weeks ago (9/30)presents with 2 days hx increasing fatigue, with associated productive cough, fever, chills, and body aches.  He denies nausea, diarrhea and vomiting. CXR reported nearly resolved bilateral middle and upper lung zone airspace opacities, lactic acid elevated.     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    Hyperlipidemia     Hypertension     Hypopituitarism     Hypothyroid     Immunosuppression 3/13/2015    Squamous Cell Carcinoma     on the right side of the face     Squamous cell carcinoma of skin of right temple 1/27/2016    Stroke     Syncope 2/12    Unspecified disorder of kidney and ureter        Past Surgical History:   Procedure Laterality Date    CAROTID ENDARTERECTOMY (L)  2/27/2007    EXCISION TURBINATE, SUBMUCOUS      FESS  9/16/2014    Mohs excision   3/23/2015, 2/24/2016    combined with WLE forehead    NASAL SEPTUM SURGERY  9/16/2014    right ureter surgery  1995    SENTINEL LYMPH NODE BIOPSY  3/23/2015, 2/24/2016    SINUS  "SURGERY  9/16/2014    septo, [facility], [facility]    Transphenoidal surgery         Social History: Patient lives with his wife in [place], [place].    Prior Intubation HX:  None this admit.    Modified Barium Swallow: None per EMR.     Chest X-Rays: FINDINGS:  Cardiac monitoring leads overlie the chest.  Cardiomediastinal silhouette is stable in size.  The lungs demonstrate coarse interstitial markings bilaterally.  There are patchy left basilar opacities, slightly increased in conspicuity from prior examination which could relate to developing infection/pneumonia or aspiration.  There is a possible small volume of left-sided pleural fluid.  The remaining lungs demonstrate no new confluent airspace consolidation.  No evidence of pneumothorax.    Prior diet: Regular/Thin liquids per family report.    Subjective     Pt seen at the bedside for clinical swallow assessment. SLP did check w/ RN, Gal, prior to visit. Pt awake/alert and visiting with wife upon entry. Pt's wife did remain in room for tx. He appeared pleasant and engaged in evaluation.   Patient goals: Pt stated, "My brain is leaking" though later corrected himself "mucous just drips from my nose."     Pain/Comfort:  · Pain Rating 1: 0/10    Objective:     Oral Musculature Evaluation  · Oral Musculature: general weakness  · Structural Abnormalities: (Torus mandibularis present)  · Dentition: present and adequate  · Secretion Management: adequate  · Mucosal Quality: dry  · Mandibular Strength and Mobility: WFL  · Oral Labial Strength and Mobility: impaired pursing, functional retraction, functional seal  · Lingual Strength and Mobility: impaired strength  · Velar Elevation: WFL  · Buccal Strength and Mobility: decreased tone  · Volitional Cough: (elicited)  · Volitional Swallow: (delayed)  · Voice Prior to PO Intake: (clear)    Bedside Swallow Eval: Pt's HOB elevated to 90*. His wife noted pt did not eat lunch 2/2 not liking the taste. Pt agreeable to all PO trials " with SLP. He self fed puree, soft solids, hard solids, and thin liquids.   Consistencies Assessed:  · Thin liquids : straw sips water x4, cyclical  · Puree : tsp bites applesauce x3  · Soft solids : tsp bites diced peaches x3  · Solids : 1 inch bites saltine cracker x2     Oral Phase:   · WFL  · Oral residue: mild, cleared with min A  · Good oral acceptance, adequate rotary chew, timely A-P transfer of bolus    Pharyngeal Phase:   · delayed coughing produced x2 though pt's wife reported chronic cough t/o the day  · delayed swallow initation  · no overt clinical signs/symptoms of aspiration  · no overt clinical signs/symptoms of pharyngeal dysphagia    Compensatory Strategies  · Liquid wash, lingual sweep, and alternating liquids/solids    Treatment: Extensive education provided to pt and wife re: role of SLP, rationale of BSS, results of clinical swallow assessment, swallow precautions, and aspiration risks including aspiration PNA. Pt concerned re: postnasal drip. He reported seeing ENT, , though postnasal drip continued. Pt stated he awakens to thick mucous within throat. Pt instructed to clear mucous from pharynx prior to consuming PO to reduce aspiration risk. He was agreeable to information though noted difficulty clearing mucous. Pt and wife verbalized understanding of taught information; however, impaired memory recall evident in conversation, thus reinforcement needed. Pt will benefit from SLP tx 3x a week while inpatient to ensure swallow safety and modify diet as needed.     Assessment:     Thierry Ramos Jr. is a 85 y.o. male with an admit diagnosis of Pneumonia. Per clinical swallow assessment, pt presents with functional oral and pharyngeal phases of the swallow with minimal s/s of aspiration. Suspect cough unrelated to swallow function at this time. Pt encouraged to implement oral care s/p all meals to reduce risk of aspirating bacteria filled saliva. Diet REC: continue on Regular/Thin  liquid diet given standard aspiration precautions and taught compensatory strategies. SLP did review results of BSS with attending physician, . SLP to f/u while inpatient.     Goals:   Multidisciplinary Problems     SLP Goals        Problem: SLP Goal    Goal Priority Disciplines Outcome   SLP Goal     SLP Ongoing, Progressing   Description:  Short Term Goals:  1. Pt will participate in clinical swallow assessment to determine least restrictive diet.-MET 10/14  2. Pt will consume Regular/Thin liquid diet without any overt s/s of aspiration.  3. Pt will implement taught swallow precautions 100% of the time with meals.                     Plan:     · Patient to be seen:  3 x/week   · Plan of Care expires:  11/12/19  · Plan of Care reviewed with:  patient, spouse(JOCE Santo and  )   · SLP Follow-Up:  Yes       Discharge recommendations:  (TBD)   Barriers to Discharge:  None    Time Tracking:     SLP Treatment Date:   10/14/19  Speech Start Time:  1447  Speech Stop Time:  1514     Speech Total Time (min):  27 min    Billable Minutes: Eval Swallow and Oral Function 15 and Seld Care/Home Management Training 12    Betty Davis CCC-SLP  10/14/2019

## 2019-10-14 NOTE — PLAN OF CARE
Problem: Occupational Therapy Goal  Goal: Occupational Therapy Goal  Description  Goals to be met by: 11/13/19     Patient will increase functional independence with ADLs by performing:    LE Dressing with Stand-by Assistance.  Grooming while standing with Stand-by Assistance.  Toileting from toilet with Stand-by Assistance for hygiene and clothing management.   Supine to sit with Stand-by Assistance.  Step transfer with Stand-by Assistance  Toilet transfer to toilet with Stand-by Assistance.  Increased functional strength to WFL for self care skills and functional mobility.  Upper extremity exercise program x10 reps per handout, with independence.     Outcome: Ongoing, Progressing     Thierry Ramos Jr. is a 85 y.o. male with a medical diagnosis of Pneumonia.  Performance deficits affecting function are weakness, impaired endurance, impaired self care skills, impaired functional mobilty, gait instability, impaired balance, decreased lower extremity function, decreased safety awareness, impaired cardiopulmonary response to activity.  Pt found in bed, agreeable to therapy.  Pt continues to require vc's for RW safety and mgmt. He is somewhat unsteady when turning to sit in chair requiring CGA to regain balance. Pt is progressing towards goals. Continue OT services to address functional goals, progressing as able.  NEL Richardson

## 2019-10-14 NOTE — PLAN OF CARE
Problem: Physical Therapy Goal  Goal: Physical Therapy Goal  Description  Goals to be met by: discharge date     Patient will increase functional independence with mobility by performin. Supine to sit with Modified [place]  2. Sit to supine with Modified [place]  3. Sit to stand transfer with Modified [place] - met 10/14/19  4. Bed to chair transfer with Stand-by Assistance using Rolling Walker  5. Gait  x 150 feet with Contact Guard Assistance using Rolling Walker. MET 10/13/19  6. Pt to jose martin Sitting OOB chair > 1 hour - met 10/14/19  7. Lower extremity exercise program x15 reps    New LTG's:  1.  Sit in chair 2 hours  2.  Ambulate 200' with RW with supervision, approaching chair safely.   Outcome: Ongoing, Progressing

## 2019-10-14 NOTE — NURSING
Progress notes reviewed. Rounds completed. Introduced self as VN for this shift. Educated pt on VN's role in pt care. Educated pt about call light use and fall precautions. Patient on BSC and bed alarm going off. Patient stated he could not wait for staff. Advised patient that he is a fall risk and should not get out of bed without staff being present in the room.  Encouraged patient to use call light before getting out of the bed to ask for assistance. Verbalized understanding  Opportunity given for pt's questions. No questions or concerns expressed at this time. Will continue to monitor closely.        10/13/19 1957   Type of Frequent Check   Type Patient Rounds  (VN Rounds)   Safety/Activity   Patient Rounds bed in low position;bed wheels locked;call light in patient/parent reach;visualized patient   Safety Promotion/Fall Prevention assistive device/personal item within reach;bed alarm set;side rails raised x 2;Fall Risk reviewed with patient/family   Activity Management up to bedside commode - L3   Positioning   Body Position positioned/repositioned independently   Pain/Comfort/Sleep   Preferred Pain Scale number (Numeric Rating Pain Scale)   Comfort/Acceptable Pain Level 0   Pain Rating (0-10): Rest 0

## 2019-10-14 NOTE — PT/OT/SLP PROGRESS
Occupational Therapy   Treatment    Name: Thierry Ramos Jr.  MRN: [id number]  Admitting Diagnosis:  Pneumonia       Recommendations:     Discharge Recommendations: home with home health, home health PT, home health OT  Discharge Equipment Recommendations:  none  Barriers to discharge:  None    Assessment:     Thierry Ramos Jr. is a 85 y.o. male with a medical diagnosis of Pneumonia.  Performance deficits affecting function are weakness, impaired endurance, impaired self care skills, impaired functional mobilty, gait instability, impaired balance, decreased lower extremity function, decreased safety awareness, impaired cardiopulmonary response to activity.  Pt found in bed, agreeable to therapy.  Pt continues to require vc's for RW safety and mgmt. He is somewhat unsteady when turning to sit in chair requiring CGA to regain balance. Pt is progressing towards goals. Continue OT services to address functional goals, progressing as able.      Pt progressing towards goals.  Rehab Prognosis:  Good; patient would benefit from acute skilled OT services to address these deficits and reach maximum level of function.       Plan:     Patient to be seen 5 x/week to address the above listed problems via self-care/home management, therapeutic activities, therapeutic exercises  · Plan of Care Expires: 11/13/19  · Plan of Care Reviewed with: patient    Subjective     Pain/Comfort:  · Pain Rating 1: 0/10  · Pain Rating Post-Intervention 1: 0/10    Objective:     Communicated with: RN prior to session.  Patient found HOB elevated with telemetry, peripheral IV upon OT entry to room.    General Precautions: Standard, fall   Orthopedic Precautions:N/A   Braces: N/A     Occupational Performance:     Bed Mobility:    · Patient completed Rolling/Turning to Right with modified independence  · Patient completed Scooting/Bridging with modified independence  · Patient completed Supine to Sit with modified independence     Functional  Mobility/Transfers:  · Patient completed Sit <> Stand Transfer with stand by assistance  with  no assistive device   · Patient completed Bed <> Chair Transfer using Stand Pivot technique with stand by assistance and contact guard assistance with rolling walker  · Patient completed Toilet Transfer Stand Pivot technique with stand by assistance and contact guard assistance with  rolling walker and grab bars  · Functional Mobility: Pt ambulated room distances with SBA-CGA secondary to LOB when turning to sit without use of RW. Pt is unsafe with RW and requires vc's for safe technique.     Activities of Daily Living:  · Lower Body Dressing: modified independence and seated EOB to jannet socks    · Toileting: stand by assistance        [facility] 6 Click ADL: 19    Treatment & Education:  Pt thoroughly educated on RW safety and mgmt.  Pt moves walker to side when attempting to turn to sit down which causes LOB at times.  Pt instructed to keep RW in front of him at all times.  When approaching chair walker turns with him and when standing at counter walker remains in front.    Pt also educated on home safety to reduce risk of fall ie removing rugs, night lights, having cell phone on body at all times, installing grab bars, etc.  Pt and pt's spouse verbalizes understanding and reports home is setup with all equipment and safety needs.    Patient left up in chair with all lines intact, call button in reach, chair alarm on, RN notified and spouse presentEducation:      GOALS:   Multidisciplinary Problems     Occupational Therapy Goals        Problem: Occupational Therapy Goal    Goal Priority Disciplines Outcome Interventions   Occupational Therapy Goal     OT, PT/OT Ongoing, Progressing    Description:  Goals to be met by: 11/13/19     Patient will increase functional independence with ADLs by performing:    LE Dressing with Stand-by Assistance.  Grooming while standing with Stand-by Assistance.  Toileting from toilet with  Stand-by Assistance for hygiene and clothing management.   Supine to sit with Stand-by Assistance.  Step transfer with Stand-by Assistance  Toilet transfer to toilet with Stand-by Assistance.  Increased functional strength to WFL for self care skills and functional mobility.  Upper extremity exercise program x10 reps per handout, with independence.                      Time Tracking:     OT Date of Treatment: 10/14/19  OT Start Time: 1040  OT Stop Time: 1107  OT Total Time (min): 27 min    Billable Minutes:Self Care/Home Management 15  Therapeutic Activity 12    NEL Richardson  10/14/2019

## 2019-10-14 NOTE — HOSPITAL COURSE
He was put on ceftriaxone. He improved clinically and was weaned off nasal cannula. Sputum culture grew many Pseudomonas aeruginosa, so ceftriaxone was switched to cefepime. Physical and Occupational Therapy recommended home health, but he and his wife preferred outpatient therapy. However, Infectious Disease recommended cefepime until 11/4/19. A right PICC was placed and he was given home health due to IV antibiotic administration.

## 2019-10-14 NOTE — PLAN OF CARE
Patient safety maintained. Medications administered per order. Assistance provided as needed for patient positioning and ambulation. Monitoring glucose levels and treating as needed per order. Continued telemetry monitoring. Therapy working with the patient as tolerated. Difficulty obtaining O2 sat on ambulation, O2 on RA 94%. Ambulatory O2 sat needs to be repeated.

## 2019-10-14 NOTE — PLAN OF CARE
Problem: SLP Goal  Goal: SLP Goal  Description  Short Term Goals:  1. Pt will participate in clinical swallow assessment to determine least restrictive diet.-MET 10/14  2. Pt will consume Regular/Thin liquid diet without any overt s/s of aspiration.  3. Pt will implement taught swallow precautions 100% of the time with meals.    Outcome: Ongoing, Progressing   10/14: Bedside swallow study completed. Coughing produced x2 s/p swallow though no other s/s of aspiration were appreciated. Per wife, pt with frequent coughing t/o the day. Least restrictive diet REC: Regular/Thin liquids given standard aspiration precautions. Pills to be given whole, 1 at a time. Oral care needed s/p all meals. SLP to f/u while inpatient.   PRANAY Camargo, CCC-SLP

## 2019-10-14 NOTE — SUBJECTIVE & OBJECTIVE
Interval History:  Awake and alert, looks a lot better, tolerated PT this morning. Plan for possible discharge today wife request for outpatient therapy as against home health.     Resp cx positive pseudomonas switched to cefepime  Continue Singulair and Flonase    Baseline, uses scooters in the grocery because his legs gets tired.    Review of Systems   Constitutional: Negative for fever.   HENT: Negative for congestion.    Respiratory: Negative for cough and shortness of breath.    Cardiovascular: Negative for chest pain and leg swelling.   Gastrointestinal: Negative for nausea.   Musculoskeletal: Negative for arthralgias and gait problem.   Skin: Positive for color change and wound.   Psychiatric/Behavioral: Negative for behavioral problems.     Objective:     Vital Signs (Most Recent):  Temp: 97.9 °F (36.6 °C) (10/14/19 0753)  Pulse: 73 (10/14/19 0753)  Resp: 18 (10/14/19 0753)  BP: (!) 140/63 (10/14/19 0753)  SpO2: 96 % (10/14/19 0753) Vital Signs (24h Range):  Temp:  [97 °F (36.1 °C)-98.3 °F (36.8 °C)] 97.9 °F (36.6 °C)  Pulse:  [60-81] 73  Resp:  [14-20] 18  SpO2:  [96 %-99 %] 96 %  BP: (120-149)/(56-67) 140/63     Weight: 70 kg (154 lb 5.2 oz)  Body mass index is 21.52 kg/m².    Intake/Output Summary (Last 24 hours) at 10/14/2019 0910  Last data filed at 10/14/2019 0800  Gross per 24 hour   Intake --   Output 800 ml   Net -800 ml      Physical Exam   Constitutional: He is oriented to person, place, and time. He appears well-developed and well-nourished.   HENT:   Head: Normocephalic and atraumatic.   Neck: Neck supple.   Cardiovascular: Normal rate, regular rhythm and normal heart sounds. Exam reveals no gallop and no friction rub.   No murmur heard.  Pulmonary/Chest: Effort normal. He has no wheezes. He exhibits no tenderness.   Abdominal: Soft. Bowel sounds are normal. He exhibits no distension. There is no tenderness.   Musculoskeletal: Normal range of motion. He exhibits edema. He exhibits no tenderness.    Neurological: He is alert and oriented to person, place, and time.   Skin: Skin is warm. Capillary refill takes less than 2 seconds.   Psychiatric: He has a normal mood and affect.       Significant Labs:   A1C:   Recent Labs   Lab 07/16/19  0200   HGBA1C 5.6     ABGs: No results for input(s): PH, PCO2, HCO3, POCSATURATED, BE, TOTALHB, COHB, METHB, O2HB, POCFIO2 in the last 48 hours.  Blood Culture:   No results for input(s): LABBLOO in the last 48 hours.  BMP:   Recent Labs   Lab 10/14/19  0516   GLU 92      K 4.7      CO2 26   BUN 23   CREATININE 0.9   CALCIUM 9.6   MG 2.0     CBC:   Recent Labs   Lab 10/13/19  0637 10/14/19  0516   WBC 5.66 6.34   HGB 12.4* 12.3*   HCT 38.7* 38.8*   * 114*     Lactic Acid:   No results for input(s): LACTATE in the last 48 hours.  TSH:   Recent Labs   Lab 07/15/19  0802   TSH 0.040*     Urine Culture: No results for input(s): LABURIN in the last 48 hours.  Urine Studies: No results for input(s): COLORU, APPEARANCEUA, PHUR, SPECGRAV, PROTEINUA, GLUCUA, KETONESU, BILIRUBINUA, OCCULTUA, NITRITE, UROBILINOGEN, LEUKOCYTESUR, RBCUA, WBCUA, BACTERIA, SQUAMEPITHEL, HYALINECASTS in the last 48 hours.    Invalid input(s): MESERET    Significant Imaging: I have reviewed all pertinent imaging results/findings within the past 24 hours.

## 2019-10-14 NOTE — PT/OT/SLP PROGRESS
Physical Therapy Treatment    Patient Name:  Thierry Ramos Jr.   MRN:  [id number]    Recommendations:     Discharge Recommendations:  home health PT, home health OT   Discharge Equipment Recommendations: none   Barriers to discharge: None    Assessment:     Thierry Ramos Jr. is a 85 y.o. male admitted with a medical diagnosis of Pneumonia.  He presents with the following impairments/functional limitations:  weakness, impaired self care skills, impaired balance, impaired endurance, impaired functional mobilty, gait instability, decreased lower extremity function, impaired cardiopulmonary response to activity.  Pt ambulated 160' x 2 with RW with supervision and vc x 2 to back up to chair prior to letting go of RW.    Rehab Prognosis: Fair; patient would benefit from acute skilled PT services to address these deficits and reach maximum level of function.    Recent Surgery: * No surgery found *      Plan:     During this hospitalization, patient to be seen 6 x/week to address the identified rehab impairments via gait training, therapeutic activities, therapeutic exercises, neuromuscular re-education and progress toward the following goals:    · Plan of Care Expires:  11/14/19    Subjective     Chief Complaint: none  Patient/Family Comments/goals: go home  Pain/Comfort:  · Pain Rating 1: 0/10      Objective:     Communicated with nurse prior to session.  Patient found up in chair with telemetry, oxygen upon PT entry to room.     General Precautions: Standard, fall   Orthopedic Precautions:N/A   Braces:       Functional Mobility:  · Gait:  Pt ambulated 160' x 2 with RW with supervision and vc x 2 to back up to chair prior to letting go of RW.  · Balance: Pt has F/F+ dynamic standing balance      AM-PAC 6 CLICK MOBILITY  Turning over in bed (including adjusting bedclothes, sheets and blankets)?: 4  Sitting down on and standing up from a chair with arms (e.g., wheelchair, bedside commode, etc.): 4  Moving from lying on  back to sitting on the side of the bed?: 4  Moving to and from a bed to a chair (including a wheelchair)?: 4  Need to walk in hospital room?: 3  Climbing 3-5 steps with a railing?: 3  Basic Mobility Total Score: 22       Therapeutic Activities and Exercises:   Pt performed BLE seated LAQ, hip flexion, hip abduction and AP's 10 x 2.  Note decreased RLE DF strength.    Patient left up in chair with all lines intact, call button in reach, chair alarm on, nurse notified and wife present..    GOALS:   Multidisciplinary Problems     Physical Therapy Goals        Problem: Physical Therapy Goal    Goal Priority Disciplines Outcome Goal Variances Interventions   Physical Therapy Goal     PT, PT/OT Ongoing, Progressing     Description:  Goals to be met by: discharge date     Patient will increase functional independence with mobility by performin. Supine to sit with Modified [place]  2. Sit to supine with Modified [place]  3. Sit to stand transfer with Modified [place] - met 10/14/19  4. Bed to chair transfer with Stand-by Assistance using Rolling Walker  5. Gait  x 150 feet with Contact Guard Assistance using Rolling Walker. MET 10/13/19  6. Pt to jose martin Sitting OOB chair > 1 hour - met 10/14/19  7. Lower extremity exercise program x15 reps    New LTG's:  1.  Sit in chair 2 hours  2.  Ambulate 200' with RW with supervision, approaching chair safely.                    Time Tracking:     PT Received On: 10/14/19  PT Start Time: 1126     PT Stop Time: 1154  PT Total Time (min): 28 min     Billable Minutes: Gait Training 14 and Therapeutic Exercise 14    Treatment Type: Treatment  PT/PTA: PT     PTA Visit Number: 0     Mary Carmen Crawford, PT  10/14/2019

## 2019-10-14 NOTE — PLAN OF CARE
Rounded on patient  Wife at bedside    PT/OT recommending home health but patient declining home health- they would like outpatient PT as wife can bring him to/from. Patient has all DME equipment    Future Appointments   Date Time Provider Department Center   10/23/2019  2:00 PM Britney Lobo NP [facility] WOUND [address]   2/19/2020  8:00 AM OSMAR OIC-US1 MASTER [facility]TR [facility]        10/14/19 1021   Discharge Reassessment   Assessment Type Discharge Planning Reassessment   Provided patient/caregiver education on the expected discharge date and the discharge plan Yes   Discharge Plan A Home;Home with family;Other   DME Needed Upon Discharge  none   Patient choice form signed by patient/caregiver No   Anticipated Discharge Disposition Home     Kiki Pineda, RN, CCM, CMSRN  RN Transition Navigator  [phone]

## 2019-10-14 NOTE — PLAN OF CARE
Patient on oxygen with documented flow. Will attempt to wean per O2 order protocol.  Patient given aerosol treatment with no adverse reactions noted. Patient instructed on proper use.

## 2019-10-15 PROBLEM — E29.1 SECONDARY MALE HYPOGONADISM: Chronic | Status: ACTIVE | Noted: 2018-09-19

## 2019-10-15 PROBLEM — R06.02 SHORTNESS OF BREATH: Status: RESOLVED | Noted: 2018-09-03 | Resolved: 2019-10-15

## 2019-10-15 PROBLEM — J15.1 PNEUMONIA OF LEFT LOWER LOBE DUE TO PSEUDOMONAS SPECIES: Status: ACTIVE | Noted: 2019-10-11

## 2019-10-15 PROBLEM — Z71.89 GOALS OF CARE, COUNSELING/DISCUSSION: Status: RESOLVED | Noted: 2019-07-16 | Resolved: 2019-10-15

## 2019-10-15 PROBLEM — Z51.5 PALLIATIVE CARE ENCOUNTER: Status: RESOLVED | Noted: 2019-07-16 | Resolved: 2019-10-15

## 2019-10-15 PROBLEM — R74.8 ABNORMAL LIVER ENZYMES: Status: RESOLVED | Noted: 2019-07-15 | Resolved: 2019-10-15

## 2019-10-15 PROBLEM — E86.0 DEHYDRATION: Status: RESOLVED | Noted: 2019-07-08 | Resolved: 2019-10-15

## 2019-10-15 PROBLEM — I27.20 PULMONARY HTN: Chronic | Status: ACTIVE | Noted: 2019-08-29

## 2019-10-15 PROBLEM — E27.49 SECONDARY ADRENAL INSUFFICIENCY: Chronic | Status: ACTIVE | Noted: 2018-09-19

## 2019-10-15 LAB
ANION GAP SERPL CALC-SCNC: 9 MMOL/L (ref 8–16)
BASOPHILS # BLD AUTO: 0.01 K/UL (ref 0–0.2)
BASOPHILS NFR BLD: 0.2 % (ref 0–1.9)
BUN SERPL-MCNC: 23 MG/DL (ref 8–23)
CALCIUM SERPL-MCNC: 9.7 MG/DL (ref 8.7–10.5)
CHLORIDE SERPL-SCNC: 101 MMOL/L (ref 95–110)
CO2 SERPL-SCNC: 30 MMOL/L (ref 23–29)
CREAT SERPL-MCNC: 1 MG/DL (ref 0.5–1.4)
DIFFERENTIAL METHOD: ABNORMAL
EOSINOPHIL # BLD AUTO: 0.1 K/UL (ref 0–0.5)
EOSINOPHIL NFR BLD: 1.8 % (ref 0–8)
ERYTHROCYTE [DISTWIDTH] IN BLOOD BY AUTOMATED COUNT: 14.8 % (ref 11.5–14.5)
EST. GFR  (AFRICAN AMERICAN): >60 ML/MIN/1.73 M^2
EST. GFR  (NON AFRICAN AMERICAN): >60 ML/MIN/1.73 M^2
GLUCOSE SERPL-MCNC: 91 MG/DL (ref 70–110)
HCT VFR BLD AUTO: 37.1 % (ref 40–54)
HGB BLD-MCNC: 11.6 G/DL (ref 14–18)
LYMPHOCYTES # BLD AUTO: 2 K/UL (ref 1–4.8)
LYMPHOCYTES NFR BLD: 30.8 % (ref 18–48)
MAGNESIUM SERPL-MCNC: 1.8 MG/DL (ref 1.6–2.6)
MCH RBC QN AUTO: 30.4 PG (ref 27–31)
MCHC RBC AUTO-ENTMCNC: 31.3 G/DL (ref 32–36)
MCV RBC AUTO: 97 FL (ref 82–98)
MONOCYTES # BLD AUTO: 0.7 K/UL (ref 0.3–1)
MONOCYTES NFR BLD: 10.8 % (ref 4–15)
NEUTROPHILS # BLD AUTO: 3.5 K/UL (ref 1.8–7.7)
NEUTROPHILS NFR BLD: 56.4 % (ref 38–73)
PHOSPHATE SERPL-MCNC: 3.4 MG/DL (ref 2.7–4.5)
PLATELET # BLD AUTO: 135 K/UL (ref 150–350)
PMV BLD AUTO: 9.3 FL (ref 9.2–12.9)
POCT GLUCOSE: 168 MG/DL (ref 70–110)
POCT GLUCOSE: 169 MG/DL (ref 70–110)
POCT GLUCOSE: 85 MG/DL (ref 70–110)
POCT GLUCOSE: 98 MG/DL (ref 70–110)
POTASSIUM SERPL-SCNC: 4.9 MMOL/L (ref 3.5–5.1)
RBC # BLD AUTO: 3.81 M/UL (ref 4.6–6.2)
SODIUM SERPL-SCNC: 140 MMOL/L (ref 136–145)
WBC # BLD AUTO: 6.56 K/UL (ref 3.9–12.7)

## 2019-10-15 PROCEDURE — 84100 ASSAY OF PHOSPHORUS: CPT | Mod: HCNC

## 2019-10-15 PROCEDURE — 36415 COLL VENOUS BLD VENIPUNCTURE: CPT | Mod: HCNC

## 2019-10-15 PROCEDURE — 92526 ORAL FUNCTION THERAPY: CPT | Mod: HCNC

## 2019-10-15 PROCEDURE — 97535 SELF CARE MNGMENT TRAINING: CPT | Mod: HCNC

## 2019-10-15 PROCEDURE — 83735 ASSAY OF MAGNESIUM: CPT | Mod: HCNC

## 2019-10-15 PROCEDURE — 25000003 PHARM REV CODE 250: Mod: HCNC | Performed by: FAMILY MEDICINE

## 2019-10-15 PROCEDURE — 63600175 PHARM REV CODE 636 W HCPCS: Mod: HCNC | Performed by: FAMILY MEDICINE

## 2019-10-15 PROCEDURE — 97530 THERAPEUTIC ACTIVITIES: CPT | Mod: HCNC

## 2019-10-15 PROCEDURE — 63600175 PHARM REV CODE 636 W HCPCS: Mod: HCNC | Performed by: HOSPITALIST

## 2019-10-15 PROCEDURE — 97116 GAIT TRAINING THERAPY: CPT | Mod: HCNC

## 2019-10-15 PROCEDURE — 11000001 HC ACUTE MED/SURG PRIVATE ROOM: Mod: HCNC

## 2019-10-15 PROCEDURE — 80048 BASIC METABOLIC PNL TOTAL CA: CPT | Mod: HCNC

## 2019-10-15 PROCEDURE — 97110 THERAPEUTIC EXERCISES: CPT | Mod: HCNC

## 2019-10-15 PROCEDURE — 85025 COMPLETE CBC W/AUTO DIFF WBC: CPT | Mod: HCNC

## 2019-10-15 RX ORDER — SODIUM CHLORIDE 0.9 % (FLUSH) 0.9 %
10 SYRINGE (ML) INJECTION EVERY 6 HOURS
Status: DISCONTINUED | OUTPATIENT
Start: 2019-10-16 | End: 2019-10-16 | Stop reason: HOSPADM

## 2019-10-15 RX ORDER — PREDNISOLONE SODIUM PHOSPHATE 15 MG/5ML
2.5 SOLUTION ORAL DAILY
Status: DISCONTINUED | OUTPATIENT
Start: 2019-10-16 | End: 2019-10-16 | Stop reason: HOSPADM

## 2019-10-15 RX ORDER — SODIUM CHLORIDE 0.9 % (FLUSH) 0.9 %
10 SYRINGE (ML) INJECTION
Status: DISCONTINUED | OUTPATIENT
Start: 2019-10-15 | End: 2019-10-16 | Stop reason: HOSPADM

## 2019-10-15 RX ORDER — CEFEPIME HYDROCHLORIDE 2 G/50ML
2 INJECTION, SOLUTION INTRAVENOUS
Status: DISCONTINUED | OUTPATIENT
Start: 2019-10-15 | End: 2019-10-16 | Stop reason: HOSPADM

## 2019-10-15 RX ORDER — CEFDINIR 300 MG/1
300 CAPSULE ORAL 2 TIMES DAILY
Qty: 4 CAPSULE | Refills: 0 | Status: SHIPPED | OUTPATIENT
Start: 2019-10-15 | End: 2019-10-15 | Stop reason: HOSPADM

## 2019-10-15 RX ORDER — CEFEPIME HYDROCHLORIDE 2 G/50ML
2 INJECTION, SOLUTION INTRAVENOUS EVERY 12 HOURS
Qty: 1900 ML | Refills: 0
Start: 2019-10-16 | End: 2019-11-04

## 2019-10-15 RX ADMIN — PREDNISONE 2.5 MG: 2.5 TABLET ORAL at 10:10

## 2019-10-15 RX ADMIN — FLUTICASONE PROPIONATE 100 MCG: 50 SPRAY, METERED NASAL at 10:10

## 2019-10-15 RX ADMIN — Medication 10 ML: at 11:10

## 2019-10-15 RX ADMIN — CEFEPIME HYDROCHLORIDE 2 G: 2 INJECTION, SOLUTION INTRAVENOUS at 09:10

## 2019-10-15 RX ADMIN — SENNOSIDES, DOCUSATE SODIUM 1 TABLET: 50; 8.6 TABLET, FILM COATED ORAL at 09:10

## 2019-10-15 RX ADMIN — CETIRIZINE HYDROCHLORIDE 10 MG: 10 TABLET, FILM COATED ORAL at 09:10

## 2019-10-15 RX ADMIN — PANTOPRAZOLE SODIUM 40 MG: 40 TABLET, DELAYED RELEASE ORAL at 10:10

## 2019-10-15 RX ADMIN — GUAIFENESIN 600 MG: 600 TABLET, EXTENDED RELEASE ORAL at 10:10

## 2019-10-15 RX ADMIN — GUAIFENESIN 600 MG: 600 TABLET, EXTENDED RELEASE ORAL at 09:10

## 2019-10-15 RX ADMIN — CEFEPIME HYDROCHLORIDE 1 G: 1 INJECTION, SOLUTION INTRAVENOUS at 03:10

## 2019-10-15 RX ADMIN — ENOXAPARIN SODIUM 40 MG: 100 INJECTION SUBCUTANEOUS at 05:10

## 2019-10-15 RX ADMIN — PREDNISONE 5 MG: 5 TABLET ORAL at 10:10

## 2019-10-15 RX ADMIN — SENNOSIDES, DOCUSATE SODIUM 1 TABLET: 50; 8.6 TABLET, FILM COATED ORAL at 10:10

## 2019-10-15 RX ADMIN — CLOPIDOGREL BISULFATE 75 MG: 75 TABLET ORAL at 10:10

## 2019-10-15 RX ADMIN — LOSARTAN POTASSIUM 25 MG: 25 TABLET, FILM COATED ORAL at 10:10

## 2019-10-15 RX ADMIN — LEVOTHYROXINE SODIUM 112 MCG: 112 TABLET ORAL at 05:10

## 2019-10-15 RX ADMIN — MONTELUKAST 10 MG: 10 TABLET, FILM COATED ORAL at 10:10

## 2019-10-15 RX ADMIN — ATORVASTATIN CALCIUM 20 MG: 20 TABLET, FILM COATED ORAL at 10:10

## 2019-10-15 RX ADMIN — POLYETHYLENE GLYCOL 3350 17 G: 17 POWDER, FOR SOLUTION ORAL at 10:10

## 2019-10-15 NOTE — PT/OT/SLP PROGRESS
"Speech Language Pathology Treatment    Patient Name:  Thierry Ramos Jr.   MRN:  [id number]  Admitting Diagnosis: Pneumonia of left lower lobe due to Pseudomonas species    Recommendations:                 General Recommendations:  Follow-up not indicated  Diet recommendations:  Regular, Liquid Diet Level: Thin   Aspiration Precautions: 1 bite/sip at a time, Alternating bites/sips, Meds whole 1 at a time, Remain upright 30 minutes post meal, Small bites/sips and Standard aspiration precautions   General Precautions: Standard, aspiration, fall, respiratory  Communication strategies:  none    Subjective     Pt was awake and alert laying in bed upon entering the room. Pt was oriented x4 and was willing to participate in PO trials. Pt's wife was present and reported that the pt does not want to eat the food he is given and that his appetite began decreasing a/b 2 years ago. She reported that she brings him protein shakes daily, since he is only willing to two bites of food during each meal. Pt's wife also reported that she brought him a breakfast croissant that he ate entirely; however, pt reported that he forced himself to eat it. Pt requested soups and to go home. Both pt and his wife reported that they were confused on when pt would be discharged. Pt reported that he felt as if he were in "limbo," but that he was not in pain, though his wife reported that pt reported pain in backside sore and throat that morning.  Patient goals: "I feel comfortable." "I don't have an appetite."    Pain/Comfort:  · Pain Rating 1: 0/10    Objective:     Has the patient been evaluated by SLP for swallowing?   Yes  Keep patient NPO? No   Current Respiratory Status: room air      Pt safely consumed 1 square of cracker and two self-regulated, straw sips of water. Pt did not display any s/s of aspiration. Pt observed to have adequate oral intake and functional swallow.    Assessment:     Thierry Ramos Jr. is a 85 y.o. male with an " admitting diagnosis of Pneumonia of left lower lobe due to Pseudomonas species. Pt was being monitored to ensure he was still safe for his current diet. He does not present with any s/s aspiration, adequate oral intake and a functional swallow. Pt is safe to continue regular diet with thin liquids.    Goals:   Multidisciplinary Problems     SLP Goals        Problem: SLP Goal    Goal Priority Disciplines Outcome   SLP Goal     SLP Ongoing, Progressing   Description:  Short Term Goals:  1. Pt will participate in clinical swallow assessment to determine least restrictive diet.-MET 10/14  2. Pt will consume Regular/Thin liquid diet without any overt s/s of aspiration.  3. Pt will implement taught swallow precautions 100% of the time with meals.                     Plan:     · Patient to be seen:  3 x/week   · Plan of Care expires:  11/12/19  · Plan of Care reviewed with:  patient, spouse(JOCE Santo and  )   · SLP Follow-Up:  No       Discharge recommendations:  home   Barriers to Discharge:  None    Time Tracking:     SLP Treatment Date:   10/15/19  Speech Start Time:  0950  Speech Stop Time:  1015     Speech Total Time (min):  25 min    Billable Minutes: Treatment Swallowing Dysfunction 25    CARMELINA Quan  10/15/2019

## 2019-10-15 NOTE — DISCHARGE SUMMARY
[facility] Hospital Medicine  Discharge Summary      Patient Name: Thierry Ramos Jr.  MRN: [id number]  Admission Date: 10/11/2019  Hospital Length of Stay: 5 days  Discharge Date and Time: 10/16/2019 12:24 PM  Attending Physician: Jl Kimbrough MD   Discharging Provider: Jl Kimbrough MD  Primary Care Provider: AKILA Wooten MD      HPI:   Thierry Ramos Jr. is a 85 y.o. white man with panhypopituitarism, secondary adrenal insufficiency, secondary hypogonadism, central hypothyroidism, diabetes mellitus type 2 with neuropathy, hypertension, history of stroke on 12/16/06 status post left carotid endarterectomy on 2/27/07, dyslipidemia, chronic lymphocytic leukemia (treated with rituximab), hypogammaglobulinemia, immune thrombocytopenia, pulmonary hypertension, chronic rhinosinusitis. He lives in [place], [place]. He is . His primary care physician is Dr. CAROLE Wooten.   He presented to [facility] Emergency Department on 10/11/19 with 2 days of fatigue, productive cough, fever, chills, and body aches. Chest X-ray showed increased bilateral lower lung zone interstitial and ill-defined airspace opacities, more prominent on the left. Temperature and WBC count were normal. Lactate was 2.9. He was admitted to [facility].       Hospital Course:   He was put on ceftriaxone. He improved clinically and was weaned off nasal cannula. Sputum culture grew many Pseudomonas aeruginosa, so ceftriaxone was switched to cefepime. Physical and Occupational Therapy recommended home health, but he and his wife preferred outpatient therapy. However, Infectious Disease recommended cefepime until 11/4/19. A right PICC was placed and he was given home health due to IV antibiotic administration.     Consults:   Consults (From admission, onward)        Status Ordering Provider     Inpatient consult to Infectious Diseases  Once     Provider:  Delicia Phelps MD     Completed ABADCO, JEYSON A     Inpatient consult to PICC team (MARKYS)  Once     Provider:  (Not yet assigned)    Acknowledged ARTUR JEYSON A      Physical Therapy, Occupational Therapy, Speech Therapy    Final Active Diagnoses:    Diagnosis Date Noted POA    PRINCIPAL PROBLEM:  Pneumonia of left lower lobe due to Pseudomonas species [J15.1] 10/11/2019 Yes    Pulmonary HTN [I27.20] 08/29/2019 Yes     Chronic    Secondary adrenal insufficiency [E27.49] 09/19/2018 Yes     Chronic    Secondary male hypogonadism [E29.1] 09/19/2018 Yes     Chronic    Type 2 diabetes, controlled, with neuropathy [E11.40] 03/02/2016 Yes     Chronic    Hypogammaglobulinemia [D80.1] 01/12/2015 Yes     Chronic    Chronic rhinosinusitis [J32.9] 09/16/2014 Yes     Chronic    History of CVA (cerebrovascular accident) [Z86.73] 05/31/2014 Not Applicable     Chronic    Central hypothyroidism [E03.8] 05/31/2014 Yes     Chronic    Immune thrombocytopenia [D69.3] 05/31/2014 Yes     Chronic    CLL (chronic lymphocytic leukemia) [C91.10] 12/04/2012 Yes     Chronic    Panhypopituitarism [E23.0] 12/04/2012 Yes     Chronic    Hypertension [I10] 07/26/2012 Yes     Chronic    Mixed dyslipidemia [E78.2] 07/26/2012 Yes     Chronic      Problems Resolved During this Admission:       Discharged Condition: good    Disposition: [facility]-Health Care [facility]    Follow Up:  Follow-up Information     P Juve Wooten MD On 11/15/2019.    Specialty:  Internal Medicine  Why:  2:00pm  Contact information:  [address] [address]  [phone]             [facility] - Infectious Disease.    Specialty:  Infectious Diseases  Contact information:  [address]  [facility] [address] [phone]               Patient Instructions:      Ambulatory Referral to Infectious Disease   Referral Priority: Routine Referral Type: Consultation   Referral Reason: Specialty Services Required   Requested Specialty: Infectious  Diseases   Number of Visits Requested: 1     Ambulatory referral to Home Health   Referral Priority: Routine Referral Type: Home Health   Referral Reason: Specialty Services Required   Requested Specialty: Home Health Services   Number of Visits Requested: 1     Diet Adult Regular     Notify your health care provider if you experience any of the following:  persistent nausea and vomiting or diarrhea     Notify your health care provider if you experience any of the following:  difficulty breathing or increased cough     Notify your health care provider if you experience any of the following:  increased confusion or weakness     Activity as tolerated       Significant Diagnostic Studies:   X-Ray Chest AP Portable 10/11/19: FINDINGS:  Nearly resolved bilateral middle and upper lung zone airspace opacities.  Increased bilateral lower lung zone interstitial and ill-defined airspace opacities, more prominent on the left.  Nearly resolved trace left effusion.  No right effusion.  No pneumothorax.  No acute bone abnormality.  Impression: Since July 19, 2019, increased bilateral lower lung zone interstitial and ill-defined airspace opacities, more prominent on the left and suspicious for edema or infectious/inflammatory disease.  Nearly resolved bilateral middle and upper lung zone airspace opacities.  X-Ray Chest AP Portable 10/14/19: FINDINGS:  Cardiac monitoring leads overlie the chest.  Cardiomediastinal silhouette is stable in size.  The lungs demonstrate coarse interstitial markings bilaterally.  There are patchy left basilar opacities, slightly increased in conspicuity from prior examination which could relate to developing infection/pneumonia or aspiration.  There is a possible small volume of left-sided pleural fluid.  The remaining lungs demonstrate no new confluent airspace consolidation.  No evidence of pneumothorax.   X-Ray Chest AP Portable 10/15/19: FINDINGS:  Right PICC catheter tip projects over the distal  SVC.  There is no pneumothorax or significant interval detrimental change in the cardiopulmonary status since the previous exam.  There is interval improvement of aeration involving the left lower lung zone.    Medications:  Reconciled Home Medications:      Medication List      START taking these medications    ceFEPIme in dextrose 5% 2 gram/50 mL Pgbk  Commonly known as:  MAXIPIME  Inject 50 mLs (2 g total) into the vein every 12 (twelve) hours. End date 11/4/19. for 19 days        CHANGE how you take these medications    tazarotene 0.1 % cream  Commonly known as:  AVAGE  Apply topically every evening.  What changed:    · when to take this  · reasons to take this        CONTINUE taking these medications    ACCU-CHEK FASTCLIX LANCING DEV Misc  Generic drug:  lancets  TEST  BLOOD  GLUCOSE FOUR TIMES DAILY     alcohol swabs Padm  APPLY 1 PAD TOPICALLY  AS NEEDED.     aspirin 81 MG Chew  Take 81 mg by mouth every Mon, Wed, Fri.     atorvastatin 20 MG tablet  Commonly known as:  LIPITOR  TAKE 1 TABLET EVERY DAY     cyanocobalamin (vitamin B-12) 5,000 mcg Tbdl  Take 1 tablet by mouth once daily.     cycloSPORINE 0.05 % ophthalmic emulsion  Commonly known as:  RESTASIS  Place 0.4 mLs (1 drop total) into both eyes 2 (two) times daily.     erythromycin ophthalmic ointment  Commonly known as:  ROMYCIN  Apply to eyelids and lashes OU QHS     FISH OIL ORAL  Take 5 capsules by mouth once daily.     fluorouracil 5 % cream  Commonly known as:  EFUDEX  Use hs for 2 weeks on right cheek     levothyroxine 125 MCG tablet  Commonly known as:  SYNTHROID  TAKE 1 TABLET ONE TIME DAILY     losartan 25 MG tablet  Commonly known as:  COZAAR  Take 1 tablet (25 mg total) by mouth once daily.     multivitamin with minerals tablet  Take 1 tablet by mouth once daily.     pantoprazole 40 MG tablet  Commonly known as:  PROTONIX  TAKE 1 TABLET EVERY DAY     EDEN-COLACE ORAL  Take 1 tablet by mouth once daily.     PLAVIX 75 mg tablet  Generic drug:   clopidogrel  Take 75 mg by mouth once daily.     * predniSONE 5 MG tablet  Commonly known as:  DELTASONE  Take 5 mg by mouth once daily.     * predniSONE 2.5 MG tablet  Commonly known as:  DELTASONE  Take 1 tablet by mouth once daily. 5mg and 2.5mg qd     ROBITUSSIN COUGH & COLD CF MAX ORAL  Take 15 mLs by mouth 2 (two) times daily.     SYMBICORT 160-4.5 mcg/actuation Hfaa  Generic drug:  budesonide-formoterol 160-4.5 mcg  Inhale 1 puff into the lungs as needed.     testosterone cypionate 100 mg/mL injection  Commonly known as:  DEPOTESTOTERONE CYPIONATE  Inject 0.5 mLs (50 mg total) into the muscle every 14 (fourteen) days.     VEMLIDY 25 mg Tab  Generic drug:  tenofovir alafenamide  Take 25 mg by mouth once daily.     VENTOLIN HFA 90 mcg/actuation inhaler  Generic drug:  albuterol  Inhale 1 puff into the lungs as needed.         * This list has 2 medication(s) that are the same as other medications prescribed for you. Read the directions carefully, and ask your doctor or other care provider to review them with you.                Indwelling Lines/Drains at time of discharge: Right arm PICC    Time spent on the discharge of patient: 35 minutes  Patient was seen and examined on the date of discharge and determined to be suitable for discharge.         Jl Kimbrough MD  Department of Hospital Medicine  [facility]

## 2019-10-15 NOTE — PLAN OF CARE
Problem: Occupational Therapy Goal  Goal: Occupational Therapy Goal  Description  Goals to be met by: 11/13/19     Patient will increase functional independence with ADLs by performing:    LE Dressing with Stand-by Assistance.  Grooming while standing with Stand-by Assistance.  Toileting from toilet with Stand-by Assistance for hygiene and clothing management.   Supine to sit with Stand-by Assistance.  Step transfer with Stand-by Assistance  Toilet transfer to toilet with Stand-by Assistance.  Increased functional strength to WFL for self care skills and functional mobility.  Upper extremity exercise program x10 reps per handout, with independence.     Outcome: Ongoing, Progressing    Thierry Ramos Jr. is a 85 y.o. male with a medical diagnosis of Pneumonia of left lower lobe due to Pseudomonas species.  Performance deficits affecting function are weakness, impaired endurance, impaired self care skills, impaired functional mobilty, gait instability, impaired balance, decreased upper extremity function, decreased lower extremity function, decreased safety awareness, impaired skin, impaired cardiopulmonary response to activity. Pt found in bed, agreeable to therapy.  Pt continues with decreased overall strength and endurance.  He is unsteady during ambulation and requires vc's for safe use of RW. Good tolerance of therapy. Continue OT services to address functional goals, progressing as able.  RANDI Richardson/L

## 2019-10-15 NOTE — PT/OT/SLP PROGRESS
Physical Therapy Treatment    Patient Name:  Thierry Ramos Jr.   MRN:  [id number]    Recommendations:     Discharge Recommendations:  home with home health   Discharge Equipment Recommendations: none   Barriers to discharge: decreased mobility,endurance and strength    Assessment:     Thierry Ramos Jr. is a 85 y.o. male admitted with a medical diagnosis of Pneumonia of left lower lobe due to Pseudomonas species.  He presents with the following impairments/functional limitations:  weakness, impaired endurance, impaired functional mobilty, gait instability, impaired balance, decreased lower extremity function, decreased safety awareness, decreased ROM, impaired coordination,pt with improving status and remains with decreased endurance and strength,pt will benefit from  services upon discharge.    Rehab Prognosis: Good; patient would benefit from acute skilled PT services to address these deficits and reach maximum level of function.    Recent Surgery: * No surgery found *      Plan:     During this hospitalization, patient to be seen 6 x/week to address the identified rehab impairments via gait training, therapeutic activities, neuromuscular re-education and progress toward the following goals:    · Plan of Care Expires:  11/14/19    Subjective     Chief Complaint: n/a  Patient/Family Comments/goals: pt sat up in chair earlier.  Pain/Comfort:  · Pain Rating 1: 0/10      Objective:     Communicated with nsg prior to session.  Patient found supine with bed alarm, telemetry upon PT entry to room.     General Precautions: Standard, aspiration, fall, respiratory   Orthopedic Precautions:N/A   Braces: N/A     Functional Mobility:  · Bed Mobility:     · Supine to Sit: modified independence  · Transfers:     · Sit to Stand:  modified independence with rolling walker  · Bed to Chair: modified independence and supervision with  rolling walker  using  Step Transfer  · Gait: amb ~250' X 1 with RW an S  · Balance: fair  standing balance with RW      AM-PAC 6 CLICK MOBILITY  Turning over in bed (including adjusting bedclothes, sheets and blankets)?: 4  Sitting down on and standing up from a chair with arms (e.g., wheelchair, bedside commode, etc.): 4  Moving from lying on back to sitting on the side of the bed?: 4  Moving to and from a bed to a chair (including a wheelchair)?: 4  Need to walk in hospital room?: 3  Climbing 3-5 steps with a railing?: 3  Basic Mobility Total Score: 22       Therapeutic Activities and Exercises:        Patient left up in chair with all lines intact, call button in reach, chair alarm on and spouse present..    GOALS: see general POC  Multidisciplinary Problems     Physical Therapy Goals        Problem: Physical Therapy Goal    Goal Priority Disciplines Outcome Goal Variances Interventions   Physical Therapy Goal     PT, PT/OT Ongoing, Progressing     Description:  Goals to be met by: discharge date     Patient will increase functional independence with mobility by performin. Supine to sit with Modified [place]  MET 10/15/19  2. Sit to supine with Modified [place]  MET 10/15/19  3. Sit to stand transfer with Modified [place] - met 10/14/19  4. Bed to chair transfer with Stand-by Assistance using Rolling Walker  MET 10/15/19  5. Gait  x 150 feet with Contact Guard Assistance using Rolling Walker. MET 10/13/19  6. Pt to jose martin Sitting OOB chair > 1 hour - met 10/14/19  7. Lower extremity exercise program x15 reps    New LTG's:  1.  Sit in chair 2 hours  2.  Ambulate 200' with RW with supervision, approaching chair safely. MET 10/15/19                    Time Tracking:     PT Received On: 10/15/19  PT Start Time: 1345     PT Stop Time: 1409  PT Total Time (min): 24 min     Billable Minutes: Gait Training 15 and Therapeutic Activity 9    Treatment Type: Treatment  PT/PTA: PTA     PTA Visit Number: 1     Sidney Lim, JASMINE  10/15/2019

## 2019-10-15 NOTE — PLAN OF CARE
Problem: SLP Goal  Goal: SLP Goal  Description  Short Term Goals:  1. Pt will participate in clinical swallow assessment to determine least restrictive diet.-MET 10/14  2. Pt will consume Regular/Thin liquid diet without any overt s/s of aspiration.  3. Pt will implement taught swallow precautions 100% of the time with meals.    Outcome: Ongoing, Progressing   Pt reports limited PO due to poor appetite, does consume chocolate shakes and SLP did rec: boost and other oral supplements to increase caloric needs.

## 2019-10-15 NOTE — PLAN OF CARE
VN cued into room.  Pt resting comfortably in bed with call light and personal belongings within reach. NADN.  Family at bedside.  Discussed discharge, the need for antibiotics, home health and PICC line placement.  Educated pt and family on PICC line.  Family stated they want [facility].  Allowed time for questions.  Pt had no needs or complaints at this time.  VN will continue to follow and be available as needed.

## 2019-10-15 NOTE — SUBJECTIVE & OBJECTIVE
Interval History: Reports that he has been weak since being admitted in July when he was hospitalized for thrombocytopenia. The discharge summary mentions he had interstitial lung opacities at the time and received antibiotics but does not indicate what the antibiotics were for.    Review of Systems   Gastrointestinal: Negative for nausea and vomiting.   Neurological: Negative for seizures and syncope.     Objective:     Vital Signs (Most Recent):  Temp: 97.1 °F (36.2 °C) (10/15/19 1223)  Pulse: 85 (10/15/19 1223)  Resp: 18 (10/15/19 1223)  BP: 130/63 (10/15/19 1223)  SpO2: (!) 91 % (10/15/19 1223) Vital Signs (24h Range):  Temp:  [96.4 °F (35.8 °C)-98.1 °F (36.7 °C)] 97.1 °F (36.2 °C)  Pulse:  [65-85] 85  Resp:  [16-18] 18  SpO2:  [91 %-96 %] 91 %  BP: (127-165)/(62-71) 130/63     Weight: 70 kg (154 lb 5.2 oz)  Body mass index is 21.52 kg/m².    Intake/Output Summary (Last 24 hours) at 10/15/2019 1248  Last data filed at 10/15/2019 0516  Gross per 24 hour   Intake 275 ml   Output 601 ml   Net -326 ml      Physical Exam   Constitutional: He is oriented to person, place, and time. He appears well-developed. No distress.   Pulmonary/Chest: Effort normal. No respiratory distress.   Neurological: He is alert and oriented to person, place, and time.   Psychiatric: He has a normal mood and affect.   Nursing note and vitals reviewed.      Significant Labs: All pertinent labs within the past 24 hours have been reviewed.    Significant Imaging: I have reviewed all pertinent imaging results/findings within the past 24 hours.   X-Ray Chest AP Portable 10/11/19: FINDINGS:  Nearly resolved bilateral middle and upper lung zone airspace opacities.  Increased bilateral lower lung zone interstitial and ill-defined airspace opacities, more prominent on the left.  Nearly resolved trace left effusion.  No right effusion.  No pneumothorax.  No acute bone abnormality.  Impression:  Since July 19, 2019, increased bilateral lower lung zone  interstitial and ill-defined airspace opacities, more prominent on the left and suspicious for edema or infectious/inflammatory disease.  Nearly resolved bilateral middle and upper lung zone airspace opacities.

## 2019-10-15 NOTE — ASSESSMENT & PLAN NOTE
Mr. Ramos is 84 y/o M significant pmhx of CLL and recurrent sinusitis here with PNA 2/2 Pseudomonas    Recommendations    --Will need cefepime 2g q12h IV for 3 weeks total end date 11/4/19  --will need PICC before discharge

## 2019-10-15 NOTE — PLAN OF CARE
VN and patient rounded and discussed plan of care. Patient reports feeling better but not as much as he thought he would. He did verify that he had a BM today and is on a stool softener.  He knows how to use the call light that is close by and states that he will not get up out of bed by himself.  Bed locked and in lowest position with alarm on.

## 2019-10-15 NOTE — PT/OT/SLP PROGRESS
Occupational Therapy   Treatment    Name: Thierry Ramos Jr.  MRN: [id number]  Admitting Diagnosis:  Pneumonia of left lower lobe due to Pseudomonas species       Recommendations:     Discharge Recommendations: home  Discharge Equipment Recommendations:  none  Barriers to discharge:  None    Assessment:     Thierry Ramos Jr. is a 85 y.o. male with a medical diagnosis of Pneumonia of left lower lobe due to Pseudomonas species.  Performance deficits affecting function are weakness, impaired endurance, impaired self care skills, impaired functional mobilty, gait instability, impaired balance, decreased upper extremity function, decreased lower extremity function, decreased safety awareness, impaired skin, impaired cardiopulmonary response to activity. Pt found in bed, agreeable to therapy.  Pt continues with decreased overall strength and endurance.  He is unsteady during ambulation and requires vc's for safe use of RW. Good tolerance of therapy. Continue OT services to address functional goals, progressing as able.      Rehab Prognosis:  Good; patient would benefit from acute skilled OT services to address these deficits and reach maximum level of function.       Plan:     Patient to be seen 5 x/week to address the above listed problems via self-care/home management, therapeutic activities, therapeutic exercises  · Plan of Care Expires: 11/13/19  · Plan of Care Reviewed with: patient, spouse    Subjective     Pain/Comfort:  · Pain Rating 1: 0/10  · Pain Rating Post-Intervention 1: 0/10    Objective:     Communicated with: RN prior to session.  Patient found HOB elevated with telemetry upon OT entry to room.    General Precautions: Standard, aspiration, fall, respiratory   Orthopedic Precautions:N/A   Braces: N/A     Occupational Performance:     Bed Mobility:    · Patient completed Rolling/Turning to Right with modified independence  · Patient completed Scooting/Bridging with modified independence  · Patient  completed Supine to Sit with modified independence     Functional Mobility/Transfers:  · Patient completed Sit <> Stand Transfer with stand by assistance  with  rolling walker and vc's for hand placement   · Patient completed Bed <> Chair Transfer using Stand Pivot technique with stand by assistance and contact guard assistance with rolling walker  · Patient completed Toilet Transfer Stand Pivot technique with stand by assistance and contact guard assistance with  rolling walker and grab bars  · Functional Mobility: Pt ambulated room distances with CGA-SBA using RW.  Pt requires vc's for RW safety/mgmt and to slow down with turns.    Activities of Daily Living:  · Grooming: stand by assistance standing at sink  · Toileting: minimum assistance for thorough cleaning after BM      AMPAC 6 Click ADL: 19    Treatment & Education:  Pt performed BUE AAROM for shld planes and AROM for other jts 2 x 10 reps.  Pt tolerated well.     Patient left up in chair with all lines intact, call button in reach, chair alarm on and spouse presentEducation:      GOALS:   Multidisciplinary Problems     Occupational Therapy Goals        Problem: Occupational Therapy Goal    Goal Priority Disciplines Outcome Interventions   Occupational Therapy Goal     OT, PT/OT Ongoing, Progressing    Description:  Goals to be met by: 11/13/19     Patient will increase functional independence with ADLs by performing:    LE Dressing with Stand-by Assistance.  Grooming while standing with Stand-by Assistance.  Toileting from toilet with Stand-by Assistance for hygiene and clothing management.   Supine to sit with Stand-by Assistance.  Step transfer with Stand-by Assistance  Toilet transfer to toilet with Stand-by Assistance.  Increased functional strength to WFL for self care skills and functional mobility.  Upper extremity exercise program x10 reps per handout, with independence.                      Time Tracking:     OT Date of Treatment: 10/15/19  OT  Start Time: 1300  OT Stop Time: 1328  OT Total Time (min): 28 min    Billable Minutes:Self Care/Home Management 18  Therapeutic Exercise 10    RANDI Richardson/LUIS  10/15/2019

## 2019-10-15 NOTE — HPI
Mr. Ramos is a 85M with pmhx if CLL, recurrent sinusitis per pt,  HTN, pulmonary HTN, central hypothyroidism who presents on 10/11 for productive cough that got worse 2 days prior to admission.  Pt was hospitalized here at the end of July, ID was consulted and started him on 10 day course of IV cefepime, vanco, and flagyl for PNA which he completed while in the hospital.  Pt reports that since being discharge at the end of July he was feeling good for about 2-3 weeks but then he began having issues with his sinuses.  He reports that for the last 4 years he has always had problems with sinusitis and is unsure why he has sinus problems.  Two weeks ago he reports he the began having productive cough, fatigue and body aches, which worsened over the last 2 days and prompted him to report to the ED.  He reports no fevers but has reports chills and shakes.  Reports a productive cough yellow-green in color.  Denies chest pain, sob, nausea, vomiting, dairrhea.  Reports that he last used rituxmab treatment for CLL about 2 weeks ago.  In the ED initial CXR showed improvement of previous PNA in July however CXR done on 10/14 shows patchy left basilar opacities slightly worse compared to previous CXR which could relate to developing infection/PNA or aspiration.  Also CXR shows possible small volume of left sided pleural fluid.  Pt has been afebrile and has not had elevated WC.  Blood cultures are no growth to date, however Resp culture is positive of Pseudomonas.  Initially was started on azithromycin and ceftriaxone, currently on Cefepime IV.      Of note pt has noticed his right great toe has become red and a bit swollen, and does not know why.

## 2019-10-15 NOTE — DISCHARGE INSTRUCTIONS
You have a pneumonia caused by Pseudomonas aeruginosa. You have received 5 days of antibiotics and you have been prescribed 2 more days of antibiotic to take at home.

## 2019-10-15 NOTE — ASSESSMENT & PLAN NOTE
Panhypopituitarism  Secondary adrenal insufficiency  Secondary male hypogonadism  Takes medications for these.

## 2019-10-15 NOTE — SUBJECTIVE & OBJECTIVE
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    Hyperlipidemia     Hypertension     Hypopituitarism     Hypothyroid     Immunosuppression 3/13/2015    Squamous Cell Carcinoma     on the right side of the face     Squamous cell carcinoma of skin of right temple 1/27/2016    Stroke     Syncope 2/12    Unspecified disorder of kidney and ureter        Past Surgical History:   Procedure Laterality Date    CAROTID ENDARTERECTOMY (L)  2/27/2007    EXCISION TURBINATE, SUBMUCOUS      FESS  9/16/2014    Mohs excision   3/23/2015, 2/24/2016    combined with WLE forehead    NASAL SEPTUM SURGERY  9/16/2014    right ureter surgery  1995    SENTINEL LYMPH NODE BIOPSY  3/23/2015, 2/24/2016    SINUS SURGERY  9/16/2014    septo, ESS, BITSMR    Transphenoidal surgery         Review of patient's allergies indicates:   Allergen Reactions    Ace inhibitors Swelling     angioedema    Divalproex Hives     Rash under arms, body creases       Medications:  Medications Prior to Admission   Medication Sig    atorvastatin (LIPITOR) 20 MG tablet TAKE 1 TABLET EVERY DAY    clopidogrel (PLAVIX) 75 mg tablet Take 75 mg by mouth once daily.    cyanocobalamin, vitamin B-12, 5,000 mcg TbDL Take 1 tablet by mouth once daily.    cycloSPORINE (RESTASIS) 0.05 % ophthalmic emulsion Place 0.4 mLs (1 drop total) into both eyes 2 (two) times daily.    DOCOSAHEXANOIC ACID/EPA (FISH OIL ORAL) Take 5 capsules by mouth once daily.     erythromycin (ROMYCIN) ophthalmic ointment Apply to eyelids and lashes OU QHS    fluorouracil (EFUDEX) 5 % cream Use hs for 2 weeks on right cheek    guaifen/dextromethorphan/pe (ROBITUSSIN COUGH & COLD CF MAX ORAL) Take 15 mLs by mouth 2 (two) times daily.    levothyroxine (SYNTHROID) 125 MCG tablet TAKE 1 TABLET ONE TIME DAILY    losartan  (COZAAR) 25 MG tablet Take 1 tablet (25 mg total) by mouth once daily.    multivitamin with minerals tablet Take 1 tablet by mouth once daily.    pantoprazole (PROTONIX) 40 MG tablet TAKE 1 TABLET EVERY DAY    predniSONE (DELTASONE) 2.5 MG tablet Take 1 tablet by mouth once daily. 5mg and 2.5mg qd    sennosides/docusate sodium (EDEN-COLACE ORAL) Take 1 tablet by mouth once daily.    tenofovir alafenamide (VEMLIDY) 25 mg Tab Take 25 mg by mouth once daily.    ACCU-CHEK FASTCLIX Misc TEST  BLOOD  GLUCOSE FOUR TIMES DAILY    alcohol swabs PadM APPLY 1 PAD TOPICALLY  AS NEEDED.    aspirin 81 MG Chew Take 81 mg by mouth every Mon, Wed, Fri.    predniSONE (DELTASONE) 5 MG tablet Take 5 mg by mouth once daily.    SYMBICORT 160-4.5 mcg/actuation HFAA Inhale 1 puff into the lungs as needed.    tazarotene (AVAGE) 0.1 % cream Apply topically every evening. (Patient taking differently: Apply topically as needed. )    testosterone cypionate (DEPOTESTOTERONE CYPIONATE) 100 mg/mL injection Inject 0.5 mLs (50 mg total) into the muscle every 14 (fourteen) days.    VENTOLIN HFA 90 mcg/actuation inhaler Inhale 1 puff into the lungs as needed.     Antibiotics (From admission, onward)    Start     Stop Route Frequency Ordered    10/14/19 1530  cefepime in dextrose 5 % 1 gram/50 mL IVPB 1 g      -- IV Every 12 hours (non-standard times) 10/14/19 1421        Antifungals (From admission, onward)    None        Antivirals (From admission, onward)        Stop Route Frequency     tenofovir alafenamide      -- Oral Daily           Immunization History   Administered Date(s) Administered    Influenza - High Dose - PF (65 years and older) 09/11/2012, 09/20/2013, 10/06/2014, 02/09/2018, 09/19/2018, 09/18/2019    PPD Test 07/22/2019    Tdap 12/19/2016       Family History     Problem Relation (Age of Onset)    Cancer Mother, Father, Brother    Colon cancer Father    No Known Problems Sister, Maternal Aunt, Maternal Uncle, Paternal  "Aunt, Paternal Uncle, Maternal Grandmother, Maternal Grandfather, Paternal Grandmother, Paternal Grandfather        Social History     Socioeconomic History    Marital status:      Spouse name: Not on file    Number of children: Not on file    Years of education: Not on file    Highest education level: Not on file   Occupational History    Occupation: retired   Social Needs    Financial resource strain: Not very hard    Food insecurity:     Worry: Never true     Inability: Never true    Transportation needs:     Medical: No     Non-medical: No   Tobacco Use    Smoking status: Never Smoker    Smokeless tobacco: Never Used   Substance and Sexual Activity    Alcohol use: Yes     Alcohol/week: 2.0 standard drinks     Types: 1 Glasses of wine, 1 Cans of beer per week     Frequency: Never     Drinks per session: Patient refused     Binge frequency: Patient refused     Comment: "rarely"    Drug use: No    Sexual activity: Not Currently   Lifestyle    Physical activity:     Days per week: 5 days     Minutes per session: Not on file    Stress: To some extent   Relationships    Social connections:     Talks on phone: More than three times a week     Gets together: More than three times a week     Attends Mosque service: Not on file     Active member of club or organization: No     Attends meetings of clubs or organizations: Never     Relationship status:    Other Topics Concern    Not on file   Social History Narrative    He is .     Review of Systems   Constitutional: Positive for activity change, appetite change, chills and fatigue. Negative for fever.   HENT: Positive for congestion and sinus pressure. Negative for sore throat.    Eyes: Negative for photophobia, pain, discharge and redness.   Respiratory: Positive for cough. Negative for shortness of breath.    Cardiovascular: Negative for chest pain, palpitations and leg swelling.   Gastrointestinal: Negative for abdominal " distention, abdominal pain, diarrhea, nausea and vomiting.   Genitourinary: Negative for difficulty urinating, dysuria and flank pain.   Musculoskeletal: Positive for myalgias. Negative for joint swelling, neck pain and neck stiffness.   Skin: Negative for color change and rash.   Neurological: Negative for dizziness, seizures, light-headedness and numbness.   Psychiatric/Behavioral: Negative for agitation and behavioral problems.     Objective:     Vital Signs (Most Recent):  Temp: 98.3 °F (36.8 °C) (10/15/19 1622)  Pulse: 90 (10/15/19 1622)  Resp: 19 (10/15/19 1622)  BP: 125/62 (10/15/19 1622)  SpO2: (!) 92 % (10/15/19 1622) Vital Signs (24h Range):  Temp:  [96.4 °F (35.8 °C)-98.3 °F (36.8 °C)] 98.3 °F (36.8 °C)  Pulse:  [65-93] 90  Resp:  [16-19] 19  SpO2:  [91 %-96 %] 92 %  BP: (125-165)/(62-71) 125/62     Weight: 70 kg (154 lb 5.2 oz)  Body mass index is 21.52 kg/m².    Estimated Creatinine Clearance: 53.5 mL/min (based on SCr of 1 mg/dL).    Physical Exam   Constitutional: He is oriented to person, place, and time. He appears well-developed. No distress.   HENT:   Head: Normocephalic and atraumatic.   Eyes: Pupils are equal, round, and reactive to light. Conjunctivae and EOM are normal.   Neck: Normal range of motion. Neck supple.   Cardiovascular: Normal rate and regular rhythm. Exam reveals no gallop.   No murmur heard.  Pulmonary/Chest: Effort normal. No respiratory distress. He has wheezes.   Abdominal: Soft. Bowel sounds are normal. He exhibits no distension. There is no tenderness. There is no rebound.   Musculoskeletal: Normal range of motion.   Right great toe with a horizontal abrasion just under toe nail.  R great toe with erythema that does NOT extend to foot, non tender, slightly warm to touch.     Lymphadenopathy:     He has no cervical adenopathy.   Neurological: He is alert and oriented to person, place, and time.   Skin: Skin is warm and dry.   Psychiatric: He has a normal mood and affect.        Significant Labs:   Blood Culture:   Recent Labs   Lab 07/15/19  0855 07/15/19  0859 10/11/19  0832 10/11/19  0854   LABBLOO No growth after 5 days. No growth after 5 days. No Growth to date  No Growth to date  No Growth to date  No Growth to date  No Growth to date No Growth to date  No Growth to date  No Growth to date  No Growth to date  No Growth to date     CBC:   Recent Labs   Lab 10/14/19  0516 10/15/19  0449   WBC 6.34 6.56   HGB 12.3* 11.6*   HCT 38.8* 37.1*   * 135*     CMP:   Recent Labs   Lab 10/14/19  0516 10/15/19  0449    140   K 4.7 4.9    101   CO2 26 30*   GLU 92 91   BUN 23 23   CREATININE 0.9 1.0   CALCIUM 9.6 9.7   ANIONGAP 12 9   EGFRNONAA >60 >60     Respiratory Culture:   Recent Labs   Lab 07/20/19  0735 10/11/19  1149   GSRESP >10epis/lfp and <than many WBC's  Predominance of oropharyngeal kelly. Please recollect. <10 epithelial cells per low power field.  Moderate WBC's  Few Gram positive cocci  Few Gram negative rods   RESPIRATORYC Specimen inadequate - culture not performed PSEUDOMONAS AERUGINOSA  Many  *     Urine Culture:   Recent Labs   Lab 07/16/19  1705   LABURIN No growth     Wound Culture: No results for input(s): LABAERO in the last 4320 hours.    Significant Imaging: I have reviewed all pertinent imaging results/findings within the past 24 hours.

## 2019-10-15 NOTE — CONSULTS
[facility]  Infectious Disease  Consult Note    Patient Name: Thierry Ramos Jr.  MRN: [id number]  Admission Date: 10/11/2019  Hospital Length of Stay: 4 days  Attending Physician: Jl Kimbrough MD  Primary Care Provider: AKILA Wooten MD     Isolation Status: No active isolations    Patient information was obtained from patient and spouse/SO.      Inpatient consult to Infectious Diseases  Consult performed by: Nemesio Calvo MD  Consult ordered by: Jl Kimbrough MD  Reason for consult: Pseudomonas PNA         Assessment/Plan:     * Pneumonia of left lower lobe due to Pseudomonas species  Mr. Ramos is 86 y/o M significant pmhx of CLL and recurrent sinusitis here with PNA 2/2 Pseudomonas    Recommendations    --Will need cefepime 2g q12h IV for 3 weeks total end date 11/4/19  --will need PICC before discharge            Thank you for your consult. I will follow-up with patient. Please contact us if you have any additional questions.    Neemsio Calvo MD  Infectious Disease  [facility]    Subjective:     Principal Problem: Pneumonia of left lower lobe due to Pseudomonas species    HPI: Mr. Ramos is a 85M with pmhx if CLL, recurrent sinusitis per pt,  HTN, pulmonary HTN, central hypothyroidism who presents on 10/11 for productive cough that got worse 2 days prior to admission.  Pt was hospitalized here at the end of July, ID was consulted and started him on 10 day course of IV cefepime, vanco, and flagyl for PNA which he completed while in the hospital.  Pt reports that since being discharge at the end of July he was feeling good for about 2-3 weeks but then he began having issues with his sinuses.  He reports that for the last 4 years he has always had problems with sinusitis and is unsure why he has sinus problems.  Two weeks ago he reports he the began having productive cough, fatigue and body aches, which worsened over the last 2 days and prompted him to report to  the ED.  He reports no fevers but has reports chills and shakes.  Reports a productive cough yellow-green in color.  Denies chest pain, sob, nausea, vomiting, dairrhea.  Reports that he last used rituxmab treatment for CLL about 2 weeks ago.  In the ED initial CXR showed improvement of previous PNA in July however CXR done on 10/14 shows patchy left basilar opacities slightly worse compared to previous CXR which could relate to developing infection/PNA or aspiration.  Also CXR shows possible small volume of left sided pleural fluid.  Pt has been afebrile and has not had elevated WC.  Blood cultures are no growth to date, however Resp culture is positive of Pseudomonas.  Initially was started on azithromycin and ceftriaxone, currently on Cefepime IV.      Of note pt has noticed his right great toe has become red and a bit swollen, and does not know why.      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    Hyperlipidemia     Hypertension     Hypopituitarism     Hypothyroid     Immunosuppression 3/13/2015    Squamous Cell Carcinoma     on the right side of the face     Squamous cell carcinoma of skin of right temple 1/27/2016    Stroke     Syncope 2/12    Unspecified disorder of kidney and ureter        Past Surgical History:   Procedure Laterality Date    CAROTID ENDARTERECTOMY (L)  2/27/2007    EXCISION TURBINATE, SUBMUCOUS      FESS  9/16/2014    Mohs excision   3/23/2015, 2/24/2016    combined with WLE forehead    NASAL SEPTUM SURGERY  9/16/2014    right ureter surgery  1995    SENTINEL LYMPH NODE BIOPSY  3/23/2015, 2/24/2016    SINUS SURGERY  9/16/2014    septo, ESS, BITSMR    Transphenoidal surgery         Review of patient's allergies indicates:   Allergen Reactions    Ace inhibitors Swelling     angioedema    Divalproex Hives      Rash under arms, body creases       Medications:  Medications Prior to Admission   Medication Sig    atorvastatin (LIPITOR) 20 MG tablet TAKE 1 TABLET EVERY DAY    clopidogrel (PLAVIX) 75 mg tablet Take 75 mg by mouth once daily.    cyanocobalamin, vitamin B-12, 5,000 mcg TbDL Take 1 tablet by mouth once daily.    cycloSPORINE (RESTASIS) 0.05 % ophthalmic emulsion Place 0.4 mLs (1 drop total) into both eyes 2 (two) times daily.    DOCOSAHEXANOIC ACID/EPA (FISH OIL ORAL) Take 5 capsules by mouth once daily.     erythromycin (ROMYCIN) ophthalmic ointment Apply to eyelids and lashes OU QHS    fluorouracil (EFUDEX) 5 % cream Use hs for 2 weeks on right cheek    guaifen/dextromethorphan/pe (ROBITUSSIN COUGH & COLD CF MAX ORAL) Take 15 mLs by mouth 2 (two) times daily.    levothyroxine (SYNTHROID) 125 MCG tablet TAKE 1 TABLET ONE TIME DAILY    losartan (COZAAR) 25 MG tablet Take 1 tablet (25 mg total) by mouth once daily.    multivitamin with minerals tablet Take 1 tablet by mouth once daily.    pantoprazole (PROTONIX) 40 MG tablet TAKE 1 TABLET EVERY DAY    predniSONE (DELTASONE) 2.5 MG tablet Take 1 tablet by mouth once daily. 5mg and 2.5mg qd    sennosides/docusate sodium (EDEN-COLACE ORAL) Take 1 tablet by mouth once daily.    tenofovir alafenamide (VEMLIDY) 25 mg Tab Take 25 mg by mouth once daily.    ACCU-CHEK FASTCLIX Misc TEST  BLOOD  GLUCOSE FOUR TIMES DAILY    alcohol swabs PadM APPLY 1 PAD TOPICALLY  AS NEEDED.    aspirin 81 MG Chew Take 81 mg by mouth every Mon, Wed, Fri.    predniSONE (DELTASONE) 5 MG tablet Take 5 mg by mouth once daily.    SYMBICORT 160-4.5 mcg/actuation HFAA Inhale 1 puff into the lungs as needed.    tazarotene (AVAGE) 0.1 % cream Apply topically every evening. (Patient taking differently: Apply topically as needed. )    testosterone cypionate (DEPOTESTOTERONE CYPIONATE) 100 mg/mL injection Inject 0.5 mLs (50 mg total) into the muscle every 14 (fourteen) days.     "VENTOLIN HFA 90 mcg/actuation inhaler Inhale 1 puff into the lungs as needed.     Antibiotics (From admission, onward)    Start     Stop Route Frequency Ordered    10/14/19 1530  cefepime in dextrose 5 % 1 gram/50 mL IVPB 1 g      -- IV Every 12 hours (non-standard times) 10/14/19 1421        Antifungals (From admission, onward)    None        Antivirals (From admission, onward)        Stop Route Frequency     tenofovir alafenamide      -- Oral Daily           Immunization History   Administered Date(s) Administered    Influenza - High Dose - PF (65 years and older) 09/11/2012, 09/20/2013, 10/06/2014, 02/09/2018, 09/19/2018, 09/18/2019    PPD Test 07/22/2019    Tdap 12/19/2016       Family History     Problem Relation (Age of Onset)    Cancer Mother, Father, Brother    Colon cancer Father    No Known Problems Sister, Maternal Aunt, Maternal Uncle, Paternal Aunt, Paternal Uncle, Maternal Grandmother, Maternal Grandfather, Paternal Grandmother, Paternal Grandfather        Social History     Socioeconomic History    Marital status:      Spouse name: Not on file    Number of children: Not on file    Years of education: Not on file    Highest education level: Not on file   Occupational History    Occupation: retired   Social Needs    Financial resource strain: Not very hard    Food insecurity:     Worry: Never true     Inability: Never true    Transportation needs:     Medical: No     Non-medical: No   Tobacco Use    Smoking status: Never Smoker    Smokeless tobacco: Never Used   Substance and Sexual Activity    Alcohol use: Yes     Alcohol/week: 2.0 standard drinks     Types: 1 Glasses of wine, 1 Cans of beer per week     Frequency: Never     Drinks per session: Patient refused     Binge frequency: Patient refused     Comment: "rarely"    Drug use: No    Sexual activity: Not Currently   Lifestyle    Physical activity:     Days per week: 5 days     Minutes per session: Not on file    Stress: To " some extent   Relationships    Social connections:     Talks on phone: More than three times a week     Gets together: More than three times a week     Attends Hinduism service: Not on file     Active member of club or organization: No     Attends meetings of clubs or organizations: Never     Relationship status:    Other Topics Concern    Not on file   Social History Narrative    He is .     Review of Systems   Constitutional: Positive for activity change, appetite change, chills and fatigue. Negative for fever.   HENT: Positive for congestion and sinus pressure. Negative for sore throat.    Eyes: Negative for photophobia, pain, discharge and redness.   Respiratory: Positive for cough. Negative for shortness of breath.    Cardiovascular: Negative for chest pain, palpitations and leg swelling.   Gastrointestinal: Negative for abdominal distention, abdominal pain, diarrhea, nausea and vomiting.   Genitourinary: Negative for difficulty urinating, dysuria and flank pain.   Musculoskeletal: Positive for myalgias. Negative for joint swelling, neck pain and neck stiffness.   Skin: Negative for color change and rash.   Neurological: Negative for dizziness, seizures, light-headedness and numbness.   Psychiatric/Behavioral: Negative for agitation and behavioral problems.     Objective:     Vital Signs (Most Recent):  Temp: 98.3 °F (36.8 °C) (10/15/19 1622)  Pulse: 90 (10/15/19 1622)  Resp: 19 (10/15/19 1622)  BP: 125/62 (10/15/19 1622)  SpO2: (!) 92 % (10/15/19 1622) Vital Signs (24h Range):  Temp:  [96.4 °F (35.8 °C)-98.3 °F (36.8 °C)] 98.3 °F (36.8 °C)  Pulse:  [65-93] 90  Resp:  [16-19] 19  SpO2:  [91 %-96 %] 92 %  BP: (125-165)/(62-71) 125/62     Weight: 70 kg (154 lb 5.2 oz)  Body mass index is 21.52 kg/m².    Estimated Creatinine Clearance: 53.5 mL/min (based on SCr of 1 mg/dL).    Physical Exam   Constitutional: He is oriented to person, place, and time. He appears well-developed. No distress.   HENT:    Head: Normocephalic and atraumatic.   Eyes: Pupils are equal, round, and reactive to light. Conjunctivae and EOM are normal.   Neck: Normal range of motion. Neck supple.   Cardiovascular: Normal rate and regular rhythm. Exam reveals no gallop.   No murmur heard.  Pulmonary/Chest: Effort normal. No respiratory distress. He has wheezes.   Abdominal: Soft. Bowel sounds are normal. He exhibits no distension. There is no tenderness. There is no rebound.   Musculoskeletal: Normal range of motion.   Right great toe with a horizontal abrasion just under toe nail.  R great toe with erythema that does NOT extend to foot, non tender, slightly warm to touch.     Lymphadenopathy:     He has no cervical adenopathy.   Neurological: He is alert and oriented to person, place, and time.   Skin: Skin is warm and dry.   Psychiatric: He has a normal mood and affect.       Significant Labs:   Blood Culture:   Recent Labs   Lab 07/15/19  0855 07/15/19  0859 10/11/19  0832 10/11/19  0854   LABBLOO No growth after 5 days. No growth after 5 days. No Growth to date  No Growth to date  No Growth to date  No Growth to date  No Growth to date No Growth to date  No Growth to date  No Growth to date  No Growth to date  No Growth to date     CBC:   Recent Labs   Lab 10/14/19  0516 10/15/19  0449   WBC 6.34 6.56   HGB 12.3* 11.6*   HCT 38.8* 37.1*   * 135*     CMP:   Recent Labs   Lab 10/14/19  0516 10/15/19  0449    140   K 4.7 4.9    101   CO2 26 30*   GLU 92 91   BUN 23 23   CREATININE 0.9 1.0   CALCIUM 9.6 9.7   ANIONGAP 12 9   EGFRNONAA >60 >60     Respiratory Culture:   Recent Labs   Lab 07/20/19  0735 10/11/19  1149   GSRESP >10epis/lfp and <than many WBC's  Predominance of oropharyngeal klely. Please recollect. <10 epithelial cells per low power field.  Moderate WBC's  Few Gram positive cocci  Few Gram negative rods   RESPIRATORYC Specimen inadequate - culture not performed PSEUDOMONAS AERUGINOSA  Many  *      Urine Culture:   Recent Labs   Lab 07/16/19  1705   LABURIN No growth     Wound Culture: No results for input(s): LABAERO in the last 4320 hours.    Significant Imaging: I have reviewed all pertinent imaging results/findings within the past 24 hours.

## 2019-10-15 NOTE — PLAN OF CARE
Problem: Physical Therapy Goal  Goal: Physical Therapy Goal  Description  Goals to be met by: discharge date     Patient will increase functional independence with mobility by performin. Supine to sit with Modified Gore  MET 10/15/19  2. Sit to supine with Modified Gore  MET 10/15/19  3. Sit to stand transfer with Modified Gore - met 10/14/19  4. Bed to chair transfer with Stand-by Assistance using Rolling Walker  MET 10/15/19  5. Gait  x 150 feet with Contact Guard Assistance using Rolling Walker. MET 10/13/19  6. Pt to jose martin Sitting OOB chair > 1 hour - met 10/14/19  7. Lower extremity exercise program x15 reps    New LTG's:  1.  Sit in chair 2 hours  2.  Ambulate 200' with RW with supervision, approaching chair safely. MET 10/15/19   Outcome: Ongoing, Progressing   Goals 1,2,4,and new LTG #2 met

## 2019-10-15 NOTE — PLAN OF CARE
Future Appointments   Date Time Provider Department Center   10/23/2019  2:00 PM Britney Lobo NP [facility] WOUND [address]   10/24/2019  3:20 PM LAB, HEMONC CANCER BLDG [facility] LAB HO [place]   10/24/2019  4:30 PM Isatu Badillo NP [facility] BM [place]   11/15/2019  2:00 PM CAROLE Wooten MD [facility] IM [place]   2/19/2020  8:00 AM [facility] OIC-US1 MASTER [facility] ULTR IC [facility]     Patient and wife declined home health but agreeable to outpatient PT  Ambulatory referral placed by MD and they will call patient to schedule    Discharge rounds on patient. Discussed followup appointments, blue discharge folder, discharge nurse will go over home medications and reasons for medications and final discharge instructions. All patient/caregiver questions answered. Patient verbalized understanding.         10/15/19 1134   Final Note   Assessment Type Final Discharge Note   Anticipated Discharge Disposition Home   Hospital Follow Up  Appt(s) scheduled? Yes   Discharge plans and expectations educations in teach back method with documentation complete? Yes   Right Care Referral Info   Post Acute Recommendation No Care     Kiki Pineda, RN, CCM, CMSRN  RN Transition Navigator  [phone]

## 2019-10-15 NOTE — PROGRESS NOTES
[facility] Hospital Medicine  Progress Note    Patient Name: Thierry Ramos Jr.  MRN: [id number]  Patient Class: IP- Inpatient   Admission Date: 10/11/2019  Length of Stay: 4 days  Attending Physician: Jl Kimbrough MD  Primary Care Provider: AKILA Wooten MD        Subjective:     Principal Problem:Pneumonia of left lower lobe due to Pseudomonas species        HPI:  Thierry Ramos Jr. is a 85 y.o. white man with panhypopituitarism, secondary adrenal insufficiency, secondary hypogonadism, central hypothyroidism, diabetes mellitus type 2 with neuropathy, hypertension, history of stroke on 12/16/06 status post left carotid endarterectomy on 2/27/07, dyslipidemia, chronic lymphocytic leukemia (treated with rituximab), hypogammaglobulinemia, immune thrombocytopenia, pulmonary hypertension, chronic rhinosinusitis. He lives in [place], [place]. He is . His primary care physician is Dr. CAROLE Wooten.   He presented to [facility] Emergency Department on 10/11/19 with 2 days of fatigue, productive cough, fever, chills, and body aches. Chest X-ray showed increased bilateral lower lung zone interstitial and ill-defined airspace opacities, more prominent on the left. Temperature and WBC count were normal. Lactate was 2.9. He was admitted to [facility].     Overview/Hospital Course:  He was put on ceftriaxone. He improved clinically and was weaned off nasal cannula. Sputum culture grew many Pseudomonas aeruginosa, so ceftriaxone was switched to cefepime. Physical and Occupational Therapy recommended home health, but he and his wife preferred outpatient therapy.     Interval History: Reports that he has been weak since being admitted in July when he was hospitalized for thrombocytopenia. The discharge summary mentions he had interstitial lung opacities at the time and received antibiotics but does not indicate what the antibiotics were for.    Review of  Systems   Gastrointestinal: Negative for nausea and vomiting.   Neurological: Negative for seizures and syncope.     Objective:     Vital Signs (Most Recent):  Temp: 97.1 °F (36.2 °C) (10/15/19 1223)  Pulse: 85 (10/15/19 1223)  Resp: 18 (10/15/19 1223)  BP: 130/63 (10/15/19 1223)  SpO2: (!) 91 % (10/15/19 1223) Vital Signs (24h Range):  Temp:  [96.4 °F (35.8 °C)-98.1 °F (36.7 °C)] 97.1 °F (36.2 °C)  Pulse:  [65-85] 85  Resp:  [16-18] 18  SpO2:  [91 %-96 %] 91 %  BP: (127-165)/(62-71) 130/63     Weight: 70 kg (154 lb 5.2 oz)  Body mass index is 21.52 kg/m².    Intake/Output Summary (Last 24 hours) at 10/15/2019 1248  Last data filed at 10/15/2019 0516  Gross per 24 hour   Intake 275 ml   Output 601 ml   Net -326 ml      Physical Exam   Constitutional: He is oriented to person, place, and time. He appears well-developed. No distress.   Pulmonary/Chest: Effort normal. No respiratory distress.   Neurological: He is alert and oriented to person, place, and time.   Psychiatric: He has a normal mood and affect.   Nursing note and vitals reviewed.      Significant Labs: All pertinent labs within the past 24 hours have been reviewed.    Significant Imaging: I have reviewed all pertinent imaging results/findings within the past 24 hours.   X-Ray Chest AP Portable 10/11/19: FINDINGS:  Nearly resolved bilateral middle and upper lung zone airspace opacities.  Increased bilateral lower lung zone interstitial and ill-defined airspace opacities, more prominent on the left.  Nearly resolved trace left effusion.  No right effusion.  No pneumothorax.  No acute bone abnormality.  Impression:  Since July 19, 2019, increased bilateral lower lung zone interstitial and ill-defined airspace opacities, more prominent on the left and suspicious for edema or infectious/inflammatory disease.  Nearly resolved bilateral middle and upper lung zone airspace opacities.      Assessment/Plan:      * Pneumonia of left lower lobe due to Pseudomonas  species  Giving IV cefepime. Place PICC. Consult Infectious Disease.    Type 2 diabetes, controlled, with neuropathy  Hemoglobin A1c 5.6%. Giving insulin sliding scale as needed.    Chronic rhinosinusitis  Giving cetirizine.    Central hypothyroidism  Panhypopituitarism  Secondary adrenal insufficiency  Secondary male hypogonadism  Takes medications for these.    History of CVA (cerebrovascular accident)  Continue aspirin and atorvastatin.    CLL (chronic lymphocytic leukemia)  Treated with rituximab on 9/30/19. Continue home prednisone.    Hypertension  Mixed dyslipidemia  Continue home losartan and atorvastatin.      VTE Risk Mitigation (From admission, onward)         Ordered     enoxaparin injection 40 mg  Daily      10/11/19 1123     IP VTE HIGH RISK PATIENT  Once      10/11/19 1123                      Jl Kimbrough MD  Department of Hospital Medicine   [facility]

## 2019-10-16 VITALS
HEIGHT: 71 IN | RESPIRATION RATE: 17 BRPM | DIASTOLIC BLOOD PRESSURE: 67 MMHG | BODY MASS INDEX: 19.16 KG/M2 | WEIGHT: 136.88 LBS | SYSTOLIC BLOOD PRESSURE: 149 MMHG | HEART RATE: 78 BPM | OXYGEN SATURATION: 96 % | TEMPERATURE: 97 F

## 2019-10-16 LAB
ANION GAP SERPL CALC-SCNC: 9 MMOL/L (ref 8–16)
BACTERIA BLD CULT: NORMAL
BACTERIA BLD CULT: NORMAL
BASOPHILS # BLD AUTO: 0.01 K/UL (ref 0–0.2)
BASOPHILS NFR BLD: 0.1 % (ref 0–1.9)
BUN SERPL-MCNC: 31 MG/DL (ref 8–23)
CALCIUM SERPL-MCNC: 9.4 MG/DL (ref 8.7–10.5)
CHLORIDE SERPL-SCNC: 103 MMOL/L (ref 95–110)
CO2 SERPL-SCNC: 27 MMOL/L (ref 23–29)
CREAT SERPL-MCNC: 1 MG/DL (ref 0.5–1.4)
DIFFERENTIAL METHOD: ABNORMAL
EOSINOPHIL # BLD AUTO: 0.1 K/UL (ref 0–0.5)
EOSINOPHIL NFR BLD: 2.1 % (ref 0–8)
ERYTHROCYTE [DISTWIDTH] IN BLOOD BY AUTOMATED COUNT: 14.9 % (ref 11.5–14.5)
EST. GFR  (AFRICAN AMERICAN): >60 ML/MIN/1.73 M^2
EST. GFR  (NON AFRICAN AMERICAN): >60 ML/MIN/1.73 M^2
GLUCOSE SERPL-MCNC: 94 MG/DL (ref 70–110)
HCT VFR BLD AUTO: 38.7 % (ref 40–54)
HGB BLD-MCNC: 12.4 G/DL (ref 14–18)
LYMPHOCYTES # BLD AUTO: 2.1 K/UL (ref 1–4.8)
LYMPHOCYTES NFR BLD: 30.2 % (ref 18–48)
MAGNESIUM SERPL-MCNC: 2 MG/DL (ref 1.6–2.6)
MCH RBC QN AUTO: 30.6 PG (ref 27–31)
MCHC RBC AUTO-ENTMCNC: 32 G/DL (ref 32–36)
MCV RBC AUTO: 96 FL (ref 82–98)
MONOCYTES # BLD AUTO: 0.7 K/UL (ref 0.3–1)
MONOCYTES NFR BLD: 9.8 % (ref 4–15)
NEUTROPHILS # BLD AUTO: 3.7 K/UL (ref 1.8–7.7)
NEUTROPHILS NFR BLD: 57.8 % (ref 38–73)
PHOSPHATE SERPL-MCNC: 3.8 MG/DL (ref 2.7–4.5)
PLATELET # BLD AUTO: 133 K/UL (ref 150–350)
PMV BLD AUTO: 8.9 FL (ref 9.2–12.9)
POCT GLUCOSE: 80 MG/DL (ref 70–110)
POTASSIUM SERPL-SCNC: 4.3 MMOL/L (ref 3.5–5.1)
RBC # BLD AUTO: 4.05 M/UL (ref 4.6–6.2)
SODIUM SERPL-SCNC: 139 MMOL/L (ref 136–145)
WBC # BLD AUTO: 6.81 K/UL (ref 3.9–12.7)

## 2019-10-16 PROCEDURE — 83735 ASSAY OF MAGNESIUM: CPT | Mod: HCNC

## 2019-10-16 PROCEDURE — 85025 COMPLETE CBC W/AUTO DIFF WBC: CPT | Mod: HCNC

## 2019-10-16 PROCEDURE — 25000003 PHARM REV CODE 250: Mod: HCNC | Performed by: FAMILY MEDICINE

## 2019-10-16 PROCEDURE — A4216 STERILE WATER/SALINE, 10 ML: HCPCS | Mod: HCNC | Performed by: HOSPITALIST

## 2019-10-16 PROCEDURE — 84100 ASSAY OF PHOSPHORUS: CPT | Mod: HCNC

## 2019-10-16 PROCEDURE — 63600175 PHARM REV CODE 636 W HCPCS: Mod: HCNC | Performed by: HOSPITALIST

## 2019-10-16 PROCEDURE — 36415 COLL VENOUS BLD VENIPUNCTURE: CPT | Mod: HCNC

## 2019-10-16 PROCEDURE — 80048 BASIC METABOLIC PNL TOTAL CA: CPT | Mod: HCNC

## 2019-10-16 PROCEDURE — 25000003 PHARM REV CODE 250: Mod: HCNC | Performed by: HOSPITALIST

## 2019-10-16 PROCEDURE — 94761 N-INVAS EAR/PLS OXIMETRY MLT: CPT | Mod: HCNC

## 2019-10-16 PROCEDURE — 63600175 PHARM REV CODE 636 W HCPCS: Mod: HCNC | Performed by: FAMILY MEDICINE

## 2019-10-16 RX ADMIN — PREDNISONE 5 MG: 5 TABLET ORAL at 09:10

## 2019-10-16 RX ADMIN — Medication 10 ML: at 11:10

## 2019-10-16 RX ADMIN — SENNOSIDES, DOCUSATE SODIUM 1 TABLET: 50; 8.6 TABLET, FILM COATED ORAL at 08:10

## 2019-10-16 RX ADMIN — CLOPIDOGREL BISULFATE 75 MG: 75 TABLET ORAL at 08:10

## 2019-10-16 RX ADMIN — PANTOPRAZOLE SODIUM 40 MG: 40 TABLET, DELAYED RELEASE ORAL at 08:10

## 2019-10-16 RX ADMIN — PREDNISOLONE SODIUM PHOSPHATE 2.5 MG: 15 SOLUTION ORAL at 11:10

## 2019-10-16 RX ADMIN — CEFEPIME HYDROCHLORIDE 2 G: 2 INJECTION, SOLUTION INTRAVENOUS at 08:10

## 2019-10-16 RX ADMIN — GUAIFENESIN 600 MG: 600 TABLET, EXTENDED RELEASE ORAL at 08:10

## 2019-10-16 RX ADMIN — Medication 10 ML: at 07:10

## 2019-10-16 RX ADMIN — FLUTICASONE PROPIONATE 100 MCG: 50 SPRAY, METERED NASAL at 09:10

## 2019-10-16 RX ADMIN — LOSARTAN POTASSIUM 25 MG: 25 TABLET, FILM COATED ORAL at 08:10

## 2019-10-16 RX ADMIN — LEVOTHYROXINE SODIUM 112 MCG: 112 TABLET ORAL at 05:10

## 2019-10-16 RX ADMIN — ATORVASTATIN CALCIUM 20 MG: 20 TABLET, FILM COATED ORAL at 08:10

## 2019-10-16 RX ADMIN — MONTELUKAST 10 MG: 10 TABLET, FILM COATED ORAL at 08:10

## 2019-10-16 RX ADMIN — POLYETHYLENE GLYCOL 3350 17 G: 17 POWDER, FOR SOLUTION ORAL at 09:10

## 2019-10-16 NOTE — PLAN OF CARE
Follow-up With  Details  Why  Contact Info   CAROLE Wooten MD  On 11/15/2019  2:00pm  [address] [facility] [place] [address]  [phone]   Luis Fernando Banerjee MD  On 10/31/2019  3pm (Infectious disease clinic)  [address]  [place] [address]  [phone]   [facility]    Infusion  [address] [place] [address]  [phone]   [facility]  [address]  [address]  [place] [address]  [phone]     Discharge rounds on patient. Discussed followup appointments, blue discharge folder, discharge nurse will go over home medications and reasons for medications and final discharge instructions. All patient/caregiver questions answered. Patient verbalized understanding.         10/16/19 1047   Final Note   Assessment Type Final Discharge Note   Anticipated Discharge Disposition Home-[facility]   Hospital Follow Up  Appt(s) scheduled? Yes   Discharge plans and expectations educations in teach back method with documentation complete? Yes   Right Care Referral Info   Post Acute Recommendation Other   Referral Type home health with home IV infusion   Facility Name [facility]     Kiki Pineda, RN, CCM, CMSRN  RN Transition Navigator  [phone]

## 2019-10-16 NOTE — PLAN OF CARE
[facility].    [facility] nurse will be coming to see patient and do IV teaching       10/16/19 1854   Post-Acute Status   Post-Acute Authorization Home Health/Hospice;Medications   Home Health/Hospice Status Set-up Complete   Medication Status Pending Prior Authorization     Kiki Pineda RN, CCM, CMSRN  RN Transition Navigator  [phone]

## 2019-10-16 NOTE — PROCEDURES
"Thierry Ramos Jr. is a 85 y.o. male patient.    Temp: 98.3 °F (36.8 °C) (10/15/19 1622)  Pulse: 90 (10/15/19 1622)  Resp: 19 (10/15/19 1622)  BP: 125/62 (10/15/19 1622)  SpO2: (!) 92 % (10/15/19 1622)  Weight: 70 kg (154 lb 5.2 oz) (10/13/19 0360)  Height: 5' 11" (180.3 cm) (10/11/19 1755)    PICC  Date/Time: 10/15/2019 8:24 PM  Performed by: Benjamin Truong RN  Consent Done: Yes  Time out: Immediately prior to procedure a time out was called to verify the correct patient, procedure, equipment, support staff and site/side marked as required  Indications: med administration and vascular access  Anesthesia: local infiltration  Local anesthetic: lidocaine 1% without epinephrine  Anesthetic Total (mL): 2  Preparation: skin prepped with chlorhexidine (without alcohol)  Skin prep agent dried: skin prep agent completely dried prior to procedure  Sterile barriers: all five maximum sterile barriers used - cap, mask, sterile gown, sterile gloves, and large sterile sheet  Hand hygiene: hand hygiene performed prior to central venous catheter insertion  Location details: right basilic  Catheter type: double lumen  Catheter size: 5 Fr  Catheter Length: 33cm    Ultrasound guidance: yes  Vessel Caliber: medium, compressibility normal  Vascular Doppler: not done  Needle advanced into vessel with real time Ultrasound guidance.  Guidewire confirmed in vessel.  Sterile sheath used.  no esophageal manometryNumber of attempts: 1  Post-procedure: blood return through all ports and sterile dressing applied            Benjamin Truong  10/15/2019  "

## 2019-10-16 NOTE — NURSING
Cued into patient's room for evening rounds. With permission. Lying in supine position with head of bed elevated 30 degrees. Denies pain at present. Side rails x3 up with bed alarm activated for patient safety. No questions or concerns at present. Fall precaution education. Verbalized understanding. Will continue to be available as needed

## 2019-10-16 NOTE — PLAN OF CARE
Mariposa, RN with [facility] came to meet with patient and wife and did home IV abx teaching. Patient and wife state they feel very comfortable and wife stated she will have no problems doing home infusions  Medications will be delivered to patient's home later today.       10/16/19 1045   Post-Acute Status   Medication Status Set-up Complete

## 2019-10-16 NOTE — PLAN OF CARE
VN reviewed discharge instructions with pt and spouse. AVS printed and handed to pt/spouse by bedside nurse. Pt will be going home with HH. Reviewed follow-up appointments, medications, and importance of medication compliance. Reviewed home care/PICC line care instructions, infection s/s, treatment plan, self-management, and when to seek medical attention. Allowed time for questions. All questions answered. Patient verbalized complete understanding of discharge instructions and voices no concerns.    Discharge instructions complete. Transport/wheelchair requested. Bedside nurse notified.

## 2019-10-16 NOTE — PLAN OF CARE
Patient AAOX4. Plan of care reviewed and patient verbalized understanding. O2 sats are at 93% on room air. PICC line placed this shift, patency maintained. Bed in lowest position, call light within reach. Fall precautions maintained. Will continue to monitor.

## 2019-10-17 PROCEDURE — G0179 MD RECERTIFICATION HHA PT: HCPCS | Mod: ,,, | Performed by: HOSPITALIST

## 2019-10-17 PROCEDURE — G0179 PR HOME HEALTH MD RECERTIFICATION: ICD-10-PCS | Mod: ,,, | Performed by: HOSPITALIST

## 2019-10-18 NOTE — PROGRESS NOTES
Pharmacy New Medication Education    Patient and/or Caregiver ACCEPTED medication education.    Pharmacy has provided education on the name, indication, and possible side effects of the medication(s) prescribed, using teach-back method.     Learners of pharmacy medication education includes:  patient    The following medications have also been discussed, during this admission.     No current facility-administered medications for this encounter.        Current Outpatient Medications   Medication Sig    atorvastatin (LIPITOR) 20 MG tablet TAKE 1 TABLET EVERY DAY    clopidogrel (PLAVIX) 75 mg tablet Take 75 mg by mouth once daily.    cyanocobalamin, vitamin B-12, 5,000 mcg TbDL Take 1 tablet by mouth once daily.    cycloSPORINE (RESTASIS) 0.05 % ophthalmic emulsion Place 0.4 mLs (1 drop total) into both eyes 2 (two) times daily.    DOCOSAHEXANOIC ACID/EPA (FISH OIL ORAL) Take 5 capsules by mouth once daily.     erythromycin (ROMYCIN) ophthalmic ointment Apply to eyelids and lashes OU QHS    fluorouracil (EFUDEX) 5 % cream Use hs for 2 weeks on right cheek    guaifen/dextromethorphan/pe (ROBITUSSIN COUGH & COLD CF MAX ORAL) Take 15 mLs by mouth 2 (two) times daily.    levothyroxine (SYNTHROID) 125 MCG tablet TAKE 1 TABLET ONE TIME DAILY    losartan (COZAAR) 25 MG tablet Take 1 tablet (25 mg total) by mouth once daily.    multivitamin with minerals tablet Take 1 tablet by mouth once daily.    pantoprazole (PROTONIX) 40 MG tablet TAKE 1 TABLET EVERY DAY    predniSONE (DELTASONE) 2.5 MG tablet Take 1 tablet by mouth once daily. 5mg and 2.5mg qd    sennosides/docusate sodium (EDEN-COLACE ORAL) Take 1 tablet by mouth once daily.    tenofovir alafenamide (VEMLIDY) 25 mg Tab Take 25 mg by mouth once daily.    ACCU-CHEK FASTCLIX Misc TEST  BLOOD  GLUCOSE FOUR TIMES DAILY    alcohol swabs PadM APPLY 1 PAD TOPICALLY  AS NEEDED.    aspirin 81 MG Chew Take 81 mg by mouth every Mon, Wed, Fri.    ceFEPIme in dextrose 5% (MAXIPIME) 2  gram/50 mL PgBk Inject 50 mLs (2 g total) into the vein every 12 (twelve) hours. End date 11/4/19. for 19 days    predniSONE (DELTASONE) 5 MG tablet Take 5 mg by mouth once daily.    SYMBICORT 160-4.5 mcg/actuation HFAA Inhale 1 puff into the lungs as needed.    tazarotene (AVAGE) 0.1 % cream Apply topically every evening. (Patient taking differently: Apply topically as needed. )    testosterone cypionate (DEPOTESTOTERONE CYPIONATE) 100 mg/mL injection Inject 0.5 mLs (50 mg total) into the muscle every 14 (fourteen) days.    VENTOLIN HFA 90 mcg/actuation inhaler Inhale 1 puff into the lungs as needed.          Thank you  Solomon Subramanian, PharmD

## 2019-10-22 ENCOUNTER — LAB VISIT (OUTPATIENT)
Dept: LAB | Facility: HOSPITAL | Age: 84
End: 2019-10-22
Attending: INTERNAL MEDICINE
Payer: MEDICARE

## 2019-10-22 ENCOUNTER — TELEPHONE (OUTPATIENT)
Dept: INTERNAL MEDICINE | Facility: CLINIC | Age: 84
End: 2019-10-22

## 2019-10-22 DIAGNOSIS — J15.1 PNEUMONIA DUE TO PSEUDOMONAS: Primary | ICD-10-CM

## 2019-10-22 LAB
ALBUMIN SERPL BCP-MCNC: 3 G/DL (ref 3.5–5.2)
ALP SERPL-CCNC: 162 U/L (ref 55–135)
ALT SERPL W/O P-5'-P-CCNC: 34 U/L (ref 10–44)
ANION GAP SERPL CALC-SCNC: 9 MMOL/L (ref 8–16)
AST SERPL-CCNC: 30 U/L (ref 10–40)
BASOPHILS # BLD AUTO: 0.07 K/UL (ref 0–0.2)
BASOPHILS NFR BLD: 0.7 % (ref 0–1.9)
BILIRUB SERPL-MCNC: 0.6 MG/DL (ref 0.1–1)
BUN SERPL-MCNC: 33 MG/DL (ref 8–23)
CALCIUM SERPL-MCNC: 9.1 MG/DL (ref 8.7–10.5)
CHLORIDE SERPL-SCNC: 106 MMOL/L (ref 95–110)
CO2 SERPL-SCNC: 26 MMOL/L (ref 23–29)
CREAT SERPL-MCNC: 1 MG/DL (ref 0.5–1.4)
DIFFERENTIAL METHOD: ABNORMAL
EOSINOPHIL # BLD AUTO: 0.2 K/UL (ref 0–0.5)
EOSINOPHIL NFR BLD: 2.1 % (ref 0–8)
ERYTHROCYTE [DISTWIDTH] IN BLOOD BY AUTOMATED COUNT: 15.6 % (ref 11.5–14.5)
EST. GFR  (AFRICAN AMERICAN): >60 ML/MIN/1.73 M^2
EST. GFR  (NON AFRICAN AMERICAN): >60 ML/MIN/1.73 M^2
GLUCOSE SERPL-MCNC: 154 MG/DL (ref 70–110)
HCT VFR BLD AUTO: 38.4 % (ref 40–54)
HGB BLD-MCNC: 11.9 G/DL (ref 14–18)
IMM GRANULOCYTES # BLD AUTO: 0.35 K/UL (ref 0–0.04)
IMM GRANULOCYTES NFR BLD AUTO: 3.7 % (ref 0–0.5)
LYMPHOCYTES # BLD AUTO: 2.5 K/UL (ref 1–4.8)
LYMPHOCYTES NFR BLD: 26.2 % (ref 18–48)
MCH RBC QN AUTO: 31 PG (ref 27–31)
MCHC RBC AUTO-ENTMCNC: 31 G/DL (ref 32–36)
MCV RBC AUTO: 100 FL (ref 82–98)
MONOCYTES # BLD AUTO: 0.9 K/UL (ref 0.3–1)
MONOCYTES NFR BLD: 9.5 % (ref 4–15)
NEUTROPHILS # BLD AUTO: 5.4 K/UL (ref 1.8–7.7)
NEUTROPHILS NFR BLD: 57.8 % (ref 38–73)
NRBC BLD-RTO: 1 /100 WBC
PLATELET # BLD AUTO: 138 K/UL (ref 150–350)
PMV BLD AUTO: 9.6 FL (ref 9.2–12.9)
POTASSIUM SERPL-SCNC: 3.7 MMOL/L (ref 3.5–5.1)
PROT SERPL-MCNC: 5.6 G/DL (ref 6–8.4)
RBC # BLD AUTO: 3.84 M/UL (ref 4.6–6.2)
SODIUM SERPL-SCNC: 141 MMOL/L (ref 136–145)
WBC # BLD AUTO: 9.38 K/UL (ref 3.9–12.7)

## 2019-10-22 PROCEDURE — 85025 COMPLETE CBC W/AUTO DIFF WBC: CPT | Mod: HCNC

## 2019-10-22 PROCEDURE — 80053 COMPREHEN METABOLIC PANEL: CPT | Mod: HCNC

## 2019-10-22 NOTE — TELEPHONE ENCOUNTER
----- Message from Jennie Arevalo sent at 10/22/2019 11:11 AM CDT -----  Contact: [facility] Bakari [phone]  Bakari is requesting a call from the office in regards to getting orders for patient for wound care.  Please advise, thanks

## 2019-10-22 NOTE — TELEPHONE ENCOUNTER
Attempted to contact Bakari and natali will not allow for call to be completed.    Will call again.

## 2019-10-23 ENCOUNTER — TELEPHONE (OUTPATIENT)
Dept: HEMATOLOGY/ONCOLOGY | Facility: CLINIC | Age: 84
End: 2019-10-23

## 2019-10-24 ENCOUNTER — OFFICE VISIT (OUTPATIENT)
Dept: HEMATOLOGY/ONCOLOGY | Facility: CLINIC | Age: 84
End: 2019-10-24
Payer: MEDICARE

## 2019-10-24 ENCOUNTER — TELEPHONE (OUTPATIENT)
Dept: INTERNAL MEDICINE | Facility: CLINIC | Age: 84
End: 2019-10-24

## 2019-10-24 ENCOUNTER — LAB VISIT (OUTPATIENT)
Dept: LAB | Facility: HOSPITAL | Age: 84
End: 2019-10-24
Payer: MEDICARE

## 2019-10-24 VITALS
RESPIRATION RATE: 18 BRPM | DIASTOLIC BLOOD PRESSURE: 58 MMHG | HEIGHT: 71 IN | TEMPERATURE: 98 F | HEART RATE: 96 BPM | SYSTOLIC BLOOD PRESSURE: 125 MMHG | WEIGHT: 146.19 LBS | OXYGEN SATURATION: 98 % | BODY MASS INDEX: 20.47 KG/M2

## 2019-10-24 DIAGNOSIS — Z09 HOSPITAL DISCHARGE FOLLOW-UP: ICD-10-CM

## 2019-10-24 DIAGNOSIS — D69.3 IMMUNE THROMBOCYTOPENIA: ICD-10-CM

## 2019-10-24 DIAGNOSIS — D80.1 HYPOGAMMAGLOBULINEMIA: ICD-10-CM

## 2019-10-24 DIAGNOSIS — J32.1 CHRONIC FRONTAL SINUSITIS: ICD-10-CM

## 2019-10-24 DIAGNOSIS — C91.10 CLL (CHRONIC LYMPHOCYTIC LEUKEMIA): ICD-10-CM

## 2019-10-24 DIAGNOSIS — C91.10 CLL (CHRONIC LYMPHOCYTIC LEUKEMIA): Primary | ICD-10-CM

## 2019-10-24 DIAGNOSIS — R76.8 HEPATITIS B CORE ANTIBODY POSITIVE: ICD-10-CM

## 2019-10-24 DIAGNOSIS — J15.1 PNEUMONIA OF LEFT LOWER LOBE DUE TO PSEUDOMONAS SPECIES: ICD-10-CM

## 2019-10-24 LAB
ALBUMIN SERPL BCP-MCNC: 3.2 G/DL (ref 3.5–5.2)
ALP SERPL-CCNC: 163 U/L (ref 55–135)
ALT SERPL W/O P-5'-P-CCNC: 33 U/L (ref 10–44)
ANION GAP SERPL CALC-SCNC: 8 MMOL/L (ref 8–16)
ANISOCYTOSIS BLD QL SMEAR: SLIGHT
AST SERPL-CCNC: 27 U/L (ref 10–40)
BASOPHILS # BLD AUTO: 0.05 K/UL (ref 0–0.2)
BASOPHILS NFR BLD: 0.6 % (ref 0–1.9)
BILIRUB SERPL-MCNC: 0.7 MG/DL (ref 0.1–1)
BUN SERPL-MCNC: 38 MG/DL (ref 8–23)
BURR CELLS BLD QL SMEAR: ABNORMAL
CALCIUM SERPL-MCNC: 9.3 MG/DL (ref 8.7–10.5)
CHLORIDE SERPL-SCNC: 105 MMOL/L (ref 95–110)
CO2 SERPL-SCNC: 27 MMOL/L (ref 23–29)
CREAT SERPL-MCNC: 1.1 MG/DL (ref 0.5–1.4)
DIFFERENTIAL METHOD: ABNORMAL
EOSINOPHIL # BLD AUTO: 0.1 K/UL (ref 0–0.5)
EOSINOPHIL NFR BLD: 1.5 % (ref 0–8)
ERYTHROCYTE [DISTWIDTH] IN BLOOD BY AUTOMATED COUNT: 15.9 % (ref 11.5–14.5)
EST. GFR  (AFRICAN AMERICAN): >60 ML/MIN/1.73 M^2
EST. GFR  (NON AFRICAN AMERICAN): >60 ML/MIN/1.73 M^2
GLUCOSE SERPL-MCNC: 207 MG/DL (ref 70–110)
HCT VFR BLD AUTO: 40.4 % (ref 40–54)
HGB BLD-MCNC: 12.7 G/DL (ref 14–18)
IGA SERPL-MCNC: 11 MG/DL (ref 40–350)
IGG SERPL-MCNC: 288 MG/DL (ref 650–1600)
IGM SERPL-MCNC: 11 MG/DL (ref 50–300)
IMM GRANULOCYTES # BLD AUTO: 0.4 K/UL (ref 0–0.04)
IMM GRANULOCYTES NFR BLD AUTO: 4.5 % (ref 0–0.5)
LYMPHOCYTES # BLD AUTO: 2 K/UL (ref 1–4.8)
LYMPHOCYTES NFR BLD: 21.8 % (ref 18–48)
MCH RBC QN AUTO: 31 PG (ref 27–31)
MCHC RBC AUTO-ENTMCNC: 31.4 G/DL (ref 32–36)
MCV RBC AUTO: 99 FL (ref 82–98)
MONOCYTES # BLD AUTO: 0.8 K/UL (ref 0.3–1)
MONOCYTES NFR BLD: 8.9 % (ref 4–15)
NEUTROPHILS # BLD AUTO: 5.6 K/UL (ref 1.8–7.7)
NEUTROPHILS NFR BLD: 62.7 % (ref 38–73)
NRBC BLD-RTO: 1 /100 WBC
OVALOCYTES BLD QL SMEAR: ABNORMAL
PLATELET # BLD AUTO: 123 K/UL (ref 150–350)
PLATELET BLD QL SMEAR: ABNORMAL
PMV BLD AUTO: 9.5 FL (ref 9.2–12.9)
POIKILOCYTOSIS BLD QL SMEAR: SLIGHT
POLYCHROMASIA BLD QL SMEAR: ABNORMAL
POTASSIUM SERPL-SCNC: 4.4 MMOL/L (ref 3.5–5.1)
PROT SERPL-MCNC: 6 G/DL (ref 6–8.4)
RBC # BLD AUTO: 4.1 M/UL (ref 4.6–6.2)
SODIUM SERPL-SCNC: 140 MMOL/L (ref 136–145)
WBC # BLD AUTO: 8.94 K/UL (ref 3.9–12.7)

## 2019-10-24 PROCEDURE — 99499 UNLISTED E&M SERVICE: CPT | Mod: HCNC,S$GLB,, | Performed by: NURSE PRACTITIONER

## 2019-10-24 PROCEDURE — 99999 PR PBB SHADOW E&M-EST. PATIENT-LVL III: CPT | Mod: PBBFAC,HCNC,, | Performed by: NURSE PRACTITIONER

## 2019-10-24 PROCEDURE — 1101F PR PT FALLS ASSESS DOC 0-1 FALLS W/OUT INJ PAST YR: ICD-10-PCS | Mod: HCNC,CPTII,S$GLB, | Performed by: NURSE PRACTITIONER

## 2019-10-24 PROCEDURE — 3074F SYST BP LT 130 MM HG: CPT | Mod: HCNC,CPTII,S$GLB, | Performed by: NURSE PRACTITIONER

## 2019-10-24 PROCEDURE — 1101F PT FALLS ASSESS-DOCD LE1/YR: CPT | Mod: HCNC,CPTII,S$GLB, | Performed by: NURSE PRACTITIONER

## 2019-10-24 PROCEDURE — 3078F PR MOST RECENT DIASTOLIC BLOOD PRESSURE < 80 MM HG: ICD-10-PCS | Mod: HCNC,CPTII,S$GLB, | Performed by: NURSE PRACTITIONER

## 2019-10-24 PROCEDURE — 85025 COMPLETE CBC W/AUTO DIFF WBC: CPT | Mod: HCNC

## 2019-10-24 PROCEDURE — 99215 PR OFFICE/OUTPT VISIT, EST, LEVL V, 40-54 MIN: ICD-10-PCS | Mod: HCNC,S$GLB,, | Performed by: NURSE PRACTITIONER

## 2019-10-24 PROCEDURE — 99215 OFFICE O/P EST HI 40 MIN: CPT | Mod: HCNC,S$GLB,, | Performed by: NURSE PRACTITIONER

## 2019-10-24 PROCEDURE — 3078F DIAST BP <80 MM HG: CPT | Mod: HCNC,CPTII,S$GLB, | Performed by: NURSE PRACTITIONER

## 2019-10-24 PROCEDURE — 3074F PR MOST RECENT SYSTOLIC BLOOD PRESSURE < 130 MM HG: ICD-10-PCS | Mod: HCNC,CPTII,S$GLB, | Performed by: NURSE PRACTITIONER

## 2019-10-24 PROCEDURE — 36415 COLL VENOUS BLD VENIPUNCTURE: CPT | Mod: HCNC

## 2019-10-24 PROCEDURE — 82784 ASSAY IGA/IGD/IGG/IGM EACH: CPT | Mod: 59,HCNC

## 2019-10-24 PROCEDURE — 80053 COMPREHEN METABOLIC PANEL: CPT | Mod: HCNC

## 2019-10-24 PROCEDURE — 99999 PR PBB SHADOW E&M-EST. PATIENT-LVL III: ICD-10-PCS | Mod: PBBFAC,HCNC,, | Performed by: NURSE PRACTITIONER

## 2019-10-24 PROCEDURE — 99499 RISK ADDL DX/OHS AUDIT: ICD-10-PCS | Mod: HCNC,S$GLB,, | Performed by: NURSE PRACTITIONER

## 2019-10-24 RX ORDER — NEOMYCIN SULFATE, POLYMYXIN B SULFATE, AND DEXAMETHASONE 3.5; 10000; 1 MG/G; [USP'U]/G; MG/G
OINTMENT OPHTHALMIC
COMMUNITY
End: 2020-04-02

## 2019-10-24 RX ORDER — NEOMYCIN SULFATE, POLYMYXIN B SULFATE AND DEXAMETHASONE 3.5; 10000; 1 MG/ML; [USP'U]/ML; MG/ML
SUSPENSION/ DROPS OPHTHALMIC
COMMUNITY
End: 2020-04-02

## 2019-10-24 RX ORDER — CHLORHEXIDINE GLUCONATE ORAL RINSE 1.2 MG/ML
SOLUTION DENTAL
COMMUNITY
End: 2020-04-02

## 2019-10-24 RX ORDER — BUDESONIDE 0.5 MG/2ML
INHALANT ORAL
COMMUNITY
End: 2020-04-02

## 2019-10-24 RX ORDER — FUROSEMIDE 20 MG/1
20 TABLET ORAL DAILY PRN
COMMUNITY
Start: 2019-10-16

## 2019-10-24 NOTE — Clinical Note
Follow-up:Labs on 11/18 on [facility] closer to home (cbc, cmp)Return visit with Dr. Marr to discuss starting rituxan hycela on 11/19 at 7 amAppt for ritxuan hycela on 11/19, can cancel if Dr. Marr does not wish for pt to receive this therapy.

## 2019-10-24 NOTE — TELEPHONE ENCOUNTER
----- Message from Lesvia Finley sent at 10/24/2019  8:16 AM CDT -----  Contact: [facility] Bakari [phone]  [facility] called in regards checking the status of getting orders for the pt. The wrong number was put in the first message. That's why she did not get the call back.       Please advise

## 2019-10-24 NOTE — PROGRESS NOTES
Subjective:       Patient ID: Thierry Ramos Jr. is a 85 y.o. male.    Chief Complaint: Headache    Heme/Onc history:  Mr. Ramos has a history of CLL diagnosed in April 2012 by Dr Joaquin Wooten. In April 2013, there was an abrupt rise in his white blood cell count to 93,000 and he had a modest number of prolymphocytes in his peripheral smear.  He had generalized urticaria, which was thought to be paraneoplastic.  At that time, he was treated with 5 courses of fludarabine, cyclophosphamide and Rituxan.  The fludarabine doses were decreased somewhat because of mild renal impairment.  The sixth treatment was with Rituxan alone because he developed thrombocytopenia, which has persisted in varying degrees since then.     His major hematologic problem and much of his treatment since 2013, has been related to thrombocytopenia.  It became more severe in early 2016, at a time when he was taking both aspirin and Plavix.  A bone marrow examination had no evidence of myelodysplasia and 35% of the bone marrow cells were small lymphocytes.  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  The FISH studies for myelodysplastic disorders were negative. He was treated for possible immune thrombocytopenia with prednisone 60 mg daily for 2 weeks, but there was no improvement in his platelet count.  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     Subsequently, because of continued thrombocytopenia,  recommended treatment with ofatumumab, which he began in late June 2016.  At that time, his platelet count was 57,000.  He generally tolerated the drug well and the frequency of administration was gradually reduced over a period of more than 2 years.  Platelet counts during most of those 2 years were between 66,000-117,000.  The drug was discontinued in early November 2018.     He has had many episodes of infection in  the past, particularly recurrent sinusitis and he has had several surgical procedures on his sinuses.  Because of this, he has been receiving intravenous immunoglobulin monthly for years and the frequency of pneumonia and severe sinusitis has diminished. Most recent dose of IVIG 4/2019.      After a pituitary surgery for a tumor in 2002, he experienced a TIA and then had 2 strokes.  He has had many nonmelanoma skin cancers excised.     He asked to be seen earlier than planned by Dr. Wooten in January because he was concerned about his recent blood counts.  His platelet count has declined modestly from the range of 66,000-107,000 seen in the past 9 months to a current value of 51,000.  His white blood cell count remains normal and his lymphocyte percentage     He was started on a low dose ibrutinib 280 mg rather than 420mg due to concern for treatment related- further decrease in platelet count while on aspirin and plavix.     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 Left arm is bandaged due to skin fragility causing oozing wounds.     Today:  Patient presents today for routine f/u. She completed 4 weekly doses of IV rituxan on 9/13/19. His plt have improved. He was recently hospitalized for PNA. He is completing a course of cefepime IV via a PICC line with home health. He will complete this in early November. He has previously been treated with IVIG which last dose was given 7/12/2019 and his last immunoglobulin level was done 2015. We rechecked immunoglobulins today, but they are pending. He denies fevers and night sweats since d/c from [facility].  No sob, chest pain.     Review of Systems   Constitutional: Positive for fatigue. Negative for chills, diaphoresis and fever.   HENT: Negative for congestion, ear pain, mouth sores, nosebleeds, sinus pain, sore throat and trouble swallowing.    Eyes: Negative for pain, redness and visual disturbance.   Respiratory: Negative for chest tightness, shortness of breath, wheezing and stridor.    Cardiovascular: Negative for chest pain, palpitations and leg swelling.   Gastrointestinal: Negative for abdominal distention, abdominal pain, blood in stool, constipation, diarrhea, nausea and vomiting.   Genitourinary: Negative for hematuria.   Musculoskeletal: Negative for arthralgias and back pain.   Skin: Positive for wound. Negative for rash.        Sacral followed by wound care   Allergic/Immunologic: Negative for environmental allergies, food allergies and immunocompromised state.   Neurological: Positive for weakness. Negative for dizziness, light-headedness, numbness and headaches.   Hematological: Negative for adenopathy. Bruises/bleeds easily.   Psychiatric/Behavioral: Negative.  Negative for confusion.       Objective:      Physical Exam   Constitutional: He is oriented to person, place, and time. He appears well-developed and well-nourished.   HENT:   Head: Normocephalic and atraumatic.   Right Ear: External ear normal.   Left Ear: External ear normal.   Mouth/Throat: Oropharynx is clear and moist.   Eyes: Conjunctivae and EOM are normal. No scleral icterus.   Neck: Normal range of motion. Neck supple.   Cardiovascular: Normal rate and intact distal pulses.   Pulmonary/Chest: Effort normal and breath sounds normal. No respiratory distress. He has no wheezes.   improved   Abdominal: Soft. Bowel sounds are normal. He exhibits no distension. There is no tenderness.   Musculoskeletal: Normal range of motion. He exhibits no edema (none noted on exam today ).   Lymphadenopathy:        Head (right side): No submental, no  submandibular, no tonsillar, no preauricular, no posterior auricular and no occipital adenopathy present.        Head (left side): No submental, no submandibular, no tonsillar, no preauricular, no posterior auricular and no occipital adenopathy present.     He has no cervical adenopathy.     He has no axillary adenopathy.        Right: No supraclavicular adenopathy present.        Left: No supraclavicular adenopathy present.   Neurological: He is alert and oriented to person, place, and time. No cranial nerve deficit.   Skin: Skin is warm and dry. Ecchymosis noted. There is erythema.   Fragile skin with generalized ecchymosis to BUE and some peeling/flaking of skin on face  Sacral wound--see wound care note for picture   Psychiatric: He has a normal mood and affect. His behavior is normal. Judgment and thought content normal.   Vitals reviewed.      Assessment:       1. CLL (chronic lymphocytic leukemia)    2. Immune thrombocytopenia    3. Hepatitis B core antibody positive    4. Hypogammaglobulinemia    5. Hospital discharge follow-up    6. Pneumonia of left lower lobe due to Pseudomonas species        Plan:       CLL/ITP  -Completed 4th Rituxan weekly infusion on 9/13/19, tolerate without difficulty  -Platelets improved from 24K > 116 K > 123 K  -Previously seen by NP who initiated converation regarding rituxan hycela as a maintenance therapy. Pt is interested in this. I discussed this with him and his spouse again today. We will set this up for 11/19/19. Pt will have lab work performed locally on 11/18 and see Dr. Marr on 11/19 at 7 am to discuss if hycela is indicated. We will have an appt for hycela set up for 11/19 in case Dr. Marr and pt wish to proceed.     Hep B core antibody +  -followed by hepatology  -started tenofovir 8/2019, continue for 6 month after completion of chemo    Infections with hypogammaglobulinemia  -has received IVIG in the past for chronic infections, last given 7/2019  -checked  immunoglobulins 10/24/19, but still pending  -will defer to Dr. Marr in case IVIG is indicated to give again    Hospital f/u for PNA  - Now on cefepime IV via PICC line with home health   - Will complete therapy early November    Follow-up  Labs on 11/18 on [facility] closer to home (cbc, cmp)  Return visit with Dr. Marr to discuss starting rituxan hycela on 11/19 at 7 am  Appt for ritxuan hycela on 11/19, can cancel if Dr. Marr does not wish for pt to receive this therapy.    Isatu Badillo, DNP, NP  Hematology/Oncology

## 2019-10-25 ENCOUNTER — TELEPHONE (OUTPATIENT)
Dept: INTERNAL MEDICINE | Facility: CLINIC | Age: 84
End: 2019-10-25

## 2019-10-25 NOTE — TELEPHONE ENCOUNTER
Spoke with Bakari who advises that the previous # we had was incorrect, therefore, we were unable to connect.    However, pt has a couple of skin tears that they are using antibiotic ointment and covering with Telfa.    He also has a decubitus on the buttocks that they are cleaning with wound cleanser and using metahoney as a barrier.    She will advise if any worsening.    Thanks.

## 2019-10-25 NOTE — TELEPHONE ENCOUNTER
----- Message from Jo Lopez sent at 10/25/2019  9:06 AM CDT -----  Contact: Bakari/[facility] [phone]  Needs wound care orders for some new skin tares.    Please call and advise.    Thank You

## 2019-10-29 ENCOUNTER — LAB VISIT (OUTPATIENT)
Dept: LAB | Facility: HOSPITAL | Age: 84
End: 2019-10-29
Attending: INTERNAL MEDICINE
Payer: MEDICARE

## 2019-10-29 DIAGNOSIS — J15.1 PNEUMONIA DUE TO PSEUDOMONAS: Primary | ICD-10-CM

## 2019-10-29 LAB
ALBUMIN SERPL BCP-MCNC: 3.1 G/DL (ref 3.5–5.2)
ALP SERPL-CCNC: 132 U/L (ref 55–135)
ALT SERPL W/O P-5'-P-CCNC: 27 U/L (ref 10–44)
ANION GAP SERPL CALC-SCNC: 7 MMOL/L (ref 8–16)
AST SERPL-CCNC: 27 U/L (ref 10–40)
BASOPHILS # BLD AUTO: 0.05 K/UL (ref 0–0.2)
BASOPHILS NFR BLD: 0.7 % (ref 0–1.9)
BILIRUB SERPL-MCNC: 0.6 MG/DL (ref 0.1–1)
BUN SERPL-MCNC: 46 MG/DL (ref 8–23)
CALCIUM SERPL-MCNC: 9.3 MG/DL (ref 8.7–10.5)
CHLORIDE SERPL-SCNC: 108 MMOL/L (ref 95–110)
CO2 SERPL-SCNC: 27 MMOL/L (ref 23–29)
CREAT SERPL-MCNC: 1 MG/DL (ref 0.5–1.4)
DIFFERENTIAL METHOD: ABNORMAL
EOSINOPHIL # BLD AUTO: 0.3 K/UL (ref 0–0.5)
EOSINOPHIL NFR BLD: 3.6 % (ref 0–8)
ERYTHROCYTE [DISTWIDTH] IN BLOOD BY AUTOMATED COUNT: 15.9 % (ref 11.5–14.5)
EST. GFR  (AFRICAN AMERICAN): >60 ML/MIN/1.73 M^2
EST. GFR  (NON AFRICAN AMERICAN): >60 ML/MIN/1.73 M^2
GLUCOSE SERPL-MCNC: 124 MG/DL (ref 70–110)
HCT VFR BLD AUTO: 39.8 % (ref 40–54)
HGB BLD-MCNC: 12.5 G/DL (ref 14–18)
IMM GRANULOCYTES # BLD AUTO: 0.14 K/UL (ref 0–0.04)
IMM GRANULOCYTES NFR BLD AUTO: 2 % (ref 0–0.5)
LYMPHOCYTES # BLD AUTO: 2.6 K/UL (ref 1–4.8)
LYMPHOCYTES NFR BLD: 37.2 % (ref 18–48)
MCH RBC QN AUTO: 31.9 PG (ref 27–31)
MCHC RBC AUTO-ENTMCNC: 31.4 G/DL (ref 32–36)
MCV RBC AUTO: 102 FL (ref 82–98)
MONOCYTES # BLD AUTO: 0.7 K/UL (ref 0.3–1)
MONOCYTES NFR BLD: 10.5 % (ref 4–15)
NEUTROPHILS # BLD AUTO: 3.2 K/UL (ref 1.8–7.7)
NEUTROPHILS NFR BLD: 46 % (ref 38–73)
NRBC BLD-RTO: 0 /100 WBC
PLATELET # BLD AUTO: 109 K/UL (ref 150–350)
PMV BLD AUTO: 10.2 FL (ref 9.2–12.9)
POTASSIUM SERPL-SCNC: 4 MMOL/L (ref 3.5–5.1)
PROT SERPL-MCNC: 5.5 G/DL (ref 6–8.4)
RBC # BLD AUTO: 3.92 M/UL (ref 4.6–6.2)
SODIUM SERPL-SCNC: 142 MMOL/L (ref 136–145)
WBC # BLD AUTO: 7.04 K/UL (ref 3.9–12.7)

## 2019-10-29 PROCEDURE — 85025 COMPLETE CBC W/AUTO DIFF WBC: CPT | Mod: HCNC

## 2019-10-29 PROCEDURE — 80053 COMPREHEN METABOLIC PANEL: CPT | Mod: HCNC

## 2019-10-31 ENCOUNTER — OFFICE VISIT (OUTPATIENT)
Dept: INFECTIOUS DISEASES | Facility: CLINIC | Age: 84
End: 2019-10-31
Payer: MEDICARE

## 2019-10-31 VITALS
DIASTOLIC BLOOD PRESSURE: 62 MMHG | BODY MASS INDEX: 20.37 KG/M2 | TEMPERATURE: 98 F | HEIGHT: 71 IN | SYSTOLIC BLOOD PRESSURE: 131 MMHG | WEIGHT: 145.5 LBS | HEART RATE: 82 BPM

## 2019-10-31 DIAGNOSIS — J15.1 PNEUMONIA OF LEFT LOWER LOBE DUE TO PSEUDOMONAS SPECIES: Primary | ICD-10-CM

## 2019-10-31 PROCEDURE — 3075F PR MOST RECENT SYSTOLIC BLOOD PRESS GE 130-139MM HG: ICD-10-PCS | Mod: HCNC,CPTII,S$GLB, | Performed by: INTERNAL MEDICINE

## 2019-10-31 PROCEDURE — 3075F SYST BP GE 130 - 139MM HG: CPT | Mod: HCNC,CPTII,S$GLB, | Performed by: INTERNAL MEDICINE

## 2019-10-31 PROCEDURE — 3078F DIAST BP <80 MM HG: CPT | Mod: HCNC,CPTII,S$GLB, | Performed by: INTERNAL MEDICINE

## 2019-10-31 PROCEDURE — 1101F PR PT FALLS ASSESS DOC 0-1 FALLS W/OUT INJ PAST YR: ICD-10-PCS | Mod: HCNC,CPTII,S$GLB, | Performed by: INTERNAL MEDICINE

## 2019-10-31 PROCEDURE — 99214 OFFICE O/P EST MOD 30 MIN: CPT | Mod: HCNC,S$GLB,, | Performed by: INTERNAL MEDICINE

## 2019-10-31 PROCEDURE — 1101F PT FALLS ASSESS-DOCD LE1/YR: CPT | Mod: HCNC,CPTII,S$GLB, | Performed by: INTERNAL MEDICINE

## 2019-10-31 PROCEDURE — 99214 PR OFFICE/OUTPT VISIT, EST, LEVL IV, 30-39 MIN: ICD-10-PCS | Mod: HCNC,S$GLB,, | Performed by: INTERNAL MEDICINE

## 2019-10-31 PROCEDURE — 3078F PR MOST RECENT DIASTOLIC BLOOD PRESSURE < 80 MM HG: ICD-10-PCS | Mod: HCNC,CPTII,S$GLB, | Performed by: INTERNAL MEDICINE

## 2019-10-31 PROCEDURE — 99999 PR PBB SHADOW E&M-EST. PATIENT-LVL III: ICD-10-PCS | Mod: PBBFAC,HCNC,, | Performed by: INTERNAL MEDICINE

## 2019-10-31 PROCEDURE — 99999 PR PBB SHADOW E&M-EST. PATIENT-LVL III: CPT | Mod: PBBFAC,HCNC,, | Performed by: INTERNAL MEDICINE

## 2019-10-31 NOTE — LETTER
November 4, 2019      Jl Kimbrough MD  [address] [place] [address]           [facility] - Infectious Diseases  [address] [facility] [address]  Phone: [phone]  Fax: [phone]          Patient: Thierry Ramos Jr.   MR Number: [id number]   YOB: 1934   Date of Visit: 10/31/2019       Dear Dr. Jl Kimbrough:    Thank you for referring Thierry Ramos to me for evaluation. Attached you will find relevant portions of my assessment and plan of care.    If you have questions, please do not hesitate to call me. I look forward to following Thierry Ramos along with you.    Sincerely,    Luis Fernando Banerjee MD    Enclosure  CC:  No Recipients    If you would like to receive this communication electronically, please contact [email or web address] or [phone] to request more information on [facility] access.    For providers and/or their staff who would like to refer a patient to [facility], please contact us through our one-stop-shop provider referral line, [facility], at [phone].    If you feel you have received this communication in error or would no longer like to receive these types of communications, please e-mail [email or web address]

## 2019-10-31 NOTE — PROGRESS NOTES
Infectious Diseases Clinic Note    Subjective:       Patient ID: Thierry Ramos Jr. is a 85 y.o. male.    Chief Complaint: No chief complaint on file.    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  His platelets have declined treated with ibrutinib, was stopped, he recently completed 4 weekly doses of rituximab 9/13/19 and PLTs have improved.  Pt was recently hospitalized for pneumonia at [place] with 10/11 sputum culture growing PsA, sent home with IV cefepime via PICC to complete 3 week course with EOT 11/4/19.  Here for f/u.  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    Hyperlipidemia     Hypertension     Hypopituitarism     Hypothyroid     Immunosuppression 3/13/2015    Squamous Cell Carcinoma     on the right side of the face     Squamous cell carcinoma of skin of right temple 1/27/2016    Stroke     Syncope 2/12    Unspecified disorder of kidney and ureter        Social History     Socioeconomic History    Marital status:      Spouse name: Not on file    Number of children: Not on file    Years of education: Not on file    Highest education level: Not on file   Occupational History    Occupation: retired   Social Needs    Financial resource strain: Not very hard    Food insecurity:     Worry: Never true     Inability: Never true    Transportation needs:     Medical: No     Non-medical: No   Tobacco Use    Smoking status: Never Smoker    Smokeless tobacco: Never Used   Substance and Sexual Activity    Alcohol use: Yes     Alcohol/week: 2.0  "standard drinks     Types: 1 Glasses of wine, 1 Cans of beer per week     Frequency: Never     Drinks per session: Patient refused     Binge frequency: Patient refused     Comment: "rarely"    Drug use: No    Sexual activity: Not Currently   Lifestyle    Physical activity:     Days per week: 5 days     Minutes per session: Not on file    Stress: To some extent   Relationships    Social connections:     Talks on phone: More than three times a week     Gets together: More than three times a week     Attends Latter day service: Not on file     Active member of club or organization: No     Attends meetings of clubs or organizations: Never     Relationship status:    Other Topics Concern    Not on file   Social History Narrative    He is .         Current Outpatient Medications:     ACCU-CHEK FASTCLIX Misc, TEST  BLOOD  GLUCOSE FOUR TIMES DAILY, Disp: 408 each, Rfl: 6    alcohol swabs PadM, APPLY 1 PAD TOPICALLY  AS NEEDED., Disp: 300 each, Rfl: 3    aspirin 81 MG Chew, Take 81 mg by mouth every Mon, Wed, Fri., Disp: , Rfl:     atorvastatin (LIPITOR) 20 MG tablet, TAKE 1 TABLET EVERY DAY, Disp: 90 tablet, Rfl: 3    budesonide (PULMICORT) 0.5 mg/2 mL nebulizer solution, budesonide 0.5 mg/2 mL suspension for nebulization, Disp: , Rfl:     ceFEPIme in dextrose 5% (MAXIPIME) 2 gram/50 mL PgBk, Inject 50 mLs (2 g total) into the vein every 12 (twelve) hours. End date 11/4/19. for 19 days, Disp: 1900 mL, Rfl: 0    chlorhexidine (PERIDEX) 0.12 % solution, chlorhexidine gluconate 0.12 % mouthwash, Disp: , Rfl:     clopidogrel (PLAVIX) 75 mg tablet, Take 75 mg by mouth once daily., Disp: , Rfl:     cyanocobalamin, vitamin B-12, 5,000 mcg TbDL, Take 1 tablet by mouth once daily., Disp: , Rfl:     cycloSPORINE (RESTASIS) 0.05 % ophthalmic emulsion, Place 0.4 mLs (1 drop total) into both eyes 2 (two) times daily., Disp: 180 each, Rfl: 3    DOCOSAHEXANOIC ACID/EPA (FISH OIL ORAL), Take 5 capsules by " mouth once daily. , Disp: , Rfl:     erythromycin (ROMYCIN) ophthalmic ointment, Apply to eyelids and lashes OU QHS, Disp: 1 Tube, Rfl: 6    fluorouracil (EFUDEX) 5 % cream, Use hs for 2 weeks on right cheek, Disp: 40 g, Rfl: 3    furosemide (LASIX) 20 MG tablet, , Disp: , Rfl:     guaifen/dextromethorphan/pe (ROBITUSSIN COUGH & COLD CF MAX ORAL), Take 15 mLs by mouth 2 (two) times daily., Disp: , Rfl:     levothyroxine (SYNTHROID) 125 MCG tablet, TAKE 1 TABLET ONE TIME DAILY, Disp: 90 tablet, Rfl: 3    losartan (COZAAR) 25 MG tablet, Take 1 tablet (25 mg total) by mouth once daily., Disp: 90 tablet, Rfl: 3    multivitamin with minerals tablet, Take 1 tablet by mouth once daily., Disp: , Rfl:     neomycin-polymyxin-dexamethasone (DEXACINE) 3.5 mg/g-10,000 unit/g-0.1 % Oint, neomycin 3.5 mg/g-polymyxin B 10,000 unit/g-dexameth 0.1 % eye oint, Disp: , Rfl:     neomycin-polymyxin-dexamethasone (MAXITROL) 3.5mg/mL-10,000 unit/mL-0.1 % DrpS, neomycin-polymyxin-dexameth 3.5 mg/mL-10,000 unit/mL-0.1% eye drops, Disp: , Rfl:     pantoprazole (PROTONIX) 40 MG tablet, TAKE 1 TABLET EVERY DAY, Disp: 90 tablet, Rfl: 3    predniSONE (DELTASONE) 2.5 MG tablet, Take 1 tablet by mouth once daily. 5mg and 2.5mg qd, Disp: , Rfl:     predniSONE (DELTASONE) 5 MG tablet, Take 5 mg by mouth once daily., Disp: , Rfl:     sennosides/docusate sodium (EDEN-COLACE ORAL), Take 1 tablet by mouth once daily., Disp: , Rfl:     SYMBICORT 160-4.5 mcg/actuation HFAA, Inhale 1 puff into the lungs as needed., Disp: , Rfl:     tazarotene (AVAGE) 0.1 % cream, Apply topically every evening. (Patient taking differently: Apply topically as needed. ), Disp: 30 g, Rfl: 3    tenofovir alafenamide (VEMLIDY) 25 mg Tab, Take 25 mg by mouth once daily., Disp: 30 tablet, Rfl: 11    testosterone cypionate (DEPOTESTOTERONE CYPIONATE) 100 mg/mL injection, Inject 0.5 mLs (50 mg total) into the muscle every 14 (fourteen) days., Disp: 10 mL, Rfl: 5     VENTOLIN HFA 90 mcg/actuation inhaler, Inhale 1 puff into the lungs as needed., Disp: , Rfl:     Review of Systems   Constitutional: Negative for activity change, appetite change, chills, fatigue and fever.   HENT: Negative for congestion, dental problem, mouth sores and sinus pressure.    Eyes: Negative for pain, redness and visual disturbance.   Respiratory: Negative for cough, shortness of breath and wheezing.    Cardiovascular: Negative for chest pain and leg swelling.   Gastrointestinal: Negative for abdominal distention, abdominal pain, diarrhea, nausea and vomiting.   Endocrine: Negative for polyuria.   Genitourinary: Negative for decreased urine volume, dysuria and frequency.   Musculoskeletal: Negative for joint swelling.   Skin: Negative for color change.   Allergic/Immunologic: Negative for food allergies.   Neurological: Negative for dizziness, weakness and headaches.   Hematological: Negative for adenopathy.   Psychiatric/Behavioral: Negative for agitation and confusion. The patient is not nervous/anxious.            Objective:       Vitals:    10/31/19 1530   BP: 131/62   Pulse: 82   Temp: 98 °F (36.7 °C)       There were no vitals filed for this visit.  Physical Exam   Constitutional: He is oriented to person, place, and time. He appears well-developed and well-nourished.   HENT:   Head: Normocephalic and atraumatic.   Mouth/Throat: Oropharynx is clear and moist.   Eyes: Conjunctivae are normal.   Neck: Neck supple.   Cardiovascular: Normal rate, regular rhythm and normal heart sounds.   No murmur heard.  Pulmonary/Chest: Effort normal and breath sounds normal. No respiratory distress. He has no wheezes.   Abdominal: Soft. Bowel sounds are normal. He exhibits no distension. There is no tenderness.   Musculoskeletal: Normal range of motion. He exhibits no edema or tenderness.   Lymphadenopathy:     He has no cervical adenopathy.   Neurological: He is alert and oriented to person, place, and time.  Coordination normal.   Skin: Skin is warm and dry. No rash noted.   Psychiatric: He has a normal mood and affect. His behavior is normal.           Assessment/Plan:       No diagnosis found.      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  His platelets have declined treated with ibrutinib, was stopped, he recently completed 4 weekly doses of rituximab 9/13/19 and PLTs have improved.  Pt was recently hospitalized for pneumonia at [place] with 10/11 sputum culture growing PsA, sent home with IV cefepime via PICC to complete 3 week course with EOT 11/4/19.  Here for f/u.  Doing very well  - finish cefepime as previously described  - pull PICC at that time with home health  - pt and wife understand to reach out with any new issues

## 2019-11-03 ENCOUNTER — EXTERNAL HOME HEALTH (OUTPATIENT)
Dept: HOME HEALTH SERVICES | Facility: HOSPITAL | Age: 84
End: 2019-11-03
Payer: MEDICARE

## 2019-11-04 ENCOUNTER — TELEPHONE (OUTPATIENT)
Dept: HOME HEALTH SERVICES | Facility: HOSPITAL | Age: 84
End: 2019-11-04

## 2019-11-05 ENCOUNTER — TELEPHONE (OUTPATIENT)
Dept: HOME HEALTH SERVICES | Facility: HOSPITAL | Age: 84
End: 2019-11-05

## 2019-11-07 ENCOUNTER — TELEPHONE (OUTPATIENT)
Dept: PHARMACY | Facility: CLINIC | Age: 84
End: 2019-11-07

## 2019-11-08 ENCOUNTER — TELEPHONE (OUTPATIENT)
Dept: HOME HEALTH SERVICES | Facility: HOSPITAL | Age: 84
End: 2019-11-08

## 2019-11-09 ENCOUNTER — HOSPITAL ENCOUNTER (INPATIENT)
Facility: HOSPITAL | Age: 84
LOS: 3 days | Discharge: HOME | DRG: 194 | End: 2019-11-12
Attending: EMERGENCY MEDICINE | Admitting: HOSPITALIST
Payer: MEDICARE

## 2019-11-09 DIAGNOSIS — J15.1 PNEUMONIA OF LEFT LOWER LOBE DUE TO PSEUDOMONAS SPECIES: Primary | ICD-10-CM

## 2019-11-09 DIAGNOSIS — E11.40 TYPE 2 DIABETES, CONTROLLED, WITH NEUROPATHY: Chronic | ICD-10-CM

## 2019-11-09 DIAGNOSIS — J18.9 PNEUMONIA: ICD-10-CM

## 2019-11-09 DIAGNOSIS — J18.9 PNEUMONIA OF BOTH LUNGS DUE TO INFECTIOUS ORGANISM, UNSPECIFIED PART OF LUNG: ICD-10-CM

## 2019-11-09 PROBLEM — D69.3 IMMUNE THROMBOCYTOPENIC PURPURA: Status: ACTIVE | Noted: 2019-11-09

## 2019-11-09 PROBLEM — E87.1 HYPONATREMIA: Status: ACTIVE | Noted: 2019-11-09

## 2019-11-09 PROBLEM — D64.9 ANEMIA: Status: ACTIVE | Noted: 2019-11-09

## 2019-11-09 PROBLEM — N18.30 CHRONIC KIDNEY DISEASE, STAGE III (MODERATE): Status: ACTIVE | Noted: 2019-11-09

## 2019-11-09 LAB
ALBUMIN SERPL BCP-MCNC: 2.8 G/DL (ref 3.5–5.2)
ALP SERPL-CCNC: 119 U/L (ref 55–135)
ALT SERPL W/O P-5'-P-CCNC: 36 U/L (ref 10–44)
ANION GAP SERPL CALC-SCNC: 9 MMOL/L (ref 8–16)
APTT BLDCRRT: 24.1 SEC (ref 21–32)
AST SERPL-CCNC: 38 U/L (ref 10–40)
BASOPHILS # BLD AUTO: 0.02 K/UL (ref 0–0.2)
BASOPHILS NFR BLD: 0.2 % (ref 0–1.9)
BILIRUB SERPL-MCNC: 0.6 MG/DL (ref 0.1–1)
BILIRUB UR QL STRIP: NEGATIVE
BUN SERPL-MCNC: 51 MG/DL (ref 8–23)
CALCIUM SERPL-MCNC: 8.8 MG/DL (ref 8.7–10.5)
CHLORIDE SERPL-SCNC: 103 MMOL/L (ref 95–110)
CLARITY UR: CLEAR
CO2 SERPL-SCNC: 23 MMOL/L (ref 23–29)
COLOR UR: YELLOW
CREAT SERPL-MCNC: 1.4 MG/DL (ref 0.5–1.4)
DIFFERENTIAL METHOD: ABNORMAL
EOSINOPHIL # BLD AUTO: 0 K/UL (ref 0–0.5)
EOSINOPHIL NFR BLD: 0.3 % (ref 0–8)
ERYTHROCYTE [DISTWIDTH] IN BLOOD BY AUTOMATED COUNT: 15.2 % (ref 11.5–14.5)
EST. GFR  (AFRICAN AMERICAN): 53 ML/MIN/1.73 M^2
EST. GFR  (NON AFRICAN AMERICAN): 45 ML/MIN/1.73 M^2
GLUCOSE SERPL-MCNC: 194 MG/DL (ref 70–110)
GLUCOSE UR QL STRIP: NEGATIVE
HCT VFR BLD AUTO: 35 % (ref 40–54)
HGB BLD-MCNC: 11.2 G/DL (ref 14–18)
HGB UR QL STRIP: NEGATIVE
INFLUENZA A, MOLECULAR: NEGATIVE
INFLUENZA B, MOLECULAR: NEGATIVE
INR PPP: 1 (ref 0.8–1.2)
KETONES UR QL STRIP: NEGATIVE
LACTATE SERPL-SCNC: 1 MMOL/L (ref 0.5–2.2)
LACTATE SERPL-SCNC: 1.7 MMOL/L (ref 0.5–2.2)
LEUKOCYTE ESTERASE UR QL STRIP: NEGATIVE
LIPASE SERPL-CCNC: 20 U/L (ref 4–60)
LYMPHOCYTES # BLD AUTO: 2.3 K/UL (ref 1–4.8)
LYMPHOCYTES NFR BLD: 23.7 % (ref 18–48)
MAGNESIUM SERPL-MCNC: 1.7 MG/DL (ref 1.6–2.6)
MCH RBC QN AUTO: 31.2 PG (ref 27–31)
MCHC RBC AUTO-ENTMCNC: 32 G/DL (ref 32–36)
MCV RBC AUTO: 98 FL (ref 82–98)
MONOCYTES # BLD AUTO: 0.9 K/UL (ref 0.3–1)
MONOCYTES NFR BLD: 9.2 % (ref 4–15)
NEUTROPHILS # BLD AUTO: 6.2 K/UL (ref 1.8–7.7)
NEUTROPHILS NFR BLD: 66.6 % (ref 38–73)
NITRITE UR QL STRIP: NEGATIVE
PH UR STRIP: 6 [PH] (ref 5–8)
PHOSPHATE SERPL-MCNC: 3.2 MG/DL (ref 2.7–4.5)
PLATELET # BLD AUTO: 99 K/UL (ref 150–350)
PMV BLD AUTO: 10.1 FL (ref 9.2–12.9)
POCT GLUCOSE: 179 MG/DL (ref 70–110)
POTASSIUM SERPL-SCNC: 5 MMOL/L (ref 3.5–5.1)
PROCALCITONIN SERPL IA-MCNC: 0.26 NG/ML
PROT SERPL-MCNC: 5 G/DL (ref 6–8.4)
PROT UR QL STRIP: NEGATIVE
PROTHROMBIN TIME: 10.7 SEC (ref 9–12.5)
RBC # BLD AUTO: 3.59 M/UL (ref 4.6–6.2)
SODIUM SERPL-SCNC: 135 MMOL/L (ref 136–145)
SP GR UR STRIP: 1.01 (ref 1–1.03)
SPECIMEN SOURCE: NORMAL
TROPONIN I SERPL DL<=0.01 NG/ML-MCNC: 0.01 NG/ML (ref 0–0.03)
URN SPEC COLLECT METH UR: NORMAL
UROBILINOGEN UR STRIP-ACNC: NEGATIVE EU/DL
WBC # BLD AUTO: 9.57 K/UL (ref 3.9–12.7)

## 2019-11-09 PROCEDURE — 85025 COMPLETE CBC W/AUTO DIFF WBC: CPT | Mod: HCNC

## 2019-11-09 PROCEDURE — 36415 COLL VENOUS BLD VENIPUNCTURE: CPT | Mod: HCNC

## 2019-11-09 PROCEDURE — G0378 HOSPITAL OBSERVATION PER HR: HCPCS | Mod: HCNC

## 2019-11-09 PROCEDURE — 96365 THER/PROPH/DIAG IV INF INIT: CPT | Mod: HCNC

## 2019-11-09 PROCEDURE — 63600175 PHARM REV CODE 636 W HCPCS: Mod: HCNC | Performed by: HOSPITALIST

## 2019-11-09 PROCEDURE — 63600175 PHARM REV CODE 636 W HCPCS: Mod: HCNC | Performed by: EMERGENCY MEDICINE

## 2019-11-09 PROCEDURE — 81003 URINALYSIS AUTO W/O SCOPE: CPT | Mod: HCNC

## 2019-11-09 PROCEDURE — 87502 INFLUENZA DNA AMP PROBE: CPT | Mod: HCNC

## 2019-11-09 PROCEDURE — 83690 ASSAY OF LIPASE: CPT | Mod: HCNC

## 2019-11-09 PROCEDURE — 83036 HEMOGLOBIN GLYCOSYLATED A1C: CPT | Mod: HCNC

## 2019-11-09 PROCEDURE — 84145 PROCALCITONIN (PCT): CPT | Mod: HCNC

## 2019-11-09 PROCEDURE — 83605 ASSAY OF LACTIC ACID: CPT | Mod: HCNC

## 2019-11-09 PROCEDURE — 84100 ASSAY OF PHOSPHORUS: CPT | Mod: HCNC

## 2019-11-09 PROCEDURE — 84484 ASSAY OF TROPONIN QUANT: CPT | Mod: HCNC

## 2019-11-09 PROCEDURE — 96375 TX/PRO/DX INJ NEW DRUG ADDON: CPT

## 2019-11-09 PROCEDURE — 11000001 HC ACUTE MED/SURG PRIVATE ROOM: Mod: HCNC

## 2019-11-09 PROCEDURE — 25000003 PHARM REV CODE 250: Mod: HCNC | Performed by: NURSE PRACTITIONER

## 2019-11-09 PROCEDURE — 96361 HYDRATE IV INFUSION ADD-ON: CPT | Mod: HCNC

## 2019-11-09 PROCEDURE — 99285 EMERGENCY DEPT VISIT HI MDM: CPT | Mod: 25,HCNC

## 2019-11-09 PROCEDURE — 85610 PROTHROMBIN TIME: CPT | Mod: HCNC

## 2019-11-09 PROCEDURE — 83735 ASSAY OF MAGNESIUM: CPT | Mod: HCNC

## 2019-11-09 PROCEDURE — 87040 BLOOD CULTURE FOR BACTERIA: CPT | Mod: 59,HCNC

## 2019-11-09 PROCEDURE — 85730 THROMBOPLASTIN TIME PARTIAL: CPT | Mod: HCNC

## 2019-11-09 PROCEDURE — 80053 COMPREHEN METABOLIC PANEL: CPT | Mod: HCNC

## 2019-11-09 RX ORDER — IPRATROPIUM BROMIDE AND ALBUTEROL SULFATE 2.5; .5 MG/3ML; MG/3ML
3 SOLUTION RESPIRATORY (INHALATION) EVERY 4 HOURS PRN
Status: DISCONTINUED | OUTPATIENT
Start: 2019-11-09 | End: 2019-11-12 | Stop reason: HOSPADM

## 2019-11-09 RX ORDER — RAMELTEON 8 MG/1
8 TABLET ORAL NIGHTLY PRN
Status: DISCONTINUED | OUTPATIENT
Start: 2019-11-09 | End: 2019-11-12 | Stop reason: HOSPADM

## 2019-11-09 RX ORDER — IBUPROFEN 200 MG
16 TABLET ORAL
Status: DISCONTINUED | OUTPATIENT
Start: 2019-11-09 | End: 2019-11-12 | Stop reason: HOSPADM

## 2019-11-09 RX ORDER — CLOPIDOGREL BISULFATE 75 MG/1
75 TABLET ORAL DAILY
Status: DISCONTINUED | OUTPATIENT
Start: 2019-11-10 | End: 2019-11-12 | Stop reason: HOSPADM

## 2019-11-09 RX ORDER — ACETAMINOPHEN 325 MG/1
650 TABLET ORAL EVERY 4 HOURS PRN
Status: DISCONTINUED | OUTPATIENT
Start: 2019-11-09 | End: 2019-11-12 | Stop reason: HOSPADM

## 2019-11-09 RX ORDER — PREDNISONE 5 MG/1
5 TABLET ORAL DAILY
Status: DISCONTINUED | OUTPATIENT
Start: 2019-11-10 | End: 2019-11-10

## 2019-11-09 RX ORDER — LOSARTAN POTASSIUM 25 MG/1
25 TABLET ORAL DAILY
Status: DISCONTINUED | OUTPATIENT
Start: 2019-11-10 | End: 2019-11-12 | Stop reason: HOSPADM

## 2019-11-09 RX ORDER — GLUCAGON 1 MG
1 KIT INJECTION
Status: DISCONTINUED | OUTPATIENT
Start: 2019-11-09 | End: 2019-11-12 | Stop reason: HOSPADM

## 2019-11-09 RX ORDER — LANOLIN ALCOHOL/MO/W.PET/CERES
5000 CREAM (GRAM) TOPICAL DAILY
Status: DISCONTINUED | OUTPATIENT
Start: 2019-11-10 | End: 2019-11-12 | Stop reason: HOSPADM

## 2019-11-09 RX ORDER — ERYTHROMYCIN 5 MG/G
OINTMENT OPHTHALMIC NIGHTLY
Status: DISCONTINUED | OUTPATIENT
Start: 2019-11-09 | End: 2019-11-12 | Stop reason: HOSPADM

## 2019-11-09 RX ORDER — ONDANSETRON 8 MG/1
8 TABLET, ORALLY DISINTEGRATING ORAL EVERY 8 HOURS PRN
Status: DISCONTINUED | OUTPATIENT
Start: 2019-11-09 | End: 2019-11-12 | Stop reason: HOSPADM

## 2019-11-09 RX ORDER — SODIUM CHLORIDE 0.9 % (FLUSH) 0.9 %
10 SYRINGE (ML) INJECTION
Status: DISCONTINUED | OUTPATIENT
Start: 2019-11-09 | End: 2019-11-12 | Stop reason: HOSPADM

## 2019-11-09 RX ORDER — INSULIN ASPART 100 [IU]/ML
0-5 INJECTION, SOLUTION INTRAVENOUS; SUBCUTANEOUS
Status: DISCONTINUED | OUTPATIENT
Start: 2019-11-09 | End: 2019-11-12 | Stop reason: HOSPADM

## 2019-11-09 RX ORDER — ATORVASTATIN CALCIUM 20 MG/1
20 TABLET, FILM COATED ORAL DAILY
Status: DISCONTINUED | OUTPATIENT
Start: 2019-11-10 | End: 2019-11-12 | Stop reason: HOSPADM

## 2019-11-09 RX ORDER — IBUPROFEN 200 MG
24 TABLET ORAL
Status: DISCONTINUED | OUTPATIENT
Start: 2019-11-09 | End: 2019-11-12 | Stop reason: HOSPADM

## 2019-11-09 RX ORDER — PANTOPRAZOLE SODIUM 40 MG/1
40 TABLET, DELAYED RELEASE ORAL DAILY
Status: DISCONTINUED | OUTPATIENT
Start: 2019-11-10 | End: 2019-11-12 | Stop reason: HOSPADM

## 2019-11-09 RX ORDER — NAPROXEN SODIUM 220 MG/1
81 TABLET, FILM COATED ORAL
Status: DISCONTINUED | OUTPATIENT
Start: 2019-11-11 | End: 2019-11-12 | Stop reason: HOSPADM

## 2019-11-09 RX ORDER — PREDNISONE 2.5 MG/1
2.5 TABLET ORAL DAILY
Status: DISCONTINUED | OUTPATIENT
Start: 2019-11-10 | End: 2019-11-09

## 2019-11-09 RX ORDER — FUROSEMIDE 20 MG/1
20 TABLET ORAL DAILY
Status: DISCONTINUED | OUTPATIENT
Start: 2019-11-10 | End: 2019-11-12 | Stop reason: HOSPADM

## 2019-11-09 RX ORDER — BUDESONIDE 0.5 MG/2ML
0.5 INHALANT ORAL EVERY 12 HOURS
Status: DISCONTINUED | OUTPATIENT
Start: 2019-11-10 | End: 2019-11-12 | Stop reason: HOSPADM

## 2019-11-09 RX ADMIN — ERYTHROMYCIN: 5 OINTMENT OPHTHALMIC at 11:11

## 2019-11-09 RX ADMIN — VANCOMYCIN HYDROCHLORIDE 1750 MG: 100 INJECTION, POWDER, LYOPHILIZED, FOR SOLUTION INTRAVENOUS at 09:11

## 2019-11-09 RX ADMIN — PIPERACILLIN AND TAZOBACTAM 4.5 G: 4; .5 INJECTION, POWDER, LYOPHILIZED, FOR SOLUTION INTRAVENOUS; PARENTERAL at 05:11

## 2019-11-09 RX ADMIN — SODIUM CHLORIDE 1893 ML: 0.9 INJECTION, SOLUTION INTRAVENOUS at 05:11

## 2019-11-09 NOTE — ED PROVIDER NOTES
Encounter Date: 11/9/2019    SCRIBE #1 NOTE: I, Michelle Ahumada, am scribing for, and in the presence of,  Dr. Ames. I have scribed the entire note.       History     Chief Complaint   Patient presents with    Fever     Pt has been having fevers today and possibly yesterday. Also complaining of fatigue. States recent hx of Pneumonia.      Pt is a 84 yo male w/h/o TIA, panhypopituitarism, recent pseudomonas pneumonia, and CLL (rituximab) with hypogammaglobulinemia, who presents to the ED due to a fever that presented yesterday. Wife notes that the patient's temperature was 100.6 F today and is unsure if he also had a fever yesterday. Patient says he's also feeling fatigued and has a productive cough - white grey phlegm, non-bloody. Wife says she gave the patient 2 Tylenols about 45 minutes PTA. Patient denies chest pain, shortness of breath, chills, nausea, vomiting, change in urine or BM, rash, abdominal pain, HA, neck pain or any other complaints at this time. Wife says the patient had his PICC line removed on Tuesday (cefepime for pseudomonas PNA) and feels he started feeling sick (weak) after this.    The history is provided by the patient and the spouse.     Review of patient's allergies indicates:   Allergen Reactions    Ace inhibitors Swelling     angioedema    Divalproex Hives     Rash under arms, body creases     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    Hyperlipidemia     Hypertension     Hypopituitarism     Hypothyroid     Immunosuppression 3/13/2015    Squamous Cell Carcinoma     on the right side of the face     Squamous cell carcinoma of skin of right temple 1/27/2016    Stroke     Syncope 2/12    Unspecified disorder of kidney and ureter      Past Surgical History:   Procedure Laterality Date    CAROTID ENDARTERECTOMY  "(L)  2/27/2007    EXCISION TURBINATE, SUBMUCOUS      FESS  9/16/2014    Mohs excision   3/23/2015, 2/24/2016    combined with WLE forehead    NASAL SEPTUM SURGERY  9/16/2014    right ureter surgery  1995    SENTINEL LYMPH NODE BIOPSY  3/23/2015, 2/24/2016    SINUS SURGERY  9/16/2014    septo, ESS, BITSMR    Transphenoidal surgery       Family History   Problem Relation Age of Onset    Cancer Mother         breast    Colon cancer Father     Cancer Father         colon    Cancer Brother         leukemia    No Known Problems Sister     No Known Problems Maternal Aunt     No Known Problems Maternal Uncle     No Known Problems Paternal Aunt     No Known Problems Paternal Uncle     No Known Problems Maternal Grandmother     No Known Problems Maternal Grandfather     No Known Problems Paternal Grandmother     No Known Problems Paternal Grandfather     Amblyopia Neg Hx     Blindness Neg Hx     Cataracts Neg Hx     Diabetes Neg Hx     Glaucoma Neg Hx     Hypertension Neg Hx     Macular degeneration Neg Hx     Retinal detachment Neg Hx     Strabismus Neg Hx     Stroke Neg Hx     Thyroid disease Neg Hx     Allergic rhinitis Neg Hx     Allergies Neg Hx     Angioedema Neg Hx     Asthma Neg Hx     Atopy Neg Hx     Eczema Neg Hx     Immunodeficiency Neg Hx     Rhinitis Neg Hx     Urticaria Neg Hx      Social History     Tobacco Use    Smoking status: Never Smoker    Smokeless tobacco: Never Used   Substance Use Topics    Alcohol use: Yes     Alcohol/week: 2.0 standard drinks     Types: 1 Glasses of wine, 1 Cans of beer per week     Frequency: Never     Drinks per session: Patient refused     Binge frequency: Patient refused     Comment: "rarely"    Drug use: No     Review of Systems   Constitutional: Positive for fatigue and fever.   Respiratory: Positive for cough.    All other systems reviewed and are negative.      Physical Exam     Initial Vitals [11/09/19 1626]   BP Pulse Resp Temp " SpO2   (!) 101/50 87 20 98.1 °F (36.7 °C) (!) 93 %      MAP       --         Physical Exam  Appearance: Chronically ill-appearing slender male, pale, no acute distress.  HEENT: Normocephalic. Atraumatic. No conjunctival injection. EOMI. PERRL. Oropharynx clear.    Neck: No JVD. No LN. Neck supple.    Chest: Non-tender. Rales noted to the left base.  No wheezes.  No rhonchi.  Cardiovascular: Regular rate and rhythm. No murmurs. No gallops. No rubs. +2 radial/DP/DT pulses bilaterally.  Abdomen: Soft. Not distended. Nontender. No guarding. No rebound. No Masses  Musculoskeletal: Good range of motion all joints. No deformities.    Neurologic: Alert and oriented x 3.  Equal strength in upper and lower extremities bilaterally. Normal sensation. No facial droop. Normal speech. Neg Kernigs and Brudzinski.   Psych:  Appropriate, conversant   Integumentary: No rashes seen.  Good turgor.  No abrasions.  No ecchymoses.    ED Course   Procedures  Labs Reviewed   CULTURE, BLOOD   CULTURE, BLOOD   INFLUENZA A & B BY MOLECULAR   CBC W/ AUTO DIFFERENTIAL   COMPREHENSIVE METABOLIC PANEL   LACTIC ACID, PLASMA   LACTIC ACID, PLASMA   URINALYSIS, REFLEX TO URINE CULTURE   MAGNESIUM   PHOSPHORUS   APTT   PROTIME-INR   LIPASE   TROPONIN I   PROCALCITONIN          X-Rays:   Independently Interpreted Readings:   Other Readings:  Reviewed by myself, read by radiology.    Imaging Results           X-Ray Chest AP Portable (Final result)  Result time 11/09/19 17:03:57    Final result by Joe Rivas MD (11/09/19 17:03:57)                 Impression:      Findings concerning for left basilar airspace disease including aspiration or pneumonia, with potential trace left parapneumonic pleural effusion.  Short-term follow-up chest radiography after therapy is recommended to resolution.    This report was flagged in Epic as abnormal.      Electronically signed by: Joe Rivas MD  Date:    11/09/2019  Time:    17:03             Narrative:     EXAMINATION:  XR CHEST AP PORTABLE    CLINICAL HISTORY:  Sepsis;    TECHNIQUE:  Single frontal view of the chest was performed.    COMPARISON:  Chest radiograph 10/15/2019    FINDINGS:  Patient is slightly rotated.  Opacities with air bronchograms projected over the left lung base concerning for airspace disease.  Slight blunting of the left costophrenic angle which may represent pleural thickening/scarring versus small pleural effusion.  No large consolidation or pleural effusion on the right.  No pneumothorax.  Cardiomediastinal silhouette is otherwise stable.  Unchanged bilateral mild nonspecific interstitial coarsening.  No acute osseous process seen.                              Medical Decision Makin yo male w/h/o CLL (rituximab), hypogammaglobulinemia, and panhypopituitarism, presents for fever of 1-2 days with ongoing, but worsening cough productive of white-grey phlegm. ROS otherwise non-focal. Exam shows afebrile here, borderline soft BP, borderline hypoxic. +rales to LLL. No LE edema or ttp. No JVD. Pale. Labs reassuring, but concerned not able to mount appropriate immune response. Gave IVF 30 mL/kg, zosyn. CXR with LLL air bronchograms and opacities per rads, my read with obscuration of left heart border with possible effusion. Admitted to hospitalist for presumed PNA.                                 Clinical Impression:   No diagnosis found.         I, Dr. Gloria Ames, personally performed the services described in this documentation. All medical record entries made by the scribe were at my direction and in my presence.  I have reviewed the chart and agree that the record reflects my personal performance and is accurate and complete. Gloria Ames MD.  5:34 PM 2019                 Gloria Ames MD  11/10/19 1033

## 2019-11-10 PROBLEM — J15.1 PNEUMONIA DUE TO PSEUDOMONAS SPECIES: Status: ACTIVE | Noted: 2019-11-09

## 2019-11-10 PROBLEM — D63.8 ANEMIA OF CHRONIC DISEASE: Status: ACTIVE | Noted: 2019-11-09

## 2019-11-10 LAB
ANION GAP SERPL CALC-SCNC: 5 MMOL/L (ref 8–16)
BASOPHILS # BLD AUTO: 0.01 K/UL (ref 0–0.2)
BASOPHILS NFR BLD: 0.2 % (ref 0–1.9)
BUN SERPL-MCNC: 35 MG/DL (ref 8–23)
CALCIUM SERPL-MCNC: 8.7 MG/DL (ref 8.7–10.5)
CHLORIDE SERPL-SCNC: 112 MMOL/L (ref 95–110)
CO2 SERPL-SCNC: 26 MMOL/L (ref 23–29)
CREAT SERPL-MCNC: 0.9 MG/DL (ref 0.5–1.4)
DIFFERENTIAL METHOD: ABNORMAL
EOSINOPHIL # BLD AUTO: 0.1 K/UL (ref 0–0.5)
EOSINOPHIL NFR BLD: 2.3 % (ref 0–8)
ERYTHROCYTE [DISTWIDTH] IN BLOOD BY AUTOMATED COUNT: 15.2 % (ref 11.5–14.5)
EST. GFR  (AFRICAN AMERICAN): >60 ML/MIN/1.73 M^2
EST. GFR  (NON AFRICAN AMERICAN): >60 ML/MIN/1.73 M^2
ESTIMATED AVG GLUCOSE: 137 MG/DL (ref 68–131)
GLUCOSE SERPL-MCNC: 95 MG/DL (ref 70–110)
HBA1C MFR BLD HPLC: 6.4 % (ref 4–5.6)
HCT VFR BLD AUTO: 37 % (ref 40–54)
HGB BLD-MCNC: 11.7 G/DL (ref 14–18)
LYMPHOCYTES # BLD AUTO: 1.9 K/UL (ref 1–4.8)
LYMPHOCYTES NFR BLD: 32.7 % (ref 18–48)
MAGNESIUM SERPL-MCNC: 1.7 MG/DL (ref 1.6–2.6)
MCH RBC QN AUTO: 30.7 PG (ref 27–31)
MCHC RBC AUTO-ENTMCNC: 31.6 G/DL (ref 32–36)
MCV RBC AUTO: 97 FL (ref 82–98)
MONOCYTES # BLD AUTO: 0.4 K/UL (ref 0.3–1)
MONOCYTES NFR BLD: 7.7 % (ref 4–15)
NEUTROPHILS # BLD AUTO: 3.2 K/UL (ref 1.8–7.7)
NEUTROPHILS NFR BLD: 57.1 % (ref 38–73)
PHOSPHATE SERPL-MCNC: 3.3 MG/DL (ref 2.7–4.5)
PLATELET # BLD AUTO: 100 K/UL (ref 150–350)
PMV BLD AUTO: 9.7 FL (ref 9.2–12.9)
POCT GLUCOSE: 129 MG/DL (ref 70–110)
POCT GLUCOSE: 92 MG/DL (ref 70–110)
POTASSIUM SERPL-SCNC: 4.5 MMOL/L (ref 3.5–5.1)
RBC # BLD AUTO: 3.81 M/UL (ref 4.6–6.2)
SODIUM SERPL-SCNC: 143 MMOL/L (ref 136–145)
WBC # BLD AUTO: 5.72 K/UL (ref 3.9–12.7)

## 2019-11-10 PROCEDURE — G0378 HOSPITAL OBSERVATION PER HR: HCPCS | Mod: HCNC

## 2019-11-10 PROCEDURE — 94640 AIRWAY INHALATION TREATMENT: CPT | Mod: HCNC

## 2019-11-10 PROCEDURE — 36415 COLL VENOUS BLD VENIPUNCTURE: CPT | Mod: HCNC

## 2019-11-10 PROCEDURE — 87186 SC STD MICRODIL/AGAR DIL: CPT | Mod: HCNC

## 2019-11-10 PROCEDURE — 25000242 PHARM REV CODE 250 ALT 637 W/ HCPCS: Mod: HCNC | Performed by: NURSE PRACTITIONER

## 2019-11-10 PROCEDURE — 84100 ASSAY OF PHOSPHORUS: CPT | Mod: HCNC

## 2019-11-10 PROCEDURE — 94761 N-INVAS EAR/PLS OXIMETRY MLT: CPT | Mod: HCNC

## 2019-11-10 PROCEDURE — 97161 PT EVAL LOW COMPLEX 20 MIN: CPT | Mod: HCNC

## 2019-11-10 PROCEDURE — 63600175 PHARM REV CODE 636 W HCPCS: Mod: HCNC | Performed by: HOSPITALIST

## 2019-11-10 PROCEDURE — 96376 TX/PRO/DX INJ SAME DRUG ADON: CPT

## 2019-11-10 PROCEDURE — 97535 SELF CARE MNGMENT TRAINING: CPT | Mod: HCNC

## 2019-11-10 PROCEDURE — 87070 CULTURE OTHR SPECIMN AEROBIC: CPT | Mod: HCNC

## 2019-11-10 PROCEDURE — 25000003 PHARM REV CODE 250: Mod: HCNC | Performed by: NURSE PRACTITIONER

## 2019-11-10 PROCEDURE — 80048 BASIC METABOLIC PNL TOTAL CA: CPT | Mod: HCNC

## 2019-11-10 PROCEDURE — 63600175 PHARM REV CODE 636 W HCPCS: Mod: HCNC | Performed by: NURSE PRACTITIONER

## 2019-11-10 PROCEDURE — 11000001 HC ACUTE MED/SURG PRIVATE ROOM: Mod: HCNC

## 2019-11-10 PROCEDURE — 83735 ASSAY OF MAGNESIUM: CPT | Mod: HCNC

## 2019-11-10 PROCEDURE — 87077 CULTURE AEROBIC IDENTIFY: CPT | Mod: HCNC

## 2019-11-10 PROCEDURE — 97165 OT EVAL LOW COMPLEX 30 MIN: CPT | Mod: HCNC

## 2019-11-10 PROCEDURE — 85025 COMPLETE CBC W/AUTO DIFF WBC: CPT | Mod: HCNC

## 2019-11-10 PROCEDURE — 96375 TX/PRO/DX INJ NEW DRUG ADDON: CPT

## 2019-11-10 PROCEDURE — 87205 SMEAR GRAM STAIN: CPT | Mod: HCNC

## 2019-11-10 RX ORDER — PREDNISOLONE SODIUM PHOSPHATE 15 MG/5ML
7.5 SOLUTION ORAL DAILY
Status: DISCONTINUED | OUTPATIENT
Start: 2019-11-10 | End: 2019-11-12 | Stop reason: HOSPADM

## 2019-11-10 RX ORDER — PREDNISONE 2.5 MG/1
7.5 TABLET ORAL DAILY
Status: DISCONTINUED | OUTPATIENT
Start: 2019-11-10 | End: 2019-11-10

## 2019-11-10 RX ADMIN — PREDNISONE 5 MG: 5 TABLET ORAL at 08:11

## 2019-11-10 RX ADMIN — PIPERACILLIN AND TAZOBACTAM 4.5 G: 4; .5 INJECTION, POWDER, FOR SOLUTION INTRAVENOUS at 05:11

## 2019-11-10 RX ADMIN — ERYTHROMYCIN: 5 OINTMENT OPHTHALMIC at 08:11

## 2019-11-10 RX ADMIN — LEVOTHYROXINE SODIUM 125 MCG: 25 TABLET ORAL at 05:11

## 2019-11-10 RX ADMIN — PANTOPRAZOLE SODIUM 40 MG: 40 TABLET, DELAYED RELEASE ORAL at 08:11

## 2019-11-10 RX ADMIN — FUROSEMIDE 20 MG: 20 TABLET ORAL at 08:11

## 2019-11-10 RX ADMIN — ATORVASTATIN CALCIUM 20 MG: 20 TABLET, FILM COATED ORAL at 08:11

## 2019-11-10 RX ADMIN — PREDNISOLONE SODIUM PHOSPHATE 7.5 MG: 15 SOLUTION ORAL at 11:11

## 2019-11-10 RX ADMIN — CYANOCOBALAMIN TAB 1000 MCG 5000 MCG: 1000 TAB at 08:11

## 2019-11-10 RX ADMIN — BUDESONIDE 0.5 MG: 0.5 SUSPENSION RESPIRATORY (INHALATION) at 08:11

## 2019-11-10 RX ADMIN — CLOPIDOGREL BISULFATE 75 MG: 75 TABLET ORAL at 08:11

## 2019-11-10 RX ADMIN — LOSARTAN POTASSIUM 25 MG: 25 TABLET, FILM COATED ORAL at 08:11

## 2019-11-10 RX ADMIN — PIPERACILLIN AND TAZOBACTAM 4.5 G: 4; .5 INJECTION, POWDER, FOR SOLUTION INTRAVENOUS at 10:11

## 2019-11-10 RX ADMIN — VANCOMYCIN HYDROCHLORIDE 750 MG: 750 INJECTION, POWDER, LYOPHILIZED, FOR SOLUTION INTRAVENOUS at 08:11

## 2019-11-10 RX ADMIN — PIPERACILLIN AND TAZOBACTAM 4.5 G: 4; .5 INJECTION, POWDER, FOR SOLUTION INTRAVENOUS at 01:11

## 2019-11-10 RX ADMIN — BUDESONIDE 0.5 MG: 0.5 SUSPENSION RESPIRATORY (INHALATION) at 07:11

## 2019-11-10 NOTE — PROGRESS NOTES
ID Staff - Besch     Consult received - recent admission for pneumonia with sputum positive for pseudomonas.  Just finished 21 day course of treatment and now feels ill again.  Empirically on piperacillin-tazobactam during work-up    CXR and CT chest  Show improvement - full consult to follow tomorrow

## 2019-11-10 NOTE — H&P
[facility] Medicine  History & Physical    Patient Name: Thierry Ramos Jr.  MRN: [id number]  Admission Date: 11/9/2019  Attending Physician: Sanjay Vásquez MD   Primary Care Provider: AKILA Wooten MD         Patient information was obtained from patient, past medical records and ER records.     Subjective:     Principal Problem:Pneumonia    Chief Complaint:   Chief Complaint   Patient presents with    Fever     Pt has been having fevers today and possibly yesterday. Also complaining of fatigue. States recent hx of Pneumonia.         HPI: Thierry Ramos is an 85-year-old male with a past medical history of Hypertension, Diabetes Mellitus Type 2, Hyperlipidemia, Cerebrovascular Accident, Hypothyroidism, Hypogammaglobinemia, Hepatitis B, Chronic Sinusitis, and Chronic Lymphocytic Leukemia s/p chemo Rituximab (9/30), Anemia and Chronic Kidney Disease. He lives in [place], [place]. His PCP is Dr. Juve Wooten. He was recently hospitalized at [facility] for Pneumonia from 10/11-10/16/19.     He presented to [facility] ED 11/9/2019 with a chief complaint of fever that started one day prior to presentation. Associated symptoms include fatigue and a productive cough - white grey phlegm, non-bloody. Wife says the patient had his PICC line removed four days ago (cefepime for pseudomonas PNA) and feels he started feeling sick (weak) after this. Patient denies chest pain, shortness of breath, chills, nausea, vomiting, change in urine or BM, rash, abdominal pain, HA, neck pain or any other complaints at this time. ED workup revealed WBC wnl, Lactate wnl, Hgb (11.2), Hct (35.0), Platelets (99), NA (135), Glucose (194), Bun (51), total protein (5.0), Albumin (2.8), CXR revealed left basilar airspace disease including aspiration or pneumonia, with potential trace left parapneumonic pleural effusion. Vanc and Zosyn started in ED. Admitted to [facility] for further  evaluation and treatment.     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    Hyperlipidemia     Hypertension     Hypopituitarism     Hypothyroid     Immunosuppression 3/13/2015    Squamous Cell Carcinoma     on the right side of the face     Squamous cell carcinoma of skin of right temple 1/27/2016    Stroke     Syncope 2/12    Unspecified disorder of kidney and ureter        Past Surgical History:   Procedure Laterality Date    CAROTID ENDARTERECTOMY (L)  2/27/2007    EXCISION TURBINATE, SUBMUCOUS      FESS  9/16/2014    Mohs excision   3/23/2015, 2/24/2016    combined with WLE forehead    NASAL SEPTUM SURGERY  9/16/2014    right ureter surgery  1995    SENTINEL LYMPH NODE BIOPSY  3/23/2015, 2/24/2016    SINUS SURGERY  9/16/2014    septo, ESS, BITSMR    Transphenoidal surgery         Review of patient's allergies indicates:   Allergen Reactions    Ace inhibitors Swelling     angioedema    Divalproex Hives     Rash under arms, body creases       No current facility-administered medications on file prior to encounter.      Current Outpatient Medications on File Prior to Encounter   Medication Sig    ACCU-CHEK FASTCLIX Misc TEST  BLOOD  GLUCOSE FOUR TIMES DAILY    alcohol swabs PadM APPLY 1 PAD TOPICALLY  AS NEEDED.    aspirin 81 MG Chew Take 81 mg by mouth every Mon, Wed, Fri.    atorvastatin (LIPITOR) 20 MG tablet TAKE 1 TABLET EVERY DAY    budesonide (PULMICORT) 0.5 mg/2 mL nebulizer solution budesonide 0.5 mg/2 mL suspension for nebulization    chlorhexidine (PERIDEX) 0.12 % solution chlorhexidine gluconate 0.12 % mouthwash    clopidogrel (PLAVIX) 75 mg tablet Take 75 mg by mouth once daily.    cyanocobalamin, vitamin B-12, 5,000 mcg TbDL Take 1 tablet by mouth once daily.    cycloSPORINE (RESTASIS) 0.05 % ophthalmic  emulsion Place 0.4 mLs (1 drop total) into both eyes 2 (two) times daily.    DOCOSAHEXANOIC ACID/EPA (FISH OIL ORAL) Take 5 capsules by mouth once daily.     erythromycin (ROMYCIN) ophthalmic ointment Apply to eyelids and lashes OU QHS    fluorouracil (EFUDEX) 5 % cream Use hs for 2 weeks on right cheek    furosemide (LASIX) 20 MG tablet     guaifen/dextromethorphan/pe (ROBITUSSIN COUGH & COLD CF MAX ORAL) Take 15 mLs by mouth 2 (two) times daily.    levothyroxine (SYNTHROID) 125 MCG tablet TAKE 1 TABLET ONE TIME DAILY    losartan (COZAAR) 25 MG tablet Take 1 tablet (25 mg total) by mouth once daily.    multivitamin with minerals tablet Take 1 tablet by mouth once daily.    neomycin-polymyxin-dexamethasone (DEXACINE) 3.5 mg/g-10,000 unit/g-0.1 % Oint neomycin 3.5 mg/g-polymyxin B 10,000 unit/g-dexameth 0.1 % eye oint    neomycin-polymyxin-dexamethasone (MAXITROL) 3.5mg/mL-10,000 unit/mL-0.1 % DrpS neomycin-polymyxin-dexameth 3.5 mg/mL-10,000 unit/mL-0.1% eye drops    pantoprazole (PROTONIX) 40 MG tablet TAKE 1 TABLET EVERY DAY    predniSONE (DELTASONE) 2.5 MG tablet Take 1 tablet by mouth once daily. 5mg and 2.5mg qd    predniSONE (DELTASONE) 5 MG tablet Take 5 mg by mouth once daily.    sennosides/docusate sodium (EDEN-COLACE ORAL) Take 1 tablet by mouth once daily.    SYMBICORT 160-4.5 mcg/actuation HFAA Inhale 1 puff into the lungs as needed.    tazarotene (AVAGE) 0.1 % cream Apply topically every evening. (Patient taking differently: Apply topically as needed. )    tenofovir alafenamide (VEMLIDY) 25 mg Tab Take 25 mg by mouth once daily.    testosterone cypionate (DEPOTESTOTERONE CYPIONATE) 100 mg/mL injection Inject 0.5 mLs (50 mg total) into the muscle every 14 (fourteen) days.    VENTOLIN HFA 90 mcg/actuation inhaler Inhale 1 puff into the lungs as needed.     Family History     Problem Relation (Age of Onset)    Cancer Mother, Father, Brother    Colon cancer Father    No Known Problems  "Sister, Maternal Aunt, Maternal Uncle, Paternal Aunt, Paternal Uncle, Maternal Grandmother, Maternal Grandfather, Paternal Grandmother, Paternal Grandfather        Tobacco Use    Smoking status: Never Smoker    Smokeless tobacco: Never Used   Substance and Sexual Activity    Alcohol use: Yes     Alcohol/week: 2.0 standard drinks     Types: 1 Glasses of wine, 1 Cans of beer per week     Frequency: Never     Drinks per session: Patient refused     Binge frequency: Patient refused     Comment: "rarely"    Drug use: No    Sexual activity: Not Currently     Review of Systems   Constitutional: Positive for fatigue and fever. Negative for chills.   HENT: Negative for congestion, dental problem, postnasal drip and sinus pressure.    Eyes: Negative for redness and visual disturbance.   Respiratory: Positive for cough. Negative for choking and shortness of breath.    Cardiovascular: Negative for chest pain and palpitations.   Gastrointestinal: Negative for abdominal distention, abdominal pain, nausea and vomiting.   Endocrine: Negative for polydipsia and polyuria.   Genitourinary: Negative for difficulty urinating, dysuria and flank pain.   Musculoskeletal: Negative for back pain and gait problem.   Skin: Negative for color change and rash.   Neurological: Positive for weakness. Negative for light-headedness and headaches.   Psychiatric/Behavioral: Negative for agitation.     Objective:     Vital Signs (Most Recent):  Temp: 97.6 °F (36.4 °C) (11/09/19 1959)  Pulse: 70 (11/09/19 1810)  Resp: (!) 28 (11/09/19 1810)  BP: (!) 102/52 (11/09/19 1810)  SpO2: 99 % (11/09/19 1810) Vital Signs (24h Range):  Temp:  [97.6 °F (36.4 °C)-98.3 °F (36.8 °C)] 97.6 °F (36.4 °C)  Pulse:  [69-87] 70  Resp:  [20-28] 28  SpO2:  [93 %-99 %] 99 %  BP: ()/(44-52) 102/52     Weight: 63 kg (139 lb)  Body mass index is 19.39 kg/m².    Physical Exam   Constitutional: He is oriented to person, place, and time. No distress.   Chronically " ill-appearing    HENT:   Head: Normocephalic and atraumatic.   Eyes: Pupils are equal, round, and reactive to light.   Neck: Normal range of motion. No JVD present.   Cardiovascular: Normal rate and regular rhythm.   Pulmonary/Chest: Effort normal. He has rales.   Abdominal: Soft. Bowel sounds are normal. He exhibits no distension.   Musculoskeletal: Normal range of motion.   Neurological: He is alert and oriented to person, place, and time.   Skin: Skin is warm and dry. Capillary refill takes 2 to 3 seconds. He is not diaphoretic.   Psychiatric: He has a normal mood and affect.         CRANIAL NERVES     CN III, IV, VI   Pupils are equal, round, and reactive to light.       Significant Labs:   A1C:   Recent Labs   Lab 07/16/19  0200   HGBA1C 5.6     Bilirubin:   Recent Labs   Lab 10/11/19  0819 10/22/19  1138 10/24/19  1458 10/29/19  1024 11/09/19  1739   BILITOT 0.8 0.6 0.7 0.6 0.6     BMP:   Recent Labs   Lab 11/09/19  1739   *   *   K 5.0      CO2 23   BUN 51*   CREATININE 1.4   CALCIUM 8.8   MG 1.7     CBC:   Recent Labs   Lab 11/09/19 1739   WBC 9.57   HGB 11.2*   HCT 35.0*   PLT 99*     CMP:   Recent Labs   Lab 11/09/19 1739   *   K 5.0      CO2 23   *   BUN 51*   CREATININE 1.4   CALCIUM 8.8   PROT 5.0*   ALBUMIN 2.8*   BILITOT 0.6   ALKPHOS 119   AST 38   ALT 36   ANIONGAP 9   EGFRNONAA 45*     Coagulation:   Recent Labs   Lab 11/09/19 1739   INR 1.0   APTT 24.1     Lactic Acid:   Recent Labs   Lab 11/09/19  1739   LACTATE 1.7     Lipase:   Recent Labs   Lab 11/09/19 1739   LIPASE 20     Magnesium:   Recent Labs   Lab 11/09/19 1739   MG 1.7     Troponin:   Recent Labs   Lab 11/09/19 1739   TROPONINI 0.008     TSH:   Recent Labs   Lab 07/15/19  0802   TSH 0.040*     Urine Studies:   Recent Labs   Lab 11/09/19  1921   COLORU Yellow   APPEARANCEUA Clear   PHUR 6.0   SPECGRAV 1.010   PROTEINUA Negative   GLUCUA Negative   KETONESU Negative   BILIRUBINUA Negative    OCCULTUA Negative   NITRITE Negative   UROBILINOGEN Negative   LEUKOCYTESUR Negative     All pertinent labs within the past 24 hours have been reviewed.    Significant Imaging: I have reviewed all pertinent imaging results/findings within the past 24 hours.     Imaging Results           X-Ray Chest AP Portable (Final result)  Result time 11/09/19 17:03:57    Final result by Joe Rivas MD (11/09/19 17:03:57)                 Impression:      Findings concerning for left basilar airspace disease including aspiration or pneumonia, with potential trace left parapneumonic pleural effusion.  Short-term follow-up chest radiography after therapy is recommended to resolution.    This report was flagged in Epic as abnormal.      Electronically signed by: Joe Rivas MD  Date:    11/09/2019  Time:    17:03             Narrative:    EXAMINATION:  XR CHEST AP PORTABLE    CLINICAL HISTORY:  Sepsis;    TECHNIQUE:  Single frontal view of the chest was performed.    COMPARISON:  Chest radiograph 10/15/2019    FINDINGS:  Patient is slightly rotated.  Opacities with air bronchograms projected over the left lung base concerning for airspace disease.  Slight blunting of the left costophrenic angle which may represent pleural thickening/scarring versus small pleural effusion.  No large consolidation or pleural effusion on the right.  No pneumothorax.  Cardiomediastinal silhouette is otherwise stable.  Unchanged bilateral mild nonspecific interstitial coarsening.  No acute osseous process seen.                                  Assessment/Plan:     * Pneumonia  CXR: CXR revealed left basilar airspace disease including aspiration or pneumonia.  Blood cx pending  respiratory cx pending  Flu test negative  Vanc and Zosyn IV  Duo neb treatments as needed  Continue steroid  Supplemental oxygen: wean as needed   PT/OT eval and tx      Chronic kidney disease, stage III (moderate)  Cr (1.4) on admit  Renally dose all meds  Avoid Nephrotoxic  agents  Strict I&Os  Daily weights  BMP daily      Anemia  H&H stable  CBC daily      Hyponatremia  Na 135 on admission  Gentle Hydration      Immune thrombocytopenic purpura  Platelets (99), monitor  CBC daily      Weakness  Debility     PT/OT eval/tx      Type 2 diabetes, controlled, with neuropathy  HbA1c 5.6 (7/2019)  Diet control  Low dose SSI  Accucheck AC and HS      Chronic rhinosinusitis  Zyrtec daily      Central hypothyroidism  Continue Synthroid 125mcg po before breakfast      History of CVA (cerebrovascular accident)  Continue ASA and statin      CLL (chronic lymphocytic leukemia)  S/p Rituxan on 9/30  Continue Prednisone       Hypertension  Hyperlipidemia    Continue losartan and Lipitor        VTE Risk Mitigation (From admission, onward)    SCD             Tara SANFORD Chairs, APRN, FNP-C  Department of Hospital Medicine   [facility]

## 2019-11-10 NOTE — PLAN OF CARE
Plan of care reviewed with patient, understanding verbalized. Pt remains SR on tele. No complaints of pain or acute distress noted. BG monitored and SSI administered per orders. Instructed to call for any assistance, understanding verbalized. Bed alarm on, call light in reach, fall precautions in place. Will continue to monitor.

## 2019-11-10 NOTE — ASSESSMENT & PLAN NOTE
Cr (1.4) on admit  Renally dose all meds  Avoid Nephrotoxic agents  Strict I&Os  Daily weights  BMP daily

## 2019-11-10 NOTE — HPI
Thierry Ramos Jr. is a 85 y.o. white man with panhypopituitarism, secondary adrenal insufficiency, secondary hypogonadism, central hypothyroidism, diabetes mellitus type 2 with neuropathy, hypertension, history of stroke on 12/16/06 status post left carotid endarterectomy on 2/27/07, dyslipidemia, chronic lymphocytic leukemia (treated with rituximab), hypogammaglobulinemia, immune thrombocytopenia, pulmonary hypertension, chronic rhinosinusitis. He lives in [place], [place]. He is . His primary care physician is Dr. CAROLE Wooten.   He was hospitalized at [facility] from 10/11/19 to 10/16/19 for bilateral Pseudomonas aeruginosa pneumonia, treated with IV cefepime until 11/4/19.    He returned to [facility] on 11/9/19 with fever for one day associated with fatigue and cough productive of gray phlegm. He reported starting to feel sick after his cefepime had completed and the PICC was removed. He was found to have no hypoxia this time. Procalcitonin was 0.26 ng/mL (normal <0.25). Chest X-ray showed persistent pneumonia.

## 2019-11-10 NOTE — PLAN OF CARE
Pt received on RA.  SPO2  96%.  Pt in no apparent respiratory distress.  Will continue to monitor.

## 2019-11-10 NOTE — ED TRIAGE NOTES
Pt presents to ED with complaints of fever and overall fatigue. Pt states he was recently diagnosed with pneumonia and was on 19 days of antibiotics. Pt states he finished his antibiotics this past Tuesday as well as had his PICC line removed. Pt states the last couple of days he has been feeling bad and running fever off and on. Wife states that she gave pt 2 tylenols about 45 minutes PTA. Pt states he has a productive cough. Pt denies CP, SOB, N/V/D. Pt states pain 0 of 10.

## 2019-11-10 NOTE — PLAN OF CARE
Problem: Physical Therapy Goal  Goal: Physical Therapy Goal  Description  Goals to be met by: discharge date     Patient will increase functional independence with mobility by performin. Supine to sit with Modified [place]  2. Sit to supine with Modified [place]  3. Sit to stand transfer with Stand By Assistance  4. Bed to chair transfer with Stand-by Assistance using Rolling Walker and tolerate OOB chair > 1 hr  5. Gait  x 200 feet with Stand-by Assistance using Rolling Walker or without AD use demonstrating Good safety awareness w speed of gait and awareness of foot clearance.  6. Lower extremity exercise program x15 reps without SOB   Outcome: Ongoing, Progressing   PT Evaluation performed.  Pt cooperative throughout.  Pt requires min assist to CGA for safety with txfs and ambulation with and without RW use.  Pt requires cues to slow speed and improve safety awareness during mobility task performance.  Recommend return to home with wife's assistance and  PT as appropriate dependent on pt's status at time of discharge.

## 2019-11-10 NOTE — ASSESSMENT & PLAN NOTE
CXR: CXR revealed left basilar airspace disease including aspiration or pneumonia.  Blood cx pending  respiratory cx pending  Flu test negative  Vanc and Zosyn IV  Duo neb treatments as needed  Continue steroid  Supplemental oxygen: wean as needed   PT/OT eval and tx

## 2019-11-10 NOTE — PT/OT/SLP EVAL
Physical Therapy Evaluation    Patient Name:  Thierry Ramos Jr.   MRN:  [id number]    Recommendations:     Discharge Recommendations:  home health PT, home   Discharge Equipment Recommendations: none   Barriers to discharge: None    Assessment:     Thierry Ramos Jr. is a 85 y.o. male admitted with a medical diagnosis of Pneumonia.  He presents with the following impairments/functional limitations:  weakness, impaired endurance, impaired self care skills, decreased lower extremity function, impaired balance, gait instability, impaired functional mobilty, decreased safety awareness, impaired cardiopulmonary response to activity, impaired skin.      Pt cooperative throughout.  Pt requires min assist to CGA for safety with txfs and ambulation with and without RW use.  Pt requires cues to slow speed and improve safety awareness during mobility task performance.  Recommend return to home with wife's assistance and HH PT as appropriate dependent on pt's status at time of discharge.            Rehab Prognosis: Good; patient would benefit from acute skilled PT services to address these deficits and reach maximum level of function.    Recent Surgery: * No surgery found *      Plan:     During this hospitalization, patient to be seen 6 x/week to address the identified rehab impairments via gait training, therapeutic activities, therapeutic exercises, neuromuscular re-education and progress toward the following goals:    · Plan of Care Expires:  12/10/19    Subjective     Chief Complaint: c/o fatigue and general weakness;  No dizziness or nausea reported  Patient/Family Comments/goals: return home PLOF  Pain/Comfort:  · Pain Rating 1: 0/10  · Pain Rating Post-Intervention 1: 0/10    Patients cultural, spiritual, Holiness conflicts given the current situation: no    Living Environment:  Pt lives in [facility] with no [facility] with wife.  Pt has BSC not currently using.  Uses walk in shower w grab bars and shower chair.  Pt uses RW in  household levels intermittently w ambulation without AD depending on how he is feeling any given day.  Pt reports just completing HH PT following previous hospitalization in October and did well.  Pt reports he will continue to perform his exercise routine in home setting once discharged.  Pt does not drive and is never alone in home.    Equipment used at home: walker, rolling, rollator, shower chair, grab bar, cane, quad, other (see comments)(has BSC not currently using).    Upon discharge, patient will have assistance from his wife.    Objective:     Communicated with nsg prior to session.  Patient found supine with bed alarm, telemetry  upon PT entry to room.    General Precautions: Standard, fall   Orthopedic Precautions:N/A   Braces: N/A     Exams:  · Cognitive:  A & O x 4          Demo some safety awareness deficits w following instruction to slow frannie and clear floor during ambulation w pt focused on showing and stating what he can do rather than listening to safety recommendations.  Redirection attention to task req'd intermittently but cooperative throughout.  · AROM:  BLE  WFL throughout w deficits of R dorsiflexion ~50%  · MMT:  RLE  Grossly 4/5    Dorsiflexion 2/5          LLE  Grossly 4-/5 throughout mm groups    Functional Mobility:  · Bed mobility:  SBA B rolling and min assist w sup scoot and CGA and VC seated scoot  · Txfs:  Sup<>sit w SBA and VC technique to ease movement and perf w best energy conservation; sit<>stand CGA   · Gait:  amb w RW CGA ~45' with VC to slow frannie and increase foot clearance as LE mm fatigue demo and decreased foot clearance R > L at end of gait trial distance;  amb without AD w CGA ~45' with CGA/min assist during change in direction and VC to slow frannie and increased eyes to horizon and awareness of safety obstacles in environment; SOB after ambulation demo'd      Therapeutic Activities and Exercises:   safety ed for mobility and AD use and PT POC;  Ed for energy  conservation techniques provided;  Bed mobility training, txf training, gait training initiated    AM-PAC 6 CLICK MOBILITY  Total Score:20     Patient left supine with all lines intact, call button in reach, bed alarm on, nsg notified and wife present.    GOALS:   Multidisciplinary Problems     Physical Therapy Goals        Problem: Physical Therapy Goal    Goal Priority Disciplines Outcome Goal Variances Interventions   Physical Therapy Goal     PT, PT/OT Ongoing, Progressing     Description:  Goals to be met by: discharge date     Patient will increase functional independence with mobility by performin. Supine to sit with Modified [place]  2. Sit to supine with Modified [place]  3. Sit to stand transfer with Stand By Assistance  4. Bed to chair transfer with Stand-by Assistance using Rolling Walker and tolerate OOB chair > 1 hr  5. Gait  x 200 feet with Stand-by Assistance using Rolling Walker or without AD use demonstrating Good safety awareness w speed of gait and awareness of foot clearance.  6. Lower extremity exercise program x15 reps without SOB                    History:     Past Medical History:   Diagnosis Date    Actinic keratosis     Anemia     Anticoagulant long-term use     Asthma     Basal cell carcinoma     Carotid stenosis     CLL (chronic lymphocytic leukemia)     COPD (chronic obstructive pulmonary disease)     Diabetes mellitus     Steroid related    Encounter for blood transfusion     Hyperlipidemia     Hypertension     Hypopituitarism     Hypothyroid     Immunosuppression 3/13/2015    Squamous Cell Carcinoma     on the right side of the face     Squamous cell carcinoma of skin of right temple 2016    Stroke     Syncope     Unspecified disorder of kidney and ureter        Past Surgical History:   Procedure Laterality Date    CAROTID ENDARTERECTOMY (L)  2007    EXCISION TURBINATE, SUBMUCOUS      FESS  2014    Mohs excision    3/23/2015, 2/24/2016    combined with WLE forehead    NASAL SEPTUM SURGERY  9/16/2014    right ureter surgery  1995    SENTINEL LYMPH NODE BIOPSY  3/23/2015, 2/24/2016    SINUS SURGERY  9/16/2014    septo, ESS, BITSMR    Transphenoidal surgery         Time Tracking:     PT Received On: 11/10/19  PT Start Time: 1007     PT Stop Time: 1025  PT Total Time (min): 18 min     Billable Minutes: Evaluation 18      Yesenia Swann, PT  11/10/2019

## 2019-11-10 NOTE — PROGRESS NOTES
Pharmacokinetic Initial Assessment: IV Vancomycin    Assessment/Plan:    Initiate intravenous vancomycin with loading dose of 1750 mg once followed by a maintenance dose of vancomycin 750 mg IV every 24 hours  Desired empiric serum trough concentration is 10 to 15 mcg/mL  Draw vancomycin trough level 30 min prior to fourth dose on 11-12-19 at approximately 2000   Pharmacy will continue to follow and monitor vancomycin.      Please contact pharmacy at extension [phone] with any questions regarding this assessment.     Thank you for the consult,   Philippe Batistay       Patient brief summary:  Thierry Ramos Jr. is a 85 y.o. male initiated on antimicrobial therapy with IV Vancomycin for treatment of suspected lower respiratory infection    Drug Allergies:   Review of patient's allergies indicates:   Allergen Reactions    Ace inhibitors Swelling     angioedema    Divalproex Hives     Rash under arms, body creases       Actual Body Weight:   63 kg    Renal Function:   Estimated Creatinine Clearance: 34.4 mL/min (based on SCr of 1.4 mg/dL).,     Dialysis Method (if applicable):  N/A    CBC (last 72 hours):  Recent Labs   Lab Result Units 11/09/19  1739   WBC K/uL 9.57   Hemoglobin g/dL 11.2*   Hematocrit % 35.0*   Platelets K/uL 99*   Gran% % 66.6   Lymph% % 23.7   Mono% % 9.2   Eosinophil% % 0.3   Basophil% % 0.2   Differential Method  Automated       Metabolic Panel (last 72 hours):  Recent Labs   Lab Result Units 11/09/19  1739 11/09/19  1921   Sodium mmol/L 135*  --    Potassium mmol/L 5.0  --    Chloride mmol/L 103  --    CO2 mmol/L 23  --    Glucose mg/dL 194*  --    Glucose, UA   --  Negative   BUN, Bld mg/dL 51*  --    Creatinine mg/dL 1.4  --    Albumin g/dL 2.8*  --    Total Bilirubin mg/dL 0.6  --    Alkaline Phosphatase U/L 119  --    AST U/L 38  --    ALT U/L 36  --    Magnesium mg/dL 1.7  --    Phosphorus mg/dL 3.2  --        Drug levels (last 3 results):  No results for input(s): VANCOMYCINRA, VANCOMYCINPE,  VANCOMYCINTR in the last 72 hours.    Microbiologic Results:  Microbiology Results (last 7 days)       Procedure Component Value Units Date/Time    Influenza A & B by Molecular [[id number]] Collected:  11/09/19 1740    Order Status:  Completed Specimen:  Nasopharyngeal Swab Updated:  11/09/19 1827     Influenza A, Molecular Negative     Influenza B, Molecular Negative     Flu A & B Source Nasal swab    Blood culture x two cultures. Draw prior to antibiotics. [[id number]] Collected:  11/09/19 1739    Order Status:  Sent Specimen:  Blood from Peripheral, Forearm, Left Updated:  11/09/19 1802    Blood culture x two cultures. Draw prior to antibiotics. [[id number]] Collected:  11/09/19 1745    Order Status:  Sent Specimen:  Blood from Peripheral, Wrist, Left Updated:  11/09/19 1801

## 2019-11-10 NOTE — PLAN OF CARE
VN completed admission questions with patient. Plan of care was discussed including home medications. Patient takes acidophilus at home and would like to continue taking it here in the hospital.  Tara Chavarria NP notified via secure chat, awaiting orders.  All questions answered.  Education given on safety measures and using call light before getting out of bed by himself. Patient verbalized understanding. Bed locked and in lowest position. Alarm on.

## 2019-11-11 PROBLEM — D63.8 ANEMIA OF CHRONIC DISEASE: Chronic | Status: ACTIVE | Noted: 2019-11-09

## 2019-11-11 PROBLEM — N18.30 CHRONIC KIDNEY DISEASE, STAGE III (MODERATE): Chronic | Status: ACTIVE | Noted: 2019-11-09

## 2019-11-11 PROBLEM — D69.3 IMMUNE THROMBOCYTOPENIC PURPURA: Chronic | Status: ACTIVE | Noted: 2019-11-09

## 2019-11-11 LAB
ANION GAP SERPL CALC-SCNC: 8 MMOL/L (ref 8–16)
BASOPHILS # BLD AUTO: 0.01 K/UL (ref 0–0.2)
BASOPHILS NFR BLD: 0.2 % (ref 0–1.9)
BUN SERPL-MCNC: 29 MG/DL (ref 8–23)
CALCIUM SERPL-MCNC: 9 MG/DL (ref 8.7–10.5)
CHLORIDE SERPL-SCNC: 107 MMOL/L (ref 95–110)
CO2 SERPL-SCNC: 26 MMOL/L (ref 23–29)
CREAT SERPL-MCNC: 1.1 MG/DL (ref 0.5–1.4)
DIFFERENTIAL METHOD: ABNORMAL
EOSINOPHIL # BLD AUTO: 0.1 K/UL (ref 0–0.5)
EOSINOPHIL NFR BLD: 1.8 % (ref 0–8)
ERYTHROCYTE [DISTWIDTH] IN BLOOD BY AUTOMATED COUNT: 15.1 % (ref 11.5–14.5)
EST. GFR  (AFRICAN AMERICAN): >60 ML/MIN/1.73 M^2
EST. GFR  (NON AFRICAN AMERICAN): >60 ML/MIN/1.73 M^2
GLUCOSE SERPL-MCNC: 101 MG/DL (ref 70–110)
HCT VFR BLD AUTO: 39 % (ref 40–54)
HGB BLD-MCNC: 12.3 G/DL (ref 14–18)
LYMPHOCYTES # BLD AUTO: 2.1 K/UL (ref 1–4.8)
LYMPHOCYTES NFR BLD: 37.6 % (ref 18–48)
MAGNESIUM SERPL-MCNC: 1.7 MG/DL (ref 1.6–2.6)
MCH RBC QN AUTO: 30.8 PG (ref 27–31)
MCHC RBC AUTO-ENTMCNC: 31.5 G/DL (ref 32–36)
MCV RBC AUTO: 98 FL (ref 82–98)
MONOCYTES # BLD AUTO: 0.4 K/UL (ref 0.3–1)
MONOCYTES NFR BLD: 7.1 % (ref 4–15)
NEUTROPHILS # BLD AUTO: 2.9 K/UL (ref 1.8–7.7)
NEUTROPHILS NFR BLD: 53.3 % (ref 38–73)
PHOSPHATE SERPL-MCNC: 3.2 MG/DL (ref 2.7–4.5)
PLATELET # BLD AUTO: 112 K/UL (ref 150–350)
PMV BLD AUTO: 9.7 FL (ref 9.2–12.9)
POCT GLUCOSE: 104 MG/DL (ref 70–110)
POCT GLUCOSE: 145 MG/DL (ref 70–110)
POCT GLUCOSE: 156 MG/DL (ref 70–110)
POCT GLUCOSE: 188 MG/DL (ref 70–110)
POCT GLUCOSE: 99 MG/DL (ref 70–110)
POTASSIUM SERPL-SCNC: 4 MMOL/L (ref 3.5–5.1)
RBC # BLD AUTO: 3.99 M/UL (ref 4.6–6.2)
SODIUM SERPL-SCNC: 141 MMOL/L (ref 136–145)
WBC # BLD AUTO: 5.5 K/UL (ref 3.9–12.7)

## 2019-11-11 PROCEDURE — 25000003 PHARM REV CODE 250: Mod: HCNC | Performed by: NURSE PRACTITIONER

## 2019-11-11 PROCEDURE — 63600175 PHARM REV CODE 636 W HCPCS: Mod: HCNC | Performed by: NURSE PRACTITIONER

## 2019-11-11 PROCEDURE — 80048 BASIC METABOLIC PNL TOTAL CA: CPT | Mod: HCNC

## 2019-11-11 PROCEDURE — 11000001 HC ACUTE MED/SURG PRIVATE ROOM: Mod: HCNC

## 2019-11-11 PROCEDURE — 97535 SELF CARE MNGMENT TRAINING: CPT | Mod: HCNC

## 2019-11-11 PROCEDURE — 85025 COMPLETE CBC W/AUTO DIFF WBC: CPT | Mod: HCNC

## 2019-11-11 PROCEDURE — 87116 MYCOBACTERIA CULTURE: CPT | Mod: HCNC

## 2019-11-11 PROCEDURE — 97116 GAIT TRAINING THERAPY: CPT | Mod: HCNC

## 2019-11-11 PROCEDURE — 25000242 PHARM REV CODE 250 ALT 637 W/ HCPCS: Mod: HCNC | Performed by: NURSE PRACTITIONER

## 2019-11-11 PROCEDURE — 84100 ASSAY OF PHOSPHORUS: CPT | Mod: HCNC

## 2019-11-11 PROCEDURE — 83735 ASSAY OF MAGNESIUM: CPT | Mod: HCNC

## 2019-11-11 PROCEDURE — 63600175 PHARM REV CODE 636 W HCPCS: Mod: HCNC | Performed by: HOSPITALIST

## 2019-11-11 PROCEDURE — 96376 TX/PRO/DX INJ SAME DRUG ADON: CPT

## 2019-11-11 PROCEDURE — 87015 SPECIMEN INFECT AGNT CONCNTJ: CPT | Mod: HCNC

## 2019-11-11 PROCEDURE — 94761 N-INVAS EAR/PLS OXIMETRY MLT: CPT | Mod: HCNC

## 2019-11-11 PROCEDURE — 97530 THERAPEUTIC ACTIVITIES: CPT | Mod: HCNC

## 2019-11-11 PROCEDURE — 87206 SMEAR FLUORESCENT/ACID STAI: CPT | Mod: HCNC

## 2019-11-11 PROCEDURE — 94640 AIRWAY INHALATION TREATMENT: CPT | Mod: HCNC

## 2019-11-11 PROCEDURE — 36415 COLL VENOUS BLD VENIPUNCTURE: CPT | Mod: HCNC

## 2019-11-11 RX ADMIN — CLOPIDOGREL BISULFATE 75 MG: 75 TABLET ORAL at 09:11

## 2019-11-11 RX ADMIN — FUROSEMIDE 20 MG: 20 TABLET ORAL at 09:11

## 2019-11-11 RX ADMIN — BUDESONIDE 0.5 MG: 0.5 SUSPENSION RESPIRATORY (INHALATION) at 07:11

## 2019-11-11 RX ADMIN — LOSARTAN POTASSIUM 25 MG: 25 TABLET, FILM COATED ORAL at 09:11

## 2019-11-11 RX ADMIN — LEVOTHYROXINE SODIUM 125 MCG: 25 TABLET ORAL at 05:11

## 2019-11-11 RX ADMIN — PIPERACILLIN AND TAZOBACTAM 4.5 G: 4; .5 INJECTION, POWDER, FOR SOLUTION INTRAVENOUS at 04:11

## 2019-11-11 RX ADMIN — CYANOCOBALAMIN TAB 1000 MCG 5000 MCG: 1000 TAB at 09:11

## 2019-11-11 RX ADMIN — PIPERACILLIN AND TAZOBACTAM 4.5 G: 4; .5 INJECTION, POWDER, FOR SOLUTION INTRAVENOUS at 12:11

## 2019-11-11 RX ADMIN — PANTOPRAZOLE SODIUM 40 MG: 40 TABLET, DELAYED RELEASE ORAL at 09:11

## 2019-11-11 RX ADMIN — BUDESONIDE 0.5 MG: 0.5 SUSPENSION RESPIRATORY (INHALATION) at 08:11

## 2019-11-11 RX ADMIN — PREDNISOLONE SODIUM PHOSPHATE 7.5 MG: 15 SOLUTION ORAL at 11:11

## 2019-11-11 RX ADMIN — ERYTHROMYCIN 1 INCH: 5 OINTMENT OPHTHALMIC at 10:11

## 2019-11-11 RX ADMIN — ASPIRIN 81 MG 81 MG: 81 TABLET ORAL at 05:11

## 2019-11-11 RX ADMIN — PIPERACILLIN AND TAZOBACTAM 4.5 G: 4; .5 INJECTION, POWDER, FOR SOLUTION INTRAVENOUS at 10:11

## 2019-11-11 RX ADMIN — ATORVASTATIN CALCIUM 20 MG: 20 TABLET, FILM COATED ORAL at 09:11

## 2019-11-11 NOTE — CONSULTS
[facility] Pulmonology Consult - HO-II Note    Primary Attending Physician: Dr. Vásquez  Consultant Attending: Dr. Suhas Junior  Consultant Resident: Dr. Joe Enamorado    Reason for Consult:     Recurrent Pneumonia    Subjective:      History of Present Illness:  Mr. Thierry Ramos Jr. 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    Of note patient hospitalized in July 2019 at which time he was treated for pneumonia and completed 10 day course with vanc, flagyl and cefepime, patient recently admitted to [facility] from 10/11-10/16 again for pneumonia with respiratory cultures growing Pseudomonas resisted to ciprofloxacin. Was subsequently discharge with PICC line to complete total 3 week course of cefepime which completed on 11/4. PICC line removed on 11/9.     Patient reports feeling generally better since his discharge from the hospital with significant improvement in his cough which prior to his recent hospitalization was productive for copious thick green sputum. Cough has since improved, though still with cough productive of thin yellow sputum since completing his course of antibiotics. Noted to have low-grade fever at home of 100.5 and was referred back to the ED by his home health nurse for evaluation. Denies dyspnea, chest pain, chills, hemoptysis, abdominal pain, nausea, emesis, soarthroat, headache, or recent ill contacts.     Past Medical History:  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    Encounter for blood transfusion     Hyperlipidemia      Hypertension     Hypopituitarism     Hypothyroid     Immunosuppression 3/13/2015    Squamous Cell Carcinoma     on the right side of the face     Squamous cell carcinoma of skin of right temple 1/27/2016    Stroke     Syncope 2/12    Unspecified disorder of kidney and ureter        Past Surgical History:  Past Surgical History:   Procedure Laterality Date    CAROTID ENDARTERECTOMY (L)  2/27/2007    EXCISION TURBINATE, SUBMUCOUS      FESS  9/16/2014    Mohs excision   3/23/2015, 2/24/2016    combined with WLE forehead    NASAL SEPTUM SURGERY  9/16/2014    right ureter surgery  1995    SENTINEL LYMPH NODE BIOPSY  3/23/2015, 2/24/2016    SINUS SURGERY  9/16/2014    septo, ESS, BITSMR    Transphenoidal surgery         Allergies:  Review of patient's allergies indicates:   Allergen Reactions    Ace inhibitors Swelling     angioedema    Divalproex Hives     Rash under arms, body creases       Medications:   In-Hospital Scheduled Medications:   aspirin  81 mg Oral Every Mon, Wed, Fri    atorvastatin  20 mg Oral Daily    budesonide  0.5 mg Nebulization Q12H    clopidogrel  75 mg Oral Daily    cyanocobalamin  5,000 mcg Oral Daily    erythromycin   Both Eyes QHS    furosemide  20 mg Oral Daily    levothyroxine  125 mcg Oral Before breakfast    losartan  25 mg Oral Daily    pantoprazole  40 mg Oral Daily    piperacillin-tazobactam (ZOSYN) IVPB  4.5 g Intravenous Q8H    prednisoLONE  7.5 mg Oral Daily    vancomycin (VANCOCIN) IVPB  750 mg Intravenous Q24H      In-Hospital PRN Medications:  acetaminophen, albuterol-ipratropium, Dextrose 10% Bolus, Dextrose 10% Bolus, glucagon (human recombinant), glucose, glucose, insulin aspart U-100, ondansetron, pneumoc 13-enrique conj-dip cr(PF), promethazine (PHENERGAN) IVPB, ramelteon, sodium chloride 0.9%   In-Hospital IV Infusion Medications:     Home Medications:  Prior to Admission medications    Medication Sig Start Date End Date Taking? Authorizing  Provider   aspirin 81 MG Chew Take 81 mg by mouth every Mon, Wed, Fri.   Yes Historical Provider, MD   atorvastatin (LIPITOR) 20 MG tablet TAKE 1 TABLET EVERY DAY 5/23/19  Yes Lou Chambers MD   clopidogrel (PLAVIX) 75 mg tablet Take 75 mg by mouth once daily.   Yes Historical Provider, MD   cyanocobalamin, vitamin B-12, 5,000 mcg TbDL Take 1 tablet by mouth once daily.   Yes Historical Provider, MD   cycloSPORINE (RESTASIS) 0.05 % ophthalmic emulsion Place 0.4 mLs (1 drop total) into both eyes 2 (two) times daily. 5/27/19 5/26/20 Yes Marcell Monge, POOL   DOCOSAHEXANOIC ACID/EPA (FISH OIL ORAL) Take 5 capsules by mouth once daily.    Yes Historical Provider, MD   erythromycin (ROMYCIN) ophthalmic ointment Apply to eyelids and lashes OU QHS 5/9/19  Yes Melvin Ying MD   fluorouracil (EFUDEX) 5 % cream Use hs for 2 weeks on right cheek 4/20/18  Yes Karissa Monsivais MD   furosemide (LASIX) 20 MG tablet  10/16/19  Yes Historical Provider, MD   guaifen/dextromethorphan/pe (ROBITUSSIN COUGH & COLD CF MAX ORAL) Take 15 mLs by mouth 2 (two) times daily.   Yes Historical Provider, MD   Lactobacillus acidophilus (ACIDOPHILUS ORAL) Take 1 tablet by mouth once daily.   Yes Historical Provider, MD   levothyroxine (SYNTHROID) 125 MCG tablet TAKE 1 TABLET ONE TIME DAILY 9/5/19  Yes Cee Farmer MD   losartan (COZAAR) 25 MG tablet Take 1 tablet (25 mg total) by mouth once daily. 3/19/19  Yes CAROLE Wootne MD   multivitamin with minerals tablet Take 1 tablet by mouth once daily.   Yes Historical Provider, MD   neomycin-polymyxin-dexamethasone (DEXACINE) 3.5 mg/g-10,000 unit/g-0.1 % Oint neomycin 3.5 mg/g-polymyxin B 10,000 unit/g-dexameth 0.1 % eye oint   Yes Historical Provider, MD   neomycin-polymyxin-dexamethasone (MAXITROL) 3.5mg/mL-10,000 unit/mL-0.1 % DrpS neomycin-polymyxin-dexameth 3.5 mg/mL-10,000 unit/mL-0.1% eye drops   Yes Historical Provider, MD   pantoprazole (PROTONIX) 40 MG tablet TAKE 1 TABLET  EVERY DAY 5/8/18  Yes CAROLE Wooten MD   predniSONE (DELTASONE) 2.5 MG tablet Take 1 tablet by mouth once daily. 5mg and 2.5mg qd 9/5/19  Yes Historical Provider, MD   predniSONE (DELTASONE) 5 MG tablet Take 5 mg by mouth once daily.   Yes Historical Provider, MD   sennosides/docusate sodium (EDEN-COLACE ORAL) Take 1 tablet by mouth once daily.   Yes Historical Provider, MD   tenofovir alafenamide (VEMLIDY) 25 mg Tab Take 25 mg by mouth once daily. 8/19/19  Yes Mark Jones PA-C   testosterone cypionate (DEPOTESTOTERONE CYPIONATE) 100 mg/mL injection Inject 0.5 mLs (50 mg total) into the muscle every 14 (fourteen) days. 5/14/19 11/12/19 Yes Cee Farmer MD   ACCU-CHEK FASTCLIX Misc TEST  BLOOD  GLUCOSE FOUR TIMES DAILY 3/6/17   CAROLE Wooten MD   alcohol swabs PadM APPLY 1 PAD TOPICALLY  AS NEEDED. 3/6/17   CAROLE Wooten MD   budesonide (PULMICORT) 0.5 mg/2 mL nebulizer solution budesonide 0.5 mg/2 mL suspension for nebulization    Historical Provider, MD   chlorhexidine (PERIDEX) 0.12 % solution chlorhexidine gluconate 0.12 % mouthwash    Historical Provider, MD   SYMBICORT 160-4.5 mcg/actuation HFAA Inhale 1 puff into the lungs as needed. 8/30/19   Historical Provider, MD   tazarotene (AVAGE) 0.1 % cream Apply topically every evening.  Patient taking differently: Apply topically as needed.  12/13/16 9/19/19  Karissa Monsivais MD   VENTOLIN HFA 90 mcg/actuation inhaler Inhale 1 puff into the lungs as needed. 8/30/19   Historical Provider, MD       Family History:  Family History   Problem Relation Age of Onset    Cancer Mother         breast    Colon cancer Father     Cancer Father         colon    Cancer Brother         leukemia    No Known Problems Sister     No Known Problems Maternal Aunt     No Known Problems Maternal Uncle     No Known Problems Paternal Aunt     No Known Problems Paternal Uncle     No Known Problems Maternal Grandmother     No Known Problems Maternal Grandfather      No Known Problems Paternal Grandmother     No Known Problems Paternal Grandfather     Amblyopia Neg Hx     Blindness Neg Hx     Cataracts Neg Hx     Diabetes Neg Hx     Glaucoma Neg Hx     Hypertension Neg Hx     Macular degeneration Neg Hx     Retinal detachment Neg Hx     Strabismus Neg Hx     Stroke Neg Hx     Thyroid disease Neg Hx     Allergic rhinitis Neg Hx     Allergies Neg Hx     Angioedema Neg Hx     Asthma Neg Hx     Atopy Neg Hx     Eczema Neg Hx     Immunodeficiency Neg Hx     Rhinitis Neg Hx     Urticaria Neg Hx        Social History:  Social History     Tobacco Use    Smoking status: Never Smoker    Smokeless tobacco: Never Used   Substance Use Topics    Alcohol use: Not Currently     Alcohol/week: 2.0 standard drinks     Types: 1 Glasses of wine, 1 Cans of beer per week     Frequency: Never     Drinks per session: Patient refused     Binge frequency: Patient refused    Drug use: No       Review of Systems:  Pertinent items are noted in HPI. All other systems are reviewed and are negative.    Immunizations:   Currently on File with [facility]:   Most Recent Immunizations   Administered Date(s) Administered    Influenza - High Dose - PF (65 years and older) 2019    PPD Test 2019    Tdap 2016        Objective:   Last 24 Hour Vital Signs:  BP  Min: 116/58  Max: 153/66  Temp  Av.5 °F (36.4 °C)  Min: 96.5 °F (35.8 °C)  Max: 98.3 °F (36.8 °C)  Pulse  Av.7  Min: 57  Max: 96  Resp  Av.1  Min: 16  Max: 20  SpO2  Av.4 %  Min: 94 %  Max: 98 %  Weight  Av.4 kg (137 lb 9.1 oz)  Min: 62.4 kg (137 lb 9.1 oz)  Max: 62.4 kg (137 lb 9.1 oz)  I/O last 3 completed shifts:  In: 2743 [P.O.:850; IV Piggyback:]  Out: 2905 [Urine:2905]  Body mass index is 19.19 kg/m².    Physical Examination:  General: Chronically ill-appearing, in NAD  HEENT: NCAT, PERRL, EOMI.  Midline frontal scalp scar.    Heart: RRR, II/VI systolic ejection murmur.     Lungs: Bilateral lower lobe rhonchi.  Normal work of breathing  Abdomen: Soft, non-tender, non-distended, normoactive bowel sounds  Extremities: Diffuse ecchymoses bilateral forearms.  No edema lower extremities.   Neuro/Psych: Alert, oriented x 3.  Excellent remote memory.   Skin: Sacral wound with dressing in place.      Laboratory Results:  Most Recent Data:  CBC:   Lab Results   Component Value Date    WBC 5.50 11/11/2019    HGB 12.3 (L) 11/11/2019    HCT 39.0 (L) 11/11/2019     (L) 11/11/2019    MCV 98 11/11/2019    RDW 15.1 (H) 11/11/2019     WBC Differential: 66% N, 0% Bands, 23% L, 9.2% M, 0.3% Eo, 0.2% Bas, with 0 additional cells seen    BMP:   Lab Results   Component Value Date     11/11/2019    K 4.0 11/11/2019     11/11/2019    CO2 26 11/11/2019    BUN 29 (H) 11/11/2019    CREATININE 1.1 11/11/2019     11/11/2019    CALCIUM 9.0 11/11/2019    MG 1.7 11/11/2019    PHOS 3.2 11/11/2019     LFTs:   Lab Results   Component Value Date    PROT 5.0 (L) 11/09/2019    ALBUMIN 2.8 (L) 11/09/2019    BILITOT 0.6 11/09/2019    AST 38 11/09/2019    ALKPHOS 119 11/09/2019    ALT 36 11/09/2019     Coags:   Lab Results   Component Value Date    INR 1.0 11/09/2019     FLP:   Lab Results   Component Value Date    CHOL 136 08/30/2019    HDL 54 08/30/2019    LDLCALC 59.8 (L) 08/30/2019    TRIG 111 08/30/2019    CHOLHDL 39.7 08/30/2019     DM:   Lab Results   Component Value Date    HGBA1C 6.4 (H) 11/09/2019    HGBA1C 5.6 07/16/2019    HGBA1C 5.6 04/05/2019    LDLCALC 59.8 (L) 08/30/2019    CREATININE 1.1 11/11/2019     Thyroid:   Lab Results   Component Value Date    TSH 0.040 (L) 07/15/2019    FREET4 0.90 07/15/2019    V1EQYBH 5.4 06/29/2006     Anemia:   Lab Results   Component Value Date    IRON 72 05/30/2014    TIBC 246 (L) 05/30/2014    FERRITIN 463 (H) 05/30/2014     Cardiac:   Lab Results   Component Value Date    TROPONINI 0.008 11/09/2019    BNP 36 07/15/2019     Urinalysis:   Lab Results    Component Value Date    LABURIN No growth 07/16/2019    COLORU Yellow 11/09/2019    SPECGRAV 1.010 11/09/2019    NITRITE Negative 11/09/2019    KETONESU Negative 11/09/2019    UROBILINOGEN Negative 11/09/2019       Trended Lab Data:  Recent Labs   Lab 11/09/19  1739 11/10/19  0806 11/11/19  0812   WBC 9.57 5.72 5.50   HGB 11.2* 11.7* 12.3*   HCT 35.0* 37.0* 39.0*   PLT 99* 100* 112*   MCV 98 97 98   RDW 15.2* 15.2* 15.1*   * 143 141   K 5.0 4.5 4.0    112* 107   CO2 23 26 26   BUN 51* 35* 29*   CREATININE 1.4 0.9 1.1   * 95 101   PROT 5.0*  --   --    ALBUMIN 2.8*  --   --    BILITOT 0.6  --   --    AST 38  --   --    ALKPHOS 119  --   --    ALT 36  --   --        Trended Cardiac Data:  Recent Labs   Lab 11/09/19 1739   TROPONINI 0.008       Microbiology Data:  11/9/2019: Blood cultures pending  11/9/2019 sputum cultures pending: smear with GPC/GNR    Other Laboratory Data:  None    Other Results:  EKG (my interpretation): Sinus tachycardia, Inferolateral T wave depressions, no T wave inversions.     Radiology:  Ct Chest Without Contrast    Result Date: 11/10/2019  EXAMINATION: CT CHEST WITHOUT CONTRAST CLINICAL HISTORY: Pleural effusion; TECHNIQUE: Low dose axial images, sagittal and coronal reformations were obtained from the thoracic inlet to the lung bases. Contrast was not administered. COMPARISON: 07/19/2019 FINDINGS: The previously noted bilateral pleural effusions have significantly decreased in size with only a very small layering left-sided pleural effusion and may be some trace fluid on the right.  The previously noted ground-glass opacities of also significantly improved in the interval with some minimal residual ground-glass patchy opacities still present within the bilateral upper lobes and to a lesser degree the right middle lobe, lingula and bilateral lower lobes.  There is extensive peribronchial thickening and mucoid impaction of multiple segmental bronchi along with  tree-in-bud type opacification noted within the bilateral lower lobes that are suspicious for an infectious etiology.  Minimal tree-in-bud type opacities also present within the lingula. Moderate vascular calcification seen involving the aorta.  Coronary artery calcifications are present.  There is no pericardial effusion.  No enlarged mediastinal or hilar lymph nodes suggested on this noncontrast exam.  The previously identified pretracheal lymph node has decreased in size in the interval. The visualized upper abdomen is unremarkable in appearance. No suspicious appearing osseus abnormalities are identified.     1. Significant interval decrease in size of the bilateral pleural effusions with only a very small layering pleural effusion on the left and may be some trace pleural fluid on the right. 2. Significant interval improvement of the previously noted ground-glass opacities as well although there has been interval development of extensive tree-in-bud type opacities and what appears to represent some mucoid impaction of multiple bilateral lower lobe subsegmental bronchi within the lung bases and to a lesser degree the lingula as well.  Findings are again suspicious for an infectious process. Electronically signed by: Donato Yeager DO Date:    11/10/2019 Time:    10:59    X-ray Chest Ap Portable    Result Date: 11/9/2019  EXAMINATION: XR CHEST AP PORTABLE CLINICAL HISTORY: Sepsis; TECHNIQUE: Single frontal view of the chest was performed. COMPARISON: Chest radiograph 10/15/2019 FINDINGS: Patient is slightly rotated.  Opacities with air bronchograms projected over the left lung base concerning for airspace disease.  Slight blunting of the left costophrenic angle which may represent pleural thickening/scarring versus small pleural effusion.  No large consolidation or pleural effusion on the right.  No pneumothorax.  Cardiomediastinal silhouette is otherwise stable.  Unchanged bilateral mild nonspecific interstitial  coarsening.  No acute osseous process seen.     Findings concerning for left basilar airspace disease including aspiration or pneumonia, with potential trace left parapneumonic pleural effusion.  Short-term follow-up chest radiography after therapy is recommended to resolution. This report was flagged in Epic as abnormal. Electronically signed by: Joe Rivas MD Date:    11/09/2019 Time:    17:03    X-ray Chest Ap Portable    Result Date: 10/15/2019  EXAMINATION: XR CHEST AP PORTABLE CLINICAL HISTORY: PICC line placemant; TECHNIQUE: Single frontal view of the chest was performed. COMPARISON: 10/14/2019 FINDINGS: Right PICC catheter tip projects over the distal SVC.  There is no pneumothorax or significant interval detrimental change in the cardiopulmonary status since the previous exam.  There is interval improvement of aeration involving the left lower lung zone.     As above Electronically signed by: Joseph Ramirez MD Date:    10/15/2019 Time:    20:35    X-ray Chest Ap Portable    Result Date: 10/14/2019  EXAMINATION: XR CHEST AP PORTABLE CLINICAL HISTORY: Pneumonia; TECHNIQUE: Single frontal view of the chest was performed. COMPARISON: 10/11/2019 FINDINGS: Cardiac monitoring leads overlie the chest.  Cardiomediastinal silhouette is stable in size.  The lungs demonstrate coarse interstitial markings bilaterally.  There are patchy left basilar opacities, slightly increased in conspicuity from prior examination which could relate to developing infection/pneumonia or aspiration.  There is a possible small volume of left-sided pleural fluid.  The remaining lungs demonstrate no new confluent airspace consolidation.  No evidence of pneumothorax.     As above. Electronically signed by: Geoff Samson MD Date:    10/14/2019 Time:    07:55      Antibiotics and Day Number of Therapy:  Vanc/Zosyn       Assessment:     Thierry Ramos JrEder is a 85 y.o. 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 Imagine here showed Left basilar airspace disease with CT chest revealing bibasilar tree-in-bud pattern nodular consolidations. This imaging would be atypical for pseudomonas pneumonia. Sputum cultures pending here, can continue to follow to held identify organism, if inconclusive could then consider BAL for culture collection which could be done inpatient vs outpatient.     Recommendations:     #Bilateral Lower lobe Pneumonia  Presents with history of recurrent infections with now 3rd admission since July for Pneumonia with most recent sputum cultures 10/11 growing pseudomonas for which patient recently completed course of IV cefepime, now with recurrent fever and cough. Chronic immunocompromised state with chronic steroid use, hypogammaglobulinemia and CLL with rituximab injections  - CT chest with extensive tree-in-bud opacities with ? Mucoid impaction of multiple bilateral lower lobe subsegmental bronchi, lesser involvement also noted in lingula.  - Flu negative  - blood cultures no growth to date  - sputum culture pending  - follow up ID recommendations, appreciate input  - ordered AFB sputum culture to rule out Non-tuberculoid mycobacterium  - No need for Airborne Isolation  - can follow sputum cultures for now, if sputum cultures inconclusive can then consider BAL which could be done outpatient or inpatient if Mr. Ramos remains in the hospital    Case Discussed with Staff, with any addenda or additional recommendations to follow per Attestation.Thank you for allowing us to participate in the care of this patient. Please do not hesitate to contact  us if you have any questions regarding this consult.    Joe Enamorado  [facility] Internal Medicine HO-II

## 2019-11-11 NOTE — PROGRESS NOTES
This is an attestation of a separate pulm consult note from today.    CLL, on rituxan and steroids, recent pseudomonal PNA and treated with 21d of cefepime.  Back now because cough not much better and may have felt warm at home.  No fevers here.  He says he has been having symptoms for months now and not really worse.  CT chest with bilateral nodular and tree-in-bud infiltrates in lower lobes, infiltrates actually look better than past chest CT.      Recommendations:  1.  Immune suppressed and at risk for OIs of various types.  Would get sputum for bacterial and AFBs.  I don't think this is TB and he does not need airborne precautions, the AFBs would be to eval for NTBMB  2.  I don't think he needs abx as he isn't clearly worse and if he has an infection it is likely atypical and will need specific therapies.  Sputum culture pending can guide if abx are needed  3.  On room air, not much different from baseline.  Doesn't need to stay in hospital to await results of this workup as will likely take a little while to get prelim AFB results.  If sputum bateria and AFB smear neg, we can bronch and BAL him here if inpatient or bronch him as an outpatient

## 2019-11-11 NOTE — PLAN OF CARE
Reviewed plan of care with pt., understanding verbalized. Will continue to monitor pt. Throughout shift.

## 2019-11-11 NOTE — PLAN OF CARE
Problem: Occupational Therapy Goal  Goal: Occupational Therapy Goal  Description  Goals to be met by: 11/28/2019    Patient will increase functional independence with ADLs by performing:    UE Dressing with Modified [place].  LE Dressing with Modified [place].  Grooming while standing with Modified [place].  Toileting from toilet with Modified [place] for hygiene and clothing management.   Toilet transfer to toilet with Modified [place].  Upper extremity exercise program x10 reps per handout, with independence.     Outcome: Ongoing, Progressing   OT initial eval completed and treatment initiated.  Pt would benefit from HHOT. Pt has all necessary DME in place at home.  Continue with OT POC.

## 2019-11-11 NOTE — PLAN OF CARE
Problem: Physical Therapy Goal  Goal: Physical Therapy Goal  Outcome: Ongoing, Progressing   Recommendation Home with HH if patient agreeable

## 2019-11-11 NOTE — PT/OT/SLP EVAL
Occupational Therapy   Evaluation/treatment    Name: Thierry Ramos Jr.  MRN: [id number]  Admitting Diagnosis:  Pneumonia due to Pseudomonas species      Recommendations:     Discharge Recommendations: home health OT  Discharge Equipment Recommendations:  none  Barriers to discharge:  None    Assessment:     Thierry Ramos Jr. is a 85 y.o. male with a medical diagnosis of Pneumonia due to Pseudomonas species.  He presents with Performance deficits affecting function: weakness, gait instability, impaired balance, impaired endurance, decreased lower extremity function, decreased safety awareness, decreased upper extremity function, impaired functional mobilty, impaired self care skills, impaired skin.      Rehab Prognosis: Good; patient would benefit from acute skilled OT services to address these deficits and reach maximum level of function.       Plan:     Patient to be seen 5 x/week to address the above listed problems via self-care/home management, therapeutic activities, therapeutic exercises  · Plan of Care Expires: 12/10/19  · Plan of Care Reviewed with: patient, son    Subjective     Chief Complaint: none  Patient/Family Comments/goals: to go home    Occupational Profile:  Living Environment: lives with wife inn [facility] with THE; walk n shower with GB.  Previous level of function: Independent-[place] with ADLs, ambulated with RW in house and rollator in community; uses electric store scooter when shopping; wife does all IADLS and drives.  Roles and Routines: fairly active  Equipment Used at Home:  walker, rolling, raised toilet, rollator, grab bar, cane, quad, shower chair(has BSC not using)  Assistance upon Discharge: wife and son    Pain/Comfort:  · Pain Rating 1: 0/10  · Pain Rating Post-Intervention 1: 0/10    Patients cultural, spiritual, Hinduism conflicts given the current situation: no    Objective:     Communicated with: nurse prior to session.  Patient found HOB elevated with bed alarm, telemetry,  peripheral IV upon OT entry to room.    General Precautions: Standard, fall   Orthopedic Precautions:N/A   Braces: N/A     Occupational Performance:    Bed Mobility:    · Patient completed Rolling/Turning to Left with  stand by assistance and with side rail  · Patient completed Scooting/Bridging with stand by assistance and with side rail  · Patient completed Supine to Sit with stand by assistance and with side rail  · Patient completed Sit to Supine with stand by assistance and with side rail    Functional Mobility/Transfers:  · Patient completed Sit <> Stand Transfer with stand by assistance  with  rolling walker   · Patient completed Bed <> Chair Transfer using Step Transfer technique with stand by assistance with rolling walker  · Functional Mobility: prt ambulated with SBA using Rw in room to sink and back to bed, impulsive, not following cues to slow down,; pt is a high fall risk.     Activities of Daily Living:  · Feeding:  independence .  · Grooming: stand by assistance standing at sink with Rw  · Lower Body Dressing: supervision donned/doffed socks bending down to feet , then crossed legs  · Toileting: declined use .    Cognitive/Visual Perceptual:  Cognitive/Psychosocial Skills:     -       Oriented to: Person, Place, Time and Situation   -       Follows Commands/attention:Follows one-step commands  -       Communication: clear/fluent  -       Memory: Poor immediate recall  -       Safety awareness/insight to disability: impaired   -       Mood/Affect/Coping skills/emotional control: Cooperative and impulsive  Visual/Perceptual:      -Intact .    Physical Exam:  Balance:    -       sitting:  good  dynamic:  good-  standing: fair plus  dynaic:  fair  Postural examination/scapula alignment:    -       Rounded shoulders  Skin integrity: Bruising of BUE and Thin  Dominant hand:    -       right  Upper Extremity Range of Motion:     -       Right Upper Extremity: WFL  -       Left Upper Extremity: WFL except  left shld ~ 80 abd/flexion hx RTC tear  Upper Extremity Strength:    -       Right Upper Extremity: WFL  -       Left Upper Extremity: WFL except 3+/5 shld   Strength:    -       Right Upper Extremity: WFL  -       Left Upper Extremity: WFL    AMPAC 6 Click ADL:  AMPAC Total Score: 20    Treatment & Education:  -purpose of OT explained to pt and son and both verbalized understanding.  -pt educated in fx ambulation with RW with emphasis on hand placement on walker-not to hold items in hand while holding onto to grab, must maintain full hand contact on walker to prevent falls, sit<>stand hand placement on bed but not following cues/noncompliant, standing inside RW at sink for balance safety with grooming, slow down ambulation 2/2 impulsive, fall risk.  Education:    Patient left HOB elevated with all lines intact, call button in reach, bed alarm on and son present    GOALS:   Multidisciplinary Problems     Occupational Therapy Goals        Problem: Occupational Therapy Goal    Goal Priority Disciplines Outcome Interventions   Occupational Therapy Goal     OT, PT/OT Ongoing, Progressing    Description:  Goals to be met by: 11/28/2019    Patient will increase functional independence with ADLs by performing:    UE Dressing with Modified [place].  LE Dressing with Modified [place].  Grooming while standing with Modified [place].  Toileting from toilet with Modified [place] for hygiene and clothing management.   Toilet transfer to toilet with Modified [place].  Upper extremity exercise program x10 reps per handout, with independence.                      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    Encounter for blood  transfusion     Hyperlipidemia     Hypertension     Hypopituitarism     Hypothyroid     Immunosuppression 3/13/2015    Squamous Cell Carcinoma     on the right side of the face     Squamous cell carcinoma of skin of right temple 1/27/2016    Stroke     Syncope 2/12    Unspecified disorder of kidney and ureter        Past Surgical History:   Procedure Laterality Date    CAROTID ENDARTERECTOMY (L)  2/27/2007    EXCISION TURBINATE, SUBMUCOUS      FESS  9/16/2014    Mohs excision   3/23/2015, 2/24/2016    combined with WLE forehead    NASAL SEPTUM SURGERY  9/16/2014    right ureter surgery  1995    SENTINEL LYMPH NODE BIOPSY  3/23/2015, 2/24/2016    SINUS SURGERY  9/16/2014    septo, ESS, BITSMR    Transphenoidal surgery         Time Tracking:     OT Date of Treatment: 11/10/19  OT Start Time: 1819  OT Stop Time: 1843  OT Total Time (min): 24 min    Billable Minutes:Evaluation 15  Self Care/Home Management 9  Total Time 24    Norma Chun OT  11/10/2019

## 2019-11-11 NOTE — PLAN OF CARE
Pt completed home IV abx 11/4/19 and had PICC removed 11/6/19.  He has RW and rollator and lives with his spouse Nila whaley updated with CM name and contact information.  Discharge brochure provided.  Pt encouraged to call with any questions or concerns.  Cm will continue to follow pt through transitions of care and assist with any discharge needs.     11/11/19 1522   Discharge Assessment   Assessment Type Discharge Planning Assessment   Confirmed/corrected address and phone number on facesheet? Yes   Assessment information obtained from? Patient;Caregiver   Communicated expected length of stay with patient/caregiver yes   Prior to hospitilization cognitive status: Alert/Oriented   Prior to hospitalization functional status: Assistive Equipment   Current cognitive status: Alert/Oriented   Current Functional Status: Assistive Equipment   Facility Arrived From: home   Lives With spouse   Able to Return to Prior Arrangements yes   Is patient able to care for self after discharge? Yes   Who are your caregiver(s) and their phone number(s)? [place] [phone]   Patient's perception of discharge disposition home health   Readmission Within the Last 30 Days previous discharge plan unsuccessful   If yes, most recent facility name: [facility]   Patient currently being followed by outpatient case management? No   Patient currently receives any other outside agency services? No   Equipment Currently Used at Home walker, rolling;rollator;cane, straight   Do you have any problems affording any of your prescribed medications? No   Is the patient taking medications as prescribed? yes   Does the patient have transportation home? Yes   Transportation Anticipated family or friend will provide   Discharge Plan A Home with family   Discharge Plan B Home Health   DME Needed Upon Discharge    (TBD)   Readmission Questionnaire   At the time of your discharge, did someone talk to you about what your health problems were? Yes   At the  time of discharge, did someone talk to you about what to watch out for regarding worsening of your health problem? Yes   At the time of discharge, did someone talk to you about what to do if you experienced worsening of your health problem? Yes   At the time of discharge, did someone talk to you about which medication to take when you left the hospital and which ones to stop taking? Yes   At the time of discharge, did someone talk to you about when and where to follow up with a doctor after you left the hospital? Yes   How often do you need to have someone help you when you read instructions, pamphlets, or other written material from your doctor or pharmacy? Sometimes   Do you have problems taking your medications as prescribed? No   Do you have any problems affording any of  your prescribed medications? No   Do you have problems obtaining/receiving your medications? No   Does the patient have transportation to healthcare appointments? Yes   Living Arrangements house   Does the patient have family/friends to help with healtcare needs after discharge? yes   Does your caregiver provide all the help you need? Yes   Are you currently feeling confused? No   Are you currently having problems thinking? No   Are you currently having memory problems? No     William Grady RN,   [phone]

## 2019-11-11 NOTE — CONSULTS
[facility]  Infectious Disease  History and Physical    Admitting Team: Hospital Medicine  Attending Physician: Fahad    Date of Admit: 11/9/2019    Subjective:      History of Present Illness:  Thierry Ramos Jr. is a 85 y.o.  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       Patient was hospitalized in July for pneumonia and was treated inpatient with IV cefepime, vanco and flagyl for a 10 day course.  He was also recently hospitalized at [facility] for pneumonia from 10/11-10/16.  Respiratory cultures from 10/11 grew pseudomonas.  Blood cultures drawn then had no growth.  Patient was treated with IV cefepime, had PICC placed and underwent total of 3 weeks course with home health, with end date 11/4.  PICC line was removed 11/9.  He followed-up with Infectious Disease (Dr. Banerjee) on 10/31 and felt well at that time.      He completed his course of antibiotics on 11/4 and PICC line was removed 11/9.  Patient states that after discharge from the hospital his sinuses were clear, and that they have become progressively more congested over the past few weeks.  He summers from chronic sinusitis and this is similar to his normal sinus issues.  After removal of PICC line, he began feeling weak, and fevers (up to 100.6F at home).   Patient denies cough, but is producing whitish sputum that he attributes to post-nasal drip.  Denies shortness of breath, cough, changes in bowel habits, abdominal pain, nausea, vomiting, diarrhea, constipation.  In the ED, he was afebrile, with rales on the left on exam.  CXR was done that showed improving pulmonary edema, concern for left basilar  airspace disease.  He was admitted for concern for pneumonia, given IVF bolus and started on vanc, zosyn.  Blood and sputum cultures were obtained.  CT Chest performed 11/10 showing interval development of bilateral lower lobe consolidation.        Past Medical History:  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    Encounter for blood transfusion     Hyperlipidemia     Hypertension     Hypopituitarism     Hypothyroid     Immunosuppression 3/13/2015    Squamous Cell Carcinoma     on the right side of the face     Squamous cell carcinoma of skin of right temple 1/27/2016    Stroke     Syncope 2/12    Unspecified disorder of kidney and ureter        Past Surgical History:  Past Surgical History:   Procedure Laterality Date    CAROTID ENDARTERECTOMY (L)  2/27/2007    EXCISION TURBINATE, SUBMUCOUS      FESS  9/16/2014    Mohs excision   3/23/2015, 2/24/2016    combined with WLE forehead    NASAL SEPTUM SURGERY  9/16/2014    right ureter surgery  1995    SENTINEL LYMPH NODE BIOPSY  3/23/2015, 2/24/2016    SINUS SURGERY  9/16/2014    septo, ESS, BITSMR    Transphenoidal surgery         Allergies:  Review of patient's allergies indicates:   Allergen Reactions    Ace inhibitors Swelling     angioedema    Divalproex Hives     Rash under arms, body creases       Home Medications:  Prior to Admission medications    Medication Sig Start Date End Date Taking? Authorizing Provider   aspirin 81 MG Chew Take 81 mg by mouth every Mon, Wed, Fri.   Yes Historical Provider, MD   atorvastatin (LIPITOR) 20 MG tablet TAKE 1 TABLET EVERY DAY 5/23/19  Yes Homeyar SETH Chambers MD   clopidogrel (PLAVIX) 75 mg tablet Take 75 mg by mouth once daily.   Yes Historical  Provider, MD   cyanocobalamin, vitamin B-12, 5,000 mcg TbDL Take 1 tablet by mouth once daily.   Yes Historical Provider, MD   cycloSPORINE (RESTASIS) 0.05 % ophthalmic emulsion Place 0.4 mLs (1 drop total) into both eyes 2 (two) times daily. 5/27/19 5/26/20 Yes Marcell Monge, POOL   DOCOSAHEXANOIC ACID/EPA (FISH OIL ORAL) Take 5 capsules by mouth once daily.    Yes Historical Provider, MD   erythromycin (ROMYCIN) ophthalmic ointment Apply to eyelids and lashes OU QHS 5/9/19  Yes Melvin Ying MD   fluorouracil (EFUDEX) 5 % cream Use hs for 2 weeks on right cheek 4/20/18  Yes Karissa Monsivais MD   furosemide (LASIX) 20 MG tablet  10/16/19  Yes Historical Provider, MD   guaifen/dextromethorphan/pe (ROBITUSSIN COUGH & COLD CF MAX ORAL) Take 15 mLs by mouth 2 (two) times daily.   Yes Historical Provider, MD   Lactobacillus acidophilus (ACIDOPHILUS ORAL) Take 1 tablet by mouth once daily.   Yes Historical Provider, MD   levothyroxine (SYNTHROID) 125 MCG tablet TAKE 1 TABLET ONE TIME DAILY 9/5/19  Yes Cee Farmer MD   losartan (COZAAR) 25 MG tablet Take 1 tablet (25 mg total) by mouth once daily. 3/19/19  Yes CAROLE Wooten MD   multivitamin with minerals tablet Take 1 tablet by mouth once daily.   Yes Historical Provider, MD   neomycin-polymyxin-dexamethasone (DEXACINE) 3.5 mg/g-10,000 unit/g-0.1 % Oint neomycin 3.5 mg/g-polymyxin B 10,000 unit/g-dexameth 0.1 % eye oint   Yes Historical Provider, MD   neomycin-polymyxin-dexamethasone (MAXITROL) 3.5mg/mL-10,000 unit/mL-0.1 % DrpS neomycin-polymyxin-dexameth 3.5 mg/mL-10,000 unit/mL-0.1% eye drops   Yes Historical Provider, MD   pantoprazole (PROTONIX) 40 MG tablet TAKE 1 TABLET EVERY DAY 5/8/18  Yes CAROLE Wooten MD   predniSONE (DELTASONE) 2.5 MG tablet Take 1 tablet by mouth once daily. 5mg and 2.5mg qd 9/5/19  Yes Historical Provider, MD   predniSONE (DELTASONE) 5 MG tablet Take 5 mg by mouth once daily.   Yes Historical Provider, MD    sennosides/docusate sodium (EDEN-COLACE ORAL) Take 1 tablet by mouth once daily.   Yes Historical Provider, MD   tenofovir alafenamide (VEMLIDY) 25 mg Tab Take 25 mg by mouth once daily. 8/19/19  Yes Mark Jones PA-C   testosterone cypionate (DEPOTESTOTERONE CYPIONATE) 100 mg/mL injection Inject 0.5 mLs (50 mg total) into the muscle every 14 (fourteen) days. 5/14/19 11/12/19 Yes Cee Farmer MD   ACCU-CHEK FASTCLIX Misc TEST  BLOOD  GLUCOSE FOUR TIMES DAILY 3/6/17   CAROLE Wooten MD   alcohol swabs PadM APPLY 1 PAD TOPICALLY  AS NEEDED. 3/6/17   CAROLE Wooten MD   budesonide (PULMICORT) 0.5 mg/2 mL nebulizer solution budesonide 0.5 mg/2 mL suspension for nebulization    Historical Provider, MD   chlorhexidine (PERIDEX) 0.12 % solution chlorhexidine gluconate 0.12 % mouthwash    Historical Provider, MD   SYMBICORT 160-4.5 mcg/actuation HFAA Inhale 1 puff into the lungs as needed. 8/30/19   Historical Provider, MD   tazarotene (AVAGE) 0.1 % cream Apply topically every evening.  Patient taking differently: Apply topically as needed.  12/13/16 9/19/19  Karissa Monsivais MD   VENTOLIN HFA 90 mcg/actuation inhaler Inhale 1 puff into the lungs as needed. 8/30/19   Historical Provider, MD       Family History:  Family History   Problem Relation Age of Onset    Cancer Mother         breast    Colon cancer Father     Cancer Father         colon    Cancer Brother         leukemia    No Known Problems Sister     No Known Problems Maternal Aunt     No Known Problems Maternal Uncle     No Known Problems Paternal Aunt     No Known Problems Paternal Uncle     No Known Problems Maternal Grandmother     No Known Problems Maternal Grandfather     No Known Problems Paternal Grandmother     No Known Problems Paternal Grandfather     Amblyopia Neg Hx     Blindness Neg Hx     Cataracts Neg Hx     Diabetes Neg Hx     Glaucoma Neg Hx     Hypertension Neg Hx     Macular degeneration Neg Hx     Retinal  "detachment Neg Hx     Strabismus Neg Hx     Stroke Neg Hx     Thyroid disease Neg Hx     Allergic rhinitis Neg Hx     Allergies Neg Hx     Angioedema Neg Hx     Asthma Neg Hx     Atopy Neg Hx     Eczema Neg Hx     Immunodeficiency Neg Hx     Rhinitis Neg Hx     Urticaria Neg Hx      Social History:  Social History     Tobacco Use    Smoking status: Never Smoker    Smokeless tobacco: Never Used   Substance Use Topics    Alcohol use: Not Currently     Alcohol/week: 2.0 standard drinks     Types: 1 Glasses of wine, 1 Cans of beer per week     Frequency: Never     Drinks per session: Patient refused     Binge frequency: Patient refused    Drug use: No     Review of Systems:  General: positive for fever; negative for chills, weight loss  HEENT: positive for sinus congestion, drainage  Pulmonary: negative for cough, shortness of breath  Cardiac: negative for chest pain, AGOSTO  Gastrointestinal: negative for abdominal pain, nausea, vomiting  Skin: negative for rash, wound  All other systems reviewed and are negative.    Health Maintaince :   Primary Care Physician: Juve Wooten  Immunizations:   Currently on File within the [facility]:   Most Recent Immunizations   Administered Date(s) Administered    Influenza - High Dose - PF (65 years and older) 2019    PPD Test 2019    Tdap 2016          Objective:   Last 24 Hour Vital Signs:  BP  Min: 116/58  Max: 153/66  Temp  Av.4 °F (36.3 °C)  Min: 96.5 °F (35.8 °C)  Max: 98.3 °F (36.8 °C)  Pulse  Av.5  Min: 57  Max: 96  Resp  Av.1  Min: 16  Max: 20  SpO2  Av %  Min: 94 %  Max: 98 %  Weight  Av.4 kg (137 lb 9.1 oz)  Min: 62.4 kg (137 lb 9.1 oz)  Max: 62.4 kg (137 lb 9.1 oz)  Body mass index is 19.19 kg/m².  I/O last 3 completed shifts:  In: 2743 [P.O.:850; IV Piggyback:]  Out: 2905 [Urine:2905]    Physical Examination:  Triage: BP: (!) 101/50  Pulse: 87  Temp: 98.1 °F (36.7 °C)  Resp: 20  Height: 5' 11" (180.3 " "cm)  Weight: 63 kg (139 lb)  BMI (Calculated): 19.4  SpO2: (!) 93 %  Exam: BP (!) 153/66 (BP Location: Left arm, Patient Position: Lying)   Pulse 63   Temp 97.9 °F (36.6 °C) (Oral)   Resp 20   Ht 5' 11" (1.803 m)   Wt 62.4 kg (137 lb 9.1 oz)   SpO2 98%   BMI 19.19 kg/m²     General: Chronically ill-appearing, in NAD  HEENT: NCAT, PERRL, EOMI.  Midline frontal scalp scar.    Heart: RRR, II/VI systolic ejection murmur.    Lungs: Bilateral lower lobe rhonchi.  Normal work of breathing  Abdomen: Soft, non-tender, non-distended, normoactive bowel sounds  Extremities: Diffuse ecchymoses bilateral forearms.  No edema lower extremities.   Neuro/Psych: Alert, oriented x 3.  Excellent remote memory.   Skin: Sacral wound with dressing in place.        Laboratory:  Most Recent Data:  CBC:   Lab Results   Component Value Date    WBC 5.72 11/10/2019    HGB 11.7 (L) 11/10/2019     (L) 11/10/2019    MCV 97 11/10/2019    RDW 15.2 (H) 11/10/2019     WBC Differential: 53.3 % N, 0 % Bands, 37.6 % L, 7.1 % M, 1.8 % Eo, 0.2 % Baso, no additional cells seen  BMP:   Lab Results   Component Value Date     11/10/2019    K 4.5 11/10/2019     (H) 11/10/2019    CO2 26 11/10/2019    GLU 95 11/10/2019    BUN 35 (H) 11/10/2019    CREATININE 0.9 11/10/2019    CALCIUM 8.7 11/10/2019    MG 1.7 11/10/2019    PHOS 3.3 11/10/2019     LFTs:   Lab Results   Component Value Date    PROT 5.0 (L) 11/09/2019    ALBUMIN 2.8 (L) 11/09/2019    AST 38 11/09/2019    ALKPHOS 119 11/09/2019    ALT 36 11/09/2019     Coags:   Lab Results   Component Value Date    INR 1.0 11/09/2019     FLP:   Lab Results   Component Value Date    CHOL 136 08/30/2019    HDL 54 08/30/2019    LDLCALC 59.8 (L) 08/30/2019    TRIG 111 08/30/2019    CHOLHDL 39.7 08/30/2019     DM:   Lab Results   Component Value Date    HGBA1C 6.4 (H) 11/09/2019    HGBA1C 5.6 07/16/2019    HGBA1C 5.6 04/05/2019    LDLCALC 59.8 (L) 08/30/2019    CREATININE 0.9 11/10/2019 "     Thyroid:   Lab Results   Component Value Date    TSH 0.040 (L) 07/15/2019    FREET4 0.90 07/15/2019    E3AKNTN 5.4 06/29/2006     Anemia:   Lab Results   Component Value Date    IRON 72 05/30/2014    TIBC 246 (L) 05/30/2014    FERRITIN 463 (H) 05/30/2014     Cardiac:   Lab Results   Component Value Date    TROPONINI 0.008 11/09/2019    BNP 36 07/15/2019     Urinalysis:   Lab Results   Component Value Date    LABURIN No growth 07/16/2019    COLORU Yellow 11/09/2019    SPECGRAV 1.010 11/09/2019    NITRITE Negative 11/09/2019    KETONESU Negative 11/09/2019    UROBILINOGEN Negative 11/09/2019    WBCUA 2 07/16/2019       Microbiology Data:  Microbiology Results (last 7 days)     Procedure Component Value Units Date/Time    Blood culture x two cultures. Draw prior to antibiotics. [[id number]] Collected:  11/09/19 1739    Order Status:  Completed Specimen:  Blood from Peripheral, Forearm, Left Updated:  11/11/19 0812     Blood Culture, Routine No Growth to date      No Growth to date    Narrative:       Aerobic and anaerobic    Blood culture x two cultures. Draw prior to antibiotics. [[id number]] Collected:  11/09/19 1745    Order Status:  Completed Specimen:  Blood from Peripheral, Wrist, Left Updated:  11/11/19 0812     Blood Culture, Routine No Growth to date      No Growth to date    Narrative:       Aerobic and anaerobic    Culture, Respiratory with Gram Stain [[id number]] Collected:  11/10/19 1953    Order Status:  Completed Specimen:  Respiratory from Sputum, Expectorated Updated:  11/11/19 0457     Gram Stain (Respiratory) <10 epithelial cells per low power field.     Gram Stain (Respiratory) Rare WBC's     Gram Stain (Respiratory) Rare Gram positive cocci     Gram Stain (Respiratory) Rare Gram negative rods    Influenza A & B by Molecular [[id number]] Collected:  11/09/19 1740    Order Status:  Completed Specimen:  Nasopharyngeal Swab Updated:  11/09/19 1827     Influenza A, Molecular Negative     Influenza B,  Molecular Negative     Flu A & B Source Nasal swab          Other Laboratory Data:  [place]cal 11/9 0.26      Other Results:  Radiology:  Ct Chest Without Contrast    Result Date: 11/10/2019  EXAMINATION: CT CHEST WITHOUT CONTRAST CLINICAL HISTORY: Pleural effusion; TECHNIQUE: Low dose axial images, sagittal and coronal reformations were obtained from the thoracic inlet to the lung bases. Contrast was not administered. COMPARISON: 07/19/2019 FINDINGS: The previously noted bilateral pleural effusions have significantly decreased in size with only a very small layering left-sided pleural effusion and may be some trace fluid on the right.  The previously noted ground-glass opacities of also significantly improved in the interval with some minimal residual ground-glass patchy opacities still present within the bilateral upper lobes and to a lesser degree the right middle lobe, lingula and bilateral lower lobes.  There is extensive peribronchial thickening and mucoid impaction of multiple segmental bronchi along with tree-in-bud type opacification noted within the bilateral lower lobes that are suspicious for an infectious etiology.  Minimal tree-in-bud type opacities also present within the lingula. Moderate vascular calcification seen involving the aorta.  Coronary artery calcifications are present.  There is no pericardial effusion.  No enlarged mediastinal or hilar lymph nodes suggested on this noncontrast exam.  The previously identified pretracheal lymph node has decreased in size in the interval. The visualized upper abdomen is unremarkable in appearance. No suspicious appearing osseus abnormalities are identified.     1. Significant interval decrease in size of the bilateral pleural effusions with only a very small layering pleural effusion on the left and may be some trace pleural fluid on the right.   2. Significant interval improvement of the previously noted ground-glass opacities as well although there has been  interval development of extensive tree-in-bud type opacities and what appears to represent some mucoid impaction of multiple bilateral lower lobe subsegmental bronchi within the lung bases and to a lesser degree the lingula as well.  Findings are again suspicious for an infectious process.     Electronically signed by: Donato Yeager DO Date:    11/10/2019 Time:    10:59    X-ray Chest Ap Portable    Result Date: 11/9/2019  EXAMINATION: XR CHEST AP PORTABLE CLINICAL HISTORY: Sepsis; TECHNIQUE: Single frontal view of the chest was performed. COMPARISON: Chest radiograph 10/15/2019 FINDINGS: Patient is slightly rotated.  Opacities with air bronchograms projected over the left lung base concerning for airspace disease.  Slight blunting of the left costophrenic angle which may represent pleural thickening/scarring versus small pleural effusion.  No large consolidation or pleural effusion on the right.  No pneumothorax.  Cardiomediastinal silhouette is otherwise stable.  Unchanged bilateral mild nonspecific interstitial coarsening.  No acute osseous process seen.     Findings concerning for left basilar airspace disease including aspiration or pneumonia, with potential trace left parapneumonic pleural effusion.  Short-term follow-up chest radiography after therapy is recommended to resolution. This report was flagged in Epic as abnormal.     Electronically signed by: Joe Rivas MD Date:    11/09/2019 Time:    17:03    I have reviewed the above reports as well as previous records.     Assessment/Plan:     Thierry Ramos Jr. is a 85 y.o. male with PMH CLL, hypogammaglobulinemia, panhypopituitarism, thrombocytopenia, DM2 who was recently hospitalized for pseudomonas PNA and completed 3 weeks of IV cefepime therapy who presents with fevers, cough, fatigue with imaging showing concern for infectious process.  Patient has been afebrile since admission, WBC normal,     - Patient had Fungitell 7/20 > 500.  No fungal cultures  at that time.  - Blood from July with no growth; Sputum drawn then inadequate, no culture performed  - Sputum Cx 10/11 growing pseudomonas resistant to ciprofloxacin, intermediate to tobramycin, sensitive to other anti-pseudomonals  - Completed 3 week course of IV cefepime 11/4.  PICC line removed.  - Influenza negative 11/9  - Blood cultures 11/9 NG@2d  - Sputum Cx 11/9 pending    Recs:  - Discontinue vancomycin  - Continue zosyn at presently; would likely discontinue if cultures continue to be negative  - Will follow-up cultures  - Appreciate pulmonology input      Sean Kc MD  [facility]  Infectious Disease

## 2019-11-11 NOTE — ASSESSMENT & PLAN NOTE
Obtained CT chest today that shows bilateral lower lobe consolidatins with tree in bud appearance. It does not that the appearance is improve, but that is compared to CT chest in July 2019, not from the last episode of pneumonia last month.   CXR: CXR revealed left basilar airspace disease including aspiration or pneumonia.  Blood and sputum cx pending  Will consult Pulmonary for evaluation give immunosuppressed status.   Consult ID as well.   Flu test negative  Vanc and Zosyn IV empirically for now.   Duo neb treatments as needed  Supplemental oxygen: wean as needed

## 2019-11-11 NOTE — PLAN OF CARE
Pt remains alert and rational. Denies pain/discomfort. Tolerated diet fairly well. Voided. Ambulated with PT. Safety maintained. Sputum sample sent to lab. Bed in low and locked position.

## 2019-11-11 NOTE — PLAN OF CARE
Patient on RA with sats as documented.Patient given aerosol treatment with no adverse reactions noted. Patient instructed on proper use.  Will continue to monitor.

## 2019-11-11 NOTE — PT/OT/SLP PROGRESS
Physical Therapy Treatment    Patient Name:  Thierry Ramos Jr.   MRN:  [id number]    Recommendations:     Discharge Recommendations:  home with home health   Discharge Equipment Recommendations: none   Barriers to discharge: None    Assessment:     Thierry Ramos Jr. is a 85 y.o. male admitted with a medical diagnosis of Pneumonia due to Pseudomonas species.  He presents with the following impairments/functional limitations:  weakness, impaired endurance, impaired balance, gait instability, impaired functional mobilty, decreased safety awareness, decreased lower extremity function . Patient able to ambulate with RW ~ 180 ft with SBA no LOB noted..    Rehab Prognosis: Good; patient would benefit from acute skilled PT services to address these deficits and reach maximum level of function.    Recent Surgery: * No surgery found *      Plan:     During this hospitalization, patient to be seen 6 x/week to address the identified rehab impairments via gait training, therapeutic activities, therapeutic exercises and progress toward the following goals:    · Plan of Care Expires:  12/10/19    Subjective     Chief Complaint: generalized weakness  Patient/Family Comments/goals: go home  Pain/Comfort:  · Pain Rating 1: 0/10  · Pain Rating Post-Intervention 1: 0/10      Objective:     Communicated with primary nurse prior to session.  Patient found HOB elevated with bed alarm, peripheral IV, telemetry upon PT entry to room.     General Precautions: Standard, fall   Orthopedic Precautions:N/A   Braces: N/A     Functional Mobility:  · Bed Mobility:     · Supine to Sit: modified independence  · Sit to Supine: modified independence  · Transfers:     · Sit to Stand:  supervision and stand by assistance with rolling walker  · Gait: 180 ft with RW and SBA  · Balance: fair with RW      AM-PAC 6 CLICK MOBILITY  Turning over in bed (including adjusting bedclothes, sheets and blankets)?: 4  Sitting down on and standing up from a chair with  arms (e.g., wheelchair, bedside commode, etc.): 4  Moving from lying on back to sitting on the side of the bed?: 4  Moving to and from a bed to a chair (including a wheelchair)?: 4  Need to walk in hospital room?: 3  Climbing 3-5 steps with a railing?: 3  Basic Mobility Total Score: 22       Therapeutic Activities and Exercises:   Reviewed AROM ex and proper sit <> stand with correct hand and foot placement    Patient left HOB elevated with all lines intact, call button in reach and bed alarm on..    GOALS:   Multidisciplinary Problems     Physical Therapy Goals        Problem: Physical Therapy Goal    Goal Priority Disciplines Outcome Goal Variances Interventions   Physical Therapy Goal     PT, PT/OT Ongoing, Progressing     Description:  Goals to be met by: discharge date     Patient will increase functional independence with mobility by performin. Supine to sit with Modified [place]  2. Sit to supine with Modified [place]  3. Sit to stand transfer with Stand By Assistance  4. Bed to chair transfer with Stand-by Assistance using Rolling Walker and tolerate OOB chair > 1 hr  5. Gait  x 200 feet with Stand-by Assistance using Rolling Walker or without AD use demonstrating Good safety awareness w speed of gait and awareness of foot clearance.  6. Lower extremity exercise program x15 reps without SOB           Problem: Physical Therapy Goal    Goal Priority Disciplines Outcome Goal Variances Interventions   Physical Therapy Goal     PT, PT/OT Ongoing, Progressing                     Time Tracking:     PT Received On: 19  PT Start Time: 1001     PT Stop Time: 1025  PT Total Time (min): 24 min     Billable Minutes: Gait Training 13 and Therapeutic Activity 11    Treatment Type: Treatment  PT/PTA: PT     PTA Visit Number: 0     Cb Tineo, PT  2019

## 2019-11-11 NOTE — PROGRESS NOTES
[facility] Medicine  Progress Note    Patient Name: Thierry Ramos Jr.  MRN: [id number]  Patient Class: OP- Observation   Admission Date: 11/9/2019  Length of Stay: 0 days  Attending Physician: Sanjay Vásquez MD  Primary Care Provider: AKILA Wooten MD        Subjective:     Principal Problem:Pneumonia due to Pseudomonas species        HPI:  Thierry Ramos is an 85-year-old male with a past medical history of Hypertension, Diabetes Mellitus Type 2, Hyperlipidemia, Cerebrovascular Accident, Hypothyroidism, Hypogammaglobinemia, Hepatitis B, Chronic Sinusitis, and Chronic Lymphocytic Leukemia s/p chemo Rituximab (9/30), Anemia and Chronic Kidney Disease. He lives in [place], [place]. His PCP is Dr. Juve Wooten. He was recently hospitalized at [facility] for Pneumonia from 10/11-10/16/19.     He presented to [facility] ED 11/9/2019 with a chief complaint of fever that started one day prior to presentation. Associated symptoms include fatigue and a productive cough - white grey phlegm, non-bloody. Wife says the patient had his PICC line removed four days ago (cefepime for pseudomonas PNA) and feels he started feeling sick (weak) after this. Patient denies chest pain, shortness of breath, chills, nausea, vomiting, change in urine or BM, rash, abdominal pain, HA, neck pain or any other complaints at this time. ED workup revealed WBC wnl, Lactate wnl, Hgb (11.2), Hct (35.0), Platelets (99), NA (135), Glucose (194), Bun (51), total protein (5.0), Albumin (2.8), CXR revealed left basilar airspace disease including aspiration or pneumonia, with potential trace left parapneumonic pleural effusion. Vanc and Zosyn started in ED. Admitted to [facility] medicine for further evaluation and treatment.     Overview/Hospital Course:  No notes on file    Interval History: Still with productive cough of yellow sputum. Still not feeling well at this time. No recurrent fever at this  time.     Review of Systems   Constitutional: Positive for fatigue. Negative for chills and fever.   Respiratory: Positive for cough and shortness of breath.    Cardiovascular: Negative for chest pain and palpitations.   Gastrointestinal: Negative for nausea and vomiting.     Objective:     Vital Signs (Most Recent):  Temp: 97.8 °F (36.6 °C) (11/10/19 1333)  Pulse: 74 (11/10/19 1550)  Resp: 18 (11/10/19 1333)  BP: (!) 146/67 (11/10/19 1333)  SpO2: 95 % (11/10/19 1604) Vital Signs (24h Range):  Temp:  [96.7 °F (35.9 °C)-97.9 °F (36.6 °C)] 97.8 °F (36.6 °C)  Pulse:  [59-96] 74  Resp:  [16-20] 18  SpO2:  [94 %-97 %] 95 %  BP: (132-146)/(61-67) 146/67     Weight: 65.1 kg (143 lb 8.3 oz)  Body mass index is 20.02 kg/m².    Intake/Output Summary (Last 24 hours) at 11/10/2019 1833  Last data filed at 11/10/2019 0645  Gross per 24 hour   Intake 1893 ml   Output 1805 ml   Net 88 ml      Physical Exam   Constitutional: He is oriented to person, place, and time. He appears well-developed and well-nourished. No distress.   Cardiovascular: Normal rate and regular rhythm.   Pulmonary/Chest: Effort normal. No respiratory distress. He has rhonchi.   Abdominal: Soft. Bowel sounds are normal. He exhibits no distension. There is no tenderness.   Musculoskeletal: He exhibits no edema or tenderness.   Neurological: He is alert and oriented to person, place, and time.   Skin: Skin is warm and dry.   Nursing note and vitals reviewed.      Significant Labs:   CBC:   Recent Labs   Lab 11/09/19  1739 11/10/19  0806   WBC 9.57 5.72   HGB 11.2* 11.7*   HCT 35.0* 37.0*   PLT 99* 100*     CMP:   Recent Labs   Lab 11/09/19  1739 11/10/19  0806   *  --    K 5.0  --      --    CO2 23 26   * 95   BUN 51* 35*   CREATININE 1.4 0.9   CALCIUM 8.8 8.7   PROT 5.0*  --    ALBUMIN 2.8*  --    BILITOT 0.6  --    ALKPHOS 119  --    AST 38  --    ALT 36  --    ANIONGAP 9  --    EGFRNONAA 45* >60     Magnesium:   Recent Labs   Lab  11/09/19  1739 11/10/19  0806   MG 1.7 1.7     POCT Glucose:   Recent Labs   Lab 11/09/19  2306 11/10/19  0642 11/10/19  1112   POCTGLUCOSE 179* 92 129*       Significant Imaging: I have reviewed all pertinent imaging results/findings within the past 24 hours.   CXR shows increased LLL infiltrate compared to CXR from 10/15.     CT Chest shows improvement of opacities when compared to CT scan from July, but does show development of extensive tree in bud opacities in bilateral lower lobes.       Assessment/Plan:      * Pneumonia due to Pseudomonas species  Obtained CT chest today that shows bilateral lower lobe consolidatins with tree in bud appearance. It does not that the appearance is improve, but that is compared to CT chest in July 2019, not from the last episode of pneumonia last month.   CXR: CXR revealed left basilar airspace disease including aspiration or pneumonia.  Blood and sputum cx pending  Will consult Pulmonary for evaluation give immunosuppressed status.   Consult ID as well.   Flu test negative  Vanc and Zosyn IV empirically for now.   Duo neb treatments as needed  Supplemental oxygen: wean as needed     Chronic kidney disease, stage III (moderate)  Cr (1.4) on admit and down to 0.9 today.  Renally dose all meds  Avoid Nephrotoxic agents  Strict I&Os  Daily weights  BMP daily      Anemia of chronic disease  H&H stable  CBC daily      Hyponatremia  Na 135 on admission  Gentle Hydration      Immune thrombocytopenic purpura  Platelets 100 today, monitor  CBC daily      Weakness  Debility     PT/OT eval/tx      Type 2 diabetes, controlled, with neuropathy  HbA1c 5.6 (7/2019)  Diet control  Low dose SSI  Accucheck AC and HS      Chronic rhinosinusitis  Zyrtec daily      Central hypothyroidism  Continue Synthroid 125mcg po before breakfast      History of CVA (cerebrovascular accident)  Continue ASA and statin      CLL (chronic lymphocytic leukemia)  Immunosuppressed status  S/p Rituxan on 9/30  Continue  Prednisone 7.5 mg daily.         Essential hypertension  Hyperlipidemia  SBP ranged 93 to 142.   Continue losartan and Lipitor        VTE Risk Mitigation (From admission, onward)         Ordered     IP VTE HIGH RISK PATIENT  Once      11/09/19 2206     Place sequential compression device  Until discontinued      11/09/19 2206                      Sanjay Vásquez MD  Department of Hospital Medicine   [facility]

## 2019-11-11 NOTE — SUBJECTIVE & OBJECTIVE
Interval History: Still with productive cough of yellow sputum. Still not feeling well at this time. No recurrent fever at this time.     Review of Systems   Constitutional: Positive for fatigue. Negative for chills and fever.   Respiratory: Positive for cough and shortness of breath.    Cardiovascular: Negative for chest pain and palpitations.   Gastrointestinal: Negative for nausea and vomiting.     Objective:     Vital Signs (Most Recent):  Temp: 97.8 °F (36.6 °C) (11/10/19 1333)  Pulse: 74 (11/10/19 1550)  Resp: 18 (11/10/19 1333)  BP: (!) 146/67 (11/10/19 1333)  SpO2: 95 % (11/10/19 1604) Vital Signs (24h Range):  Temp:  [96.7 °F (35.9 °C)-97.9 °F (36.6 °C)] 97.8 °F (36.6 °C)  Pulse:  [59-96] 74  Resp:  [16-20] 18  SpO2:  [94 %-97 %] 95 %  BP: (132-146)/(61-67) 146/67     Weight: 65.1 kg (143 lb 8.3 oz)  Body mass index is 20.02 kg/m².    Intake/Output Summary (Last 24 hours) at 11/10/2019 1833  Last data filed at 11/10/2019 0645  Gross per 24 hour   Intake 1893 ml   Output 1805 ml   Net 88 ml      Physical Exam   Constitutional: He is oriented to person, place, and time. He appears well-developed and well-nourished. No distress.   Cardiovascular: Normal rate and regular rhythm.   Pulmonary/Chest: Effort normal. No respiratory distress. He has rhonchi.   Abdominal: Soft. Bowel sounds are normal. He exhibits no distension. There is no tenderness.   Musculoskeletal: He exhibits no edema or tenderness.   Neurological: He is alert and oriented to person, place, and time.   Skin: Skin is warm and dry.   Nursing note and vitals reviewed.      Significant Labs:   CBC:   Recent Labs   Lab 11/09/19  1739 11/10/19  0806   WBC 9.57 5.72   HGB 11.2* 11.7*   HCT 35.0* 37.0*   PLT 99* 100*     CMP:   Recent Labs   Lab 11/09/19  1739 11/10/19  0806   *  --    K 5.0  --      --    CO2 23 26   * 95   BUN 51* 35*   CREATININE 1.4 0.9   CALCIUM 8.8 8.7   PROT 5.0*  --    ALBUMIN 2.8*  --    BILITOT 0.6  --     ALKPHOS 119  --    AST 38  --    ALT 36  --    ANIONGAP 9  --    EGFRNONAA 45* >60     Magnesium:   Recent Labs   Lab 11/09/19  1739 11/10/19  0806   MG 1.7 1.7     POCT Glucose:   Recent Labs   Lab 11/09/19  2306 11/10/19  0642 11/10/19  1112   POCTGLUCOSE 179* 92 129*       Significant Imaging: I have reviewed all pertinent imaging results/findings within the past 24 hours.   CXR shows increased LLL infiltrate compared to CXR from 10/15.     CT Chest shows improvement of opacities when compared to CT scan from July, but does show development of extensive tree in bud opacities in bilateral lower lobes.

## 2019-11-11 NOTE — PROGRESS NOTES
Therapy with Vancomycin complete and/or discontinued by provider. Vancomycin lab and consult discontinued. Pharmacy will sign off at this time.Please re-consult as needed.

## 2019-11-11 NOTE — ASSESSMENT & PLAN NOTE
Cr (1.4) on admit and down to 0.9 today.  Renally dose all meds  Avoid Nephrotoxic agents  Strict I&Os  Daily weights  BMP daily

## 2019-11-11 NOTE — PLAN OF CARE
Pt progressing well towards goals, requiring increased v/c to pace activities. Charge nurse entered during OT session while pt performing oral care and stated that heart monitor reading V-Tach; RN assessed pt and no concerns noted at this time. Pt educated on pacing activities but demonstrated limited carryover of learning in task practice. Cont OT POC

## 2019-11-11 NOTE — PT/OT/SLP PROGRESS
Occupational Therapy   Treatment    Name: Thierry Ramos Jr.  MRN: [id number]  Admitting Diagnosis:  Pneumonia due to Pseudomonas species       Recommendations:     Discharge Recommendations: home health PT, home health OT  Discharge Equipment Recommendations:  none  Barriers to discharge:       Assessment:     Thierry Ramos Jr. is a 85 y.o. male with a medical diagnosis of Pneumonia due to Pseudomonas species.  He presents with deconditioning. Performance deficits affecting function are weakness, gait instability, decreased upper extremity function, decreased lower extremity function, impaired endurance, impaired balance, decreased safety awareness, impaired self care skills, impaired functional mobilty.     Rehab Prognosis:  Good; patient would benefit from acute skilled OT services to address these deficits and reach maximum level of function.       Plan:     Patient to be seen 5 x/week to address the above listed problems via self-care/home management, therapeutic activities, therapeutic exercises  · Plan of Care Expires: 12/10/19  · Plan of Care Reviewed with: patient, spouse    Subjective     Pain/Comfort:  · Pain Rating 1: 0/10    Objective:     Communicated with: nsg prior to session.  Patient found supine with bed alarm, telemetry, peripheral IV upon OT entry to room.    General Precautions: Standard, fall   Orthopedic Precautions:N/A   Braces: N/A     Occupational Performance:     Bed Mobility:    · Patient completed Rolling/Turning to Right with stand by assistance  · Patient completed Scooting/Bridging with stand by assistance  · Patient completed Supine to Sit with stand by assistance  · Patient completed Sit to Supine with stand by assistance     Functional Mobility/Transfers:  · Patient completed Sit <> Stand Transfer with stand by assistance  with  rolling walker   Functional Mobility: Pt with fair+  dynamic seated and standing balance.    Activities of Daily Living:  · Grooming: supervision to  brush teeth in stance at sink  · Upper Body Dressing: minimum assistance to don gown as bridgere      [facility] 6 Click ADL: 21    Treatment & Education:  Pt educated on role of OT and POC.   Pt performing skills as listed above.  Charge nurse entered during OT session while pt performing oral care and stated that heart monitor reading V-Tach; RN assessed pt and no concerns noted at this time. Pt educated on pacing activities but demonstrated limited carryover of learning in task practice.  Pt ambulated around nurse's station as he states he feels better after walking. Pt with fast rate of speed and did not heed instructions to slow speed to allow for OT to follow with IV pole safely    Patient left HOB elevated with all lines intact, call button in reach, bed alarm on, nsg notified and spouse presentEducation:      GOALS:   Multidisciplinary Problems     Occupational Therapy Goals        Problem: Occupational Therapy Goal    Goal Priority Disciplines Outcome Interventions   Occupational Therapy Goal     OT, PT/OT Ongoing, Progressing    Description:  Goals to be met by: 11/28/2019    Patient will increase functional independence with ADLs by performing:    UE Dressing with Modified [place].  LE Dressing with Modified [place].  Grooming while standing with Modified [place].  Toileting from toilet with Modified [place] for hygiene and clothing management.   Toilet transfer to toilet with Modified [place].  Upper extremity exercise program x10 reps per handout, with independence.                      Time Tracking:     OT Date of Treatment: 11/11/19  OT Start Time: 1530  OT Stop Time: 1553  OT Total Time (min): 23 min    Billable Minutes:Self Care/Home Management 12  Therapeutic Activity 11    Lilliam Mckeon OT  11/11/2019

## 2019-11-11 NOTE — PLAN OF CARE
VN cued into pt's room for introduction. VN informed pt and wife that VN would be working along side bedside nurse and PCT throughout shift. Level of present pain assessed. At present no distress noted. Thoroughly discussed with patient and wife the plan of care. Discussed with patient and wife High fall risk protocol and interventions that have been initiated and cont be in place for safety. Patient verbalized clear understanding and cooperation using teach back method. Bed alarm presently activated and in use. Will cont to be available to patient and intervene prn.

## 2019-11-12 VITALS
OXYGEN SATURATION: 96 % | HEART RATE: 78 BPM | DIASTOLIC BLOOD PRESSURE: 58 MMHG | RESPIRATION RATE: 18 BRPM | TEMPERATURE: 98 F | HEIGHT: 71 IN | BODY MASS INDEX: 19.04 KG/M2 | SYSTOLIC BLOOD PRESSURE: 124 MMHG | WEIGHT: 136 LBS

## 2019-11-12 PROBLEM — E87.1 HYPONATREMIA: Status: RESOLVED | Noted: 2019-11-09 | Resolved: 2019-11-12

## 2019-11-12 PROBLEM — J18.9 PNEUMONIA OF BOTH LUNGS DUE TO INFECTIOUS ORGANISM: Status: ACTIVE | Noted: 2019-07-19

## 2019-11-12 LAB
ANION GAP SERPL CALC-SCNC: 10 MMOL/L (ref 8–16)
BASOPHILS # BLD AUTO: 0.02 K/UL (ref 0–0.2)
BASOPHILS NFR BLD: 0.4 % (ref 0–1.9)
BUN SERPL-MCNC: 37 MG/DL (ref 8–23)
CALCIUM SERPL-MCNC: 9.5 MG/DL (ref 8.7–10.5)
CHLORIDE SERPL-SCNC: 104 MMOL/L (ref 95–110)
CO2 SERPL-SCNC: 26 MMOL/L (ref 23–29)
CREAT SERPL-MCNC: 1 MG/DL (ref 0.5–1.4)
DIFFERENTIAL METHOD: ABNORMAL
EOSINOPHIL # BLD AUTO: 0.1 K/UL (ref 0–0.5)
EOSINOPHIL NFR BLD: 1.8 % (ref 0–8)
ERYTHROCYTE [DISTWIDTH] IN BLOOD BY AUTOMATED COUNT: 14.8 % (ref 11.5–14.5)
EST. GFR  (AFRICAN AMERICAN): >60 ML/MIN/1.73 M^2
EST. GFR  (NON AFRICAN AMERICAN): >60 ML/MIN/1.73 M^2
GLUCOSE SERPL-MCNC: 106 MG/DL (ref 70–110)
HCT VFR BLD AUTO: 38.9 % (ref 40–54)
HGB BLD-MCNC: 12.4 G/DL (ref 14–18)
LYMPHOCYTES # BLD AUTO: 2.2 K/UL (ref 1–4.8)
LYMPHOCYTES NFR BLD: 45 % (ref 18–48)
MAGNESIUM SERPL-MCNC: 1.9 MG/DL (ref 1.6–2.6)
MCH RBC QN AUTO: 30.8 PG (ref 27–31)
MCHC RBC AUTO-ENTMCNC: 31.9 G/DL (ref 32–36)
MCV RBC AUTO: 97 FL (ref 82–98)
MONOCYTES # BLD AUTO: 0.5 K/UL (ref 0.3–1)
MONOCYTES NFR BLD: 9.4 % (ref 4–15)
NEUTROPHILS # BLD AUTO: 2.1 K/UL (ref 1.8–7.7)
NEUTROPHILS NFR BLD: 43.4 % (ref 38–73)
PHOSPHATE SERPL-MCNC: 4.5 MG/DL (ref 2.7–4.5)
PLATELET # BLD AUTO: 129 K/UL (ref 150–350)
PMV BLD AUTO: 9.7 FL (ref 9.2–12.9)
POCT GLUCOSE: 107 MG/DL (ref 70–110)
POCT GLUCOSE: 162 MG/DL (ref 70–110)
POTASSIUM SERPL-SCNC: 4.1 MMOL/L (ref 3.5–5.1)
RBC # BLD AUTO: 4.03 M/UL (ref 4.6–6.2)
SODIUM SERPL-SCNC: 140 MMOL/L (ref 136–145)
WBC # BLD AUTO: 4.98 K/UL (ref 3.9–12.7)

## 2019-11-12 PROCEDURE — 63600175 PHARM REV CODE 636 W HCPCS: Mod: HCNC | Performed by: NURSE PRACTITIONER

## 2019-11-12 PROCEDURE — 80048 BASIC METABOLIC PNL TOTAL CA: CPT | Mod: HCNC

## 2019-11-12 PROCEDURE — 63600175 PHARM REV CODE 636 W HCPCS: Mod: HCNC | Performed by: HOSPITALIST

## 2019-11-12 PROCEDURE — 97530 THERAPEUTIC ACTIVITIES: CPT | Mod: HCNC

## 2019-11-12 PROCEDURE — 84100 ASSAY OF PHOSPHORUS: CPT | Mod: HCNC

## 2019-11-12 PROCEDURE — 94640 AIRWAY INHALATION TREATMENT: CPT | Mod: HCNC

## 2019-11-12 PROCEDURE — 25000242 PHARM REV CODE 250 ALT 637 W/ HCPCS: Mod: HCNC | Performed by: NURSE PRACTITIONER

## 2019-11-12 PROCEDURE — 97110 THERAPEUTIC EXERCISES: CPT | Mod: HCNC

## 2019-11-12 PROCEDURE — 85025 COMPLETE CBC W/AUTO DIFF WBC: CPT | Mod: HCNC

## 2019-11-12 PROCEDURE — 94761 N-INVAS EAR/PLS OXIMETRY MLT: CPT | Mod: HCNC

## 2019-11-12 PROCEDURE — 83735 ASSAY OF MAGNESIUM: CPT | Mod: HCNC

## 2019-11-12 PROCEDURE — 25000003 PHARM REV CODE 250: Mod: HCNC | Performed by: NURSE PRACTITIONER

## 2019-11-12 PROCEDURE — 36415 COLL VENOUS BLD VENIPUNCTURE: CPT | Mod: HCNC

## 2019-11-12 RX ORDER — AMOXICILLIN 250 MG
1 CAPSULE ORAL DAILY
Status: DISCONTINUED | OUTPATIENT
Start: 2019-11-12 | End: 2019-11-12 | Stop reason: HOSPADM

## 2019-11-12 RX ADMIN — LEVOTHYROXINE SODIUM 125 MCG: 25 TABLET ORAL at 05:11

## 2019-11-12 RX ADMIN — CYANOCOBALAMIN TAB 1000 MCG 5000 MCG: 1000 TAB at 09:11

## 2019-11-12 RX ADMIN — PANTOPRAZOLE SODIUM 40 MG: 40 TABLET, DELAYED RELEASE ORAL at 09:11

## 2019-11-12 RX ADMIN — PIPERACILLIN AND TAZOBACTAM 4.5 G: 4; .5 INJECTION, POWDER, FOR SOLUTION INTRAVENOUS at 05:11

## 2019-11-12 RX ADMIN — BUDESONIDE 0.5 MG: 0.5 SUSPENSION RESPIRATORY (INHALATION) at 08:11

## 2019-11-12 RX ADMIN — CLOPIDOGREL BISULFATE 75 MG: 75 TABLET ORAL at 09:11

## 2019-11-12 RX ADMIN — PREDNISOLONE SODIUM PHOSPHATE 7.5 MG: 15 SOLUTION ORAL at 09:11

## 2019-11-12 RX ADMIN — ATORVASTATIN CALCIUM 20 MG: 20 TABLET, FILM COATED ORAL at 09:11

## 2019-11-12 RX ADMIN — FUROSEMIDE 20 MG: 20 TABLET ORAL at 09:11

## 2019-11-12 RX ADMIN — IPRATROPIUM BROMIDE AND ALBUTEROL SULFATE 3 ML: .5; 3 SOLUTION RESPIRATORY (INHALATION) at 08:11

## 2019-11-12 RX ADMIN — LOSARTAN POTASSIUM 25 MG: 25 TABLET, FILM COATED ORAL at 09:11

## 2019-11-12 NOTE — PLAN OF CARE
Pt remains afebrile with subjective improvement of cough. Continues to have no O2 requirements. AFB culture pending, respiratory culture NGTD. Will monitor peripherally. If discharged will set up OP bronch if no growth on cultures. If inpatient without growth will set up bronch prior to weekend.     Luis Fernando Cloud PGY IV  [employer] Pulmonology/Critical Care  Pager: [phone]

## 2019-11-12 NOTE — PLAN OF CARE
"  Problem: Occupational Therapy Goal  Goal: Occupational Therapy Goal  Description  Goals to be met by: 11/28/2019    Patient will increase functional independence with ADLs by performing:    UE Dressing with Modified [place].  LE Dressing with Modified [place].  Grooming while standing with Modified [place].  Toileting from toilet with Modified [place] for hygiene and clothing management.   Toilet transfer to toilet with Modified [place].  Upper extremity exercise program x10 reps per handout, with independence.     Outcome: Ongoing, Progressing  Declined most ADL tasks on this date.  Quick pace with household mobility with RW, attempting to "run" at one point with max vc to return to walking.  Completed toilet transfer with use of grab bar.  Continue OT services to restore patient functioning.  "

## 2019-11-12 NOTE — PLAN OF CARE
Problem: Physical Therapy Goal  Goal: Physical Therapy Goal  Description  Goals to be met by: discharge date     Patient will increase functional independence with mobility by performin. Supine to sit with Modified [place] - met 2019  2. Sit to supine with Modified [place] - met 2019  3. Sit to stand transfer with Stand By Assistance - met 2019  4. Bed to chair transfer with Stand-by Assistance using Rolling Walker and tolerate OOB chair > 1 hr  5. Gait  x 200 feet with Stand-by Assistance using Rolling Walker or without AD use demonstrating Good safety awareness w speed of gait and awareness of foot clearance.  6. Lower extremity exercise program x15 reps without SOB   Outcome: Ongoing, Progressing   Pt participating with therapy, enjoys being OOB. Reports that he thinks he is going home today. Offered outpt PT as opportunity for continued strengthening, particularly since pt reports that [facility]  PT had evaluated and discharged him Saturday prior to admission.

## 2019-11-12 NOTE — PT/OT/SLP PROGRESS
"Physical Therapy Treatment    Patient Name:  Thierry Ramos Jr.   MRN:  [id number]    Recommendations:     Discharge Recommendations:  outpatient PT   Discharge Equipment Recommendations: none   Barriers to discharge: None    Assessment:     Thierry Ramos Jr. is a 85 y.o. male admitted with a medical diagnosis of Pneumonia of both lungs due to infectious organism.  He presents with the following impairments/functional limitations:  weakness, impaired endurance, gait instability, impaired self care skills, impaired functional mobilty, impaired balance, decreased lower extremity function, decreased ROM, decreased safety awareness . Patient motivated to move around. Progressing towards goals. Will benefit from outpatient PT, but patient declines, feels he can build himself back up at home.    Rehab Prognosis: Good; patient would benefit from acute skilled PT services to address these deficits and reach maximum level of function.    Recent Surgery: * No surgery found *      Plan:     During this hospitalization, patient to be seen 5 x/week to address the identified rehab impairments via gait training, therapeutic activities, therapeutic exercises and progress toward the following goals:    · Plan of Care Expires:  12/10/19    Subjective     Chief Complaint: bed alarm going off, wants to be out of bed more.   Patient/Family Comments/goals: When educated on safety with pace of gait, pt states he was built to walk fast. Pt reports that his HH PT evaluated and discharged him on Saturday because he was "good" although pt did exhibit decreased dorsiflexion on the R foot. States he was working on balance exercises. Reports that as recently as July he was doing a lot of exercises at home including stationary bike, press ups etc...  Reports significant weight loss since January of this year. When outpt therapy was recommended, pt feels that he can do it on his own. PT offered patient that if he should change his mind and feels " that he needs a more structures supervised program, to ask his MD for a script for outpatient therapy.    Pain/Comfort:  · Pain Rating 1: 0/10      Objective:     Communicated with RN prior to session.  Patient found supine with bed alarm, telemetry, peripheral IV upon PT entry to room.     General Precautions: Standard, fall   Orthopedic Precautions:N/A   Braces: N/A     Functional Mobility:  · Bed Mobility:     · Rolling Right: modified independence and supervision  · Supine to Sit: modified independence  · Sit to Supine: modified independence  · Transfers:     · Sit to Stand:  modified independence with rolling walker  · Gait: pt ambulated approximately 150 feet with RW, no LOB, able to clear floor with R foot  despite lack of normal DF. Patient with fast pace of gait pushing RW quickly requiring cues to slow pace. .   · Balance: Good- with use of RW due to deviations above.       AM-PAC 6 CLICK MOBILITY  Turning over in bed (including adjusting bedclothes, sheets and blankets)?: 3  Sitting down on and standing up from a chair with arms (e.g., wheelchair, bedside commode, etc.): 4  Moving from lying on back to sitting on the side of the bed?: 4  Moving to and from a bed to a chair (including a wheelchair)?: 4  Need to walk in hospital room?: 4  Climbing 3-5 steps with a railing?: 3  Basic Mobility Total Score: 22       Therapeutic Activities and Exercises:   Patient provided with printed handout of LE TE to perform. Paitent performed 15 reps toe raises at RW, 10 reps high steps at RW, 10 reps abduction, 10 reps hip extension. All TE performed in standing.    Patient left supine with all lines intact, call button in reach, bed alarm on and wife present..    GOALS:   Multidisciplinary Problems     Physical Therapy Goals        Problem: Physical Therapy Goal    Goal Priority Disciplines Outcome Goal Variances Interventions   Physical Therapy Goal     PT, PT/OT Ongoing, Progressing     Description:  Goals to be met  by: discharge date     Patient will increase functional independence with mobility by performin. Supine to sit with Modified [place]  2. Sit to supine with Modified [place]  3. Sit to stand transfer with Stand By Assistance  4. Bed to chair transfer with Stand-by Assistance using Rolling Walker and tolerate OOB chair > 1 hr  5. Gait  x 200 feet with Stand-by Assistance using Rolling Walker or without AD use demonstrating Good safety awareness w speed of gait and awareness of foot clearance.  6. Lower extremity exercise program x15 reps without SOB           Problem: Physical Therapy Goal    Goal Priority Disciplines Outcome Goal Variances Interventions   Physical Therapy Goal     PT, PT/OT Ongoing, Progressing                     Time Tracking:     PT Received On: 19  PT Start Time: 1107     PT Stop Time: 1130  PT Total Time (min): 23 min     Billable Minutes: Therapeutic Activity 8 and Therapeutic Exercise 15       PT/PTA: PT     PTA Visit Number: 0     Vera Shipley, PT  2019

## 2019-11-12 NOTE — ASSESSMENT & PLAN NOTE
CT chest shows bilateral lower lobe consolidatins with tree in bud appearance. It does not that the appearance is improve, but that is compared to CT chest in July 2019, not from the last episode of pneumonia last month.   CXR: CXR revealed left basilar airspace disease including aspiration or pneumonia.  Blood and sputum cx are negative to date.   Appreciate Pulmonary evaluation. Sent off AFB to see if non-TB chronic infection. Will consider bronch if AFB and sputum culture are negative.    Appreciate ID assistance. as well.   Flu test negative  Continue Zosyn IV empirically for now. Stop vanc.   Duo neb treatments as needed  Supplemental oxygen: wean as needed

## 2019-11-12 NOTE — NURSING
Patient stating that he must collect a sputum sample before being discharged. Dr. Kimbrough notified and states that it is okay for patient to be discharged with only one sputum collection rather than three. Patient and family notified.

## 2019-11-12 NOTE — SUBJECTIVE & OBJECTIVE
Interval History: Feeling a little better compared to admission. No recurrence of the fever. Cough is about the same.     Review of Systems   Constitutional: Negative for chills and fever.   Respiratory: Positive for cough. Negative for shortness of breath.    Cardiovascular: Negative for chest pain and palpitations.   Gastrointestinal: Negative for nausea and vomiting.     Objective:     Vital Signs (Most Recent):  Temp: 97.9 °F (36.6 °C) (11/12/19 0503)  Pulse: (!) 58 (11/12/19 0503)  Resp: 16 (11/12/19 0503)  BP: (!) 153/68 (11/12/19 0503)  SpO2: 96 % (11/12/19 0431) Vital Signs (24h Range):  Temp:  [97.8 °F (36.6 °C)-98.6 °F (37 °C)] 97.9 °F (36.6 °C)  Pulse:  [55-76] 58  Resp:  [16-20] 16  SpO2:  [94 %-98 %] 96 %  BP: (133-153)/(63-68) 153/68     Weight: 61.7 kg (136 lb 0.4 oz)  Body mass index is 18.97 kg/m².    Intake/Output Summary (Last 24 hours) at 11/12/2019 0605  Last data filed at 11/12/2019 0545  Gross per 24 hour   Intake 1140 ml   Output 1225 ml   Net -85 ml      Physical Exam   Constitutional: He is oriented to person, place, and time. He appears well-developed and well-nourished. No distress.   Cardiovascular: Normal rate and regular rhythm.   Pulmonary/Chest: Effort normal. No respiratory distress. He has rhonchi.   Abdominal: Soft. Bowel sounds are normal. He exhibits no distension. There is no tenderness.   Musculoskeletal: He exhibits no edema or tenderness.   Neurological: He is alert and oriented to person, place, and time.   Skin: Skin is warm and dry.   Nursing note and vitals reviewed.      Significant Labs:   CBC:   Lab 11/10/19  0806 11/11/19 0812   WBC 5.72 5.50   HGB 11.7* 12.3*   HCT 37.0* 39.0*   * 112*     CMP:   Lab 11/10/19  0806 11/11/19 0812    141   K 4.5 4.0   * 107   CO2 26 26   GLU 95 101   BUN 35* 29*   CREATININE 0.9 1.1   CALCIUM 8.7 9.0   ANIONGAP 5* 8   EGFRNONAA >60 >60     Magnesium:   Lab 11/10/19  0806 11/11/19  0812   MG 1.7 1.7        Significant Imaging: I have reviewed all pertinent imaging results/findings within the past 24 hours.

## 2019-11-12 NOTE — PT/OT/SLP PROGRESS
"Occupational Therapy   Treatment    Name: Thierry Ramos Jr.  MRN: [id number]  Admitting Diagnosis:  Pneumonia of both lungs due to infectious organism       Recommendations:     Discharge Recommendations: other (see comments)(OP vs HH OT/PT, patient and MD preference)  Discharge Equipment Recommendations:  none  Barriers to discharge:  Other (Comment)    Assessment:     Thierry Ramos Jr. is a 85 y.o. male with a medical diagnosis of Pneumonia of both lungs due to infectious organism.  He presents with the following performance deficits affecting function: weakness, impaired endurance, impaired self care skills, impaired functional mobilty, impaired balance, decreased safety awareness.   Declined most ADL tasks on this date.  Quick pace with household mobility with RW, attempting to "run" at one point with max vc to return to walking.  Completed toilet transfer with use of grab bar.  Continue OT services to restore patient functioning.    Rehab Prognosis:  Good; patient would benefit from acute skilled OT services to address these deficits and reach maximum level of function.       Plan:     Patient to be seen 5 x/week to address the above listed problems via self-care/home management, therapeutic activities, therapeutic exercises  · Plan of Care Expires: 12/10/19  · Plan of Care Reviewed with: patient, spouse    Subjective     Pain/Comfort:  · Pain Rating 1: 0/10  · Pain Rating Post-Intervention 1: 0/10    Objective:     Communicated with: JOCE Vigil prior to session.  Patient found HOB elevated with bed alarm, telemetry, peripheral IV upon OT entry to room.    General Precautions: Standard, aspiration, fall, diabetic   Orthopedic Precautions:N/A   Braces: N/A     Occupational Performance:     Bed Mobility:    · Patient completed Scooting/Bridging with modified independence  · Patient completed Supine to Sit with modified independence  · Patient completed Sit to Supine with modified independence     Functional " Mobility/Transfers:  · Patient completed Sit <> Stand Transfer with stand by assistance  with  rolling walker   · Patient completed Toilet Transfer Step Transfer technique with stand by assistance with  grab bars  · Functional Mobility: SBA with RW household distance on unit    Activities of Daily Living:  · Declined all attempts to engage      [facility] 6 Click ADL: 19    Treatment & Education:  Educated on slowing pace for safety during mobility on unit.  Requires reinforcement.    Patient left HOB elevated with all lines intact, call button in reach, bed alarm on and RN notifiedEducation:      GOALS:   Multidisciplinary Problems     Occupational Therapy Goals        Problem: Occupational Therapy Goal    Goal Priority Disciplines Outcome Interventions   Occupational Therapy Goal     OT, PT/OT Ongoing, Progressing    Description:  Goals to be met by: 11/28/2019    Patient will increase functional independence with ADLs by performing:    UE Dressing with Modified [place].  LE Dressing with Modified [place].  Grooming while standing with Modified [place].  Toileting from toilet with Modified [place] for hygiene and clothing management.   Toilet transfer to toilet with Modified [place].  Upper extremity exercise program x10 reps per handout, with independence.                      Time Tracking:     OT Date of Treatment: 11/12/19  OT Start Time: 1315  OT Stop Time: 1328  OT Total Time (min): 13 min    Billable Minutes:Therapeutic Activity 13    JASMINE Souza  11/12/2019

## 2019-11-12 NOTE — PLAN OF CARE
VN reviewed discharge instructions with pt and spouse. AVS printed and handed to pt/spouse by bedside nurse. Reviewed follow-up appointments, medications, diet, and importance of medication compliance. Reviewed home care instructions, treatment plan, self-management, and when to seek medical attention. Allowed time for questions. All questions answered. Patient/spouse verbalized complete understanding of discharge instructions and voices no concerns.    Discharge instructions complete. Bedside nurse notified.

## 2019-11-12 NOTE — PLAN OF CARE
Rounds completed on pt.  All questions addressed.  Bedside nurse to discuss d/c medications.  Discussed importance to attend all f/u appts and take medications as prescribed.  Verbalized understanding.    Nov15 Hospital Follow Up with AKILA Wooten MD   Friday Nov 15, 2019 2:00 PM   Arrive at check-in approximately 15 minutes before your scheduled appointment time. Bring all outside medical records and imaging, along with a list of your current medications and insurance card.  6th floor: Virgilio Singh, Andrew, Rufina, Spencer, Chris, Yvonne, Jaron,                 & Christi,NP  7th floor: Je Kirkpatrick, Kelly, Leslee, Sydney, Miguel A,                 & Annual Wellness Visits  [place] [facility]                                                                                                                                                       [address] [facility].  [place] [address]   [phone]    Nov18 Non-Fasting Lab   Monday Nov 18, 2019 8:30 AM   Arrive at check-in approximately 15 minutes before your scheduled appointment time. Bring all outside medical records and imaging, along with a list of your current medications and insurance card.  5th Floor  [place] [facility]   [address] [facility].  [place] [address]   [phone]    Nov19 Established Patient Visit with Kelly Marr MD   Tuesday Nov 19, 2019 7:00 AM   Arrive at check-in approximately 15 minutes before your scheduled appointment time. Bring all outside medical records and imaging, along with a list of your current medications and insurance card.  [facility]   [address]  [place] [address]   [phone]           Infusion 060 Min   Tuesday Nov 19, 2019 8:00 AM   Arrive at check-in approximately 15 minutes before your scheduled appointment time. Bring all outside medical records and imaging, along with a list of your current medications and  insurance card.  [facility], 5th Floor  [facility]   [address]  [facility] [id number] [phone]           Follow up with Valeria Rodrigues MD   Tuesday Nov 19, 2019   8:20am  [address]   [address]   [place] [id number]   [phone]         11/12/19 1512   Final Note   Assessment Type Final Discharge Note   Anticipated Discharge Disposition Home   Hospital Follow Up  Appt(s) scheduled? Yes   Discharge plans and expectations educations in teach back method with documentation complete? Yes   Right Care Referral Info   Post Acute Recommendation No Care     William Grady RN,   [phone]

## 2019-11-12 NOTE — HOSPITAL COURSE
His symptoms had apparently been going on for several months. Chest CT showed improvement of pneumonia compared to 7/19/19. Infectious Disease and Pulmonology were consulted and, noting that this has been going on since July and he was feeling better since admission, recommended holding antibiotics, getting sputum culture and AFB culture, and getting bronchoalveolar lavage if sputum cultures are inconclusive. Pulmonology said BAL could be outpatient. Infectious Disease agreed that his sputum cultures will continue to grow Pseudomonas despite treatment, due to colonization. He was discharged home after sputum samples were taken and sent to the lab, and will follow up with Pulmonology.

## 2019-11-12 NOTE — ASSESSMENT & PLAN NOTE
Cr (1.4) on admit and down to 1.1 today.  Renally dose all meds  Avoid Nephrotoxic agents  Strict I&Os  Daily weights  BMP daily

## 2019-11-12 NOTE — PROGRESS NOTES
[facility]  Infectious Disease  History and Physical    Admitting Team: Hospital Medicine  Attending Physician: Fahad    Date of Admit: 11/9/2019   Admission data    Thierry Ramos Jr. is a 85 y.o.  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       Patient was hospitalized in July for pneumonia and was treated inpatient with IV cefepime, vanco and flagyl for a 10 day course.  He was also recently hospitalized at [facility] for pneumonia from 10/11-10/16.  Respiratory cultures from 10/11 grew pseudomonas.  Blood cultures drawn then had no growth.  Patient was treated with IV cefepime, had PICC placed and underwent total of 3 weeks course with home health, with end date 11/4.  PICC line was removed 11/9.  He followed-up with Infectious Disease (Dr. Banerjee) on 10/31 and felt well at that time.      He completed his course of antibiotics on 11/4 and PICC line was removed 11/9.  Patient states that after discharge from the hospital his sinuses were clear, and that they have become progressively more congested over the past few weeks.  He summers from chronic sinusitis and this is similar to his normal sinus issues.  After removal of PICC line, he began feeling weak, and fevers (up to 100.6F at home).   Patient denies cough, but is producing whitish sputum that he attributes to post-nasal drip.  Denies shortness of breath, cough, changes in bowel habits, abdominal pain, nausea, vomiting, diarrhea, constipation.  In the ED, he was afebrile, with rales on the left on exam.  CXR was done that showed improving pulmonary edema, concern for left basilar airspace disease.  He was  admitted for concern for pneumonia, given IVF bolus and started on vanc, zosyn.  Blood and sputum cultures were obtained.  CT Chest performed 11/10 showing interval development of bilateral lower lobe consolidation.      Subjective/ Updates:     No new issues   Gram neg meir in sputum, not yet identified     Allergies:  Review of patient's allergies indicates:   Allergen Reactions    Ace inhibitors Swelling     angioedema    Divalproex Hives     Rash under arms, body creases     Home Medications:  Prior to Admission medications    Medication Sig Start Date End Date Taking? Authorizing Provider   aspirin 81 MG Chew Take 81 mg by mouth every Mon, Wed, Fri.   Yes Historical Provider, MD   atorvastatin (LIPITOR) 20 MG tablet TAKE 1 TABLET EVERY DAY 5/23/19  Yes Lou Chambers MD   clopidogrel (PLAVIX) 75 mg tablet Take 75 mg by mouth once daily.   Yes Historical Provider, MD   cyanocobalamin, vitamin B-12, 5,000 mcg TbDL Take 1 tablet by mouth once daily.   Yes Historical Provider, MD   cycloSPORINE (RESTASIS) 0.05 % ophthalmic emulsion Place 0.4 mLs (1 drop total) into both eyes 2 (two) times daily. 5/27/19 5/26/20 Yes Marcell Monge OD   DOCOSAHEXANOIC ACID/EPA (FISH OIL ORAL) Take 5 capsules by mouth once daily.    Yes Historical Provider, MD   erythromycin (ROMYCIN) ophthalmic ointment Apply to eyelids and lashes OU QHS 5/9/19  Yes Melvin Ying MD   fluorouracil (EFUDEX) 5 % cream Use hs for 2 weeks on right cheek 4/20/18  Yes Karissa Monsivais MD   furosemide (LASIX) 20 MG tablet  10/16/19  Yes Historical Provider, MD   guaifen/dextromethorphan/pe (ROBITUSSIN COUGH & COLD CF MAX ORAL) Take 15 mLs by mouth 2 (two) times daily.   Yes Historical Provider, MD   Lactobacillus acidophilus (ACIDOPHILUS ORAL) Take 1 tablet by mouth once daily.   Yes Historical Provider, MD   levothyroxine (SYNTHROID) 125 MCG tablet TAKE 1 TABLET ONE TIME DAILY 9/5/19  Yes Cee Farmer MD   losartan (COZAAR) 25 MG tablet Take  1 tablet (25 mg total) by mouth once daily. 3/19/19  Yes CAROLE Wooten MD   multivitamin with minerals tablet Take 1 tablet by mouth once daily.   Yes Historical Provider, MD   neomycin-polymyxin-dexamethasone (DEXACINE) 3.5 mg/g-10,000 unit/g-0.1 % Oint neomycin 3.5 mg/g-polymyxin B 10,000 unit/g-dexameth 0.1 % eye oint   Yes Historical Provider, MD   neomycin-polymyxin-dexamethasone (MAXITROL) 3.5mg/mL-10,000 unit/mL-0.1 % DrpS neomycin-polymyxin-dexameth 3.5 mg/mL-10,000 unit/mL-0.1% eye drops   Yes Historical Provider, MD   pantoprazole (PROTONIX) 40 MG tablet TAKE 1 TABLET EVERY DAY 5/8/18  Yes CAROLE Wooten MD   predniSONE (DELTASONE) 2.5 MG tablet Take 1 tablet by mouth once daily. 5mg and 2.5mg qd 9/5/19  Yes Historical Provider, MD   predniSONE (DELTASONE) 5 MG tablet Take 5 mg by mouth once daily.   Yes Historical Provider, MD   sennosides/docusate sodium (EDEN-COLACE ORAL) Take 1 tablet by mouth once daily.   Yes Historical Provider, MD   tenofovir alafenamide (VEMLIDY) 25 mg Tab Take 25 mg by mouth once daily. 8/19/19  Yes Mark Jones PA-C   testosterone cypionate (DEPOTESTOTERONE CYPIONATE) 100 mg/mL injection Inject 0.5 mLs (50 mg total) into the muscle every 14 (fourteen) days. 5/14/19 11/12/19 Yes Cee Farmer MD   ACCU-CHEK FASTCLIX Misc TEST  BLOOD  GLUCOSE FOUR TIMES DAILY 3/6/17   CAROLE Wooten MD   alcohol swabs PadM APPLY 1 PAD TOPICALLY  AS NEEDED. 3/6/17   CAROLE Wooten MD   budesonide (PULMICORT) 0.5 mg/2 mL nebulizer solution budesonide 0.5 mg/2 mL suspension for nebulization    Historical Provider, MD   chlorhexidine (PERIDEX) 0.12 % solution chlorhexidine gluconate 0.12 % mouthwash    Historical Provider, MD   SYMBICORT 160-4.5 mcg/actuation HFAA Inhale 1 puff into the lungs as needed. 8/30/19   Historical Provider, MD   tazarotene (AVAGE) 0.1 % cream Apply topically every evening.  Patient taking differently: Apply topically as needed.  12/13/16 9/19/19  Karissa  "CAROLYN Monsivais MD   VENTOLIN HFA 90 mcg/actuation inhaler Inhale 1 puff into the lungs as needed. 19   Historical Provider, MD     Objective:   Last 24 Hour Vital Signs:  BP  Min: 124/58  Max: 153/68  Temp  Av °F (36.7 °C)  Min: 97.7 °F (36.5 °C)  Max: 98.6 °F (37 °C)  Pulse  Av.4  Min: 55  Max: 79  Resp  Av.7  Min: 16  Max: 18  SpO2  Av.4 %  Min: 94 %  Max: 98 %  Weight  Av.7 kg (136 lb 0.4 oz)  Min: 61.7 kg (136 lb 0.4 oz)  Max: 61.7 kg (136 lb 0.4 oz)  Body mass index is 18.97 kg/m².  I/O last 3 completed shifts:  In: 1260 [P.O.:960; IV Piggyback:300]  Out: 2325 [Urine:2325]    Physical Examination:  Triage: BP: (!) 101/50  Pulse: 87  Temp: 98.1 °F (36.7 °C)  Resp: 20  Height: 5' 11" (180.3 cm)  Weight: 63 kg (139 lb)  BMI (Calculated): 19.4  SpO2: (!) 93 %  Exam: BP (!) 124/58 (BP Location: Left arm, Patient Position: Lying)   Pulse 78   Temp 97.8 °F (36.6 °C) (Oral)   Resp 18   Ht 5' 11" (1.803 m)   Wt 61.7 kg (136 lb 0.4 oz)   SpO2 96%   BMI 18.97 kg/m²     General: Chronically ill-appearing, in NAD  HEENT: NCAT, PERRL, EOMI.  Midline frontal scalp scar.    Heart: RRR, II/VI systolic ejection murmur.    Lungs: Bilateral lower lobe rhonchi.  Normal work of breathing  Abdomen: Soft, non-tender, non-distended, normoactive bowel sounds  Extremities: Diffuse ecchymoses bilateral forearms.  No edema lower extremities.   Neuro/Psych: Alert, oriented x 3.  Excellent remote memory.   Skin: Sacral wound with dressing in place.      Laboratory:  Most Recent Data:  CBC:   Lab Results   Component Value Date    WBC 4.98 2019    HGB 12.4 (L) 2019     (L) 2019    MCV 97 2019    RDW 14.8 (H) 2019     WBC Differential: 53.3 % N, 0 % Bands, 37.6 % L, 7.1 % M, 1.8 % Eo, 0.2 % Baso, no additional cells seen    Microbiology Data:  Microbiology Results (last 7 days)     Procedure Component Value Units Date/Time    AFB Culture & Smear [[id number]] Collected:  " 11/11/19 1617    Order Status:  Completed Specimen:  Respiratory from Sputum, Expectorated Updated:  11/12/19 1455     AFB CULTURE STAIN No acid fast bacilli seen.    Narrative:       Not to rule out TB, Does not need Airborne Isolation.    Culture, Respiratory with Gram Stain [[id number]]  (Abnormal) Collected:  11/10/19 1953    Order Status:  Completed Specimen:  Respiratory from Sputum, Expectorated Updated:  11/12/19 0937     Respiratory Culture GRAM NEGATIVE HENRY  Few  Identification and susceptibility pending  Normal respiratory kelly also present       Gram Stain (Respiratory) <10 epithelial cells per low power field.     Gram Stain (Respiratory) Rare WBC's     Gram Stain (Respiratory) Rare Gram positive cocci     Gram Stain (Respiratory) Rare Gram negative rods    Blood culture x two cultures. Draw prior to antibiotics. [[id number]] Collected:  11/09/19 1739    Order Status:  Completed Specimen:  Blood from Peripheral, Forearm, Left Updated:  11/12/19 0812     Blood Culture, Routine No Growth to date      No Growth to date      No Growth to date    Narrative:       Aerobic and anaerobic    Blood culture x two cultures. Draw prior to antibiotics. [[id number]] Collected:  11/09/19 1745    Order Status:  Completed Specimen:  Blood from Peripheral, Wrist, Left Updated:  11/12/19 0812     Blood Culture, Routine No Growth to date      No Growth to date      No Growth to date    Narrative:       Aerobic and anaerobic    Influenza A & B by Molecular [[id number]] Collected:  11/09/19 1740    Order Status:  Completed Specimen:  Nasopharyngeal Swab Updated:  11/09/19 1827     Influenza A, Molecular Negative     Influenza B, Molecular Negative     Flu A & B Source Nasal swab        Other Laboratory Data:  Procal 11/9 0.26    Other Results:  Radiology:  Ct Chest Without Contrast    1. Significant interval decrease in size of the bilateral pleural effusions with only a very small layering pleural effusion on the left and may  be some trace pleural fluid on the right.   2. Significant interval improvement of the previously noted ground-glass opacities as well although there has been interval development of extensive tree-in-bud type opacities and what appears to represent some mucoid impaction of multiple bilateral lower lobe subsegmental bronchi within the lung bases and to a lesser degree the lingula as well.  Findings are again suspicious for an infectious process.     X-ray Chest Ap Portable    Findings concerning for left basilar airspace disease including aspiration or pneumonia, with potential trace left parapneumonic pleural effusion.  Short-term follow-up chest radiography after therapy is recommended to resolution. This report was flagged in Epic as abnormal.      Assessment/Plan:     Thierry Ramos Jr. is a 85 y.o. male with PMH CLL, hypogammaglobulinemia, panhypopituitarism, thrombocytopenia, DM2 who was recently hospitalized for pseudomonas PNA and completed 3 weeks of IV cefepime therapy who presents with fevers, cough, fatigue with imaging showing concern for infectious process.  Patient has been afebrile since admission, WBC normal.     He has remained clinically stable, with no concern for sepsis and with markedly improved imaging compared to the CT Chest in 7/2019. Hence at this time, even if pseudomonas grows out of the sputum, we are not going to embark on another course of antibiotics.     Recs  - would not treat results of sputum cultures  - obtain AFB sputum as suggested by pulm  - needs close outpatient pulm followup  - ID will sign off    Castro Nunez  [facility]  Infectious Disease

## 2019-11-12 NOTE — DISCHARGE SUMMARY
[facility] Medicine  Discharge Summary      Patient Name: Thierry Ramos Jr.  MRN: [id number]  Admission Date: 11/9/2019  Hospital Length of Stay: 1 days  Discharge Date and Time: 11/12/2019  3:46 PM  Attending Physician: Jl Kimbrough MD   Discharging Provider: Jl Kimbrough MD  Primary Care Provider: AKILA Wooten MD      HPI:   Thierry Ramos Jr. is a 85 y.o. white man with panhypopituitarism, secondary adrenal insufficiency, secondary hypogonadism, central hypothyroidism, diabetes mellitus type 2 with neuropathy, hypertension, history of stroke on 12/16/06 status post left carotid endarterectomy on 2/27/07, dyslipidemia, chronic lymphocytic leukemia (treated with rituximab), hypogammaglobulinemia, immune thrombocytopenia, pulmonary hypertension, chronic rhinosinusitis. He lives in [place], [place]. He is . His primary care physician is Dr. CAROLE Wooten.   He was hospitalized at [facility] from 10/11/19 to 10/16/19 for bilateral Pseudomonas aeruginosa pneumonia, treated with IV cefepime until 11/4/19.    He returned to [facility] on 11/9/19 with fever for one day associated with fatigue and cough productive of gray phlegm. He reported starting to feel sick after his cefepime had completed and the PICC was removed. He was found to have no hypoxia this time. Procalcitonin was 0.26 ng/mL (normal <0.25). Chest X-ray showed persistent pneumonia.      Hospital Course:   His symptoms had apparently been going on for several months. Chest CT showed improvement of pneumonia compared to 7/19/19. Infectious Disease and Pulmonology were consulted and, noting that this has been going on since July and he was feeling better since admission, recommended holding antibiotics, getting sputum culture and AFB culture, and getting bronchoalveolar lavage if sputum cultures are inconclusive. Pulmonology said BAL could be outpatient. Infectious  Disease agreed that his sputum cultures will continue to grow Pseudomonas despite treatment, due to colonization. He was discharged home after sputum samples were taken and sent to the lab, and will follow up with Pulmonology.     Consults:   Consults (From admission, onward)        Status Ordering Provider     Inpatient consult to Infectious Diseases  Once     Provider:  Fernie Dias MD    Completed GILDARDO BOWSER     Inpatient consult to Pulmonology  Once     Provider:  (Not yet assigned)    Completed GILDARDO BOWSER     Inpatient consult to Social Work/Case Management  Once     Provider:  (Not yet assigned)    Acknowledged GILDARDO BOWSER        Final Active Diagnoses:    Diagnosis Date Noted POA    PRINCIPAL PROBLEM:  Pneumonia of both lungs due to infectious organism [J18.9] 07/19/2019 Yes    Immune thrombocytopenic purpura [D69.3] 11/09/2019 Yes     Chronic    Anemia of chronic disease [D63.8] 11/09/2019 Yes     Chronic    Chronic kidney disease, stage III (moderate) [N18.3] 11/09/2019 Yes     Chronic    Weakness [R53.1] 07/08/2019 Yes    Type 2 diabetes, controlled, with neuropathy [E11.40] 03/02/2016 Yes     Chronic    Chronic rhinosinusitis [J32.9] 09/16/2014 Yes     Chronic    Central hypothyroidism [E03.8] 05/31/2014 Yes     Chronic    History of CVA (cerebrovascular accident) [Z86.73] 05/31/2014 Not Applicable     Chronic    CLL (chronic lymphocytic leukemia) [C91.10] 12/04/2012 Yes     Chronic    Essential hypertension [I10] 07/26/2012 Yes     Chronic      Problems Resolved During this Admission:    Diagnosis Date Noted Date Resolved POA    Hyponatremia [E87.1] 11/09/2019 11/12/2019 Yes       Discharged Condition: good    Disposition: Home or Self Care    Follow Up:  Follow-up Information     Valeria Rodrigues MD On 11/19/2019.    Specialty:  Pulmonary Disease  Why:  8:20am  Contact information:  [address]  [address]  [place] [address] [phone]                  Patient Instructions:      Diet diabetic     Notify your health care provider if you experience any of the following:  difficulty breathing or increased cough     Activity as tolerated       Significant Diagnostic Studies:   CT Chest Without Contrast 11/10/19: FINDINGS:  The previously noted bilateral pleural effusions have significantly decreased in size with only a very small layering left-sided pleural effusion and may be some trace fluid on the right.  The previously noted ground-glass opacities of also significantly improved in the interval with some minimal residual ground-glass patchy opacities still present within the bilateral upper lobes and to a lesser degree the right middle lobe, lingula and bilateral lower lobes.  There is extensive peribronchial thickening and mucoid impaction of multiple segmental bronchi along with tree-in-bud type opacification noted within the bilateral lower lobes that are suspicious for an infectious etiology.  Minimal tree-in-bud type opacities also present within the lingula.  Moderate vascular calcification seen involving the aorta.  Coronary artery calcifications are present.  There is no pericardial effusion.  No enlarged mediastinal or hilar lymph nodes suggested on this noncontrast exam.  The previously identified pretracheal lymph node has decreased in size in the interval.  The visualized upper abdomen is unremarkable in appearance.  No suspicious appearing osseus abnormalities are identified.  Impression:     1. Significant interval decrease in size of the bilateral pleural effusions with only a very small layering pleural effusion on the left and may be some trace pleural fluid on the right.  2. Significant interval improvement of the previously noted ground-glass opacities as well although there has been interval development of extensive tree-in-bud type opacities and what appears to represent some mucoid impaction of multiple bilateral lower lobe subsegmental  bronchi within the lung bases and to a lesser degree the lingula as well.  Findings are again suspicious for an infectious process.     Medications:  Reconciled Home Medications:      Medication List      CHANGE how you take these medications    tazarotene 0.1 % cream  Commonly known as:  AVAGE  Apply topically every evening.  What changed:    · when to take this  · reasons to take this        CONTINUE taking these medications    ACCU-CHEK FASTCLIX LANCING DEV Misc  Generic drug:  lancets  TEST  BLOOD  GLUCOSE FOUR TIMES DAILY     ACIDOPHILUS ORAL  Take 1 tablet by mouth once daily.     alcohol swabs Padm  APPLY 1 PAD TOPICALLY  AS NEEDED.     aspirin 81 MG Chew  Take 81 mg by mouth every Mon, Wed, Fri.     atorvastatin 20 MG tablet  Commonly known as:  LIPITOR  TAKE 1 TABLET EVERY DAY     budesonide 0.5 mg/2 mL nebulizer solution  Commonly known as:  PULMICORT  budesonide 0.5 mg/2 mL suspension for nebulization     chlorhexidine 0.12 % solution  Commonly known as:  PERIDEX  chlorhexidine gluconate 0.12 % mouthwash     cyanocobalamin (vitamin B-12) 5,000 mcg Tbdl  Take 1 tablet by mouth once daily.     cycloSPORINE 0.05 % ophthalmic emulsion  Commonly known as:  RESTASIS  Place 0.4 mLs (1 drop total) into both eyes 2 (two) times daily.     erythromycin ophthalmic ointment  Commonly known as:  ROMYCIN  Apply to eyelids and lashes OU QHS     FISH OIL ORAL  Take 5 capsules by mouth once daily.     fluorouracil 5 % cream  Commonly known as:  EFUDEX  Use hs for 2 weeks on right cheek     furosemide 20 MG tablet  Commonly known as:  LASIX     levothyroxine 125 MCG tablet  Commonly known as:  SYNTHROID  TAKE 1 TABLET ONE TIME DAILY     losartan 25 MG tablet  Commonly known as:  COZAAR  Take 1 tablet (25 mg total) by mouth once daily.     multivitamin with minerals tablet  Take 1 tablet by mouth once daily.     * neomycin-polymyxin-dexamethasone 3.5 mg/g-10,000 unit/g-0.1 % Oint  Commonly known as:  DEXACINE  neomycin 3.5  mg/g-polymyxin B 10,000 unit/g-dexameth 0.1 % eye oint     * neomycin-polymyxin-dexamethasone 3.5mg/mL-10,000 unit/mL-0.1 % Drps  Commonly known as:  MAXITROL  neomycin-polymyxin-dexameth 3.5 mg/mL-10,000 unit/mL-0.1% eye drops     pantoprazole 40 MG tablet  Commonly known as:  PROTONIX  TAKE 1 TABLET EVERY DAY     EDEN-COLACE ORAL  Take 1 tablet by mouth once daily.     PLAVIX 75 mg tablet  Generic drug:  clopidogrel  Take 75 mg by mouth once daily.     * predniSONE 5 MG tablet  Commonly known as:  DELTASONE  Take 5 mg by mouth once daily.     * predniSONE 2.5 MG tablet  Commonly known as:  DELTASONE  Take 1 tablet by mouth once daily. 5mg and 2.5mg qd     ROBITUSSIN COUGH & COLD CF MAX ORAL  Take 15 mLs by mouth 2 (two) times daily.     SYMBICORT 160-4.5 mcg/actuation Hfaa  Generic drug:  budesonide-formoterol 160-4.5 mcg  Inhale 1 puff into the lungs as needed.     testosterone cypionate 100 mg/mL injection  Commonly known as:  DEPOTESTOTERONE CYPIONATE  Inject 0.5 mLs (50 mg total) into the muscle every 14 (fourteen) days.     VEMLIDY 25 mg Tab  Generic drug:  tenofovir alafenamide  Take 25 mg by mouth once daily.     VENTOLIN HFA 90 mcg/actuation inhaler  Generic drug:  albuterol  Inhale 1 puff into the lungs as needed.         * This list has 4 medication(s) that are the same as other medications prescribed for you. Read the directions carefully, and ask your doctor or other care provider to review them with you.                Indwelling Lines/Drains at time of discharge:   Lines/Drains/Airways     Pressure Ulcer                 Pressure Injury 11/09/19 2308 posterior Sacral spine Stage 2 2 days                Time spent on the discharge of patient: 35 minutes  Patient was seen and examined on the date of discharge and determined to be suitable for discharge.         Jl Kimbrough MD  Department of Hospital Medicine  [facility]

## 2019-11-12 NOTE — PLAN OF CARE
Pt on RA with documented sats. The proper method of use, as well as anticipated side effects, of this aerosol treatment are discussed and demonstrated to the patient.  Will continue to monitor.

## 2019-11-12 NOTE — PROGRESS NOTES
Pharmacy New Medication Education    Patient and/or Caregiver ACCEPTED medication education.    Pharmacy has provided education on the name, indication, and possible side effects of the medication(s) prescribed, using teach-back method.     Learners of pharmacy medication education includes:  patient      The following medications have also been discussed, during this admission.     Current Facility-Administered Medications   Medication Frequency    acetaminophen tablet 650 mg Q4H PRN    albuterol-ipratropium 2.5 mg-0.5 mg/3 mL nebulizer solution 3 mL Q4H PRN    aspirin chewable tablet 81 mg Every Mon, Wed, Fri    atorvastatin tablet 20 mg Daily    budesonide nebulizer solution 0.5 mg Q12H    clopidogrel tablet 75 mg Daily    cyanocobalamin tablet 5,000 mcg Daily    dextrose 10% (D10W) Bolus PRN    dextrose 10% (D10W) Bolus PRN    erythromycin 5 mg/gram (0.5 %) ophthalmic ointment QHS    furosemide tablet 20 mg Daily    glucagon (human recombinant) injection 1 mg PRN    glucose chewable tablet 16 g PRN    glucose chewable tablet 24 g PRN    insulin aspart U-100 pen 0-5 Units QID (AC + HS) PRN    Lactobacillus acidoph-L.bulgar 1 million cell tablet 1 tablet Daily with dinner    levothyroxine tablet 125 mcg Before breakfast    losartan tablet 25 mg Daily    NON FORMULARY MEDICATION 25 mg Daily    ondansetron disintegrating tablet 8 mg Q8H PRN    pantoprazole EC tablet 40 mg Daily    piperacillin-tazobactam 4.5 g in dextrose 5 % 100 mL IVPB (ready to mix system) Q8H    pneumoc 13-enrique conj-dip cr(PF) (PREVNAR 13 (PF)) 0.5 mL Prior to discharge    prednisoLONE 15 mg/5 mL (3 mg/mL) solution 7.5 mg Daily    promethazine (PHENERGAN) 6.25 mg in dextrose 5 % 50 mL IVPB Q6H PRN    ramelteon tablet 8 mg Nightly PRN    senna-docusate 8.6-50 mg per tablet 1 tablet Daily    sodium chloride 0.9% flush 10 mL PRN          Thank you  Solomon Subramanian, PharmD

## 2019-11-12 NOTE — PROGRESS NOTES
[facility] Medicine  Progress Note    Patient Name: Thierry Ramos Jr.  MRN: [id number]  Patient Class: IP- Inpatient   Admission Date: 11/9/2019  Length of Stay: 1 days  Attending Physician: Sanjay Vásquez MD  Primary Care Provider: AKILA Wooten MD        Subjective:     Principal Problem:Pneumonia due to Pseudomonas species        HPI:  Thierry Ramos is an 85-year-old male with a past medical history of Hypertension, Diabetes Mellitus Type 2, Hyperlipidemia, Cerebrovascular Accident, Hypothyroidism, Hypogammaglobinemia, Hepatitis B, Chronic Sinusitis, and Chronic Lymphocytic Leukemia s/p chemo Rituximab (9/30), Anemia and Chronic Kidney Disease. He lives in [place], [place]. His PCP is Dr. Juve Wooten. He was recently hospitalized at [facility] for Pneumonia from 10/11-10/16/19.     He presented to [facility] ED 11/9/2019 with a chief complaint of fever that started one day prior to presentation. Associated symptoms include fatigue and a productive cough - white grey phlegm, non-bloody. Wife says the patient had his PICC line removed four days ago (cefepime for pseudomonas PNA) and feels he started feeling sick (weak) after this. Patient denies chest pain, shortness of breath, chills, nausea, vomiting, change in urine or BM, rash, abdominal pain, HA, neck pain or any other complaints at this time. ED workup revealed WBC wnl, Lactate wnl, Hgb (11.2), Hct (35.0), Platelets (99), NA (135), Glucose (194), Bun (51), total protein (5.0), Albumin (2.8), CXR revealed left basilar airspace disease including aspiration or pneumonia, with potential trace left parapneumonic pleural effusion. Vanc and Zosyn started in ED. Admitted to [facility] medicine for further evaluation and treatment.     Overview/Hospital Course:  No notes on file    Interval History: Feeling a little better compared to admission. No recurrence of the fever. Cough is about the same.     Review of  Systems   Constitutional: Negative for chills and fever.   Respiratory: Positive for cough. Negative for shortness of breath.    Cardiovascular: Negative for chest pain and palpitations.   Gastrointestinal: Negative for nausea and vomiting.     Objective:     Vital Signs (Most Recent):  Temp: 97.9 °F (36.6 °C) (11/12/19 0503)  Pulse: (!) 58 (11/12/19 0503)  Resp: 16 (11/12/19 0503)  BP: (!) 153/68 (11/12/19 0503)  SpO2: 96 % (11/12/19 0431) Vital Signs (24h Range):  Temp:  [97.8 °F (36.6 °C)-98.6 °F (37 °C)] 97.9 °F (36.6 °C)  Pulse:  [55-76] 58  Resp:  [16-20] 16  SpO2:  [94 %-98 %] 96 %  BP: (133-153)/(63-68) 153/68     Weight: 61.7 kg (136 lb 0.4 oz)  Body mass index is 18.97 kg/m².    Intake/Output Summary (Last 24 hours) at 11/12/2019 0605  Last data filed at 11/12/2019 0545  Gross per 24 hour   Intake 1140 ml   Output 1225 ml   Net -85 ml      Physical Exam   Constitutional: He is oriented to person, place, and time. He appears well-developed and well-nourished. No distress.   Cardiovascular: Normal rate and regular rhythm.   Pulmonary/Chest: Effort normal. No respiratory distress. He has rhonchi.   Abdominal: Soft. Bowel sounds are normal. He exhibits no distension. There is no tenderness.   Musculoskeletal: He exhibits no edema or tenderness.   Neurological: He is alert and oriented to person, place, and time.   Skin: Skin is warm and dry.   Nursing note and vitals reviewed.      Significant Labs:   CBC:   Lab 11/10/19  0806 11/11/19 0812   WBC 5.72 5.50   HGB 11.7* 12.3*   HCT 37.0* 39.0*   * 112*     CMP:   Lab 11/10/19  0806 11/11/19 0812    141   K 4.5 4.0   * 107   CO2 26 26   GLU 95 101   BUN 35* 29*   CREATININE 0.9 1.1   CALCIUM 8.7 9.0   ANIONGAP 5* 8   EGFRNONAA >60 >60     Magnesium:   Lab 11/10/19  0806 11/11/19  0812   MG 1.7 1.7       Significant Imaging: I have reviewed all pertinent imaging results/findings within the past 24 hours.      Assessment/Plan:      * Pneumonia  due to Pseudomonas species  CT chest shows bilateral lower lobe consolidatins with tree in bud appearance. It does not that the appearance is improve, but that is compared to CT chest in July 2019, not from the last episode of pneumonia last month.   CXR: CXR revealed left basilar airspace disease including aspiration or pneumonia.  Blood and sputum cx are negative to date.   Appreciate Pulmonary evaluation. Sent off AFB to see if non-TB chronic infection. Will consider bronch if AFB and sputum culture are negative.    Appreciate ID assistance. as well.   Flu test negative  Continue Zosyn IV empirically for now. Stop vanc.   Duo neb treatments as needed  Supplemental oxygen: wean as needed     Chronic kidney disease, stage III (moderate)  Cr (1.4) on admit and down to 1.1 today.  Renally dose all meds  Avoid Nephrotoxic agents  Strict I&Os  Daily weights  BMP daily      Anemia of chronic disease  H&H stable  CBC daily      Immune thrombocytopenic purpura  Platelets 112 today, monitor  CBC daily      Weakness  Debility     PT/OT eval/tx      Type 2 diabetes, controlled, with neuropathy  HbA1c 5.6 (7/2019)  Diet control  Low dose SSI  Accucheck AC and HS   BS ranged 104 to 156.     Chronic rhinosinusitis  Zyrtec daily      Central hypothyroidism  Continue Synthroid 125mcg po before breakfast      History of CVA (cerebrovascular accident)  Continue ASA and statin      CLL (chronic lymphocytic leukemia)  Immunosuppressed status  S/p Rituxan on 9/30  Continue Prednisone 7.5 mg daily.         Essential hypertension  Hyperlipidemia  SBP ranged 116 to 152.   Continue losartan and Lipitor        VTE Risk Mitigation (From admission, onward)         Ordered     IP VTE HIGH RISK PATIENT  Once      11/09/19 2206     Place sequential compression device  Until discontinued      11/09/19 2206                      Sanjay Vásquez MD  Department of Hospital Medicine   [facility]

## 2019-11-13 LAB
BACTERIA SPEC AEROBE CULT: ABNORMAL
BACTERIA SPEC AEROBE CULT: ABNORMAL
GRAM STN SPEC: ABNORMAL

## 2019-11-15 ENCOUNTER — OFFICE VISIT (OUTPATIENT)
Dept: INTERNAL MEDICINE | Facility: CLINIC | Age: 84
End: 2019-11-15
Payer: MEDICARE

## 2019-11-15 DIAGNOSIS — J18.9 PNEUMONIA DUE TO INFECTIOUS ORGANISM, UNSPECIFIED LATERALITY, UNSPECIFIED PART OF LUNG: Primary | ICD-10-CM

## 2019-11-15 DIAGNOSIS — E23.0 PANHYPOPITUITARISM: Chronic | ICD-10-CM

## 2019-11-15 DIAGNOSIS — C91.10 CLL (CHRONIC LYMPHOCYTIC LEUKEMIA): Chronic | ICD-10-CM

## 2019-11-15 DIAGNOSIS — E11.40 TYPE 2 DIABETES, CONTROLLED, WITH NEUROPATHY: Chronic | ICD-10-CM

## 2019-11-15 LAB
BACTERIA BLD CULT: NORMAL
BACTERIA BLD CULT: NORMAL

## 2019-11-15 PROCEDURE — 1126F AMNT PAIN NOTED NONE PRSNT: CPT | Mod: HCNC,S$GLB,, | Performed by: INTERNAL MEDICINE

## 2019-11-15 PROCEDURE — 1159F PR MEDICATION LIST DOCUMENTED IN MEDICAL RECORD: ICD-10-PCS | Mod: HCNC,S$GLB,, | Performed by: INTERNAL MEDICINE

## 2019-11-15 PROCEDURE — G0009 PNEUMOCOCCAL POLYSACCHARIDE VACCINE 23-VALENT =>2YO SQ IM: ICD-10-PCS | Mod: HCNC,S$GLB,, | Performed by: INTERNAL MEDICINE

## 2019-11-15 PROCEDURE — 1101F PR PT FALLS ASSESS DOC 0-1 FALLS W/OUT INJ PAST YR: ICD-10-PCS | Mod: HCNC,CPTII,S$GLB, | Performed by: INTERNAL MEDICINE

## 2019-11-15 PROCEDURE — 1126F PR PAIN SEVERITY QUANTIFIED, NO PAIN PRESENT: ICD-10-PCS | Mod: HCNC,S$GLB,, | Performed by: INTERNAL MEDICINE

## 2019-11-15 PROCEDURE — 1101F PT FALLS ASSESS-DOCD LE1/YR: CPT | Mod: HCNC,CPTII,S$GLB, | Performed by: INTERNAL MEDICINE

## 2019-11-15 PROCEDURE — 99999 PR PBB SHADOW E&M-EST. PATIENT-LVL IV: CPT | Mod: PBBFAC,HCNC,, | Performed by: INTERNAL MEDICINE

## 2019-11-15 PROCEDURE — 1159F MED LIST DOCD IN RCRD: CPT | Mod: HCNC,S$GLB,, | Performed by: INTERNAL MEDICINE

## 2019-11-15 PROCEDURE — 99214 PR OFFICE/OUTPT VISIT, EST, LEVL IV, 30-39 MIN: ICD-10-PCS | Mod: HCNC,S$GLB,, | Performed by: INTERNAL MEDICINE

## 2019-11-15 PROCEDURE — 3074F PR MOST RECENT SYSTOLIC BLOOD PRESSURE < 130 MM HG: ICD-10-PCS | Mod: HCNC,CPTII,S$GLB, | Performed by: INTERNAL MEDICINE

## 2019-11-15 PROCEDURE — 99999 PR PBB SHADOW E&M-EST. PATIENT-LVL IV: ICD-10-PCS | Mod: PBBFAC,HCNC,, | Performed by: INTERNAL MEDICINE

## 2019-11-15 PROCEDURE — 90732 PPSV23 VACC 2 YRS+ SUBQ/IM: CPT | Mod: HCNC,S$GLB,, | Performed by: INTERNAL MEDICINE

## 2019-11-15 PROCEDURE — 3074F SYST BP LT 130 MM HG: CPT | Mod: HCNC,CPTII,S$GLB, | Performed by: INTERNAL MEDICINE

## 2019-11-15 PROCEDURE — 99214 OFFICE O/P EST MOD 30 MIN: CPT | Mod: HCNC,S$GLB,, | Performed by: INTERNAL MEDICINE

## 2019-11-15 PROCEDURE — 90732 PNEUMOCOCCAL POLYSACCHARIDE VACCINE 23-VALENT =>2YO SQ IM: ICD-10-PCS | Mod: HCNC,S$GLB,, | Performed by: INTERNAL MEDICINE

## 2019-11-15 PROCEDURE — 3078F DIAST BP <80 MM HG: CPT | Mod: HCNC,CPTII,S$GLB, | Performed by: INTERNAL MEDICINE

## 2019-11-15 PROCEDURE — G0009 ADMIN PNEUMOCOCCAL VACCINE: HCPCS | Mod: HCNC,S$GLB,, | Performed by: INTERNAL MEDICINE

## 2019-11-15 PROCEDURE — 3078F PR MOST RECENT DIASTOLIC BLOOD PRESSURE < 80 MM HG: ICD-10-PCS | Mod: HCNC,CPTII,S$GLB, | Performed by: INTERNAL MEDICINE

## 2019-11-18 ENCOUNTER — LAB VISIT (OUTPATIENT)
Dept: LAB | Facility: HOSPITAL | Age: 84
End: 2019-11-18
Attending: INTERNAL MEDICINE
Payer: MEDICARE

## 2019-11-18 ENCOUNTER — PATIENT MESSAGE (OUTPATIENT)
Dept: INTERNAL MEDICINE | Facility: CLINIC | Age: 84
End: 2019-11-18

## 2019-11-18 DIAGNOSIS — C91.10 CLL (CHRONIC LYMPHOCYTIC LEUKEMIA): ICD-10-CM

## 2019-11-18 LAB
ALBUMIN SERPL BCP-MCNC: 3.2 G/DL (ref 3.5–5.2)
ALP SERPL-CCNC: 140 U/L (ref 55–135)
ALT SERPL W/O P-5'-P-CCNC: 32 U/L (ref 10–44)
ANION GAP SERPL CALC-SCNC: 9 MMOL/L (ref 8–16)
AST SERPL-CCNC: 26 U/L (ref 10–40)
BASOPHILS # BLD AUTO: 0.04 K/UL (ref 0–0.2)
BASOPHILS NFR BLD: 0.6 % (ref 0–1.9)
BILIRUB SERPL-MCNC: 0.8 MG/DL (ref 0.1–1)
BUN SERPL-MCNC: 31 MG/DL (ref 8–23)
CALCIUM SERPL-MCNC: 9.6 MG/DL (ref 8.7–10.5)
CHLORIDE SERPL-SCNC: 105 MMOL/L (ref 95–110)
CO2 SERPL-SCNC: 26 MMOL/L (ref 23–29)
CREAT SERPL-MCNC: 0.9 MG/DL (ref 0.5–1.4)
DIFFERENTIAL METHOD: ABNORMAL
EOSINOPHIL # BLD AUTO: 0.2 K/UL (ref 0–0.5)
EOSINOPHIL NFR BLD: 3.3 % (ref 0–8)
ERYTHROCYTE [DISTWIDTH] IN BLOOD BY AUTOMATED COUNT: 14.6 % (ref 11.5–14.5)
EST. GFR  (AFRICAN AMERICAN): >60 ML/MIN/1.73 M^2
EST. GFR  (NON AFRICAN AMERICAN): >60 ML/MIN/1.73 M^2
GLUCOSE SERPL-MCNC: 103 MG/DL (ref 70–110)
HCT VFR BLD AUTO: 43.4 % (ref 40–54)
HGB BLD-MCNC: 13.4 G/DL (ref 14–18)
IMM GRANULOCYTES # BLD AUTO: 0.1 K/UL (ref 0–0.04)
IMM GRANULOCYTES NFR BLD AUTO: 1.4 % (ref 0–0.5)
LYMPHOCYTES # BLD AUTO: 3.2 K/UL (ref 1–4.8)
LYMPHOCYTES NFR BLD: 46.2 % (ref 18–48)
MCH RBC QN AUTO: 31.2 PG (ref 27–31)
MCHC RBC AUTO-ENTMCNC: 30.9 G/DL (ref 32–36)
MCV RBC AUTO: 101 FL (ref 82–98)
MONOCYTES # BLD AUTO: 0.5 K/UL (ref 0.3–1)
MONOCYTES NFR BLD: 7.5 % (ref 4–15)
NEUTROPHILS # BLD AUTO: 2.8 K/UL (ref 1.8–7.7)
NEUTROPHILS NFR BLD: 41 % (ref 38–73)
NRBC BLD-RTO: 0 /100 WBC
PLATELET # BLD AUTO: 161 K/UL (ref 150–350)
PMV BLD AUTO: 9.8 FL (ref 9.2–12.9)
POTASSIUM SERPL-SCNC: 4 MMOL/L (ref 3.5–5.1)
PROT SERPL-MCNC: 6 G/DL (ref 6–8.4)
RBC # BLD AUTO: 4.3 M/UL (ref 4.6–6.2)
SODIUM SERPL-SCNC: 140 MMOL/L (ref 136–145)
WBC # BLD AUTO: 6.92 K/UL (ref 3.9–12.7)

## 2019-11-18 PROCEDURE — 36415 COLL VENOUS BLD VENIPUNCTURE: CPT | Mod: HCNC,PO

## 2019-11-18 PROCEDURE — 80053 COMPREHEN METABOLIC PANEL: CPT | Mod: HCNC

## 2019-11-18 PROCEDURE — 85025 COMPLETE CBC W/AUTO DIFF WBC: CPT | Mod: HCNC

## 2019-11-19 ENCOUNTER — OFFICE VISIT (OUTPATIENT)
Dept: HEMATOLOGY/ONCOLOGY | Facility: CLINIC | Age: 84
End: 2019-11-19
Payer: MEDICARE

## 2019-11-19 ENCOUNTER — INFUSION (OUTPATIENT)
Dept: INFUSION THERAPY | Facility: HOSPITAL | Age: 84
End: 2019-11-19
Attending: INTERNAL MEDICINE
Payer: MEDICARE

## 2019-11-19 VITALS
WEIGHT: 148.56 LBS | BODY MASS INDEX: 20.8 KG/M2 | DIASTOLIC BLOOD PRESSURE: 60 MMHG | SYSTOLIC BLOOD PRESSURE: 128 MMHG | RESPIRATION RATE: 18 BRPM | HEART RATE: 65 BPM | HEIGHT: 71 IN | TEMPERATURE: 98 F

## 2019-11-19 VITALS
BODY MASS INDEX: 20.72 KG/M2 | OXYGEN SATURATION: 94 % | RESPIRATION RATE: 16 BRPM | DIASTOLIC BLOOD PRESSURE: 58 MMHG | TEMPERATURE: 98 F | SYSTOLIC BLOOD PRESSURE: 124 MMHG | HEART RATE: 80 BPM | WEIGHT: 148.56 LBS

## 2019-11-19 DIAGNOSIS — D80.1 HYPOGAMMAGLOBULINEMIA: ICD-10-CM

## 2019-11-19 DIAGNOSIS — C91.10 CLL (CHRONIC LYMPHOCYTIC LEUKEMIA): Primary | ICD-10-CM

## 2019-11-19 PROCEDURE — 3074F SYST BP LT 130 MM HG: CPT | Mod: HCNC,CPTII,S$GLB, | Performed by: INTERNAL MEDICINE

## 2019-11-19 PROCEDURE — 1101F PR PT FALLS ASSESS DOC 0-1 FALLS W/OUT INJ PAST YR: ICD-10-PCS | Mod: HCNC,CPTII,S$GLB, | Performed by: INTERNAL MEDICINE

## 2019-11-19 PROCEDURE — 99215 OFFICE O/P EST HI 40 MIN: CPT | Mod: HCNC,S$GLB,, | Performed by: INTERNAL MEDICINE

## 2019-11-19 PROCEDURE — 1101F PT FALLS ASSESS-DOCD LE1/YR: CPT | Mod: HCNC,CPTII,S$GLB, | Performed by: INTERNAL MEDICINE

## 2019-11-19 PROCEDURE — 3078F DIAST BP <80 MM HG: CPT | Mod: HCNC,CPTII,S$GLB, | Performed by: INTERNAL MEDICINE

## 2019-11-19 PROCEDURE — 99999 PR PBB SHADOW E&M-EST. PATIENT-LVL III: CPT | Mod: PBBFAC,HCNC,, | Performed by: INTERNAL MEDICINE

## 2019-11-19 PROCEDURE — 63600175 PHARM REV CODE 636 W HCPCS: Mod: TB,HCNC | Performed by: INTERNAL MEDICINE

## 2019-11-19 PROCEDURE — 99499 UNLISTED E&M SERVICE: CPT | Mod: HCNC,S$GLB,, | Performed by: INTERNAL MEDICINE

## 2019-11-19 PROCEDURE — 25000003 PHARM REV CODE 250: Mod: HCNC | Performed by: INTERNAL MEDICINE

## 2019-11-19 PROCEDURE — 99215 PR OFFICE/OUTPT VISIT, EST, LEVL V, 40-54 MIN: ICD-10-PCS | Mod: HCNC,S$GLB,, | Performed by: INTERNAL MEDICINE

## 2019-11-19 PROCEDURE — 99999 PR PBB SHADOW E&M-EST. PATIENT-LVL III: ICD-10-PCS | Mod: PBBFAC,HCNC,, | Performed by: INTERNAL MEDICINE

## 2019-11-19 PROCEDURE — 96401 CHEMO ANTI-NEOPL SQ/IM: CPT | Mod: HCNC

## 2019-11-19 PROCEDURE — 99499 RISK ADDL DX/OHS AUDIT: ICD-10-PCS | Mod: HCNC,S$GLB,, | Performed by: INTERNAL MEDICINE

## 2019-11-19 PROCEDURE — 3074F PR MOST RECENT SYSTOLIC BLOOD PRESSURE < 130 MM HG: ICD-10-PCS | Mod: HCNC,CPTII,S$GLB, | Performed by: INTERNAL MEDICINE

## 2019-11-19 PROCEDURE — 3078F PR MOST RECENT DIASTOLIC BLOOD PRESSURE < 80 MM HG: ICD-10-PCS | Mod: HCNC,CPTII,S$GLB, | Performed by: INTERNAL MEDICINE

## 2019-11-19 RX ORDER — DIPHENHYDRAMINE HCL 50 MG
50 CAPSULE ORAL ONCE
Status: COMPLETED | OUTPATIENT
Start: 2019-11-19 | End: 2019-11-19

## 2019-11-19 RX ORDER — SODIUM CHLORIDE 0.9 % (FLUSH) 0.9 %
10 SYRINGE (ML) INJECTION
Status: DISCONTINUED | OUTPATIENT
Start: 2019-11-19 | End: 2019-11-19 | Stop reason: HOSPADM

## 2019-11-19 RX ORDER — DIPHENHYDRAMINE HCL 25 MG
50 CAPSULE ORAL ONCE
Status: CANCELLED
Start: 2019-11-19

## 2019-11-19 RX ORDER — FAMOTIDINE 10 MG/ML
20 INJECTION INTRAVENOUS
Status: CANCELLED | OUTPATIENT
Start: 2019-11-19

## 2019-11-19 RX ORDER — ACETAMINOPHEN 325 MG/1
650 TABLET ORAL
Status: CANCELLED | OUTPATIENT
Start: 2019-11-19

## 2019-11-19 RX ORDER — MEPERIDINE HYDROCHLORIDE 50 MG/ML
25 INJECTION INTRAMUSCULAR; INTRAVENOUS; SUBCUTANEOUS
Status: DISCONTINUED | OUTPATIENT
Start: 2019-11-19 | End: 2019-11-19 | Stop reason: HOSPADM

## 2019-11-19 RX ORDER — FAMOTIDINE 20 MG/1
20 TABLET, FILM COATED ORAL ONCE
Status: COMPLETED | OUTPATIENT
Start: 2019-11-19 | End: 2019-11-19

## 2019-11-19 RX ORDER — SODIUM CHLORIDE 0.9 % (FLUSH) 0.9 %
10 SYRINGE (ML) INJECTION
Status: CANCELLED | OUTPATIENT
Start: 2019-11-19

## 2019-11-19 RX ORDER — FAMOTIDINE 20 MG/1
20 TABLET, FILM COATED ORAL ONCE
Status: CANCELLED
Start: 2019-11-19

## 2019-11-19 RX ORDER — ACETAMINOPHEN 325 MG/1
650 TABLET ORAL
Status: COMPLETED | OUTPATIENT
Start: 2019-11-19 | End: 2019-11-19

## 2019-11-19 RX ORDER — HEPARIN 100 UNIT/ML
500 SYRINGE INTRAVENOUS
Status: CANCELLED | OUTPATIENT
Start: 2019-11-19

## 2019-11-19 RX ORDER — HEPARIN 100 UNIT/ML
500 SYRINGE INTRAVENOUS
Status: DISCONTINUED | OUTPATIENT
Start: 2019-11-19 | End: 2019-11-19 | Stop reason: HOSPADM

## 2019-11-19 RX ADMIN — ACETAMINOPHEN 650 MG: 325 TABLET ORAL at 08:11

## 2019-11-19 RX ADMIN — RITUXIMAB AND HYALURONIDASE 1600 MG: 120; 2000 INJECTION, SOLUTION SUBCUTANEOUS at 08:11

## 2019-11-19 RX ADMIN — FAMOTIDINE 20 MG: 20 TABLET ORAL at 08:11

## 2019-11-19 RX ADMIN — DIPHENHYDRAMINE HYDROCHLORIDE 50 MG: 50 CAPSULE ORAL at 08:11

## 2019-11-19 NOTE — PROGRESS NOTES
Subjective:       Patient ID: Thierry Ramos Jr. is a 85 y.o. male.    Chief Complaint: CLL (chronic lymphocytic leukemia)    Heme/Onc history:  Mr. Ramos has a history of CLL diagnosed in April 2012 by Dr Joaquin Wooten. In April 2013, there was an abrupt rise in his white blood cell count to 93,000 and he had a modest number of prolymphocytes in his peripheral smear.  He had generalized urticaria, which was thought to be paraneoplastic.  At that time, he was treated with 5 courses of fludarabine, cyclophosphamide and Rituxan.  The fludarabine doses were decreased somewhat because of mild renal impairment.  The sixth treatment was with Rituxan alone because he developed thrombocytopenia, which has persisted in varying degrees since then.     His major hematologic problem and much of his treatment since 2013, has been related to thrombocytopenia.  It became more severe in early 2016, at a time when he was taking both aspirin and Plavix.  A bone marrow examination had no evidence of myelodysplasia and 35% of the bone marrow cells were small lymphocytes.  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  The FISH studies for myelodysplastic disorders were negative. He was treated for possible immune thrombocytopenia with prednisone 60 mg daily for 2 weeks, but there was no improvement in his platelet count.  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     Subsequently, because of continued thrombocytopenia,  recommended treatment with ofatumumab, which he began in late June 2016.  At that time, his platelet count was 57,000.  He generally tolerated the drug well and the frequency of administration was gradually reduced over a period of more than 2 years.  Platelet counts during most of those 2 years were between 66,000-117,000.  The drug was discontinued in early November 2018.     He has had many  episodes of infection in the past, particularly recurrent sinusitis and he has had several surgical procedures on his sinuses.  Because of this, he has been receiving intravenous immunoglobulin monthly for years and the frequency of pneumonia and severe sinusitis has diminished. Most recent dose of IVIG 4/2019.      After a pituitary surgery for a tumor in 2002, he experienced a TIA and then had 2 strokes.  He has had many nonmelanoma skin cancers excised.     He asked to be seen earlier than planned by Dr. Wooten in January because he was concerned about his recent blood counts.  His platelet count has declined modestly from the range of 66,000-107,000 seen in the past 9 months to a current value of 51,000.  His white blood cell count remains normal and his lymphocyte percentage     He was started on a low dose ibrutinib 280 mg rather than 420mg due to concern for treatment related- further decrease in platelet count while on aspirin and plavix.     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 Left arm is bandaged due to skin fragility causing oozing wounds.     Today:  Patient presents today for routine f/u. He completed 4 weekly doses of IV rituxan on 9/13/19. His plt are currently normal at 161K. He was recently hospitalized for the second time with PNA. He has previously been treated with IVIG which last dose was given 7/12/2019 and repeat IgG is 288. He denies current fevers and night sweats since d/c from [facility]. No sob, chest pain.   He still feels weak and unsteady.      Review of Systems   Constitutional: Positive for fatigue. Negative for  chills, diaphoresis and fever.   HENT: Negative for congestion, ear pain, mouth sores, nosebleeds, sinus pain, sore throat and trouble swallowing.    Eyes: Negative for pain, redness and visual disturbance.   Respiratory: Negative for chest tightness, shortness of breath, wheezing and stridor.    Cardiovascular: Negative for chest pain, palpitations and leg swelling.   Gastrointestinal: Negative for abdominal distention, abdominal pain, blood in stool, constipation, diarrhea, nausea and vomiting.   Genitourinary: Negative for hematuria.   Musculoskeletal: Negative for arthralgias and back pain.   Skin: Positive for wound. Negative for rash.        Sacral followed by wound care   Allergic/Immunologic: Negative for environmental allergies, food allergies and immunocompromised state.   Neurological: Positive for weakness. Negative for dizziness, light-headedness, numbness and headaches.   Hematological: Negative for adenopathy. Bruises/bleeds easily.   Psychiatric/Behavioral: Negative.  Negative for confusion.       Objective:      Physical Exam   Constitutional: He is oriented to person, place, and time. He appears well-developed and well-nourished.   HENT:   Head: Normocephalic and atraumatic.   Right Ear: External ear normal.   Left Ear: External ear normal.   Mouth/Throat: Oropharynx is clear and moist.   Eyes: Conjunctivae and EOM are normal. No scleral icterus.   Neck: Normal range of motion. Neck supple.   Cardiovascular: Normal rate and intact distal pulses.   Pulmonary/Chest: Effort normal and breath sounds normal. No respiratory distress. He has no wheezes.   improved   Abdominal: Soft. Bowel sounds are normal. He exhibits no distension. There is no tenderness.   Musculoskeletal: Normal range of motion. He exhibits no edema (none noted on exam today ).   Lymphadenopathy:        Head (right side): No submental, no submandibular, no tonsillar, no preauricular, no posterior auricular and no occipital adenopathy  present.        Head (left side): No submental, no submandibular, no tonsillar, no preauricular, no posterior auricular and no occipital adenopathy present.     He has no cervical adenopathy.     He has no axillary adenopathy.        Right: No supraclavicular adenopathy present.        Left: No supraclavicular adenopathy present.   Neurological: He is alert and oriented to person, place, and time. No cranial nerve deficit.   Skin: Skin is warm and dry. Ecchymosis noted. There is erythema.   Fragile skin with generalized ecchymosis to BUE and some peeling/flaking of skin on face  Sacral wound--see wound care note for picture   Psychiatric: He has a normal mood and affect. His behavior is normal. Judgment and thought content normal.   Vitals reviewed.      Assessment:       No diagnosis found.    Plan:       CLL/ITP  -Completed 4th Rituxan weekly infusion on 9/13/19, tolerate without difficulty  -Platelets improved from 24K > 116 K > 161 K  -Proceed with maintenance Hycela; first dose 11/19/19    Hep B core antibody +  -followed by hepatology  -started tenofovir 8/2019, continue for 6 months after completion of chemo    Infections with hypogammaglobulinemia  -has received IVIG in the past for chronic infections, last given 7/2019  -checked immunoglobulins 10/24/19 at 288, plan repeat IVIG infusion    Follow-up  Labs on 2/18 on [facility] closer to home (cbc, cmp)  Return visit with Dr. Marr and next  rituxan hycela on 2/19  Schedule IVIG infusion for December

## 2019-11-19 NOTE — Clinical Note
Labs on 2/18 on [facility] closer to home (cbc, cmp)Return visit with Dr. Marr and next  rituxan hycela on 2/19Schedule IVIG infusion for December

## 2019-11-19 NOTE — NURSING
0826  Pt here for Rituxan Hycela, accompanied by spouse, reports recent hospitalization for pneumonia, feeling weak/tired and sleeping frequently; discussed new treatment plan, all questions answered and pt reports discussing all with MD Marr previously, pt agrees to proceed

## 2019-11-20 ENCOUNTER — TELEPHONE (OUTPATIENT)
Dept: HEMATOLOGY/ONCOLOGY | Facility: CLINIC | Age: 84
End: 2019-11-20

## 2019-11-21 VITALS
SYSTOLIC BLOOD PRESSURE: 116 MMHG | HEIGHT: 71 IN | TEMPERATURE: 99 F | HEART RATE: 86 BPM | RESPIRATION RATE: 18 BRPM | BODY MASS INDEX: 19.82 KG/M2 | DIASTOLIC BLOOD PRESSURE: 62 MMHG | WEIGHT: 141.56 LBS

## 2019-11-21 NOTE — PROGRESS NOTES
History of present illness:  85-year-old gentleman with several chronic medical conditions including diabetes mellitus, pan hypopituitarism, CLL, and others as following up from a recent hospitalization for pneumonia.  He was readmitted recently.  He was seen by Pulmonary and Infectious Disease and clinically improved during his hospital stay appropriate antibiotics.  Currently reports still fatigued but generally doing well.  No fever no chills.  No hemoptysis.  No nausea vomiting or diarrhea.  His losartan was stopped during his hospitalization and he remains off of such presently.    Current medications:  All medications noted reviewed and updated in the electronic medical record medication list.    Review of systems:  Constitutional:  No fever no chills.  No night sweats.  HEENT:  No hoarseness no dysphagia.  No URI symptoms.  Respiratory:  No active cough.  No overt shortness of breath.  Cardiovascular:  No chest pain palpitations syncope or presyncope.  No claudication.  GI:  No nausea vomiting abdominal pain or diarrhea.    Past medical history, past surgical history, family medical history social histories are noted and reviewed in the electronic medical record history sections.    Physical examination:  General:  Pleasant alert appropriately groomed gentleman no acute distress.  Vital signs:  All noted and reviewed is normal.  Blood pressure taken manually by this examiner is 116/62  Eyes:  Sclerae white and nonicteric.  HEENT:  Neck supple no masses no thyromegaly.  Mouth and pharynx normal without thrush.  Lungs:  Clear to auscultation.  Cardiovascular:  Regular rhythm. No significant murmur.  No JVD. No peripheral extremity edema.  Mental status:  Alert oriented affect and mood are all appropriate.    Data:  Reviewed recent hospital discharge summary    Impression:  Recent bilateral pneumonia clinically improved  Hypertension currently off medication and normotensive.  Diabetes mellitus controlled.  Pan  hypopituitarism on replacement therapy.  CLL  Other chronic medical conditions noted in stable    Plan:  Remain off losartan.  He was advised to follow up with pulmonary medicine that referral was entered.  Pneumococcal 23 Valent vaccine update.  Return to clinic in 3 months.

## 2019-11-25 ENCOUNTER — OFFICE VISIT (OUTPATIENT)
Dept: PULMONOLOGY | Facility: CLINIC | Age: 84
End: 2019-11-25
Payer: MEDICARE

## 2019-11-25 VITALS
OXYGEN SATURATION: 92 % | HEART RATE: 85 BPM | HEIGHT: 71 IN | SYSTOLIC BLOOD PRESSURE: 120 MMHG | BODY MASS INDEX: 20.71 KG/M2 | DIASTOLIC BLOOD PRESSURE: 66 MMHG | WEIGHT: 147.94 LBS

## 2019-11-25 DIAGNOSIS — J18.9 PNEUMONIA OF BOTH LUNGS DUE TO INFECTIOUS ORGANISM, UNSPECIFIED PART OF LUNG: ICD-10-CM

## 2019-11-25 DIAGNOSIS — C91.10 CLL (CHRONIC LYMPHOCYTIC LEUKEMIA): Chronic | ICD-10-CM

## 2019-11-25 DIAGNOSIS — D80.1 HYPOGAMMAGLOBULINEMIA: Chronic | ICD-10-CM

## 2019-11-25 PROCEDURE — 1159F PR MEDICATION LIST DOCUMENTED IN MEDICAL RECORD: ICD-10-PCS | Mod: HCNC,S$GLB,, | Performed by: INTERNAL MEDICINE

## 2019-11-25 PROCEDURE — 3078F PR MOST RECENT DIASTOLIC BLOOD PRESSURE < 80 MM HG: ICD-10-PCS | Mod: HCNC,CPTII,S$GLB, | Performed by: INTERNAL MEDICINE

## 2019-11-25 PROCEDURE — 3074F PR MOST RECENT SYSTOLIC BLOOD PRESSURE < 130 MM HG: ICD-10-PCS | Mod: HCNC,CPTII,S$GLB, | Performed by: INTERNAL MEDICINE

## 2019-11-25 PROCEDURE — 1101F PT FALLS ASSESS-DOCD LE1/YR: CPT | Mod: HCNC,CPTII,S$GLB, | Performed by: INTERNAL MEDICINE

## 2019-11-25 PROCEDURE — 99999 PR PBB SHADOW E&M-EST. PATIENT-LVL III: CPT | Mod: PBBFAC,HCNC,, | Performed by: INTERNAL MEDICINE

## 2019-11-25 PROCEDURE — 1159F MED LIST DOCD IN RCRD: CPT | Mod: HCNC,S$GLB,, | Performed by: INTERNAL MEDICINE

## 2019-11-25 PROCEDURE — 3078F DIAST BP <80 MM HG: CPT | Mod: HCNC,CPTII,S$GLB, | Performed by: INTERNAL MEDICINE

## 2019-11-25 PROCEDURE — 99215 PR OFFICE/OUTPT VISIT, EST, LEVL V, 40-54 MIN: ICD-10-PCS | Mod: HCNC,S$GLB,, | Performed by: INTERNAL MEDICINE

## 2019-11-25 PROCEDURE — 3074F SYST BP LT 130 MM HG: CPT | Mod: HCNC,CPTII,S$GLB, | Performed by: INTERNAL MEDICINE

## 2019-11-25 PROCEDURE — 1126F AMNT PAIN NOTED NONE PRSNT: CPT | Mod: HCNC,S$GLB,, | Performed by: INTERNAL MEDICINE

## 2019-11-25 PROCEDURE — 99215 OFFICE O/P EST HI 40 MIN: CPT | Mod: HCNC,S$GLB,, | Performed by: INTERNAL MEDICINE

## 2019-11-25 PROCEDURE — 1126F PR PAIN SEVERITY QUANTIFIED, NO PAIN PRESENT: ICD-10-PCS | Mod: HCNC,S$GLB,, | Performed by: INTERNAL MEDICINE

## 2019-11-25 PROCEDURE — 1101F PR PT FALLS ASSESS DOC 0-1 FALLS W/OUT INJ PAST YR: ICD-10-PCS | Mod: HCNC,CPTII,S$GLB, | Performed by: INTERNAL MEDICINE

## 2019-11-25 PROCEDURE — 99999 PR PBB SHADOW E&M-EST. PATIENT-LVL III: ICD-10-PCS | Mod: PBBFAC,HCNC,, | Performed by: INTERNAL MEDICINE

## 2019-11-25 NOTE — PROGRESS NOTES
Subjective:       Patient ID: Thierry Ramos Jr. is a 85 y.o. male.    Chief Complaint: Pneumonia    85 year old male with CLL Rituxan and Hycela, Hypogammaglobinemia on IVIG who presents for hospital follow up. Patient was admitted to [facility] with PNA.     Evaluation as below:  CLL, on rituxan and steroids, recent pseudomonal PNA and treated with 21d of cefepime.  Back now because cough not much better and may have felt warm at home.  No fevers here.  He says he has been having symptoms for months now and not really worse.  CT chest with bilateral nodular and tree-in-bud infiltrates in lower lobes, infiltrates actually look better than past chest CT.       Recommendations:  1.  Immune suppressed and at risk for OIs of various types.  Would get sputum for bacterial and AFBs.  I don't think this is TB and he does not need airborne precautions, the AFBs would be to eval for NTBMB  2.  I don't think he needs abx as he isn't clearly worse and if he has an infection it is likely atypical and will need specific therapies.  Sputum culture pending can guide if abx are needed  3.  On room air, not much different from baseline.  Doesn't need to stay in hospital to await results of this workup as will likely take a little while to get prelim AFB results.  If sputum bateria and AFB smear neg, we can bronch and BAL him here if inpatient or bronch him as an outpatient      Patient reports positive yellow sputum. No fevers. Significant unintentional weight loss.     Review of Systems   HENT: Positive for postnasal drip and congestion.    Respiratory: Positive for cough, sputum production, shortness of breath and dyspnea on extertion.        Past Medical History:   Diagnosis Date    Actinic keratosis     Anemia     Anticoagulant long-term use     Asthma     Basal cell carcinoma     Carotid stenosis     CLL (chronic lymphocytic leukemia)     COPD (chronic obstructive pulmonary disease)     Diabetes mellitus 2014     Steroid related    Encounter for blood transfusion     Hyperlipidemia     Hypertension     Hypopituitarism     Hypothyroid     Immunosuppression 3/13/2015    Squamous Cell Carcinoma     on the right side of the face     Squamous cell carcinoma of skin of right temple 1/27/2016    Stroke     Syncope 2/12    Unspecified disorder of kidney and ureter      Past Surgical History:   Procedure Laterality Date    CAROTID ENDARTERECTOMY (L)  2/27/2007    EXCISION TURBINATE, SUBMUCOUS      FESS  9/16/2014    Mohs excision   3/23/2015, 2/24/2016    combined with WLE forehead    NASAL SEPTUM SURGERY  9/16/2014    right ureter surgery  1995    SENTINEL LYMPH NODE BIOPSY  3/23/2015, 2/24/2016    SINUS SURGERY  9/16/2014    septo, ESS, BITSMR    Transphenoidal surgery       Social History     Socioeconomic History    Marital status:      Spouse name: Not on file    Number of children: Not on file    Years of education: Not on file    Highest education level: Not on file   Occupational History    Occupation: retired   Social Needs    Financial resource strain: Not very hard    Food insecurity:     Worry: Never true     Inability: Never true    Transportation needs:     Medical: No     Non-medical: No   Tobacco Use    Smoking status: Never Smoker    Smokeless tobacco: Never Used   Substance and Sexual Activity    Alcohol use: Not Currently     Alcohol/week: 2.0 standard drinks     Types: 1 Glasses of wine, 1 Cans of beer per week     Frequency: Never     Drinks per session: Patient refused     Binge frequency: Patient refused    Drug use: No    Sexual activity: Not Currently   Lifestyle    Physical activity:     Days per week: 5 days     Minutes per session: Not on file    Stress: To some extent   Relationships    Social connections:     Talks on phone: More than three times a week     Gets together: More than three times a week     Attends Church service: Not on file     Active member  "of club or organization: No     Attends meetings of clubs or organizations: Never     Relationship status:    Other Topics Concern    Not on file   Social History Narrative    He is .     Family History   Problem Relation Age of Onset    Cancer Mother         breast    Colon cancer Father     Cancer Father         colon    Cancer Brother         leukemia    No Known Problems Sister     No Known Problems Maternal Aunt     No Known Problems Maternal Uncle     No Known Problems Paternal Aunt     No Known Problems Paternal Uncle     No Known Problems Maternal Grandmother     No Known Problems Maternal Grandfather     No Known Problems Paternal Grandmother     No Known Problems Paternal Grandfather     Amblyopia Neg Hx     Blindness Neg Hx     Cataracts Neg Hx     Diabetes Neg Hx     Glaucoma Neg Hx     Hypertension Neg Hx     Macular degeneration Neg Hx     Retinal detachment Neg Hx     Strabismus Neg Hx     Stroke Neg Hx     Thyroid disease Neg Hx     Allergic rhinitis Neg Hx     Allergies Neg Hx     Angioedema Neg Hx     Asthma Neg Hx     Atopy Neg Hx     Eczema Neg Hx     Immunodeficiency Neg Hx     Rhinitis Neg Hx     Urticaria Neg Hx      Review of patient's allergies indicates:   Allergen Reactions    Ace inhibitors Swelling     angioedema    Divalproex Hives     Rash under arms, body creases       Objective:       Vitals:    11/25/19 0900   BP: 120/66   Pulse: 85   SpO2: (!) 92%   Weight: 67.1 kg (147 lb 14.9 oz)   Height: 5' 11" (1.803 m)   PainSc: 0-No pain       Physical Exam   Constitutional: He is oriented to person, place, and time. He appears well-developed and well-nourished. No distress.   HENT:   Head: Normocephalic.   Mouth/Throat: Oropharynx is clear and moist.   Neck: Normal range of motion. Neck supple.   Cardiovascular: Normal rate and regular rhythm.   Pulmonary/Chest: Normal expansion and effort normal.   Abdominal: Soft. Bowel sounds are normal. "   Musculoskeletal: Normal range of motion. He exhibits no edema.   Neurological: He is alert and oriented to person, place, and time.   Skin: Skin is warm and dry. No rash noted. He is not diaphoretic. No erythema.   Psychiatric: He has a normal mood and affect. His behavior is normal.   Nursing note and vitals reviewed.    Personal Diagnostic Review  CT of chest: Significant interval improvement of the previously noted ground-glass opacities as well although there has been interval development of extensive tree-in-bud type opacities and what appears to represent some mucoid impaction of multiple bilateral lower lobe subsegmental bronchi within the lung bases and to a lesser degree the lingula as well.  Findings are again suspicious for an infectious process.  No flowsheet data found.      Assessment:       No diagnosis found.    Outpatient Encounter Medications as of 11/25/2019   Medication Sig Dispense Refill    ACCU-CHEK FASTCLIX Misc TEST  BLOOD  GLUCOSE FOUR TIMES DAILY 408 each 6    alcohol swabs PadM APPLY 1 PAD TOPICALLY  AS NEEDED. 300 each 3    aspirin 81 MG Chew Take 81 mg by mouth every Mon, Wed, Fri.      atorvastatin (LIPITOR) 20 MG tablet TAKE 1 TABLET EVERY DAY 90 tablet 3    budesonide (PULMICORT) 0.5 mg/2 mL nebulizer solution budesonide 0.5 mg/2 mL suspension for nebulization      chlorhexidine (PERIDEX) 0.12 % solution chlorhexidine gluconate 0.12 % mouthwash      clopidogrel (PLAVIX) 75 mg tablet Take 75 mg by mouth once daily.      cyanocobalamin, vitamin B-12, 5,000 mcg TbDL Take 1 tablet by mouth once daily.      cycloSPORINE (RESTASIS) 0.05 % ophthalmic emulsion Place 0.4 mLs (1 drop total) into both eyes 2 (two) times daily. 180 each 3    DOCOSAHEXANOIC ACID/EPA (FISH OIL ORAL) Take 5 capsules by mouth once daily.       erythromycin (ROMYCIN) ophthalmic ointment Apply to eyelids and lashes OU QHS 1 Tube 6    fluorouracil (EFUDEX) 5 % cream Use hs for 2 weeks on right cheek 40 g  3    furosemide (LASIX) 20 MG tablet       guaifen/dextromethorphan/pe (ROBITUSSIN COUGH & COLD CF MAX ORAL) Take 15 mLs by mouth 2 (two) times daily.      Lactobacillus acidophilus (ACIDOPHILUS ORAL) Take 1 tablet by mouth once daily.      levothyroxine (SYNTHROID) 125 MCG tablet TAKE 1 TABLET ONE TIME DAILY 90 tablet 3    multivitamin with minerals tablet Take 1 tablet by mouth once daily.      neomycin-polymyxin-dexamethasone (DEXACINE) 3.5 mg/g-10,000 unit/g-0.1 % Oint neomycin 3.5 mg/g-polymyxin B 10,000 unit/g-dexameth 0.1 % eye oint      neomycin-polymyxin-dexamethasone (MAXITROL) 3.5mg/mL-10,000 unit/mL-0.1 % DrpS neomycin-polymyxin-dexameth 3.5 mg/mL-10,000 unit/mL-0.1% eye drops      pantoprazole (PROTONIX) 40 MG tablet TAKE 1 TABLET EVERY DAY 90 tablet 3    predniSONE (DELTASONE) 2.5 MG tablet Take 1 tablet by mouth once daily. 5mg and 2.5mg qd      predniSONE (DELTASONE) 5 MG tablet Take 5 mg by mouth once daily.      sennosides/docusate sodium (EDEN-COLACE ORAL) Take 1 tablet by mouth once daily.      SYMBICORT 160-4.5 mcg/actuation HFAA Inhale 1 puff into the lungs as needed.      tenofovir alafenamide (VEMLIDY) 25 mg Tab Take 25 mg by mouth once daily. 30 tablet 11    VENTOLIN HFA 90 mcg/actuation inhaler Inhale 1 puff into the lungs as needed.      tazarotene (AVAGE) 0.1 % cream Apply topically every evening. (Patient taking differently: Apply topically as needed. ) 30 g 3    testosterone cypionate (DEPOTESTOTERONE CYPIONATE) 100 mg/mL injection Inject 0.5 mLs (50 mg total) into the muscle every 14 (fourteen) days. 10 mL 5     No facility-administered encounter medications on file as of 11/25/2019.      No orders of the defined types were placed in this encounter.      Plan:       Pneumonia of both lungs due to infectious organism  Patient with multiple different disorders that likely can cause opportunistic infection. Agree with Dr. Junior's assessment in [facility].   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   Will await finalization of cultures prior to proceeding with bronchoscopy.     CLL (chronic lymphocytic leukemia)  Per Hematology. On Immunosuppressive therapy.     Hypogammaglobulinemia  IVIG.     Follow up in 2 months.   Homa Christensen MD

## 2019-11-25 NOTE — ASSESSMENT & PLAN NOTE
Patient with multiple different disorders that likely can cause opportunistic infection. Agree with Dr. Junior's assessment in [facility].   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   Will await finalization of cultures prior to proceeding with bronchoscopy.

## 2019-11-25 NOTE — LETTER
November 25, 2019      CAROLE Wooten MD  [address]  [place] [address]           [facility]  [address]  [place] [address]  Phone: [phone]          Patient: Thierry Ramos Jr.   MR Number: [id number]   YOB: 1934   Date of Visit: 11/25/2019       Dear Dr. CAROLE Wooten:    Thank you for referring Thierry Ramos to me for evaluation. Attached you will find relevant portions of my assessment and plan of care.    If you have questions, please do not hesitate to call me. I look forward to following Thierry Ramos along with you.    Sincerely,    Homa Christensen MD    Enclosure  CC:  No Recipients    If you would like to receive this communication electronically, please contact [email or web address] or [phone] to request more information on [facility] access.    For providers and/or their staff who would like to refer a patient to [facility], please contact us through our one-stop-shop provider referral line, [facility], at [phone].    If you feel you have received this communication in error or would no longer like to receive these types of communications, please e-mail [email or web address]

## 2019-12-02 ENCOUNTER — INFUSION (OUTPATIENT)
Dept: INFUSION THERAPY | Facility: HOSPITAL | Age: 84
End: 2019-12-02
Attending: INTERNAL MEDICINE
Payer: MEDICARE

## 2019-12-02 VITALS
WEIGHT: 147.94 LBS | DIASTOLIC BLOOD PRESSURE: 63 MMHG | BODY MASS INDEX: 20.71 KG/M2 | RESPIRATION RATE: 18 BRPM | SYSTOLIC BLOOD PRESSURE: 155 MMHG | HEART RATE: 78 BPM | TEMPERATURE: 98 F | HEIGHT: 71 IN

## 2019-12-02 DIAGNOSIS — D80.1 HYPOGAMMAGLOBULINEMIA: Primary | ICD-10-CM

## 2019-12-02 DIAGNOSIS — J32.9 CHRONIC SINUSITIS, UNSPECIFIED LOCATION: ICD-10-CM

## 2019-12-02 DIAGNOSIS — C91.10 CLL (CHRONIC LYMPHOCYTIC LEUKEMIA): ICD-10-CM

## 2019-12-02 PROCEDURE — 96375 TX/PRO/DX INJ NEW DRUG ADDON: CPT | Mod: HCNC

## 2019-12-02 PROCEDURE — 96366 THER/PROPH/DIAG IV INF ADDON: CPT | Mod: HCNC

## 2019-12-02 PROCEDURE — S0028 INJECTION, FAMOTIDINE, 20 MG: HCPCS | Mod: HCNC | Performed by: INTERNAL MEDICINE

## 2019-12-02 PROCEDURE — 96365 THER/PROPH/DIAG IV INF INIT: CPT | Mod: HCNC

## 2019-12-02 PROCEDURE — 96367 TX/PROPH/DG ADDL SEQ IV INF: CPT | Mod: HCNC

## 2019-12-02 PROCEDURE — 63600175 PHARM REV CODE 636 W HCPCS: Mod: JG,HCNC | Performed by: INTERNAL MEDICINE

## 2019-12-02 PROCEDURE — 25000003 PHARM REV CODE 250: Mod: HCNC | Performed by: INTERNAL MEDICINE

## 2019-12-02 RX ORDER — FAMOTIDINE 10 MG/ML
20 INJECTION INTRAVENOUS
Status: COMPLETED | OUTPATIENT
Start: 2019-12-02 | End: 2019-12-02

## 2019-12-02 RX ORDER — ACETAMINOPHEN 325 MG/1
650 TABLET ORAL
Status: COMPLETED | OUTPATIENT
Start: 2019-12-02 | End: 2019-12-02

## 2019-12-02 RX ADMIN — DIPHENHYDRAMINE HYDROCHLORIDE 50 MG: 50 INJECTION, SOLUTION INTRAMUSCULAR; INTRAVENOUS at 07:12

## 2019-12-02 RX ADMIN — ACETAMINOPHEN 650 MG: 325 TABLET ORAL at 07:12

## 2019-12-02 RX ADMIN — SODIUM CHLORIDE: 9 INJECTION, SOLUTION INTRAVENOUS at 07:12

## 2019-12-02 RX ADMIN — FAMOTIDINE 20 MG: 10 INJECTION, SOLUTION INTRAVENOUS at 08:12

## 2019-12-02 RX ADMIN — HUMAN IMMUNOGLOBULIN G 20 G: 20 LIQUID INTRAVENOUS at 08:12

## 2019-12-02 NOTE — PLAN OF CARE
Tolerated IVIG well.  No reactions noted.  No questions or concerns at this time.  Left unit in NAD.

## 2019-12-09 ENCOUNTER — PATIENT MESSAGE (OUTPATIENT)
Dept: PULMONOLOGY | Facility: CLINIC | Age: 84
End: 2019-12-09

## 2019-12-11 ENCOUNTER — TELEPHONE (OUTPATIENT)
Dept: PHARMACY | Facility: CLINIC | Age: 84
End: 2019-12-11

## 2019-12-11 ENCOUNTER — PATIENT MESSAGE (OUTPATIENT)
Dept: PULMONOLOGY | Facility: CLINIC | Age: 84
End: 2019-12-11

## 2019-12-11 NOTE — TELEPHONE ENCOUNTER
Vemlidy refill confirmed and reassessment complete. We will ship Vemlidy refill on  via fedex to arrive on . $0.00 copay- 004. Confirmed 2 patient identifiers - name and . Therapy appropriate.     Patient has 7-8 doses of Vemlidy  remaining and takes it around breakfast daily.  Pt reports they are not having any side effects so far. No missed doses, no new medications, no new allergies or health conditions reported at this time. Allergies reviewed and medication reconciliation complete (reviewed and documented in [facility] and [facility]).  Disease education reviewed (including transmission and prevention). Patient counseled on importance of maintaining adherence and keeping lab appointments which were scheduled. All questions answered and addressed to patients satisfaction. Advised to call [facility] and provider if any issues arise.  Pt verbalized understanding.

## 2019-12-20 ENCOUNTER — HOSPITAL ENCOUNTER (EMERGENCY)
Facility: HOSPITAL | Age: 84
Discharge: HOME | End: 2019-12-20
Attending: EMERGENCY MEDICINE
Payer: MEDICARE

## 2019-12-20 ENCOUNTER — PATIENT MESSAGE (OUTPATIENT)
Dept: INTERNAL MEDICINE | Facility: CLINIC | Age: 84
End: 2019-12-20

## 2019-12-20 ENCOUNTER — PATIENT MESSAGE (OUTPATIENT)
Dept: PULMONOLOGY | Facility: CLINIC | Age: 84
End: 2019-12-20

## 2019-12-20 VITALS
HEART RATE: 81 BPM | RESPIRATION RATE: 18 BRPM | SYSTOLIC BLOOD PRESSURE: 128 MMHG | WEIGHT: 145 LBS | TEMPERATURE: 97 F | DIASTOLIC BLOOD PRESSURE: 57 MMHG | BODY MASS INDEX: 20.3 KG/M2 | HEIGHT: 71 IN

## 2019-12-20 DIAGNOSIS — R55 SYNCOPE, UNSPECIFIED SYNCOPE TYPE: Primary | ICD-10-CM

## 2019-12-20 DIAGNOSIS — R53.1 WEAKNESS: ICD-10-CM

## 2019-12-20 DIAGNOSIS — J32.0 CHRONIC MAXILLARY SINUSITIS: ICD-10-CM

## 2019-12-20 LAB
ALBUMIN SERPL BCP-MCNC: 3 G/DL (ref 3.5–5.2)
ALP SERPL-CCNC: 138 U/L (ref 55–135)
ALT SERPL W/O P-5'-P-CCNC: 37 U/L (ref 10–44)
ANION GAP SERPL CALC-SCNC: 13 MMOL/L (ref 8–16)
AST SERPL-CCNC: 37 U/L (ref 10–40)
BASOPHILS # BLD AUTO: 0.02 K/UL (ref 0–0.2)
BASOPHILS NFR BLD: 0.2 % (ref 0–1.9)
BILIRUB SERPL-MCNC: 0.9 MG/DL (ref 0.1–1)
BUN SERPL-MCNC: 38 MG/DL (ref 8–23)
CALCIUM SERPL-MCNC: 9.2 MG/DL (ref 8.7–10.5)
CHLORIDE SERPL-SCNC: 104 MMOL/L (ref 95–110)
CK SERPL-CCNC: 40 U/L (ref 20–200)
CO2 SERPL-SCNC: 22 MMOL/L (ref 23–29)
CREAT SERPL-MCNC: 1.1 MG/DL (ref 0.5–1.4)
DIFFERENTIAL METHOD: ABNORMAL
EOSINOPHIL # BLD AUTO: 0.1 K/UL (ref 0–0.5)
EOSINOPHIL NFR BLD: 0.8 % (ref 0–8)
ERYTHROCYTE [DISTWIDTH] IN BLOOD BY AUTOMATED COUNT: 14.7 % (ref 11.5–14.5)
EST. GFR  (AFRICAN AMERICAN): >60 ML/MIN/1.73 M^2
EST. GFR  (NON AFRICAN AMERICAN): >60 ML/MIN/1.73 M^2
GLUCOSE SERPL-MCNC: 170 MG/DL (ref 70–110)
HCT VFR BLD AUTO: 40.7 % (ref 40–54)
HGB BLD-MCNC: 13.1 G/DL (ref 14–18)
LYMPHOCYTES # BLD AUTO: 1.8 K/UL (ref 1–4.8)
LYMPHOCYTES NFR BLD: 21.4 % (ref 18–48)
MCH RBC QN AUTO: 30.6 PG (ref 27–31)
MCHC RBC AUTO-ENTMCNC: 32.2 G/DL (ref 32–36)
MCV RBC AUTO: 95 FL (ref 82–98)
MONOCYTES # BLD AUTO: 1.2 K/UL (ref 0.3–1)
MONOCYTES NFR BLD: 14 % (ref 4–15)
NEUTROPHILS # BLD AUTO: 5.3 K/UL (ref 1.8–7.7)
NEUTROPHILS NFR BLD: 63.6 % (ref 38–73)
PLATELET # BLD AUTO: 120 K/UL (ref 150–350)
PMV BLD AUTO: 9.5 FL (ref 9.2–12.9)
POCT GLUCOSE: 166 MG/DL (ref 70–110)
POTASSIUM SERPL-SCNC: 4 MMOL/L (ref 3.5–5.1)
PROT SERPL-MCNC: 5.5 G/DL (ref 6–8.4)
RBC # BLD AUTO: 4.28 M/UL (ref 4.6–6.2)
SODIUM SERPL-SCNC: 139 MMOL/L (ref 136–145)
TROPONIN I SERPL DL<=0.01 NG/ML-MCNC: <0.006 NG/ML (ref 0–0.03)
WBC # BLD AUTO: 8.58 K/UL (ref 3.9–12.7)

## 2019-12-20 PROCEDURE — 99285 EMERGENCY DEPT VISIT HI MDM: CPT | Mod: 25,HCNC

## 2019-12-20 PROCEDURE — 96361 HYDRATE IV INFUSION ADD-ON: CPT | Mod: HCNC

## 2019-12-20 PROCEDURE — 84484 ASSAY OF TROPONIN QUANT: CPT | Mod: HCNC

## 2019-12-20 PROCEDURE — 93005 ELECTROCARDIOGRAM TRACING: CPT | Mod: HCNC

## 2019-12-20 PROCEDURE — 96360 HYDRATION IV INFUSION INIT: CPT | Mod: HCNC

## 2019-12-20 PROCEDURE — 93010 ELECTROCARDIOGRAM REPORT: CPT | Mod: HCNC,,, | Performed by: STUDENT IN AN ORGANIZED HEALTH CARE EDUCATION/TRAINING PROGRAM

## 2019-12-20 PROCEDURE — 82550 ASSAY OF CK (CPK): CPT | Mod: HCNC

## 2019-12-20 PROCEDURE — 80053 COMPREHEN METABOLIC PANEL: CPT | Mod: HCNC

## 2019-12-20 PROCEDURE — 82962 GLUCOSE BLOOD TEST: CPT | Mod: HCNC

## 2019-12-20 PROCEDURE — 93010 EKG 12-LEAD: ICD-10-PCS | Mod: HCNC,,, | Performed by: STUDENT IN AN ORGANIZED HEALTH CARE EDUCATION/TRAINING PROGRAM

## 2019-12-20 PROCEDURE — 63600175 PHARM REV CODE 636 W HCPCS: Mod: HCNC | Performed by: EMERGENCY MEDICINE

## 2019-12-20 PROCEDURE — 85025 COMPLETE CBC W/AUTO DIFF WBC: CPT | Mod: HCNC

## 2019-12-20 RX ORDER — LEVOFLOXACIN 500 MG/1
500 TABLET, FILM COATED ORAL DAILY
Qty: 5 TABLET | Refills: 0 | Status: SHIPPED | OUTPATIENT
Start: 2019-12-20 | End: 2020-01-02 | Stop reason: SDUPTHER

## 2019-12-20 RX ADMIN — SODIUM CHLORIDE 1000 ML: 0.9 INJECTION, SOLUTION INTRAVENOUS at 12:12

## 2019-12-20 NOTE — TELEPHONE ENCOUNTER
Spoke with patient wife (Nikia) informed her that I have received patient message. Patient wife (Nikia) states that patient has been coughing up green and yellow flem, patient wife (Nikia) also states that when patient gets admitted into the hospital, the Doctor usually gives him some antibiotics that works well with patient cough, but when patient is released and goes back home then patient is back to coughing up green and yellow flem again. I verbalized to patient wife that I understand and will forward her message to Dr Christensen to review/advise. Patient wife verbalized that she understand.

## 2019-12-20 NOTE — ED PROVIDER NOTES
"Encounter Date: 12/20/2019    SCRIBE #1 NOTE: I, Tereza Harris, am scribing for, and in the presence of,  Dr. Gutiérrez. I have scribed the entire note.       History     Chief Complaint   Patient presents with    Weakness     ambulating with walker and fell today.      85 y.o. male presents to the ER c/o syncope. Reports he's been feeling weak on and off for months, but comes in today because of a near syncopal and a syncopal episode. Wife reports patient "tripped" in the bathroom this morning but did not fall to the ground. He was able to lower himself onto the toilet, and appears to have been feeling lightheaded at that time. Later, patient was ambulating with walker when he fell going down a step in the backyard. Wife reports he lost consciousness twice while lying on the ground - "his eyes rolled back", and she noted a loss of continence. When patient awoke, he was at his neurologic baseline, with no tonic clonic jerking reported or any description of a post ictal period. His wife gave him 5 mg of Prednisone as recommended by physician when he is feeling weak. Patient reports feeling persistently weak, as well as having a "chronic sinus issue" that causes him to feel congested and cough up a lot of yellow/green phlegm. Denies SOB at this time. Patient also reports some pain to his left hand and elbow, at the site of some skin tears sustained in the fall. Pains are aching, worse with palpation and without alleviating factors.     The history is provided by the spouse, a relative and the patient.     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    Encounter for blood transfusion     Hyperlipidemia     Hypertension     Hypopituitarism     Hypothyroid     Immunosuppression 3/13/2015    Squamous Cell Carcinoma     on the right side of the face     Squamous cell carcinoma of skin of right temple 1/27/2016    Stroke     Syncope 2/12    Unspecified disorder of kidney and ureter      Past Surgical History:   Procedure Laterality Date    CAROTID ENDARTERECTOMY (L)  2/27/2007    EXCISION TURBINATE, SUBMUCOUS      FESS  9/16/2014    Mohs excision   3/23/2015, 2/24/2016    combined with WLE forehead    NASAL SEPTUM SURGERY  9/16/2014    right ureter surgery  1995    SENTINEL LYMPH NODE BIOPSY  3/23/2015, 2/24/2016    SINUS SURGERY  9/16/2014    septo, ESS, BITSMR    Transphenoidal surgery       Family History   Problem Relation Age of Onset    Cancer Mother         breast    Colon cancer Father     Cancer Father         colon    Cancer Brother         leukemia    No Known Problems Sister     No Known Problems Maternal Aunt     No Known Problems Maternal Uncle     No Known Problems Paternal Aunt     No Known Problems Paternal Uncle     No Known Problems Maternal Grandmother     No Known Problems Maternal Grandfather     No Known Problems Paternal Grandmother     No Known Problems Paternal Grandfather     Amblyopia Neg Hx     Blindness Neg Hx     Cataracts Neg Hx     Diabetes Neg Hx     Glaucoma Neg Hx     Hypertension Neg Hx     Macular degeneration Neg Hx     Retinal detachment Neg Hx     Strabismus Neg Hx     Stroke Neg Hx     Thyroid disease Neg Hx     Allergic rhinitis Neg Hx     Allergies Neg Hx     Angioedema Neg Hx     Asthma Neg Hx     Atopy Neg Hx     Eczema Neg Hx     Immunodeficiency Neg Hx     Rhinitis Neg Hx     Urticaria Neg Hx      Social History     Tobacco Use    Smoking status: Never Smoker    Smokeless tobacco: Never Used   Substance Use Topics     Alcohol use: Not Currently     Alcohol/week: 2.0 standard drinks     Types: 1 Glasses of wine, 1 Cans of beer per week     Frequency: Never     Drinks per session: Patient refused     Binge frequency: Patient refused    Drug use: No     Review of Systems   Constitutional: Positive for fatigue. Negative for chills and fever.   HENT: Positive for congestion and postnasal drip. Negative for facial swelling, trouble swallowing and voice change.    Eyes: Negative for photophobia, pain and redness.   Respiratory: Positive for cough. Negative for choking and shortness of breath.    Cardiovascular: Negative for chest pain, palpitations and leg swelling.   Gastrointestinal: Negative for abdominal pain, diarrhea, nausea and vomiting.   Genitourinary: Negative for dysuria, frequency and urgency.   Musculoskeletal: Negative for back pain, neck pain and neck stiffness.   Neurological: Positive for syncope and light-headedness. Negative for seizures, numbness and headaches.   All other systems reviewed and are negative.      Physical Exam     Initial Vitals [12/20/19 1122]   BP Pulse Resp Temp SpO2   (!) 104/51 87 18 97.3 °F (36.3 °C) --      MAP       --         Physical Exam    Nursing note and vitals reviewed.  Constitutional: He appears well-developed and well-nourished. No distress.   HENT:   Head: Normocephalic and atraumatic.   Dry mucous membranes. Oropharynx clear.   Eyes: Conjunctivae and EOM are normal. Pupils are equal, round, and reactive to light.   Neck: Normal range of motion. Neck supple. No tracheal deviation present.   Cardiovascular: Normal rate, regular rhythm, normal heart sounds and intact distal pulses.   Pulmonary/Chest: Breath sounds normal. No respiratory distress. He has no wheezes. He has no rhonchi. He has no rales.   Abdominal: Soft. Bowel sounds are normal. He exhibits no distension. There is no tenderness.   Musculoskeletal: Normal range of motion. He exhibits no edema or tenderness.    Neurological: He is alert and oriented to person, place, and time. He has normal strength. No cranial nerve deficit. GCS score is 15. GCS eye subscore is 4. GCS verbal subscore is 5. GCS motor subscore is 6.   Skin: Skin is warm and dry.   Skin tears to left hand, left elbow and right elbow with ecchymosis to forearms bilaterally.         ED Course   Procedures  Labs Reviewed   CBC W/ AUTO DIFFERENTIAL - Abnormal; Notable for the following components:       Result Value    RBC 4.28 (*)     Hemoglobin 13.1 (*)     RDW 14.7 (*)     Platelets 120 (*)     Mono # 1.2 (*)     All other components within normal limits   COMPREHENSIVE METABOLIC PANEL - Abnormal; Notable for the following components:    CO2 22 (*)     Glucose 170 (*)     BUN, Bld 38 (*)     Total Protein 5.5 (*)     Albumin 3.0 (*)     Alkaline Phosphatase 138 (*)     All other components within normal limits   POCT GLUCOSE - Abnormal; Notable for the following components:    POCT Glucose 166 (*)     All other components within normal limits   TROPONIN I   CK   URINALYSIS   POCT GLUCOSE MONITORING CONTINUOUS     EKG Readings: (Independently Interpreted)   Initial Reading: No STEMI. Previous EKG: Compared with most recent EKG Previous EKG Date: 10/11/19 (Minimal change). Rhythm: Normal Sinus Rhythm. Heart Rate: 79. Ectopy: No Ectopy. Conduction: Normal. ST Segments: Normal ST Segments. T Waves: Normal.       X-Rays:   Independently Interpreted Readings:   Other Readings:  I have visualized all imaging for this patient, radiology has done the interpretation.    Imaging Results          X-Ray Chest AP Portable (Final result)  Result time 12/20/19 13:25:22    Final result by Luis Fernando Mistry MD (12/20/19 13:25:22)                 Impression:      As above.      Electronically signed by: Luis Fernando Mistry MD  Date:    12/20/2019  Time:    13:25             Narrative:    EXAMINATION:  XR CHEST AP PORTABLE    CLINICAL HISTORY:  Weakness;    TECHNIQUE:  Single frontal  view of the chest was performed.    FINDINGS:  There are bilateral patchy basilar opacities left greater than right which are improved but not resolved compared to previous performed 11/09/2019.                                CT Head Without Contrast (Final result)  Result time 12/20/19 12:47:58    Final result by Joe Rivas MD (12/20/19 12:47:58)                 Impression:      No acute large vascular territory infarct or intracranial hemorrhage identified.    Generalized cerebral volume loss and supratentorial white matter chronic small vessel ischemic change with multifocal remote lacunar type infarcts.    Chronic right mastoid effusion with interval worsening pansinusitis from 07/23/2019 exam.  Correlate clinically.    This report was flagged in Epic as abnormal.      Electronically signed by: Joe Rivas MD  Date:    12/20/2019  Time:    12:47             Narrative:    EXAMINATION:  CT HEAD WITHOUT CONTRAST    CLINICAL HISTORY:  Head trauma, minor, GCS>=13, NOC/NEXUS/CCR positive, first study;    TECHNIQUE:  Low dose axial CT images obtained throughout the head without intravenous contrast. Sagittal and coronal reconstructions were performed.    COMPARISON:  Head CT 07/23/2019 and MRI brain 10/20/2014    FINDINGS:  Intracranial compartment:    Generalized cerebral volume loss.  Ventricles are midline and stable in size and configuration without distortion by mass effect or acute hydrocephalus.  Grossly stable distribution of supratentorial white matter patchy hypoattenuation, likely sequela of chronic small vessel ischemic change.  Left corona radiata and bilateral basal ganglia suspected small remote lacunar type infarct, unchanged.  No definite new focal areas of abnormal parenchymal attenuation.  Skull base atherosclerotic vascular calcifications noted.  No extra-axial blood or fluid collections.  Empty sella configuration unchanged, nonspecific.    No parenchymal mass, hemorrhage, edema or major  vascular distribution infarct.    Skull/extracranial contents (limited evaluation): Postsurgical changes of left frontotemporal craniotomy again noted.  Interval increased patchy mucosal thickening and opacification of the bilateral paranasal sinuses.  Partial opacification of the right mastoid air cells without dehiscence similar to prior.  Left mastoid air cells are clear.                              Medical Decision Making:   Initial Assessment:   85 y.o. male presents to the emergency department with complaint of generalized weakness with syncope  Differential Diagnosis:   Arrhythmia, dehydration, vasovagal, electrolyte dyscrasias, anemia  Independently Interpreted Test(s):   I have ordered and independently interpreted X-rays - see prior notes.  I have ordered and independently interpreted EKG Reading(s) - see prior notes  Clinical Tests:   Lab Tests: Reviewed       <> Summary of Lab: Similar to baseline  ED Management:  Patient given some IV fluid.  Reports feeling much better at this time.  Informed him of results as well as plan to discharge with prescription for Levaquin, instructions on home management and increasing oral hydration, prompt follow-up with primary care and pulmonology physicians, reasons to return to the emergency room.  I did offer the patient and his wife admission for observation given that he is still a little bit orthostatic positive, but patient declined, prefers attempting outpatient management at this time.                                 Clinical Impression:       ICD-10-CM ICD-9-CM   1. Syncope, unspecified syncope type R55 780.2   2. Weakness R53.1 780.79   3. Chronic maxillary sinusitis J32.0 473.0       Disposition:   Disposition: Discharged  Condition: Stable    Scribe Attestation I, Dr. Thony Gutiérrez, personally performed the services described in this documentation. All medical record entries made by the scribe were at my direction and in my presence.  I have reviewed the  chart and agree that the record reflects my personal performance and is accurate and complete. Thony Gutiérrez MD.  1:56 PM 12/20/2019                     Thony Gutiérrez MD  12/20/19 [id number]

## 2019-12-20 NOTE — ED TRIAGE NOTES
Pt seen in the ED with complaints of a fall, and generalized weakness. Pt reports that when he fell he hit his L elbow. Pt denies any chest pain, SOB, nausea, vomiting or fever. Pt also denies any pain during time of assessment, pt has a small skin tear to the L elbow and R arm. Pt has a hx of cancer, and wife at the bedside reports that the pt has not been eating properly or drinking enough water. Pt is AAOX4, he follows commands and is cooperative. Will continue to monitor.

## 2019-12-31 ENCOUNTER — PATIENT OUTREACH (OUTPATIENT)
Dept: ADMINISTRATIVE | Facility: HOSPITAL | Age: 84
End: 2019-12-31

## 2020-01-02 ENCOUNTER — TELEPHONE (OUTPATIENT)
Dept: OTOLARYNGOLOGY | Facility: CLINIC | Age: 85
End: 2020-01-02

## 2020-01-02 ENCOUNTER — OFFICE VISIT (OUTPATIENT)
Dept: INTERNAL MEDICINE | Facility: CLINIC | Age: 85
End: 2020-01-02
Payer: MEDICARE

## 2020-01-02 VITALS
BODY MASS INDEX: 20.06 KG/M2 | RESPIRATION RATE: 18 BRPM | DIASTOLIC BLOOD PRESSURE: 62 MMHG | SYSTOLIC BLOOD PRESSURE: 120 MMHG | HEIGHT: 71 IN | WEIGHT: 143.31 LBS | TEMPERATURE: 98 F | HEART RATE: 88 BPM

## 2020-01-02 DIAGNOSIS — R53.1 WEAKNESS: ICD-10-CM

## 2020-01-02 DIAGNOSIS — J32.1 CHRONIC FRONTAL SINUSITIS: Primary | Chronic | ICD-10-CM

## 2020-01-02 PROCEDURE — 1159F MED LIST DOCD IN RCRD: CPT | Mod: HCNC,S$GLB,, | Performed by: INTERNAL MEDICINE

## 2020-01-02 PROCEDURE — 1101F PT FALLS ASSESS-DOCD LE1/YR: CPT | Mod: HCNC,CPTII,S$GLB, | Performed by: INTERNAL MEDICINE

## 2020-01-02 PROCEDURE — 3078F DIAST BP <80 MM HG: CPT | Mod: HCNC,CPTII,S$GLB, | Performed by: INTERNAL MEDICINE

## 2020-01-02 PROCEDURE — 1126F PR PAIN SEVERITY QUANTIFIED, NO PAIN PRESENT: ICD-10-PCS | Mod: HCNC,S$GLB,, | Performed by: INTERNAL MEDICINE

## 2020-01-02 PROCEDURE — 99212 PR OFFICE/OUTPT VISIT, EST, LEVL II, 10-19 MIN: ICD-10-PCS | Mod: HCNC,S$GLB,, | Performed by: INTERNAL MEDICINE

## 2020-01-02 PROCEDURE — 3078F PR MOST RECENT DIASTOLIC BLOOD PRESSURE < 80 MM HG: ICD-10-PCS | Mod: HCNC,CPTII,S$GLB, | Performed by: INTERNAL MEDICINE

## 2020-01-02 PROCEDURE — 1126F AMNT PAIN NOTED NONE PRSNT: CPT | Mod: HCNC,S$GLB,, | Performed by: INTERNAL MEDICINE

## 2020-01-02 PROCEDURE — 99999 PR PBB SHADOW E&M-EST. PATIENT-LVL III: CPT | Mod: PBBFAC,HCNC,, | Performed by: INTERNAL MEDICINE

## 2020-01-02 PROCEDURE — 99999 PR PBB SHADOW E&M-EST. PATIENT-LVL III: ICD-10-PCS | Mod: PBBFAC,HCNC,, | Performed by: INTERNAL MEDICINE

## 2020-01-02 PROCEDURE — 99212 OFFICE O/P EST SF 10 MIN: CPT | Mod: HCNC,S$GLB,, | Performed by: INTERNAL MEDICINE

## 2020-01-02 PROCEDURE — 3074F PR MOST RECENT SYSTOLIC BLOOD PRESSURE < 130 MM HG: ICD-10-PCS | Mod: HCNC,CPTII,S$GLB, | Performed by: INTERNAL MEDICINE

## 2020-01-02 PROCEDURE — 1159F PR MEDICATION LIST DOCUMENTED IN MEDICAL RECORD: ICD-10-PCS | Mod: HCNC,S$GLB,, | Performed by: INTERNAL MEDICINE

## 2020-01-02 PROCEDURE — 1101F PR PT FALLS ASSESS DOC 0-1 FALLS W/OUT INJ PAST YR: ICD-10-PCS | Mod: HCNC,CPTII,S$GLB, | Performed by: INTERNAL MEDICINE

## 2020-01-02 PROCEDURE — 3074F SYST BP LT 130 MM HG: CPT | Mod: HCNC,CPTII,S$GLB, | Performed by: INTERNAL MEDICINE

## 2020-01-02 RX ORDER — LEVOFLOXACIN 500 MG/1
500 TABLET, FILM COATED ORAL DAILY
Qty: 10 TABLET | Refills: 0 | Status: SHIPPED | OUTPATIENT
Start: 2020-01-02 | End: 2020-03-05

## 2020-01-02 NOTE — TELEPHONE ENCOUNTER
----- Message from Raheel Encinas sent at 1/2/2020  4:08 PM CST -----  Contact: Patient   Good Afternoon,      has placed a order for  to be scheduled with ENT.  has already been established with  I wasn't able to find any openings with the Doctor Until April I was just checking to see if it is possible to get something sooner then the opening she has in April. Please Advise and thank you in advanced.       Chronic frontal sinusitis [J32.1]

## 2020-01-03 ENCOUNTER — TELEPHONE (OUTPATIENT)
Dept: PULMONOLOGY | Facility: CLINIC | Age: 85
End: 2020-01-03

## 2020-01-03 NOTE — TELEPHONE ENCOUNTER
I have spoke with Mr. Ramos wife Mrs. Urbano, she was informed that currently Dr. Christensen Feb schedule have not been released yet and that once I receive the days that Dr. Christensen is here in clinic I would get Mr. Ramos scheduled for his appointment and place in the mail. Patient wife has verbalize that she understand.

## 2020-01-03 NOTE — TELEPHONE ENCOUNTER
----- Message from Aleida Morales sent at 1/3/2020  2:34 PM CST -----  Contact: WIFE - KINSEY  Wife states received a letter in the mail stating it's time to schedule an appointment in Feb 2020 Wife also ask for a call concerning results of test      Contact info  [phone]

## 2020-01-05 RX ORDER — CLOPIDOGREL BISULFATE 75 MG/1
75 TABLET ORAL DAILY
Qty: 90 TABLET | Refills: 3 | Status: SHIPPED | OUTPATIENT
Start: 2020-01-05

## 2020-01-09 ENCOUNTER — TELEPHONE (OUTPATIENT)
Dept: INTERNAL MEDICINE | Facility: CLINIC | Age: 85
End: 2020-01-09

## 2020-01-09 ENCOUNTER — TELEPHONE (OUTPATIENT)
Dept: PHARMACY | Facility: CLINIC | Age: 85
End: 2020-01-09

## 2020-01-09 NOTE — TELEPHONE ENCOUNTER
[facility] is requesting a refill on Losartan 25 mg, however, this is not listed in the pt's med list.    Please advise.    Thanks.

## 2020-01-10 ENCOUNTER — TELEPHONE (OUTPATIENT)
Dept: INTERNAL MEDICINE | Facility: CLINIC | Age: 85
End: 2020-01-10

## 2020-01-10 NOTE — TELEPHONE ENCOUNTER
Spoke to N[place] at [facility] pharmacy and let him know the pt is no longer on losartan .

## 2020-01-10 NOTE — TELEPHONE ENCOUNTER
----- Message from Janet Noriega sent at 1/10/2020 11:31 AM CST -----  Contact: Claire rodriguez [facility]   Claire state she need an Rx for losartan 25mg. I did not locate this Rx on the patient chart. Please call and advise.

## 2020-01-11 ENCOUNTER — OFFICE VISIT (OUTPATIENT)
Dept: URGENT CARE | Facility: CLINIC | Age: 85
End: 2020-01-11
Payer: MEDICARE

## 2020-01-11 VITALS
OXYGEN SATURATION: 96 % | DIASTOLIC BLOOD PRESSURE: 58 MMHG | RESPIRATION RATE: 18 BRPM | HEIGHT: 71 IN | WEIGHT: 143 LBS | TEMPERATURE: 98 F | SYSTOLIC BLOOD PRESSURE: 103 MMHG | HEART RATE: 93 BPM | BODY MASS INDEX: 20.02 KG/M2

## 2020-01-11 DIAGNOSIS — S51.812A LACERATION OF LEFT FOREARM, INITIAL ENCOUNTER: Primary | ICD-10-CM

## 2020-01-11 PROCEDURE — 99214 OFFICE O/P EST MOD 30 MIN: CPT | Mod: S$GLB,,, | Performed by: FAMILY MEDICINE

## 2020-01-11 PROCEDURE — 99214 PR OFFICE/OUTPT VISIT, EST, LEVL IV, 30-39 MIN: ICD-10-PCS | Mod: S$GLB,,, | Performed by: FAMILY MEDICINE

## 2020-01-11 NOTE — PATIENT INSTRUCTIONS
Wound Care  Taking proper care of your wound will help it heal. Your healthcare provider may show you how to clean and dress the wound. He or she will also explain how to tell if the wound is healing normally. If you are unsure of how to take care of the wound, be sure to clarify what dressing to use and how often you should change the bandages. Here are the basic steps.     A wound that's not healing normally may be dark in color or have white streaks.   Wash your hands  Tips for washing your hands include:  · Use liquid soap and lather for 2 minutes. Scrub between your fingers and under your nails.  · Rinse with warm water, keeping your fingers pointing down.  · Use a paper towel to dry your hands and to turn off the faucet.  Remove the used dressing  Here are suggestions for removing the dressing:  · If dressing changes cause you pain, be sure to take your pain medicine as prescribed by your healthcare provider 30 minutes before dressing changes.  · Set up your supplies.  · Put on disposable gloves if youre dressing a wound for someone else or your wound is infected.  · Loosen the tape by pulling gently toward the wound.  · Gently take off the old dressing. If the dressing is stuck to the wound, moisten it with saline (if available) or clean water.  · If you have a drain or tube in the wound, be careful not to pull on it.  · Remove the dressing 1 layer at a time and put it in a plastic bag.  · Remove your gloves.  Inspect and dress the wound  Check the wound carefully:  · Each time you change the dressing, inspect the wound carefully to be sure its healing normally by making sure your wound appears to be pink and moist, and is free of infection.    · Wash your hands again. Put on a new pair of gloves.  · Clean and dress the wound as directed by your healthcare provider or nurse. Do not put anything in the wound that is not prescribed or directed by your healthcare provider. If you have a drain or tube, be  careful not to pull on it. Make sure to secure the drain or tube as well.  · Put all supplies in a plastic bag. Seal the bag and put it in the trash.  · Be sure to wash your hands again.  Call your healthcare provider  Call your healthcare provider if you see any of the following signs of a problem:  · Bleeding that soaks the dressing  · Pink fluid weeping from the wound  · Increased drainage or drainage that is yellow, yellow-green, or foul-smelling  · Increased swelling or pain, or redness or swelling in the skin around the wound  · A change in the color of the wound, or if streaks develop in a direction away from the wound  · The area between any stitches opens up  · An increase in the size of the wound  · A fever of 100.4°F (38°C) or higher, or as directed by your healthcare provider  · Chills, increased fatigue, or a loss of appetite      Date Last Reviewed: 7/30/2015  © 3834-7163 [employer] MD. [address] [place] [address], [place] [address]. All rights reserved. This information is not intended as a substitute for professional medical care. Always follow your healthcare professional's instructions.

## 2020-01-11 NOTE — PROGRESS NOTES
"Subjective:       Patient ID: Thierry Ramos Jr. is a 85 y.o. male.    Vitals:  height is 5' 11" (1.803 m) and weight is 64.9 kg (143 lb). His blood pressure is 103/58 (abnormal) and his pulse is 93. His respiration is 18 and oxygen saturation is 96%.     Chief Complaint: Abrasion (Left Arm )    A 5-year-old male with the wife complains of tripped and fell injuring left arm has very thin skin and and is on blood thinner now having avulsed skin and bleeding from the left arm denies any pain and or around the elbow or on the shoulder denies any loss of consciousness no head injury    Laceration    The incident occurred less than 1 hour ago. The laceration is located on the left arm. The pain is at a severity of 5/10. The pain is mild. The pain has been fluctuating since onset. He reports no foreign bodies present. His tetanus status is UTD.       Constitution: Negative for fatigue.   HENT: Negative for facial swelling and facial trauma.    Neck: Negative for neck stiffness.   Cardiovascular: Negative for chest trauma.   Eyes: Negative for eye trauma, double vision and blurred vision.   Gastrointestinal: Negative for abdominal trauma, abdominal pain and rectal bleeding.   Genitourinary: Negative for hematuria, genital trauma and pelvic pain.   Musculoskeletal: Negative for pain, trauma, joint swelling, abnormal ROM of joint and pain with walking.   Skin: Positive for abrasion. Negative for color change, wound and laceration.   Neurological: Negative for dizziness, history of vertigo, light-headedness, coordination disturbances, altered mental status and loss of consciousness.   Hematologic/Lymphatic: Negative for history of bleeding disorder.   Psychiatric/Behavioral: Negative for altered mental status.       Objective:      Physical Exam   Constitutional: He is oriented to person, place, and time. He appears well-developed and well-nourished. He is cooperative.  Non-toxic appearance. He does not appear ill. No " distress.   HENT:   Head: Normocephalic and atraumatic.   Right Ear: Hearing, tympanic membrane, external ear and ear canal normal.   Left Ear: Hearing, tympanic membrane, external ear and ear canal normal.   Nose: Nose normal. No mucosal edema, rhinorrhea or nasal deformity. No epistaxis. Right sinus exhibits no maxillary sinus tenderness and no frontal sinus tenderness. Left sinus exhibits no maxillary sinus tenderness and no frontal sinus tenderness.   Mouth/Throat: Uvula is midline, oropharynx is clear and moist and mucous membranes are normal. No trismus in the jaw. Normal dentition. No uvula swelling. No posterior oropharyngeal erythema.   Eyes: Conjunctivae and lids are normal. Right eye exhibits no discharge. Left eye exhibits no discharge. No scleral icterus.   Neck: Trachea normal, normal range of motion, full passive range of motion without pain and phonation normal. Neck supple.   Cardiovascular: Normal rate, regular rhythm, normal heart sounds, intact distal pulses and normal pulses.   Pulmonary/Chest: Effort normal and breath sounds normal. No respiratory distress.   Abdominal: Soft. Normal appearance and bowel sounds are normal. He exhibits no distension, no pulsatile midline mass and no mass. There is no tenderness.   Musculoskeletal: Normal range of motion. He exhibits no edema or deformity.   Left elbow arm area with bruised skin and avulsed skin with the losing of blood no active bleeding noted otherwise elbow is full range of movement sloughed off skin removed and dressing placed since there is no area to hold the skin intact   Neurological: He is alert and oriented to person, place, and time. He exhibits normal muscle tone. Coordination normal.   Skin: Skin is warm, dry, intact, not diaphoretic and not pale.   Psychiatric: He has a normal mood and affect. His speech is normal and behavior is normal. Judgment and thought content normal. Cognition and memory are normal.   Nursing note and vitals  reviewed.        Assessment:       1. Laceration of left forearm, initial encounter        Plan:         Laceration of left forearm, initial encounter        patient and spouse explained about the wound care instruction and follow up with PCP/or wound Care Center

## 2020-01-12 NOTE — PROGRESS NOTES
Subjective:       Patient ID: Thierry Ramos Jr. is a 85 y.o. male.    Chief Complaint: Follow-up (hosp)    HPI   The patient presents for follow-up post ER visit 12/20/2019 for weakness and syncope.  The patient felt better with use of Levaquin for appear to 5 days.  Symptoms worsened off of the medication however.  He has chronic purulent sinus discharge at this juncture.  He is not experiencing any chills or fever.  The patient states that his weight dropped from 200 lb to 143 lb in recent years.  He does admit to decreased appetite.  He is using protein drinks as a supplement.  Bowel movements are normal.    Review of Systems   Constitutional: Positive for appetite change and fatigue. Negative for activity change and unexpected weight change.   HENT: Negative for sinus pressure and sore throat.    Eyes: Negative for visual disturbance.   Respiratory: Negative for cough, chest tightness, shortness of breath and wheezing.    Cardiovascular: Negative for chest pain, palpitations and leg swelling.   Gastrointestinal: Negative for abdominal pain, blood in stool, nausea and vomiting.   Genitourinary: Negative for dysuria, hematuria and urgency.   Musculoskeletal: Negative for arthralgias, back pain, gait problem, joint swelling, myalgias and neck stiffness.   Skin: Negative for color change and rash.   Neurological: Positive for weakness. Negative for dizziness, syncope, light-headedness, numbness and headaches.   Psychiatric/Behavioral: Negative for sleep disturbance.       Objective:      Physical Exam   Constitutional: He is oriented to person, place, and time. He appears well-developed and well-nourished. No distress.   HENT:   Head: Normocephalic and atraumatic.   Right Ear: External ear normal.   Left Ear: External ear normal.   Frontal sinuses are slightly tender to palpation.   Eyes: Conjunctivae and EOM are normal. No scleral icterus.   Neck: Normal range of motion. Neck supple. No thyromegaly present.    Cardiovascular: Normal rate, regular rhythm and normal heart sounds. Exam reveals no gallop and no friction rub.   No murmur heard.  Pulmonary/Chest: Effort normal and breath sounds normal. No respiratory distress. He has no wheezes. He has no rales.   Lymphadenopathy:     Cervical adenopathy: slightly tender anterior cervical nodes bilaterally.   Neurological: He is alert and oriented to person, place, and time.   Skin: Skin is warm and dry. No rash noted.   Nursing note and vitals reviewed.      Assessment:       1. Chronic frontal sinusitis    2. Weakness        Plan:     Thierry was seen today for follow-up.  Levaquin will be renewed.  Routine medications will be continued.  The patient will follow up with ENT regarding chronic sinusitis.    Diagnoses and all orders for this visit:    Chronic frontal sinusitis  -     Ambulatory consult to ENT    Weakness    Other orders  -     levoFLOXacin (LEVAQUIN) 500 MG tablet; Take 1 tablet (500 mg total) by mouth once daily.

## 2020-01-13 ENCOUNTER — OFFICE VISIT (OUTPATIENT)
Dept: PULMONOLOGY | Facility: CLINIC | Age: 85
End: 2020-01-13
Payer: MEDICARE

## 2020-01-13 VITALS
BODY MASS INDEX: 20.06 KG/M2 | DIASTOLIC BLOOD PRESSURE: 66 MMHG | SYSTOLIC BLOOD PRESSURE: 122 MMHG | WEIGHT: 143.31 LBS | OXYGEN SATURATION: 95 % | HEIGHT: 71 IN | HEART RATE: 85 BPM

## 2020-01-13 DIAGNOSIS — R05.3 COUGH, PERSISTENT: ICD-10-CM

## 2020-01-13 DIAGNOSIS — J32.9 CHRONIC SINUSITIS, UNSPECIFIED LOCATION: Chronic | ICD-10-CM

## 2020-01-13 LAB
ACID FAST MOD KINY STN SPEC: NORMAL
MYCOBACTERIUM SPEC QL CULT: NORMAL

## 2020-01-13 PROCEDURE — 99213 PR OFFICE/OUTPT VISIT, EST, LEVL III, 20-29 MIN: ICD-10-PCS | Mod: HCNC,S$GLB,, | Performed by: NURSE PRACTITIONER

## 2020-01-13 PROCEDURE — 99999 PR PBB SHADOW E&M-EST. PATIENT-LVL III: CPT | Mod: PBBFAC,HCNC,, | Performed by: NURSE PRACTITIONER

## 2020-01-13 PROCEDURE — 99213 OFFICE O/P EST LOW 20 MIN: CPT | Mod: HCNC,S$GLB,, | Performed by: NURSE PRACTITIONER

## 2020-01-13 PROCEDURE — 99999 PR PBB SHADOW E&M-EST. PATIENT-LVL III: ICD-10-PCS | Mod: PBBFAC,HCNC,, | Performed by: NURSE PRACTITIONER

## 2020-01-13 PROCEDURE — 99499 RISK ADDL DX/OHS AUDIT: ICD-10-PCS | Mod: S$GLB,,, | Performed by: NURSE PRACTITIONER

## 2020-01-13 PROCEDURE — 99499 UNLISTED E&M SERVICE: CPT | Mod: S$GLB,,, | Performed by: NURSE PRACTITIONER

## 2020-01-13 NOTE — PROGRESS NOTES
Subjective:       Patient ID: Thierry Ramos Jr. is a 85 y.o. male.    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 Patient was admitted to [facility] with PNA.     Evaluation as below:  CLL, on rituxan and steroids, recent pseudomonal PNA and treated with 21d of cefepime.  Back now because cough not much better and may have felt warm at home.  No fevers here.  He says he has been having symptoms for months now and not really worse.  CT chest with bilateral nodular and tree-in-bud infiltrates in lower lobes, infiltrates actually look better than past chest CT.       Recommendations:  1.  Immune suppressed and at risk for OIs of various types.  Would get sputum for bacterial and AFBs.  I don't think this is TB and he does not need airborne precautions, the AFBs would be to eval for NTBMB  2.  I don't think he needs abx as he isn't clearly worse and if he has an infection it is likely atypical and will need specific therapies.  Sputum culture pending can guide if abx are needed  3.  On room air, not much different from baseline.  Doesn't need to stay in hospital to await results of this workup as will likely take a little while to get prelim AFB results.  If sputum bateria and AFB smear neg, we can bronch and BAL him here if inpatient or bronch him as an outpatient      Patient reports positive yellow sputum. No fevers. Significant unintentional weight loss.     Interval HX 1/12/2020-    Since last vistation, he reports remains with productive cough- dark yellow at times. Has been prescribed Levaquin (just finished last pill yesterday)and referred to ENT for chronic rhinitis. Explains to have continous purleny nasal discharge.  Denies fever. Discloses riding stationary bike for 15-20 mins a day. Reports weight neural at this time.     Follow-up   Associated  symptoms include congestion and coughing. Pertinent negatives include no chest pain, fatigue or fever.     Review of Systems   Constitutional: Negative for fever, weight loss and fatigue.   HENT: Positive for postnasal drip and congestion.    Respiratory: Positive for cough, sputum production, shortness of breath (Reports has improved some.) and dyspnea on extertion. Negative for hemoptysis.    Cardiovascular: Negative for chest pain and leg swelling.   Gastrointestinal: Negative for acid reflux.       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    Encounter for blood transfusion     Hyperlipidemia     Hypertension     Hypopituitarism     Hypothyroid     Immunosuppression 3/13/2015    Squamous Cell Carcinoma     on the right side of the face     Squamous cell carcinoma of skin of right temple 1/27/2016    Stroke     Syncope 2/12    Unspecified disorder of kidney and ureter      Past Surgical History:   Procedure Laterality Date    CAROTID ENDARTERECTOMY (L)  2/27/2007    EXCISION TURBINATE, SUBMUCOUS      FESS  9/16/2014    Mohs excision   3/23/2015, 2/24/2016    combined with WLE forehead    NASAL SEPTUM SURGERY  9/16/2014    right ureter surgery  1995    SENTINEL LYMPH NODE BIOPSY  3/23/2015, 2/24/2016    SINUS SURGERY  9/16/2014    septo, ESS, BITSMR    Transphenoidal surgery       Social History     Socioeconomic History    Marital status:      Spouse name: Not on file    Number of children: Not on file    Years of education: Not on file    Highest education level: Not on file   Occupational History    Occupation: retired   Social Needs    Financial resource strain: Not very hard    Food insecurity:     Worry: Never true      Inability: Never true    Transportation needs:     Medical: No     Non-medical: No   Tobacco Use    Smoking status: Never Smoker    Smokeless tobacco: Never Used   Substance and Sexual Activity    Alcohol use: Not Currently     Alcohol/week: 2.0 standard drinks     Types: 1 Glasses of wine, 1 Cans of beer per week     Frequency: Never     Drinks per session: Patient refused     Binge frequency: Patient refused    Drug use: No    Sexual activity: Not Currently   Lifestyle    Physical activity:     Days per week: 5 days     Minutes per session: Not on file    Stress: To some extent   Relationships    Social connections:     Talks on phone: More than three times a week     Gets together: More than three times a week     Attends Yazidi service: Not on file     Active member of club or organization: No     Attends meetings of clubs or organizations: Never     Relationship status:    Other Topics Concern    Not on file   Social History Narrative    He is .     Family History   Problem Relation Age of Onset    Cancer Mother         breast    Colon cancer Father     Cancer Father         colon    Cancer Brother         leukemia    No Known Problems Sister     No Known Problems Maternal Aunt     No Known Problems Maternal Uncle     No Known Problems Paternal Aunt     No Known Problems Paternal Uncle     No Known Problems Maternal Grandmother     No Known Problems Maternal Grandfather     No Known Problems Paternal Grandmother     No Known Problems Paternal Grandfather     Amblyopia Neg Hx     Blindness Neg Hx     Cataracts Neg Hx     Diabetes Neg Hx     Glaucoma Neg Hx     Hypertension Neg Hx     Macular degeneration Neg Hx     Retinal detachment Neg Hx     Strabismus Neg Hx     Stroke Neg Hx     Thyroid disease Neg Hx     Allergic rhinitis Neg Hx     Allergies Neg Hx     Angioedema Neg Hx     Asthma Neg Hx     Atopy Neg Hx     Eczema Neg Hx     Immunodeficiency Neg  "Hx     Rhinitis Neg Hx     Urticaria Neg Hx      Review of patient's allergies indicates:   Allergen Reactions    Ace inhibitors Swelling     angioedema    Divalproex Hives     Rash under arms, body creases       Objective:       Vitals:    01/13/20 1313   BP: 122/66   Pulse: 85   SpO2: 95%   Weight: 65 kg (143 lb 4.8 oz)   Height: 5' 11" (1.803 m)   PainSc: 0-No pain     Physical Exam   Constitutional: He is oriented to person, place, and time. He appears well-developed and well-nourished. No distress.   HENT:   Head: Normocephalic.   Mouth/Throat: Oropharynx is clear and moist.   No sinus tenderness on palpation.    Neck: Normal range of motion. Neck supple.   Cardiovascular: Normal rate and regular rhythm.   Pulmonary/Chest: Normal expansion and effort normal. He has rhonchi.   Abdominal: Soft. Bowel sounds are normal.   Musculoskeletal: Normal range of motion. He exhibits no edema.   Neurological: He is alert and oriented to person, place, and time.   Skin: Skin is warm and dry. No rash noted. He is not diaphoretic. No erythema.   Psychiatric: He has a normal mood and affect. His behavior is normal.   Nursing note and vitals reviewed.    Personal Diagnostic Review  Sputum cultures :   AFB Culture & Smear Culture in progress P   AFB Culture & Smear Culture in progress P   AFB CULTURE STAIN No acid fast bacilli seen.      Respiratory Culture Abnormal    SERRATIA MARCESCENS   Few   Normal respiratory kelly also present    He was treated with Levaquin.       CT of chest: Significant interval improvement of the previously noted ground-glass opacities as well although there has been interval development of extensive tree-in-bud type opacities and what appears to represent some mucoid impaction of multiple bilateral lower lobe subsegmental bronchi within the lung bases and to a lesser degree the lingula as well.  Findings are again suspicious for an infectious process.      Assessment:       No diagnosis found.  "   Outpatient Encounter Medications as of 1/13/2020   Medication Sig Dispense Refill    ACCU-CHEK FASTCLIX Misc TEST  BLOOD  GLUCOSE FOUR TIMES DAILY 408 each 6    alcohol swabs PadM APPLY 1 PAD TOPICALLY  AS NEEDED. 300 each 3    aspirin 81 MG Chew Take 81 mg by mouth every Mon, Wed, Fri.      atorvastatin (LIPITOR) 20 MG tablet TAKE 1 TABLET EVERY DAY 90 tablet 3    budesonide (PULMICORT) 0.5 mg/2 mL nebulizer solution budesonide 0.5 mg/2 mL suspension for nebulization      chlorhexidine (PERIDEX) 0.12 % solution chlorhexidine gluconate 0.12 % mouthwash      clopidogrel (PLAVIX) 75 mg tablet Take 1 tablet (75 mg total) by mouth once daily. 90 tablet 3    cyanocobalamin, vitamin B-12, 5,000 mcg TbDL Take 1 tablet by mouth once daily.      cycloSPORINE (RESTASIS) 0.05 % ophthalmic emulsion Place 0.4 mLs (1 drop total) into both eyes 2 (two) times daily. 180 each 3    DOCOSAHEXANOIC ACID/EPA (FISH OIL ORAL) Take 5 capsules by mouth once daily.       erythromycin (ROMYCIN) ophthalmic ointment Apply to eyelids and lashes OU QHS 1 Tube 6    fluorouracil (EFUDEX) 5 % cream Use hs for 2 weeks on right cheek 40 g 3    furosemide (LASIX) 20 MG tablet       guaifen/dextromethorphan/pe (ROBITUSSIN COUGH & COLD CF MAX ORAL) Take 15 mLs by mouth 2 (two) times daily.      Lactobacillus acidophilus (ACIDOPHILUS ORAL) Take 1 tablet by mouth once daily.      levothyroxine (SYNTHROID) 125 MCG tablet TAKE 1 TABLET ONE TIME DAILY 90 tablet 3    multivitamin with minerals tablet Take 1 tablet by mouth once daily.      neomycin-polymyxin-dexamethasone (DEXACINE) 3.5 mg/g-10,000 unit/g-0.1 % Oint neomycin 3.5 mg/g-polymyxin B 10,000 unit/g-dexameth 0.1 % eye oint      neomycin-polymyxin-dexamethasone (MAXITROL) 3.5mg/mL-10,000 unit/mL-0.1 % DrpS neomycin-polymyxin-dexameth 3.5 mg/mL-10,000 unit/mL-0.1% eye drops      pantoprazole (PROTONIX) 40 MG tablet TAKE 1 TABLET EVERY DAY 90 tablet 3    predniSONE (DELTASONE) 2.5  MG tablet Take 1 tablet by mouth once daily. 5mg and 2.5mg qd      predniSONE (DELTASONE) 5 MG tablet Take 5 mg by mouth once daily.      sennosides/docusate sodium (EDEN-COLACE ORAL) Take 1 tablet by mouth once daily.      SYMBICORT 160-4.5 mcg/actuation HFAA Inhale 1 puff into the lungs as needed.      tenofovir alafenamide (VEMLIDY) 25 mg Tab Take 1 tablet (25 mg total) by mouth once daily. 30 tablet 11    VENTOLIN HFA 90 mcg/actuation inhaler Inhale 1 puff into the lungs as needed.      levoFLOXacin (LEVAQUIN) 500 MG tablet Take 1 tablet (500 mg total) by mouth once daily. (Patient not taking: Reported on 1/13/2020) 10 tablet 0    tazarotene (AVAGE) 0.1 % cream Apply topically every evening. (Patient taking differently: Apply topically as needed. ) 30 g 3    testosterone cypionate (DEPOTESTOTERONE CYPIONATE) 100 mg/mL injection Inject 0.5 mLs (50 mg total) into the muscle every 14 (fourteen) days. 10 mL 5     No facility-administered encounter medications on file as of 1/13/2020.      No orders of the defined types were placed in this encounter.    Plan:     Problem List Items Addressed This Visit        ENT    Chronic rhinosinusitis (Chronic)    Current Assessment & Plan     Patient was referred to ENT has appt 1st week in Feb. Continue zyrtec, topical nasal steroids and nasal saline rinse.             Pulmonary    Cough, persistent    Current Assessment & Plan     AFB negative. No distress; VSS. Patient reports remains with cough. He denies fever. Has CLL and immune suppressed. Recently finished antibiotics. Discussed case with Dr Christensen. Will obtain CT of chest non contrast. If CT unchanged will consider Bronchoscopy. Discussed with pt and wife no further antibiotic treatment unless having fever. He is to call Dr. Christensen or myself if requiring antibiotics. Concerns for possible MAC infection.     Chronic rhinitis- has been referred to ENT.          Relevant Orders    CT Chest Without Contrast      Follow  up after imaging.

## 2020-01-13 NOTE — ASSESSMENT & PLAN NOTE
Patient was referred to ENT has appt 1st week in Feb. Continue zyrtec, topical nasal steroids and nasal saline rinse.

## 2020-01-13 NOTE — ASSESSMENT & PLAN NOTE
AFB negative. No distress; VSS. Patient reports remains with cough. He denies fever. Has CLL and immune suppressed. Recently finished antibiotics. Discussed case with Dr Christensen. Will obtain CT of chest non contrast. If CT unchanged will consider Bronchoscopy. Discussed with pt and wife no further antibiotic treatment unless having fever. He is to call Dr. Christensen or myself if requiring antibiotics. Concerns for possible MAC infection.     Chronic rhinitis- has been referred to ENT.

## 2020-01-13 NOTE — H&P (VIEW-ONLY)
Subjective:       Patient ID: Thierry Ramos Jr. is a 85 y.o. male.    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 Patient was admitted to [facility] with PNA.     Evaluation as below:  CLL, on rituxan and steroids, recent pseudomonal PNA and treated with 21d of cefepime.  Back now because cough not much better and may have felt warm at home.  No fevers here.  He says he has been having symptoms for months now and not really worse.  CT chest with bilateral nodular and tree-in-bud infiltrates in lower lobes, infiltrates actually look better than past chest CT.       Recommendations:  1.  Immune suppressed and at risk for OIs of various types.  Would get sputum for bacterial and AFBs.  I don't think this is TB and he does not need airborne precautions, the AFBs would be to eval for NTBMB  2.  I don't think he needs abx as he isn't clearly worse and if he has an infection it is likely atypical and will need specific therapies.  Sputum culture pending can guide if abx are needed  3.  On room air, not much different from baseline.  Doesn't need to stay in hospital to await results of this workup as will likely take a little while to get prelim AFB results.  If sputum bateria and AFB smear neg, we can bronch and BAL him here if inpatient or bronch him as an outpatient      Patient reports positive yellow sputum. No fevers. Significant unintentional weight loss.     Interval HX 1/12/2020-    Since last vistation, he reports remains with productive cough- dark yellow at times. Has been prescribed Levaquin (just finished last pill yesterday)and referred to ENT for chronic rhinitis. Explains to have continous purleny nasal discharge.  Denies fever. Discloses riding stationary bike for 15-20 mins a day. Reports weight neural at this time.     Follow-up   Associated  symptoms include congestion and coughing. Pertinent negatives include no chest pain, fatigue or fever.     Review of Systems   Constitutional: Negative for fever, weight loss and fatigue.   HENT: Positive for postnasal drip and congestion.    Respiratory: Positive for cough, sputum production, shortness of breath (Reports has improved some.) and dyspnea on extertion. Negative for hemoptysis.    Cardiovascular: Negative for chest pain and leg swelling.   Gastrointestinal: Negative for acid reflux.       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    Encounter for blood transfusion     Hyperlipidemia     Hypertension     Hypopituitarism     Hypothyroid     Immunosuppression 3/13/2015    Squamous Cell Carcinoma     on the right side of the face     Squamous cell carcinoma of skin of right temple 1/27/2016    Stroke     Syncope 2/12    Unspecified disorder of kidney and ureter      Past Surgical History:   Procedure Laterality Date    CAROTID ENDARTERECTOMY (L)  2/27/2007    EXCISION TURBINATE, SUBMUCOUS      FESS  9/16/2014    Mohs excision   3/23/2015, 2/24/2016    combined with WLE forehead    NASAL SEPTUM SURGERY  9/16/2014    right ureter surgery  1995    SENTINEL LYMPH NODE BIOPSY  3/23/2015, 2/24/2016    SINUS SURGERY  9/16/2014    septo, ESS, BITSMR    Transphenoidal surgery       Social History     Socioeconomic History    Marital status:      Spouse name: Not on file    Number of children: Not on file    Years of education: Not on file    Highest education level: Not on file   Occupational History    Occupation: retired   Social Needs    Financial resource strain: Not very hard    Food insecurity:     Worry: Never true      Inability: Never true    Transportation needs:     Medical: No     Non-medical: No   Tobacco Use    Smoking status: Never Smoker    Smokeless tobacco: Never Used   Substance and Sexual Activity    Alcohol use: Not Currently     Alcohol/week: 2.0 standard drinks     Types: 1 Glasses of wine, 1 Cans of beer per week     Frequency: Never     Drinks per session: Patient refused     Binge frequency: Patient refused    Drug use: No    Sexual activity: Not Currently   Lifestyle    Physical activity:     Days per week: 5 days     Minutes per session: Not on file    Stress: To some extent   Relationships    Social connections:     Talks on phone: More than three times a week     Gets together: More than three times a week     Attends [employer] service: Not on file     Active member of club or organization: No     Attends meetings of clubs or organizations: Never     Relationship status:    Other Topics Concern    Not on file   Social History Narrative    He is .     Family History   Problem Relation Age of Onset    Cancer Mother         breast    Colon cancer Father     Cancer Father         colon    Cancer Brother         leukemia    No Known Problems Sister     No Known Problems Maternal Aunt     No Known Problems Maternal Uncle     No Known Problems Paternal Aunt     No Known Problems Paternal Uncle     No Known Problems Maternal Grandmother     No Known Problems Maternal Grandfather     No Known Problems Paternal Grandmother     No Known Problems Paternal Grandfather     Amblyopia Neg Hx     Blindness Neg Hx     Cataracts Neg Hx     Diabetes Neg Hx     Glaucoma Neg Hx     Hypertension Neg Hx     Macular degeneration Neg Hx     Retinal detachment Neg Hx     Strabismus Neg Hx     Stroke Neg Hx     Thyroid disease Neg Hx     Allergic rhinitis Neg Hx     Allergies Neg Hx     Angioedema Neg Hx     Asthma Neg Hx     Atopy Neg Hx     Eczema Neg Hx     Immunodeficiency Neg  "Hx     Rhinitis Neg Hx     Urticaria Neg Hx      Review of patient's allergies indicates:   Allergen Reactions    Ace inhibitors Swelling     angioedema    Divalproex Hives     Rash under arms, body creases       Objective:       Vitals:    01/13/20 1313   BP: 122/66   Pulse: 85   SpO2: 95%   Weight: 65 kg (143 lb 4.8 oz)   Height: 5' 11" (1.803 m)   PainSc: 0-No pain     Physical Exam   Constitutional: He is oriented to person, place, and time. He appears well-developed and well-nourished. No distress.   HENT:   Head: Normocephalic.   Mouth/Throat: Oropharynx is clear and moist.   No sinus tenderness on palpation.    Neck: Normal range of motion. Neck supple.   Cardiovascular: Normal rate and regular rhythm.   Pulmonary/Chest: Normal expansion and effort normal. He has rhonchi.   Abdominal: Soft. Bowel sounds are normal.   Musculoskeletal: Normal range of motion. He exhibits no edema.   Neurological: He is alert and oriented to person, place, and time.   Skin: Skin is warm and dry. No rash noted. He is not diaphoretic. No erythema.   Psychiatric: He has a normal mood and affect. His behavior is normal.   Nursing note and vitals reviewed.    Personal Diagnostic Review  Sputum cultures :   AFB Culture & Smear Culture in progress P   AFB Culture & Smear Culture in progress P   AFB CULTURE STAIN No acid fast bacilli seen.      Respiratory Culture Abnormal    SERRATIA MARCESCENS   Few   Normal respiratory kelly also present    He was treated with Levaquin.       CT of chest: Significant interval improvement of the previously noted ground-glass opacities as well although there has been interval development of extensive tree-in-bud type opacities and what appears to represent some mucoid impaction of multiple bilateral lower lobe subsegmental bronchi within the lung bases and to a lesser degree the lingula as well.  Findings are again suspicious for an infectious process.      Assessment:       No diagnosis found.  "   Outpatient Encounter Medications as of 1/13/2020   Medication Sig Dispense Refill    ACCU-CHEK FASTCLIX Misc TEST  BLOOD  GLUCOSE FOUR TIMES DAILY 408 each 6    alcohol swabs PadM APPLY 1 PAD TOPICALLY  AS NEEDED. 300 each 3    aspirin 81 MG Chew Take 81 mg by mouth every Mon, Wed, Fri.      atorvastatin (LIPITOR) 20 MG tablet TAKE 1 TABLET EVERY DAY 90 tablet 3    budesonide (PULMICORT) 0.5 mg/2 mL nebulizer solution budesonide 0.5 mg/2 mL suspension for nebulization      chlorhexidine (PERIDEX) 0.12 % solution chlorhexidine gluconate 0.12 % mouthwash      clopidogrel (PLAVIX) 75 mg tablet Take 1 tablet (75 mg total) by mouth once daily. 90 tablet 3    cyanocobalamin, vitamin B-12, 5,000 mcg TbDL Take 1 tablet by mouth once daily.      cycloSPORINE (RESTASIS) 0.05 % ophthalmic emulsion Place 0.4 mLs (1 drop total) into both eyes 2 (two) times daily. 180 each 3    DOCOSAHEXANOIC ACID/EPA (FISH OIL ORAL) Take 5 capsules by mouth once daily.       erythromycin (ROMYCIN) ophthalmic ointment Apply to eyelids and lashes OU QHS 1 Tube 6    fluorouracil (EFUDEX) 5 % cream Use hs for 2 weeks on right cheek 40 g 3    furosemide (LASIX) 20 MG tablet       guaifen/dextromethorphan/pe (ROBITUSSIN COUGH & COLD CF MAX ORAL) Take 15 mLs by mouth 2 (two) times daily.      Lactobacillus acidophilus (ACIDOPHILUS ORAL) Take 1 tablet by mouth once daily.      levothyroxine (SYNTHROID) 125 MCG tablet TAKE 1 TABLET ONE TIME DAILY 90 tablet 3    multivitamin with minerals tablet Take 1 tablet by mouth once daily.      neomycin-polymyxin-dexamethasone (DEXACINE) 3.5 mg/g-10,000 unit/g-0.1 % Oint neomycin 3.5 mg/g-polymyxin B 10,000 unit/g-dexameth 0.1 % eye oint      neomycin-polymyxin-dexamethasone (MAXITROL) 3.5mg/mL-10,000 unit/mL-0.1 % DrpS neomycin-polymyxin-dexameth 3.5 mg/mL-10,000 unit/mL-0.1% eye drops      pantoprazole (PROTONIX) 40 MG tablet TAKE 1 TABLET EVERY DAY 90 tablet 3    predniSONE (DELTASONE) 2.5  MG tablet Take 1 tablet by mouth once daily. 5mg and 2.5mg qd      predniSONE (DELTASONE) 5 MG tablet Take 5 mg by mouth once daily.      sennosides/docusate sodium (EDEN-COLACE ORAL) Take 1 tablet by mouth once daily.      SYMBICORT 160-4.5 mcg/actuation HFAA Inhale 1 puff into the lungs as needed.      tenofovir alafenamide (VEMLIDY) 25 mg Tab Take 1 tablet (25 mg total) by mouth once daily. 30 tablet 11    VENTOLIN HFA 90 mcg/actuation inhaler Inhale 1 puff into the lungs as needed.      levoFLOXacin (LEVAQUIN) 500 MG tablet Take 1 tablet (500 mg total) by mouth once daily. (Patient not taking: Reported on 1/13/2020) 10 tablet 0    tazarotene (AVAGE) 0.1 % cream Apply topically every evening. (Patient taking differently: Apply topically as needed. ) 30 g 3    testosterone cypionate (DEPOTESTOTERONE CYPIONATE) 100 mg/mL injection Inject 0.5 mLs (50 mg total) into the muscle every 14 (fourteen) days. 10 mL 5     No facility-administered encounter medications on file as of 1/13/2020.      No orders of the defined types were placed in this encounter.    Plan:     Problem List Items Addressed This Visit        ENT    Chronic rhinosinusitis (Chronic)    Current Assessment & Plan     Patient was referred to ENT has appt 1st week in Feb. Continue zyrtec, topical nasal steroids and nasal saline rinse.             Pulmonary    Cough, persistent    Current Assessment & Plan     AFB negative. No distress; VSS. Patient reports remains with cough. He denies fever. Has CLL and immune suppressed. Recently finished antibiotics. Discussed case with Dr Christensen. Will obtain CT of chest non contrast. If CT unchanged will consider Bronchoscopy. Discussed with pt and wife no further antibiotic treatment unless having fever. He is to call Dr. Christensen or myself if requiring antibiotics. Concerns for possible MAC infection.     Chronic rhinitis- has been referred to ENT.          Relevant Orders    CT Chest Without Contrast      Follow  up after imaging.

## 2020-01-14 ENCOUNTER — PATIENT MESSAGE (OUTPATIENT)
Dept: ADMINISTRATIVE | Facility: OTHER | Age: 85
End: 2020-01-14

## 2020-01-14 ENCOUNTER — CLINICAL SUPPORT (OUTPATIENT)
Dept: URGENT CARE | Facility: CLINIC | Age: 85
End: 2020-01-14
Payer: MEDICARE

## 2020-01-14 VITALS
TEMPERATURE: 98 F | HEART RATE: 68 BPM | OXYGEN SATURATION: 96 % | DIASTOLIC BLOOD PRESSURE: 72 MMHG | HEIGHT: 71 IN | RESPIRATION RATE: 18 BRPM | BODY MASS INDEX: 20.02 KG/M2 | SYSTOLIC BLOOD PRESSURE: 138 MMHG | WEIGHT: 143 LBS

## 2020-01-14 DIAGNOSIS — E29.1 SECONDARY MALE HYPOGONADISM: ICD-10-CM

## 2020-01-14 DIAGNOSIS — S41.132D: Primary | ICD-10-CM

## 2020-01-14 PROCEDURE — 96372 PR INJECTION,THERAP/PROPH/DIAG2ST, IM OR SUBCUT: ICD-10-PCS | Mod: S$GLB,,, | Performed by: FAMILY MEDICINE

## 2020-01-14 PROCEDURE — 99214 OFFICE O/P EST MOD 30 MIN: CPT | Mod: 25,S$GLB,, | Performed by: FAMILY MEDICINE

## 2020-01-14 PROCEDURE — 99214 PR OFFICE/OUTPT VISIT, EST, LEVL IV, 30-39 MIN: ICD-10-PCS | Mod: 25,S$GLB,, | Performed by: FAMILY MEDICINE

## 2020-01-14 PROCEDURE — 96372 THER/PROPH/DIAG INJ SC/IM: CPT | Mod: S$GLB,,, | Performed by: FAMILY MEDICINE

## 2020-01-14 RX ORDER — KETOROLAC TROMETHAMINE 30 MG/ML
30 INJECTION, SOLUTION INTRAMUSCULAR; INTRAVENOUS
Status: COMPLETED | OUTPATIENT
Start: 2020-01-14 | End: 2020-01-14

## 2020-01-14 RX ORDER — MUPIROCIN 20 MG/G
OINTMENT TOPICAL
Qty: 22 G | Refills: 1 | Status: SHIPPED | OUTPATIENT
Start: 2020-01-14

## 2020-01-14 RX ORDER — TESTOSTERONE CYPIONATE 1000 MG/10ML
50 INJECTION, SOLUTION INTRAMUSCULAR
Qty: 10 ML | Refills: 5 | Status: SHIPPED | OUTPATIENT
Start: 2020-01-14 | End: 2020-07-14

## 2020-01-14 RX ADMIN — KETOROLAC TROMETHAMINE 30 MG: 30 INJECTION, SOLUTION INTRAMUSCULAR; INTRAVENOUS at 11:01

## 2020-01-14 NOTE — PROGRESS NOTES
"Subjective:       Patient ID: Thierry Ramos Jr. is a 85 y.o. male.    Vitals:  height is 5' 11" (1.803 m) and weight is 64.9 kg (143 lb). His respiration is 18.     Chief Complaint: No chief complaint on file.    Wound Check   There has been no drainage from the wound. There is no redness present. There is no swelling present. There is no pain present.       Constitution: Negative for chills, fatigue and fever.   HENT: Negative for congestion and sore throat.    Neck: Negative for painful lymph nodes.   Cardiovascular: Negative for chest pain and leg swelling.   Eyes: Negative for double vision and blurred vision.   Respiratory: Negative for cough and shortness of breath.    Gastrointestinal: Negative for nausea, vomiting and diarrhea.   Genitourinary: Negative for dysuria, frequency and urgency.   Musculoskeletal: Negative for joint pain, joint swelling, muscle cramps and muscle ache.   Skin: Negative for color change, pale and rash.   Allergic/Immunologic: Negative for seasonal allergies.   Neurological: Negative for dizziness, history of vertigo, light-headedness, passing out and headaches.   Hematologic/Lymphatic: Negative for swollen lymph nodes, easy bruising/bleeding and history of blood clots. Does not bruise/bleed easily.   Psychiatric/Behavioral: Negative for nervous/anxious, sleep disturbance and depression. The patient is not nervous/anxious.        Objective:      Physical Exam      Assessment:       No diagnosis found.    Plan:         There are no diagnoses linked to this encounter.       "

## 2020-01-14 NOTE — PROGRESS NOTES
"Subjective:       Patient ID: Thierry Ramos Jr. is a 85 y.o. male.    Vitals:  height is 5' 11" (1.803 m) and weight is 64.9 kg (143 lb). His oral temperature is 98.2 °F (36.8 °C). His blood pressure is 138/72 and his pulse is 68. His respiration is 18 and oxygen saturation is 96%.     Chief Complaint: Wound Check    Wound Check   He was originally treated 5 to 10 days ago (X1 WEEK). Previous treatment included wound cleansing or irrigation and burn dressing. His temperature was unmeasured prior to arrival. There has been bloody discharge from the wound. The redness has improved. The swelling has improved. The pain has improved. There is difficulty moving the extremity or digit due to pain.       Constitution: Negative for chills, fatigue and fever.   HENT: Negative for congestion and sore throat.    Neck: Negative for painful lymph nodes.   Cardiovascular: Negative for chest pain and leg swelling.   Eyes: Negative for double vision and blurred vision.   Respiratory: Negative for cough and shortness of breath.    Gastrointestinal: Negative for nausea, vomiting and diarrhea.   Genitourinary: Negative for dysuria, frequency and urgency.   Musculoskeletal: Positive for pain and trauma. Negative for joint pain, joint swelling, muscle cramps and muscle ache.   Skin: Positive for wound. Negative for color change, pale and rash.   Allergic/Immunologic: Negative for seasonal allergies.   Neurological: Negative for dizziness, history of vertigo, light-headedness, passing out and headaches.   Hematologic/Lymphatic: Negative for swollen lymph nodes, easy bruising/bleeding and history of blood clots. Does not bruise/bleed easily.   Psychiatric/Behavioral: Negative for nervous/anxious, sleep disturbance and depression. The patient is not nervous/anxious.        Objective:      Physical Exam      Assessment:       1. Puncture wound of multiple sites of left upper extremity, subsequent encounter        Plan:         Puncture " wound of multiple sites of left upper extremity, subsequent encounter    Other orders  -     ketorolac injection 30 mg  -     mupirocin (BACTROBAN) 2 % ointment; Apply to affected area 3 times daily  Dispense: 22 g; Refill: 1

## 2020-01-16 ENCOUNTER — HOSPITAL ENCOUNTER (OUTPATIENT)
Dept: RADIOLOGY | Facility: HOSPITAL | Age: 85
Discharge: HOME | End: 2020-01-16
Attending: NURSE PRACTITIONER
Payer: MEDICARE

## 2020-01-16 DIAGNOSIS — R05.3 COUGH, PERSISTENT: ICD-10-CM

## 2020-01-16 DIAGNOSIS — D69.6 THROMBOCYTOPENIA, UNSPECIFIED: Primary | ICD-10-CM

## 2020-01-16 PROCEDURE — 71250 CT THORAX DX C-: CPT | Mod: TC,HCNC

## 2020-01-16 PROCEDURE — 71250 CT THORAX DX C-: CPT | Mod: 26,HCNC,, | Performed by: RADIOLOGY

## 2020-01-16 PROCEDURE — 71250 CT CHEST WITHOUT CONTRAST: ICD-10-PCS | Mod: 26,HCNC,, | Performed by: RADIOLOGY

## 2020-01-16 NOTE — PROGRESS NOTES
Spoke with patient in regards to abnormal CT of chest. I discussed and reviewed patient case and CT with Dr. Christensen. He will need to proceed with Bronchoscopy (BAL without Fluro). He would like to have bronch on 1/22. He was instructed to stop Plavix 5 days before procedure- which would need to stop tomorrow. Also,  Will check CBC prior with hx of thrombocytopenia.

## 2020-01-17 ENCOUNTER — LAB VISIT (OUTPATIENT)
Dept: LAB | Facility: HOSPITAL | Age: 85
End: 2020-01-17
Attending: NURSE PRACTITIONER
Payer: MEDICARE

## 2020-01-17 DIAGNOSIS — D69.6 THROMBOCYTOPENIA, UNSPECIFIED: ICD-10-CM

## 2020-01-17 LAB
ANISOCYTOSIS BLD QL SMEAR: SLIGHT
BASOPHILS # BLD AUTO: 0.05 K/UL (ref 0–0.2)
BASOPHILS NFR BLD: 0.7 % (ref 0–1.9)
DACRYOCYTES BLD QL SMEAR: ABNORMAL
DIFFERENTIAL METHOD: ABNORMAL
EOSINOPHIL # BLD AUTO: 0 K/UL (ref 0–0.5)
EOSINOPHIL NFR BLD: 0.5 % (ref 0–8)
ERYTHROCYTE [DISTWIDTH] IN BLOOD BY AUTOMATED COUNT: 15.4 % (ref 11.5–14.5)
HCT VFR BLD AUTO: 42.6 % (ref 40–54)
HGB BLD-MCNC: 13.1 G/DL (ref 14–18)
IMM GRANULOCYTES # BLD AUTO: 0.2 K/UL (ref 0–0.04)
IMM GRANULOCYTES NFR BLD AUTO: 2.7 % (ref 0–0.5)
LYMPHOCYTES # BLD AUTO: 1.3 K/UL (ref 1–4.8)
LYMPHOCYTES NFR BLD: 17.9 % (ref 18–48)
MCH RBC QN AUTO: 30.7 PG (ref 27–31)
MCHC RBC AUTO-ENTMCNC: 30.8 G/DL (ref 32–36)
MCV RBC AUTO: 100 FL (ref 82–98)
MONOCYTES # BLD AUTO: 0.7 K/UL (ref 0.3–1)
MONOCYTES NFR BLD: 9.2 % (ref 4–15)
NEUTROPHILS # BLD AUTO: 5.2 K/UL (ref 1.8–7.7)
NEUTROPHILS NFR BLD: 69 % (ref 38–73)
NRBC BLD-RTO: 0 /100 WBC
OVALOCYTES BLD QL SMEAR: ABNORMAL
PLATELET # BLD AUTO: 128 K/UL (ref 150–350)
PMV BLD AUTO: 9.6 FL (ref 9.2–12.9)
POIKILOCYTOSIS BLD QL SMEAR: SLIGHT
RBC # BLD AUTO: 4.27 M/UL (ref 4.6–6.2)
WBC # BLD AUTO: 7.48 K/UL (ref 3.9–12.7)

## 2020-01-17 PROCEDURE — 36415 COLL VENOUS BLD VENIPUNCTURE: CPT | Mod: HCNC,PO

## 2020-01-17 PROCEDURE — 85025 COMPLETE CBC W/AUTO DIFF WBC: CPT | Mod: HCNC

## 2020-01-21 ENCOUNTER — TELEPHONE (OUTPATIENT)
Dept: INTERNAL MEDICINE | Facility: CLINIC | Age: 85
End: 2020-01-21

## 2020-01-21 NOTE — TELEPHONE ENCOUNTER
Spoke with pt's wife who advises that this message was to be routed to Dr. Wilburn in Pulmonary.    Please contact the pt or his wife to discuss procedure details for 1/22/20.    Thanks.

## 2020-01-21 NOTE — TELEPHONE ENCOUNTER
----- Message from Mila Jansen MA sent at 1/21/2020 12:11 PM CST -----  Contact: [facility]/[phone]  Requesting a call in reference to procs details on tomorrow

## 2020-01-22 ENCOUNTER — HOSPITAL ENCOUNTER (OUTPATIENT)
Facility: HOSPITAL | Age: 85
Discharge: HOME | End: 2020-01-22
Attending: INTERNAL MEDICINE | Admitting: INTERNAL MEDICINE
Payer: MEDICARE

## 2020-01-22 VITALS
HEIGHT: 71 IN | RESPIRATION RATE: 20 BRPM | SYSTOLIC BLOOD PRESSURE: 126 MMHG | HEART RATE: 68 BPM | TEMPERATURE: 99 F | OXYGEN SATURATION: 95 % | DIASTOLIC BLOOD PRESSURE: 61 MMHG | BODY MASS INDEX: 20.02 KG/M2 | WEIGHT: 143 LBS

## 2020-01-22 DIAGNOSIS — R91.8 PULMONARY INFILTRATE: ICD-10-CM

## 2020-01-22 DIAGNOSIS — R05.3 COUGH, PERSISTENT: Primary | ICD-10-CM

## 2020-01-22 PROCEDURE — 87106 FUNGI IDENTIFICATION YEAST: CPT | Mod: HCNC

## 2020-01-22 PROCEDURE — 88312 SPECIAL STAINS GROUP 1: CPT | Mod: HCNC | Performed by: PATHOLOGY

## 2020-01-22 PROCEDURE — 99153 MOD SED SAME PHYS/QHP EA: CPT | Mod: HCNC | Performed by: INTERNAL MEDICINE

## 2020-01-22 PROCEDURE — 88112 CYTOPATH CELL ENHANCE TECH: CPT | Mod: HCNC | Performed by: PATHOLOGY

## 2020-01-22 PROCEDURE — 87015 SPECIMEN INFECT AGNT CONCNTJ: CPT | Mod: 59,HCNC

## 2020-01-22 PROCEDURE — 88112 PR  CYTOPATH, CELL ENHANCE TECH: ICD-10-PCS | Mod: 26,HCNC,, | Performed by: PATHOLOGY

## 2020-01-22 PROCEDURE — 87205 SMEAR GRAM STAIN: CPT | Mod: HCNC

## 2020-01-22 PROCEDURE — 87102 FUNGUS ISOLATION CULTURE: CPT | Mod: HCNC

## 2020-01-22 PROCEDURE — 88312 SPECIAL STAINS GROUP 1: CPT | Mod: 26,HCNC,, | Performed by: PATHOLOGY

## 2020-01-22 PROCEDURE — 31624 DX BRONCHOSCOPE/LAVAGE: CPT | Mod: HCNC,RT,, | Performed by: INTERNAL MEDICINE

## 2020-01-22 PROCEDURE — 63600175 PHARM REV CODE 636 W HCPCS: Mod: HCNC | Performed by: INTERNAL MEDICINE

## 2020-01-22 PROCEDURE — 87070 CULTURE OTHR SPECIMN AEROBIC: CPT | Mod: HCNC

## 2020-01-22 PROCEDURE — 31624 DX BRONCHOSCOPE/LAVAGE: CPT | Mod: HCNC | Performed by: INTERNAL MEDICINE

## 2020-01-22 PROCEDURE — 31624 PR BRONCHOSCOPY,DIAG2STIC W LAVAGE: ICD-10-PCS | Mod: HCNC,RT,, | Performed by: INTERNAL MEDICINE

## 2020-01-22 PROCEDURE — 87116 MYCOBACTERIA CULTURE: CPT | Mod: 59,HCNC

## 2020-01-22 PROCEDURE — 25000003 PHARM REV CODE 250: Mod: HCNC | Performed by: INTERNAL MEDICINE

## 2020-01-22 PROCEDURE — 88305 TISSUE EXAM BY PATHOLOGIST: CPT | Mod: HCNC | Performed by: PATHOLOGY

## 2020-01-22 PROCEDURE — 99152 MOD SED SAME PHYS/QHP 5/>YRS: CPT | Mod: HCNC | Performed by: INTERNAL MEDICINE

## 2020-01-22 PROCEDURE — 88312 PR  SPECIAL STAINS,GROUP I: ICD-10-PCS | Mod: 26,HCNC,, | Performed by: PATHOLOGY

## 2020-01-22 PROCEDURE — 88305 TISSUE EXAM BY PATHOLOGIST: CPT | Mod: 26,HCNC,, | Performed by: PATHOLOGY

## 2020-01-22 PROCEDURE — 88305 TISSUE EXAM BY PATHOLOGIST: ICD-10-PCS | Mod: 26,HCNC,, | Performed by: PATHOLOGY

## 2020-01-22 PROCEDURE — 88112 CYTOPATH CELL ENHANCE TECH: CPT | Mod: 26,HCNC,, | Performed by: PATHOLOGY

## 2020-01-22 PROCEDURE — 87206 SMEAR FLUORESCENT/ACID STAI: CPT | Mod: HCNC

## 2020-01-22 RX ORDER — MIDAZOLAM HYDROCHLORIDE 5 MG/ML
INJECTION INTRAMUSCULAR; INTRAVENOUS CODE/TRAUMA/SEDATION MEDICATION
Status: COMPLETED | OUTPATIENT
Start: 2020-01-22 | End: 2020-01-22

## 2020-01-22 RX ORDER — FENTANYL CITRATE 50 UG/ML
INJECTION, SOLUTION INTRAMUSCULAR; INTRAVENOUS CODE/TRAUMA/SEDATION MEDICATION
Status: COMPLETED | OUTPATIENT
Start: 2020-01-22 | End: 2020-01-22

## 2020-01-22 RX ORDER — LIDOCAINE HYDROCHLORIDE 20 MG/ML
SOLUTION OROPHARYNGEAL CODE/TRAUMA/SEDATION MEDICATION
Status: COMPLETED | OUTPATIENT
Start: 2020-01-22 | End: 2020-01-22

## 2020-01-22 RX ORDER — LIDOCAINE HYDROCHLORIDE 10 MG/ML
INJECTION INFILTRATION; PERINEURAL CODE/TRAUMA/SEDATION MEDICATION
Status: COMPLETED | OUTPATIENT
Start: 2020-01-22 | End: 2020-01-22

## 2020-01-22 RX ORDER — LOSARTAN POTASSIUM 25 MG/1
25 TABLET ORAL DAILY
COMMUNITY
End: 2020-03-16 | Stop reason: SDUPTHER

## 2020-01-22 RX ORDER — LIDOCAINE HYDROCHLORIDE 20 MG/ML
INJECTION, SOLUTION INFILTRATION; PERINEURAL CODE/TRAUMA/SEDATION MEDICATION
Status: COMPLETED | OUTPATIENT
Start: 2020-01-22 | End: 2020-01-22

## 2020-01-22 RX ADMIN — LIDOCAINE HYDROCHLORIDE 5 ML: 20 SOLUTION OROPHARYNGEAL at 10:01

## 2020-01-22 RX ADMIN — LIDOCAINE HYDROCHLORIDE 6 ML: 20 INJECTION, SOLUTION INFILTRATION; PERINEURAL at 10:01

## 2020-01-22 RX ADMIN — LIDOCAINE HYDROCHLORIDE 2 ML: 10 INJECTION, SOLUTION INFILTRATION; PERINEURAL at 10:01

## 2020-01-22 RX ADMIN — FENTANYL CITRATE 25 MCG: 50 INJECTION, SOLUTION INTRAMUSCULAR; INTRAVENOUS at 10:01

## 2020-01-22 RX ADMIN — LIDOCAINE HYDROCHLORIDE 8 ML: 10 INJECTION, SOLUTION INFILTRATION; PERINEURAL at 10:01

## 2020-01-22 RX ADMIN — MIDAZOLAM HYDROCHLORIDE 1 MG: 5 INJECTION, SOLUTION INTRAMUSCULAR; INTRAVENOUS at 10:01

## 2020-01-22 RX ADMIN — TOPICAL ANESTHETIC 0.5 ML: 200 SPRAY DENTAL; PERIODONTAL at 10:01

## 2020-01-22 NOTE — DISCHARGE INSTRUCTIONS
Flexible Bronchoscopy  A flexible bronchoscopy is an exam of the airways of your lungs. A thin, flexible tube called a bronchoscope is used. It has a light and small camera that allow the healthcare provider to view your airways.    Before your test  · Follow your healthcare provider's instructions carefully. If you dont, the exam may be canceled. Or you may need to take it again.  · If you are taking blood-thinning medicine, ask your healthcare provider if you should stop taking the medicine before this test.  · Have no food or drink for at least 8 hours before the test. Also, avoid smoking for 24 hours before the test.  · You will need to remove any dentures or removable devices from your mouth.  · Right before the test, you will be given sedating medicines to help you relax. The medicine may be given by an IV (intravenously) into one of your veins. In addition, your nose and throat may be numbed with a special spray to help prevent gagging and coughing.  · If you are having this test as an outpatient, make sure you have an adult friend or family member to drive you home.  During your test  Bronchoscopy takes 45 to 60 minutes and includes the following steps:  · You may be given medicine (anesthesia) so that you are unconscious or asleep during the procedure.  · The healthcare provider inserts the tube into your nose or mouth.  · If you have not been given anesthesia, you might feel a gagging sensation. To help ease this feeling, you will be told to swallow or take deep breaths. Your airway will remain open even with the tube in place. But you wont be able to talk.  · The provider checks your breathing passage. He or she may also remove tiny tissue samples for biopsy.  After your test  · You may have a mild sore throat or cough. Your voice may also be hoarse.  · Don't eat or drink until the anesthesia wears off.  · If you had a biopsy, you might see traces of blood being coughed up.  When to call your  healthcare provider  Call your healthcare provider right away if you have any of the following:  · Shortness of breath  · Chest pain  · Bleeding from your nose or throat  · Coughing up a large amount of blood  · A fever above 100.4°F (38°C) for more than 24 hours  Call 911  Call 911 if you have:  · Chest pain  · Severe shortness of breath   Date Last Reviewed: 12/1/2016  © 2759-4227 [employer]. [address] [place] [address], [place] [address]. All rights reserved. This information is not intended as a substitute for professional medical care. Always follow your healthcare professional's instructions.

## 2020-01-22 NOTE — SEDATION DOCUMENTATION
RN transported pateint to bronch suite, spouse at bedside, H and P updated-yes, patient placed on cardiac monitor- VSS, anesthesia Plan:  Conscious sedation, ASA verified-yes, Airway exam performed-yes, Personal or Family history of anesthesia complications-No  Consent signed and witnessed, Yulissa Collins RN

## 2020-01-22 NOTE — INTERVAL H&P NOTE
The patient has been examined and the H&P has been reviewed:    I concur with the findings and no changes have occurred since H&P was written.    Anesthesia/Surgery risks, benefits and alternative options discussed and understood by patient/family.    Active Hospital Problems    Diagnosis  POA    Pulmonary infiltrate [R91.8]  Yes      Resolved Hospital Problems   No resolved problems to display.

## 2020-01-22 NOTE — SEDATION DOCUMENTATION
Specimens obtained during Bronchoscopy:  BAL  ml nacl in 35 ml return and 15ml right bronchial wash.  [facility] to include documentation charted in procedural sedation documentation.  Patient to be NPO 1 hour post procedure and place in PO tolerance at 1145.  Moderate concious sedation was performed and cardiorespiratory functions were monitored the entire procedure by Yulissa Collins RN.  Sedation began at 1027  and concluded at 1055.  The patient tolerated the procedure well.  Yulissa Collins RN

## 2020-01-24 LAB
BACTERIA SPEC AEROBE CULT: NO GROWTH
GRAM STN SPEC: NORMAL
GRAM STN SPEC: NORMAL

## 2020-01-27 ENCOUNTER — PATIENT MESSAGE (OUTPATIENT)
Dept: PULMONOLOGY | Facility: CLINIC | Age: 85
End: 2020-01-27

## 2020-01-29 ENCOUNTER — PATIENT MESSAGE (OUTPATIENT)
Dept: PULMONOLOGY | Facility: CLINIC | Age: 85
End: 2020-01-29

## 2020-01-29 LAB — FINAL PATHOLOGIC DIAGNOSIS: NORMAL

## 2020-01-30 ENCOUNTER — PATIENT MESSAGE (OUTPATIENT)
Dept: ENDOCRINOLOGY | Facility: CLINIC | Age: 85
End: 2020-01-30

## 2020-01-31 ENCOUNTER — PATIENT MESSAGE (OUTPATIENT)
Dept: ENDOCRINOLOGY | Facility: CLINIC | Age: 85
End: 2020-01-31

## 2020-01-31 RX ORDER — PREDNISONE 2.5 MG/1
2.5 TABLET ORAL DAILY
Qty: 30 TABLET | Refills: 11 | Status: SHIPPED | OUTPATIENT
Start: 2020-01-31

## 2020-01-31 RX ORDER — PREDNISONE 5 MG/1
5 TABLET ORAL DAILY
Qty: 30 TABLET | Refills: 11 | Status: SHIPPED | OUTPATIENT
Start: 2020-01-31

## 2020-02-03 ENCOUNTER — OFFICE VISIT (OUTPATIENT)
Dept: OTOLARYNGOLOGY | Facility: CLINIC | Age: 85
End: 2020-02-03
Payer: MEDICARE

## 2020-02-03 VITALS
HEART RATE: 64 BPM | HEIGHT: 71 IN | BODY MASS INDEX: 21 KG/M2 | WEIGHT: 150 LBS | SYSTOLIC BLOOD PRESSURE: 124 MMHG | DIASTOLIC BLOOD PRESSURE: 66 MMHG | TEMPERATURE: 97 F

## 2020-02-03 DIAGNOSIS — J34.3 HYPERTROPHY OF INFERIOR NASAL TURBINATE: ICD-10-CM

## 2020-02-03 DIAGNOSIS — D84.9 IMMUNOSUPPRESSION: ICD-10-CM

## 2020-02-03 DIAGNOSIS — J32.8 OTHER CHRONIC SINUSITIS: Primary | ICD-10-CM

## 2020-02-03 PROCEDURE — 3078F DIAST BP <80 MM HG: CPT | Mod: HCNC,CPTII,S$GLB, | Performed by: OTOLARYNGOLOGY

## 2020-02-03 PROCEDURE — 1101F PT FALLS ASSESS-DOCD LE1/YR: CPT | Mod: HCNC,CPTII,S$GLB, | Performed by: OTOLARYNGOLOGY

## 2020-02-03 PROCEDURE — 31231 NASAL ENDOSCOPY DX: CPT | Mod: HCNC,S$GLB,, | Performed by: OTOLARYNGOLOGY

## 2020-02-03 PROCEDURE — 3074F PR MOST RECENT SYSTOLIC BLOOD PRESSURE < 130 MM HG: ICD-10-PCS | Mod: HCNC,CPTII,S$GLB, | Performed by: OTOLARYNGOLOGY

## 2020-02-03 PROCEDURE — 99214 PR OFFICE/OUTPT VISIT, EST, LEVL IV, 30-39 MIN: ICD-10-PCS | Mod: 25,HCNC,S$GLB, | Performed by: OTOLARYNGOLOGY

## 2020-02-03 PROCEDURE — 31231 PR NASAL ENDOSCOPY, DX: ICD-10-PCS | Mod: HCNC,S$GLB,, | Performed by: OTOLARYNGOLOGY

## 2020-02-03 PROCEDURE — 3078F PR MOST RECENT DIASTOLIC BLOOD PRESSURE < 80 MM HG: ICD-10-PCS | Mod: HCNC,CPTII,S$GLB, | Performed by: OTOLARYNGOLOGY

## 2020-02-03 PROCEDURE — 1159F PR MEDICATION LIST DOCUMENTED IN MEDICAL RECORD: ICD-10-PCS | Mod: HCNC,S$GLB,, | Performed by: OTOLARYNGOLOGY

## 2020-02-03 PROCEDURE — 3074F SYST BP LT 130 MM HG: CPT | Mod: HCNC,CPTII,S$GLB, | Performed by: OTOLARYNGOLOGY

## 2020-02-03 PROCEDURE — 99999 PR PBB SHADOW E&M-EST. PATIENT-LVL III: CPT | Mod: PBBFAC,HCNC,, | Performed by: OTOLARYNGOLOGY

## 2020-02-03 PROCEDURE — 99214 OFFICE O/P EST MOD 30 MIN: CPT | Mod: 25,HCNC,S$GLB, | Performed by: OTOLARYNGOLOGY

## 2020-02-03 PROCEDURE — 99999 PR PBB SHADOW E&M-EST. PATIENT-LVL III: ICD-10-PCS | Mod: PBBFAC,HCNC,, | Performed by: OTOLARYNGOLOGY

## 2020-02-03 PROCEDURE — 1101F PR PT FALLS ASSESS DOC 0-1 FALLS W/OUT INJ PAST YR: ICD-10-PCS | Mod: HCNC,CPTII,S$GLB, | Performed by: OTOLARYNGOLOGY

## 2020-02-03 PROCEDURE — 1159F MED LIST DOCD IN RCRD: CPT | Mod: HCNC,S$GLB,, | Performed by: OTOLARYNGOLOGY

## 2020-02-03 RX ORDER — IPRATROPIUM BROMIDE 21 UG/1
2 SPRAY, METERED NASAL 2 TIMES DAILY
Qty: 30 ML | Refills: 0 | Status: SHIPPED | OUTPATIENT
Start: 2020-02-03

## 2020-02-03 RX ORDER — IPRATROPIUM BROMIDE 21 UG/1
2 SPRAY, METERED NASAL 2 TIMES DAILY
Qty: 30 ML | Refills: 0 | Status: SHIPPED | OUTPATIENT
Start: 2020-02-03 | End: 2020-02-03

## 2020-02-03 NOTE — PROGRESS NOTES
"     Chief Complaint   Patient presents with    Cough     productive    Nasal Congestion   .    HPI:     Thierry Ramos Jr. is a 85 y.o. male who presents for evaluation of chronic sinusitis for several years.  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  He is currently on IV IG every month for this.  He has had bilateral FESS of all sinuses by Dr. Dsouza in 2014.  He reports a several month history of several month history of pain and pressure in the forehead,  nasal congestion and postnasal drip. He notes thick purulent discharge bilaterally. He does use sinus rinses or nasal sprays. He admits to midface pain and pressure.  He admits to rhinorrhea and postnasal drip. There is not maxillary tooth pain. He  admits to headaches.  He has been on a recent 10 day course of Augmentin and felt like this helped somewhat.     Interval HPI 2/3/2020:  Mr. Ramos follows up today for CRS. His last visit was 1 year ago.  He reports he has had a chronic cough and presents today to see if his "sinuses are causing his cough." He has had a recent bronchoscopy on 1/22/2020 for unresolving left lower lobe infiltrate and rigth upper and lower lobe infiltrates.   He reports continued nasal congestion and rhinorrhea. He has been taking mucinex for his nasal symptoms.  He was using Zyrtec until 4 weeks ago. He denies facial pain and pressure, postnasal drip, or change in sense of smell or taste.    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    Encounter for blood transfusion     Hyperlipidemia     Hypertension     Hypopituitarism     Hypothyroid     Immunosuppression 3/13/2015    Squamous Cell " Carcinoma     on the right side of the face     Squamous cell carcinoma of skin of right temple 1/27/2016    Stroke     Syncope 2/12    Unspecified disorder of kidney and ureter      Social History     Socioeconomic History    Marital status:      Spouse name: Not on file    Number of children: Not on file    Years of education: Not on file    Highest education level: Not on file   Occupational History    Occupation: retired   Social Needs    Financial resource strain: Not very hard    Food insecurity:     Worry: Never true     Inability: Never true    Transportation needs:     Medical: No     Non-medical: No   Tobacco Use    Smoking status: Never Smoker    Smokeless tobacco: Never Used   Substance and Sexual Activity    Alcohol use: Not Currently     Alcohol/week: 2.0 standard drinks     Types: 1 Glasses of wine, 1 Cans of beer per week     Frequency: Never     Drinks per session: Patient refused     Binge frequency: Patient refused    Drug use: No    Sexual activity: Not Currently   Lifestyle    Physical activity:     Days per week: 5 days     Minutes per session: Not on file    Stress: To some extent   Relationships    Social connections:     Talks on phone: More than three times a week     Gets together: More than three times a week     Attends [employer] service: Not on file     Active member of club or organization: No     Attends meetings of clubs or organizations: Never     Relationship status:    Other Topics Concern    Not on file   Social History Narrative    He is .     Past Surgical History:   Procedure Laterality Date    BRONCHOSCOPY N/A 1/22/2020    Procedure: Bronchoscopy;  Surgeon: [facility] Diagnostic Provider;  Location: [facility] OR [address] [place] [address];  Service: Anesthesiology;  Laterality: N/A;    CAROTID ENDARTERECTOMY (L)  2/27/2007    EXCISION TURBINATE, SUBMUCOUS      FESS  9/16/2014    Mohs excision   3/23/2015, 2/24/2016    combined with WLE forehead    NASAL  SEPTUM SURGERY  9/16/2014    right ureter surgery  1995    SENTINEL LYMPH NODE BIOPSY  3/23/2015, 2/24/2016    SINUS SURGERY  9/16/2014    septo, ESS, BITSMR    Transphenoidal surgery       Family History   Problem Relation Age of Onset    Cancer Mother         breast    Colon cancer Father     Cancer Father         colon    Cancer Brother         leukemia    No Known Problems Sister     No Known Problems Maternal Aunt     No Known Problems Maternal Uncle     No Known Problems Paternal Aunt     No Known Problems Paternal Uncle     No Known Problems Maternal Grandmother     No Known Problems Maternal Grandfather     No Known Problems Paternal Grandmother     No Known Problems Paternal Grandfather     Amblyopia Neg Hx     Blindness Neg Hx     Cataracts Neg Hx     Diabetes Neg Hx     Glaucoma Neg Hx     Hypertension Neg Hx     Macular degeneration Neg Hx     Retinal detachment Neg Hx     Strabismus Neg Hx     Stroke Neg Hx     Thyroid disease Neg Hx     Allergic rhinitis Neg Hx     Allergies Neg Hx     Angioedema Neg Hx     Asthma Neg Hx     Atopy Neg Hx     Eczema Neg Hx     Immunodeficiency Neg Hx     Rhinitis Neg Hx     Urticaria Neg Hx            Review of Systems  General: negative for chills, fever or weight loss  Psychological: negative for mood changes or depression  Ophthalmic: negative for blurry vision, photophobia or eye pain  ENT: see HPI  Respiratory: no cough, shortness of breath, or wheezing  Cardiovascular: no chest pain or dyspnea on exertion  Gastrointestinal: no abdominal pain, change in bowel habits, or black/ bloody stools  Musculoskeletal: negative for gait disturbance or muscular weakness  Neurological: no syncope or seizures; no ataxia  Dermatological: negative for puritis,  rash and jaundice  Hematologic/lymphatic: no easy bruising, no new lumps or bumps      Physical Exam:    Vitals:    02/03/20 1055   BP: 124/66   Pulse: 64   Temp: 96.8 °F (36 °C)        Constitutional: Well appearing / communicating without difficutly.  NAD.  Eyes: EOM I Bilaterally  Head/Face: Normocephalic.  Negative paranasal sinus pressure/tenderness.  Salivary glands WNL.  House Brackmann I Bilaterally.    Right Ear: Auricle normal appearance. External Auditory Canal within normal limits,TM w/o masses/lesions/perforations. TM mobility noted.   Left Ear: Auricle normal appearance. External Auditory Canal WNL,TM w/o masses/lesions/perforations. TM mobility noted.  Nose:+ bilateral crusting. No gross nasal septal deviation. Inferior Turbinates 3+ bilaterally. No septal perforation. No masses/lesions. External nasal skin appears normal without masses/lesions.  Oral Cavity: Gingiva/lips within normal limits.  Dentition/gingiva healthy appearing. Mucus membranes moist. Floor of mouth soft, no masses palpated. Oral Tongue mobile. Hard Palate appears normal.    Oropharynx: Base of tongue appears normal. No masses/lesions noted. Tonsillar fossa/pharyngeal wall without lesions. Posterior oropharynx WNL.  Soft palate without masses. Midline uvula.   Neck/Lymphatic: No LAD I-VI bilaterally.  No thyromegaly.  No masses noted on exam.    Mirror laryngoscopy/nasopharyngoscopy: Active gag reflex.  Unable to perform.    Neuro/Psychiatric: AOx3.  Normal mood and affect.   Cardiovascular: Normal carotid pulses bilaterally, no increasing jugular venous distention noted at cervical region bilaterally.    Respiratory: Normal respiratory effort, no stridor, no retractions noted.      See separate procedure note for nasal endoscopy.      Assessment:    ICD-10-CM ICD-9-CM    1. Other chronic sinusitis J32.8 473.8    2. Immunosuppression D89.9 279.9    3. Hypertrophy of inferior nasal turbinate J34.3 478.0      The primary encounter diagnosis was Other chronic sinusitis. Diagnoses of Immunosuppression and Hypertrophy of inferior nasal turbinate were also pertinent to this visit.      Plan:  No orders of the  defined types were placed in this encounter.    Hx CRS; no evidence of purulent discharge from the sinuses.  Patient has a history of prior chronic sinusitis previous endoscopic sinus surgery.  I have recommended that he perform nasal saline rinses daily, restart Zyrtec, and add Atrovent nasal spray to his regimen.  I have recommended that he follow-up with the pulmonologist as directed.    Alicia Truong MD

## 2020-02-03 NOTE — PROCEDURES
Procedures       PROCEDURE NOTE:  Nasal endoscopy   Preprocedure diagnosis:  Chronic sinusitis  Postprocedure diangosis:  Same  Complications:  None  Blood Loss:  None    Procedure in detail:  After verbal consent was obtained, the patient's nasal cavity was anesthesized using topical 1%lidocaine and Neosynepherine.  A rigid 0 degree endoscope was placed in first the right, then the left nasal cavity.  The inferior and middle turbinates were examined, and found to be erythematous bilaterally.  The middle meatus and maxillary antrum was also examined, and found to be patent bilaterally. No purulent discharge was noted. No masses seen.  The patient tolerated the procedure well and there were no complications.

## 2020-02-04 ENCOUNTER — PATIENT MESSAGE (OUTPATIENT)
Dept: PULMONOLOGY | Facility: CLINIC | Age: 85
End: 2020-02-04

## 2020-02-05 ENCOUNTER — TELEPHONE (OUTPATIENT)
Dept: PHARMACY | Facility: CLINIC | Age: 85
End: 2020-02-05

## 2020-02-11 ENCOUNTER — PATIENT MESSAGE (OUTPATIENT)
Dept: PULMONOLOGY | Facility: CLINIC | Age: 85
End: 2020-02-11

## 2020-02-18 ENCOUNTER — LAB VISIT (OUTPATIENT)
Dept: LAB | Facility: HOSPITAL | Age: 85
End: 2020-02-18
Attending: INTERNAL MEDICINE
Payer: MEDICARE

## 2020-02-18 DIAGNOSIS — C91.10 CLL (CHRONIC LYMPHOCYTIC LEUKEMIA): ICD-10-CM

## 2020-02-18 LAB
ALBUMIN SERPL BCP-MCNC: 3.2 G/DL (ref 3.5–5.2)
ALP SERPL-CCNC: 133 U/L (ref 55–135)
ALT SERPL W/O P-5'-P-CCNC: 50 U/L (ref 10–44)
ANION GAP SERPL CALC-SCNC: 11 MMOL/L (ref 8–16)
AST SERPL-CCNC: 42 U/L (ref 10–40)
BASOPHILS # BLD AUTO: 0.07 K/UL (ref 0–0.2)
BASOPHILS NFR BLD: 0.9 % (ref 0–1.9)
BILIRUB SERPL-MCNC: 0.6 MG/DL (ref 0.1–1)
BUN SERPL-MCNC: 24 MG/DL (ref 8–23)
CALCIUM SERPL-MCNC: 10.1 MG/DL (ref 8.7–10.5)
CHLORIDE SERPL-SCNC: 107 MMOL/L (ref 95–110)
CO2 SERPL-SCNC: 28 MMOL/L (ref 23–29)
CREAT SERPL-MCNC: 1 MG/DL (ref 0.5–1.4)
DIFFERENTIAL METHOD: ABNORMAL
EOSINOPHIL # BLD AUTO: 0.1 K/UL (ref 0–0.5)
EOSINOPHIL NFR BLD: 0.6 % (ref 0–8)
ERYTHROCYTE [DISTWIDTH] IN BLOOD BY AUTOMATED COUNT: 16.2 % (ref 11.5–14.5)
EST. GFR  (AFRICAN AMERICAN): >60 ML/MIN/1.73 M^2
EST. GFR  (NON AFRICAN AMERICAN): >60 ML/MIN/1.73 M^2
GLUCOSE SERPL-MCNC: 146 MG/DL (ref 70–110)
HCT VFR BLD AUTO: 43.5 % (ref 40–54)
HGB BLD-MCNC: 13.1 G/DL (ref 14–18)
IMM GRANULOCYTES # BLD AUTO: 0.36 K/UL (ref 0–0.04)
IMM GRANULOCYTES NFR BLD AUTO: 4.5 % (ref 0–0.5)
LYMPHOCYTES # BLD AUTO: 1.7 K/UL (ref 1–4.8)
LYMPHOCYTES NFR BLD: 21.4 % (ref 18–48)
MCH RBC QN AUTO: 30.3 PG (ref 27–31)
MCHC RBC AUTO-ENTMCNC: 30.1 G/DL (ref 32–36)
MCV RBC AUTO: 101 FL (ref 82–98)
MONOCYTES # BLD AUTO: 0.9 K/UL (ref 0.3–1)
MONOCYTES NFR BLD: 11.4 % (ref 4–15)
NEUTROPHILS # BLD AUTO: 4.9 K/UL (ref 1.8–7.7)
NEUTROPHILS NFR BLD: 61.2 % (ref 38–73)
NRBC BLD-RTO: 0 /100 WBC
PLATELET # BLD AUTO: 128 K/UL (ref 150–350)
PMV BLD AUTO: 10 FL (ref 9.2–12.9)
POTASSIUM SERPL-SCNC: 5.2 MMOL/L (ref 3.5–5.1)
PROT SERPL-MCNC: 5.8 G/DL (ref 6–8.4)
RBC # BLD AUTO: 4.32 M/UL (ref 4.6–6.2)
SODIUM SERPL-SCNC: 146 MMOL/L (ref 136–145)
WBC # BLD AUTO: 7.98 K/UL (ref 3.9–12.7)

## 2020-02-18 PROCEDURE — 85025 COMPLETE CBC W/AUTO DIFF WBC: CPT | Mod: HCNC

## 2020-02-18 PROCEDURE — 82105 ALPHA-FETOPROTEIN SERUM: CPT | Mod: HCNC

## 2020-02-18 PROCEDURE — 80053 COMPREHEN METABOLIC PANEL: CPT | Mod: HCNC

## 2020-02-18 PROCEDURE — 36415 COLL VENOUS BLD VENIPUNCTURE: CPT | Mod: HCNC,PO

## 2020-02-19 ENCOUNTER — INFUSION (OUTPATIENT)
Dept: INFUSION THERAPY | Facility: HOSPITAL | Age: 85
End: 2020-02-19
Attending: INTERNAL MEDICINE
Payer: MEDICARE

## 2020-02-19 ENCOUNTER — HOSPITAL ENCOUNTER (OUTPATIENT)
Dept: RADIOLOGY | Facility: HOSPITAL | Age: 85
Discharge: HOME | End: 2020-02-19
Attending: PHYSICIAN ASSISTANT
Payer: MEDICARE

## 2020-02-19 VITALS
TEMPERATURE: 98 F | DIASTOLIC BLOOD PRESSURE: 60 MMHG | RESPIRATION RATE: 18 BRPM | SYSTOLIC BLOOD PRESSURE: 127 MMHG | HEART RATE: 67 BPM

## 2020-02-19 DIAGNOSIS — R79.89 ABNORMAL LFTS: ICD-10-CM

## 2020-02-19 DIAGNOSIS — C91.10 CLL (CHRONIC LYMPHOCYTIC LEUKEMIA): Primary | ICD-10-CM

## 2020-02-19 PROCEDURE — 63600175 PHARM REV CODE 636 W HCPCS: Mod: TB,HCNC | Performed by: INTERNAL MEDICINE

## 2020-02-19 PROCEDURE — 96401 CHEMO ANTI-NEOPL SQ/IM: CPT | Mod: HCNC

## 2020-02-19 PROCEDURE — 76700 US EXAM ABDOM COMPLETE: CPT | Mod: TC,HCNC

## 2020-02-19 PROCEDURE — 25000003 PHARM REV CODE 250: Mod: HCNC | Performed by: INTERNAL MEDICINE

## 2020-02-19 PROCEDURE — 76700 US ABDOMEN COMPLETE: ICD-10-PCS | Mod: 26,HCNC,, | Performed by: RADIOLOGY

## 2020-02-19 PROCEDURE — 76700 US EXAM ABDOM COMPLETE: CPT | Mod: 26,HCNC,, | Performed by: RADIOLOGY

## 2020-02-19 RX ORDER — DIPHENHYDRAMINE HCL 50 MG
50 CAPSULE ORAL ONCE
Status: COMPLETED | OUTPATIENT
Start: 2020-02-19 | End: 2020-02-19

## 2020-02-19 RX ORDER — FAMOTIDINE 20 MG/1
20 TABLET, FILM COATED ORAL ONCE
Status: COMPLETED | OUTPATIENT
Start: 2020-02-19 | End: 2020-02-19

## 2020-02-19 RX ORDER — ACETAMINOPHEN 325 MG/1
650 TABLET ORAL
Status: CANCELLED | OUTPATIENT
Start: 2020-02-19

## 2020-02-19 RX ORDER — DIPHENHYDRAMINE HCL 25 MG
50 CAPSULE ORAL ONCE
Status: CANCELLED
Start: 2020-02-19

## 2020-02-19 RX ORDER — FAMOTIDINE 20 MG/1
20 TABLET, FILM COATED ORAL ONCE
Status: CANCELLED
Start: 2020-02-19

## 2020-02-19 RX ORDER — HEPARIN 100 UNIT/ML
500 SYRINGE INTRAVENOUS
Status: CANCELLED | OUTPATIENT
Start: 2020-02-19

## 2020-02-19 RX ORDER — ACETAMINOPHEN 325 MG/1
650 TABLET ORAL
Status: COMPLETED | OUTPATIENT
Start: 2020-02-19 | End: 2020-02-19

## 2020-02-19 RX ORDER — SODIUM CHLORIDE 0.9 % (FLUSH) 0.9 %
10 SYRINGE (ML) INJECTION
Status: CANCELLED | OUTPATIENT
Start: 2020-02-19

## 2020-02-19 RX ORDER — MEPERIDINE HYDROCHLORIDE 50 MG/ML
25 INJECTION INTRAMUSCULAR; INTRAVENOUS; SUBCUTANEOUS
Status: DISCONTINUED | OUTPATIENT
Start: 2020-02-19 | End: 2020-02-19 | Stop reason: HOSPADM

## 2020-02-19 RX ADMIN — FAMOTIDINE 20 MG: 20 TABLET ORAL at 04:02

## 2020-02-19 RX ADMIN — ACETAMINOPHEN 650 MG: 325 TABLET ORAL at 04:02

## 2020-02-19 RX ADMIN — RITUXIMAB AND HYALURONIDASE 1600 MG: 120; 2000 INJECTION, SOLUTION SUBCUTANEOUS at 04:02

## 2020-02-19 RX ADMIN — DIPHENHYDRAMINE HYDROCHLORIDE 50 MG: 50 CAPSULE ORAL at 04:02

## 2020-02-19 NOTE — PLAN OF CARE
Pt tolerated Rituxan hycela with no complications. VSS. Pt instructed to call MD with any problems. NAD. Pt discharged home via wheelchair.

## 2020-02-20 ENCOUNTER — PATIENT MESSAGE (OUTPATIENT)
Dept: PULMONOLOGY | Facility: CLINIC | Age: 85
End: 2020-02-20

## 2020-02-20 ENCOUNTER — PATIENT MESSAGE (OUTPATIENT)
Dept: HEPATOLOGY | Facility: CLINIC | Age: 85
End: 2020-02-20

## 2020-02-20 LAB — AFP SERPL-MCNC: 1.1 NG/ML (ref 0–8.4)

## 2020-02-21 ENCOUNTER — TELEPHONE (OUTPATIENT)
Dept: HEPATOLOGY | Facility: CLINIC | Age: 85
End: 2020-02-21

## 2020-02-21 ENCOUNTER — LAB VISIT (OUTPATIENT)
Dept: LAB | Facility: HOSPITAL | Age: 85
End: 2020-02-21
Attending: INTERNAL MEDICINE
Payer: MEDICARE

## 2020-02-21 ENCOUNTER — OFFICE VISIT (OUTPATIENT)
Dept: INTERNAL MEDICINE | Facility: CLINIC | Age: 85
End: 2020-02-21
Payer: MEDICARE

## 2020-02-21 VITALS
HEIGHT: 70 IN | DIASTOLIC BLOOD PRESSURE: 60 MMHG | WEIGHT: 153.25 LBS | HEART RATE: 84 BPM | BODY MASS INDEX: 21.94 KG/M2 | TEMPERATURE: 98 F | SYSTOLIC BLOOD PRESSURE: 130 MMHG

## 2020-02-21 DIAGNOSIS — E11.59 HYPERTENSION ASSOCIATED WITH DIABETES: ICD-10-CM

## 2020-02-21 DIAGNOSIS — E11.40 TYPE 2 DIABETES, CONTROLLED, WITH NEUROPATHY: ICD-10-CM

## 2020-02-21 DIAGNOSIS — I15.2 HYPERTENSION ASSOCIATED WITH DIABETES: ICD-10-CM

## 2020-02-21 DIAGNOSIS — Z86.73 HISTORY OF CVA (CEREBROVASCULAR ACCIDENT): Chronic | ICD-10-CM

## 2020-02-21 DIAGNOSIS — E23.0 PANHYPOPITUITARISM: Chronic | ICD-10-CM

## 2020-02-21 DIAGNOSIS — C91.10 CLL (CHRONIC LYMPHOCYTIC LEUKEMIA): Chronic | ICD-10-CM

## 2020-02-21 DIAGNOSIS — E11.40 TYPE 2 DIABETES, CONTROLLED, WITH NEUROPATHY: Primary | ICD-10-CM

## 2020-02-21 LAB
ESTIMATED AVG GLUCOSE: 148 MG/DL (ref 68–131)
HBA1C MFR BLD HPLC: 6.8 % (ref 4–5.6)

## 2020-02-21 PROCEDURE — 1126F PR PAIN SEVERITY QUANTIFIED, NO PAIN PRESENT: ICD-10-PCS | Mod: HCNC,S$GLB,, | Performed by: INTERNAL MEDICINE

## 2020-02-21 PROCEDURE — 1159F PR MEDICATION LIST DOCUMENTED IN MEDICAL RECORD: ICD-10-PCS | Mod: HCNC,S$GLB,, | Performed by: INTERNAL MEDICINE

## 2020-02-21 PROCEDURE — 99999 PR PBB SHADOW E&M-EST. PATIENT-LVL III: CPT | Mod: PBBFAC,HCNC,, | Performed by: INTERNAL MEDICINE

## 2020-02-21 PROCEDURE — 1101F PR PT FALLS ASSESS DOC 0-1 FALLS W/OUT INJ PAST YR: ICD-10-PCS | Mod: HCNC,CPTII,S$GLB, | Performed by: INTERNAL MEDICINE

## 2020-02-21 PROCEDURE — 36415 COLL VENOUS BLD VENIPUNCTURE: CPT | Mod: HCNC,PO

## 2020-02-21 PROCEDURE — 99499 UNLISTED E&M SERVICE: CPT | Mod: S$GLB,,, | Performed by: INTERNAL MEDICINE

## 2020-02-21 PROCEDURE — 1101F PT FALLS ASSESS-DOCD LE1/YR: CPT | Mod: HCNC,CPTII,S$GLB, | Performed by: INTERNAL MEDICINE

## 2020-02-21 PROCEDURE — 1126F AMNT PAIN NOTED NONE PRSNT: CPT | Mod: HCNC,S$GLB,, | Performed by: INTERNAL MEDICINE

## 2020-02-21 PROCEDURE — 99214 OFFICE O/P EST MOD 30 MIN: CPT | Mod: HCNC,S$GLB,, | Performed by: INTERNAL MEDICINE

## 2020-02-21 PROCEDURE — 99214 PR OFFICE/OUTPT VISIT, EST, LEVL IV, 30-39 MIN: ICD-10-PCS | Mod: HCNC,S$GLB,, | Performed by: INTERNAL MEDICINE

## 2020-02-21 PROCEDURE — 99999 PR PBB SHADOW E&M-EST. PATIENT-LVL III: ICD-10-PCS | Mod: PBBFAC,HCNC,, | Performed by: INTERNAL MEDICINE

## 2020-02-21 PROCEDURE — 99499 RISK ADDL DX/OHS AUDIT: ICD-10-PCS | Mod: S$GLB,,, | Performed by: INTERNAL MEDICINE

## 2020-02-21 PROCEDURE — 83036 HEMOGLOBIN GLYCOSYLATED A1C: CPT | Mod: HCNC

## 2020-02-21 PROCEDURE — 1159F MED LIST DOCD IN RCRD: CPT | Mod: HCNC,S$GLB,, | Performed by: INTERNAL MEDICINE

## 2020-02-21 NOTE — TELEPHONE ENCOUNTER
Call returned to Yolie, wife of the patient [phone] regarding 6 month follow up visit.  Appointment scheduled for Tuesday 8/11/20 at 8:40 per request.   Will schedule F/U Labs in [place] early am and U/S at the [facility] when orders are in.  Yolie voiced understanding.

## 2020-02-21 NOTE — TELEPHONE ENCOUNTER
----- Message from Marcela Castro MA sent at 2/21/2020  2:45 PM CST -----  Contact: [phone]  Mrs Ramos   [phone]  Pt's wife said there was a message in the portal regarding ordering test but she was unable to reply to the message . Called to say yes, please go ahead with scheduling any test needed

## 2020-02-24 ENCOUNTER — TELEPHONE (OUTPATIENT)
Dept: HEPATOLOGY | Facility: CLINIC | Age: 85
End: 2020-02-24

## 2020-02-24 DIAGNOSIS — K74.00 LIVER FIBROSIS: ICD-10-CM

## 2020-02-24 NOTE — TELEPHONE ENCOUNTER
Called pt's wife to discuss labs and US, both stable, verbalized understanding. Will f/u in August. Please contact wife or pt to schedule pt for US and all labs before August appt

## 2020-02-25 ENCOUNTER — PATIENT MESSAGE (OUTPATIENT)
Dept: PULMONOLOGY | Facility: CLINIC | Age: 85
End: 2020-02-25

## 2020-02-25 ENCOUNTER — HOSPITAL ENCOUNTER (EMERGENCY)
Facility: HOSPITAL | Age: 85
Discharge: HOME | End: 2020-02-25
Attending: EMERGENCY MEDICINE
Payer: MEDICARE

## 2020-02-25 VITALS
RESPIRATION RATE: 22 BRPM | HEIGHT: 70 IN | TEMPERATURE: 97 F | DIASTOLIC BLOOD PRESSURE: 57 MMHG | WEIGHT: 153 LBS | BODY MASS INDEX: 21.9 KG/M2 | OXYGEN SATURATION: 94 % | HEART RATE: 73 BPM | SYSTOLIC BLOOD PRESSURE: 123 MMHG

## 2020-02-25 DIAGNOSIS — R05.9 COUGH: ICD-10-CM

## 2020-02-25 DIAGNOSIS — J40 BRONCHITIS: Primary | ICD-10-CM

## 2020-02-25 DIAGNOSIS — R06.02 SHORTNESS OF BREATH: ICD-10-CM

## 2020-02-25 LAB
ALBUMIN SERPL BCP-MCNC: 3.1 G/DL (ref 3.5–5.2)
ALP SERPL-CCNC: 168 U/L (ref 55–135)
ALT SERPL W/O P-5'-P-CCNC: 50 U/L (ref 10–44)
ANION GAP SERPL CALC-SCNC: 11 MMOL/L (ref 8–16)
ANISOCYTOSIS BLD QL SMEAR: SLIGHT
AST SERPL-CCNC: 50 U/L (ref 10–40)
BASOPHILS NFR BLD: 0 % (ref 0–1.9)
BILIRUB SERPL-MCNC: 1 MG/DL (ref 0.1–1)
BNP SERPL-MCNC: 133 PG/ML (ref 0–99)
BUN SERPL-MCNC: 30 MG/DL (ref 8–23)
CALCIUM SERPL-MCNC: 9.7 MG/DL (ref 8.7–10.5)
CHLORIDE SERPL-SCNC: 102 MMOL/L (ref 95–110)
CO2 SERPL-SCNC: 26 MMOL/L (ref 23–29)
CREAT SERPL-MCNC: 1 MG/DL (ref 0.5–1.4)
DIFFERENTIAL METHOD: ABNORMAL
EOSINOPHIL NFR BLD: 0 % (ref 0–8)
ERYTHROCYTE [DISTWIDTH] IN BLOOD BY AUTOMATED COUNT: 15.5 % (ref 11.5–14.5)
EST. GFR  (AFRICAN AMERICAN): >60 ML/MIN/1.73 M^2
EST. GFR  (NON AFRICAN AMERICAN): >60 ML/MIN/1.73 M^2
GLUCOSE SERPL-MCNC: 156 MG/DL (ref 70–110)
HCT VFR BLD AUTO: 40.3 % (ref 40–54)
HGB BLD-MCNC: 12.9 G/DL (ref 14–18)
IMM GRANULOCYTES # BLD AUTO: ABNORMAL K/UL (ref 0–0.04)
IMM GRANULOCYTES NFR BLD AUTO: ABNORMAL % (ref 0–0.5)
LYMPHOCYTES NFR BLD: 9 % (ref 18–48)
MCH RBC QN AUTO: 30.3 PG (ref 27–31)
MCHC RBC AUTO-ENTMCNC: 32 G/DL (ref 32–36)
MCV RBC AUTO: 95 FL (ref 82–98)
MONOCYTES NFR BLD: 6 % (ref 4–15)
NEUTROPHILS NFR BLD: 80 % (ref 38–73)
NEUTS BAND NFR BLD MANUAL: 5 %
NRBC BLD-RTO: 0 /100 WBC
PLATELET # BLD AUTO: 119 K/UL (ref 150–350)
PMV BLD AUTO: 9.3 FL (ref 9.2–12.9)
POTASSIUM SERPL-SCNC: 4.3 MMOL/L (ref 3.5–5.1)
PROT SERPL-MCNC: 5.6 G/DL (ref 6–8.4)
RBC # BLD AUTO: 4.26 M/UL (ref 4.6–6.2)
SODIUM SERPL-SCNC: 139 MMOL/L (ref 136–145)
TROPONIN I SERPL DL<=0.01 NG/ML-MCNC: 0.02 NG/ML (ref 0–0.03)
WBC # BLD AUTO: 8.47 K/UL (ref 3.9–12.7)

## 2020-02-25 PROCEDURE — 85007 BL SMEAR W/DIFF WBC COUNT: CPT | Mod: HCNC

## 2020-02-25 PROCEDURE — 99285 EMERGENCY DEPT VISIT HI MDM: CPT | Mod: 25,HCNC

## 2020-02-25 PROCEDURE — 84484 ASSAY OF TROPONIN QUANT: CPT | Mod: HCNC

## 2020-02-25 PROCEDURE — 83880 ASSAY OF NATRIURETIC PEPTIDE: CPT | Mod: HCNC

## 2020-02-25 PROCEDURE — 85027 COMPLETE CBC AUTOMATED: CPT | Mod: HCNC

## 2020-02-25 PROCEDURE — 80053 COMPREHEN METABOLIC PANEL: CPT | Mod: HCNC

## 2020-02-25 RX ORDER — BENZONATATE 100 MG/1
100 CAPSULE ORAL 3 TIMES DAILY PRN
Qty: 20 CAPSULE | Refills: 0 | Status: SHIPPED | OUTPATIENT
Start: 2020-02-25 | End: 2020-03-05 | Stop reason: SDUPTHER

## 2020-02-25 RX ORDER — HYDROCODONE POLISTIREX AND CHLORPHENIRAMINE POLISTIREX 10; 8 MG/5ML; MG/5ML
5 SUSPENSION, EXTENDED RELEASE ORAL EVERY 12 HOURS PRN
Qty: 60 ML | Refills: 0 | Status: SHIPPED | OUTPATIENT
Start: 2020-02-25

## 2020-02-25 RX ORDER — LEVOFLOXACIN 500 MG/1
500 TABLET, FILM COATED ORAL DAILY
Qty: 5 TABLET | Refills: 0 | Status: SHIPPED | OUTPATIENT
Start: 2020-02-25 | End: 2020-03-01

## 2020-02-25 NOTE — ED NOTES
Pt's O2 sat is maintaining between 92-93% on room air. Dr. Reyes informed. Will continue to monitor.

## 2020-02-25 NOTE — ED NOTES
Pt presents to the ED w/ c/ of cough for 3years. Pt reports that he has been coughing up brown sputum and reports worsening cough over the past 2 days. Denies chest pain. Pt reports increasing SOB. Pt is 90% on room air. 2L oxygen via nasal cannula placed and pt's O2 sat is 95% at this time. Pt denies n/v. Pt connected to cardiac monitor, BP cuff, and pulse ox.

## 2020-02-25 NOTE — ED PROVIDER NOTES
Encounter Date: 2/25/2020    SCRIBE #1 NOTE: I, Kymberly Rush, am scribing for, and in the presence of,  Kodi Reyes MD. I have scribed the entire note.       History     Chief Complaint   Patient presents with    Cough     Pt presents to ED today c/o cough x 1 year reports worse last 2 days with brown sputum. Denies fever. Wife reports pt has seen several doctors and they are uable to determine what cough is from.      Time seen by provider: 9:08 AM    This is a 86 y.o. male who presents with complaint of cough for 1 year that has worsened over the past 2 days in which he has had an incessant productive cough with brown mucus. His associated symptoms include inability to sleep due to cough and decreased appetite. The patient denies any abdominal pain or any other concerning symptoms. He has tried Robitussin without relief. In January he saw his pulmonologist and had a bronchoscopy on 01/22. On 02/21 the patient saw his PCP. His PMHx includes but is not limited to DM and CLL with thrombocytopenia and chronic sinusitis. The patient is currently receiving Rituxan and Hycela for CLL.    The history is provided by the patient and the spouse.     Review of patient's allergies indicates:   Allergen Reactions    Ace inhibitors Swelling     angioedema    Divalproex Hives     Rash under arms, body creases     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    Encounter for blood transfusion     Hyperlipidemia     Hypertension     Hypopituitarism     Hypothyroid     Immunosuppression 3/13/2015    Liver fibrosis (?) 2/24/2020    Squamous Cell Carcinoma     on the right side of the face     Squamous cell carcinoma of skin of right temple 1/27/2016    Stroke      Syncope 2/12    Unspecified disorder of kidney and ureter      Past Surgical History:   Procedure Laterality Date    BRONCHOSCOPY N/A 1/22/2020    Procedure: Bronchoscopy;  Surgeon: [facility] Provider;  Location: [facility] OR [address] [place] [address];  Service: Anesthesiology;  Laterality: N/A;    CAROTID ENDARTERECTOMY (L)  2/27/2007    EXCISION TURBINATE, SUBMUCOUS      FESS  9/16/2014    Mohs excision   3/23/2015, 2/24/2016    combined with WLE forehead    NASAL SEPTUM SURGERY  9/16/2014    right ureter surgery  1995    SENTINEL LYMPH NODE BIOPSY  3/23/2015, 2/24/2016    SINUS SURGERY  9/16/2014    septo, ESS, BITSMR    Transphenoidal surgery       Family History   Problem Relation Age of Onset    Cancer Mother         breast    Colon cancer Father     Cancer Father         colon    Cancer Brother         leukemia    No Known Problems Sister     No Known Problems Maternal Aunt     No Known Problems Maternal Uncle     No Known Problems Paternal Aunt     No Known Problems Paternal Uncle     No Known Problems Maternal Grandmother     No Known Problems Maternal Grandfather     No Known Problems Paternal Grandmother     No Known Problems Paternal Grandfather     Amblyopia Neg Hx     Blindness Neg Hx     Cataracts Neg Hx     Diabetes Neg Hx     Glaucoma Neg Hx     Hypertension Neg Hx     Macular degeneration Neg Hx     Retinal detachment Neg Hx     Strabismus Neg Hx     Stroke Neg Hx     Thyroid disease Neg Hx     Allergic rhinitis Neg Hx     Allergies Neg Hx     Angioedema Neg Hx     Asthma Neg Hx     Atopy Neg Hx     Eczema Neg Hx     Immunodeficiency Neg Hx     Rhinitis Neg Hx     Urticaria Neg Hx      Social History     Tobacco Use    Smoking status: Never Smoker    Smokeless tobacco: Never Used   Substance Use Topics    Alcohol use: Not Currently     Alcohol/week: 2.0 standard drinks     Types: 1 Glasses of wine, 1 Cans of beer per week     Frequency: Never     Drinks per session:  Patient refused     Binge frequency: Patient refused    Drug use: No     Review of Systems    Physical Exam     Initial Vitals [02/25/20 0916]   BP Pulse Resp Temp SpO2   (!) 148/55 76 (!) 22 98.4 °F (36.9 °C) (!) 90 %      MAP       --         Physical Exam    Nursing note and vitals reviewed.  Constitutional: He appears well-developed and well-nourished. He is not diaphoretic. No distress.   HENT:   Head: Normocephalic and atraumatic.   Mouth/Throat: Oropharynx is clear and moist.   Skin graft at R temple  Multiple scabbed lesions at R face   Eyes: Conjunctivae and EOM are normal.   Neck: Normal range of motion. Neck supple.   Cardiovascular: Normal rate, regular rhythm and normal heart sounds. Exam reveals no gallop and no friction rub.    No murmur heard.  Pulmonary/Chest: He has no wheezes. He has no rhonchi. He has rales (bibasilar rales).   Abdominal: Soft. There is no tenderness. There is no rebound and no guarding.   Musculoskeletal: Normal range of motion. He exhibits edema (2+ bilateral pretibial pitting edema). He exhibits no tenderness.   Lymphadenopathy:     He has no cervical adenopathy.   Neurological: He is alert and oriented to person, place, and time. He has normal strength.   Skin: Skin is warm and dry. No rash noted.         ED Course   Procedures  Labs Reviewed   CBC W/ AUTO DIFFERENTIAL - Abnormal; Notable for the following components:       Result Value    RBC 4.26 (*)     Hemoglobin 12.9 (*)     RDW 15.5 (*)     Platelets 119 (*)     Gran% 80.0 (*)     Lymph% 9.0 (*)     All other components within normal limits   COMPREHENSIVE METABOLIC PANEL - Abnormal; Notable for the following components:    Glucose 156 (*)     BUN, Bld 30 (*)     Total Protein 5.6 (*)     Albumin 3.1 (*)     Alkaline Phosphatase 168 (*)     AST 50 (*)     ALT 50 (*)     All other components within normal limits   B-TYPE NATRIURETIC PEPTIDE - Abnormal; Notable for the following components:     (*)     All other  components within normal limits   TROPONIN I     EKG Readings: (Independently Interpreted)   Ex16 10:24  NSR at 85 bpm with septal Q wave in V1 and ST/T wave abnormality.          X-Rays:   Independently Interpreted Readings:   Other Readings:  Reviewed by myself, read by radiology.     Imaging Results          X-Ray Chest PA And Lateral (Final result)  Result time 02/25/20 10:26:41    Final result by Pb Montaño MD (02/25/20 10:26:41)                 Impression:      Minimal LEFT retrocardiac consolidation for which correlation and follow-up recommended.      Electronically signed by: Pb Montaño MD  Date:    02/25/2020  Time:    10:26             Narrative:    EXAMINATION:  XR CHEST PA AND LATERAL    CLINICAL HISTORY:  Cough    TECHNIQUE:  PA and lateral views of the chest were performed.    COMPARISON:  12/20/2019    FINDINGS:  Minimal consolidative changes identified LEFT retrocardiac region.  Pneumonia not excluded.  Atelectasis could have a similar appearance.No pleural effusion.No pneumothorax.    The cardiac silhouette is normal in size. The hilar and mediastinal contours are unremarkable.    Bones are intact.                              Medical Decision Making:   History:   Old Medical Records: I decided to obtain old medical records.  Old Records Summarized: records from clinic visits.       <> Summary of Records: 02/03 Pt seen by Dr. Gonzalez, ENT, for nasal endoscopy. no evidence of purulent discharge from the sinuses. Per Dr. Gonzalez's report: Patient has a history of prior chronic sinusitis previous endoscopic sinus surgery.  I have recommended that he perform nasal saline rinses daily, restart Zyrtec, and add Atrovent nasal spray to his regimen.  I have recommended that he follow-up with the pulmonologist as directed. AFB cultures are still in progress, but stains have shown no AFB so far.   Clinical Tests:   Lab Tests: Reviewed and Ordered  Radiological Study: Reviewed and Ordered  Medical  Tests: Reviewed and Ordered  ED Management:  1:22 PM Discussed with on-call Pulmonologist who agrees with discharge on a respiratory FQ in light of prior history of Pseudomonas. Patient non-toxic in appearance. Patient voices understanding of discharge instructions and agrees with plan.               Attending Attestation:           Physician Attestation for Scribe:  Physician Attestation Statement for Scribe #1: I, Kodi Reyes MD, reviewed documentation, as scribed by Kymberly Rush in my presence, and it is both accurate and complete.                 ED Course as of Feb 25 1324   Tue Feb 25, 2020   1150 Pt has improved and has not been coughing for past hour.    [SG]   1317 Discussed patient with Dr. Nelson, pulmonologist on call for Dr. Christensen. In light of pt's hx of pseudomonas infection will place on Levaquin.    [SG]      ED Course User Index  [SG] Kymberly Rush                Clinical Impression:       ICD-10-CM ICD-9-CM   1. Bronchitis J40 490     R06.02 786.05   3. Cough R05 786.2                                Kodi Reyes MD  02/25/20 8279

## 2020-02-26 ENCOUNTER — PATIENT MESSAGE (OUTPATIENT)
Dept: PULMONOLOGY | Facility: CLINIC | Age: 85
End: 2020-02-26

## 2020-02-26 ENCOUNTER — TELEPHONE (OUTPATIENT)
Dept: PULMONOLOGY | Facility: CLINIC | Age: 85
End: 2020-02-26

## 2020-02-26 NOTE — TELEPHONE ENCOUNTER
Spoke with patient Mr. Ramos on this morning, he was informed that currently at the moment there aren't any earlier appointments that I can offer to him at the time. Patient has verbalize that he understand and will let his wife know that as well. Patient will keep his appointment for March 05th.

## 2020-02-26 NOTE — TELEPHONE ENCOUNTER
----- Message from Gabi Cervantes sent at 2/26/2020  9:44 AM CST -----  Contact: PT's Wife- Lorin   Called to speak to Berkley to make a follow up appointment since the PT was just released from the hospital. I informed her that the PT has an appointment scheduled for Thursday 3/5 that was scheduled by Berkley but said the discharge papers from the hospital said the PT needs to see his pulmonary doctor within the next 2 days. Wants to see if the PT can be seen sooner than 3/5    Callback: [phone]

## 2020-02-26 NOTE — TELEPHONE ENCOUNTER
Spoke with the patient's wife and informed that US cannot be done at the [facility] only the labs. She wanted the [facility] at the [facility] and Labs at the [facility] and [facility].  US and Labs scheduled for 8/1/20.  Follow Up visit scheduled for 8/11/20.  All Appt letters placed in the mail.

## 2020-02-27 LAB
FUNGUS SPEC CULT: ABNORMAL
FUNGUS SPEC CULT: NORMAL

## 2020-03-05 ENCOUNTER — OFFICE VISIT (OUTPATIENT)
Dept: PULMONOLOGY | Facility: CLINIC | Age: 85
End: 2020-03-05
Payer: MEDICARE

## 2020-03-05 ENCOUNTER — PATIENT MESSAGE (OUTPATIENT)
Dept: PULMONOLOGY | Facility: CLINIC | Age: 85
End: 2020-03-05

## 2020-03-05 VITALS
BODY MASS INDEX: 21.11 KG/M2 | TEMPERATURE: 98 F | HEIGHT: 70 IN | WEIGHT: 147.5 LBS | HEART RATE: 97 BPM | SYSTOLIC BLOOD PRESSURE: 122 MMHG | DIASTOLIC BLOOD PRESSURE: 58 MMHG | OXYGEN SATURATION: 92 %

## 2020-03-05 DIAGNOSIS — T17.800A ASPIRATION INTO LOWER RESPIRATORY TRACT, INITIAL ENCOUNTER: Primary | ICD-10-CM

## 2020-03-05 DIAGNOSIS — J69.0 PNEUMONITIS DUE TO INHALATION OF FOOD AND VOMIT: ICD-10-CM

## 2020-03-05 DIAGNOSIS — J18.9 PNEUMONIA OF BOTH LUNGS DUE TO INFECTIOUS ORGANISM, UNSPECIFIED PART OF LUNG: ICD-10-CM

## 2020-03-05 PROCEDURE — 99214 PR OFFICE/OUTPT VISIT, EST, LEVL IV, 30-39 MIN: ICD-10-PCS | Mod: HCNC,S$GLB,, | Performed by: INTERNAL MEDICINE

## 2020-03-05 PROCEDURE — 1159F PR MEDICATION LIST DOCUMENTED IN MEDICAL RECORD: ICD-10-PCS | Mod: HCNC,S$GLB,, | Performed by: INTERNAL MEDICINE

## 2020-03-05 PROCEDURE — 99214 OFFICE O/P EST MOD 30 MIN: CPT | Mod: HCNC,S$GLB,, | Performed by: INTERNAL MEDICINE

## 2020-03-05 PROCEDURE — 1159F MED LIST DOCD IN RCRD: CPT | Mod: HCNC,S$GLB,, | Performed by: INTERNAL MEDICINE

## 2020-03-05 PROCEDURE — 1101F PR PT FALLS ASSESS DOC 0-1 FALLS W/OUT INJ PAST YR: ICD-10-PCS | Mod: HCNC,CPTII,S$GLB, | Performed by: INTERNAL MEDICINE

## 2020-03-05 PROCEDURE — 99999 PR PBB SHADOW E&M-EST. PATIENT-LVL V: CPT | Mod: PBBFAC,HCNC,, | Performed by: INTERNAL MEDICINE

## 2020-03-05 PROCEDURE — 1101F PT FALLS ASSESS-DOCD LE1/YR: CPT | Mod: HCNC,CPTII,S$GLB, | Performed by: INTERNAL MEDICINE

## 2020-03-05 PROCEDURE — 99999 PR PBB SHADOW E&M-EST. PATIENT-LVL V: ICD-10-PCS | Mod: PBBFAC,HCNC,, | Performed by: INTERNAL MEDICINE

## 2020-03-05 RX ORDER — LEVOFLOXACIN 750 MG/1
750 TABLET ORAL DAILY
Qty: 14 TABLET | Refills: 0 | Status: SHIPPED | OUTPATIENT
Start: 2020-03-05 | End: 2020-04-02 | Stop reason: ALTCHOICE

## 2020-03-05 RX ORDER — BENZONATATE 100 MG/1
100 CAPSULE ORAL 3 TIMES DAILY PRN
Qty: 20 CAPSULE | Refills: 0 | Status: SHIPPED | OUTPATIENT
Start: 2020-03-05 | End: 2020-03-15

## 2020-03-05 NOTE — ASSESSMENT & PLAN NOTE
Patient with continued symptoms. Im concerned patient may be aspirating.   Subjective fevers.   AFB from bronch is thus far negative. Awaiting finalization.   -Levaquin  -Modified Barium Swallow and Speech therapy evaluation.

## 2020-03-05 NOTE — PROGRESS NOTES
History of present illness:  86-year-old gentleman with a number of chronic medical conditions following up.  The patient has history of hypertension, diet-controlled diabetes mellitus, CLL, pan hypopituitarism, COPD and others.  Not currently on any pharmacologic therapy for his diabetes.  Recently treated for bronchopneumonia at our [facility].  Treated with antibiotics.  He is currently being followed by pulmonology due to persistent cough and radiographic changes.  His bronchoscopy scheduled.    Current medications:  All medications are noted and reviewed in the electronic medical record medication list.  Noted that he is back on losartan for hypertension.    Review of systems:  Constitutional:  No fever no chills.  HEENT:  No hoarseness no dysphagia.  Cardiovascular:  No chest pain palpitations syncope presyncope or claudication.  Respiratory:  Persistent cough.  No overt shortness of breath.  GI:  No nausea no vomiting abdominal pain or diarrhea.  Neurologic:  No new focal neurological symptomatologies.    Past medical history, past surgical history, family medical history social history is are all noted and reviewed in the electronic medical record history sections.    Physical examination:  General:  Pleasant alert appropriately groomed gentleman no acute distress.  Vital signs:  Blood pressure taken manually by this examiner 130/72.  Afebrile.  Lungs:  No wheezing.  No rales apparent.  Cardiovascular:  Regular rate and rhythm. No significant murmur.  Carotids full bilaterally. No JVD.  No peripheral extremity edema.  Mental status:  Alert oriented affect mood all appropriate.    Data:  Reviewed recent hospitalization and data from such.    Impression:  Diet-controlled diabetes mellitus due for update.  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  CLL followed by Hematology-Oncology.  Recent respiratory illness continue follow-up and evaluation by pulmonology.  Non suppressive therapy for hepatitis  being followed by hepatology due for follow-up.  Other chronic medical conditions as noted in the electronic medical record history section.    Plan:  Update glycohemoglobin.  Return to clinic in 6 months.  Follow-up with other subspecialists as planned.

## 2020-03-05 NOTE — PROGRESS NOTES
Subjective:       Patient ID: Thierry Ramos Jr. is a 86 y.o. male.    Chief Complaint: No chief complaint on file.    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 Patient was admitted to [facility] with PNA.      Evaluation as below:  CLL, on rituxan and steroids, recent pseudomonal PNA and treated with 21d of cefepime.  Back now because cough not much better and may have felt warm at home.  No fevers here.  He says he has been having symptoms for months now and not really worse.  CT chest with bilateral nodular and tree-in-bud infiltrates in lower lobes, infiltrates actually look better than past chest CT.       Recommendations:  1.  Immune suppressed and at risk for OIs of various types.  Would get sputum for bacterial and AFBs.  I don't think this is TB and he does not need airborne precautions, the AFBs would be to eval for NTBMB  2.  I don't think he needs abx as he isn't clearly worse and if he has an infection it is likely atypical and will need specific therapies.  Sputum culture pending can guide if abx are needed  3.  On room air, not much different from baseline.  Doesn't need to stay in hospital to await results of this workup as will likely take a little while to get prelim AFB results.  If sputum bateria and AFB smear neg, we can bronch and BAL him here if inpatient or bronch him as an outpatient        Patient reports positive yellow sputum. No fevers. Significant unintentional weight loss.      Interval HX 1/12/2020-     Since last vistation, he reports remains with productive cough- dark yellow at times. Has been prescribed Levaquin (just finished last pill yesterday)and referred to ENT for chronic rhinitis. Explains to have continous purleny nasal discharge.  Denies fever. Discloses riding stationary bike for 15-20 mins a day. Reports weight neural at this time.      Interval hx 3/5/2020: Patient continues to have SOB. And he  is making discolored sputum. Wife reports fevers and coughing while eating.   Denies chest pain.  Recently went to the ED and received 5 days of Levaquin.     Review of Systems   Constitutional: Positive for fever. Negative for fatigue.   HENT: Negative for postnasal drip and congestion.    Respiratory: Positive for cough, sputum production and dyspnea on extertion.    Cardiovascular: Negative for chest pain and leg swelling.       Objective:      Physical Exam   Constitutional: He is oriented to person, place, and time. He appears well-developed and well-nourished. No distress.   HENT:   Head: Normocephalic.   Nose: Nose normal.   Neck: Normal range of motion. Neck supple.   Cardiovascular: Normal rate and regular rhythm.   Pulmonary/Chest: Normal expansion, symmetric chest wall expansion, effort normal and breath sounds normal.   Abdominal: Soft. Bowel sounds are normal.   Musculoskeletal: Normal range of motion.   Neurological: He is alert and oriented to person, place, and time.   Skin: Skin is warm and dry. He is not diaphoretic.   Psychiatric: He has a normal mood and affect. His behavior is normal.   Nursing note and vitals reviewed.    Personal Diagnostic Review  none pertinent  No flowsheet data found.      Assessment:       No diagnosis found.    Outpatient Encounter Medications as of 3/5/2020   Medication Sig Dispense Refill    ACCU-CHEK FASTCLIX Misc TEST  BLOOD  GLUCOSE FOUR TIMES DAILY 408 each 6    alcohol swabs PadM APPLY 1 PAD TOPICALLY  AS NEEDED. 300 each 3    aspirin 81 MG Chew Take 81 mg by mouth every Mon, Wed, Fri.      atorvastatin (LIPITOR) 20 MG tablet TAKE 1 TABLET EVERY DAY 90 tablet 3    benzonatate (TESSALON) 100 MG capsule Take 1 capsule (100 mg total) by mouth 3 (three) times daily as needed. 20 capsule 0    budesonide (PULMICORT) 0.5 mg/2 mL nebulizer solution budesonide 0.5 mg/2 mL suspension for nebulization      chlorhexidine (PERIDEX) 0.12 % solution chlorhexidine  gluconate 0.12 % mouthwash      clopidogrel (PLAVIX) 75 mg tablet Take 1 tablet (75 mg total) by mouth once daily. 90 tablet 3    cyanocobalamin, vitamin B-12, 5,000 mcg TbDL Take 1 tablet by mouth once daily.      cycloSPORINE (RESTASIS) 0.05 % ophthalmic emulsion Place 0.4 mLs (1 drop total) into both eyes 2 (two) times daily. 180 each 3    DOCOSAHEXANOIC ACID/EPA (FISH OIL ORAL) Take 5 capsules by mouth once daily.       erythromycin (ROMYCIN) ophthalmic ointment Apply to eyelids and lashes OU QHS 1 Tube 6    fluorouracil (EFUDEX) 5 % cream Use hs for 2 weeks on right cheek 40 g 3    furosemide (LASIX) 20 MG tablet       guaifen/dextromethorphan/pe (ROBITUSSIN COUGH & COLD CF MAX ORAL) Take 15 mLs by mouth 2 (two) times daily.      hydrocodone-chlorpheniramine (TUSSIONEX) 10-8 mg/5 mL suspension Take 5 mLs by mouth every 12 (twelve) hours as needed for Cough. 60 mL 0    ipratropium (ATROVENT) 0.03 % nasal spray 2 sprays by Nasal route 2 (two) times daily. 30 mL 0    Lactobacillus acidophilus (ACIDOPHILUS ORAL) Take 1 tablet by mouth once daily.      levoFLOXacin (LEVAQUIN) 500 MG tablet Take 1 tablet (500 mg total) by mouth once daily. 10 tablet 0    levothyroxine (SYNTHROID) 125 MCG tablet TAKE 1 TABLET ONE TIME DAILY 90 tablet 3    losartan (COZAAR) 25 MG tablet Take 25 mg by mouth once daily.      multivitamin with minerals tablet Take 1 tablet by mouth once daily.      mupirocin (BACTROBAN) 2 % ointment Apply to affected area 3 times daily 22 g 1    neomycin-polymyxin-dexamethasone (DEXACINE) 3.5 mg/g-10,000 unit/g-0.1 % Oint neomycin 3.5 mg/g-polymyxin B 10,000 unit/g-dexameth 0.1 % eye oint      neomycin-polymyxin-dexamethasone (MAXITROL) 3.5mg/mL-10,000 unit/mL-0.1 % DrpS neomycin-polymyxin-dexameth 3.5 mg/mL-10,000 unit/mL-0.1% eye drops      pantoprazole (PROTONIX) 40 MG tablet TAKE 1 TABLET EVERY DAY 90 tablet 3    predniSONE (DELTASONE) 2.5 MG tablet Take 1 tablet (2.5 mg total) by  mouth once daily. 5mg and 2.5mg qd 30 tablet 11    predniSONE (DELTASONE) 5 MG tablet Take 1 tablet (5 mg total) by mouth once daily. 30 tablet 11    sennosides/docusate sodium (EDEN-COLACE ORAL) Take 1 tablet by mouth once daily.      SYMBICORT 160-4.5 mcg/actuation HFAA Inhale 1 puff into the lungs as needed.      tazarotene (AVAGE) 0.1 % cream Apply topically every evening. (Patient taking differently: Apply topically as needed. ) 30 g 3    tenofovir alafenamide (VEMLIDY) 25 mg Tab Take 1 tablet (25 mg total) by mouth once daily. 30 tablet 11    testosterone cypionate (DEPOTESTOTERONE CYPIONATE) 100 mg/mL injection Inject 0.5 mLs (50 mg total) into the muscle every 14 (fourteen) days. 10 mL 5    VENTOLIN HFA 90 mcg/actuation inhaler Inhale 1 puff into the lungs as needed.       No facility-administered encounter medications on file as of 3/5/2020.      No orders of the defined types were placed in this encounter.      Plan:          Pneumonia of both lungs due to infectious organism  Patient with continued symptoms. Im concerned patient may be aspirating.   Subjective fevers.   AFB from bronch is thus far negative. Awaiting finalization.   -Levaquin  -Modified Barium Swallow and Speech therapy evaluation.      Follow up in 3 months.     Homa Christensen MD

## 2020-03-06 ENCOUNTER — TELEPHONE (OUTPATIENT)
Dept: HEPATOLOGY | Facility: CLINIC | Age: 85
End: 2020-03-06

## 2020-03-06 ENCOUNTER — TELEPHONE (OUTPATIENT)
Dept: PHARMACY | Facility: CLINIC | Age: 85
End: 2020-03-06

## 2020-03-09 ENCOUNTER — PATIENT MESSAGE (OUTPATIENT)
Dept: PULMONOLOGY | Facility: CLINIC | Age: 85
End: 2020-03-09

## 2020-03-12 NOTE — TELEPHONE ENCOUNTER
RX call attempt 1 regarding Vemlidy refill from [facility]. Patients wife Yolie reached-- shipping out 3/17 for 3/18 arrival with patients wifes consent. Copay of 0.00 @ 004. Address and  confirmed. Patient has 8 days of medication on hand at this time. Patient has not started any new medications, has had no missed doses and no side effects present. Patient is currently taking the medication as directed by doctors instruction, Take 1 tablet (25 mg total) by mouth once daily. Patient does have a safe place in their residence to keep medication at desired temperature away from small children and pets. Patient also does have the capability of contacting 911 in the event of an emergency. Patients wife states they do not have any questions or concerns at this time.

## 2020-03-16 ENCOUNTER — PATIENT MESSAGE (OUTPATIENT)
Dept: INTERNAL MEDICINE | Facility: CLINIC | Age: 85
End: 2020-03-16

## 2020-03-16 RX ORDER — LOSARTAN POTASSIUM 25 MG/1
25 TABLET ORAL DAILY
Qty: 90 TABLET | Refills: 3 | Status: SHIPPED | OUTPATIENT
Start: 2020-03-16

## 2020-03-17 ENCOUNTER — PATIENT MESSAGE (OUTPATIENT)
Dept: PULMONOLOGY | Facility: CLINIC | Age: 85
End: 2020-03-17

## 2020-03-17 DIAGNOSIS — T17.800A ASPIRATION INTO LOWER RESPIRATORY TRACT, INITIAL ENCOUNTER: Primary | ICD-10-CM

## 2020-03-18 ENCOUNTER — CLINICAL SUPPORT (OUTPATIENT)
Dept: INTERNAL MEDICINE | Facility: CLINIC | Age: 85
End: 2020-03-18
Payer: MEDICARE

## 2020-03-18 ENCOUNTER — PATIENT MESSAGE (OUTPATIENT)
Dept: HEMATOLOGY/ONCOLOGY | Facility: CLINIC | Age: 85
End: 2020-03-18

## 2020-03-18 ENCOUNTER — PATIENT MESSAGE (OUTPATIENT)
Dept: PULMONOLOGY | Facility: CLINIC | Age: 85
End: 2020-03-18

## 2020-03-18 DIAGNOSIS — R05.9 COUGH: ICD-10-CM

## 2020-03-18 DIAGNOSIS — R06.02 SHORTNESS OF BREATH: Primary | ICD-10-CM

## 2020-03-18 PROCEDURE — U0002 COVID-19 LAB TEST NON-CDC: HCPCS | Mod: HCNC

## 2020-03-18 NOTE — TELEPHONE ENCOUNTER
Patient reports fever of 100.7. Patient states he has been taking abx for a cough for the last 14 days. States he is coughing up yellow phlegm. Will discuss with dr adam and follow up with recommendations

## 2020-03-19 ENCOUNTER — PATIENT MESSAGE (OUTPATIENT)
Dept: HEMATOLOGY/ONCOLOGY | Facility: CLINIC | Age: 85
End: 2020-03-19

## 2020-03-22 ENCOUNTER — PATIENT MESSAGE (OUTPATIENT)
Dept: DERMATOLOGY | Facility: CLINIC | Age: 85
End: 2020-03-22

## 2020-03-22 ENCOUNTER — PATIENT MESSAGE (OUTPATIENT)
Dept: HEMATOLOGY/ONCOLOGY | Facility: CLINIC | Age: 85
End: 2020-03-22

## 2020-03-22 LAB — SARS-COV-2 RNA RESP QL NAA+PROBE: NORMAL

## 2020-03-23 ENCOUNTER — PATIENT MESSAGE (OUTPATIENT)
Dept: OPHTHALMOLOGY | Facility: CLINIC | Age: 85
End: 2020-03-23

## 2020-03-25 LAB
ACID FAST MOD KINY STN SPEC: NORMAL
ACID FAST MOD KINY STN SPEC: NORMAL
MYCOBACTERIUM SPEC QL CULT: NORMAL
MYCOBACTERIUM SPEC QL CULT: NORMAL

## 2020-04-01 RX ORDER — PANTOPRAZOLE SODIUM 40 MG/1
TABLET, DELAYED RELEASE ORAL
Qty: 90 TABLET | Refills: 3 | Status: SHIPPED | OUTPATIENT
Start: 2020-04-01

## 2020-04-01 RX ORDER — ATORVASTATIN CALCIUM 20 MG/1
TABLET, FILM COATED ORAL
Qty: 90 TABLET | Refills: 3 | Status: SHIPPED | OUTPATIENT
Start: 2020-04-01

## 2020-04-02 ENCOUNTER — HOSPITAL ENCOUNTER (INPATIENT)
Facility: HOSPITAL | Age: 85
LOS: 1 days | Discharge: HOME | DRG: 178 | End: 2020-04-03
Attending: EMERGENCY MEDICINE | Admitting: HOSPITALIST
Payer: MEDICARE

## 2020-04-02 ENCOUNTER — PATIENT MESSAGE (OUTPATIENT)
Dept: HEMATOLOGY/ONCOLOGY | Facility: CLINIC | Age: 85
End: 2020-04-02

## 2020-04-02 ENCOUNTER — OFFICE VISIT (OUTPATIENT)
Dept: URGENT CARE | Facility: CLINIC | Age: 85
End: 2020-04-02
Payer: MEDICARE

## 2020-04-02 VITALS
RESPIRATION RATE: 24 BRPM | TEMPERATURE: 101 F | OXYGEN SATURATION: 91 % | WEIGHT: 147 LBS | DIASTOLIC BLOOD PRESSURE: 61 MMHG | HEIGHT: 70 IN | HEART RATE: 108 BPM | BODY MASS INDEX: 21.05 KG/M2 | SYSTOLIC BLOOD PRESSURE: 110 MMHG

## 2020-04-02 DIAGNOSIS — R09.02 HYPOXEMIA: ICD-10-CM

## 2020-04-02 DIAGNOSIS — C91.10 CLL (CHRONIC LYMPHOCYTIC LEUKEMIA): ICD-10-CM

## 2020-04-02 DIAGNOSIS — J18.9 PNEUMONIA OF LEFT LOWER LOBE DUE TO INFECTIOUS ORGANISM: Primary | ICD-10-CM

## 2020-04-02 DIAGNOSIS — J69.0 ASPIRATION PNEUMONIA OF LEFT LOWER LOBE, UNSPECIFIED ASPIRATION PNEUMONIA TYPE: ICD-10-CM

## 2020-04-02 DIAGNOSIS — R09.02 HYPOXIA: ICD-10-CM

## 2020-04-02 DIAGNOSIS — Z20.822 SUSPECTED COVID-19 VIRUS INFECTION: ICD-10-CM

## 2020-04-02 DIAGNOSIS — R06.02 SOB (SHORTNESS OF BREATH): ICD-10-CM

## 2020-04-02 DIAGNOSIS — R50.9 FEVER, UNSPECIFIED FEVER CAUSE: Primary | ICD-10-CM

## 2020-04-02 DIAGNOSIS — E11.40 TYPE 2 DIABETES, CONTROLLED, WITH NEUROPATHY: Chronic | ICD-10-CM

## 2020-04-02 DIAGNOSIS — D84.9 IMMUNOSUPPRESSED STATUS: ICD-10-CM

## 2020-04-02 PROBLEM — J22 ACUTE RESPIRATORY INFECTION: Status: ACTIVE | Noted: 2020-04-02

## 2020-04-02 PROBLEM — J96.01 ACUTE HYPOXEMIC RESPIRATORY FAILURE: Status: ACTIVE | Noted: 2020-04-02

## 2020-04-02 PROBLEM — I70.0 AORTIC ATHEROSCLEROSIS: Chronic | Status: ACTIVE | Noted: 2019-08-29

## 2020-04-02 LAB
ALBUMIN SERPL BCP-MCNC: 2.9 G/DL (ref 3.5–5.2)
ALP SERPL-CCNC: 185 U/L (ref 55–135)
ALT SERPL W/O P-5'-P-CCNC: 93 U/L (ref 10–44)
ANION GAP SERPL CALC-SCNC: 11 MMOL/L (ref 8–16)
AST SERPL-CCNC: 68 U/L (ref 10–40)
BASOPHILS # BLD AUTO: 0.03 K/UL (ref 0–0.2)
BASOPHILS NFR BLD: 0.3 % (ref 0–1.9)
BILIRUB SERPL-MCNC: 0.7 MG/DL (ref 0.1–1)
BUN SERPL-MCNC: 38 MG/DL (ref 8–23)
CALCIUM SERPL-MCNC: 9.5 MG/DL (ref 8.7–10.5)
CHLORIDE SERPL-SCNC: 105 MMOL/L (ref 95–110)
CK SERPL-CCNC: 14 U/L (ref 20–200)
CO2 SERPL-SCNC: 22 MMOL/L (ref 23–29)
CREAT SERPL-MCNC: 1.1 MG/DL (ref 0.5–1.4)
CRP SERPL-MCNC: 69.6 MG/L (ref 0–8.2)
DIFFERENTIAL METHOD: ABNORMAL
EOSINOPHIL # BLD AUTO: 0 K/UL (ref 0–0.5)
EOSINOPHIL NFR BLD: 0.3 % (ref 0–8)
ERYTHROCYTE [DISTWIDTH] IN BLOOD BY AUTOMATED COUNT: 14.1 % (ref 11.5–14.5)
EST. GFR  (AFRICAN AMERICAN): >60 ML/MIN/1.73 M^2
EST. GFR  (NON AFRICAN AMERICAN): >60 ML/MIN/1.73 M^2
FERRITIN SERPL-MCNC: 439 NG/ML (ref 20–300)
GLUCOSE SERPL-MCNC: 217 MG/DL (ref 70–110)
HCT VFR BLD AUTO: 41.1 % (ref 40–54)
HGB BLD-MCNC: 13.2 G/DL (ref 14–18)
IMM GRANULOCYTES # BLD AUTO: 0.38 K/UL (ref 0–0.04)
IMM GRANULOCYTES NFR BLD AUTO: 4.1 % (ref 0–0.5)
INFLUENZA A, MOLECULAR: NEGATIVE
INFLUENZA B, MOLECULAR: NEGATIVE
LACTATE SERPL-SCNC: 2.3 MMOL/L (ref 0.5–2.2)
LDH SERPL L TO P-CCNC: 209 U/L (ref 110–260)
LYMPHOCYTES # BLD AUTO: 1.5 K/UL (ref 1–4.8)
LYMPHOCYTES NFR BLD: 15.7 % (ref 18–48)
MCH RBC QN AUTO: 30.2 PG (ref 27–31)
MCHC RBC AUTO-ENTMCNC: 32.1 G/DL (ref 32–36)
MCV RBC AUTO: 94 FL (ref 82–98)
MONOCYTES # BLD AUTO: 1 K/UL (ref 0.3–1)
MONOCYTES NFR BLD: 10.4 % (ref 4–15)
NEUTROPHILS # BLD AUTO: 6.5 K/UL (ref 1.8–7.7)
NEUTROPHILS NFR BLD: 69.2 % (ref 38–73)
NRBC BLD-RTO: 0 /100 WBC
PLATELET # BLD AUTO: 150 K/UL (ref 150–350)
PMV BLD AUTO: 9.3 FL (ref 9.2–12.9)
POCT GLUCOSE: 147 MG/DL (ref 70–110)
POTASSIUM SERPL-SCNC: 4.9 MMOL/L (ref 3.5–5.1)
PROCALCITONIN SERPL IA-MCNC: 0.39 NG/ML
PROT SERPL-MCNC: 5.5 G/DL (ref 6–8.4)
RBC # BLD AUTO: 4.37 M/UL (ref 4.6–6.2)
SODIUM SERPL-SCNC: 138 MMOL/L (ref 136–145)
SPECIMEN SOURCE: NORMAL
TROPONIN I SERPL DL<=0.01 NG/ML-MCNC: 0.01 NG/ML (ref 0–0.03)
WBC # BLD AUTO: 9.32 K/UL (ref 3.9–12.7)

## 2020-04-02 PROCEDURE — 85025 COMPLETE CBC W/AUTO DIFF WBC: CPT | Mod: HCNC

## 2020-04-02 PROCEDURE — 99215 PR OFFICE/OUTPT VISIT, EST, LEVL V, 40-54 MIN: ICD-10-PCS | Mod: S$GLB,,, | Performed by: INTERNAL MEDICINE

## 2020-04-02 PROCEDURE — 82962 GLUCOSE BLOOD TEST: CPT | Mod: HCNC

## 2020-04-02 PROCEDURE — 82550 ASSAY OF CK (CPK): CPT | Mod: HCNC

## 2020-04-02 PROCEDURE — 80053 COMPREHEN METABOLIC PANEL: CPT | Mod: HCNC

## 2020-04-02 PROCEDURE — 83605 ASSAY OF LACTIC ACID: CPT | Mod: HCNC

## 2020-04-02 PROCEDURE — 83615 LACTATE (LD) (LDH) ENZYME: CPT | Mod: HCNC

## 2020-04-02 PROCEDURE — 82728 ASSAY OF FERRITIN: CPT | Mod: HCNC

## 2020-04-02 PROCEDURE — 84484 ASSAY OF TROPONIN QUANT: CPT | Mod: HCNC

## 2020-04-02 PROCEDURE — 25000003 PHARM REV CODE 250: Mod: HCNC | Performed by: PHYSICIAN ASSISTANT

## 2020-04-02 PROCEDURE — 86140 C-REACTIVE PROTEIN: CPT | Mod: HCNC

## 2020-04-02 PROCEDURE — 99285 EMERGENCY DEPT VISIT HI MDM: CPT | Mod: 25,HCNC

## 2020-04-02 PROCEDURE — 96365 THER/PROPH/DIAG IV INF INIT: CPT | Mod: HCNC

## 2020-04-02 PROCEDURE — 93005 ELECTROCARDIOGRAM TRACING: CPT | Mod: HCNC

## 2020-04-02 PROCEDURE — 25000003 PHARM REV CODE 250: Mod: HCNC | Performed by: HOSPITALIST

## 2020-04-02 PROCEDURE — 84145 PROCALCITONIN (PCT): CPT | Mod: HCNC

## 2020-04-02 PROCEDURE — 96366 THER/PROPH/DIAG IV INF ADDON: CPT | Mod: HCNC

## 2020-04-02 PROCEDURE — 99215 OFFICE O/P EST HI 40 MIN: CPT | Mod: S$GLB,,, | Performed by: INTERNAL MEDICINE

## 2020-04-02 PROCEDURE — U0002 COVID-19 LAB TEST NON-CDC: HCPCS | Mod: HCNC

## 2020-04-02 PROCEDURE — 11000001 HC ACUTE MED/SURG PRIVATE ROOM: Mod: HCNC

## 2020-04-02 PROCEDURE — 87040 BLOOD CULTURE FOR BACTERIA: CPT | Mod: HCNC

## 2020-04-02 PROCEDURE — 63600175 PHARM REV CODE 636 W HCPCS: Mod: HCNC | Performed by: PHYSICIAN ASSISTANT

## 2020-04-02 PROCEDURE — 87502 INFLUENZA DNA AMP PROBE: CPT | Mod: HCNC

## 2020-04-02 RX ORDER — IBUPROFEN 200 MG
24 TABLET ORAL
Status: DISCONTINUED | OUTPATIENT
Start: 2020-04-02 | End: 2020-04-03 | Stop reason: HOSPADM

## 2020-04-02 RX ORDER — ONDANSETRON 2 MG/ML
4 INJECTION INTRAMUSCULAR; INTRAVENOUS EVERY 8 HOURS PRN
Status: DISCONTINUED | OUTPATIENT
Start: 2020-04-02 | End: 2020-04-03 | Stop reason: HOSPADM

## 2020-04-02 RX ORDER — HYDROXYCHLOROQUINE SULFATE 200 MG/1
400 TABLET, FILM COATED ORAL DAILY
Status: DISCONTINUED | OUTPATIENT
Start: 2020-04-03 | End: 2020-04-03 | Stop reason: HOSPADM

## 2020-04-02 RX ORDER — INSULIN ASPART 100 [IU]/ML
0-5 INJECTION, SOLUTION INTRAVENOUS; SUBCUTANEOUS
Status: DISCONTINUED | OUTPATIENT
Start: 2020-04-02 | End: 2020-04-02 | Stop reason: SDUPTHER

## 2020-04-02 RX ORDER — ACETAMINOPHEN 500 MG
1000 TABLET ORAL EVERY 8 HOURS PRN
Status: DISCONTINUED | OUTPATIENT
Start: 2020-04-02 | End: 2020-04-03 | Stop reason: HOSPADM

## 2020-04-02 RX ORDER — ACETAMINOPHEN 325 MG/1
650 TABLET ORAL
Status: COMPLETED | OUTPATIENT
Start: 2020-04-02 | End: 2020-04-02

## 2020-04-02 RX ORDER — HYDROXYCHLOROQUINE SULFATE 200 MG/1
400 TABLET, FILM COATED ORAL 2 TIMES DAILY
Status: COMPLETED | OUTPATIENT
Start: 2020-04-02 | End: 2020-04-03

## 2020-04-02 RX ORDER — IBUPROFEN 200 MG
16 TABLET ORAL
Status: DISCONTINUED | OUTPATIENT
Start: 2020-04-02 | End: 2020-04-03 | Stop reason: HOSPADM

## 2020-04-02 RX ORDER — NAPROXEN SODIUM 220 MG/1
81 TABLET, FILM COATED ORAL
Status: DISCONTINUED | OUTPATIENT
Start: 2020-04-03 | End: 2020-04-03 | Stop reason: HOSPADM

## 2020-04-02 RX ORDER — AMOXICILLIN 250 MG
1 CAPSULE ORAL DAILY
Status: DISCONTINUED | OUTPATIENT
Start: 2020-04-03 | End: 2020-04-03 | Stop reason: HOSPADM

## 2020-04-02 RX ORDER — GLUCAGON 1 MG
1 KIT INJECTION
Status: DISCONTINUED | OUTPATIENT
Start: 2020-04-02 | End: 2020-04-02 | Stop reason: SDUPTHER

## 2020-04-02 RX ORDER — CLOPIDOGREL BISULFATE 75 MG/1
75 TABLET ORAL DAILY
Status: DISCONTINUED | OUTPATIENT
Start: 2020-04-03 | End: 2020-04-03 | Stop reason: HOSPADM

## 2020-04-02 RX ORDER — SODIUM CHLORIDE 0.9 % (FLUSH) 0.9 %
10 SYRINGE (ML) INJECTION
Status: DISCONTINUED | OUTPATIENT
Start: 2020-04-02 | End: 2020-04-03 | Stop reason: HOSPADM

## 2020-04-02 RX ORDER — ALBUTEROL SULFATE 90 UG/1
2 AEROSOL, METERED RESPIRATORY (INHALATION) EVERY 6 HOURS PRN
Status: DISCONTINUED | OUTPATIENT
Start: 2020-04-02 | End: 2020-04-03 | Stop reason: HOSPADM

## 2020-04-02 RX ORDER — INSULIN ASPART 100 [IU]/ML
0-5 INJECTION, SOLUTION INTRAVENOUS; SUBCUTANEOUS
Status: DISCONTINUED | OUTPATIENT
Start: 2020-04-02 | End: 2020-04-03 | Stop reason: HOSPADM

## 2020-04-02 RX ORDER — ACETAMINOPHEN 325 MG/1
650 TABLET ORAL EVERY 4 HOURS PRN
Status: DISCONTINUED | OUTPATIENT
Start: 2020-04-02 | End: 2020-04-03 | Stop reason: HOSPADM

## 2020-04-02 RX ORDER — IBUPROFEN 200 MG
24 TABLET ORAL
Status: DISCONTINUED | OUTPATIENT
Start: 2020-04-02 | End: 2020-04-02

## 2020-04-02 RX ORDER — PANTOPRAZOLE SODIUM 40 MG/1
40 TABLET, DELAYED RELEASE ORAL DAILY
Status: DISCONTINUED | OUTPATIENT
Start: 2020-04-03 | End: 2020-04-03 | Stop reason: HOSPADM

## 2020-04-02 RX ORDER — IBUPROFEN 200 MG
16 TABLET ORAL
Status: DISCONTINUED | OUTPATIENT
Start: 2020-04-02 | End: 2020-04-02

## 2020-04-02 RX ORDER — ATORVASTATIN CALCIUM 20 MG/1
20 TABLET, FILM COATED ORAL DAILY
Status: DISCONTINUED | OUTPATIENT
Start: 2020-04-03 | End: 2020-04-03 | Stop reason: HOSPADM

## 2020-04-02 RX ORDER — GLUCAGON 1 MG
1 KIT INJECTION
Status: DISCONTINUED | OUTPATIENT
Start: 2020-04-02 | End: 2020-04-03 | Stop reason: HOSPADM

## 2020-04-02 RX ADMIN — CEFTRIAXONE 2 G: 2 INJECTION, SOLUTION INTRAVENOUS at 03:04

## 2020-04-02 RX ADMIN — METHYLPREDNISOLONE SODIUM SUCCINATE 20 MG: 40 INJECTION, POWDER, FOR SOLUTION INTRAMUSCULAR; INTRAVENOUS at 11:04

## 2020-04-02 RX ADMIN — ACETAMINOPHEN 650 MG: 325 TABLET ORAL at 03:04

## 2020-04-02 RX ADMIN — AZITHROMYCIN MONOHYDRATE 500 MG: 500 INJECTION, POWDER, LYOPHILIZED, FOR SOLUTION INTRAVENOUS at 03:04

## 2020-04-02 RX ADMIN — HYDROXYCHLOROQUINE SULFATE 400 MG: 200 TABLET, FILM COATED ORAL at 08:04

## 2020-04-02 NOTE — SUBJECTIVE & OBJECTIVE
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    Diabetes mellitus, type 2     Encounter for blood transfusion     Hyperlipidemia     Hypertension     Hypopituitarism     Hypothyroid     Immunosuppression 3/13/2015    Liver fibrosis (?) 2/24/2020    Squamous Cell Carcinoma     on the right side of the face     Squamous cell carcinoma of skin of right temple 1/27/2016    Stroke     Syncope 2/12    Unspecified disorder of kidney and ureter        Past Surgical History:   Procedure Laterality Date    BRONCHOSCOPY N/A 1/22/2020    Procedure: Bronchoscopy;  Surgeon: Fabian Diagnostic Provider;  Location: [facility] OR [address] [place] [address];  Service: Anesthesiology;  Laterality: N/A;    CAROTID ENDARTERECTOMY (L)  2/27/2007    EXCISION TURBINATE, SUBMUCOUS      FESS  9/16/2014    Mohs excision   3/23/2015, 2/24/2016    combined with WLE forehead    NASAL SEPTUM SURGERY  9/16/2014    right ureter surgery  1995    SENTINEL LYMPH NODE BIOPSY  3/23/2015, 2/24/2016    SINUS SURGERY  9/16/2014    septo, ESS, BITSMR    Transphenoidal surgery         Review of patient's allergies indicates:   Allergen Reactions    Ace inhibitors Swelling     angioedema    Divalproex Hives     Rash under arms, body creases       No current facility-administered medications on file prior to encounter.      Current Outpatient Medications on File Prior to Encounter   Medication Sig    aspirin 81 MG Chew Take 81 mg by mouth every Mon, Wed, Fri.    atorvastatin (LIPITOR) 20 MG tablet TAKE 1 TABLET EVERY DAY    clopidogrel (PLAVIX) 75 mg tablet Take 1 tablet (75 mg total) by mouth once daily.    cyanocobalamin, vitamin B-12, 5,000 mcg TbDL Take 1 tablet by mouth once daily.    cycloSPORINE  (RESTASIS) 0.05 % ophthalmic emulsion Place 0.4 mLs (1 drop total) into both eyes 2 (two) times daily.    DOCOSAHEXANOIC ACID/EPA (FISH OIL ORAL) Take 5 capsules by mouth once daily.     fluorouracil (EFUDEX) 5 % cream Use hs for 2 weeks on right cheek    furosemide (LASIX) 20 MG tablet Take 20 mg by mouth daily as needed.     guaifen/dextromethorphan/pe (ROBITUSSIN COUGH & COLD CF MAX ORAL) Take 15 mLs by mouth 2 (two) times daily.    hydrocodone-chlorpheniramine (TUSSIONEX) 10-8 mg/5 mL suspension Take 5 mLs by mouth every 12 (twelve) hours as needed for Cough.    ipratropium (ATROVENT) 0.03 % nasal spray 2 sprays by Nasal route 2 (two) times daily.    Lactobacillus acidophilus (ACIDOPHILUS ORAL) Take 1 tablet by mouth once daily.    levothyroxine (SYNTHROID) 125 MCG tablet TAKE 1 TABLET ONE TIME DAILY    losartan (COZAAR) 25 MG tablet Take 1 tablet (25 mg total) by mouth once daily.    multivitamin with minerals tablet Take 1 tablet by mouth once daily.    mupirocin (BACTROBAN) 2 % ointment Apply to affected area 3 times daily    pantoprazole (PROTONIX) 40 MG tablet TAKE 1 TABLET EVERY DAY    predniSONE (DELTASONE) 2.5 MG tablet Take 1 tablet (2.5 mg total) by mouth once daily. 5mg and 2.5mg qd    predniSONE (DELTASONE) 5 MG tablet Take 1 tablet (5 mg total) by mouth once daily.    sennosides/docusate sodium (EDEN-COLACE ORAL) Take 1 tablet by mouth once daily.    SYMBICORT 160-4.5 mcg/actuation HFAA Inhale 1 puff into the lungs as needed.    tazarotene (AVAGE) 0.1 % cream Apply topically every evening. (Patient taking differently: Apply topically as needed. )    tenofovir alafenamide (VEMLIDY) 25 mg Tab Take 1 tablet (25 mg total) by mouth once daily.    testosterone cypionate (DEPOTESTOTERONE CYPIONATE) 100 mg/mL injection Inject 0.5 mLs (50 mg total) into the muscle every 14 (fourteen) days.    VENTOLIN HFA 90 mcg/actuation inhaler Inhale 1 puff into the lungs as needed.    [DISCONTINUED]  ACCU-CHEK FASTCLIX Misc TEST  BLOOD  GLUCOSE FOUR TIMES DAILY    [DISCONTINUED] alcohol swabs PadM APPLY 1 PAD TOPICALLY  AS NEEDED.    [DISCONTINUED] budesonide (PULMICORT) 0.5 mg/2 mL nebulizer solution budesonide 0.5 mg/2 mL suspension for nebulization    [DISCONTINUED] chlorhexidine (PERIDEX) 0.12 % solution chlorhexidine gluconate 0.12 % mouthwash    [DISCONTINUED] erythromycin (ROMYCIN) ophthalmic ointment Apply to eyelids and lashes OU QHS    [DISCONTINUED] levoFLOXacin (LEVAQUIN) 750 MG tablet Take 1 tablet (750 mg total) by mouth once daily.    [DISCONTINUED] neomycin-polymyxin-dexamethasone (DEXACINE) 3.5 mg/g-10,000 unit/g-0.1 % Oint neomycin 3.5 mg/g-polymyxin B 10,000 unit/g-dexameth 0.1 % eye oint    [DISCONTINUED] neomycin-polymyxin-dexamethasone (MAXITROL) 3.5mg/mL-10,000 unit/mL-0.1 % DrpS neomycin-polymyxin-dexameth 3.5 mg/mL-10,000 unit/mL-0.1% eye drops     Family History     Problem Relation (Age of Onset)    Cancer Mother, Father, Brother    Colon cancer Father    No Known Problems Sister, Maternal Aunt, Maternal Uncle, Paternal Aunt, Paternal Uncle, Maternal Grandmother, Maternal Grandfather, Paternal Grandmother, Paternal Grandfather        Tobacco Use    Smoking status: Never Smoker    Smokeless tobacco: Never Used   Substance and Sexual Activity    Alcohol use: Not Currently     Alcohol/week: 2.0 standard drinks     Types: 1 Glasses of wine, 1 Cans of beer per week     Frequency: Never     Drinks per session: Patient refused     Binge frequency: Patient refused    Drug use: No    Sexual activity: Not Currently     Review of Systems   Constitutional: Positive for chills and fever.   HENT: Negative for congestion and voice change.    Eyes: Negative for pain and redness.   Respiratory: Positive for cough and shortness of breath.    Gastrointestinal: Negative for nausea and vomiting.   Genitourinary: Negative for difficulty urinating and dysuria.   Musculoskeletal: Negative for neck  pain and neck stiffness.   Skin: Negative for rash and wound.   Neurological: Positive for headaches. Negative for syncope.     Objective:     Vital Signs (Most Recent):  Temp: (!) 100.7 °F (38.2 °C) (04/02/20 1328)  Pulse: 90 (04/02/20 1535)  Resp: (!) 25 (04/02/20 1535)  BP: 120/66 (04/02/20 1328)  SpO2: (!) 90 % (04/02/20 1535) Vital Signs (24h Range):  Temp:  [100.7 °F (38.2 °C)-100.8 °F (38.2 °C)] 100.7 °F (38.2 °C)  Pulse:  [] 90  Resp:  [18-25] 25  SpO2:  [90 %-95 %] 90 %  BP: (110-120)/(61-66) 120/66     Weight: 66.7 kg (147 lb)  Body mass index is 21.09 kg/m².    Physical Exam   Constitutional: He appears well-developed. No distress.   The rest of the physical exam was deferred due to lack of personal protective equipment available and patient coughing continuously in my direction.   HENT:   Head: Normocephalic and atraumatic.   Pulmonary/Chest: Effort normal. No respiratory distress.   Neurological: He is alert. He displays no seizure activity.   Psychiatric: He has a normal mood and affect.   Nursing note and vitals reviewed.          Significant Labs: All pertinent labs within the past 24 hours have been reviewed.    Significant Imaging: I have reviewed all pertinent imaging results/findings within the past 24 hours.   X-Ray Chest AP Portable 4/02/20: FINDINGS:  Heart size and pulmonary vascularity are within normal limits.  Atherosclerotic calcification in the wall of the aortic arch.  Lungs are satisfactorily expanded.  Mild atelectatic changes at the right base.  Patchy opacities are again seen at the left lower lung zone.  These appear similar to the most recent studies of 02/25/2020 and 11/09/2019.  Findings remain concerning for pneumonia or aspiration.  Upper lung zones remain clear.  No pleural fluid or pneumothorax.  Skeletal structures appear intact.  Impression: Persistent patchy opacities at the left lower lung zone which appear similar to the most recent studies of 02/25/2020 and  11/09/2019.  Findings remain concerning for pneumonia or aspiration.

## 2020-04-02 NOTE — TELEPHONE ENCOUNTER
Pt stating he has fever of 101.7, cough slightly worse than usual, nasal congestion, slightly short of breath, decreased appetite, body aches, and fatigue. COVID-19 test came back negative 2 weeks ago. Pt and wife state they have been self-isolating since the test was done. Advised pt wife to bring pt to urgent care to be tested for flu per Dr. Marr. Pt wife verbalized understanding.

## 2020-04-02 NOTE — HPI
Thierry Ramos Jr. is a 86 year old white man with panhypopituitarism, secondary adrenal insufficiency, secondary hypogonadism, central hypothyroidism, diabetes mellitus type 2 with neuropathy, hypertension, history of stroke on 12/16/06 status post left carotid endarterectomy on 2/27/07, dyslipidemia, chronic lymphocytic leukemia (treated with rituximab), hypogammaglobulinemia, immune thrombocytopenia, pulmonary hypertension, chronic rhinosinusitis, history of Pseudomonas aeruginosa pneumonia on 10/11/2019 and Serratia marcescens pneumonia on 11/10/2019. He lives in [place], [place]. He is . His primary care physician is Dr. CAROLE Wooten. His pulmonologist is Dr. Homa Christensen.              He was tested for COVID-19 on 3/18/2020 due to having fever and a couple weeks of cough. COVID-19 test was negative.   He was seen at [facility] on 4/2/2020 for fever, cough, shortness of breath, headache. Temperature was 100.8° Fahrenheit. Oxygen saturation was 91%. He was advised to go to the emergency department. He went to [facility]. Chest X-ray showed no change compared to X-rays on 2/25/2020 and 11/9/2019. Labs showed elevated ferritin (439 ng/mL), procalcitonin (0.39 ng/mL), CRP (69.6 mg/L). He was recently referred for modified barium swallow study by his pulmonologist to evaluate for aspiration. He was given azithromycin and ceftriaxone. He was admitted to [facility].

## 2020-04-02 NOTE — ASSESSMENT & PLAN NOTE
Acute respiratory infection  Evaluate for COVID-19. If negative, pursue the possibility of aspiration pneumonia. Give ceftriaxone for now.

## 2020-04-02 NOTE — ED NOTES
Spoke with wife, gave her update on patients status and admission. Wife:Lorin Ramos [phone]  Wife wants to make sure patient takes Vamlidy at 6pm today

## 2020-04-02 NOTE — ASSESSMENT & PLAN NOTE
Central hypothyroidism  Secondary adrenal insufficiency  Secondary male hypogonadism  Takes medications for these.

## 2020-04-02 NOTE — PROGRESS NOTES
Subjective:       Patient ID: Thierry Ramos Jr. is a 86 y.o. male.    Vitals:  temperature is 100.8 °F (38.2 °C) (abnormal). His blood pressure is 110/61 and his pulse is 108. His respiration is 24 (abnormal) and oxygen saturation is 91% (abnormal).     Chief Complaint: No chief complaint on file.    Short of breath that began yesterday, fever began yesterday. Pt is vague    Shortness of Breath   This is a new problem. The current episode started yesterday. The problem occurs intermittently. The problem has been waxing and waning. Associated symptoms include headaches. Pertinent negatives include no ear pain, fever, hemoptysis, neck pain, rash, sore throat, sputum production, vomiting or wheezing. The symptoms are aggravated by any activity.       Constitution: Negative for chills, sweating, fatigue and fever.   HENT: Negative for ear pain, tinnitus, facial swelling, congestion, sinus pain, sinus pressure, sore throat and voice change.    Neck: Negative for neck pain, neck stiffness and painful lymph nodes.   Eyes: Negative for eye pain, eye redness, photophobia, vision loss, double vision and blurred vision.   Respiratory: Positive for cough and shortness of breath. Negative for chest tightness, sputum production, bloody sputum, COPD, stridor, wheezing and asthma.    Gastrointestinal: Negative for nausea and vomiting.   Musculoskeletal: Negative for trauma and muscle ache.   Skin: Negative for rash, wound and lesion.   Allergic/Immunologic: Negative for seasonal allergies and asthma.   Neurological: Positive for headaches. Negative for dizziness, history of vertigo, light-headedness, facial drooping, speech difficulty, coordination disturbances, loss of balance, history of migraines, disorientation and loss of consciousness.   Hematologic/Lymphatic: Negative for swollen lymph nodes.   Psychiatric/Behavioral: Negative for disorientation, confusion, nervous/anxious, sleep disturbance and depression. The patient is  not nervous/anxious.        Objective:      Physical Exam   Constitutional: He appears ill.   Elderly frail   Nursing note and vitals reviewed.    Patient was seen remotely due to State of Emergency for the COVID-19 outbreak.      Assessment:       1. Fever, unspecified fever cause    2. SOB (shortness of breath)    3. Hypoxemia    4. Suspected Covid-19 Virus Infection        Plan:         Fever, unspecified fever cause  -     Refer to Emergency Dept.    SOB (shortness of breath)  -     Refer to Emergency Dept.    Hypoxemia  -     Refer to Emergency Dept.    Suspected Covid-19 Virus Infection  -     Refer to Emergency Dept.

## 2020-04-02 NOTE — ED PROVIDER NOTES
Encounter Date: 4/2/2020       History     Chief Complaint   Patient presents with    Fever     pt was sent from urgent care for low O2 sat., fever, and cough.        Patient is a 85 year old male with a history of CLL on Rituxan Hycela, last infusion 2/19, COPD,  panhypopituitarism, secondary adrenal insufficiency, type 2 diabetes mellitus, hypertension, CVA, pulmonary hypertension is presenting to the ER for evaluation of fever.  Patient was sent from the urgent care for hypoxia, suspected COVID.  Patient was complaining of fever, fatigue, body aches and worsening congestion in the last 2 days.  He also complains of some minimal shortness of breath.  States that symptoms started yesterday.  He does not use supplemental O2 at home.  Patient lives at home with his wife.  No known sick contact with COVID positive individuals.  Patient tested negative for COVID-19 3/18.    As per EMS patient's O2 saturation was 88% on room air upon arrival.    The history is provided by the patient, medical records and the EMS personnel.     Review of patient's allergies indicates:   Allergen Reactions    Ace inhibitors Swelling     angioedema    Divalproex Hives     Rash under arms, body creases     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    Diabetes mellitus, type 2     Encounter for blood transfusion     Hyperlipidemia     Hypertension     Hypopituitarism     Hypothyroid     Immunosuppression 3/13/2015    Liver fibrosis (?) 2/24/2020    Squamous Cell Carcinoma     on the right side of the face     Squamous cell carcinoma of skin of right temple 1/27/2016    Stroke     Syncope 2/12    Unspecified disorder of kidney and ureter      Past Surgical History:    Procedure Laterality Date    BRONCHOSCOPY N/A 1/22/2020    Procedure: Bronchoscopy;  Surgeon: Fabian Diagnostic Provider;  Location: [facility] OR [address] [place] [address];  Service: Anesthesiology;  Laterality: N/A;    CAROTID ENDARTERECTOMY (L)  2/27/2007    EXCISION TURBINATE, SUBMUCOUS      FESS  9/16/2014    Mohs excision   3/23/2015, 2/24/2016    combined with WLE forehead    NASAL SEPTUM SURGERY  9/16/2014    right ureter surgery  1995    SENTINEL LYMPH NODE BIOPSY  3/23/2015, 2/24/2016    SINUS SURGERY  9/16/2014    septo, ESS, BITSMR    Transphenoidal surgery       Family History   Problem Relation Age of Onset    Cancer Mother         breast    Colon cancer Father     Cancer Father         colon    Cancer Brother         leukemia    No Known Problems Sister     No Known Problems Maternal Aunt     No Known Problems Maternal Uncle     No Known Problems Paternal Aunt     No Known Problems Paternal Uncle     No Known Problems Maternal Grandmother     No Known Problems Maternal Grandfather     No Known Problems Paternal Grandmother     No Known Problems Paternal Grandfather     Amblyopia Neg Hx     Blindness Neg Hx     Cataracts Neg Hx     Diabetes Neg Hx     Glaucoma Neg Hx     Hypertension Neg Hx     Macular degeneration Neg Hx     Retinal detachment Neg Hx     Strabismus Neg Hx     Stroke Neg Hx     Thyroid disease Neg Hx     Allergic rhinitis Neg Hx     Allergies Neg Hx     Angioedema Neg Hx     Asthma Neg Hx     Atopy Neg Hx     Eczema Neg Hx     Immunodeficiency Neg Hx     Rhinitis Neg Hx     Urticaria Neg Hx      Social History     Tobacco Use    Smoking status: Never Smoker    Smokeless tobacco: Never Used   Substance Use Topics    Alcohol use: Not Currently     Alcohol/week: 2.0 standard drinks     Types: 1 Glasses of wine, 1 Cans of beer per week     Frequency: Never     Drinks per session: Patient refused     Binge frequency: Patient refused    Drug use: No     Review of  Systems   Constitutional: Positive for fatigue and fever. Negative for chills.   HENT: Positive for congestion.    Respiratory: Positive for cough and shortness of breath.    Cardiovascular: Negative for chest pain and palpitations.   Gastrointestinal: Negative for abdominal pain, diarrhea, nausea and vomiting.   Genitourinary: Negative for dysuria, flank pain and hematuria.   Musculoskeletal: Positive for myalgias.   Skin: Negative for rash.   Allergic/Immunologic: Positive for immunocompromised state.   Neurological: Positive for weakness.   Hematological: Does not bruise/bleed easily.   Psychiatric/Behavioral: Negative for confusion.       Physical Exam     Initial Vitals [04/02/20 1328]   BP Pulse Resp Temp SpO2   120/66 92 18 (!) 100.7 °F (38.2 °C) 95 %      MAP       --         Physical Exam    Vitals reviewed.  Constitutional: He appears well-developed and well-nourished. He is not diaphoretic. No distress.   HENT:   Head: Normocephalic and atraumatic.   Eyes: Conjunctivae and EOM are normal.   Neck: Neck supple.   Cardiovascular: Normal rate, regular rhythm, normal heart sounds and intact distal pulses.   Pulmonary/Chest: Breath sounds normal. No respiratory distress.   Abdominal: Soft. He exhibits no distension. There is no tenderness.   Neurological: He is alert and oriented to person, place, and time.   Skin: Skin is warm.         ED Course   Procedures  Labs Reviewed   CBC W/ AUTO DIFFERENTIAL - Abnormal; Notable for the following components:       Result Value    RBC 4.37 (*)     Hemoglobin 13.2 (*)     Immature Granulocytes 4.1 (*)     Immature Grans (Abs) 0.38 (*)     Lymph% 15.7 (*)     All other components within normal limits   COMPREHENSIVE METABOLIC PANEL - Abnormal; Notable for the following components:    CO2 22 (*)     Glucose 217 (*)     BUN, Bld 38 (*)     Total Protein 5.5 (*)     Albumin 2.9 (*)     Alkaline Phosphatase 185 (*)     AST 68 (*)     ALT 93 (*)     All other components within  normal limits   C-REACTIVE PROTEIN - Abnormal; Notable for the following components:    CRP 69.6 (*)     All other components within normal limits   FERRITIN - Abnormal; Notable for the following components:    Ferritin 439 (*)     All other components within normal limits   CK - Abnormal; Notable for the following components:    CPK 14 (*)     All other components within normal limits   LACTIC ACID, PLASMA - Abnormal; Notable for the following components:    Lactate (Lactic Acid) 2.3 (*)     All other components within normal limits   PROCALCITONIN - Abnormal; Notable for the following components:    Procalcitonin 0.39 (*)     All other components within normal limits   INFLUENZA A & B BY MOLECULAR   CULTURE, BLOOD   CULTURE, BLOOD   LACTATE DEHYDROGENASE   TROPONIN I   SARS-COV-2 (COVID-19) QUALITATIVE PCR        ECG Results          EKG 12-lead (In process)  Result time 04/02/20 13:54:28    In process by Interface, Lab In [facility] (04/02/20 13:54:28)                 Narrative:    Test Reason : R68.89,    Vent. Rate : 088 BPM     Atrial Rate : 088 BPM     P-R Int : 156 ms          QRS Dur : 080 ms      QT Int : 356 ms       P-R-T Axes : 080 058 072 degrees     QTc Int : 430 ms    Normal sinus rhythm  Normal ECG  When compared with ECG of 20-DEC-2019 11:49,  Criteria for Septal infarct are no longer Present    Referred By: System System           Confirmed By:                             Imaging Results           X-Ray Chest AP Portable (Final result)  Result time 04/02/20 14:03:05    Final result by Sachin Carrizales MD (04/02/20 14:03:05)                 Impression:      Persistent patchy opacities at the left lower lung zone which appear similar to the most recent studies of 02/25/2020 and 11/09/2019.  Findings remain concerning for pneumonia or aspiration.    This report was flagged in Epic as abnormal.      Electronically signed by: Sachin Carrizales MD  Date:    04/02/2020  Time:    14:03             Narrative:     EXAMINATION:  XR CHEST AP PORTABLE    CLINICAL HISTORY:  Suspected Covid-19 Virus Infection;    TECHNIQUE:  Single frontal view of the chest was performed.    COMPARISON:  02/25/2020, 11/09/2019 and 10/15/2019 and 10/14/2019    FINDINGS:  Heart size and pulmonary vascularity are within normal limits.  Atherosclerotic calcification in the wall of the aortic arch.  Lungs are satisfactorily expanded.  Mild atelectatic changes at the right base.  Patchy opacities are again seen at the left lower lung zone.  These appear similar to the most recent studies of 02/25/2020 and 11/09/2019.  Findings remain concerning for pneumonia or aspiration.  Upper lung zones remain clear.  No pleural fluid or pneumothorax.  Skeletal structures appear intact.                                       APC / Resident Notes:   Patient in the ER promptly upon arrival.  He is febrile on arrival, 100.7.  Patient arrives to the ED with 2 L supplemental O2 nasal cannula with saturation of 95%.  He is able to speak in complete full sentences.  She is placed on the cardiac monitor.      COVID-19 test ordered, pending result.  Patient placed on isolation.  Influenza is negative today.    On chart review it appears the patient's O2 saturation was 91% from urgent care record however EMS stated that the noted O2 on room air to be 88 %.    Laboratory studies show white count of 9.3.  Hemoglobin is stable.  Chemistries reveal mild transaminitis slightly higher than baseline.  Troponin unremarkable.  CRP 69.6 lactic acid elevated 2.3 today.  Blood cultures obtained and pending results.  Ferritin slightly elevated at 4 3 9.  LDH normal to 209. Procalcitonin 0.39    X-ray of chest reveals persistent patchy opacities at the left lower lung zone which appears to be similar to previous studies.  Concerning for pneumonia or aspiration. Chart review reveals that patient has had bronchoscopy and does see pulmonology.  Most recent respiratory culture 1/22 to  unremarkable. Has grown out serratia in the past.     Patient was covered with Rocephin and azithromycin.  Patient ambulated in the ED and appears to have O2 saturation of 90%.  Given the hypoxia with new symptoms with immunosuppressed state will admit to Hospital medicine.  Discussed case with Dr. Kimbrough who will admit patient.  Patient remained stable in the ED and stable for transfer to the floor at this time. The care of this patient was overseen by attending physician who agrees with treatment, plan, and disposition.           Attending Attestation:     Physician Attestation Statement for NP/PA:   I have conducted a face to face encounter with this patient in addition to the NP/PA, due to Medical Complexity    Other NP/PA Attestation Additions:    History of Present Illness: This is a pleasant gentleman who presents to the ED today for fever, cough, low O2 sats. He's been feeling as though he is getting worse over the last few days. Having some shortness of breath.      Medical Decision Making: Pt is here for worseing cough, fever, shortness of breath. He is requiring supplemental O2. He will be admitted for further care and management.                                Clinical Impression:       ICD-10-CM ICD-9-CM   1. Pneumonia of left lower lobe due to infectious organism J18.1 486   2. Suspected Covid-19 Virus Infection R68.89    3. Hypoxia R09.02 799.02   4. CLL (chronic lymphocytic leukemia) C91.10 204.10   5. Immunosuppressed status D89.9 279.9         Disposition:   Disposition: Admitted  Condition: Stable     ED Disposition Condition    Admit                           Tessy Grider PA-C  04/02/20 1646       Will Keene MD  04/03/20 1100

## 2020-04-03 ENCOUNTER — PATIENT MESSAGE (OUTPATIENT)
Dept: HEMATOLOGY/ONCOLOGY | Facility: CLINIC | Age: 85
End: 2020-04-03

## 2020-04-03 ENCOUNTER — PATIENT MESSAGE (OUTPATIENT)
Dept: PULMONOLOGY | Facility: CLINIC | Age: 85
End: 2020-04-03

## 2020-04-03 VITALS
DIASTOLIC BLOOD PRESSURE: 65 MMHG | HEART RATE: 76 BPM | WEIGHT: 146.81 LBS | OXYGEN SATURATION: 97 % | SYSTOLIC BLOOD PRESSURE: 139 MMHG | HEIGHT: 71 IN | TEMPERATURE: 97 F | RESPIRATION RATE: 18 BRPM | BODY MASS INDEX: 20.55 KG/M2

## 2020-04-03 PROBLEM — J69.0 ASPIRATION PNEUMONIA: Status: ACTIVE | Noted: 2020-04-02

## 2020-04-03 PROBLEM — J18.9 PNEUMONIA OF LEFT LOWER LOBE DUE TO INFECTIOUS ORGANISM: Status: ACTIVE | Noted: 2020-04-02

## 2020-04-03 LAB
POCT GLUCOSE: 186 MG/DL (ref 70–110)
POCT GLUCOSE: 235 MG/DL (ref 70–110)
POCT GLUCOSE: 244 MG/DL (ref 70–110)
SARS-COV-2 RNA RESP QL NAA+PROBE: NOT DETECTED

## 2020-04-03 PROCEDURE — 94760 N-INVAS EAR/PLS OXIMETRY 1: CPT | Mod: HCNC

## 2020-04-03 PROCEDURE — 97535 SELF CARE MNGMENT TRAINING: CPT | Mod: HCNC

## 2020-04-03 PROCEDURE — 63600175 PHARM REV CODE 636 W HCPCS: Mod: HCNC | Performed by: HOSPITALIST

## 2020-04-03 PROCEDURE — 25000003 PHARM REV CODE 250: Mod: HCNC | Performed by: HOSPITALIST

## 2020-04-03 PROCEDURE — 27000221 HC OXYGEN, UP TO 24 HOURS: Mod: HCNC

## 2020-04-03 PROCEDURE — 87070 CULTURE OTHR SPECIMN AEROBIC: CPT | Mod: HCNC

## 2020-04-03 PROCEDURE — 94761 N-INVAS EAR/PLS OXIMETRY MLT: CPT | Mod: HCNC

## 2020-04-03 PROCEDURE — 87205 SMEAR GRAM STAIN: CPT | Mod: HCNC

## 2020-04-03 PROCEDURE — 92610 EVALUATE SWALLOWING FUNCTION: CPT | Mod: HCNC

## 2020-04-03 RX ORDER — CETIRIZINE HYDROCHLORIDE 10 MG/1
10 TABLET ORAL DAILY
Status: DISCONTINUED | OUTPATIENT
Start: 2020-04-03 | End: 2020-04-03 | Stop reason: HOSPADM

## 2020-04-03 RX ORDER — CETIRIZINE HYDROCHLORIDE 10 MG/1
10 TABLET ORAL DAILY
Qty: 30 TABLET | Refills: 0 | Status: SHIPPED | OUTPATIENT
Start: 2020-04-03

## 2020-04-03 RX ORDER — FLUTICASONE PROPIONATE 50 MCG
2 SPRAY, SUSPENSION (ML) NASAL DAILY
Qty: 16 G | Refills: 0 | Status: SHIPPED | OUTPATIENT
Start: 2020-04-03

## 2020-04-03 RX ORDER — FLUTICASONE PROPIONATE 50 MCG
2 SPRAY, SUSPENSION (ML) NASAL DAILY
Status: DISCONTINUED | OUTPATIENT
Start: 2020-04-03 | End: 2020-04-03 | Stop reason: HOSPADM

## 2020-04-03 RX ORDER — CEFDINIR 300 MG/1
300 CAPSULE ORAL 2 TIMES DAILY
Qty: 12 CAPSULE | Refills: 0 | Status: SHIPPED | OUTPATIENT
Start: 2020-04-03 | End: 2020-04-09

## 2020-04-03 RX ADMIN — INSULIN ASPART 2 UNITS: 100 INJECTION, SOLUTION INTRAVENOUS; SUBCUTANEOUS at 04:04

## 2020-04-03 RX ADMIN — DOCUSATE SODIUM - SENNOSIDES 1 TABLET: 50; 8.6 TABLET, FILM COATED ORAL at 10:04

## 2020-04-03 RX ADMIN — LEVOTHYROXINE SODIUM 125 MCG: 75 TABLET ORAL at 05:04

## 2020-04-03 RX ADMIN — ASPIRIN 81 MG 81 MG: 81 TABLET ORAL at 04:04

## 2020-04-03 RX ADMIN — CLOPIDOGREL BISULFATE 75 MG: 75 TABLET ORAL at 10:04

## 2020-04-03 RX ADMIN — PANTOPRAZOLE SODIUM 40 MG: 40 TABLET, DELAYED RELEASE ORAL at 10:04

## 2020-04-03 RX ADMIN — HYDROXYCHLOROQUINE SULFATE 400 MG: 200 TABLET, FILM COATED ORAL at 10:04

## 2020-04-03 RX ADMIN — ATORVASTATIN CALCIUM 20 MG: 20 TABLET, FILM COATED ORAL at 10:04

## 2020-04-03 RX ADMIN — METHYLPREDNISOLONE SODIUM SUCCINATE 20 MG: 40 INJECTION, POWDER, FOR SOLUTION INTRAMUSCULAR; INTRAVENOUS at 05:04

## 2020-04-03 RX ADMIN — CETIRIZINE HYDROCHLORIDE 10 MG: 10 TABLET, FILM COATED ORAL at 04:04

## 2020-04-03 RX ADMIN — INSULIN ASPART 2 UNITS: 100 INJECTION, SOLUTION INTRAVENOUS; SUBCUTANEOUS at 12:04

## 2020-04-03 NOTE — SUBJECTIVE & OBJECTIVE
Interval History: Feels significantly better than yesterday.    Review of Systems   Gastrointestinal: Negative for nausea and vomiting.   Neurological: Negative for syncope and light-headedness.     Objective:     Vital Signs (Most Recent):  Temp: 98 °F (36.7 °C) (04/03/20 0750)  Pulse: 62 (04/03/20 0750)  Resp: (!) 21 (04/03/20 0750)  BP: (!) 159/73 (04/03/20 0750)  SpO2: 97 % (04/03/20 0750) Vital Signs (24h Range):  Temp:  [96.6 °F (35.9 °C)-100.8 °F (38.2 °C)] 98 °F (36.7 °C)  Pulse:  [] 62  Resp:  [16-25] 21  SpO2:  [90 %-100 %] 97 %  BP: (110-168)/(61-73) 159/73     Weight: 66.6 kg (146 lb 13.2 oz)  Body mass index is 20.48 kg/m².    Intake/Output Summary (Last 24 hours) at 4/3/2020 1129  Last data filed at 4/3/2020 0900  Gross per 24 hour   Intake 75 ml   Output 851 ml   Net -776 ml      Physical Exam   Constitutional: He appears well-developed. No distress.   Pulmonary/Chest: Effort normal. No respiratory distress.   Neurological: He is alert.   Psychiatric: He has a normal mood and affect.   Nursing note and vitals reviewed.      Significant Labs: All pertinent labs within the past 24 hours have been reviewed.    Significant Imaging: I have reviewed all pertinent imaging results/findings within the past 24 hours.

## 2020-04-03 NOTE — ED NOTES
Called 4th floor to give report. Desk reported nurse will call me back because she is gowned up in a patients room.

## 2020-04-03 NOTE — ED NOTES
Spoke with DUSTIN Redmond in regards to patients prescription of tenofovir. Pharmacy does not have patients specific type of medication. Okay for wife to bring if she can.

## 2020-04-03 NOTE — PLAN OF CARE
Problem: SLP Goal  Goal: SLP Goal  Description  STGs:  1. Pt will participate in clinical swallow assessment to determine LRD.-Met 4/3/2020  2. Pt will consume a Regular/Thin liquid diet without any overt s/s of aspiration.-Met 4/3/2020     Outcome: Met   4/3/2020: Bedside swallow study completed. Pt tolerated a variety of textures without any overt s/s of aspiration. He complained of copious pharyngeal mucous for months now--waking in middle of night to expectorate thick mucous. Pt deemed safe for an oral diet. REC: Regular/Thin liquids given standard aspiration precautions. No further SLP needs at this time. Please re-consult should change in status occur.

## 2020-04-03 NOTE — NURSING
Pt being d/c home.  D/C instructions reviewed with pt and will call spouse with instructions.  Verbalized understanding.  NAD noted.  Leaving facility via w/c per staff to POV.  No discomforts voiced.  Condition stable

## 2020-04-03 NOTE — ED NOTES
Giving pt daily dose of tenofovir alafenamide 25mg (16 pills remaining) pt reports medication is to prevent Hepatitis B from coming back.

## 2020-04-03 NOTE — DISCHARGE SUMMARY
[facility] Medicine  Discharge Summary      Patient Name: Thierry Ramos Jr.  MRN: [id number]  Admission Date: 4/2/2020  Hospital Length of Stay: 1 days  Discharge Date and Time: 4/3/2020  5:00 PM  Attending Physician: Jl Kimbrough MD   Discharging Provider: Jl Kimbrough MD  Primary Care Provider: AKILA Wooten MD      HPI:   Thierry Ramos Jr. is a 86 year old white man with panhypopituitarism, secondary adrenal insufficiency, secondary hypogonadism, central hypothyroidism, diabetes mellitus type 2 with neuropathy, hypertension, history of stroke on 12/16/06 status post left carotid endarterectomy on 2/27/07, dyslipidemia, chronic lymphocytic leukemia (treated with rituximab), hypogammaglobulinemia, immune thrombocytopenia, pulmonary hypertension, chronic rhinosinusitis, history of Pseudomonas aeruginosa pneumonia on 10/11/2019 and Serratia marcescens pneumonia on 11/10/2019. He lives in [place], [place]. He is . His primary care physician is Dr. CAROLE Wooten. His pulmonologist is Dr. Homa Christensen.              He was tested for COVID-19 on 3/18/2020 due to having fever and a couple weeks of cough. COVID-19 test was negative.   He was seen at [facility] on 4/2/2020 for fever, cough, shortness of breath, headache. Temperature was 100.8° Fahrenheit. Oxygen saturation was 91%. He was advised to go to the emergency department. He went to [facility] Emergency Department. Chest X-ray showed no change compared to X-rays on 2/25/2020 and 11/9/2019. Labs showed elevated ferritin (439 ng/mL), procalcitonin (0.39 ng/mL), CRP (69.6 mg/L). He was recently referred for modified barium swallow study by his pulmonologist to evaluate for aspiration. He was given azithromycin and ceftriaxone. He was admitted to [facility].      Hospital Course:   He was given ceftriaxone. Modified barium swallow study could not be  done while he was on airborne and contact precautions. COVID-19 test was again negative. He felt better the next morning, which was not consistent with a COVID-19 presentation, but moreso with an aspiration. Speech Language Pathology was consulted and felt his excessive mucous caused aspiration. He can get a modified barium swallow study outpatient as previously scheduled. He was prescribed cefdinir for 6 days, as well as cetirizine and fluticasone nasal spray.      Consults:   Consults (From admission, onward)        Status Ordering Provider     Inpatient consult to Infection Control (Nurse)  Once     Provider:  (Not yet assigned)    Acknowledged JEYSON SIDDIQUI     Inpatient consult to Social Work/Case Management  Once     Provider:  (Not yet assigned)    Acknowledged JEYSON SIDDIQUI        Final Active Diagnoses:    Diagnosis Date Noted POA    PRINCIPAL PROBLEM:  Aspiration pneumonia [J69.0] 04/02/2020 Yes    Chronic kidney disease, stage III (moderate) [N18.3] 11/09/2019 Yes     Chronic    Pulmonary HTN [I27.20] 08/29/2019 Yes     Chronic    Secondary adrenal insufficiency [E27.49] 09/19/2018 Yes     Chronic    Secondary male hypogonadism [E29.1] 09/19/2018 Yes     Chronic    Type 2 diabetes, controlled, with neuropathy [E11.40] 03/02/2016 Yes     Chronic    Central hypothyroidism [E03.8] 05/31/2014 Yes     Chronic    History of CVA (cerebrovascular accident) [Z86.73] 05/31/2014 Not Applicable     Chronic    CLL (chronic lymphocytic leukemia) [C91.10] 12/04/2012 Yes     Chronic    Panhypopituitarism [E23.0] 12/04/2012 Yes     Chronic    Essential hypertension [I10] 07/26/2012 Yes     Chronic    Mixed dyslipidemia [E78.2] 07/26/2012 Yes     Chronic      Problems Resolved During this Admission:       Discharged Condition: good    Disposition: Home or Self Care    Follow Up:  Follow-up Information     P Juve Wooten MD.    Specialty:  Internal Medicine  Why:  Message sent to your PCP to contact you  regarding follow up appointment  Contact information:  [address] [facility] [place] [address]  [phone]                 Patient Instructions:      Diet diabetic     Notify your health care provider if you experience any of the following:  difficulty breathing or increased cough     Notify your health care provider if you experience any of the following:  persistent nausea and vomiting or diarrhea     Activity as tolerated       Significant Diagnostic Studies:   X-Ray Chest AP Portable 4/02/20: FINDINGS:  Heart size and pulmonary vascularity are within normal limits.  Atherosclerotic calcification in the wall of the aortic arch.  Lungs are satisfactorily expanded.  Mild atelectatic changes at the right base.  Patchy opacities are again seen at the left lower lung zone.  These appear similar to the most recent studies of 02/25/2020 and 11/09/2019.  Findings remain concerning for pneumonia or aspiration.  Upper lung zones remain clear.  No pleural fluid or pneumothorax.  Skeletal structures appear intact.  Impression: Persistent patchy opacities at the left lower lung zone which appear similar to the most recent studies of 02/25/2020 and 11/09/2019.  Findings remain concerning for pneumonia or aspiration.     Medications:  Reconciled Home Medications:      Medication List      START taking these medications    cefdinir 300 MG capsule  Commonly known as:  OMNICEF  Take 1 capsule (300 mg total) by mouth 2 (two) times daily. for 6 days     cetirizine 10 MG tablet  Commonly known as:  ZYRTEC  Take 1 tablet (10 mg total) by mouth once daily.     fluticasone propionate 50 mcg/actuation nasal spray  Commonly known as:  FLONASE  2 sprays (100 mcg total) by Each Nostril route once daily.        CHANGE how you take these medications    tazarotene 0.1 % cream  Commonly known as:  AVAGE  Apply topically every evening.  What changed:    · when to take this  · reasons to take this        CONTINUE taking these  medications    ACIDOPHILUS ORAL  Take 1 tablet by mouth once daily.     aspirin 81 MG Chew  Take 81 mg by mouth every Mon, Wed, Fri.     atorvastatin 20 MG tablet  Commonly known as:  LIPITOR  TAKE 1 TABLET EVERY DAY     clopidogreL 75 mg tablet  Commonly known as:  PLAVIX  Take 1 tablet (75 mg total) by mouth once daily.     cyanocobalamin (vitamin B-12) 5,000 mcg Tbdl  Take 1 tablet by mouth once daily.     cycloSPORINE 0.05 % ophthalmic emulsion  Commonly known as:  RESTASIS  Place 0.4 mLs (1 drop total) into both eyes 2 (two) times daily.     FISH OIL ORAL  Take 5 capsules by mouth once daily.     fluorouraciL 5 % cream  Commonly known as:  EFUDEX  Use hs for 2 weeks on right cheek     furosemide 20 MG tablet  Commonly known as:  LASIX  Take 20 mg by mouth daily as needed.     hydrocodone-chlorpheniramine 10-8 mg/5 mL suspension  Commonly known as:  TUSSIONEX  Take 5 mLs by mouth every 12 (twelve) hours as needed for Cough.     ipratropium 0.03 % nasal spray  Commonly known as:  ATROVENT  2 sprays by Nasal route 2 (two) times daily.     levothyroxine 125 MCG tablet  Commonly known as:  SYNTHROID  TAKE 1 TABLET ONE TIME DAILY     losartan 25 MG tablet  Commonly known as:  COZAAR  Take 1 tablet (25 mg total) by mouth once daily.     multivitamin with minerals tablet  Take 1 tablet by mouth once daily.     mupirocin 2 % ointment  Commonly known as:  BACTROBAN  Apply to affected area 3 times daily     pantoprazole 40 MG tablet  Commonly known as:  PROTONIX  TAKE 1 TABLET EVERY DAY     EDEN-COLACE ORAL  Take 1 tablet by mouth once daily.     * predniSONE 2.5 MG tablet  Commonly known as:  DELTASONE  Take 1 tablet (2.5 mg total) by mouth once daily. 5mg and 2.5mg qd     * predniSONE 5 MG tablet  Commonly known as:  DELTASONE  Take 1 tablet (5 mg total) by mouth once daily.     ROBITUSSIN COUGH & COLD CF MAX ORAL  Take 15 mLs by mouth 2 (two) times daily.     SYMBICORT 160-4.5 mcg/actuation Hfaa  Generic drug:   budesonide-formoterol 160-4.5 mcg  Inhale 1 puff into the lungs as needed.     testosterone cypionate 100 mg/mL injection  Commonly known as:  DEPOTESTOTERONE CYPIONATE  Inject 0.5 mLs (50 mg total) into the muscle every 14 (fourteen) days.     VEMLIDY 25 mg Tab  Generic drug:  tenofovir alafenamide  Take 1 tablet (25 mg total) by mouth once daily.     VENTOLIN HFA 90 mcg/actuation inhaler  Generic drug:  albuterol  Inhale 1 puff into the lungs as needed.         * This list has 2 medication(s) that are the same as other medications prescribed for you. Read the directions carefully, and ask your doctor or other care provider to review them with you.            STOP taking these medications    ACCU-CHEK FASTCLIX LANCING DEV Misc  Generic drug:  lancets     alcohol swabs Padm     budesonide 0.5 mg/2 mL nebulizer solution  Commonly known as:  PULMICORT     chlorhexidine 0.12 % solution  Commonly known as:  PERIDEX     erythromycin ophthalmic ointment  Commonly known as:  ROMYCIN     neomycin-polymyxin-dexamethasone 3.5 mg/g-10,000 unit/g-0.1 % Oint  Commonly known as:  DEXACINE     neomycin-polymyxin-dexamethasone 3.5mg/mL-10,000 unit/mL-0.1 % Drps  Commonly known as:  MAXITROL            Indwelling Lines/Drains at time of discharge: None    Time spent on the discharge of patient: 35 minutes  Patient was seen and examined on the date of discharge and determined to be suitable for discharge.         Jl Kimbrough MD  Department of Hospital Medicine  [facility]

## 2020-04-03 NOTE — H&P
[facility]  History & Physical    Patient Name: Thierry Ramos Jr.  MRN: [id number]  Admission Date: 4/2/2020  Attending Physician: Jl Kimbrough MD   Primary Care Provider: AKILA Wooten MD         Patient information was obtained from patient, past medical records and ER records.     Subjective:     Principal Problem:Suspected Covid-19 Virus Infection    Chief Complaint:   Chief Complaint   Patient presents with    Fever     pt was sent from urgent care for low O2 sat., fever, and cough.         HPI: Thierry Ramos Jr. is a 86 year old white man with panhypopituitarism, secondary adrenal insufficiency, secondary hypogonadism, central hypothyroidism, diabetes mellitus type 2 with neuropathy, hypertension, history of stroke on 12/16/06 status post left carotid endarterectomy on 2/27/07, dyslipidemia, chronic lymphocytic leukemia (treated with rituximab), hypogammaglobulinemia, immune thrombocytopenia, pulmonary hypertension, chronic rhinosinusitis, history Serratia marcescens pneumonia on 11/10/2019. He lives in [place], [place]. He is . His primary care physician is Dr. CAROLE Wooten. His pulmonologist is Dr. Homa Christensen.   He was seen at [facility] on 4/2/2020 for fever, cough, shortness of breath, headache. Temperature was 100.8° Fahrenheit. Oxygen saturation was 91%. He was advised to go to the emergency department. He went to [facility]. Chest X-ray showed no change compared to X-rays on 2/25/2020 and 11/9/2019. Labs showed elevated ferritin (439 ng/mL), procalcitonin (0.39 ng/mL), CRP (69.6 mg/L). He was recently referred for modified barium swallow study by his pulmonologist to evaluate for aspiration. He was given azithromycin and ceftriaxone. He was admitted to [facility].    Past Medical History:   Diagnosis Date    Actinic keratosis     Anemia     Anticoagulant  long-term use     Asthma     Basal cell carcinoma     Carotid stenosis     CLL (chronic lymphocytic leukemia)     COPD (chronic obstructive pulmonary disease)     Diabetes mellitus 2014    Steroid related    Diabetes mellitus, type 2     Encounter for blood transfusion     Hyperlipidemia     Hypertension     Hypopituitarism     Hypothyroid     Immunosuppression 3/13/2015    Liver fibrosis (?) 2/24/2020    Squamous Cell Carcinoma     on the right side of the face     Squamous cell carcinoma of skin of right temple 1/27/2016    Stroke     Syncope 2/12    Unspecified disorder of kidney and ureter        Past Surgical History:   Procedure Laterality Date    BRONCHOSCOPY N/A 1/22/2020    Procedure: Bronchoscopy;  [facility];  Location: [facility] OR [address] [place] [address];  Service: Anesthesiology;  Laterality: N/A;    CAROTID ENDARTERECTOMY (L)  2/27/2007    EXCISION TURBINATE, SUBMUCOUS      FESS  9/16/2014    Mohs excision   3/23/2015, 2/24/2016    combined with WLE forehead    NASAL SEPTUM SURGERY  9/16/2014    right ureter surgery  1995    SENTINEL LYMPH NODE BIOPSY  3/23/2015, 2/24/2016    SINUS SURGERY  9/16/2014    septo, ESS, BITSMR    Transphenoidal surgery         Review of patient's allergies indicates:   Allergen Reactions    Ace inhibitors Swelling     angioedema    Divalproex Hives     Rash under arms, body creases       No current facility-administered medications on file prior to encounter.      Current Outpatient Medications on File Prior to Encounter   Medication Sig    aspirin 81 MG Chew Take 81 mg by mouth every Mon, Wed, Fri.    atorvastatin (LIPITOR) 20 MG tablet TAKE 1 TABLET EVERY DAY    clopidogrel (PLAVIX) 75 mg tablet Take 1 tablet (75 mg total) by mouth once daily.    cyanocobalamin, vitamin B-12, 5,000 mcg TbDL Take 1 tablet by mouth once daily.    cycloSPORINE (RESTASIS) 0.05 % ophthalmic emulsion Place 0.4 mLs (1 drop total) into both eyes 2 (two)  times daily.    DOCOSAHEXANOIC ACID/EPA (FISH OIL ORAL) Take 5 capsules by mouth once daily.     fluorouracil (EFUDEX) 5 % cream Use hs for 2 weeks on right cheek    furosemide (LASIX) 20 MG tablet Take 20 mg by mouth daily as needed.     guaifen/dextromethorphan/pe (ROBITUSSIN COUGH & COLD CF MAX ORAL) Take 15 mLs by mouth 2 (two) times daily.    hydrocodone-chlorpheniramine (TUSSIONEX) 10-8 mg/5 mL suspension Take 5 mLs by mouth every 12 (twelve) hours as needed for Cough.    ipratropium (ATROVENT) 0.03 % nasal spray 2 sprays by Nasal route 2 (two) times daily.    Lactobacillus acidophilus (ACIDOPHILUS ORAL) Take 1 tablet by mouth once daily.    levothyroxine (SYNTHROID) 125 MCG tablet TAKE 1 TABLET ONE TIME DAILY    losartan (COZAAR) 25 MG tablet Take 1 tablet (25 mg total) by mouth once daily.    multivitamin with minerals tablet Take 1 tablet by mouth once daily.    mupirocin (BACTROBAN) 2 % ointment Apply to affected area 3 times daily    pantoprazole (PROTONIX) 40 MG tablet TAKE 1 TABLET EVERY DAY    predniSONE (DELTASONE) 2.5 MG tablet Take 1 tablet (2.5 mg total) by mouth once daily. 5mg and 2.5mg qd    predniSONE (DELTASONE) 5 MG tablet Take 1 tablet (5 mg total) by mouth once daily.    sennosides/docusate sodium (EDEN-COLACE ORAL) Take 1 tablet by mouth once daily.    SYMBICORT 160-4.5 mcg/actuation HFAA Inhale 1 puff into the lungs as needed.    tazarotene (AVAGE) 0.1 % cream Apply topically every evening. (Patient taking differently: Apply topically as needed. )    tenofovir alafenamide (VEMLIDY) 25 mg Tab Take 1 tablet (25 mg total) by mouth once daily.    testosterone cypionate (DEPOTESTOTERONE CYPIONATE) 100 mg/mL injection Inject 0.5 mLs (50 mg total) into the muscle every 14 (fourteen) days.    VENTOLIN HFA 90 mcg/actuation inhaler Inhale 1 puff into the lungs as needed.    [DISCONTINUED] ACCU-CHEK FASTCLIX Misc TEST  BLOOD  GLUCOSE FOUR TIMES DAILY    [DISCONTINUED] alcohol  swabs PadM APPLY 1 PAD TOPICALLY  AS NEEDED.    [DISCONTINUED] budesonide (PULMICORT) 0.5 mg/2 mL nebulizer solution budesonide 0.5 mg/2 mL suspension for nebulization    [DISCONTINUED] chlorhexidine (PERIDEX) 0.12 % solution chlorhexidine gluconate 0.12 % mouthwash    [DISCONTINUED] erythromycin (ROMYCIN) ophthalmic ointment Apply to eyelids and lashes OU QHS    [DISCONTINUED] levoFLOXacin (LEVAQUIN) 750 MG tablet Take 1 tablet (750 mg total) by mouth once daily.    [DISCONTINUED] neomycin-polymyxin-dexamethasone (DEXACINE) 3.5 mg/g-10,000 unit/g-0.1 % Oint neomycin 3.5 mg/g-polymyxin B 10,000 unit/g-dexameth 0.1 % eye oint    [DISCONTINUED] neomycin-polymyxin-dexamethasone (MAXITROL) 3.5mg/mL-10,000 unit/mL-0.1 % DrpS neomycin-polymyxin-dexameth 3.5 mg/mL-10,000 unit/mL-0.1% eye drops     Family History     Problem Relation (Age of Onset)    Cancer Mother, Father, Brother    Colon cancer Father    No Known Problems Sister, Maternal Aunt, Maternal Uncle, Paternal Aunt, Paternal Uncle, Maternal Grandmother, Maternal Grandfather, Paternal Grandmother, Paternal Grandfather        Tobacco Use    Smoking status: Never Smoker    Smokeless tobacco: Never Used   Substance and Sexual Activity    Alcohol use: Not Currently     Alcohol/week: 2.0 standard drinks     Types: 1 Glasses of wine, 1 Cans of beer per week     Frequency: Never     Drinks per session: Patient refused     Binge frequency: Patient refused    Drug use: No    Sexual activity: Not Currently     Review of Systems   Constitutional: Positive for chills and fever.   HENT: Negative for congestion and voice change.    Eyes: Negative for pain and redness.   Respiratory: Positive for cough and shortness of breath.    Gastrointestinal: Negative for nausea and vomiting.   Genitourinary: Negative for difficulty urinating and dysuria.   Musculoskeletal: Negative for neck pain and neck stiffness.   Skin: Negative for rash and wound.   Neurological: Positive  for headaches. Negative for syncope.     Objective:     Vital Signs (Most Recent):  Temp: (!) 100.7 °F (38.2 °C) (04/02/20 1328)  Pulse: 90 (04/02/20 1535)  Resp: (!) 25 (04/02/20 1535)  BP: 120/66 (04/02/20 1328)  SpO2: (!) 90 % (04/02/20 1535) Vital Signs (24h Range):  Temp:  [100.7 °F (38.2 °C)-100.8 °F (38.2 °C)] 100.7 °F (38.2 °C)  Pulse:  [] 90  Resp:  [18-25] 25  SpO2:  [90 %-95 %] 90 %  BP: (110-120)/(61-66) 120/66     Weight: 66.7 kg (147 lb)  Body mass index is 21.09 kg/m².    Physical Exam   Constitutional: He appears well-developed. No distress.   The rest of the physical exam was deferred due to lack of personal protective equipment available and patient coughing continuously in my direction.   HENT:   Head: Normocephalic and atraumatic.   Pulmonary/Chest: Effort normal. No respiratory distress.   Neurological: He is alert. He displays no seizure activity.   Psychiatric: He has a normal mood and affect.   Nursing note and vitals reviewed.          Significant Labs: All pertinent labs within the past 24 hours have been reviewed.    Significant Imaging: I have reviewed all pertinent imaging results/findings within the past 24 hours.   X-Ray Chest AP Portable 4/02/20: FINDINGS:  Heart size and pulmonary vascularity are within normal limits.  Atherosclerotic calcification in the wall of the aortic arch.  Lungs are satisfactorily expanded.  Mild atelectatic changes at the right base.  Patchy opacities are again seen at the left lower lung zone.  These appear similar to the most recent studies of 02/25/2020 and 11/09/2019.  Findings remain concerning for pneumonia or aspiration.  Upper lung zones remain clear.  No pleural fluid or pneumothorax.  Skeletal structures appear intact.  Impression: Persistent patchy opacities at the left lower lung zone which appear similar to the most recent studies of 02/25/2020 and 11/09/2019.  Findings remain concerning for pneumonia or aspiration.    Assessment/Plan:      * Suspected Covid-19 Virus Infection  Acute respiratory infection  Evaluate for COVID-19. If negative, pursue the possibility of aspiration pneumonia. Give ceftriaxone for now.    Type 2 diabetes, controlled, with neuropathy  Giving insulin sliding scale as needed.    History of CVA (cerebrovascular accident)  Continue aspirin and atorvastatin.    Panhypopituitarism  Central hypothyroidism  Secondary adrenal insufficiency  Secondary male hypogonadism  Takes medications for these.    CLL (chronic lymphocytic leukemia)  Treated with rituximab on 9/30/19. Takes prednisone.      VTE Risk Mitigation (From admission, onward)    None             Jl Kimbrough MD  Department of Hospital Medicine   [facility]

## 2020-04-03 NOTE — PLAN OF CARE
Q2 hour frequent checks completed.  Patient noted to be resting comfortably. Respirations even and unlabored. No signs of distress noted. All safety measures maintained.

## 2020-04-03 NOTE — PLAN OF CARE
Pt came from ER via stretcher, COVID rule out.  POC reviewed with the pt, verbalized understanding.  VSS.  Afebrile.  AAOx3.  No complaint of pain or any other distress noted throughout night.  PIV remained intact.  On 2L oxygen via NC, O2 sat >95% throughout night.  On continuous telemetry monitor, SR.  No ectopy noted.  Blood glucose monitored.  Contact/Droplet/Airborne precaution maintained.  Fall precaution explained.  Safety maintained, free of falls throughout shift.  Instructed to call for any assistance.  Will continue to monitor.

## 2020-04-03 NOTE — ED NOTES
Talked to provider([facility] with [facility]). Provider stated wife could bring liver medication due to it not being available. Wife stated she would drop it off to the ED in about 15-20 minutes.

## 2020-04-03 NOTE — PLAN OF CARE
Problem: Adult Inpatient Plan of Care  Goal: Chart Reviewed  Outcome: Ongoing, Progressing   VN completed admission questions with patient. Plan of care was discussed including medication regimen.  All questions answered.   Education given on safety measures and using call light before getting out of bed by himself. Patient verbalized understanding. Bed locked and in lowest position. Alarm on.

## 2020-04-03 NOTE — ED NOTES
Cardiac monitoring was put in after patient left unit. Contacted nurse. Kymberly Conley reported she would hook patient up to tele box.

## 2020-04-03 NOTE — HOSPITAL COURSE
He was given ceftriaxone. Modified barium swallow study could not be done while he was on airborne and contact precautions. COVID-19 test was again negative. He felt better the next morning, which was not consistent with a COVID-19 presentation, but moreso with an aspiration. Speech Language Pathology was consulted and felt his excessive mucous caused aspiration. He can get a modified barium swallow study outpatient as previously scheduled. He was prescribed cefdinir for 6 days, as well as cetirizine and fluticasone nasal spray.

## 2020-04-03 NOTE — PT/OT/SLP EVAL
Speech Language Pathology Evaluation & Discharge Note  Bedside Swallow    Patient Name:  Thierry Ramos Jr.   MRN:  [id number]  Admitting Diagnosis: Aspiration pneumonia    Recommendations:                 General Recommendations:  Follow-up not indicated  Diet recommendations:  Regular, Thin   Aspiration Precautions: 1 bite/sip at a time, Alternating bites/sips, Avoid talking while eating, Eliminate distractions, Feed only when awake/alert, Frequent oral care, HOB to 90 degrees, Meds whole 1 at a time, Monitor for s/s of aspiration, Remain upright 30 minutes post meal, Small bites/sips and Standard aspiration precautions   General Precautions: Standard, airborne, contact, droplet, aspiration, fall  Communication strategies:  none    History:   HPI: Thierry Ramos Jr. is a 86 year old white man with panhypopituitarism, secondary adrenal insufficiency, secondary hypogonadism, central hypothyroidism, diabetes mellitus type 2 with neuropathy, hypertension, history of stroke on 12/16/06 status post left carotid endarterectomy on 2/27/07, dyslipidemia, chronic lymphocytic leukemia (treated with rituximab), hypogammaglobulinemia, immune thrombocytopenia, pulmonary hypertension, chronic rhinosinusitis, history Serratia marcescens pneumonia on 11/10/2019. He lives in [place], [place]. He is . His primary care physician is Dr. CAROLE Wooten. His pulmonologist is Dr. Homa Christensen.              He was seen at [facility] on 4/2/2020 for fever, cough, shortness of breath, headache. Temperature was 100.8° Fahrenheit. Oxygen saturation was 91%. He was advised to go to the emergency department. He went to [facility]. Chest X-ray showed no change compared to X-rays on 2/25/2020 and 11/9/2019. Labs showed elevated ferritin (439 ng/mL), procalcitonin (0.39 ng/mL), CRP (69.6 mg/L). He was recently referred for modified barium swallow study by his  pulmonologist to evaluate for aspiration. He was given azithromycin and ceftriaxone. He was admitted to [facility].     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    Diabetes mellitus, type 2     Encounter for blood transfusion     Hyperlipidemia     Hypertension     Hypopituitarism     Hypothyroid     Immunosuppression 3/13/2015    Liver fibrosis (?) 2/24/2020    Squamous Cell Carcinoma     on the right side of the face     Squamous cell carcinoma of skin of right temple 1/27/2016    Stroke     Syncope 2/12    Unspecified disorder of kidney and ureter        Past Surgical History:   Procedure Laterality Date    BRONCHOSCOPY N/A 1/22/2020    Procedure: Bronchoscopy;  Surgeon: [facility] Provider;  Location: [facility] OR [address] [place] [address];  Service: Anesthesiology;  Laterality: N/A;    CAROTID ENDARTERECTOMY (L)  2/27/2007    EXCISION TURBINATE, SUBMUCOUS      FESS  9/16/2014    Mohs excision   3/23/2015, 2/24/2016    combined with WLE forehead    NASAL SEPTUM SURGERY  9/16/2014    right ureter surgery  1995    SENTINEL LYMPH NODE BIOPSY  3/23/2015, 2/24/2016    SINUS SURGERY  9/16/2014    septo, ESS, BITSMR    Transphenoidal surgery         Social History: Patient lives with wife.    Prior Intubation HX:  None this admit    Modified Barium Swallow: none per EMR    Chest X-Rays:   Impression:       Persistent patchy opacities at the left lower lung zone which appear similar to the most recent studies of 02/25/2020 and 11/09/2019.  Findings remain concerning for pneumonia or aspiration.    This report was flagged in Epic as abnormal.     Prior diet: Regular/Thin liquids.    Subjective     Pt seen at the bedside for clinical swallow  "assessment. SLP did check w/ RN, Celina, prior to visit. SLP had attempted bedside swallow study earlier this date though pt requested SLP return later 2/2 being on phone. This PM, pt awake/alert and agreeable to PO trials.   Patient stated: "I've had this mucous for years. It wakes me up from sleeping."     Pain/Comfort:  · Pain Rating 1: 0/10    Objective:     Oral Musculature Evaluation  · Oral Musculature: general weakness  · Dentition: present and adequate  · Secretion Management: coughing on secretions  · Mucosal Quality: adequate  · Mandibular Strength and Mobility: WFL  · Oral Labial Strength and Mobility: functional retraction, functional coordination, functional seal  · Lingual Strength and Mobility: impaired strength  · Velar Elevation: WFL  · Buccal Strength and Mobility: WFL  · Volitional Cough: (elicited)  · Volitional Swallow: (timely)  · Voice Prior to PO Intake: (clear)    Bedside Swallow Eval: Pt's HOB elevated to 90*. Proper PPE donned prior to visit. He denied trouble swallowing and appeared confused re: MBSS ordered. He stated wife handled most of MD appointments. SLP reviewed MBSS ordered as outpatient by pulmonologist to rule out aspiration. MBSS not being completed by radiology dept currently 2/2 Covid. Bedside swallow study completed without any overt s/s of aspiration noted. MBSS may be completed as an outpatient once radiology dept accepting pt's--not urgent at this time. Pt in agreement. Education provided re: role of SLP, POC, swallow precautions, aspiration risks including aspiration PNA, need for excellent oral care, and positioning to prevent aspirating pharyngeal mucous. Pt verbalized understanding of all taught material. Time allowed for questions which were all answered within scope.  Consistencies Assessed:  · Thin liquids : 4oz juice via straw sips  · Puree : 75% pudding cup via tsp bites  · Soft solids : tsp bites diced peaches x5  · Solids : 1 inch bite demarcus cracker x3 "     Oral Phase:   · WFL: good oral acceptance, good rotary chew, timely A-P transfer, no oral residue, no anterior spillage, good bolus control    Pharyngeal Phase:   · no overt clinical signs/symptoms of aspiration  · no overt clinical signs/symptoms of pharyngeal dysphagia   · Timely swallow initiation, good hyolaryngeal lift/excursion  · Clear/dry voice    Compensatory Strategies  · None    Treatment: Pt is at baseline swallow function without any overt s/s of aspiration. No further ST needed.    Assessment:     Thierry Ramos Jr. is a 86 y.o. male with an SLP diagnosis of slight oropharyngeal Dysphagia c/b reduced secretion mgmt.  He presents with good tolerance of all PO trials this date without any overt coughing/choking noted. He is deemed safe to continue on a Regular/Thin liquid diet given all standard aspiration precautions. He is to be discharged from SLP care this date. Please re-consult should change in status occur. Recs reviewed with MD.    Goals:   Multidisciplinary Problems     SLP Goals     Not on file          Multidisciplinary Problems (Resolved)        Problem: SLP Goal    Goal Priority Disciplines Outcome   SLP Goal   (Resolved)     SLP Met   Description:  STGs:  1. Pt will participate in clinical swallow assessment to determine LRD.-Met 4/3/2020  2. Pt will consume a Regular/Thin liquid diet without any overt s/s of aspiration.-Met 4/3/2020                      Plan:     · Patient to be seen:  (n/a)   · Plan of Care expires:  04/03/20  · Plan of Care reviewed with:  patient(RN and MD)   · SLP Follow-Up:  No       Discharge recommendations:  home   Barriers to Discharge:  None    Time Tracking:     SLP Treatment Date:   04/03/20  Speech Start Time:  1355  Speech Stop Time:  1429     Speech Total Time (min):  34 min    Billable Minutes: Eval Swallow and Oral Function 19 and Seld Care/Home Management Training 15    Betty Davis CCC-SLP  04/03/2020

## 2020-04-03 NOTE — PROGRESS NOTES
[facility] Medicine  Progress Note    Patient Name: Thierry Ramos Jr.  MRN: [id number]  Patient Class: IP- Inpatient   Admission Date: 4/2/2020  Length of Stay: 1 days  Attending Physician: Jl Kimbrough MD  Primary Care Provider: AKILA Wooten MD        Subjective:     Principal Problem:Aspiration pneumonia        HPI:  Thierry Ramos Jr. is a 86 year old white man with panhypopituitarism, secondary adrenal insufficiency, secondary hypogonadism, central hypothyroidism, diabetes mellitus type 2 with neuropathy, hypertension, history of stroke on 12/16/06 status post left carotid endarterectomy on 2/27/07, dyslipidemia, chronic lymphocytic leukemia (treated with rituximab), hypogammaglobulinemia, immune thrombocytopenia, pulmonary hypertension, chronic rhinosinusitis, history of Pseudomonas aeruginosa pneumonia on 10/11/2019 and Serratia marcescens pneumonia on 11/10/2019. He lives in [place], [place]. He is . His primary care physician is Dr. CAROLE Wooten. His pulmonologist is Dr. Homa Christensen.              He was tested for COVID-19 on 3/18/2020 due to having fever and a couple weeks of cough. COVID-19 test was negative.   He was seen at [facility] on 4/2/2020 for fever, cough, shortness of breath, headache. Temperature was 100.8° Fahrenheit. Oxygen saturation was 91%. He was advised to go to the emergency department. He went to [facility]. Chest X-ray showed no change compared to X-rays on 2/25/2020 and 11/9/2019. Labs showed elevated ferritin (439 ng/mL), procalcitonin (0.39 ng/mL), CRP (69.6 mg/L). He was recently referred for modified barium swallow study by his pulmonologist to evaluate for aspiration. He was given azithromycin and ceftriaxone. He was admitted to [facility].    Overview/Hospital Course:  He was given ceftriaxone. Modified barium swallow study could not be done  while he was on airborne and contact precautions. COVID-19 test was again negative. He felt better the next morning, which was not consistent with a COVID-19 presentation, but moreso with an aspiration. Speech Language Pathology was consulted.     Interval History: Feels significantly better than yesterday.    Review of Systems   Gastrointestinal: Negative for nausea and vomiting.   Neurological: Negative for syncope and light-headedness.     Objective:     Vital Signs (Most Recent):  Temp: 98 °F (36.7 °C) (04/03/20 0750)  Pulse: 62 (04/03/20 0750)  Resp: (!) 21 (04/03/20 0750)  BP: (!) 159/73 (04/03/20 0750)  SpO2: 97 % (04/03/20 0750) Vital Signs (24h Range):  Temp:  [96.6 °F (35.9 °C)-100.8 °F (38.2 °C)] 98 °F (36.7 °C)  Pulse:  [] 62  Resp:  [16-25] 21  SpO2:  [90 %-100 %] 97 %  BP: (110-168)/(61-73) 159/73     Weight: 66.6 kg (146 lb 13.2 oz)  Body mass index is 20.48 kg/m².    Intake/Output Summary (Last 24 hours) at 4/3/2020 1129  Last data filed at 4/3/2020 0900  Gross per 24 hour   Intake 75 ml   Output 851 ml   Net -776 ml      Physical Exam   Constitutional: He appears well-developed. No distress.   Pulmonary/Chest: Effort normal. No respiratory distress.   Neurological: He is alert.   Psychiatric: He has a normal mood and affect.   Nursing note and vitals reviewed.      Significant Labs: All pertinent labs within the past 24 hours have been reviewed.    Significant Imaging: I have reviewed all pertinent imaging results/findings within the past 24 hours.      Assessment/Plan:      * Aspiration pneumonia  Giving ceftriaxone, to which his prior Serratia was sensitive. With history of Pseudomonas and Serratia pneumonias, suspect recurrent aspirations. Consulted Speech Language Pathology. Would like to get previously planned modified barium swallow study done for further evaluation.    Type 2 diabetes, controlled, with neuropathy  Giving insulin sliding scale as needed.    History of CVA  (cerebrovascular accident)  Continue aspirin and atorvastatin.    Panhypopituitarism  Central hypothyroidism  Secondary adrenal insufficiency  Secondary male hypogonadism  Takes medications for these.    CLL (chronic lymphocytic leukemia)  Treated with rituximab on 9/30/19. Takes prednisone.      VTE Risk Mitigation (From admission, onward)         Ordered     IP VTE HIGH RISK PATIENT  Once      04/02/20 2146     Place sequential compression device  Until discontinued      04/02/20 2146                      Jl Kimbrough MD  Department of Hospital Medicine   [facility]

## 2020-04-03 NOTE — PLAN OF CARE
Discharge orders noted, no HH or HME ordered.    Future Appointments   Date Time Provider Department Center   5/4/2020  2:00 PM Livier Bassett MA, CCC-SLP [facility] SPPATHB [facility]   5/4/2020  2:00 PM [facility] FLIP2 500 LB LIMIT [facility] XRAY IP [facility]   5/5/2020  9:30 AM Karissa Monsivais MD [facility] DERM [place]   6/1/2020 10:40 AM Alicia Truong MD [facility] YVAN [facility]   6/8/2020 10:00 AM Cee Farmer MD [facility] ENDODIA [facility]   6/9/2020  2:00 PM Homa Christensen MD [facility] PULMSVC [facility]   7/31/2020  9:15 AM Melvin Ying MD [facility] OPHTHAL [facility]   8/1/2020  8:00 AM [facility] OIC-US1 MASTER [facility] ULTR [facility]   8/3/2020  8:00 AM LAB, [place] METH LAB [place]   8/11/2020  8:40 AM Albina Solis, DUSTIN [facility] HEPAT [facility]   9/8/2020  2:40 PM CAROLE Wooten MD [facility] IM [place]       Pt's nurse will go over medications/signs and symptoms prior to discharge       04/03/20 1512   Final Note   Assessment Type Final Discharge Note   Anticipated Discharge Disposition Home   What phone number can be called within the next 1-3 days to see how you are doing after discharge? [phone]   Hospital Follow Up  Appt(s) scheduled? No  (message sent to MD for f/u)   Right Care Referral Info   Post Acute Recommendation No Care     Kristy Andres, RN Transitional Navigator  [phone]

## 2020-04-03 NOTE — ED NOTES
Upon handoff assessment, pt alert and oriented x4. Pt is calm and alerted on wait to be admitted to the floor. Pt verbalizes understanding. Pt vital signs currently stable. Call light in reach and explained on proper use. Bed in lowest position. Pt currently in no pain

## 2020-04-03 NOTE — ASSESSMENT & PLAN NOTE
Giving ceftriaxone, to which his prior Serratia was sensitive. With history of Pseudomonas and Serratia pneumonias, suspect recurrent aspirations. Consulted Speech Language Pathology. Would like to get previously planned modified barium swallow study done for further evaluation.

## 2020-04-03 NOTE — DISCHARGE INSTRUCTIONS
You have a lot of mucus that you may be aspirating into your lungs. Try taking cetirizine (Zyrtec), an allergy medicine, and fluticasone, a steroid nasal spray, to reduce the mucus.    Cefdinir capsules (English) View Edit Remove  Cetirizine capsules (English) View Edit Remove  Fluticasone nasal spray (English) View Edit Remove  Adult, Pneumonia (English) View Edit Remove  [facility] COVID 19 PREVENTION View Edit Remove

## 2020-04-03 NOTE — PLAN OF CARE
Frequent check and two hour rounding completed.  Patient awake but relaxed and comfortable. Patient denies any needs or complaints at this time. All safety measures maintained.

## 2020-04-03 NOTE — PLAN OF CARE
VN contacted patients spouse, [place], over the phone to review DC instructions. All questions answered. Verbalized understanding and provided [facility] on-call service number in case of further concerns.

## 2020-04-04 ENCOUNTER — PATIENT MESSAGE (OUTPATIENT)
Dept: HEMATOLOGY/ONCOLOGY | Facility: CLINIC | Age: 85
End: 2020-04-04

## 2020-04-04 ENCOUNTER — PATIENT MESSAGE (OUTPATIENT)
Dept: PULMONOLOGY | Facility: CLINIC | Age: 85
End: 2020-04-04

## 2020-04-06 ENCOUNTER — PATIENT MESSAGE (OUTPATIENT)
Dept: INTERNAL MEDICINE | Facility: CLINIC | Age: 85
End: 2020-04-06

## 2020-04-06 ENCOUNTER — PATIENT MESSAGE (OUTPATIENT)
Dept: HEMATOLOGY/ONCOLOGY | Facility: CLINIC | Age: 85
End: 2020-04-06

## 2020-04-06 LAB
BACTERIA BLD CULT: NORMAL
BACTERIA BLD CULT: NORMAL
BACTERIA SPEC AEROBE CULT: NORMAL
GRAM STN SPEC: NORMAL

## 2020-04-07 ENCOUNTER — OFFICE VISIT (OUTPATIENT)
Dept: INTERNAL MEDICINE | Facility: CLINIC | Age: 85
End: 2020-04-07
Payer: MEDICARE

## 2020-04-07 ENCOUNTER — TELEPHONE (OUTPATIENT)
Dept: INTERNAL MEDICINE | Facility: CLINIC | Age: 85
End: 2020-04-07

## 2020-04-07 DIAGNOSIS — E23.0 PANHYPOPITUITARISM: ICD-10-CM

## 2020-04-07 DIAGNOSIS — R53.1 WEAKNESS: ICD-10-CM

## 2020-04-07 DIAGNOSIS — J69.0 ASPIRATION PNEUMONIA, UNSPECIFIED ASPIRATION PNEUMONIA TYPE, UNSPECIFIED LATERALITY, UNSPECIFIED PART OF LUNG: Primary | ICD-10-CM

## 2020-04-07 DIAGNOSIS — E11.40 TYPE 2 DIABETES, CONTROLLED, WITH NEUROPATHY: ICD-10-CM

## 2020-04-07 PROCEDURE — 99214 OFFICE O/P EST MOD 30 MIN: CPT | Mod: HCNC,95,, | Performed by: INTERNAL MEDICINE

## 2020-04-07 PROCEDURE — 1159F MED LIST DOCD IN RCRD: CPT | Mod: HCNC,95,, | Performed by: INTERNAL MEDICINE

## 2020-04-07 PROCEDURE — 1159F PR MEDICATION LIST DOCUMENTED IN MEDICAL RECORD: ICD-10-PCS | Mod: HCNC,95,, | Performed by: INTERNAL MEDICINE

## 2020-04-07 PROCEDURE — 99214 PR OFFICE/OUTPT VISIT, EST, LEVL IV, 30-39 MIN: ICD-10-PCS | Mod: HCNC,95,, | Performed by: INTERNAL MEDICINE

## 2020-04-07 PROCEDURE — 1101F PR PT FALLS ASSESS DOC 0-1 FALLS W/OUT INJ PAST YR: ICD-10-PCS | Mod: HCNC,CPTII,95, | Performed by: INTERNAL MEDICINE

## 2020-04-07 PROCEDURE — 1101F PT FALLS ASSESS-DOCD LE1/YR: CPT | Mod: HCNC,CPTII,95, | Performed by: INTERNAL MEDICINE

## 2020-04-07 RX ORDER — BENZONATATE 100 MG/1
100 CAPSULE ORAL 3 TIMES DAILY PRN
Qty: 30 CAPSULE | Refills: 2 | Status: SHIPPED | OUTPATIENT
Start: 2020-04-07 | End: 2020-04-17

## 2020-04-07 NOTE — TELEPHONE ENCOUNTER
----- Message from Elisa Yuan sent at 4/7/2020 10:30 AM CDT -----  Contact: [phone]  Patient's wife called and state that they are still waiting for the to come virtual visit.  Appt was for 10:20am.    Please faisal, thank you.

## 2020-04-09 ENCOUNTER — TELEPHONE (OUTPATIENT)
Dept: PHARMACY | Facility: CLINIC | Age: 85
End: 2020-04-09

## 2020-04-09 NOTE — TELEPHONE ENCOUNTER
Refill call regarding Vem[facility] at [facility]. Will prepare for shipment with consent of patient's wife Yolie on  to arrive . Copay 0.00. Patient has started Cefdinir, Tessalon, and Cetirizine. Transferred wife to [facility] to speak about the new drugs. No new allergies or side effects reported with this shipment. Medication is being taken as prescribed by physician and properly stored. Two patient identifiers:  and Address verified. Patient has no questions or concerns for [facility]. Patient has 10 days on hand.

## 2020-04-09 NOTE — PROGRESS NOTES
The patient location is: home  The chief complaint leading to consultation is: hospital follow up  Visit type: Virtual visit with synchronous audio and video  Total time spent with patient: 20 minutes  Each patient to whom he or she provides medical services by telemedicine is:  (1) informed of the relationship between the physician and patient and the respective role of any other health care provider with respect to management of the patient; and (2) notified that he or she may decline to receive medical services by telemedicine and may withdraw from such care at any time.    Notes:   History of present illness:  Virtual visit today in view of the current Coronavirus pandemic.  86-year-old gentleman recently discharged from hospital April 3, 2020.  The patient has pan hypopituitarism, diabetes mellitus, chronic lymphocytic leukemia, and others recently admitted with what appears to be an aspiration pneumonia.  He has been tested twice in the last month for COVID-19 and has tested negative.  As planned modified barium swallow going forward.  The patient was discharged and completing a course of oral antibiotics-cefdinir.  He reports that overall he is feeling better.  He still has a cough which comes and goes.  He does feel tired and weak with decreased appetite.  He denies any fever chills and generalized body aches.  No diarrhea.  No nausea no vomiting.  His cough is primarily in the a.m. on 1st arising.  He is using Tessalon [place].  Home oxygen saturations 94% via pulse ox.    Current medications:  Medications are all noted reviewed the electronic medical record medication list.    Past medical history, past surgical history, family medical history social history is are all noted and reviewed in the electronic medical record history sections.    Review of systems:  Constitutional:  Denies fever chills body aches.  HEENT:  Denies hoarseness.  No sore throat.  No sinus congestion.  Respiratory:  See HPI above.   Denies any overt shortness of breath.  Cardiovascular:  No chest pain no palpitations no syncope no claudication symptoms.  No significant peripheral extremity edema.  :  Denies any change in the color character of his urine.  GI:  No nausea no vomiting no abdominal pain no diarrhea.  No melena no hematochezia.  Decreased appetite.    Visual exam:  The patient appears alert oriented affect mood all appropriate.  No acute distress.  Does not appear tachypneic.  Appropriately conversant and attentive.    Data:  Reviewed the recent hospital discharge summary, lab data reports and radiologic imaging reports.    Impression:  This gentleman with multiple medical issues with recent what clinically seems to be aspiration pneumonia which is clinically improved significantly since admission.  Still systemically recovering continues to feel weak with no energy and decreased appetite.  Other chronic medical conditions as noted appear to be clinically stable.    Recommendation:  Continue current course of therapy and observation.  He is advise if he does not continue to show continued improvement and returned towards baseline he would contact the office.  He has a virtual visit with Hematology-Oncology scheduled and has office visit with pulmonology in June with this year.  Return to clinic to see us in 3 months or before if any concerns problems or issues arise.

## 2020-04-14 NOTE — ED NOTES
Pressure injuries Pt refusing imaging at this time, NP notified and aware.  Will hold orders for now.

## 2020-04-15 ENCOUNTER — PATIENT MESSAGE (OUTPATIENT)
Dept: INTERNAL MEDICINE | Facility: CLINIC | Age: 85
End: 2020-04-15

## 2020-04-18 ENCOUNTER — PATIENT MESSAGE (OUTPATIENT)
Dept: HEMATOLOGY/ONCOLOGY | Facility: CLINIC | Age: 85
End: 2020-04-18

## 2020-04-19 ENCOUNTER — PATIENT MESSAGE (OUTPATIENT)
Dept: PULMONOLOGY | Facility: CLINIC | Age: 85
End: 2020-04-19

## 2020-04-19 ENCOUNTER — PATIENT MESSAGE (OUTPATIENT)
Dept: HEMATOLOGY/ONCOLOGY | Facility: CLINIC | Age: 85
End: 2020-04-19

## 2020-04-19 ENCOUNTER — HOSPITAL ENCOUNTER (INPATIENT)
Facility: HOSPITAL | Age: 85
LOS: 2 days | Discharge: HOME | DRG: 178 | End: 2020-04-21
Attending: EMERGENCY MEDICINE | Admitting: HOSPITALIST
Payer: MEDICARE

## 2020-04-19 ENCOUNTER — PATIENT MESSAGE (OUTPATIENT)
Dept: INTERNAL MEDICINE | Facility: CLINIC | Age: 85
End: 2020-04-19

## 2020-04-19 DIAGNOSIS — J69.0 ASPIRATION PNEUMONIA: ICD-10-CM

## 2020-04-19 DIAGNOSIS — I27.20 PULMONARY HTN: Chronic | ICD-10-CM

## 2020-04-19 DIAGNOSIS — Z20.822 SUSPECTED COVID-19 VIRUS INFECTION: ICD-10-CM

## 2020-04-19 DIAGNOSIS — R05.9 COUGH: ICD-10-CM

## 2020-04-19 DIAGNOSIS — J69.0 RECURRENT ASPIRATION PNEUMONIA: Primary | ICD-10-CM

## 2020-04-19 DIAGNOSIS — J47.0 BRONCHIECTASIS WITH ACUTE LOWER RESPIRATORY INFECTION: ICD-10-CM

## 2020-04-19 PROBLEM — J18.9 PNEUMONIA OF BOTH LUNGS DUE TO INFECTIOUS ORGANISM: Status: RESOLVED | Noted: 2019-07-19 | Resolved: 2020-04-19

## 2020-04-19 PROBLEM — J47.9 BRONCHIECTASIS: Status: ACTIVE | Noted: 2020-01-16

## 2020-04-19 PROBLEM — J15.1 PNEUMONIA OF LEFT LOWER LOBE DUE TO PSEUDOMONAS SPECIES: Status: RESOLVED | Noted: 2019-10-11 | Resolved: 2020-04-19

## 2020-04-19 LAB
ALBUMIN SERPL BCP-MCNC: 3.4 G/DL (ref 3.5–5.2)
ALP SERPL-CCNC: 187 U/L (ref 55–135)
ALT SERPL W/O P-5'-P-CCNC: 40 U/L (ref 10–44)
ANION GAP SERPL CALC-SCNC: 15 MMOL/L (ref 8–16)
AST SERPL-CCNC: 27 U/L (ref 10–40)
BASOPHILS # BLD AUTO: 0.06 K/UL (ref 0–0.2)
BASOPHILS NFR BLD: 0.8 % (ref 0–1.9)
BILIRUB SERPL-MCNC: 0.8 MG/DL (ref 0.1–1)
BNP SERPL-MCNC: 72 PG/ML (ref 0–99)
BUN SERPL-MCNC: 31 MG/DL (ref 8–23)
CALCIUM SERPL-MCNC: 10.4 MG/DL (ref 8.7–10.5)
CHLORIDE SERPL-SCNC: 104 MMOL/L (ref 95–110)
CK SERPL-CCNC: 16 U/L (ref 20–200)
CO2 SERPL-SCNC: 21 MMOL/L (ref 23–29)
CREAT SERPL-MCNC: 1.2 MG/DL (ref 0.5–1.4)
CRP SERPL-MCNC: 80 MG/L (ref 0–8.2)
DIFFERENTIAL METHOD: ABNORMAL
EOSINOPHIL # BLD AUTO: 0 K/UL (ref 0–0.5)
EOSINOPHIL NFR BLD: 0.4 % (ref 0–8)
ERYTHROCYTE [DISTWIDTH] IN BLOOD BY AUTOMATED COUNT: 14.6 % (ref 11.5–14.5)
EST. GFR  (AFRICAN AMERICAN): >60 ML/MIN/1.73 M^2
EST. GFR  (NON AFRICAN AMERICAN): 54 ML/MIN/1.73 M^2
FERRITIN SERPL-MCNC: 459 NG/ML (ref 20–300)
GLUCOSE SERPL-MCNC: 162 MG/DL (ref 70–110)
HCT VFR BLD AUTO: 46.3 % (ref 40–54)
HGB BLD-MCNC: 14.6 G/DL (ref 14–18)
IMM GRANULOCYTES # BLD AUTO: 0.19 K/UL (ref 0–0.04)
IMM GRANULOCYTES NFR BLD AUTO: 2.5 % (ref 0–0.5)
LACTATE SERPL-SCNC: 3.5 MMOL/L (ref 0.5–2.2)
LDH SERPL L TO P-CCNC: 234 U/L (ref 110–260)
LYMPHOCYTES # BLD AUTO: 1 K/UL (ref 1–4.8)
LYMPHOCYTES NFR BLD: 12.5 % (ref 18–48)
MCH RBC QN AUTO: 29.4 PG (ref 27–31)
MCHC RBC AUTO-ENTMCNC: 31.5 G/DL (ref 32–36)
MCV RBC AUTO: 93 FL (ref 82–98)
MONOCYTES # BLD AUTO: 0.4 K/UL (ref 0.3–1)
MONOCYTES NFR BLD: 5.3 % (ref 4–15)
NEUTROPHILS # BLD AUTO: 6 K/UL (ref 1.8–7.7)
NEUTROPHILS NFR BLD: 78.5 % (ref 38–73)
NRBC BLD-RTO: 0 /100 WBC
PLATELET # BLD AUTO: 154 K/UL (ref 150–350)
PMV BLD AUTO: 9.6 FL (ref 9.2–12.9)
POCT GLUCOSE: 163 MG/DL (ref 70–110)
POCT GLUCOSE: 173 MG/DL (ref 70–110)
POTASSIUM SERPL-SCNC: 4.3 MMOL/L (ref 3.5–5.1)
PROCALCITONIN SERPL IA-MCNC: 0.22 NG/ML
PROT SERPL-MCNC: 6.5 G/DL (ref 6–8.4)
RBC # BLD AUTO: 4.96 M/UL (ref 4.6–6.2)
SARS-COV-2 RDRP RESP QL NAA+PROBE: NEGATIVE
SODIUM SERPL-SCNC: 140 MMOL/L (ref 136–145)
TROPONIN I SERPL DL<=0.01 NG/ML-MCNC: <0.006 NG/ML (ref 0–0.03)
WBC # BLD AUTO: 7.6 K/UL (ref 3.9–12.7)

## 2020-04-19 PROCEDURE — 83605 ASSAY OF LACTIC ACID: CPT | Mod: HCNC

## 2020-04-19 PROCEDURE — 85025 COMPLETE CBC W/AUTO DIFF WBC: CPT | Mod: HCNC

## 2020-04-19 PROCEDURE — U0002 COVID-19 LAB TEST NON-CDC: HCPCS | Mod: HCNC

## 2020-04-19 PROCEDURE — 80053 COMPREHEN METABOLIC PANEL: CPT | Mod: HCNC

## 2020-04-19 PROCEDURE — S0030 INJECTION, METRONIDAZOLE: HCPCS | Mod: HCNC | Performed by: HOSPITALIST

## 2020-04-19 PROCEDURE — 86140 C-REACTIVE PROTEIN: CPT | Mod: HCNC

## 2020-04-19 PROCEDURE — 11000001 HC ACUTE MED/SURG PRIVATE ROOM: Mod: HCNC

## 2020-04-19 PROCEDURE — 25000003 PHARM REV CODE 250: Mod: HCNC | Performed by: EMERGENCY MEDICINE

## 2020-04-19 PROCEDURE — 82728 ASSAY OF FERRITIN: CPT | Mod: HCNC

## 2020-04-19 PROCEDURE — 83880 ASSAY OF NATRIURETIC PEPTIDE: CPT | Mod: HCNC

## 2020-04-19 PROCEDURE — 94761 N-INVAS EAR/PLS OXIMETRY MLT: CPT | Mod: HCNC

## 2020-04-19 PROCEDURE — 63600175 PHARM REV CODE 636 W HCPCS: Mod: HCNC | Performed by: EMERGENCY MEDICINE

## 2020-04-19 PROCEDURE — 96365 THER/PROPH/DIAG IV INF INIT: CPT | Mod: HCNC

## 2020-04-19 PROCEDURE — 27000221 HC OXYGEN, UP TO 24 HOURS: Mod: HCNC

## 2020-04-19 PROCEDURE — 83615 LACTATE (LD) (LDH) ENZYME: CPT | Mod: HCNC

## 2020-04-19 PROCEDURE — 84484 ASSAY OF TROPONIN QUANT: CPT | Mod: HCNC

## 2020-04-19 PROCEDURE — 25000003 PHARM REV CODE 250: Mod: HCNC | Performed by: HOSPITALIST

## 2020-04-19 PROCEDURE — 99285 EMERGENCY DEPT VISIT HI MDM: CPT | Mod: 25,HCNC

## 2020-04-19 PROCEDURE — 84145 PROCALCITONIN (PCT): CPT | Mod: HCNC

## 2020-04-19 PROCEDURE — 82550 ASSAY OF CK (CPK): CPT | Mod: HCNC

## 2020-04-19 PROCEDURE — 87040 BLOOD CULTURE FOR BACTERIA: CPT | Mod: HCNC

## 2020-04-19 PROCEDURE — 93005 ELECTROCARDIOGRAM TRACING: CPT | Mod: HCNC

## 2020-04-19 RX ORDER — FLUTICASONE PROPIONATE 50 MCG
2 SPRAY, SUSPENSION (ML) NASAL DAILY
Status: DISCONTINUED | OUTPATIENT
Start: 2020-04-19 | End: 2020-04-21 | Stop reason: HOSPADM

## 2020-04-19 RX ORDER — IBUPROFEN 200 MG
16 TABLET ORAL
Status: DISCONTINUED | OUTPATIENT
Start: 2020-04-19 | End: 2020-04-21 | Stop reason: HOSPADM

## 2020-04-19 RX ORDER — ATORVASTATIN CALCIUM 20 MG/1
20 TABLET, FILM COATED ORAL DAILY
Status: DISCONTINUED | OUTPATIENT
Start: 2020-04-20 | End: 2020-04-21 | Stop reason: HOSPADM

## 2020-04-19 RX ORDER — ACETAMINOPHEN 500 MG
1000 TABLET ORAL EVERY 8 HOURS PRN
Status: DISCONTINUED | OUTPATIENT
Start: 2020-04-19 | End: 2020-04-21 | Stop reason: HOSPADM

## 2020-04-19 RX ORDER — CEFEPIME HYDROCHLORIDE 2 G/50ML
2 INJECTION, SOLUTION INTRAVENOUS
Status: COMPLETED | OUTPATIENT
Start: 2020-04-19 | End: 2020-04-19

## 2020-04-19 RX ORDER — GLUCAGON 1 MG
1 KIT INJECTION
Status: DISCONTINUED | OUTPATIENT
Start: 2020-04-19 | End: 2020-04-21 | Stop reason: HOSPADM

## 2020-04-19 RX ORDER — ONDANSETRON 2 MG/ML
4 INJECTION INTRAMUSCULAR; INTRAVENOUS EVERY 8 HOURS PRN
Status: DISCONTINUED | OUTPATIENT
Start: 2020-04-19 | End: 2020-04-21 | Stop reason: HOSPADM

## 2020-04-19 RX ORDER — ACETAMINOPHEN 325 MG/1
650 TABLET ORAL EVERY 6 HOURS PRN
Status: DISCONTINUED | OUTPATIENT
Start: 2020-04-19 | End: 2020-04-21 | Stop reason: HOSPADM

## 2020-04-19 RX ORDER — SODIUM CHLORIDE 0.9 % (FLUSH) 0.9 %
10 SYRINGE (ML) INJECTION
Status: DISCONTINUED | OUTPATIENT
Start: 2020-04-19 | End: 2020-04-21 | Stop reason: HOSPADM

## 2020-04-19 RX ORDER — AMOXICILLIN AND CLAVULANATE POTASSIUM 875; 125 MG/1; MG/1
1 TABLET, FILM COATED ORAL EVERY 12 HOURS
Status: DISCONTINUED | OUTPATIENT
Start: 2020-04-19 | End: 2020-04-21 | Stop reason: HOSPADM

## 2020-04-19 RX ORDER — INSULIN ASPART 100 [IU]/ML
0-5 INJECTION, SOLUTION INTRAVENOUS; SUBCUTANEOUS
Status: DISCONTINUED | OUTPATIENT
Start: 2020-04-19 | End: 2020-04-21 | Stop reason: HOSPADM

## 2020-04-19 RX ORDER — METRONIDAZOLE 500 MG/100ML
500 INJECTION, SOLUTION INTRAVENOUS ONCE
Status: COMPLETED | OUTPATIENT
Start: 2020-04-19 | End: 2020-04-19

## 2020-04-19 RX ORDER — NAPROXEN SODIUM 220 MG/1
81 TABLET, FILM COATED ORAL
Status: DISCONTINUED | OUTPATIENT
Start: 2020-04-20 | End: 2020-04-21 | Stop reason: HOSPADM

## 2020-04-19 RX ORDER — PANTOPRAZOLE SODIUM 40 MG/1
40 TABLET, DELAYED RELEASE ORAL DAILY
Status: DISCONTINUED | OUTPATIENT
Start: 2020-04-20 | End: 2020-04-21 | Stop reason: HOSPADM

## 2020-04-19 RX ORDER — CLOPIDOGREL BISULFATE 75 MG/1
75 TABLET ORAL DAILY
Status: DISCONTINUED | OUTPATIENT
Start: 2020-04-20 | End: 2020-04-21 | Stop reason: HOSPADM

## 2020-04-19 RX ORDER — CETIRIZINE HYDROCHLORIDE 10 MG/1
10 TABLET ORAL DAILY
Status: DISCONTINUED | OUTPATIENT
Start: 2020-04-19 | End: 2020-04-21 | Stop reason: HOSPADM

## 2020-04-19 RX ORDER — IBUPROFEN 200 MG
24 TABLET ORAL
Status: DISCONTINUED | OUTPATIENT
Start: 2020-04-19 | End: 2020-04-21 | Stop reason: HOSPADM

## 2020-04-19 RX ADMIN — METRONIDAZOLE 500 MG: 500 INJECTION, SOLUTION INTRAVENOUS at 01:04

## 2020-04-19 RX ADMIN — AMOXICILLIN AND CLAVULANATE POTASSIUM 1 TABLET: 875; 125 TABLET, FILM COATED ORAL at 09:04

## 2020-04-19 RX ADMIN — SODIUM CHLORIDE 500 ML: 0.9 INJECTION, SOLUTION INTRAVENOUS at 11:04

## 2020-04-19 RX ADMIN — AZITHROMYCIN MONOHYDRATE 500 MG: 500 INJECTION, POWDER, LYOPHILIZED, FOR SOLUTION INTRAVENOUS at 10:04

## 2020-04-19 RX ADMIN — CEFEPIME HYDROCHLORIDE 2 G: 2 INJECTION, SOLUTION INTRAVENOUS at 11:04

## 2020-04-19 NOTE — ASSESSMENT & PLAN NOTE
Chronic rhinosinusitis  Give a dose of metronidazole, then start amoxicillin-clavulanate this evening. Wean supplemental oxygen as tolerated. Continue home cetirizine and fluticasone nasal spray. Consult ENT. Aspiration precautions. Get modified barium swallow study done inpatient. Can prescribe home oxygen based on documented hypoxia on room air.

## 2020-04-19 NOTE — ED NOTES
Pt's spouse ( Yolie_ notified of non formulary   Medication- vemliby(tenofovir), not avaible at hospital at this time, spouse states she will bring med to drop off

## 2020-04-19 NOTE — HPI
Thierry Ramos Jr. is a 86 year old white man with panhypopituitarism, secondary adrenal insufficiency, secondary hypogonadism, central hypothyroidism, diabetes mellitus type 2 with neuropathy, hypertension, history of stroke on 12/16/06 status post left carotid endarterectomy on 2/27/07, dyslipidemia, chronic lymphocytic leukemia (treated with rituximab), hypogammaglobulinemia, immune thrombocytopenia, pulmonary hypertension, chronic rhinosinusitis, recurrent aspiration pneumonias with history of Pseudomonas aeruginosa pneumonia on 10/11/2019, Serratia marcescens pneumonia on 11/10/2019, aspiration pneumonia on 4/2/2020. He lives in [place], [place]. He is . His primary care physician is Dr. CAROLE Wooten. His pulmonologist is Dr. Homa Christensen.   Dr. Christensen ordered outpatient modified barium swallow study on 3/5/2020.              He was tested for COVID-19 on 3/18/2020 due to having fever and a couple weeks of cough. COVID-19 test was negative.              He was hospitalized at [facility] from 4/2/2020 to 4/3/2020 for aspiration pneumonia. Speech Language Pathology felt his excessive mucous was causing aspirations, so he was prescribed cetirizine and fluticasone nasal spray. Speech Language Pathology recommended getting the modified barium swallow study done outpatient as previously planned. He was treated with ceftriaxone in the hospital and prescribed cefdinir on discharge.    He returned to [facility] on 4/19/2020 with recurrent shortness of breath and cough, associated with congestion, rhinorrhea, and weakness when standing. He reported that these episodes start with congestion causing coughing fits. He reported that he continued having problems with mucous despite using cetirizine and fluticasone nasal spray. He felt that he would not have to come to the hospital each time this happens if he had supplemental oxygen at home.  In the emergency department, his oxygen saturation was 84% on room air. His blood pressure was as low as 83/45. He was tachycardic. Lactate was elevated at 3.5 mmol/L. Chest X-ray showed no change compared to his last aspiration pneumonia. He was given ceftriaxone and 500 mL of normal saline. He was admitted to [facility].

## 2020-04-19 NOTE — ED NOTES
Pt transported to  472 with pt transport. Pt sent with home medication (tenovofir) that family member brought in

## 2020-04-19 NOTE — H&P
[facility]  History & Physical    Patient Name: Thierry Ramos Jr.  MRN: [id number]  Admission Date: 4/19/2020  Attending Physician: Jl Kimbrough MD   Primary Care Provider: AKILA Wooten MD         Patient information was obtained from patient, past medical records and ER records.     Subjective:     Principal Problem:Recurrent aspiration pneumonia    Chief Complaint:   Chief Complaint   Patient presents with    Shortness of Breath     reprots sob started this morning with coughing. denies chest pain         HPI: Thierry Ramos Jr. is a 86 year old white man with panhypopituitarism, secondary adrenal insufficiency, secondary hypogonadism, central hypothyroidism, diabetes mellitus type 2 with neuropathy, hypertension, history of stroke on 12/16/06 status post left carotid endarterectomy on 2/27/07, dyslipidemia, chronic lymphocytic leukemia (treated with rituximab), hypogammaglobulinemia, immune thrombocytopenia, pulmonary hypertension, chronic rhinosinusitis, recurrent aspiration pneumonias with history of Pseudomonas aeruginosa pneumonia on 10/11/2019, Serratia marcescens pneumonia on 11/10/2019, aspiration pneumonia on 4/2/2020. He lives in [place], [place]. He is . His primary care physician is Dr. CAROLE Wooten. His pulmonologist is Dr. Homa Christensen.   Dr. Christensen ordered outpatient modified barium swallow study on 3/5/2020.              He was tested for COVID-19 on 3/18/2020 due to having fever and a couple weeks of cough. COVID-19 test was negative.              He was hospitalized at [facility] from 4/2/2020 to 4/3/2020 for aspiration pneumonia. Speech Language Pathology felt his excessive mucous was causing aspirations, so he was prescribed cetirizine and fluticasone nasal spray. Speech Language Pathology recommended getting the modified barium swallow study done outpatient as previously planned. He was treated with  ceftriaxone in the hospital and prescribed cefdinir on discharge.    He returned to [facility] on 4/19/2020 with recurrent shortness of breath and cough, associated with congestion, rhinorrhea, and weakness when standing. He reported that these episodes start with congestion causing coughing fits. He reported that he continued having problems with mucous despite using cetirizine and fluticasone nasal spray. He felt that he would not have to come to the hospital each time this happens if he had supplemental oxygen at home. In the emergency department, his oxygen saturation was 84% on room air. His blood pressure was as low as 83/45. He was tachycardic. Lactate was elevated at 3.5 mmol/L. Chest X-ray showed no change compared to his last aspiration pneumonia. He was given ceftriaxone and 500 mL of normal saline. He was admitted to [facility].    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    Diabetes mellitus, type 2     Encounter for blood transfusion     Hyperlipidemia     Hypertension     Hypopituitarism     Hypothyroid     Immunosuppression 3/13/2015    Liver fibrosis (?) 2/24/2020    Pneumonia of both lungs due to infectious organism 7/19/2019    Pneumonia of left lower lobe due to Pseudomonas species 10/11/2019    Squamous Cell Carcinoma     on the right side of the face     Squamous cell carcinoma of skin of right temple 1/27/2016    Stroke     Syncope 2/12    Unspecified disorder of kidney and ureter        Past Surgical History:   Procedure Laterality Date    BRONCHOSCOPY N/A 1/22/2020    Procedure: Bronchoscopy;  Surgeon: Ronnic Diagnostic Provider;  Location: [facility] OR [address] [place] [address];   Service: Anesthesiology;  Laterality: N/A;    CAROTID ENDARTERECTOMY (L)  2/27/2007    EXCISION TURBINATE, SUBMUCOUS      FESS  9/16/2014    Mohs excision   3/23/2015, 2/24/2016    combined with WLE forehead    NASAL SEPTUM SURGERY  9/16/2014    right ureter surgery  1995    SENTINEL LYMPH NODE BIOPSY  3/23/2015, 2/24/2016    SINUS SURGERY  9/16/2014    septo, ESS, BITSMR    Transphenoidal surgery         Review of patient's allergies indicates:   Allergen Reactions    Ace inhibitors Swelling     angioedema    Divalproex Hives     Rash under arms, body creases       No current facility-administered medications on file prior to encounter.      Current Outpatient Medications on File Prior to Encounter   Medication Sig    aspirin 81 MG Chew Take 81 mg by mouth every Mon, Wed, Fri.    atorvastatin (LIPITOR) 20 MG tablet TAKE 1 TABLET EVERY DAY    cetirizine (ZYRTEC) 10 MG tablet Take 1 tablet (10 mg total) by mouth once daily.    clopidogrel (PLAVIX) 75 mg tablet Take 1 tablet (75 mg total) by mouth once daily.    cyanocobalamin, vitamin B-12, 5,000 mcg TbDL Take 1 tablet by mouth once daily.    cycloSPORINE (RESTASIS) 0.05 % ophthalmic emulsion Place 0.4 mLs (1 drop total) into both eyes 2 (two) times daily.    DOCOSAHEXANOIC ACID/EPA (FISH OIL ORAL) Take 5 capsules by mouth once daily.     fluorouracil (EFUDEX) 5 % cream Use hs for 2 weeks on right cheek    fluticasone propionate (FLONASE) 50 mcg/actuation nasal spray 2 sprays (100 mcg total) by Each Nostril route once daily.    furosemide (LASIX) 20 MG tablet Take 20 mg by mouth daily as needed.     guaifen/dextromethorphan/pe (ROBITUSSIN COUGH & COLD CF MAX ORAL) Take 15 mLs by mouth 2 (two) times daily.    hydrocodone-chlorpheniramine (TUSSIONEX) 10-8 mg/5 mL suspension Take 5 mLs by mouth every 12 (twelve) hours as needed for Cough.    ipratropium (ATROVENT) 0.03 % nasal spray 2 sprays by Nasal route 2 (two) times daily.    Lactobacillus  acidophilus (ACIDOPHILUS ORAL) Take 1 tablet by mouth once daily.    levothyroxine (SYNTHROID) 125 MCG tablet TAKE 1 TABLET ONE TIME DAILY    losartan (COZAAR) 25 MG tablet Take 1 tablet (25 mg total) by mouth once daily.    multivitamin with minerals tablet Take 1 tablet by mouth once daily.    mupirocin (BACTROBAN) 2 % ointment Apply to affected area 3 times daily    pantoprazole (PROTONIX) 40 MG tablet TAKE 1 TABLET EVERY DAY    predniSONE (DELTASONE) 2.5 MG tablet Take 1 tablet (2.5 mg total) by mouth once daily. 5mg and 2.5mg qd    predniSONE (DELTASONE) 5 MG tablet Take 1 tablet (5 mg total) by mouth once daily.    sennosides/docusate sodium (EDEN-COLACE ORAL) Take 1 tablet by mouth once daily.    SYMBICORT 160-4.5 mcg/actuation HFAA Inhale 1 puff into the lungs as needed.    tazarotene (AVAGE) 0.1 % cream Apply topically every evening. (Patient taking differently: Apply topically as needed. )    tenofovir alafenamide (VEMLIDY) 25 mg Tab Take 1 tablet (25 mg total) by mouth once daily.    testosterone cypionate (DEPOTESTOTERONE CYPIONATE) 100 mg/mL injection Inject 0.5 mLs (50 mg total) into the muscle every 14 (fourteen) days.    VENTOLIN HFA 90 mcg/actuation inhaler Inhale 1 puff into the lungs as needed.     Family History     Problem Relation (Age of Onset)    Cancer Mother, Father, Brother    Colon cancer Father    No Known Problems Sister, Maternal Aunt, Maternal Uncle, Paternal Aunt, Paternal Uncle, Maternal Grandmother, Maternal Grandfather, Paternal Grandmother, Paternal Grandfather        Tobacco Use    Smoking status: Never Smoker    Smokeless tobacco: Never Used   Substance and Sexual Activity    Alcohol use: Not Currently     Frequency: Never     Drinks per session: Patient refused     Binge frequency: Patient refused    Drug use: No    Sexual activity: Not Currently     Review of Systems   Constitutional: Negative for chills and fever.   HENT: Positive for congestion, postnasal  drip and rhinorrhea.    Eyes: Negative for pain and redness.   Respiratory: Positive for cough. Negative for shortness of breath.    Cardiovascular: Negative for chest pain and palpitations.   Gastrointestinal: Negative for nausea and vomiting.   Genitourinary: Negative for difficulty urinating and dysuria.   Musculoskeletal: Negative for neck pain and neck stiffness.   Skin: Negative for rash and wound.   Neurological: Negative for syncope and speech difficulty.     Objective:     Vital Signs (Most Recent):  Temp: 99.1 °F (37.3 °C) (04/19/20 0948)  Pulse: 85 (04/19/20 1153)  Resp: (!) 27 (04/19/20 1153)  BP: (!) 89/41 (04/19/20 1147)  SpO2: 98 % (04/19/20 1153) Vital Signs (24h Range):  Temp:  [99.1 °F (37.3 °C)] 99.1 °F (37.3 °C)  Pulse:  [] 85  Resp:  [11-38] 27  SpO2:  [84 %-99 %] 98 %  BP: ()/(41-57) 89/41     Weight: 66.2 kg (146 lb)  Body mass index is 20.36 kg/m².    Physical Exam   Constitutional: He is oriented to person, place, and time. He appears well-developed. No distress.   HENT:   Head: Normocephalic.   Eyes: Conjunctivae are normal. No scleral icterus.   Neck: Neck supple. No tracheal deviation present.   Cardiovascular: Normal rate and regular rhythm.   Pulmonary/Chest: Effort normal. No respiratory distress.   Abdominal: Soft. He exhibits no distension. There is no tenderness. There is no guarding.   Musculoskeletal: He exhibits no edema or tenderness.   Neurological: He is alert and oriented to person, place, and time.   Skin: Skin is warm and dry.   Psychiatric: He has a normal mood and affect.   Nursing note and vitals reviewed.          Significant Labs: All pertinent labs within the past 24 hours have been reviewed.    Significant Imaging: I have reviewed all pertinent imaging results/findings within the past 24 hours.   X-Ray Chest 1 View 4/19/20: FINDINGS: Cardiac monitoring leads project over the bilateral hemithoraces.  Mediastinal structures are midline.  Hilar contours are  unremarkable.  Cardiac silhouette is normal in size.  Lung volumes are normal and symmetric.  There is persistent patchy ground-glass attenuation with scattered reticulonodular opacities within the bilateral lower lung zones, left greater than right zone, not significantly changed when compared to the previous exam and likely related to the patient's suspected small airways infectious versus inflammatory process.  No new areas of consolidation.  No pneumothorax or large pleural effusion.  No free air beneath the diaphragm.  Degenerative changes of the spine and glenohumeral joints noted.  No acute osseous abnormalities.  Visualized soft tissues are unremarkable.  Impression: No interval detrimental change when compared to radiograph dated 04/02/2020.      Assessment/Plan:     * Recurrent aspiration pneumonia  Chronic rhinosinusitis  Give a dose of metronidazole, then start amoxicillin-clavulanate this evening. Wean supplemental oxygen as tolerated. Continue home cetirizine and fluticasone nasal spray. Consult ENT. Aspiration precautions. Get modified barium swallow study done inpatient. Can prescribe home oxygen based on documented hypoxia on room air.    Type 2 diabetes, controlled, with neuropathy  Giving insulin sliding scale as needed.    History of CVA (cerebrovascular accident)  Continue aspirin and atorvastatin.    Panhypopituitarism  Central hypothyroidism  Secondary adrenal insufficiency  Secondary male hypogonadism  Takes medications for these.    CLL (chronic lymphocytic leukemia)  Treated with rituximab on 9/30/19. Continue home prednisone 7.5 mg daily.      VTE Risk Mitigation (From admission, onward)    None             Jl Kimbrough MD  Department of Hospital Medicine   [facility]

## 2020-04-19 NOTE — NURSING
Patient admitted to telemetry unit from ED via stretcher without incident. No S/S of pain or discomfort noted at time of arrival. NADN. Patient oriented to room and placed on supplemental oxygen @ 2L/NC. No respiratory distress noted. No tele order. POC updated with patient- verbalizes understanding. Allotted time for questions and concerns. Bed in lowest position, side rails up x 2, call light within reach. Will continue to monitor patient.

## 2020-04-19 NOTE — ED NOTES
Pt wife called for update.  Was advised that pt will most likely be admitted.  She would like updates as they become available. Lorin Ramos [phone] (cell) and [phone] (home)

## 2020-04-19 NOTE — ED PROVIDER NOTES
Encounter Date: 4/19/2020    SCRIBE #1 NOTE: I, Alexandra Early, am scribing for, and in the presence of,  Dr. Zhao. I have scribed the entire note.       History     Chief Complaint   Patient presents with    Shortness of Breath     reprots sob started this morning with coughing. denies chest pain      This is a 86 y.o. male who has a past medical history of Actinic keratosis, Anemia, Anticoagulant long-term use, Asthma, Basal cell carcinoma, Carotid stenosis, CLL (chronic lymphocytic leukemia), COPD (chronic obstructive pulmonary disease), Diabetes mellitus (2014), Diabetes mellitus, type 2, Encounter for blood transfusion, Hyperlipidemia, Hypertension, Hypopituitarism, Hypothyroid, Immunosuppression (3/13/2015), Liver fibrosis (?) (2/24/2020), Pneumonia of both lungs due to infectious organism (7/19/2019), Pneumonia of left lower lobe due to Pseudomonas species (10/11/2019), Squamous Cell Carcinoma, Squamous cell carcinoma of skin of right temple (1/27/2016), Stroke, Syncope (2/12), and Unspecified disorder of kidney and ureter.     The patient presents to the Emergency Department due to worsening productive cough with yellow phlegm.   Symptoms are associated with congestion and runny nose. He also notes weakness when changing positions from sitting to standing.   Pt denies fever, fatigue, chills, or sore throat. He also denies any positive sick contact.   No aggravating or alleviating factors reported.   Patient was seen at this facility on 04/02/20 for similar symptoms.     The history is provided by the patient.     Review of patient's allergies indicates:   Allergen Reactions    Ace inhibitors Swelling     angioedema    Divalproex Hives     Rash under arms, body creases     Past Medical History:   Diagnosis Date    Actinic keratosis     Anemia     Anticoagulant long-term use     Asthma     Basal cell carcinoma     Carotid stenosis     CLL (chronic lymphocytic leukemia)     COPD (chronic obstructive  pulmonary disease)     Diabetes mellitus 2014    Steroid related    Diabetes mellitus, type 2     Encounter for blood transfusion     Hyperlipidemia     Hypertension     Hypopituitarism     Hypothyroid     Immunosuppression 3/13/2015    Liver fibrosis (?) 2/24/2020    Pneumonia of both lungs due to infectious organism 7/19/2019    Pneumonia of left lower lobe due to Pseudomonas species 10/11/2019    Squamous Cell Carcinoma     on the right side of the face     Squamous cell carcinoma of skin of right temple 1/27/2016    Stroke     Syncope 2/12    Unspecified disorder of kidney and ureter      Past Surgical History:   Procedure Laterality Date    BRONCHOSCOPY N/A 1/22/2020    Procedure: Bronchoscopy;  Surgeon: Dosc Diagnostic Provider;  Location: [facility] OR [address] [place] [address];  Service: Anesthesiology;  Laterality: N/A;    CAROTID ENDARTERECTOMY (L)  2/27/2007    EXCISION TURBINATE, SUBMUCOUS      FESS  9/16/2014    Mohs excision   3/23/2015, 2/24/2016    combined with WLE forehead    NASAL SEPTUM SURGERY  9/16/2014    right ureter surgery  1995    SENTINEL LYMPH NODE BIOPSY  3/23/2015, 2/24/2016    SINUS SURGERY  9/16/2014    septo, ESS, BITSMR    Transphenoidal surgery       Family History   Problem Relation Age of Onset    Cancer Mother         breast    Colon cancer Father     Cancer Father         colon    Cancer Brother         leukemia    No Known Problems Sister     No Known Problems Maternal Aunt     No Known Problems Maternal Uncle     No Known Problems Paternal Aunt     No Known Problems Paternal Uncle     No Known Problems Maternal Grandmother     No Known Problems Maternal Grandfather     No Known Problems Paternal Grandmother     No Known Problems Paternal Grandfather     Amblyopia Neg Hx     Blindness Neg Hx     Cataracts Neg Hx     Diabetes Neg Hx     Glaucoma Neg Hx     Hypertension Neg Hx     Macular degeneration Neg Hx     Retinal detachment Neg Hx     Strabismus  Neg Hx     Stroke Neg Hx     Thyroid disease Neg Hx     Allergic rhinitis Neg Hx     Allergies Neg Hx     Angioedema Neg Hx     Asthma Neg Hx     Atopy Neg Hx     Eczema Neg Hx     Immunodeficiency Neg Hx     Rhinitis Neg Hx     Urticaria Neg Hx      Social History     Tobacco Use    Smoking status: Never Smoker    Smokeless tobacco: Never Used   Substance Use Topics    Alcohol use: Not Currently     Frequency: Never     Drinks per session: Patient refused     Binge frequency: Patient refused    Drug use: No     Review of Systems   Constitutional: Negative for chills, fatigue and fever.   HENT: Positive for congestion and rhinorrhea. Negative for sore throat.    Respiratory: Positive for cough (productive).    Gastrointestinal: Negative for abdominal pain.   Musculoskeletal: Negative for neck pain and neck stiffness.   Skin: Negative for wound.   Allergic/Immunologic: Positive for immunocompromised state.   Neurological: Positive for weakness.   Hematological: Bruises/bleeds easily.       Physical Exam     Initial Vitals   BP Pulse Resp Temp SpO2   04/19/20 0934 04/19/20 0926 04/19/20 0926 04/19/20 0948 04/19/20 0926   (!) 116/57 (!) 129 (!) 26 99.1 °F (37.3 °C) (!) 84 %      MAP       --                Physical Exam    Nursing note and vitals reviewed.  Constitutional: He appears well-developed and well-nourished. He is not diaphoretic. No distress.   HENT:   Head: Normocephalic and atraumatic.   Oropharynx dry.    Eyes: EOM are normal. Right conjunctiva is injected. Left conjunctiva is injected.   Neck: Normal range of motion. Neck supple. No JVD present.   Cardiovascular: Regular rhythm and intact distal pulses. Tachycardia present.    Pulmonary/Chest: No respiratory distress. He has rales (bilateral lower lungs).   Musculoskeletal: Normal range of motion. He exhibits edema (2+ pitting to bilateral LE and 1+ pitting to distal feet). He exhibits no tenderness.   Lymphadenopathy:     He has no  cervical adenopathy.   Neurological: He is alert and oriented to person, place, and time. He has normal strength.   Skin: Skin is warm and dry. No rash noted.         ED Course   Procedures  Labs Reviewed   CBC W/ AUTO DIFFERENTIAL - Abnormal; Notable for the following components:       Result Value    Mean Corpuscular Hemoglobin Conc 31.5 (*)     RDW 14.6 (*)     Immature Granulocytes 2.5 (*)     Immature Grans (Abs) 0.19 (*)     Gran% 78.5 (*)     Lymph% 12.5 (*)     All other components within normal limits   COMPREHENSIVE METABOLIC PANEL - Abnormal; Notable for the following components:    CO2 21 (*)     Glucose 162 (*)     BUN, Bld 31 (*)     Albumin 3.4 (*)     Alkaline Phosphatase 187 (*)     eGFR if non  54 (*)     All other components within normal limits   C-REACTIVE PROTEIN - Abnormal; Notable for the following components:    CRP 80.0 (*)     All other components within normal limits   FERRITIN - Abnormal; Notable for the following components:    Ferritin 459 (*)     All other components within normal limits   CK - Abnormal; Notable for the following components:    CPK 16 (*)     All other components within normal limits   LACTIC ACID, PLASMA - Abnormal; Notable for the following components:    Lactate (Lactic Acid) 3.5 (*)     All other components within normal limits    Narrative:       LACT critical result(s) called and verbal readback obtained from MARY Ni RN by DAVIDA 04/19/2020 10:57   CULTURE, BLOOD   CULTURE, BLOOD   LACTATE DEHYDROGENASE   TROPONIN I   SARS-COV-2 RNA AMPLIFICATION, QUAL    Narrative:     What symptom criteria does the patient meet?->Cough  What symptom criteria does the patient meet?->Difficulty  breathing   PROCALCITONIN   B-TYPE NATRIURETIC PEPTIDE   POCT GLUCOSE MONITORING CONTINUOUS          Imaging Results          X-Ray Chest 1 View (Final result)  Result time 04/19/20 10:04:17    Final result by Sen Weber MD (04/19/20 10:04:17)                  Impression:      1. No interval detrimental change when compared to radiograph dated 04/02/2020.      Electronically signed by: Sen Weber MD  Date:    04/19/2020  Time:    10:04             Narrative:    EXAMINATION:  XR CHEST 1 VIEW    CLINICAL HISTORY:  Cough    TECHNIQUE:  Single frontal view of the chest was performed.    COMPARISON:  Radiograph 04/02/2020.    FINDINGS:  Cardiac monitoring leads project over the bilateral hemithoraces.  Mediastinal structures are midline.  Hilar contours are unremarkable.  Cardiac silhouette is normal in size.  Lung volumes are normal and symmetric.  There is persistent patchy ground-glass attenuation with scattered reticulonodular opacities within the bilateral lower lung zones, left greater than right zone, not significantly changed when compared to the previous exam and likely related to the patient's suspected small airways infectious versus inflammatory process.  No new areas of consolidation.  No pneumothorax or large pleural effusion.  No free air beneath the diaphragm.  Degenerative changes of the spine and glenohumeral joints noted.  No acute osseous abnormalities.  Visualized soft tissues are unremarkable.                                 Medical Decision Making:   Initial Assessment:   This is an emergent evaluation of a 86 y.o.male patient with presentation of cough, dyspnea, weakness.     Initial differentials include but are not limited to: pneumonia, covid-19, dehydration, sepsis, less likely PE.     Plan: covid-19 workup for admit + blood cultures, antibiotics   Clinical Tests:   Lab Tests: Ordered and Reviewed  Radiological Study: Ordered and Reviewed  Medical Tests: Reviewed and Ordered                   ED Course as of Apr 19 1550   Sun Apr 19, 2020   1117 SARS-CoV-2 RNA, Amplification, Qual: Negative [NP]   1117 CRP(!): 80.0 [NP]   1117 Lactate, Sharan(!!): 3.5 [NP]   1117 Troponin I: <0.006 [NP]   1117 CPK(!): 16 [NP]   1117 WBC: 7.60 [NP]   1117  Hemoglobin: 14.6 [NP]   1117 Platelets: 154 [NP]   1117 Sodium: 140 [NP]   1117 Potassium: 4.3 [NP]   1117 Chloride: 104 [NP]   1117 CO2(!): 21 [NP]   1117 Glucose(!): 162 [NP]   1117 BUN, Bld(!): 31 [NP]   1117 Creatinine: 1.2 [NP]      ED Course User Index  [NP] Owen Zhao MD           Pt has pneumonia, hx of aspiration in the past.  Case d/w Dr. Kimbrough, who will accept to his service on telemetry.        Clinical Impression:       ICD-10-CM ICD-9-CM   1. Recurrent aspiration pneumonia J69.0 507.0   2. Cough R05 786.2   3. Suspected Covid-19 Virus Infection R68.89    4. Aspiration pneumonia J69.0 507.0             ED Disposition Condition    Admit                I, Dr. Owen Zhao, personally performed the services described in this documentation.   All medical record entries made by the scribe were at my direction and in my presence.   I have reviewed the chart and agree that the record is accurate and complete.   Owen Zhao MD.              Owen Zhao MD  04/19/20 [id number]

## 2020-04-19 NOTE — ED NOTES
"Pt states that he is feeling "clear" and states that getting on oxygen always makes him feel better. Pt is aware of plan of care to be admitted to hospital and for continued antibiotic administration.  Bed locked and in lowest position, side rails up x 2, call light within reach, VSS, will continue to monitor  "

## 2020-04-19 NOTE — SUBJECTIVE & OBJECTIVE
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    Diabetes mellitus, type 2     Encounter for blood transfusion     Hyperlipidemia     Hypertension     Hypopituitarism     Hypothyroid     Immunosuppression 3/13/2015    Liver fibrosis (?) 2/24/2020    Pneumonia of both lungs due to infectious organism 7/19/2019    Pneumonia of left lower lobe due to Pseudomonas species 10/11/2019    Squamous Cell Carcinoma     on the right side of the face     Squamous cell carcinoma of skin of right temple 1/27/2016    Stroke     Syncope 2/12    Unspecified disorder of kidney and ureter        Past Surgical History:   Procedure Laterality Date    BRONCHOSCOPY N/A 1/22/2020    Procedure: Bronchoscopy;  Surgeon: Fabian Diagnostic Provider;  Location: [facility] OR [address] [place] [address];  Service: Anesthesiology;  Laterality: N/A;    CAROTID ENDARTERECTOMY (L)  2/27/2007    EXCISION TURBINATE, SUBMUCOUS      FESS  9/16/2014    Mohs excision   3/23/2015, 2/24/2016    combined with WLE forehead    NASAL SEPTUM SURGERY  9/16/2014    right ureter surgery  1995    SENTINEL LYMPH NODE BIOPSY  3/23/2015, 2/24/2016    SINUS SURGERY  9/16/2014    septo, ESS, BITSMR    Transphenoidal surgery         Review of patient's allergies indicates:   Allergen Reactions    Ace inhibitors Swelling     angioedema    Divalproex Hives     Rash under arms, body creases       No current facility-administered medications on file prior to encounter.      Current Outpatient Medications on File Prior to Encounter   Medication Sig    aspirin 81 MG Chew Take 81 mg by mouth every Mon, Wed, Fri.    atorvastatin (LIPITOR) 20 MG tablet TAKE 1 TABLET EVERY DAY    cetirizine (ZYRTEC) 10 MG tablet Take 1 tablet (10  mg total) by mouth once daily.    clopidogrel (PLAVIX) 75 mg tablet Take 1 tablet (75 mg total) by mouth once daily.    cyanocobalamin, vitamin B-12, 5,000 mcg TbDL Take 1 tablet by mouth once daily.    cycloSPORINE (RESTASIS) 0.05 % ophthalmic emulsion Place 0.4 mLs (1 drop total) into both eyes 2 (two) times daily.    DOCOSAHEXANOIC ACID/EPA (FISH OIL ORAL) Take 5 capsules by mouth once daily.     fluorouracil (EFUDEX) 5 % cream Use hs for 2 weeks on right cheek    fluticasone propionate (FLONASE) 50 mcg/actuation nasal spray 2 sprays (100 mcg total) by Each Nostril route once daily.    furosemide (LASIX) 20 MG tablet Take 20 mg by mouth daily as needed.     guaifen/dextromethorphan/pe (ROBITUSSIN COUGH & COLD CF MAX ORAL) Take 15 mLs by mouth 2 (two) times daily.    hydrocodone-chlorpheniramine (TUSSIONEX) 10-8 mg/5 mL suspension Take 5 mLs by mouth every 12 (twelve) hours as needed for Cough.    ipratropium (ATROVENT) 0.03 % nasal spray 2 sprays by Nasal route 2 (two) times daily.    Lactobacillus acidophilus (ACIDOPHILUS ORAL) Take 1 tablet by mouth once daily.    levothyroxine (SYNTHROID) 125 MCG tablet TAKE 1 TABLET ONE TIME DAILY    losartan (COZAAR) 25 MG tablet Take 1 tablet (25 mg total) by mouth once daily.    multivitamin with minerals tablet Take 1 tablet by mouth once daily.    mupirocin (BACTROBAN) 2 % ointment Apply to affected area 3 times daily    pantoprazole (PROTONIX) 40 MG tablet TAKE 1 TABLET EVERY DAY    predniSONE (DELTASONE) 2.5 MG tablet Take 1 tablet (2.5 mg total) by mouth once daily. 5mg and 2.5mg qd    predniSONE (DELTASONE) 5 MG tablet Take 1 tablet (5 mg total) by mouth once daily.    sennosides/docusate sodium (EDEN-COLACE ORAL) Take 1 tablet by mouth once daily.    SYMBICORT 160-4.5 mcg/actuation HFAA Inhale 1 puff into the lungs as needed.    tazarotene (AVAGE) 0.1 % cream Apply topically every evening. (Patient taking differently: Apply topically as needed.  )    tenofovir alafenamide (VEMLIDY) 25 mg Tab Take 1 tablet (25 mg total) by mouth once daily.    testosterone cypionate (DEPOTESTOTERONE CYPIONATE) 100 mg/mL injection Inject 0.5 mLs (50 mg total) into the muscle every 14 (fourteen) days.    VENTOLIN HFA 90 mcg/actuation inhaler Inhale 1 puff into the lungs as needed.     Family History     Problem Relation (Age of Onset)    Cancer Mother, Father, Brother    Colon cancer Father    No Known Problems Sister, Maternal Aunt, Maternal Uncle, Paternal Aunt, Paternal Uncle, Maternal Grandmother, Maternal Grandfather, Paternal Grandmother, Paternal Grandfather        Tobacco Use    Smoking status: Never Smoker    Smokeless tobacco: Never Used   Substance and Sexual Activity    Alcohol use: Not Currently     Frequency: Never     Drinks per session: Patient refused     Binge frequency: Patient refused    Drug use: No    Sexual activity: Not Currently     Review of Systems   Constitutional: Negative for chills and fever.   HENT: Positive for congestion, postnasal drip and rhinorrhea.    Eyes: Negative for pain and redness.   Respiratory: Positive for cough. Negative for shortness of breath.    Cardiovascular: Negative for chest pain and palpitations.   Gastrointestinal: Negative for nausea and vomiting.   Genitourinary: Negative for difficulty urinating and dysuria.   Musculoskeletal: Negative for neck pain and neck stiffness.   Skin: Negative for rash and wound.   Neurological: Negative for syncope and speech difficulty.     Objective:     Vital Signs (Most Recent):  Temp: 99.1 °F (37.3 °C) (04/19/20 0948)  Pulse: 85 (04/19/20 1153)  Resp: (!) 27 (04/19/20 1153)  BP: (!) 89/41 (04/19/20 1147)  SpO2: 98 % (04/19/20 1153) Vital Signs (24h Range):  Temp:  [99.1 °F (37.3 °C)] 99.1 °F (37.3 °C)  Pulse:  [] 85  Resp:  [11-38] 27  SpO2:  [84 %-99 %] 98 %  BP: ()/(41-57) 89/41     Weight: 66.2 kg (146 lb)  Body mass index is 20.36 kg/m².    Physical Exam    Constitutional: He is oriented to person, place, and time. He appears well-developed. No distress.   HENT:   Head: Normocephalic.   Eyes: Conjunctivae are normal. No scleral icterus.   Neck: Neck supple. No tracheal deviation present.   Cardiovascular: Normal rate and regular rhythm.   Pulmonary/Chest: Effort normal. No respiratory distress.   Abdominal: Soft. He exhibits no distension. There is no tenderness. There is no guarding.   Musculoskeletal: He exhibits no edema or tenderness.   Neurological: He is alert and oriented to person, place, and time.   Skin: Skin is warm and dry.   Psychiatric: He has a normal mood and affect.   Nursing note and vitals reviewed.          Significant Labs: All pertinent labs within the past 24 hours have been reviewed.    Significant Imaging: I have reviewed all pertinent imaging results/findings within the past 24 hours.   X-Ray Chest 1 View 4/19/20: FINDINGS: Cardiac monitoring leads project over the bilateral hemithoraces.  Mediastinal structures are midline.  Hilar contours are unremarkable.  Cardiac silhouette is normal in size.  Lung volumes are normal and symmetric.  There is persistent patchy ground-glass attenuation with scattered reticulonodular opacities within the bilateral lower lung zones, left greater than right zone, not significantly changed when compared to the previous exam and likely related to the patient's suspected small airways infectious versus inflammatory process.  No new areas of consolidation.  No pneumothorax or large pleural effusion.  No free air beneath the diaphragm.  Degenerative changes of the spine and glenohumeral joints noted.  No acute osseous abnormalities.  Visualized soft tissues are unremarkable.  Impression: No interval detrimental change when compared to radiograph dated 04/02/2020.

## 2020-04-20 ENCOUNTER — TELEPHONE (OUTPATIENT)
Dept: OTOLARYNGOLOGY | Facility: CLINIC | Age: 85
End: 2020-04-20

## 2020-04-20 ENCOUNTER — PATIENT MESSAGE (OUTPATIENT)
Dept: PULMONOLOGY | Facility: CLINIC | Age: 85
End: 2020-04-20

## 2020-04-20 PROBLEM — J01.90 ACUTE BACTERIAL RHINOSINUSITIS: Status: ACTIVE | Noted: 2020-04-20

## 2020-04-20 PROBLEM — B96.89 ACUTE BACTERIAL RHINOSINUSITIS: Status: ACTIVE | Noted: 2020-04-20

## 2020-04-20 LAB
POCT GLUCOSE: 140 MG/DL (ref 70–110)
POCT GLUCOSE: 78 MG/DL (ref 70–110)
POCT GLUCOSE: 87 MG/DL (ref 70–110)
POCT GLUCOSE: 98 MG/DL (ref 70–110)

## 2020-04-20 PROCEDURE — 99233 PR SUBSEQUENT HOSPITAL CARE,LEVL III: ICD-10-PCS | Mod: HCNC,,, | Performed by: OTOLARYNGOLOGY

## 2020-04-20 PROCEDURE — 87075 CULTR BACTERIA EXCEPT BLOOD: CPT | Mod: HCNC

## 2020-04-20 PROCEDURE — 87077 CULTURE AEROBIC IDENTIFY: CPT | Mod: HCNC

## 2020-04-20 PROCEDURE — 63600175 PHARM REV CODE 636 W HCPCS: Mod: HCNC | Performed by: HOSPITALIST

## 2020-04-20 PROCEDURE — 92610 EVALUATE SWALLOWING FUNCTION: CPT | Mod: HCNC

## 2020-04-20 PROCEDURE — 87070 CULTURE OTHR SPECIMN AEROBIC: CPT | Mod: HCNC

## 2020-04-20 PROCEDURE — 87186 SC STD MICRODIL/AGAR DIL: CPT | Mod: HCNC

## 2020-04-20 PROCEDURE — 97535 SELF CARE MNGMENT TRAINING: CPT | Mod: HCNC

## 2020-04-20 PROCEDURE — 25000003 PHARM REV CODE 250: Mod: HCNC | Performed by: HOSPITALIST

## 2020-04-20 PROCEDURE — 11000001 HC ACUTE MED/SURG PRIVATE ROOM: Mod: HCNC

## 2020-04-20 PROCEDURE — 99233 SBSQ HOSP IP/OBS HIGH 50: CPT | Mod: HCNC,,, | Performed by: OTOLARYNGOLOGY

## 2020-04-20 RX ADMIN — ATORVASTATIN CALCIUM 20 MG: 20 TABLET, FILM COATED ORAL at 09:04

## 2020-04-20 RX ADMIN — AMOXICILLIN AND CLAVULANATE POTASSIUM 1 TABLET: 875; 125 TABLET, FILM COATED ORAL at 08:04

## 2020-04-20 RX ADMIN — AMOXICILLIN AND CLAVULANATE POTASSIUM 1 TABLET: 875; 125 TABLET, FILM COATED ORAL at 09:04

## 2020-04-20 RX ADMIN — ASPIRIN 81 MG: 81 TABLET ORAL at 06:04

## 2020-04-20 RX ADMIN — CLOPIDOGREL BISULFATE 75 MG: 75 TABLET ORAL at 09:04

## 2020-04-20 RX ADMIN — PANTOPRAZOLE SODIUM 40 MG: 40 TABLET, DELAYED RELEASE ORAL at 09:04

## 2020-04-20 RX ADMIN — PREDNISONE 7.5 MG: 5 TABLET ORAL at 02:04

## 2020-04-20 RX ADMIN — LEVOTHYROXINE SODIUM 125 MCG: 25 TABLET ORAL at 05:04

## 2020-04-20 RX ADMIN — CETIRIZINE HYDROCHLORIDE 10 MG: 10 TABLET, FILM COATED ORAL at 09:04

## 2020-04-20 NOTE — PLAN OF CARE
VN entered into the patient's room with permission via camera. Staff at the bedside. Patient awake and alert sitting at the bedside. NAD noted. Will follow and remain available as needed.

## 2020-04-20 NOTE — PLAN OF CARE
Reported no pain but wanted to know when he would received his evening dose of Tenofovir.  Nurse advised that he has received all doses of tenofovir that he would receive tonight and that there were 5 pill in med draw. Pt satisfied w/that answer.  Pt ate 75% of turkey sandwich and went to sleep.  2100 accu ck 174, no coverage.    Tele: none     Bed in lowest position, wheels locked, non skid socks, ID band worn, personal items and call bell with in reach, bed alarm set.

## 2020-04-20 NOTE — PROGRESS NOTES
[facility] Medicine  Progress Note    Patient Name: Thierry Ramos Jr.  MRN: [id number]  Patient Class: IP- Inpatient   Admission Date: 4/19/2020  Length of Stay: 1 days  Attending Physician: Jl Kimbrough MD  Primary Care Provider: AKILA Wooten MD        Subjective:     Principal Problem:Recurrent aspiration pneumonia        HPI:  Thierry Ramos Jr. is a 86 year old white man with panhypopituitarism, secondary adrenal insufficiency, secondary hypogonadism, central hypothyroidism, diabetes mellitus type 2 with neuropathy, hypertension, history of stroke on 12/16/06 status post left carotid endarterectomy on 2/27/07, dyslipidemia, chronic lymphocytic leukemia (treated with rituximab), hypogammaglobulinemia, immune thrombocytopenia, pulmonary hypertension, chronic rhinosinusitis, recurrent aspiration pneumonias with history of Pseudomonas aeruginosa pneumonia on 10/11/2019, Serratia marcescens pneumonia on 11/10/2019, aspiration pneumonia on 4/2/2020. He lives in [place], [place]. He is . His primary care physician is Dr. CAROLE Wooten. His pulmonologist is Dr. Homa Christensen.   Dr. Christensen ordered outpatient modified barium swallow study on 3/5/2020.              He was tested for COVID-19 on 3/18/2020 due to having fever and a couple weeks of cough. COVID-19 test was negative.              He was hospitalized at [facility] from 4/2/2020 to 4/3/2020 for aspiration pneumonia. Speech Language Pathology felt his excessive mucous was causing aspirations, so he was prescribed cetirizine and fluticasone nasal spray. Speech Language Pathology recommended getting the modified barium swallow study done outpatient as previously planned. He was treated with ceftriaxone in the hospital and prescribed cefdinir on discharge.    He returned to [facility] Emergency Department on 4/19/2020 with recurrent shortness of breath and cough,  associated with congestion, rhinorrhea, and weakness when standing. He reported that these episodes start with congestion causing coughing fits. He reported that he continued having problems with mucous despite using cetirizine and fluticasone nasal spray. He felt that he would not have to come to the hospital each time this happens if he had supplemental oxygen at home. In the emergency department, his oxygen saturation was 84% on room air. His blood pressure was as low as 83/45. He was tachycardic. Lactate was elevated at 3.5 mmol/L. Chest X-ray showed no change compared to his last aspiration pneumonia. He was given ceftriaxone and 500 mL of normal saline. He was admitted to [facility].    Overview/Hospital Course:  He was put on amoxicillin-clavulanate. He was prescribed home oxygen. ENT was consulted for recommendations for his severe postnasal drip. Speech Language Pathology was consulted.     Interval History: Discussed plan. Otherwise ready to go home.    Review of Systems   Constitutional: Negative for chills and fever.   HENT: Positive for congestion and postnasal drip.    Gastrointestinal: Negative for nausea and vomiting.     Objective:     Vital Signs (Most Recent):  Temp: 97.5 °F (36.4 °C) (04/20/20 0810)  Pulse: 96 (04/20/20 0810)  Resp: 20 (04/20/20 0810)  BP: 137/62 (04/20/20 0810)  SpO2: (!) 87 % (04/20/20 0810) Vital Signs (24h Range):  Temp:  [96.3 °F (35.7 °C)-98 °F (36.7 °C)] 97.5 °F (36.4 °C)  Pulse:  [65-96] 96  Resp:  [18-40] 20  SpO2:  [87 %-99 %] 87 %  BP: ()/(45-68) 137/62     Weight: 66.2 kg (146 lb)  Body mass index is 20.36 kg/m².    Intake/Output Summary (Last 24 hours) at 4/20/2020 1254  Last data filed at 4/20/2020 0839  Gross per 24 hour   Intake 620 ml   Output 675 ml   Net -55 ml      Physical Exam   Constitutional: He is oriented to person, place, and time. He appears well-developed. No distress.   Pulmonary/Chest: Effort normal. No respiratory distress.    Neurological: He is alert and oriented to person, place, and time.   Psychiatric: He has a normal mood and affect.   Nursing note and vitals reviewed.      Significant Labs: All pertinent labs within the past 24 hours have been reviewed.    Significant Imaging: I have reviewed all pertinent imaging results/findings within the past 24 hours.      Assessment/Plan:      * Recurrent aspiration pneumonia  Chronic rhinosinusitis  Bronchiectasis  Giving amoxicillin-clavulanate. Prescribed home oxygen. Continue home cetirizine and fluticasone nasal spray. Consulted ENT. Aspiration precautions. Appreciate SLP. Discharge home later today.    Type 2 diabetes, controlled, with neuropathy  Giving insulin sliding scale as needed.    History of CVA (cerebrovascular accident)  Continue aspirin and atorvastatin.    Panhypopituitarism  Central hypothyroidism  Secondary adrenal insufficiency  Secondary male hypogonadism  Takes medications for these.    CLL (chronic lymphocytic leukemia)  Treated with rituximab on 9/30/19. Continue home prednisone 7.5 mg daily.      VTE Risk Mitigation (From admission, onward)         Ordered     IP VTE HIGH RISK PATIENT  Once      04/19/20 1355     Place sequential compression device  Until discontinued      04/19/20 1355                      Jl Kimbrough MD  Department of Hospital Medicine   [facility]

## 2020-04-20 NOTE — PLAN OF CARE
I called both numbers listed for Lorin Ramos [phone] (cell) and [phone] (home) and no answer on either.  Andi from [facility] sent a message saying pt is approved for oxygen.  [facility]     04/20/20 1052   Discharge Assessment   Assessment Type Discharge Planning Assessment     William Grady RN,   [phone]

## 2020-04-20 NOTE — ASSESSMENT & PLAN NOTE
Chronic rhinosinusitis  Bronchiectasis  Giving amoxicillin-clavulanate. Prescribed home oxygen. Continue home cetirizine and fluticasone nasal spray. Consulted ENT. Aspiration precautions. Appreciate SLP. Discharge home later today.

## 2020-04-20 NOTE — HOSPITAL COURSE
He was put on amoxicillin-clavulanate. He was prescribed home oxygen. Speech Language Pathology was consulted. ENT was consulted and examined him, finding purulent sinusitis.  He was maintained on Augmentin.  He was afebrile.  I did discuss the case with ENT.  They recommend antibiotics for 21 days.  When seeing the patient this morning he states that he is ready to go home.  He is dressed in ambulating in the room.  He denies any chest pain.  He states that he is not short of breath and he does not have the oxygen on at this time.  He does have a cough and has been producing some brownish colored sputum.  He does have a lot of sinus symptoms.    He was evaluated by speech therapy.  They are recommending  -Regular solids/Nectar thick liquids given 1:1 assist, slow rate of intake, multiple swallows, assistance with thickening liquids, upright positioning, and all other standard aspiration precautions    I did discuss with him about having therapy and home health.  He states that he does not think that he really needs physical and occupational therapy but I did explain that he definitely needs speech therapy.    He is afebrile.  His blood pressures are variable.  He looks well and he is alert and oriented.  His heart is regular and his lungs are essentially clear.  Abdomen is soft and nontender new extremities without any edema.    He is discharged in improved condition.

## 2020-04-20 NOTE — PT/OT/SLP EVAL
Speech Language Pathology Evaluation  Bedside Swallow    Patient Name:  Thierry Ramos Jr.   MRN:  [id number]  Admitting Diagnosis: Recurrent aspiration pneumonia    Recommendations:                 General Recommendations:  Dysphagia therapy and Modified barium swallow study  Diet recommendations:  Mechanical soft, [place] Thick   Aspiration Precautions: slow rate of intake, 1 bite/sip at a time, Alternating bites/sips, Assistance with meals and Assistance with thickening liquids, Avoid talking while eating, Check for pocketing/oral residue, Double swallow with each bite/sip, Eliminate distractions, Feed only when awake/alert, Frequent oral care, HOB to 90 degrees, Monitor for s/s of aspiration, Remain upright 60 minutes post daytime meals & 3 hours s/p dinner, Small bites/sips and Standard aspiration precautions   General Precautions: Standard, aspiration, fall  Communication strategies:  none    History:   HPI: Thierry Ramos Jr. is a 86 year old white man with panhypopituitarism, secondary adrenal insufficiency, secondary hypogonadism, central hypothyroidism, diabetes mellitus type 2 with neuropathy, hypertension, history of stroke on 12/16/06 status post left carotid endarterectomy on 2/27/07, dyslipidemia, chronic lymphocytic leukemia (treated with rituximab), hypogammaglobulinemia, immune thrombocytopenia, pulmonary hypertension, chronic rhinosinusitis, recurrent aspiration pneumonias with history of Pseudomonas aeruginosa pneumonia on 10/11/2019, Serratia marcescens pneumonia on 11/10/2019, aspiration pneumonia on 4/2/2020. He lives in [place], [place]. He is . His primary care physician is Dr. CAROLE Wooten. His pulmonologist is Dr. Homa Christensen.              Dr. Christensen ordered outpatient modified barium swallow study on 3/5/2020.              He was tested for COVID-19 on 3/18/2020 due to having fever and a couple weeks of cough. COVID-19 test was negative.              He was  hospitalized at [facility] from 4/2/2020 to 4/3/2020 for aspiration pneumonia. Speech Language Pathology felt his excessive mucous was causing aspirations, so he was prescribed cetirizine and fluticasone nasal spray. Speech Language Pathology recommended getting the modified barium swallow study done outpatient as previously planned. He was treated with ceftriaxone in the hospital and prescribed cefdinir on discharge.               He returned to [facility] Emergency [facility] on 4/19/2020 with recurrent shortness of breath and cough, associated with congestion, rhinorrhea, and weakness when standing. He reported that these episodes start with congestion causing coughing fits. He reported that he continued having problems with mucous despite using cetirizine and fluticasone nasal spray. He felt that he would not have to come to the hospital each time this happens if he had supplemental oxygen at home. In the emergency department, his oxygen saturation was 84% on room air. His blood pressure was as low as 83/45. He was tachycardic. Lactate was elevated at 3.5 mmol/L. Chest X-ray showed no change compared to his last aspiration pneumonia. He was given ceftriaxone and 500 mL of normal saline. He was admitted to [facility].    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    Diabetes mellitus, type 2     Encounter for blood transfusion     Hyperlipidemia     Hypertension     Hypopituitarism     Hypothyroid     Immunosuppression 3/13/2015    Liver fibrosis (?) 2/24/2020    Pneumonia of both lungs due to infectious organism 7/19/2019    Pneumonia of left lower lobe due to Pseudomonas species 10/11/2019     Squamous Cell Carcinoma     on the right side of the face     Squamous cell carcinoma of skin of right temple 1/27/2016    Stroke     Syncope 2/12    Unspecified disorder of kidney and ureter        Past Surgical History:   Procedure Laterality Date    BRONCHOSCOPY N/A 1/22/2020    Procedure: Bronchoscopy;  Surgeon: Fabian Diagnostic Provider;  Location: [facility] OR [address] [place] [address];  Service: Anesthesiology;  Laterality: N/A;    CAROTID ENDARTERECTOMY (L)  2/27/2007    EXCISION TURBINATE, SUBMUCOUS      FESS  9/16/2014    Mohs excision   3/23/2015, 2/24/2016    combined with WLE forehead    NASAL SEPTUM SURGERY  9/16/2014    right ureter surgery  1995    SENTINEL LYMPH NODE BIOPSY  3/23/2015, 2/24/2016    SINUS SURGERY  9/16/2014    septo, ESS, BITSMR    Transphenoidal surgery         Social History: Patient lives with wife.    Prior Intubation HX:  None this admit.    Modified Barium Swallow: None per EMR.    Chest X-Rays: FINDINGS:  Cardiac monitoring leads project over the bilateral hemithoraces.  Mediastinal structures are midline.  Hilar contours are unremarkable.  Cardiac silhouette is normal in size.  Lung volumes are normal and symmetric.  There is persistent patchy ground-glass attenuation with scattered reticulonodular opacities within the bilateral lower lung zones, left greater than right zone, not significantly changed when compared to the previous exam and likely related to the patient's suspected small airways infectious versus inflammatory process.  No new areas of consolidation.  No pneumothorax or large pleural effusion.  No free air beneath the diaphragm.  Degenerative changes of the spine and glenohumeral joints noted.  No acute osseous abnormalities.  Visualized soft tissues are unremarkable.    Prior diet: Regular solids/Thin liquids.    Subjective     Pt seen at the bedside for clinical swallow assessment. Pt familiar to this SLP from previous evaluations/tx. SLP did check w/ Pamela HOBSON  "prior to visit. Pt awake/alert and agreeable to SLP visit.   Patient stated: "That's my spit cup right there" while explaining hx of copious oral secretions.     Pain/Comfort:  · Pain Rating 1: 0/10    Objective:     Oral Musculature Evaluation  · Oral Musculature: general weakness  · Dentition: present and adequate  · Secretion Management: coughing on secretions, problems swallowing secretions  · Mucosal Quality: adequate  · Mandibular Strength and Mobility: WFL  · Oral Labial Strength and Mobility: WFL  · Lingual Strength and Mobility: Impaired strength  · Velar Elevation: WFL  · Buccal Strength and Mobility: WFL  · Volitional Cough: (elicited; slightly wet chest like cough)  · Volitional Swallow: (mild delay)  · Voice Prior to PO Intake: (clear)    Bedside Swallow Eval: Pt resting low in bed upon entry. PCT present to assist pt in laying more centered in bed. HOB elevated to 90*. NC stuck in pt's bed though O2 not turned on. Per RN, pt okay on RA. Min A provided with feeding. Education provided re: role of SLP, POC, rationale for BSS, swallow precautions (ie small bites/sips, alternating bites/sips, upright positioning), aspiration risks, recommended diet, and normal anatomy and physiology of the swallow. Pt verbalized understanding via TeachBack method though will require reinforcement as RN reported pt tends to lay down immediately after meals.Time allowed for questions which were all answered within scope of practice. Following exam, SLP discussed MBSS options/availability with  and radiology. MBSS initially unavailable as no radiologist present; however, [facility], spoke with  (Radiology) who agreed to complete MBSS tomorrow, 4/21/2020. Pt initially to be discharged this date per , but pt agreeable to stay overnight for test.   Consistencies Assessed:  · Thin liquids : 2oz thin juice via straw  · Nectar thick liquids : 2 oz nectar thick juice via straw  · Puree : tsp " bites applesauce x2  · Soft solids : tsp bites diced peaches x4     Oral Phase:   · WFL: good oral acceptance, good rotary chew, timely A-P transfer, no oral residue, no anterior spillage, good bolus control    Pharyngeal Phase:   · Coughing produced before, during, and after PO trials: slight increase in coughing with thin liquids vs NTL  · delayed swallow initiation: mild  · multiple spontaneous swallows  · Adequate hyolaryngeal lift/excursion to palpation    Compensatory Strategies  · Multiple swallows  · Volitional cough/throat clear   · Upright posturing during and after meals    Treatment: Pt will benefit from SLP tx 3x/week while inpatient to ensure diet tolerance, modify diet as needed, and provide dysphagia mgmt.    Assessment:     Thierry Ramos Jr. is a 86 y.o. male with an SLP diagnosis of Dysphagia.  He presents with mild pharyngeal dysphagia c/b intermittent coughing before, during, and after PO trials across consistencies and reported chronic hx of poor secretion mgmt. Pt denies difficulty swallowing or throat pain. He is deemed safe to initiate an oral diet of Mechanical Soft/NECTAR thick liquids with plan for MBSS tomorrow, 4/21/2020. Assistance needed with thickening procedure. SLP to f/u.    Goals:   Multidisciplinary Problems     SLP Goals        Problem: SLP Goal    Goal Priority Disciplines Outcome   SLP Goal     SLP Ongoing, Progressing   Description:  STGs:  1. Pt will participate in clinical swallow assessment to determine LRD.-ongoing  2. Pt will consume a Mechanical soft/NECTAR thick liquid diet without any overt s/s of aspiration.  3. Pt will participate in MBSS to objectively rule out aspiration/penetration.                     Plan:     · Patient to be seen:  3 x/week   · Plan of Care expires:  05/19/20  · Plan of Care reviewed with:  patient(RN, MD, and radiology)   · SLP Follow-Up:  Yes       Discharge recommendations:  (Pending results of MBSS)   Barriers to Discharge:   MBSS    Time Tracking:     SLP Treatment Date:   04/20/20  Speech Start Time:  1050  Speech Stop Time:  1113     Speech Total Time (min):  23 min    Billable Minutes: Eval Swallow and Oral Function 10 and [facility] Care/Home Management Training 13    Betty Davis CCC-SLP  04/20/2020

## 2020-04-20 NOTE — TELEPHONE ENCOUNTER
"Good morning,    I will reach out to the admitting physician and ask them for a pulmonary consult at [place].  I have reviewed his chart and there are several issues.    1.  There has been a suspiscion that he has chronic aspiration pneumonia and unfortunately was not able to have his speech/swallow evalution which can be completed while hospitalized.  2.  "bilateral pneumonia" with feet swelling and an elevated BNP is concerning for some heart strain, meaning that this is not really pneumonia but heart failure.  He has even had bilateral pleural effusions which is most consistent with that.  His echo supported that as well so I believe he would benefit from a fluid pill and will suggest that as well.  3.  Low immune system as suggested by Dr. Marr.    As I have said, I will reach out to the hospital team and suggest this work up and treatment.      Dr. Cee Daniel, covering for Dr Christensen during the pandemic    ===View-only below this line===      ----- Message -----     From: Thierry Ramos Jr.     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  He has severe cough and trouble breathing.  I was told he probably has pneumonia.  This makes five times being diagnosed with pneumonia since November 2019.  What can be done to help him, should he have home therapy oxygen with an antibiotic daily to prevent  a recurrence?  Feeling very frustrated today, please advise  "

## 2020-04-20 NOTE — SUBJECTIVE & OBJECTIVE
Interval History: Discussed plan. Otherwise ready to go home.    Review of Systems   Constitutional: Negative for chills and fever.   HENT: Positive for congestion and postnasal drip.    Gastrointestinal: Negative for nausea and vomiting.     Objective:     Vital Signs (Most Recent):  Temp: 97.5 °F (36.4 °C) (04/20/20 0810)  Pulse: 96 (04/20/20 0810)  Resp: 20 (04/20/20 0810)  BP: 137/62 (04/20/20 0810)  SpO2: (!) 87 % (04/20/20 0810) Vital Signs (24h Range):  Temp:  [96.3 °F (35.7 °C)-98 °F (36.7 °C)] 97.5 °F (36.4 °C)  Pulse:  [65-96] 96  Resp:  [18-40] 20  SpO2:  [87 %-99 %] 87 %  BP: ()/(45-68) 137/62     Weight: 66.2 kg (146 lb)  Body mass index is 20.36 kg/m².    Intake/Output Summary (Last 24 hours) at 4/20/2020 1254  Last data filed at 4/20/2020 0839  Gross per 24 hour   Intake 620 ml   Output 675 ml   Net -55 ml      Physical Exam   Constitutional: He is oriented to person, place, and time. He appears well-developed. No distress.   Pulmonary/Chest: Effort normal. No respiratory distress.   Neurological: He is alert and oriented to person, place, and time.   Psychiatric: He has a normal mood and affect.   Nursing note and vitals reviewed.      Significant Labs: All pertinent labs within the past 24 hours have been reviewed.    Significant Imaging: I have reviewed all pertinent imaging results/findings within the past 24 hours.

## 2020-04-20 NOTE — TELEPHONE ENCOUNTER
----- Message from Can Balderas sent at 2020  8:18 AM CDT -----  Contact: [facility]driguez [facility] [phone]  CONSULTS:     Patient:  Thierry Ramos     :  [birth year]    Clinic#: [id number]    Room number:  472    Referring MD: Dr. Lerma     Diagnosis:  Chronic sinusitis causing aspirations and pneumonia

## 2020-04-20 NOTE — PLAN OF CARE
VN rounds completed.  The patient has no complaints at this time.  He is asking about a medication that his wife brought from home.  His nurse is in the room and explained to him that he had a dose for today.  IV sights were not visible due to [place].

## 2020-04-20 NOTE — PLAN OF CARE
Speech Language Therapy said a radiologist can come tomorrow so he can get a modified barium swallow study. If he does not wait until tomorrow, he can get it outpatient, although it will probably be sometime this summer, due to the COVID-19 pandemic.    I asked Mr. Ramos if he is willing to stay another day to get the MBSS done. He said his main complaint being in the hospital was that it was cold in his room. I informed him that the thermostat should be able to be adjusted. He will stay until tomorrow for the MBSS, with plan to discharge home tomorrow after it is done. He can be prescribed amoxicillin-clavulanate for his aspiration pneumonia.

## 2020-04-20 NOTE — PLAN OF CARE
Pt lives in [place] with his wife Lorin.  He is independent at home.  Lorin drives him as needed.  He is aware that he will have MBSS tomorrow and discharge after test.  PCP is Dr. Juve Wooten.  Pt encouraged to call with any questions or concerns.  Cm will continue to follow pt through transitions of care and assist with any discharge needs.     04/20/20 7878   Discharge Assessment   Assessment Type Discharge Planning Assessment   Confirmed/corrected address and phone number on facesheet? Yes   Assessment information obtained from? Patient   Communicated expected length of stay with patient/caregiver yes   Prior to hospitilization cognitive status: Alert/Oriented   Prior to hospitalization functional status: Independent   Current cognitive status: Alert/Oriented   Current Functional Status: Independent   Facility Arrived From: home   Lives With spouse   Able to Return to Prior Arrangements yes   Is patient able to care for self after discharge? Yes   Who are your caregiver(s) and their phone number(s)? Lorin [phone]   Patient's perception of discharge disposition home or selfcare   Readmission Within the Last 30 Days no previous admission in last 30 days   Patient currently being followed by outpatient case management? No   Patient currently receives any other outside agency services? No   Equipment Currently Used at Home none   Do you have any problems affording any of your prescribed medications? No   Is the patient taking medications as prescribed? yes   Does the patient have transportation home? Yes   Transportation Anticipated family or friend will provide   Does the patient receive services at the [facility]? No   Discharge Plan A Home   Discharge Plan B Home with family   DME Needed Upon Discharge  none   Patient/Family in Agreement with Plan yes     Willaim Grady RN,   [phone]

## 2020-04-20 NOTE — PLAN OF CARE
Problem: SLP Goal  Goal: SLP Goal  Description  STGs:  1. Pt will participate in clinical swallow assessment to determine LRD.-ongoing  2. Pt will consume a Mechanical soft/NECTAR thick liquid diet without any overt s/s of aspiration.  3. Pt will participate in MBSS to objectively rule out aspiration/penetration.    Outcome: Ongoing, Progressing   4/20/2020: Bedside swallow study initiated. Pt with deep, chest-like cough before, during, and after PO trials. Cup at bedside which pt utilizes for expectoration of phlegm. Diet REC: Mechanical soft/NECTAR thick liquid diet given 1:1 assist with meals. Pt MUST be upright during mealtime and remain upright 1 hour after daytime meals and 3 hours after final meal of day to prevent aspirating refluxed material or bacteria filled saliva. MBSS to be completed next date of service. SLP to f/u.

## 2020-04-21 ENCOUNTER — TELEPHONE (OUTPATIENT)
Dept: INTERNAL MEDICINE | Facility: CLINIC | Age: 85
End: 2020-04-21

## 2020-04-21 ENCOUNTER — TELEPHONE (OUTPATIENT)
Dept: OTOLARYNGOLOGY | Facility: CLINIC | Age: 85
End: 2020-04-21

## 2020-04-21 VITALS
BODY MASS INDEX: 20.44 KG/M2 | HEIGHT: 71 IN | TEMPERATURE: 98 F | SYSTOLIC BLOOD PRESSURE: 181 MMHG | WEIGHT: 146 LBS | RESPIRATION RATE: 17 BRPM | HEART RATE: 66 BPM | OXYGEN SATURATION: 95 % | DIASTOLIC BLOOD PRESSURE: 82 MMHG

## 2020-04-21 LAB
POCT GLUCOSE: 146 MG/DL (ref 70–110)
POCT GLUCOSE: 96 MG/DL (ref 70–110)

## 2020-04-21 PROCEDURE — 25500020 PHARM REV CODE 255: Mod: HCNC | Performed by: INTERNAL MEDICINE

## 2020-04-21 PROCEDURE — A9698 NON-RAD CONTRAST MATERIALNOC: HCPCS | Mod: HCNC | Performed by: INTERNAL MEDICINE

## 2020-04-21 PROCEDURE — 92611 MOTION FLUOROSCOPY/SWALLOW: CPT | Mod: HCNC

## 2020-04-21 PROCEDURE — 97535 SELF CARE MNGMENT TRAINING: CPT | Mod: HCNC

## 2020-04-21 PROCEDURE — 25000003 PHARM REV CODE 250: Mod: HCNC | Performed by: HOSPITALIST

## 2020-04-21 PROCEDURE — 63600175 PHARM REV CODE 636 W HCPCS: Mod: HCNC | Performed by: HOSPITALIST

## 2020-04-21 RX ORDER — AMOXICILLIN AND CLAVULANATE POTASSIUM 875; 125 MG/1; MG/1
1 TABLET, FILM COATED ORAL EVERY 12 HOURS
Qty: 42 TABLET | Refills: 0 | Status: SHIPPED | OUTPATIENT
Start: 2020-04-21 | End: 2020-05-12

## 2020-04-21 RX ADMIN — PREDNISONE 7.5 MG: 5 TABLET ORAL at 08:04

## 2020-04-21 RX ADMIN — CLOPIDOGREL BISULFATE 75 MG: 75 TABLET ORAL at 08:04

## 2020-04-21 RX ADMIN — CETIRIZINE HYDROCHLORIDE 10 MG: 10 TABLET, FILM COATED ORAL at 08:04

## 2020-04-21 RX ADMIN — PANTOPRAZOLE SODIUM 40 MG: 40 TABLET, DELAYED RELEASE ORAL at 08:04

## 2020-04-21 RX ADMIN — ATORVASTATIN CALCIUM 20 MG: 20 TABLET, FILM COATED ORAL at 08:04

## 2020-04-21 RX ADMIN — BARIUM SULFATE 15 ML: 0.81 POWDER, FOR SUSPENSION ORAL at 10:04

## 2020-04-21 RX ADMIN — LEVOTHYROXINE SODIUM 125 MCG: 25 TABLET ORAL at 05:04

## 2020-04-21 RX ADMIN — AMOXICILLIN AND CLAVULANATE POTASSIUM 1 TABLET: 875; 125 TABLET, FILM COATED ORAL at 08:04

## 2020-04-21 NOTE — PROCEDURES
Modified Barium Swallow Study & Education    Patient Name:  Thierry Ramos Jr.   MRN:  [id number]      Recommendations:     Recommendations:                General Recommendations:  Dysphagia therapy  Diet recommendations:  Regular, Nectar Thick   Aspiration Precautions: 1 bite/sip at a time, Alternating bites/sips, Assistance with meals and Assistance with thickening liquids, Avoid talking while eating, Check for pocketing/oral residue, Double swallow with each bite/sip, Eliminate distractions, Feed only when awake/alert, Frequent and aggressive oral care 3x/day, HOB to 90 degrees, Meds whole 1 at a time, Monitor for s/s of aspiration, Remain upright 30 minutes post meal, Small bites/sips and Standard aspiration precautions   General Precautions: Standard, aspiration, fall, nectar thick  Communication strategies:  none    Referral     Reason for Referral  Patient was referred for a Modified Barium Swallow Study to assess the efficiency of his/her swallow function, rule out aspiration and make recommendations regarding safe dietary consistencies, effective compensatory strategies, and safe eating environment.     Diagnosis: Recurrent aspiration pneumonia       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    Diabetes mellitus, type 2     Encounter for blood transfusion     Hyperlipidemia     Hypertension     Hypopituitarism     Hypothyroid     Immunosuppression 3/13/2015    Liver fibrosis (?) 2/24/2020    Pneumonia of both lungs due to infectious organism 7/19/2019    Pneumonia of left lower lobe due to Pseudomonas species 10/11/2019    Squamous Cell Carcinoma     on the right side of the face     Squamous cell carcinoma of skin of right  temple 1/27/2016    Stroke     Syncope 2/12    Unspecified disorder of kidney and ureter        Objective:     Current Respiratory Status: 04/21/20    Alert: yes    Cooperative: yes    Follows Directions: yes    Visualization  · Patient was seen in the lateral view    Oral Peripheral Examination  · Oral Musculature: general weakness  · Dentition: present and adequate  · Secretion Management: coughing on secretions, problems swallowing secretions  · Mucosal Quality: adequate  · Mandibular Strength and Mobility: WFL  · Oral Labial Strength and Mobility: WFL  · Lingual Strength and Mobility: WFL  · Velar Elevation: WFL  · Buccal Strength and Mobility: WFL  · Volitional Cough: (elicited; slightly wet chest like cough)  · Volitional Swallow: (mild delay)  · Voice Prior to PO Intake: (clear)    Consistencies Assessed  · Thin : 5cc and 10cc cup sips thin liquid barium  · Nectar thick : 5cc, 10cc, 20cc cup sips nectar thick liquid barium; self regulated straw sips nectar thick liquid  · Puree : tsp bite pudding coated in barium paste x1  · Soft solids : tsp bites diced peaches coated in barium powder x1  · Solids : 1 inch bite demarcus cracker coated in barium paste x1     Oral Preparation/Oral Phase  Pt presents with mild oral dysphagia c/b:  · Reduced bolus control/manipulation  · Oral residue present post swallow: mild-moderate  · Decreased base of tongue mobility  · Adequate rotary chew and A-P transfer  · No oral residue  · Copious oral secretions; emesis bag given to expectorate as needed    Pharyngeal Phase   Pt presents with moderate pharyngeal dysphagia c/b:  · Pharyngeal swallow delay present as swallow triggered while bolus present past the level of the valleculae for thin liquids. Pharyngeal swallow triggered at valleculae for nectar thick. Timely swallow with food textures.  · Reduced hyolaryngeal lift/excursion  · Impaired epiglottic inversion patterns for thin and nectar thick liquids  · Reduced pharyngeal  constriction resulting in mild-moderate pyriform sinus residue s/p swallow  · Reduced peristaltic wave  · Flash and deep penetration during and after the swallow with thin liquid followed by overt silent aspiration to 10cc thin liquid cup sip. Barium observed below the level of the true vocal cords. Delayed coughing procuded. Pt appeared to clear aspirated material from larynx.  · Flash penetration noted during the swallow with nectar thick liquid cup sips. Penetrated material did not coat vocal cords and was quickly ejected from larynx.   · No penetration or aspiration with puree, soft solids, or hard solids; however, trace barium observed within larynx--unable to determine whether barium in larynx residual from aspirated thin liquids or from other consistency. Pt cued in cough/clear which completely cleared larynx.  · Verbal cues needed to produce multiple swallows to clear pharynx. Chin tuck maneuver attempted with double swallow in attempt to clear pharynx though it did not expedite clearance.  · Coughing observed after completion of MBSS though no barium present within larynx/pharynx at termination of fluoro  Per Rosenbeck's 8-Point Penetration- Aspiration Scale:    BEST: 1) Material does not enter airway.   WORST: (6) Material enters the airway, passes below the vocal folds, and is ejected into the larnyx or out of the airway    Cervical Esophageal Phase  · UES appeared to accommodate all bolus types without retrograde movement observed   · Mild stasis of all bolus types within upper esophagus    Assessment:     Impressions  ·  Pt presents with mild oral dysphagia and moderate pharyngeal dysphagia c/b:reduced bolus control/manipulation, oral residue s/p swallow, reduced hyolaryngeal lift/excursion, impaired pharyngeal constriction and peristaltic wave, pharyngeal stasis, penetration of thin and nectar thick textures, and aspiration of thin liquids. Pt is deemed safe for an oral diet of Regular solids/NECTAR  thick liquids given upright positioning, multiple swallows, small bites/sips, alternating liquids/solids, and all other standard aspiration precautions. He will benefit from post acute SLP tx.     Prognosis: Good    Barriers:  · None    Plan  1. SLP to review results of MBSS with hospitalistAman.  2. Pt to initiate a Regular/Nectar thick liquid diet  3. Pt will participate in OMEs to improve oropharyngeal strength/coordination    Education  Results were discussed with patient. Results were discussed with Medical Team who was in agreement with plan.  Education provided to pt re: role of SLP, MBSS procedure, anatomy and physiology of swallow function vs pt's swallow, aspiration risks, need for excellent oral care, swallow precautions, and need for post acute dysphagia rehab. Pt agreeable to home health SLP tx and verbalized understanding of all taught material via TeachBack method. Time allowed for questions which were all answered within scope of practice. Transport arrived to bring pt back upstairs. SLP assisted pt back into w/c.    Goals:   Multidisciplinary Problems     SLP Goals        Problem: SLP Goal    Goal Priority Disciplines Outcome   SLP Goal     SLP Ongoing, Progressing   Description:  STGs:  1. Pt will participate in clinical swallow assessment to determine LRD.-ongoing  2. Pt will consume a Mechanical soft/NECTAR thick liquid diet without any overt s/s of aspiration.- Met 4/21/2020  3. Pt will participate in MBSS to objectively rule out aspiration/penetration.-Met 4/21/2020   4. Pt will tolerate a Regular/Nectar thick liquid diet without any overt s/s of aspiration.  5. Pt will participate in OMEs to strengthen/coordinate oropharyngeal musculature.                    Plan:   · Patient to be seen:  Therapy Frequency: 3 x/week   · Plan of Care expires:  05/19/20  · Plan of Care reviewed with:  patient(RN and MD)        Discharge recommendations:  home health speech therapy   Barriers to Discharge:   None    Time Tracking:   SLP Treatment Date:   04/21/20  Speech Start Time:  0937  Speech Stop Time:  1008     Speech Total Time (min):  31 min    Betty Davis CCC-CLIF  04/21/2020

## 2020-04-21 NOTE — PLAN OF CARE
[facility] accepted pt.     04/21/20 1305   Post-Acute Status   Post-Acute Authorization Home Health   Home Health Status Set-up Complete     William Grady RN, CM  [phone]

## 2020-04-21 NOTE — PLAN OF CARE
Problem: SLP Goal  Goal: SLP Goal  Description  STGs:  1. Pt will participate in clinical swallow assessment to determine LRD.-ongoing  2. Pt will consume a Mechanical soft/NECTAR thick liquid diet without any overt s/s of aspiration.- Met 4/21/2020  3. Pt will participate in MBSS to objectively rule out aspiration/penetration.-Met 4/21/2020   4. Pt will tolerate a Regular/Nectar thick liquid diet without any overt s/s of aspiration.  5. Pt will participate in OMEs to strengthen/coordinate oropharyngeal musculature.   Outcome: Ongoing, Progressing   4/21/2020: MBSS completed this AM. Pt with overt penetration and aspiration with thin liquids and penetration with nectar thick liquids. Reduced pharyngeal constriction present with stasis within pyriform sinuses. Multiple swallows needed per bite/sip. REC: Regular solids/Nectar thick liquids given 1:1 assist, slow rate of intake, multiple swallows, assistance with thickening liquids, upright positioning, and all other standard aspiration precautions. Pt will benefit from home health SLP tx at d/c.

## 2020-04-21 NOTE — PLAN OF CARE
VN cued into patients room for DC instructions. Discharge teaching was reviewed with patient. Pt verbalized understanding of medications/changes to medications, FU appts, clinical education and resources. Refer to clinical references for pt education. Tele and IV to be removed by bedside nurse prior to leaving. Bedside nurse informed to recheck BP prior to leaving (last /82). Prescriptions delivered to bedside. Wife called and reviewed DC and confirmed she is downstairs waiting for pt. Wife stated home med (Vemlidy) was sent to the hospital (as pt supplied med) and wants to ensure it is sent back home - bedside nurse aware. Wheelchair transport to be requested by bedside nurse once ready.

## 2020-04-21 NOTE — HPI
86 year old male with a history of chronic sinusitis (FESS with Dr Dsouza in 2014) admitted with pneumonia and concern for aspiration. Has been unable to get MBBS but plan for tomorrow. Patient reports cough productive of a thick yellow sputum. He also has a lot of post nasal drip and rhinorrhea. He denies any acute change in nasal congestion or facial pressure. States he always has this. Had been using flonase at home but says he stopped using the nasal rinses.

## 2020-04-21 NOTE — ASSESSMENT & PLAN NOTE
Chronic rhinosinusitis  Bronchiectasis  Giving amoxicillin-clavulanate. Prescribed home oxygen. Continue home cetirizine and fluticasone nasal spray.   21 days of treatment with eventual follow up with ENT

## 2020-04-21 NOTE — PLAN OF CARE
Reported no pain. But appears to be thinner than last visit and has some confusion.    2100 accu ck: 140, no coverage.     0438 pt exited bed setting off bed alarm.  He is annoyed w/ bed alarm and refuses help to and from bathroom.      No Tele     Bed in lowest position, wheels locked, non skid socks, ID band worn, personal items and call bell with in reach, bed alarm set.

## 2020-04-21 NOTE — CONSULTS
[facility]  Otorhinolaryngology-Head & Neck Surgery  Consult Note    Patient Name: Thierry Ramos Jr.  MRN: [id number]  Code Status: Full Code  Admission Date: 4/19/2020  Hospital Length of Stay: 2 days  Attending Physician: Isidra Newton MD  Primary Care Provider: AKILA Wooten MD    Patient information was obtained from patient and ER records.     Consults  Subjective:     Chief Complaint/Reason for Admission: pneumonia    History of Present Illness: 86 year old male with a history of chronic sinusitis (FESS with Dr Dsouza in 2014) and CLL admitted with pneumonia and concern for aspiration. Has had several admissions for pneumonia including October and November of 2019 where he grew pseudomonas and serratia. Has been unable to get MBBS but plan for tomorrow. Patient reports cough productive of a thick yellow sputum. He also has a lot of post nasal drip and rhinorrhea. He denies any acute change in nasal congestion or facial pressure. States he always has this. Had been using flonase at home but says he stopped using the nasal rinses.    Medications:  Continuous Infusions:  Scheduled Meds:   amoxicillin-clavulanate 875-125mg  1 tablet Oral Q12H    aspirin  81 mg Oral Every Mon, Wed, Fri    atorvastatin  20 mg Oral Daily    cetirizine  10 mg Oral Daily    clopidogreL  75 mg Oral Daily    fluticasone propionate  2 spray Each Nostril Daily    levothyroxine  125 mcg Oral Before breakfast    pantoprazole  40 mg Oral Daily    predniSONE  7.5 mg Oral Daily    tenofovir alafenamide  25 mg Oral Daily with dinner     PRN Meds:acetaminophen, acetaminophen, Dextrose 10% Bolus, Dextrose 10% Bolus, glucagon (human recombinant), glucose, glucose, insulin aspart U-100, ondansetron, sodium chloride 0.9%     No current facility-administered medications on file prior to encounter.      Current Outpatient Medications on File Prior to Encounter   Medication Sig    aspirin 81 MG Chew Take 81 mg by  mouth every Mon, Wed, Fri.    atorvastatin (LIPITOR) 20 MG tablet TAKE 1 TABLET EVERY DAY    cetirizine (ZYRTEC) 10 MG tablet Take 1 tablet (10 mg total) by mouth once daily.    clopidogrel (PLAVIX) 75 mg tablet Take 1 tablet (75 mg total) by mouth once daily.    cyanocobalamin, vitamin B-12, 5,000 mcg TbDL Take 1 tablet by mouth once daily.    cycloSPORINE (RESTASIS) 0.05 % ophthalmic emulsion Place 0.4 mLs (1 drop total) into both eyes 2 (two) times daily.    DOCOSAHEXANOIC ACID/EPA (FISH OIL ORAL) Take 5 capsules by mouth once daily.     fluorouracil (EFUDEX) 5 % cream Use hs for 2 weeks on right cheek    fluticasone propionate (FLONASE) 50 mcg/actuation nasal spray 2 sprays (100 mcg total) by Each Nostril route once daily.    furosemide (LASIX) 20 MG tablet Take 20 mg by mouth daily as needed.     guaifen/dextromethorphan/pe (ROBITUSSIN COUGH & COLD CF MAX ORAL) Take 15 mLs by mouth 2 (two) times daily.    hydrocodone-chlorpheniramine (TUSSIONEX) 10-8 mg/5 mL suspension Take 5 mLs by mouth every 12 (twelve) hours as needed for Cough.    ipratropium (ATROVENT) 0.03 % nasal spray 2 sprays by Nasal route 2 (two) times daily.    Lactobacillus acidophilus (ACIDOPHILUS ORAL) Take 1 tablet by mouth once daily.    levothyroxine (SYNTHROID) 125 MCG tablet TAKE 1 TABLET ONE TIME DAILY    losartan (COZAAR) 25 MG tablet Take 1 tablet (25 mg total) by mouth once daily.    multivitamin with minerals tablet Take 1 tablet by mouth once daily.    mupirocin (BACTROBAN) 2 % ointment Apply to affected area 3 times daily    pantoprazole (PROTONIX) 40 MG tablet TAKE 1 TABLET EVERY DAY    predniSONE (DELTASONE) 2.5 MG tablet Take 1 tablet (2.5 mg total) by mouth once daily. 5mg and 2.5mg qd    predniSONE (DELTASONE) 5 MG tablet Take 1 tablet (5 mg total) by mouth once daily.    sennosides/docusate sodium (EDEN-COLACE ORAL) Take 1 tablet by mouth once daily.    tenofovir alafenamide (VEMLIDY) 25 mg Tab Take 1  tablet (25 mg total) by mouth once daily.    testosterone cypionate (DEPOTESTOTERONE CYPIONATE) 100 mg/mL injection Inject 0.5 mLs (50 mg total) into the muscle every 14 (fourteen) days.    VENTOLIN HFA 90 mcg/actuation inhaler Inhale 1 puff into the lungs as needed.    SYMBICORT 160-4.5 mcg/actuation HFAA Inhale 1 puff into the lungs as needed.    tazarotene (AVAGE) 0.1 % cream Apply topically every evening. (Patient taking differently: Apply topically as needed. )       Review of patient's allergies indicates:   Allergen Reactions    Ace inhibitors Swelling     angioedema    Divalproex Hives     Rash under arms, body creases       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    Diabetes mellitus, type 2     Encounter for blood transfusion     Hyperlipidemia     Hypertension     Hypopituitarism     Hypothyroid     Immunosuppression 3/13/2015    Liver fibrosis (?) 2/24/2020    Pneumonia of both lungs due to infectious organism 7/19/2019    Pneumonia of left lower lobe due to Pseudomonas species 10/11/2019    Squamous Cell Carcinoma     on the right side of the face     Squamous cell carcinoma of skin of right temple 1/27/2016    Stroke     Syncope 2/12    Unspecified disorder of kidney and ureter      Past Surgical History:   Procedure Laterality Date    BRONCHOSCOPY N/A 1/22/2020    Procedure: Bronchoscopy;  Surgeon: Fabian Diagnostic Provider;  Location: [facility] OR [address] [place] [address];  Service: Anesthesiology;  Laterality: N/A;    CAROTID ENDARTERECTOMY (L)  2/27/2007    EXCISION TURBINATE, SUBMUCOUS      FESS  9/16/2014    Mohs excision   3/23/2015, 2/24/2016    combined with WLE forehead    NASAL SEPTUM SURGERY  9/16/2014    right ureter surgery   1995    SENTINEL LYMPH NODE BIOPSY  3/23/2015, 2/24/2016    SINUS SURGERY  9/16/2014    septo, ESS, BITSMR    Transphenoidal surgery       Family History     Problem Relation (Age of Onset)    Cancer Mother, Father, Brother    Colon cancer Father    No Known Problems Sister, Maternal Aunt, Maternal Uncle, Paternal Aunt, Paternal Uncle, Maternal Grandmother, Maternal Grandfather, Paternal Grandmother, Paternal Grandfather        Tobacco Use    Smoking status: Never Smoker    Smokeless tobacco: Never Used   Substance and Sexual Activity    Alcohol use: Not Currently     Frequency: Never     Drinks per session: Patient refused     Binge frequency: Patient refused    Drug use: No    Sexual activity: Not Currently     Review of Systems   HENT: Positive for congestion, postnasal drip, rhinorrhea and sinus pressure. Negative for ear pain.    Eyes: Negative for pain and visual disturbance.   Respiratory: Positive for cough.    Cardiovascular: Negative for chest pain.   Gastrointestinal: Negative for nausea and vomiting.   Musculoskeletal: Negative for neck pain and neck stiffness.   Skin: Negative for rash and wound.   Allergic/Immunologic: Positive for immunocompromised state.   Neurological: Negative for dizziness, light-headedness and headaches.   Psychiatric/Behavioral: Negative for confusion. The patient is not nervous/anxious.      Objective:     Vital Signs (Most Recent):  Temp: 97.6 °F (36.4 °C) (04/21/20 0728)  Pulse: 66 (04/21/20 0728)  Resp: 17 (04/21/20 0728)  BP: (!) 181/82 (04/21/20 0728)  SpO2: 95 % (04/21/20 0728) Vital Signs (24h Range):  Temp:  [96.4 °F (35.8 °C)-97.9 °F (36.6 °C)] 97.6 °F (36.4 °C)  Pulse:  [66-96] 66  Resp:  [17-30] 17  SpO2:  [87 %-95 %] 95 %  BP: (132-181)/(62-82) 181/82     Weight: 66.2 kg (146 lb)  Body mass index is 20.36 kg/m².        Physical Exam   Constitutional: He is oriented to person, place, and time. He appears well-developed and well-nourished. No distress.   HENT:    Head: Normocephalic and atraumatic.   Right Ear: External ear normal.   Left Ear: External ear normal.   Nose: Mucosal edema, rhinorrhea (see scope exam) and septal deviation (left caudal deviation causing significant obstruction) present. No epistaxis.   Mouth/Throat: Oropharynx is clear and moist.   Right temple with well healed scar from prior excision    Eyes: Pupils are equal, round, and reactive to light. EOM are normal.   Neck: Normal range of motion. Neck supple.   Cardiovascular: Normal rate and regular rhythm.   Pulmonary/Chest: Effort normal. No stridor. He has no wheezes.   Lymphadenopathy:     He has no cervical adenopathy.   Neurological: He is alert and oriented to person, place, and time. No cranial nerve deficit.   Skin: Skin is warm and dry.       Significant Labs:  All pertinent labs from the last 24 hours have been reviewed.    Significant Diagnostics:  No recent Sinus CT. Head CT from 12/2019 does show CRS    Assessment/Plan:     Chronic rhinosinusitis  Flexible scope performed at bedside - see procedure note. Thick purulent secretions bilaterally. Attempted to culture from the middle meatus on the left but patient not too tolerate. Will follow up culture results. He has previously grown MRSA which may need to be covered for. Would continue Augmentin for now. Patient needs to resume sinus rinses on discharge. Continue flonase twice daily. Would recommend outpatient follow up with Dr Gonzalez - has an appointment already scheduled in June. Would move up after this round of antibiotics to ensure resolution.       VTE Risk Mitigation (From admission, onward)         Ordered     IP VTE HIGH RISK PATIENT  Once      04/19/20 1355     Place sequential compression device  Until discontinued      04/19/20 1355                Thank you for your consult.    Gloria Soliz MD  Otorhinolaryngology-Head & Neck Surgery  [facility]

## 2020-04-21 NOTE — TELEPHONE ENCOUNTER
----- Message from Lacey Grady RN sent at 4/21/2020 12:21 PM CDT -----  Good Afternoon,  This pt is discharging from [facility] today and will need a hospital follow up appt please.  Thank you for your help,  William Grady RN, CM  [phone]

## 2020-04-21 NOTE — PROCEDURES
Procedure: Nasal endoscopy    Anesthesia: Topical xylocaine with franchesca-synephrine    Complications: None    Findings: thick purulent secretions bilaterally, maxillary antrostomies widely patent    Procedure in Detail: After verbal consent obtained and risks, benefits and alternatives discussed with patient, nares were decongested and anesthetized, and a flexible laryngoscope was passed with following results:  Septum deviated to the left. Inferior turbinates with no hypertrophy. Mucosa erythematous and mildly edematous. Maxillary antrostomy widely patent on the left - some thick purulent secretions in sinus and filling middle meatus/ethmoid cavity. Similar findings on the right. Patient insisted I removed the scope on each side after a few seconds - I was not bale to get to his posterior nasopharynx or larynx.     Culture taken from the left.    Patient tolerated the procedure well.

## 2020-04-21 NOTE — PLAN OF CARE
HH orders sent to [facility].     04/21/20 1216   Post-Acute Status   Post-Acute Authorization [facility]   Home Health Status Referrals Sent     William Grady RN, CM  [phone]

## 2020-04-21 NOTE — PLAN OF CARE
[facility]  Home Health Hub: [phone]    GENERAL HEART FAILURE  HOME HEALTH ORDERS    Patient Name: Thierry Ramos Jr.  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]  Yes     Chronic    Aortic atherosclerosis [I70.0]  Yes     Chronic    Pulmonary HTN [I27.20]  Yes     Chronic    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       Patient is homebound due to:  Recurrent aspiration pneumonia    Allergies:   Review of patient's allergies indicates:   Allergen Reactions    Ace inhibitors Swelling     angioedema    Divalproex Hives     Rash under arms, body creases       Diet: REC: Regular solids/Nectar thick liquids given 1:1 assist, slow rate of intake, multiple swallows, assistance with thickening liquids, upright positioning, and all other standard aspiration precautionsREC: Regular solids/Nectar thick liquids given 1:1 assist, slow rate of intake, multiple swallows, assistance with thickening liquids, upright positioning, and all  other standard aspiration precautions    Activities as tolerated: SN to instruct patient on importance of home exercise program. Utilize tool for education.    Nursing:    Evaluate fall risk and need for Physical Therapy by performing time up and go (TUG) and 30 second chair rise.  o If the patient scores at risk on any one of the two tests performed then SN to order PT evaluate and treat.   o If PT consulted based on at risk score, then PT to evaluate need for OT in the home on evaluation visit.     SN to complete comprehensive assessment within 24 hours of discharge from hospital or referral from ambulatory clinic:    Perform and record the following vital signs:   BP   Respiratory Rate   Pulse   O2 Sat  o If room air O2 sat below 88%, notify physician so they can order an O2 qualifying test.    Weight (deliver scale to patient on admit if patient does not have properly functioning scale)   Skilled Nurse to record Target Weight = AM weight immediately after discharge unless otherwise specified   Standing Daily Weight:  o Instruct patient to take daily AM weights (record on log provided)   Protocol for how to weigh patient:   o Nurse to hold all equipment  o Shoes off  o Early morning with enforcement to patient on timing and method        CONSULTS:    Physical Therapy to evaluate and treat. Evaluate for home safety and equipment needs; Establish/upgrade home exercise program. Perform / instruct on therapeutic exercises, gait training, transfer training, and Range of Motion.  Occupational Therapy to evaluate and treat. Evaluate home environment for safety and equipment needs. Perform/Instruct on transfers, ADL training, ROM, and therapeutic exercises.  Speech Therapy  to evaluate and treat for  Swallowing.  Telehealth MD consult for home visit in 1 weeks for hodpital follow up              Medications: Review discharge medications with patient and family and provide education.      Current Discharge  Medication List      START taking these medications    Details   amoxicillin-clavulanate 875-125mg (AUGMENTIN) 875-125 mg per tablet Take 1 tablet by mouth every 12 (twelve) hours. for 21 days  Qty: 42 tablet, Refills: 0         CONTINUE these medications which have NOT CHANGED    Details   aspirin 81 MG Chew Take 81 mg by mouth every Mon, Wed, Fri.      atorvastatin (LIPITOR) 20 MG tablet TAKE 1 TABLET EVERY DAY  Qty: 90 tablet, Refills: 3      cetirizine (ZYRTEC) 10 MG tablet Take 1 tablet (10 mg total) by mouth once daily.  Qty: 30 tablet, Refills: 0      clopidogrel (PLAVIX) 75 mg tablet Take 1 tablet (75 mg total) by mouth once daily.  Qty: 90 tablet, Refills: 3      cyanocobalamin, vitamin B-12, 5,000 mcg TbDL Take 1 tablet by mouth once daily.      cycloSPORINE (RESTASIS) 0.05 % ophthalmic emulsion Place 0.4 mLs (1 drop total) into both eyes 2 (two) times daily.  Qty: 180 each, Refills: 3    Associated Diagnoses: Keratoconjunctivitis sicca, not specified as Sjögren's, bilateral      DOCOSAHEXANOIC ACID/EPA (FISH OIL ORAL) Take 5 capsules by mouth once daily.       fluorouracil (EFUDEX) 5 % cream Use hs for 2 weeks on right cheek  Qty: 40 g, Refills: 3      fluticasone propionate (FLONASE) 50 mcg/actuation nasal spray 2 sprays (100 mcg total) by Each Nostril route once daily.  Qty: 16 g, Refills: 0      furosemide (LASIX) 20 MG tablet Take 20 mg by mouth daily as needed.       guaifen/dextromethorphan/pe (ROBITUSSIN COUGH & COLD CF MAX ORAL) Take 15 mLs by mouth 2 (two) times daily.      hydrocodone-chlorpheniramine (TUSSIONEX) 10-8 mg/5 mL suspension Take 5 mLs by mouth every 12 (twelve) hours as needed for Cough.  Qty: 60 mL, Refills: 0      ipratropium (ATROVENT) 0.03 % nasal spray 2 sprays by Nasal route 2 (two) times daily.  Qty: 30 mL, Refills: 0      Lactobacillus acidophilus (ACIDOPHILUS ORAL) Take 1 tablet by mouth once daily.      levothyroxine (SYNTHROID) 125 MCG tablet TAKE 1 TABLET ONE TIME  DAILY  Qty: 90 tablet, Refills: 3    Associated Diagnoses: Panhypopituitarism; Central hypothyroidism      losartan (COZAAR) 25 MG tablet Take 1 tablet (25 mg total) by mouth once daily.  Qty: 90 tablet, Refills: 3      multivitamin with minerals tablet Take 1 tablet by mouth once daily.      mupirocin (BACTROBAN) 2 % ointment Apply to affected area 3 times daily  Qty: 22 g, Refills: 1      pantoprazole (PROTONIX) 40 MG tablet TAKE 1 TABLET EVERY DAY  Qty: 90 tablet, Refills: 3      !! predniSONE (DELTASONE) 2.5 MG tablet Take 1 tablet (2.5 mg total) by mouth once daily. 5mg and 2.5mg qd  Qty: 30 tablet, Refills: 11      !! predniSONE (DELTASONE) 5 MG tablet Take 1 tablet (5 mg total) by mouth once daily.  Qty: 30 tablet, Refills: 11      sennosides/docusate sodium (EDEN-COLACE ORAL) Take 1 tablet by mouth once daily.      tenofovir alafenamide (VEMLIDY) 25 mg Tab Take 1 tablet (25 mg total) by mouth once daily.  Qty: 30 tablet, Refills: 11    Associated Diagnoses: Hepatitis B core antibody positive; Immunosuppression      testosterone cypionate (DEPOTESTOTERONE CYPIONATE) 100 mg/mL injection Inject 0.5 mLs (50 mg total) into the muscle every 14 (fourteen) days.  Qty: 10 mL, Refills: 5    Associated Diagnoses: Secondary male hypogonadism      VENTOLIN HFA 90 mcg/actuation inhaler Inhale 1 puff into the lungs as needed.      SYMBICORT 160-4.5 mcg/actuation HFAA Inhale 1 puff into the lungs as needed.      tazarotene (AVAGE) 0.1 % cream Apply topically every evening.  Qty: 30 g, Refills: 3    Associated Diagnoses: Multiple actinic keratoses       !! - Potential duplicate medications found. Please discuss with provider.          I have seen and examined this patient face to face today. My clinical findings that support the need for the home health skilled services and home bound status are the following:  Medical restrictions requiring assistance of another human to leave home due to  Dyspnea on exertion (SOB).    I  certify that this patient is confined to his home and needs intermittent skilled nursing care, physical therapy, speech therapy and occupational therapy.    Isidra Newton MD  04/21/2020

## 2020-04-21 NOTE — TELEPHONE ENCOUNTER
----- Message from Lacey Grady RN sent at 4/21/2020 12:17 PM CDT -----  Good Afternoon,  This pt is discharging from [facility] today and will need a hospital follow-up appt please.  Thank you for your help,  William Grady RN, CM  [phone]

## 2020-04-21 NOTE — PLAN OF CARE
VN cued into patients room for rounding. VN role explained and informed pt that VN will be working alongside the bedside care team throughout the day.     Patient resting in bed, nurse at bedside administering meds.   Patient on RA.   Cont pulse ox: not in place  No orders for tele  RR even and unlabored  Call bell within reach. Will continue to monitor.

## 2020-04-21 NOTE — ASSESSMENT & PLAN NOTE
Flexible scope performed at bedside - see procedure note. Thick purulent secretions bilaterally. Attempted to culture from the middle meatus on the left but patient not too tolerate. Will follow up culture results. He has previously grown MRSA which may need to be covered for. Would continue Augmentin for now. Patient needs to resume sinus rinses on discharge. Continue flonase twice daily. Would recommend outpatient follow up with Dr Gonzalez - has an appointment already scheduled in June. Would move up after this round of antibiotics to ensure resolution.

## 2020-04-21 NOTE — PLAN OF CARE
Rounds completed on pt.  All questions addressed.  Bedside nurse to discuss d/c medications.  Discussed importance to attend all f/u appts and take medications as prescribed.  Verbalized understanding.    Future Appointments   Date Time Provider Department Center   4/22/2020 10:40 AM CAROLE Wooten MD [facility] IM [place]   5/4/2020  2:00 PM Livier Bassett MA, CCC-SLP [facility] SPPATHB [facility]   5/4/2020  2:00 PM [facility] FLIP2 500 LB LIMIT [facility] XRAY IP [facility]   5/5/2020  9:30 AM Karissa Monsivais MD Health system DERM [place]   6/1/2020 10:40 AM Alicia Truong MD [facility] YVAN [facility]   6/8/2020 10:00 AM Cee Farmer MD [facility] ENDODIA [facility]   6/9/2020  2:00 PM Homa Christensen MD [facility] PULMSVC [facility]   7/31/2020  9:15 AM Melvin Ying MD [facility] OPHTHAL [facility]   8/1/2020  8:00 AM [facility] OIC-US1 MASTER [facility] ULTR [facility]   8/3/2020  8:00 AM LAB, [place] METH LAB [place]   8/11/2020  8:40 AM Albina Solis NP [facility] HEPAT [facility]   9/8/2020  2:40 PM CAROLE Wooten MD [facility] system IM [place]     Follow up with Alicia Truong MD   The office will call you to set up follow-up appointment.  [address]   [address]   [place] [address]   [phone]         04/21/20 1313   Final Note   Assessment Type Final Discharge Note   Anticipated Discharge Disposition Home-Health   Hospital Follow Up  Appt(s) scheduled? Yes   Discharge plans and expectations educations in teach back method with documentation complete? Yes   Right Care Referral Info   Post Acute Recommendation Home-care     William Grady, RN,   [phone]

## 2020-04-21 NOTE — DISCHARGE SUMMARY
[facility] Medicine  Discharge Summary      Patient Name: Thierry Ramos Jr.  MRN: [id number]  Admission Date: 4/19/2020  Hospital Length of Stay: 2 days  Discharge Date and Time:  04/21/2020 12:14 PM  Attending Physician: Isidra Newton MD   Discharging Provider: Isidra Newton MD  Primary Care Provider: AKILA Wooten MD      HPI:   Thierry Ramos Jr. is a 86 year old white man with panhypopituitarism, secondary adrenal insufficiency, secondary hypogonadism, central hypothyroidism, diabetes mellitus type 2 with neuropathy, hypertension, history of stroke on 12/16/06 status post left carotid endarterectomy on 2/27/07, dyslipidemia, chronic lymphocytic leukemia (treated with rituximab), hypogammaglobulinemia, immune thrombocytopenia, pulmonary hypertension, chronic rhinosinusitis, recurrent aspiration pneumonias with history of Pseudomonas aeruginosa pneumonia on 10/11/2019, Serratia marcescens pneumonia on 11/10/2019, aspiration pneumonia on 4/2/2020. He lives in [place], [place]. He is . His primary care physician is Dr. CAROLE Wooten. His pulmonologist is Dr. Homa Christensen.   Dr. Christensen ordered outpatient modified barium swallow study on 3/5/2020.              He was tested for COVID-19 on 3/18/2020 due to having fever and a couple weeks of cough. COVID-19 test was negative.              He was hospitalized at [facility] from 4/2/2020 to 4/3/2020 for aspiration pneumonia. Speech Language Pathology felt his excessive mucous was causing aspirations, so he was prescribed cetirizine and fluticasone nasal spray. Speech Language Pathology recommended getting the modified barium swallow study done outpatient as previously planned. He was treated with ceftriaxone in the hospital and prescribed cefdinir on discharge.    He returned to [facility] Emergency Department on 4/19/2020 with recurrent shortness of breath and cough,  associated with congestion, rhinorrhea, and weakness when standing. He reported that these episodes start with congestion causing coughing fits. He reported that he continued having problems with mucous despite using cetirizine and fluticasone nasal spray. He felt that he would not have to come to the hospital each time this happens if he had supplemental oxygen at home. In the emergency department, his oxygen saturation was 84% on room air. His blood pressure was as low as 83/45. He was tachycardic. Lactate was elevated at 3.5 mmol/L. Chest X-ray showed no change compared to his last aspiration pneumonia. He was given ceftriaxone and 500 mL of normal saline. He was admitted to [facility].    * No surgery found *      Hospital Course:   He was put on amoxicillin-clavulanate. He was prescribed home oxygen. Speech Language Pathology was consulted. ENT was consulted and examined him, finding purulent sinusitis.  He was maintained on Augmentin.  He was afebrile.  I did discuss the case with ENT.  They recommend antibiotics for 21 days.  When seeing the patient this morning he states that he is ready to go home.  He is dressed in ambulating in the room.  He denies any chest pain.  He states that he is not short of breath and he does not have the oxygen on at this time.  He does have a cough and has been producing some brownish colored sputum.  He does have a lot of sinus symptoms.    He was evaluated by speech therapy.  They are recommending  -Regular solids/Nectar thick liquids given 1:1 assist, slow rate of intake, multiple swallows, assistance with thickening liquids, upright positioning, and all other standard aspiration precautions    I did discuss with him about having therapy and home health.  He states that he does not think that he really needs physical and occupational therapy but I did explain that he definitely needs speech therapy.    He is afebrile.  His blood pressures are variable.  He  looks well and he is alert and oriented.  His heart is regular and his lungs are essentially clear.  Abdomen is soft and nontender new extremities without any edema.    He is discharged in improved condition.     Consults:   Consults (From admission, onward)        Status Ordering Provider     Inpatient consult to ENT  Once     Provider:  (Not yet assigned)    Acknowledged LOISPATRICIOLEA JEYSON SANDERS          * Recurrent aspiration pneumonia  Chronic rhinosinusitis  Bronchiectasis  Giving amoxicillin-clavulanate. Prescribed home oxygen. Continue home cetirizine and fluticasone nasal spray.   21 days of treatment with eventual follow up with ENT    Chronic kidney disease, stage III (moderate)        Secondary male hypogonadism    ON testosterone replacement    Secondary adrenal insufficiency    continue same meds    Type 2 diabetes, controlled, with neuropathy  Continue follow up with PCP    Central hypothyroidism  On replacement      History of CVA (cerebrovascular accident)  Continue aspirin and atorvastatin.    Panhypopituitarism  Central hypothyroidism  Secondary adrenal insufficiency  Secondary male hypogonadism  Takes medications for these.    CLL (chronic lymphocytic leukemia)  Treated with rituximab on 9/30/19. Continue home prednisone 7.5 mg daily.      Final Active Diagnoses:    Diagnosis Date Noted POA    PRINCIPAL PROBLEM:  Recurrent aspiration pneumonia [J69.0] 04/02/2020 Yes    Acute bacterial rhinosinusitis [J01.90, B96.89] 04/20/2020 Yes    Bronchiectasis [J47.9] 01/16/2020 Yes    Chronic kidney disease, stage III (moderate) [N18.3] 11/09/2019 Yes     Chronic    Immune thrombocytopenic purpura [D69.3] 11/09/2019 Yes     Chronic    Aortic atherosclerosis [I70.0] 08/29/2019 Yes     Chronic    Pulmonary HTN [I27.20] 08/29/2019 Yes     Chronic    Secondary adrenal insufficiency [E27.49] 09/19/2018 Yes     Chronic    Secondary male hypogonadism [E29.1] 09/19/2018 Yes     Chronic    Type 2 diabetes,  "controlled, with neuropathy [E11.40] 03/02/2016 Yes     Chronic    Chronic rhinosinusitis [J32.9] 09/16/2014 Yes     Chronic    Central hypothyroidism [E03.8] 05/31/2014 Yes     Chronic    History of CVA (cerebrovascular accident) [Z86.73] 05/31/2014 Not Applicable     Chronic    CLL (chronic lymphocytic leukemia) [C91.10] 12/04/2012 Yes     Chronic    Panhypopituitarism [E23.0] 12/04/2012 Yes     Chronic      Problems Resolved During this Admission:       Discharged Condition: good    Disposition: Home or Self Care    Follow Up:  Follow-up Information     P Juve Wooten MD In 3 days.    Specialty:  Internal Medicine  Why:  Tele-visit  Contact information:  [address] [facility]  [place] [address]  [phone]             Alicia Truong MD In 1 month.    Specialty:  Otolaryngology  Contact information:  [address]  [address]  [place] [id number] [phone]                 Patient Instructions:      OXYGEN FOR HOME USE     Order Specific Question Answer Comments   Liter Flow 2    Duration Continuous    Qualifying SpO2: 84    Testing done at: Rest    Route nasal cannula    Portable mode: continuous    Device home concentrator with portable unit    Patient condition with qualifying saturation other chronic pulmonary condition    Height: 5'11"    Weight: 66.2 kg (146 lb)    Alternative treatment measures have been tried or considered and deemed clinically ineffective. Yes      Ambulatory referral/consult to Home Health   Standing Status: Future   Referral Priority: Routine Referral Type: Home Health Care   Referral Reason: Specialty Services Required   Requested Specialty: Home Health Services   Number of Visits Requested: 1     Diet Adult Regular   Order Comments: NECTAR THICK LIQUIDS  REC: Regular solids/Nectar thick liquids given 1:1 assist, slow rate of intake, multiple swallows, assistance with thickening liquids, upright positioning, and all other standard aspiration " precautions     Notify your health care provider if you experience any of the following:  temperature >100.4     Notify your health care provider if you experience any of the following:  difficulty breathing or increased cough     Activity as tolerated       Significant Diagnostic Studies: Labs:   BMP: No results for input(s): GLU, NA, K, CL, CO2, BUN, CREATININE, CALCIUM, MG in the last 48 hours., CMP No results for input(s): NA, K, CL, CO2, GLU, BUN, CREATININE, CALCIUM, PROT, ALBUMIN, BILITOT, ALKPHOS, AST, ALT, ANIONGAP, ESTGFRAFRICA, EGFRNONAA in the last 48 hours., CBC No results for input(s): WBC, HGB, HCT, PLT in the last 48 hours., A1C:   Recent Labs   Lab 11/09/19  2339 02/21/20  1419   HGBA1C 6.4* 6.8*    and All labs within the past 24 hours have been reviewed  Microbiology: COVID negative 4-  Radiology: X-Ray Chest 1 View  Narrative: EXAMINATION:  XR CHEST 1 VIEW    CLINICAL HISTORY:  Cough    TECHNIQUE:  Single frontal view of the chest was performed.    COMPARISON:  Radiograph 04/02/2020.    FINDINGS:  Cardiac monitoring leads project over the bilateral hemithoraces.  Mediastinal structures are midline.  Hilar contours are unremarkable.  Cardiac silhouette is normal in size.  Lung volumes are normal and symmetric.  There is persistent patchy ground-glass attenuation with scattered reticulonodular opacities within the bilateral lower lung zones, left greater than right zone, not significantly changed when compared to the previous exam and likely related to the patient's suspected small airways infectious versus inflammatory process.  No new areas of consolidation.  No pneumothorax or large pleural effusion.  No free air beneath the diaphragm.  Degenerative changes of the spine and glenohumeral joints noted.  No acute osseous abnormalities.  Visualized soft tissues are unremarkable.  Impression: 1. No interval detrimental change when compared to radiograph dated 04/02/2020.    Electronically  signed by: Sen Weber MD  Date:    04/19/2020  Time:    10:04         Pending Diagnostic Studies:     Procedure Component Value Units Date/Time    Fl Modified Barium Swallow Speech [[id number]] Resulted:  04/21/20 0949    Order Status:  Sent Lab Status:  In process Updated:  04/21/20 1008         Medications:  Reconciled Home Medications:      Medication List      START taking these medications    amoxicillin-clavulanate 875-125mg 875-125 mg per tablet  Commonly known as:  AUGMENTIN  Take 1 tablet by mouth every 12 (twelve) hours. for 21 days        CHANGE how you take these medications    tazarotene 0.1 % cream  Commonly known as:  AVAGE  Apply topically every evening.  What changed:    · when to take this  · reasons to take this        CONTINUE taking these medications    ACIDOPHILUS ORAL  Take 1 tablet by mouth once daily.     aspirin 81 MG Chew  Take 81 mg by mouth every Mon, Wed, Fri.     atorvastatin 20 MG tablet  Commonly known as:  LIPITOR  TAKE 1 TABLET EVERY DAY     cetirizine 10 MG tablet  Commonly known as:  ZYRTEC  Take 1 tablet (10 mg total) by mouth once daily.     clopidogreL 75 mg tablet  Commonly known as:  PLAVIX  Take 1 tablet (75 mg total) by mouth once daily.     cyanocobalamin (vitamin B-12) 5,000 mcg Tbdl  Take 1 tablet by mouth once daily.     cycloSPORINE 0.05 % ophthalmic emulsion  Commonly known as:  RESTASIS  Place 0.4 mLs (1 drop total) into both eyes 2 (two) times daily.     FISH OIL ORAL  Take 5 capsules by mouth once daily.     fluorouraciL 5 % cream  Commonly known as:  EFUDEX  Use hs for 2 weeks on right cheek     fluticasone propionate 50 mcg/actuation nasal spray  Commonly known as:  FLONASE  2 sprays (100 mcg total) by Each Nostril route once daily.     furosemide 20 MG tablet  Commonly known as:  LASIX  Take 20 mg by mouth daily as needed.     hydrocodone-chlorpheniramine 10-8 mg/5 mL suspension  Commonly known as:  TUSSIONEX  Take 5 mLs by mouth every 12 (twelve) hours as  needed for Cough.     ipratropium 0.03 % nasal spray  Commonly known as:  ATROVENT  2 sprays by Nasal route 2 (two) times daily.     levothyroxine 125 MCG tablet  Commonly known as:  SYNTHROID  TAKE 1 TABLET ONE TIME DAILY     losartan 25 MG tablet  Commonly known as:  COZAAR  Take 1 tablet (25 mg total) by mouth once daily.     multivitamin with minerals tablet  Take 1 tablet by mouth once daily.     mupirocin 2 % ointment  Commonly known as:  BACTROBAN  Apply to affected area 3 times daily     pantoprazole 40 MG tablet  Commonly known as:  PROTONIX  TAKE 1 TABLET EVERY DAY     EDEN-COLACE ORAL  Take 1 tablet by mouth once daily.     * predniSONE 2.5 MG tablet  Commonly known as:  DELTASONE  Take 1 tablet (2.5 mg total) by mouth once daily. 5mg and 2.5mg qd     * predniSONE 5 MG tablet  Commonly known as:  DELTASONE  Take 1 tablet (5 mg total) by mouth once daily.     ROBITUSSIN COUGH & COLD CF MAX ORAL  Take 15 mLs by mouth 2 (two) times daily.     SYMBICORT 160-4.5 mcg/actuation Hfaa  Generic drug:  budesonide-formoterol 160-4.5 mcg  Inhale 1 puff into the lungs as needed.     testosterone cypionate 100 mg/mL injection  Commonly known as:  DEPOTESTOTERONE CYPIONATE  Inject 0.5 mLs (50 mg total) into the muscle every 14 (fourteen) days.     VEMLIDY 25 mg Tab  Generic drug:  tenofovir alafenamide  Take 1 tablet (25 mg total) by mouth once daily.     VENTOLIN HFA 90 mcg/actuation inhaler  Generic drug:  albuterol  Inhale 1 puff into the lungs as needed.         * This list has 2 medication(s) that are the same as other medications prescribed for you. Read the directions carefully, and ask your doctor or other care provider to review them with you.                Indwelling Lines/Drains at time of discharge:   Lines/Drains/Airways     None                 Time spent on the discharge of patient: 40 minutes  Patient was seen and examined on the date of discharge and determined to be suitable for discharge.          Isidra Newton MD  Department of Hospital Medicine  [facility]

## 2020-04-21 NOTE — SUBJECTIVE & OBJECTIVE
Medications:  Continuous Infusions:  Scheduled Meds:   amoxicillin-clavulanate 875-125mg  1 tablet Oral Q12H    aspirin  81 mg Oral Every Mon, Wed, Fri    atorvastatin  20 mg Oral Daily    cetirizine  10 mg Oral Daily    clopidogreL  75 mg Oral Daily    fluticasone propionate  2 spray Each Nostril Daily    levothyroxine  125 mcg Oral Before breakfast    pantoprazole  40 mg Oral Daily    predniSONE  7.5 mg Oral Daily    tenofovir alafenamide  25 mg Oral Daily with dinner     PRN Meds:acetaminophen, acetaminophen, Dextrose 10% Bolus, Dextrose 10% Bolus, glucagon (human recombinant), glucose, glucose, insulin aspart U-100, ondansetron, sodium chloride 0.9%     No current facility-administered medications on file prior to encounter.      Current Outpatient Medications on File Prior to Encounter   Medication Sig    aspirin 81 MG Chew Take 81 mg by mouth every Mon, Wed, Fri.    atorvastatin (LIPITOR) 20 MG tablet TAKE 1 TABLET EVERY DAY    cetirizine (ZYRTEC) 10 MG tablet Take 1 tablet (10 mg total) by mouth once daily.    clopidogrel (PLAVIX) 75 mg tablet Take 1 tablet (75 mg total) by mouth once daily.    cyanocobalamin, vitamin B-12, 5,000 mcg TbDL Take 1 tablet by mouth once daily.    cycloSPORINE (RESTASIS) 0.05 % ophthalmic emulsion Place 0.4 mLs (1 drop total) into both eyes 2 (two) times daily.    DOCOSAHEXANOIC ACID/EPA (FISH OIL ORAL) Take 5 capsules by mouth once daily.     fluorouracil (EFUDEX) 5 % cream Use hs for 2 weeks on right cheek    fluticasone propionate (FLONASE) 50 mcg/actuation nasal spray 2 sprays (100 mcg total) by Each Nostril route once daily.    furosemide (LASIX) 20 MG tablet Take 20 mg by mouth daily as needed.     guaifen/dextromethorphan/pe (ROBITUSSIN COUGH & COLD CF MAX ORAL) Take 15 mLs by mouth 2 (two) times daily.    hydrocodone-chlorpheniramine (TUSSIONEX) 10-8 mg/5 mL suspension Take 5 mLs by mouth every 12 (twelve) hours as needed for Cough.    ipratropium  (ATROVENT) 0.03 % nasal spray 2 sprays by Nasal route 2 (two) times daily.    Lactobacillus acidophilus (ACIDOPHILUS ORAL) Take 1 tablet by mouth once daily.    levothyroxine (SYNTHROID) 125 MCG tablet TAKE 1 TABLET ONE TIME DAILY    losartan (COZAAR) 25 MG tablet Take 1 tablet (25 mg total) by mouth once daily.    multivitamin with minerals tablet Take 1 tablet by mouth once daily.    mupirocin (BACTROBAN) 2 % ointment Apply to affected area 3 times daily    pantoprazole (PROTONIX) 40 MG tablet TAKE 1 TABLET EVERY DAY    predniSONE (DELTASONE) 2.5 MG tablet Take 1 tablet (2.5 mg total) by mouth once daily. 5mg and 2.5mg qd    predniSONE (DELTASONE) 5 MG tablet Take 1 tablet (5 mg total) by mouth once daily.    sennosides/docusate sodium (EDEN-COLACE ORAL) Take 1 tablet by mouth once daily.    tenofovir alafenamide (VEMLIDY) 25 mg Tab Take 1 tablet (25 mg total) by mouth once daily.    testosterone cypionate (DEPOTESTOTERONE CYPIONATE) 100 mg/mL injection Inject 0.5 mLs (50 mg total) into the muscle every 14 (fourteen) days.    VENTOLIN HFA 90 mcg/actuation inhaler Inhale 1 puff into the lungs as needed.    SYMBICORT 160-4.5 mcg/actuation HFAA Inhale 1 puff into the lungs as needed.    tazarotene (AVAGE) 0.1 % cream Apply topically every evening. (Patient taking differently: Apply topically as needed. )       Review of patient's allergies indicates:   Allergen Reactions    Ace inhibitors Swelling     angioedema    Divalproex Hives     Rash under arms, body creases       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    Diabetes mellitus, type 2     Encounter for blood transfusion     Hyperlipidemia     Hypertension     Hypopituitarism      Hypothyroid     Immunosuppression 3/13/2015    Liver fibrosis (?) 2/24/2020    Pneumonia of both lungs due to infectious organism 7/19/2019    Pneumonia of left lower lobe due to Pseudomonas species 10/11/2019    Squamous Cell Carcinoma     on the right side of the face     Squamous cell carcinoma of skin of right temple 1/27/2016    Stroke     Syncope 2/12    Unspecified disorder of kidney and ureter      Past Surgical History:   Procedure Laterality Date    BRONCHOSCOPY N/A 1/22/2020    Procedure: Bronchoscopy;  [facility];  Location: [facility] OR [address] [place] [address];  Service: Anesthesiology;  Laterality: N/A;    CAROTID ENDARTERECTOMY (L)  2/27/2007    EXCISION TURBINATE, SUBMUCOUS      FESS  9/16/2014    Mohs excision   3/23/2015, 2/24/2016    combined with WLE forehead    NASAL SEPTUM SURGERY  9/16/2014    right ureter surgery  1995    SENTINEL LYMPH NODE BIOPSY  3/23/2015, 2/24/2016    SINUS SURGERY  9/16/2014    septo, ESS, BITSMR    Transphenoidal surgery       Family History     Problem Relation (Age of Onset)    Cancer Mother, Father, Brother    Colon cancer Father    No Known Problems Sister, Maternal Aunt, Maternal Uncle, Paternal Aunt, Paternal Uncle, Maternal Grandmother, Maternal Grandfather, Paternal Grandmother, Paternal Grandfather        Tobacco Use    Smoking status: Never Smoker    Smokeless tobacco: Never Used   Substance and Sexual Activity    Alcohol use: Not Currently     Frequency: Never     Drinks per session: Patient refused     Binge frequency: Patient refused    Drug use: No    Sexual activity: Not Currently     Review of Systems   HENT: Positive for congestion, postnasal drip, rhinorrhea and sinus pressure. Negative for ear pain.    Eyes: Negative for pain and visual disturbance.   Respiratory: Positive for cough.    Cardiovascular: Negative for chest pain.   Gastrointestinal: Negative for nausea and vomiting.   Musculoskeletal: Negative for neck pain and  neck stiffness.   Skin: Negative for rash and wound.   Allergic/Immunologic: Positive for immunocompromised state.   Neurological: Negative for dizziness, light-headedness and headaches.   Psychiatric/Behavioral: Negative for confusion. The patient is not nervous/anxious.      Objective:     Vital Signs (Most Recent):  Temp: 97.6 °F (36.4 °C) (04/21/20 0728)  Pulse: 66 (04/21/20 0728)  Resp: 17 (04/21/20 0728)  BP: (!) 181/82 (04/21/20 0728)  SpO2: 95 % (04/21/20 0728) Vital Signs (24h Range):  Temp:  [96.4 °F (35.8 °C)-97.9 °F (36.6 °C)] 97.6 °F (36.4 °C)  Pulse:  [66-96] 66  Resp:  [17-30] 17  SpO2:  [87 %-95 %] 95 %  BP: (132-181)/(62-82) 181/82     Weight: 66.2 kg (146 lb)  Body mass index is 20.36 kg/m².        Physical Exam   Constitutional: He is oriented to person, place, and time. He appears well-developed and well-nourished. No distress.   HENT:   Head: Normocephalic and atraumatic.   Right Ear: External ear normal.   Left Ear: External ear normal.   Nose: Mucosal edema, rhinorrhea (see scope exam) and septal deviation (left caudal deviation causing significant obstruction) present. No epistaxis.   Mouth/Throat: Oropharynx is clear and moist.   Right temple with well healed scar from prior excision    Eyes: Pupils are equal, round, and reactive to light. EOM are normal.   Neck: Normal range of motion. Neck supple.   Cardiovascular: Normal rate and regular rhythm.   Pulmonary/Chest: Effort normal. No stridor. He has no wheezes.   Lymphadenopathy:     He has no cervical adenopathy.   Neurological: He is alert and oriented to person, place, and time. No cranial nerve deficit.   Skin: Skin is warm and dry.       Significant Labs:  All pertinent labs from the last 24 hours have been reviewed.    Significant Diagnostics:  No recent Sinus CT. Head CT from 12/2019 does show CRS

## 2020-04-22 ENCOUNTER — OFFICE VISIT (OUTPATIENT)
Dept: INTERNAL MEDICINE | Facility: CLINIC | Age: 85
End: 2020-04-22
Payer: MEDICARE

## 2020-04-22 DIAGNOSIS — R05.9 COUGH: ICD-10-CM

## 2020-04-22 DIAGNOSIS — J32.9 SINUSITIS, UNSPECIFIED CHRONICITY, UNSPECIFIED LOCATION: ICD-10-CM

## 2020-04-22 DIAGNOSIS — J69.0 ASPIRATION PNEUMONIA, UNSPECIFIED ASPIRATION PNEUMONIA TYPE, UNSPECIFIED LATERALITY, UNSPECIFIED PART OF LUNG: Primary | ICD-10-CM

## 2020-04-22 PROCEDURE — 1101F PR PT FALLS ASSESS DOC 0-1 FALLS W/OUT INJ PAST YR: ICD-10-PCS | Mod: CPTII,95,, | Performed by: INTERNAL MEDICINE

## 2020-04-22 PROCEDURE — 1159F MED LIST DOCD IN RCRD: CPT | Mod: 95,,, | Performed by: INTERNAL MEDICINE

## 2020-04-22 PROCEDURE — 99213 OFFICE O/P EST LOW 20 MIN: CPT | Mod: 95,,, | Performed by: INTERNAL MEDICINE

## 2020-04-22 PROCEDURE — 99213 PR OFFICE/OUTPT VISIT, EST, LEVL III, 20-29 MIN: ICD-10-PCS | Mod: 95,,, | Performed by: INTERNAL MEDICINE

## 2020-04-22 PROCEDURE — 1159F PR MEDICATION LIST DOCUMENTED IN MEDICAL RECORD: ICD-10-PCS | Mod: 95,,, | Performed by: INTERNAL MEDICINE

## 2020-04-22 PROCEDURE — G0180 PR HOME HEALTH MD CERTIFICATION: ICD-10-PCS | Mod: ,,, | Performed by: INTERNAL MEDICINE

## 2020-04-22 PROCEDURE — G0180 MD CERTIFICATION HHA PATIENT: HCPCS | Mod: ,,, | Performed by: INTERNAL MEDICINE

## 2020-04-22 PROCEDURE — 1101F PT FALLS ASSESS-DOCD LE1/YR: CPT | Mod: CPTII,95,, | Performed by: INTERNAL MEDICINE

## 2020-04-23 ENCOUNTER — TELEPHONE (OUTPATIENT)
Dept: INFECTIOUS DISEASES | Facility: CLINIC | Age: 85
End: 2020-04-23

## 2020-04-23 ENCOUNTER — TELEPHONE (OUTPATIENT)
Dept: OTOLARYNGOLOGY | Facility: CLINIC | Age: 85
End: 2020-04-23

## 2020-04-23 LAB
BACTERIA BLD CULT: NORMAL
BACTERIA BLD CULT: NORMAL
BACTERIA SPEC AEROBE CULT: ABNORMAL

## 2020-04-23 NOTE — PROGRESS NOTES
The patient location is: home. [place]  The chief complaint leading to consultation is: cough  Visit type: audiovisual  Total time spent with patient: 15 min  Each patient to whom he or she provides medical services by telemedicine is:  (1) informed of the relationship between the physician and patient and the respective role of any other health care provider with respect to management of the patient; and (2) notified that he or she may decline to receive medical services by telemedicine and may withdraw from such care at any time.    History of present illness:  86-year-old gentleman audiovisual visit today.  Recently admitted to the hospital for possible aspiration pneumonia.  Describes persistent cough and a Love mucus production.  Felt to have sinusitis by ENT in the hospital.  Placed on outpatient or biotics has follow-up in that regard.  Also was seen by speech therapy and a modified barium swallow study was ordered which is fairly unremarkable.  He currently denies any fever chills body aches.  Cough persists but is not severe.  Mucus is thick.  Could not clarify definitively with with his home regimen on our records or with discussion with the patient's wife that he is on expectorant.  Denies being short of breath.    Current medications:  All medications are noted and reviewed in electronic medical record medication list.    Review of systems:  Constitutional:  No fever no chills.  GI:  No nausea no vomiting.  No overt GERD symptoms.  No diarrhea.  Cardiovascular:  No chest pain palpitations syncope.    Past medical history, past surgical history, family medical history social history is are all noted and reviewed in the electronic medical record history sections.    Visual inspection:  The patient is awake alert in no acute distress.  He does not appear short of breath.  Speaks articulately in full sentences with no obvious cognitive impairment.    Data:  Reviewed the recent hospital discharge summary  radiographic in other reports    Impression:  Sinusitis  Possible aspiration pneumonia probably multifactorial    Recommendation:  Continue oral antibiotics and follow-up with ENT and pulmonology as planned.  Discussed obtaining guaifenesin preparation in use regularly .

## 2020-04-23 NOTE — TELEPHONE ENCOUNTER
Spoke to patient's wife Lorin to let her know about his culture results. Growing Serratia again (has had positive sinus culture as well as respiratory cultures in the past). This time resistant to oral antibiotics. He has had to have a PICC line in the past. Will get him back in to see Infectious Disease (Dr Banerjee) and will get him back on nasal antibiotic rinses.

## 2020-04-23 NOTE — TELEPHONE ENCOUNTER
----- Message from Luis Fernando Banerjee MD sent at 4/23/2020  3:21 PM CDT -----  sondra please add on telemed soon thanks.  ----- Message -----  From: Gloria Murillo MD  Sent: 4/23/2020   3:12 PM CDT  To: Luis Fernando Banerjee MD    Good afternoon Dr Banerjee-     Wanted to touch base regarding a patient that you saw back in October. He has had several episodes of pneumonia (grown out pseudomonas and serratia in the past). On his recent hospital admission, I (ENT) was consulted for chronic sinusitis. He did have purulent drainage in both nares that I was able to culture. It is growing serratia again. He was discharge on Augmentin which is obviously not adequate. Was hoping we could get him back in with you. Im going to get him some antibiotic sinus rinses [facility] as [facility].    Thanks    Lakeisha Murillo

## 2020-04-24 LAB — BACTERIA SPEC ANAEROBE CULT: NORMAL

## 2020-04-27 ENCOUNTER — PATIENT MESSAGE (OUTPATIENT)
Dept: INFECTIOUS DISEASES | Facility: CLINIC | Age: 85
End: 2020-04-27

## 2020-04-28 NOTE — TELEPHONE ENCOUNTER
Called patient wife and informed can see tomorrow at 1pm. Patient wife agreed. She also wanted  to know patient has been having fever off and on and Saturday went up to 101.9 but has come down since then. But still getting spikes of slight fever with this morning's reading being 100.3.

## 2020-04-29 ENCOUNTER — PATIENT MESSAGE (OUTPATIENT)
Dept: INFECTIOUS DISEASES | Facility: CLINIC | Age: 85
End: 2020-04-29

## 2020-04-30 ENCOUNTER — TELEPHONE (OUTPATIENT)
Dept: INFECTIOUS DISEASES | Facility: CLINIC | Age: 85
End: 2020-04-30

## 2020-04-30 NOTE — TELEPHONE ENCOUNTER
Lupe called from [employer] and stated patient wants an order to go to  stating they need a nurse to go out everyday to help with iv abx. Please advise.

## 2020-05-03 ENCOUNTER — PATIENT MESSAGE (OUTPATIENT)
Dept: INFECTIOUS DISEASES | Facility: CLINIC | Age: 85
End: 2020-05-03

## 2020-05-04 ENCOUNTER — PATIENT MESSAGE (OUTPATIENT)
Dept: INFECTIOUS DISEASES | Facility: CLINIC | Age: 85
End: 2020-05-04

## 2020-05-04 ENCOUNTER — TELEPHONE (OUTPATIENT)
Dept: INFECTIOUS DISEASES | Facility: CLINIC | Age: 85
End: 2020-05-04

## 2020-05-04 NOTE — TELEPHONE ENCOUNTER
----- Message from Catalina Monge sent at 5/4/2020  1:50 PM CDT -----  Contact: Mrs. Ramos    tl:   [phone]  Caller says she called earlier today.    Cannot bring pt. In.   Pt. Is bed-ridden.    Has a  Home Health nurse ([facility]).  The nurse will come tomorrow.   Pls get the orders to the nurse so she can draw the labs tomorrow when she come to see pt..      Wife says she left all the info this a.m. In her msg.

## 2020-05-05 ENCOUNTER — TELEPHONE (OUTPATIENT)
Dept: INFECTIOUS DISEASES | Facility: CLINIC | Age: 85
End: 2020-05-05

## 2020-05-05 ENCOUNTER — HOSPITAL ENCOUNTER (EMERGENCY)
Facility: HOSPITAL | Age: 85
Discharge: HOME | End: 2020-05-05
Attending: EMERGENCY MEDICINE
Payer: MEDICARE

## 2020-05-05 ENCOUNTER — LAB VISIT (OUTPATIENT)
Dept: LAB | Facility: HOSPITAL | Age: 85
End: 2020-05-05
Attending: INTERNAL MEDICINE
Payer: MEDICARE

## 2020-05-05 VITALS
HEART RATE: 64 BPM | TEMPERATURE: 98 F | SYSTOLIC BLOOD PRESSURE: 124 MMHG | RESPIRATION RATE: 18 BRPM | DIASTOLIC BLOOD PRESSURE: 63 MMHG | BODY MASS INDEX: 19.11 KG/M2 | WEIGHT: 137 LBS | OXYGEN SATURATION: 97 %

## 2020-05-05 DIAGNOSIS — S81.812A NONINFECTED SKIN TEAR OF LEFT LOWER EXTREMITY, INITIAL ENCOUNTER: ICD-10-CM

## 2020-05-05 DIAGNOSIS — J96.00 ACUTE RESPIRATORY FAILURE: Primary | ICD-10-CM

## 2020-05-05 DIAGNOSIS — S41.112A SKIN TEAR OF LEFT UPPER ARM WITHOUT COMPLICATION, INITIAL ENCOUNTER: Primary | ICD-10-CM
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
## 2020-05-05 LAB
ALBUMIN SERPL BCP-MCNC: 3.1 G/DL (ref 3.5–5.2)
ALP SERPL-CCNC: 144 U/L (ref 55–135)
ALT SERPL W/O P-5'-P-CCNC: 36 U/L (ref 10–44)
ANION GAP SERPL CALC-SCNC: 10 MMOL/L (ref 8–16)
AST SERPL-CCNC: 31 U/L (ref 10–40)
BASOPHILS # BLD AUTO: 0.06 K/UL (ref 0–0.2)
BASOPHILS NFR BLD: 0.6 % (ref 0–1.9)
BILIRUB SERPL-MCNC: 0.6 MG/DL (ref 0.1–1)
BUN SERPL-MCNC: 23 MG/DL (ref 8–23)
CALCIUM SERPL-MCNC: 9.8 MG/DL (ref 8.7–10.5)
CHLORIDE SERPL-SCNC: 103 MMOL/L (ref 95–110)
CO2 SERPL-SCNC: 27 MMOL/L (ref 23–29)
CREAT SERPL-MCNC: 0.8 MG/DL (ref 0.5–1.4)
DIFFERENTIAL METHOD: ABNORMAL
EOSINOPHIL # BLD AUTO: 0.1 K/UL (ref 0–0.5)
EOSINOPHIL NFR BLD: 0.9 % (ref 0–8)
ERYTHROCYTE [DISTWIDTH] IN BLOOD BY AUTOMATED COUNT: 15.6 % (ref 11.5–14.5)
EST. GFR  (AFRICAN AMERICAN): >60 ML/MIN/1.73 M^2
EST. GFR  (NON AFRICAN AMERICAN): >60 ML/MIN/1.73 M^2
GLUCOSE SERPL-MCNC: 117 MG/DL (ref 70–110)
HCT VFR BLD AUTO: 47 % (ref 40–54)
HGB BLD-MCNC: 14 G/DL (ref 14–18)
IMM GRANULOCYTES # BLD AUTO: 0.38 K/UL (ref 0–0.04)
IMM GRANULOCYTES NFR BLD AUTO: 3.9 % (ref 0–0.5)
LYMPHOCYTES # BLD AUTO: 2 K/UL (ref 1–4.8)
LYMPHOCYTES NFR BLD: 20.4 % (ref 18–48)
MCH RBC QN AUTO: 29.3 PG (ref 27–31)
MCHC RBC AUTO-ENTMCNC: 29.8 G/DL (ref 32–36)
MCV RBC AUTO: 98 FL (ref 82–98)
MONOCYTES # BLD AUTO: 0.8 K/UL (ref 0.3–1)
MONOCYTES NFR BLD: 8.3 % (ref 4–15)
NEUTROPHILS # BLD AUTO: 6.4 K/UL (ref 1.8–7.7)
NEUTROPHILS NFR BLD: 65.9 % (ref 38–73)
NRBC BLD-RTO: 0 /100 WBC
PLATELET # BLD AUTO: 181 K/UL (ref 150–350)
PMV BLD AUTO: 9.9 FL (ref 9.2–12.9)
POTASSIUM SERPL-SCNC: 4.4 MMOL/L (ref 3.5–5.1)
PROT SERPL-MCNC: 5.4 G/DL (ref 6–8.4)
RBC # BLD AUTO: 4.78 M/UL (ref 4.6–6.2)
SODIUM SERPL-SCNC: 140 MMOL/L (ref 136–145)
WBC # BLD AUTO: 9.69 K/UL (ref 3.9–12.7)

## 2020-05-05 PROCEDURE — 99284 EMERGENCY DEPT VISIT MOD MDM: CPT | Mod: 25,HCNC

## 2020-05-05 PROCEDURE — 85025 COMPLETE CBC W/AUTO DIFF WBC: CPT | Mod: HCNC

## 2020-05-05 PROCEDURE — 80053 COMPREHEN METABOLIC PANEL: CPT | Mod: HCNC

## 2020-05-05 RX ORDER — MUPIROCIN 20 MG/G
OINTMENT TOPICAL 2 TIMES DAILY
Qty: 15 G | Refills: 0 | Status: SHIPPED | OUTPATIENT
Start: 2020-05-05

## 2020-05-05 RX ORDER — ACETAMINOPHEN 500 MG
500 TABLET ORAL EVERY 6 HOURS PRN
Qty: 30 TABLET | Refills: 0 | Status: SHIPPED | OUTPATIENT
Start: 2020-05-05

## 2020-05-05 NOTE — TELEPHONE ENCOUNTER
----- Message from Arash Morrow MA sent at 5/4/2020  4:30 PM CDT -----  MD Arash Godinez MA Cc: Leanna Light MA  Caller: Radha rodriguez/ [facility]  tel:  [phone]          Confused, I filled out the medication and dosing and place PICC order - Leanna set this up for me Thursday.  Ill be in tomorrow to fill anything else out.   Previous Messages        ----- Message -----   From: Arash Morrow MA   Sent: 5/4/2020  10:26 AM CDT   To: Luis Fernando Banerjee MD      Can you please fill out an physician order? Thanks     ----- Message -----   From: Luis Fernando Banerjee MD   Sent: 5/4/2020   8:10 AM CDT   To: Leanna Light MA     Please fax arpan williamson needed thanks.   ----- Message -----   From: Leanna Light MA   Sent: 5/1/2020  10:16 AM CDT   To: Luis Fernando Banerjee MD         ----- Message -----   From: Catalina Monge   Sent: 5/1/2020  10:09 AM CDT   To: Chriss MEEK Staff     Pt. Started IV antibiotics with Bioscripts yesterday.   Will need orders from you for the I.V. Therapy,   Fax order to:   [phone] .

## 2020-05-05 NOTE — ED NOTES
Patient stated that he was getting into bed and he tripped over his wifes foot and fell in between the bed and the walker. Patient denies losing LOC. Patient stated his back slightly hurts and left arm, and left leg has visible skin tears. Left forearm has two skin tears, area cleaned and dressed with 4x4. Left shin has large skin tear, site cleaned and dressed with 4x4. No visible deformity noted on palpation throughout entire body. No visible areas of trauma noted.

## 2020-05-05 NOTE — TELEPHONE ENCOUNTER
Spoke with Savanah at [facility] and she says they no longer need the order for home health and the pt wife is ok with administering the medication

## 2020-05-05 NOTE — PROVIDER PROGRESS NOTES - EMERGENCY DEPT.
" Emergency Department TeleTRIAGE Encounter Note      CHIEF COMPLAINT    Chief Complaint   Patient presents with    Fall     pt was walking from his patio to the bedroom and tripped over wifes foot, pt has several skin tears noted to left forarm and elbow, complains of pain " all over" denies LOC, or hitting head        VITAL SIGNS   Initial Vitals [05/05/20 1710]   BP Pulse Resp Temp SpO2   (!) 99/59 87 17 98.1 °F (36.7 °C) 98 %      MAP       --            ALLERGIES    Review of patient's allergies indicates:   Allergen Reactions    Ace inhibitors Swelling     angioedema    Divalproex Hives     Rash under arms, body creases       PROVIDER TRIAGE NOTE  Patient with past medical history diabetes, asthma, COPD, hypertension, chronic lymphocytic leukemia presents for evaluation after fall.  Patient reports tripping over his wife's foot as he was walking and falling onto his left side.  He reports "everything hurts" but most of his pain is in his left arm above his elbow.  He denies shoulder pain or neck pain.  He denies LOC or head injury at the time of the fall.  Patient reports having several abrasions to the left arm.  He is currently taking Plavix.      ORDERS  Labs Reviewed - No data to display    ED Orders (720h ago, onward)    Start Ordered     Status Ordering Provider    05/05/20 1718 05/05/20 1717  X-Ray Humerus 2 View Left  1 time imaging      Ordered PAPA RAZO            Virtual Visit Note: The provider triage portion of this emergency department evaluation and documentation was performed via [employer], a HIPAA-compliant telemedicine application, in concert with a tele-presenter in the room. A face to face patient evaluation with one of my colleagues will occur once the patient is placed in an emergency department room.      DISCLAIMER: This note was prepared with [employer] voice recognition transcription software. Garbled syntax, mangled pronouns, and other bizarre constructions may be attributed " to that software system.

## 2020-05-06 NOTE — ED PROVIDER NOTES
"Encounter Date: 5/5/2020       History     Chief Complaint   Patient presents with    Fall     pt was walking from his patio to the bedroom and tripped over wifes foot, pt has several skin tears noted to left forarm and elbow, complains of pain " all over" denies LOC, or hitting head      The patient is an 86-year-old male with a past medical history of actinic keratosis, anemia, long-term anticoagulant use, asthma, basal cell carcinoma, chronic lymphocytic leukemia, COPD, diabetes, hyperlipidemia, hypertension, hypothyroid, hypopituitarism, liver fibrosis, squamous cell carcinoma, and stroke who presents to the ED for further evaluation of left leg and left arm injuries following a fall that occurred just prior to arrival.  Patient reports that he tripped over his wife's shoe while walking inside the house from the patio.  Denies dizziness, chest pain, or shortness of breath that preempted the accident.  He denies significant head injury, although wife states that she saw him strike his head.  No loss of consciousness.  Patient denies neck pain.  Tdap is up-to-date.  No treatment attempted prior to arrival.    The history is provided by the patient and the spouse.     Review of patient's allergies indicates:   Allergen Reactions    Ace inhibitors Swelling     angioedema    Divalproex Hives     Rash under arms, body creases     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    Diabetes mellitus, type 2     Encounter for blood transfusion     Hyperlipidemia     Hypertension     Hypopituitarism     Hypothyroid     Immunosuppression 3/13/2015    Liver fibrosis (?) 2/24/2020    Pneumonia of both lungs due to infectious organism 7/19/2019    Pneumonia of " left lower lobe due to Pseudomonas species 10/11/2019    Squamous Cell Carcinoma     on the right side of the face     Squamous cell carcinoma of skin of right temple 1/27/2016    Stroke     Syncope 2/12    Unspecified disorder of kidney and ureter      Past Surgical History:   Procedure Laterality Date    BRONCHOSCOPY N/A 1/22/2020    Procedure: Bronchoscopy;  Surgeon: Fabian Diagnostic Provider;  Location: [facility] OR [address] [place] [address];  Service: Anesthesiology;  Laterality: N/A;    CAROTID ENDARTERECTOMY (L)  2/27/2007    EXCISION TURBINATE, SUBMUCOUS      FESS  9/16/2014    Mohs excision   3/23/2015, 2/24/2016    combined with WLE forehead    NASAL SEPTUM SURGERY  9/16/2014    right ureter surgery  1995    SENTINEL LYMPH NODE BIOPSY  3/23/2015, 2/24/2016    SINUS SURGERY  9/16/2014    septo, ESS, BITSMR    Transphenoidal surgery       Family History   Problem Relation Age of Onset    Cancer Mother         breast    Colon cancer Father     Cancer Father         colon    Cancer Brother         leukemia    No Known Problems Sister     No Known Problems Maternal Aunt     No Known Problems Maternal Uncle     No Known Problems Paternal Aunt     No Known Problems Paternal Uncle     No Known Problems Maternal Grandmother     No Known Problems Maternal Grandfather     No Known Problems Paternal Grandmother     No Known Problems Paternal Grandfather     Amblyopia Neg Hx     Blindness Neg Hx     Cataracts Neg Hx     Diabetes Neg Hx     Glaucoma Neg Hx     Hypertension Neg Hx     Macular degeneration Neg Hx     Retinal detachment Neg Hx     Strabismus Neg Hx     Stroke Neg Hx     Thyroid disease Neg Hx     Allergic rhinitis Neg Hx     Allergies Neg Hx     Angioedema Neg Hx     Asthma Neg Hx     Atopy Neg Hx     Eczema Neg Hx     Immunodeficiency Neg Hx     Rhinitis Neg Hx     Urticaria Neg Hx      Social History     Tobacco Use    Smoking status: Never Smoker    Smokeless tobacco: Never  Used   Substance Use Topics    Alcohol use: Not Currently     Frequency: Never     Drinks per session: Patient refused     Binge frequency: Patient refused    Drug use: No     Review of Systems   Constitutional: Negative for fever.   HENT: Negative for sore throat.    Respiratory: Negative for shortness of breath.    Cardiovascular: Negative for chest pain.   Gastrointestinal: Negative for nausea.   Genitourinary: Negative for dysuria.   Musculoskeletal: Negative for back pain.   Skin: Positive for wound (Skin tears to left arm and left leg). Negative for rash.   Neurological: Negative for dizziness, weakness and headaches.   Hematological: Does not bruise/bleed easily.       Physical Exam     Initial Vitals [05/05/20 1710]   BP Pulse Resp Temp SpO2   (!) 99/59 87 17 98.1 °F (36.7 °C) 98 %      MAP       --         Physical Exam    Nursing note and vitals reviewed.  Constitutional: He appears well-developed and well-nourished.  Non-toxic appearance. No distress.   The patient is alert, oriented, and nontoxic appearing.  No apparent distress.   HENT:   Head: Normocephalic and atraumatic.   Right Ear: Hearing and external ear normal.   Left Ear: Hearing and external ear normal.   Nose: Nose abnormal.   Mouth/Throat: Mucous membranes are normal.   Eyes: Conjunctivae and EOM are normal.   Neck: Full passive range of motion without pain. Neck supple. No spinous process tenderness and no muscular tenderness present.   No midline tenderness or step-off appreciated on exam.  Patient is able to rotate neck 45° in each direction without pain or difficulty.   Cardiovascular: Normal rate and normal pulses.   Pulses:       Radial pulses are 2+ on the right side, and 2+ on the left side.        Dorsalis pedis pulses are 2+ on the right side, and 2+ on the left side.   Pulmonary/Chest: Effort normal. No respiratory distress.   Musculoskeletal: Normal range of motion.        Left elbow: He exhibits normal range of motion, no  swelling, no effusion and no deformity.        Left knee: He exhibits normal range of motion, no swelling, no effusion, no ecchymosis, no deformity, no erythema and normal patellar mobility. No tenderness found.        Left forearm: He exhibits tenderness. He exhibits no bony tenderness, no swelling, no edema and no deformity.        Left lower leg: He exhibits tenderness. He exhibits no bony tenderness, no swelling, no edema and no deformity.   Superficial skin tears noted to dorsal aspect of left forearm and anterior left shin.  There is no significant swelling, erythema, crepitus, obvious deformity, or gross joint laxity noted to joints of extremities.  Bleeding controlled.   Neurological: He is alert and oriented to person, place, and time. He has normal strength.   Skin: Skin is warm, dry and intact. Capillary refill takes less than 2 seconds. No rash noted.   Psychiatric: He has a normal mood and affect. His speech is normal and behavior is normal. Judgment and thought content normal. Cognition and memory are normal.         ED Course   Procedures  Labs Reviewed - No data to display       Imaging Results          CT Head Without Contrast (In process)                CT Cervical Spine Without Contrast (In process)                X-Ray Tibia Fibula 2 View Left (Final result)  Result time 05/05/20 18:14:44    Final result by Werner Cisse MD (05/05/20 18:14:44)                 Impression:      No acute osseous abnormality identified.      Electronically signed by: Werner Cisse MD  Date:    05/05/2020  Time:    18:14             Narrative:    EXAMINATION:  XR TIBIA FIBULA 2 VIEW LEFT    CLINICAL HISTORY:  Unspecified fall, initial encounter    TECHNIQUE:  AP and lateral views of the left tibia and fibula were performed.    COMPARISON:  None.    FINDINGS:  No evidence of acute displaced fracture, dislocation, or osseous destructive process.  No abnormal widening of the ankle mortise.  Medial and lateral knee  compartment joint spaces appear fairly well preserved.  No radiopaque retained foreign body seen.  Prominent vascular calcifications are seen.                               X-Ray Humerus 2 View Left (Final result)  Result time 05/05/20 18:13:43    Final result by Joe Rivas MD (05/05/20 18:13:43)                 Impression:      No acute displaced fracture-dislocation identified.      Electronically signed by: Joe Rivas MD  Date:    05/05/2020  Time:    18:13             Narrative:    EXAMINATION:  XR HUMERUS 2 VIEW LEFT    CLINICAL HISTORY:  Unspecified fall, initial encounter    TECHNIQUE:  AP and lateral views left humerus    COMPARISON:  Chest radiograph 04/19/2020 and left shoulder series 07/19/2018    FINDINGS:  Overall alignment is within normal limits.  No displaced fracture, dislocation or destructive osseous process.  Mild degenerative changes noted about the shoulder.  No subcutaneous emphysema or radiodense retained foreign body.                                 Medical Decision Making:   History:   Old Medical Records: I decided to obtain old medical records.  Clinical Tests:   Radiological Study: Ordered and Reviewed  ED Management:  This is an emergent evaluation of an 86-year-old male who presents to the ED complaining of left arm and left leg pain following a mechanical fall that occurred just prior to arrival.  Questionable head injury.  No loss of consciousness.    Physical exam is notable for skin tears to left forearm and left shin.  No significant swelling, erythema, ecchymosis, crepitus, or obvious deformity on exam.  Patient reports that pain is mild and refuses offer of Tylenol.    Plain films of left forearm (including elbow) and left tib-fib are negative for acute fracture, dislocation, and subluxation.    CT of head and C-spine are also negative.    Based on history and physical exam, as well as diagnostic results, I considered but do not suspect traumatic brain injury, C-spine  injury, skull fracture, or significant musculoskeletal injury.  Clinical presentation suggests contusion with superficial skin tears.  Tdap is up-to-date.  Patient will be discharged with prescriptions for Tylenol and topical antibiotic ointment.  I recommended close outpatient follow-up with PCP.  All questions answered.  Strict return precautions have been discussed.                                     Clinical Impression:       ICD-10-CM ICD-9-CM   1. Skin tear of left upper arm without complication, initial encounter S41.112A 880.03   2. Fall W19.XXXA E888.9   3. Noninfected skin tear of left lower extremity, initial encounter S81.812A 891.0                                Renay Keene NP  05/05/20 2075

## 2020-05-06 NOTE — ED NOTES
Assumed care of patient from Gabi MANJARREZ RN. Pt is aa&o x4. Pt states that he does not want a CT scan of his head because is sure that he did not hit his head when he fell. Pt requests to speak with provider regarding POC. Provider updated, to bedside to discuss POC.

## 2020-05-06 NOTE — DISCHARGE INSTRUCTIONS
Thank you for allowing me to care for you today.  I hope our treatment plan will make you feel better in the next few days.  In order for me to take better care of my future patients and improve our Emergency Department, I would appreciate if you can provide us with feedback.  In the next few days, you may receive a survey in the mail.  If you do, it would mean a great deal to me if you would please take the time to complete it.    Thank you and I hope you feel better.  Renay Keene NP

## 2020-05-07 ENCOUNTER — TELEPHONE (OUTPATIENT)
Dept: INFECTIOUS DISEASES | Facility: CLINIC | Age: 85
End: 2020-05-07

## 2020-05-07 NOTE — TELEPHONE ENCOUNTER
Spoke to wife, hallucinations improving. Home health vitals stable.  Holding ertapenem.  PICC still in, will likely pull soon if no further abx will be used for sinus infection.

## 2020-05-07 NOTE — TELEPHONE ENCOUNTER
----- Message from Luis Fernando Banerjee MD sent at 5/7/2020 10:19 AM CDT -----  Contact: Mrs. Ramos     tel:    [phone]   Not yet, we may decide to give more of a different antibiotic.  Lets see.  Also tell him to hydrate well.  ----- Message -----  From: Leanna Light MA  Sent: 5/7/2020  10:16 AM CDT  To: Luis Fernando Banerjee MD    Do you want the line removed  ----- Message -----  From: Luis Fernando aBnerjee MD  Sent: 5/7/2020  10:14 AM CDT  To: Leanna Light MA    Most likely from the ertapenem.  Please stop it thanks.  ----- Message -----  From: Leanna Light MA  Sent: 5/7/2020   9:57 AM CDT  To: Luis Fernando Banerjee MD        ----- Message -----  From: Catalina Monge  Sent: 5/7/2020   9:29 AM CDT  To: Chriss MEEK Staff    Caller says pt. Is on the  Antibiotic and he is hallucinating this a.m..    Mumbling in his sleep.   Has had 5 instances where he seeing people, thinks something is burning in the house.   Pls call asap, as per wife.

## 2020-05-07 NOTE — TELEPHONE ENCOUNTER
Pt started ertapenem 1g q24 on 4/30, he now is having hallucinations.  Suspect related to ertapenem, will hold further doses at this time and monitor off of therapy.  Has had over 7 days of therapy.

## 2020-05-07 NOTE — TELEPHONE ENCOUNTER
Spoke with Yolie Ramos, pt wife, and informed her the pt will stop abx per Dr. Banerjee and to make sure the pt stays hydrated. Also Mariposa from [employer] is calling the pt to give her cath care instructions right now

## 2020-05-07 NOTE — TELEPHONE ENCOUNTER
----- Message from Luis Fernando Banerjee MD sent at 5/7/2020 10:14 AM CDT -----  Contact: Mrs. Ramos     tel:    [phone]   Most likely from the ertapenem.  Please stop it thanks.  ----- Message -----  From: Leanna Light MA  Sent: 5/7/2020   9:57 AM CDT  To: Luis Fernando Banerjee MD        ----- Message -----  From: Catalina Monge  Sent: 5/7/2020   9:29 AM CDT  To: Chriss MEEK Staff    Caller says pt. Is on the  Antibiotic and he is hallucinating this a.m..    Mumbling in his sleep.   Has had 5 instances where he seeing people, thinks something is burning in the house.   Pls call asap, as per wife.

## 2020-05-07 NOTE — TELEPHONE ENCOUNTER
----- Message from Catalina Mariposa sent at 5/7/2020  9:29 AM CDT -----  Contact: Mrs. Ramos     tel:    [phone]   Caller says pt. Is on the  Antibiotic and he is hallucinating this a.m..    Mumbling in his sleep.   Has had 5 instances where he seeing people, thinks something is burning in the house.   Pls call asap, as per wife.

## 2020-05-08 ENCOUNTER — TELEPHONE (OUTPATIENT)
Dept: INTERNAL MEDICINE | Facility: CLINIC | Age: 85
End: 2020-05-08

## 2020-05-08 ENCOUNTER — TELEPHONE (OUTPATIENT)
Dept: INFECTIOUS DISEASES | Facility: CLINIC | Age: 85
End: 2020-05-08

## 2020-05-08 NOTE — TELEPHONE ENCOUNTER
----- Message from Nelly Chambers sent at 5/8/2020  8:44 AM CDT -----  Contact: Cyrus/[facility]/[phone]  Patient had a fall out the bed on 5/5/20 and went to E.R. Patient is confused at [facility].

## 2020-05-08 NOTE — TELEPHONE ENCOUNTER
----- Message from Elsie Hunt sent at 5/8/2020 11:01 AM CDT -----  Contact: Isela (heather)  Patient Advice/Staff Message     Caller name: Isela     Reason for call: Wants to discuss pt care     Do you feel you need to be seen today:: No        Communication Preference: [phone]    Additional Information:

## 2020-05-08 NOTE — TELEPHONE ENCOUNTER
Called mallika from [employer] and informed ok to pull line per . Called Isela (Patient heather) and informed will be removing line that  is not recommending any iv abx therapy at this time. Heather understands.  
Detail Level: Generalized
Detail Level: Zone

## 2020-05-08 NOTE — TELEPHONE ENCOUNTER
Called teresa back and she stated he cannot get up, seeing things still, sometimes mentally there and sometimes not. Does he need to do a Virtual visit with ? Home health nurse told them today that patient need to be evaluated for home health/hospice for palliative care but unable to do anything till MD team hears from  if he is going to put back on iv abx or have line pulled? Informed daughter will get information over to dr.hand and will call her back as soon as he answers as well as will document in chart so all his physicians are able to see. Please advise.

## 2020-05-08 NOTE — TELEPHONE ENCOUNTER
Spoke with pt's niece to advise.    Verbalized understanding.  They will discuss as a family and then make a decision.

## 2020-05-08 NOTE — TELEPHONE ENCOUNTER
Spoke with pt's niece, Isela, and she and the family believe that pt needs palliative care.    They have spoken to [facility] and the  nurse advises that palliative and hospice care may be the best option due to the pt's decline.    He is no longer receiving IV therapy as the medication was causing hallucinations.    The family have also been in contact with Dr. Banerjee (ID-whose treating the MRSA).    He has been off of IV therapy for the last 24 hours and noted that the pt is talking with persons not there and reaching for things in the air that are not there.    It is not believed that he has a UTI. Pt is not eating any solid foods and today has only had a pop sicle and gatorade with water.    Her aunt is in need of help at home.    Please advise of your recommendations.    Thanks.

## 2020-05-08 NOTE — TELEPHONE ENCOUNTER
----- Message from Maru Allen sent at 5/8/2020 11:32 AM CDT -----  Type:  Needs Medical Advice    Who Called:Isela     Would the patient rather a call back or a response via [facility]? Call back     Best Call Back Number: [phone]    Additional Information: Isela Crook would like to speak with the nurse about getting the pt some home help. She do believe the pt is declining and may need hospices.

## 2020-05-08 NOTE — TELEPHONE ENCOUNTER
I am not really sure what is best at this time.  He has been receiving active therapy by other physicians and I am not sure what is specifically causing his deterioration.  If active treatment is going to continue he probably needs to be seen in hosp-ED.  If therap is going to be withheld then palliative care might be an option

## 2020-05-11 ENCOUNTER — DOCUMENT SCAN (OUTPATIENT)
Dept: HOME HEALTH SERVICES | Facility: HOSPITAL | Age: 85
End: 2020-05-11
Payer: MEDICARE

## 2020-05-11 ENCOUNTER — EXTERNAL HOME HEALTH (OUTPATIENT)
Dept: HOME HEALTH SERVICES | Facility: HOSPITAL | Age: 85
End: 2020-05-11
Payer: MEDICARE

## 2020-05-11 ENCOUNTER — TELEPHONE (OUTPATIENT)
Dept: INTERNAL MEDICINE | Facility: CLINIC | Age: 85
End: 2020-05-11

## 2020-05-11 NOTE — TELEPHONE ENCOUNTER
----- Message from Sammi Ngo sent at 5/11/2020 10:46 AM CDT -----  Contact: johanny/Jc/-[phone]  Patient past away on 05/10/20.

## 2020-05-25 ENCOUNTER — DOCUMENT SCAN (OUTPATIENT)
Dept: HOME HEALTH SERVICES | Facility: HOSPITAL | Age: 85
End: 2020-05-25

## 2020-05-25 ENCOUNTER — DOCUMENT SCAN (OUTPATIENT)
Dept: HOME HEALTH SERVICES | Facility: HOSPITAL | Age: 85
End: 2020-05-25
Payer: MEDICARE

## 2020-09-09 NOTE — TELEPHONE ENCOUNTER
----- Message from Maeve Adan sent at 9/4/2018 11:17 AM CDT -----  Contact: Case with [facility]   Case with [facility]     would like to be called back regarding  RX  Levothytoxine 152 mcg   can be reached at [phone]   Calm

## 2021-04-02 NOTE — PLAN OF CARE
0905 Injection administered, pt tolerated well; pt observed 15 minutes post injection, no complaints or concerns; discussed remaining well hydrated, and importance of mild daily activity (pt has declined home PT at present); discussed when to contact MD, when to report to ER; spouse declined AVS, next appt not yet scheduled, pt and spouse verbalized understanding of all discussed   normal appearance , no deformities

## 2021-08-10 NOTE — SUBJECTIVE & OBJECTIVE
Interval History:  Awake and alert, reported increasing post nasal drip. Add Singulair and Flonase    Review of Systems   Constitutional: Negative for fever.   HENT: Negative for congestion.    Respiratory: Negative for cough, shortness of breath and wheezing.    Cardiovascular: Positive for leg swelling. Negative for chest pain.   Gastrointestinal: Negative for abdominal distention, anal bleeding and blood in stool.   Genitourinary: Negative for dysuria and hematuria.   Musculoskeletal: Negative for arthralgias and gait problem.   Skin: Positive for color change and wound.   Neurological: Negative for syncope and light-headedness.   Psychiatric/Behavioral: Negative for behavioral problems, decreased concentration, sleep disturbance and suicidal ideas. The patient is not nervous/anxious.      Objective:     Vital Signs (Most Recent):  Temp: 96.5 °F (35.8 °C) (10/12/19 0508)  Pulse: 75 (10/12/19 0508)  Resp: 16 (10/12/19 0508)  BP: (!) 155/68 (10/12/19 0508)  SpO2: 98 % (10/12/19 0508) Vital Signs (24h Range):  Temp:  [96.5 °F (35.8 °C)-98.5 °F (36.9 °C)] 96.5 °F (35.8 °C)  Pulse:  [] 75  Resp:  [16-39] 16  SpO2:  [90 %-100 %] 98 %  BP: (107-155)/(55-71) 155/68     Weight: 70.9 kg (156 lb 4.9 oz)  Body mass index is 21.8 kg/m².    Intake/Output Summary (Last 24 hours) at 10/12/2019 0739  Last data filed at 10/11/2019 2138  Gross per 24 hour   Intake --   Output 400 ml   Net -400 ml      Physical Exam   Constitutional: He is oriented to person, place, and time. He appears well-developed and well-nourished.   HENT:   Head: Normocephalic and atraumatic.   Neck: Neck supple.   Cardiovascular: Normal rate, regular rhythm and normal heart sounds. Exam reveals no gallop and no friction rub.   No murmur heard.  Pulmonary/Chest: Effort normal. He has no wheezes. He exhibits no tenderness.   Abdominal: Soft. Bowel sounds are normal. He exhibits no distension. There is no tenderness.   Musculoskeletal: Normal range of  motion. He exhibits edema. He exhibits no tenderness.   Neurological: He is alert and oriented to person, place, and time.   Skin: Skin is warm. Capillary refill takes less than 2 seconds.   Psychiatric: He has a normal mood and affect.       Significant Labs:   A1C:   Recent Labs   Lab 07/16/19  0200   HGBA1C 5.6     ABGs: No results for input(s): PH, PCO2, HCO3, POCSATURATED, BE, TOTALHB, COHB, METHB, O2HB, POCFIO2 in the last 48 hours.  Blood Culture:   Recent Labs   Lab 10/11/19  0832 10/11/19  0854   LABBLOO No Growth to date No Growth to date     BMP:   Recent Labs   Lab 10/12/19  0406   GLU 80      K 5.0      CO2 26   BUN 26*   CREATININE 0.9   CALCIUM 9.6   MG 1.9     CBC:   Recent Labs   Lab 10/11/19  0819   WBC 7.62   HGB 12.4*   HCT 39.3*   *     Lactic Acid:   Recent Labs   Lab 10/11/19  0819   LACTATE 2.9*     TSH:   Recent Labs   Lab 07/15/19  0802   TSH 0.040*     Urine Culture: No results for input(s): LABURIN in the last 48 hours.  Urine Studies: No results for input(s): COLORU, APPEARANCEUA, PHUR, SPECGRAV, PROTEINUA, GLUCUA, KETONESU, BILIRUBINUA, OCCULTUA, NITRITE, UROBILINOGEN, LEUKOCYTESUR, RBCUA, WBCUA, BACTERIA, SQUAMEPITHEL, HYALINECASTS in the last 48 hours.    Invalid input(s): WRIGHTSUR    Significant Imaging: I have reviewed all pertinent imaging results/findings within the past 24 hours.   Moderna dose 1 and 2

## 2021-11-09 NOTE — ASSESSMENT & PLAN NOTE
Likely 2/2 shock liver in the setting of septic shock. Improving but will also check liver US, which was unremarkable.    69

## 2021-11-17 NOTE — PLAN OF CARE
Continued Stay Note  [facility]     Patient Name: Stephy Duncan  MRN: [id number]  Today's Date: 11/17/2021    Admit Date: 11/16/2021     Discharge Plan     Row Name 11/17/21 1509       Plan    Plan [facility]    Patient/Family in Agreement with Plan yes    Plan Comments Per Tha pt is accepted at [facility]. CCP to follow.               Discharge Codes    No documentation.                     REENA Culp     Problem: Occupational Therapy Goal  Goal: Occupational Therapy Goal  Goals to be met by: 8/15     Patient will increase functional independence with ADLs by performing:    Feeding with Modified [place].  UE Dressing with Set-up Assistance.  Grooming while seated with Modified [place] post setup.  Toileting from bedside commode with Minimal Assistance for hygiene and clothing management.   Sitting at edge of bed x15 minutes with Modified [place] and no drop in BP.  Rolling to Bilateral with Modified [place].   Supine to sit with Modified [place].  Toilet transfer to bedside commode with Minimal Assistance.  Upper extremity exercise program x10 reps per handout, with independence.     Outcome: Ongoing (interventions implemented as appropriate)  Patient with increased c/o fatigue and dizziness after having BM on toilet. Patient will benefit from continued skilled OT to address deficits and improve performance in functional ADL tasks. Cont OT.

## 2022-01-06 NOTE — ASSESSMENT & PLAN NOTE
Oliguria  Cr 1.0 > 1.7. UOP improving  -Nephrology consult   Problem: Mobility Impaired (Adult and Pediatric)  Goal: *Acute Goals and Plan of Care (Insert Text)  Description: FUNCTIONAL STATUS PRIOR TO ADMISSION: Patient was modified independent using a single point cane as needed for household mobility. HOME SUPPORT PRIOR TO ADMISSION: The patient lived with spouse who provides assist as needed. Physical Therapy Goals  Initiated 1/6/2022  1. Patient will move from supine to sit and sit to supine  in bed with independence within 7 day(s). 2.  Patient will transfer from bed to chair and chair to bed with modified independence using the least restrictive device within 7 day(s). 3.  Patient will perform sit to stand with modified independence within 7 day(s). 4.  Patient will ambulate with supervision/set-up for 25 feet with the least restrictive device within 7 day(s). 5.  Patient will ascend/descend 4 stairs with bilat handrail(s) with minimal assistance/contact guard assist within 7 day(s). Outcome: Not Met   PHYSICAL THERAPY EVALUATION  Patient: Ana María Soliz (89 y.o. female)  Date: 1/6/2022  Primary Diagnosis: Acute respiratory failure with hypoxia ([facility] [address] [J96.01]        Precautions:   Fall    ASSESSMENT  Based on the objective data described below, the patient presents with general weakness, decreased activity tolerance/ elevated HR with activity, decreased dynamic standing balance, gait dysfunction s/p admit with acute on chronic respiratory failure. Of note, pt with advanced HF, EF 15-20%; recently admitted and d/c'd on 12/13/2021. Attempted to self wean from O2 at home per pt report. Pt tolerated stand pivot transfer to/ from [facility] with CGA for balance. Noted HR 120s-155 with activity, therefore no further mobilization this date.  Pt will benefit from con't PT for mobility progression as tolerated in prep for return home with  [facility].    Current Level of Function Impacting Discharge (mobility/balance): bed mob supervision, transfers CGA    Other factors to consider for discharge: advanced CHF     Patient will benefit from skilled therapy intervention to address the above noted impairments. PLAN :  Recommendations and Planned Interventions: bed mobility training, transfer training, gait training, therapeutic exercises, patient and family training/education, and therapeutic activities      Frequency/Duration: Patient will be followed by physical therapy:  3 times a week to address goals. Recommendation for discharge: (in order for the patient to meet his/her long term goals)  Physical therapy at least 2 days/week in the home AND ensure assist and/or supervision for safety with mobility    This discharge recommendation:  Has not yet been discussed the attending provider and/or case management    IF patient discharges home will need the following DME: to be determined (TBD)         SUBJECTIVE:   Patient stated I got some bad news today; the doctor says my heart failure is end stage.     OBJECTIVE DATA SUMMARY:   HISTORY:    Past Medical History:   Diagnosis Date    Acquired hypothyroidism 8/15/2016    Anemia     RED-HF study    Asthma     Cardiomyopathy, nonischemic ([facility] [address]     initial dx 2001, bivHF 2008 with EF 15%, s/p biV-ICD 9/08, significant improvment in EF to 45-50%    CKD (chronic kidney disease)     Dr Binta Willams    CKD (chronic kidney disease) 8/15/2012    Depression     Diabetes ([facility] [address]     Diabetic neuropathy ([facility] [address]     DM (diabetes mellitus) ([facility] [address] 8/15/2012    GERD (gastroesophageal reflux disease)     Gout     Hypothyroidism     ICD (implantable cardioverter-defibrillator), biventricular, 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    Psoriasis      Past Surgical History:   Procedure Laterality Date    CARDIAC CATHETERIZATION  2007; 01/06/15    normal cors    ECHO 2D ADULT  4/2010    EF 45%, improved from 1/09 (25%)    ECHO 2D ADULT  11/2011    LVH, EF 55-60%    HX ORTHOPAEDIC      knee    HX PACEMAKER PLACEMENT      AICD    STRESS TEST LEXISCAN/CARDIOLITE  3/21/12    normal perfusion, global HK 40%       Personal factors and/or comorbidities impacting plan of care: good family support     Home Situation  Home Environment: Private residence  # Steps to Enter: 4  Rails to Enter: Yes  Hand Rails : Bilateral  One/Two Story Residence: One story  Living Alone: No  Support Systems: Spouse/Significant Other  Patient Expects to be Discharged to[de-identified] House  Current DME Used/Available at Home: Cane, straight  Tub or Shower Type: Shower    EXAMINATION/PRESENTATION/DECISION MAKING:   Critical Behavior:  Neurologic State: Alert  Orientation Level: Oriented X4  Cognition: Appropriate decision making,Appropriate for age attention/concentration,Appropriate safety awareness,Follows commands  Safety/Judgement: Decreased insight into deficits  Hearing:   Auditory  Auditory Impairment: None  Skin:  scattered dry/ scaly patches throughout (psoriasis)    Range Of Motion:  AROM: Generally decreased, functional                       Strength:    Strength: Generally decreased, functional                    Tone & Sensation:   Tone: Normal              Sensation: Impaired (h/o neuropathy)               Coordination:  Coordination: Generally decreased, functional  Vision:      Functional Mobility:  Bed Mobility:     Supine to Sit: Supervision  Sit to Supine: Supervision     Transfers:  Sit to Stand: Contact guard assistance  Stand to Sit: Contact guard assistance  Stand Pivot Transfers: Contact guard assistance (bed<->BSC)                    Balance:   Sitting: Intact  Standing: Impaired  Standing - Static: Good  Standing - Dynamic : Fair       Physical Therapy Evaluation Charge Determination   History Examination Presentation Decision-Making   HIGH Complexity :3+ comorbidities / personal factors will impact the outcome/ POC  MEDIUM Complexity : 3 Standardized tests and measures addressing body structure, function, activity limitation and / or participation in recreation  MEDIUM Complexity : Evolving with changing characteristics  LOW Complexity : FOTO score of       Based on the above components, the patient evaluation is determined to be of the following complexity level: LOW     Pain Rating:  No c/o pain    Activity Tolerance:   Poor, desaturates with exertion and requires oxygen, requires rest breaks, and observed SOB with activity    After treatment patient left in no apparent distress:   Call bell within reach, Side rails x 3, and sitting up in bed    COMMUNICATION/EDUCATION:   The patients plan of care was discussed with: Registered nurse. Fall prevention education was provided and the patient/caregiver indicated understanding., Patient/family have participated as able in goal setting and plan of care. , and Patient/family agree to work toward stated goals and plan of care.     Thank you for this referral.  Madai Maurer, PT   Time Calculation: 17 mins

## 2022-01-25 NOTE — PROCEDURES
Procedures       PROCEDURE NOTE:  Nasal endoscopy   Preprocedure diagnosis:  Chronic sinusitis  Postprocedure diangosis:  Same  Complications:  None  Blood Loss:  None    Procedure in detail:  After verbal consent was obtained, the patient's nasal cavity was anesthesized using topical 1%lidocaine and Neosynepherine.  A rigid 0 degree endoscope was placed in first the right, then the left nasal cavity.  The inferior and middle turbinates were examined, and found to be erythematous bilaterally.  The middle meatus and maxillary antrum was also examined, and found to have purulent rhinorrhea on the left; the right sinonasal cavity is improved.  No masses seen.  The patient tolerated the procedure well and there were no complications.       Posterior Auricular Interpolation Flap Text: A decision was made to reconstruct the defect utilizing an interpolation axial flap and a staged reconstruction.  A telfa template was made of the defect.  This telfa template was then used to outline the posterior auricular interpolation flap.  The donor area for the pedicle flap was then injected with anesthesia.  The flap was excised through the skin and subcutaneous tissue down to the layer of the underlying musculature.  The pedicle flap was carefully excised within this deep plane to maintain its blood supply.  The edges of the donor site were undermined.   The donor site was closed in a primary fashion.  The pedicle was then rotated into position and sutured.  Once the tube was sutured into place, adequate blood supply was confirmed with blanching and refill.  The pedicle was then wrapped with xeroform gauze and dressed appropriately with a telfa and gauze bandage to ensure continued blood supply and protect the attached pedicle.

## 2023-02-16 NOTE — PROGRESS NOTES
HPI     Post-op Evaluation    Additional comments: s/p mohs repair (fabi)           Comments   No pain. Using TD drops       Last edited by Betty Hamilton on 3/14/2017  3:18 PM. (History)            Assessment /Plan     For exam results, see Encounter Report.    Post-operative state    Basal cell carcinoma of eyelid, right    S/p right lower eyelid reconstruction with placement of mini monoka stent on 2/8/17.  Patient with medial notch adjacent to mini monoka stent.  Will leave stent in place for at least three months for removal.  If patient bothered by appearance of notch will consider revision at that time. Patient comfortable at this time.  Stop Tobradex gtts.                             Erythromycin Counseling:  I discussed with the patient the risks of erythromycin including but not limited to GI upset, allergic reaction, drug rash, diarrhea, increase in liver enzymes, and yeast infections.

## 2023-05-31 NOTE — PROGRESS NOTES
Chief Complaint   Patient presents with    Sinus Problem    Sinusitis     still feels the same   .    HPI:     Thierry Ramos Jr. is a 85 y.o. male who presents for evaluation of chronic sinusitis for several years.  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  He is currently on IV IG every month for this.  He has had bilateral FESS of all sinuses by Dr. Dsouza in 2014.  He reports a several month history of several month history of pain and pressure in the forehead,  nasal congestion and postnasal drip. He notes thick purulent discharge bilaterally. He does use sinus rinses or nasal sprays. He admits to midface pain and pressure.  He admits to rhinorrhea and postnasal drip. There is not maxillary tooth pain. He  admits to headaches.  He has been on a recent 10 day course of Augmentin and felt like this helped somewhat.     Interval HPI 2/15/2019:  Mr. Ramos follows up today for CRS.  He reports continued nasal congestion and post-nasal drip. He feels that the compounded nasal saline nebulizer is no longer working.   He reports that the rhinorrhea and post-nasal drip.  He notes increased thick discharge bilaterally. He stopped the compounded nebulizer and is now only using  Zyrtec.       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    Hyperlipidemia     Hypertension     Hypopituitarism     Hypothyroid     Immunosuppression 3/13/2015    Squamous Cell Carcinoma     on the right side of the face     Squamous cell carcinoma of skin of right temple 1/27/2016    Stroke     Syncope 2/12    Unspecified disorder of kidney and ureter      Social History     Socioeconomic History    Marital status:      Spouse name: Not on file    Number of children: Not on file     "Years of education: Not on file    Highest education level: Not on file   Social Needs    Financial resource strain: Not on file    Food insecurity - worry: Not on file    Food insecurity - inability: Not on file    Transportation needs - medical: Not on file    Transportation needs - non-medical: Not on file   Occupational History    Occupation: retired   Tobacco Use    Smoking status: Never Smoker    Smokeless tobacco: Never Used   Substance and Sexual Activity    Alcohol use: Yes     Alcohol/week: 1.2 oz     Types: 1 Glasses of wine, 1 Cans of beer per week     Comment: "rarely"    Drug use: No    Sexual activity: Not Currently   Other Topics Concern    Not on file   Social History Narrative    He is .     Past Surgical History:   Procedure Laterality Date    (ROTATION) FLAP N/A 3/23/2015    [facility] 2[facility]    BIOPSY-LYMPH NODE N/A 3/23/2015    [facility] 2ND FLR    CAROTID ENDARTERECTOMY (L)  2/27/2007    COLONOSCOPY N/A 2/25/2014    Performed by DELORES Lambert [facility] at [facility] ENDO (4TH FLR)    EXCISION TURBINATE, SUBMUCOUS      EXCISION-LESION-FACE Right 2/24/2016    [facility]    EXCISION-LESION-FACE N/A 3/23/2015    [facility]    FESS  9/16/2014    FULL THICKNESS SKIN GRAFT N/A 2/24/2016    [facility]    MAPPING-LYMPH NODE N/A 3/23/2015    [facility]    MAPPING-SENTINEL NODE N/A 2/24/2016    [facility]    Mohs excision   3/23/2015, 2/24/2016    combined with WLE forehead    NASAL SEPTUM SURGERY  9/16/2014    REPAIR-MOHS DEFECT-left nose Left 3/31/2017    [facility]    RESECTION-TURBINATES (SMR) Bilateral 9/16/2014    [facility] "    right ureter surgery  1995    SENTINEL LYMPH NODE BIOPSY  3/23/2015, 2/24/2016    SEPTOPLASTY Bilateral 9/16/2014    [facility] 2ND F[facility]    SINUS SURGERY  9/16/2014    septo, ESS, BITSMR    SINUS SURGERY FUNCTIONAL ENDOSCOPIC WITH NAVIGATION with bilateral maxillary, ethmoids, sphenoids, right frontal, possible left NF duct-balloons Bilateral 9/16/2014    Per[facility] at [facility] OR 2ND FLR    Transphenoidal surgery       Family History   Problem Relation Age of Onset    Cancer Mother         breast    Colon cancer Father     Cancer Father         colon    Cancer Brother         leukemia    No Known Problems Sister     No Known Problems Maternal Aunt     No Known Problems Maternal Uncle     No Known Problems Paternal Aunt     No Known Problems Paternal Uncle     No Known Problems Maternal Grandmother     No Known Problems Maternal Grandfather     No Known Problems Paternal Grandmother     No Known Problems Paternal Grandfather     Amblyopia Neg Hx     Blindness Neg Hx     Cataracts Neg Hx     Diabetes Neg Hx     Glaucoma Neg Hx     Hypertension Neg Hx     Macular degeneration Neg Hx     Retinal detachment Neg Hx     Strabismus Neg Hx     Stroke Neg Hx     Thyroid disease Neg Hx     Allergic rhinitis Neg Hx     Allergies Neg Hx     Angioedema Neg Hx     Asthma Neg Hx     Atopy Neg Hx     Eczema Neg Hx     Immunodeficiency Neg Hx     Rhinitis Neg Hx     Urticaria Neg Hx            Review of Systems  General: negative for chills, fever or weight loss  Psychological: negative for mood changes or depression  Ophthalmic: negative for blurry vision, photophobia or eye pain  ENT: see HPI  Respiratory: no cough, shortness of breath, or wheezing  Cardiovascular: no chest pain or dyspnea on exertion  Gastrointestinal: no abdominal pain, change in bowel habits, or black/ bloody stools  Musculoskeletal: negative for gait disturbance  or muscular weakness  Neurological: no syncope or seizures; no ataxia  Dermatological: negative for puritis,  rash and jaundice  Hematologic/lymphatic: no easy bruising, no new lumps or bumps      Physical Exam:    Vitals:    02/15/19 0945   BP: 123/64   Pulse: 66       Constitutional: Well appearing / communicating without difficutly.  NAD.  Eyes: EOM I Bilaterally  Head/Face: Normocephalic.  Negative paranasal sinus pressure/tenderness.  Salivary glands WNL.  House Brackmann I Bilaterally.    Right Ear: Auricle normal appearance. External Auditory Canal within normal limits,TM w/o masses/lesions/perforations. TM mobility noted.   Left Ear: Auricle normal appearance. External Auditory Canal WNL,TM w/o masses/lesions/perforations. TM mobility noted.  Nose:+ bilateral crusting. No gross nasal septal deviation. Inferior Turbinates 3+ bilaterally. No septal perforation. No masses/lesions. External nasal skin appears normal without masses/lesions.  Oral Cavity: Gingiva/lips within normal limits.  Dentition/gingiva healthy appearing. Mucus membranes moist. Floor of mouth soft, no masses palpated. Oral Tongue mobile. Hard Palate appears normal.    Oropharynx: Base of tongue appears normal. No masses/lesions noted. Tonsillar fossa/pharyngeal wall without lesions. Posterior oropharynx WNL.  Soft palate without masses. Midline uvula.   Neck/Lymphatic: No LAD I-VI bilaterally.  No thyromegaly.  No masses noted on exam.    Mirror laryngoscopy/nasopharyngoscopy: Active gag reflex.  Unable to perform.    Neuro/Psychiatric: AOx3.  Normal mood and affect.   Cardiovascular: Normal carotid pulses bilaterally, no increasing jugular venous distention noted at cervical region bilaterally.    Respiratory: Normal respiratory effort, no stridor, no retractions noted.      See separate procedure note for nasal endoscopy.    Culture result 9/20/2018:   Aerobic Bacterial Culture SERRATIA MARCESCENS   Moderate   No other significant isolate        Resulting Agency [facility]   Susceptibility      Serratia marcescens     CULTURE, AEROBIC  (SPECIFY SOURCE)     Cefepime <=8 mcg/mL Sensitive     Ceftriaxone <=8 mcg/mL Sensitive     Ciprofloxacin <=1 mcg/mL Sensitive     Ertapenem <=2 mcg/mL Sensitive     Gentamicin <=4 mcg/mL Sensitive     Meropenem <=4 mcg/mL Sensitive     Piperacillin/Tazo <=16 mcg/mL Sensitive     Tobramycin <=4 mcg/mL Sensitive     Trimeth/Sulfa <=2/38 mcg/mL Sensitive             Assessment:    ICD-10-CM ICD-9-CM    1. Other chronic sinusitis J32.8 473.8 Aerobic culture      Culture, Anaerobic   2. Immunosuppression D89.9 279.9    3. Hypertrophy of inferior nasal turbinate J34.3 478.0      The primary encounter diagnosis was Other chronic sinusitis. Diagnoses of Immunosuppression and Hypertrophy of inferior nasal turbinate were also pertinent to this visit.      Plan:  Orders Placed This Encounter   Procedures    Aerobic culture    Culture, Anaerobic     CRS: Not improved. Culture obtained from middle meatus. Start Cipro 500mg PO BID for 14 days.  Follow up in 6 weeks for recheck.     Alicia Truong MD   [FreeTextEntry1] : -CV and pulmonary status stable\par -Patient advised to continue with medication regimen as directed \par -Medication education discussed in full detail with + teach back. \par -Encouraged verbalization of fears and concerns. \par -Report all symptoms not relieved by rest or medication\par -Educated on monitoring blood pressure\par -Maintain Balanced diet \par -Exercise as appropriate\par -Patient educated on s/s of when to call medical providers with + teach back. \par -Reminded of NP role and advised to call with any questions/concerns. \par -Follow up with medical providers as scheduled\par \par - Patient verbalized understanding of plan above, advised to call call TCM Team or CCC with any questions or concerns.\par

## 2024-04-24 NOTE — PROGRESS NOTES
No care due was identified.  [facility] Embedded Care Due Messages. Reference number: [id number].   4/24/2024 5:42:32 AM CDT   Subjective:       Patient ID: Thierry Ramos Jr. is a 85 y.o. male.    Chief Complaint: No chief complaint on file.    Heme/Onc history:  Mr. Ramos has a history of CLL diagnosed in April 2012 by Dr Joaquin Wooten. In April 2013, there was an abrupt rise in his white blood cell count to 93,000 and he had a modest number of prolymphocytes in his peripheral smear.  He had generalized urticaria, which was thought to be paraneoplastic.  At that time, he was treated with 5 courses of fludarabine, cyclophosphamide and Rituxan.  The fludarabine doses were decreased somewhat because of mild renal impairment.  The sixth treatment was with Rituxan alone because he developed thrombocytopenia, which has persisted in varying degrees since then.     His major hematologic problem and much of his treatment since 2013, has been related to thrombocytopenia.  It became more severe in early 2016, at a time when he was taking both aspirin and Plavix.  A bone marrow examination had no evidence of myelodysplasia and 35% of the bone marrow cells were small lymphocytes.  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  The FISH studies for myelodysplastic disorders were negative. He was treated for possible immune thrombocytopenia with prednisone 60 mg daily for 2 weeks, but there was no improvement in his platelet count.  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     Subsequently, because of continued thrombocytopenia,  recommended treatment with ofatumumab, which he began in late June 2016.  At that time, his platelet count was 57,000.  He generally tolerated the drug well and the frequency of administration was gradually reduced over a period of more than 2 years.  Platelet counts during most of those 2 years were between 66,000-117,000.  The drug was discontinued in early November 2018.     He has had many  episodes of infection in the past, particularly recurrent sinusitis and he has had several surgical procedures on his sinuses.  Because of this, he has been receiving intravenous immunoglobulin monthly for years and the frequency of pneumonia and severe sinusitis has diminished. Most recent dose of IVIG 4/2019.      After a pituitary surgery for a tumor in 2002, he experienced a TIA and then had 2 strokes.  He has had many nonmelanoma skin cancers excised.     He asked to be seen earlier than planned by Dr. Wooten in January because he was concerned about his recent blood counts.  His platelet count has declined modestly from the range of 66,000-107,000 seen in the past 9 months to a current value of 51,000.  His white blood cell count remains normal and his lymphocyte percentage     He was started on a low dose ibrutinib 280 mg rather than 420mg due to concern for treatment related- further decrease in platelet count while on aspirin and plavix.     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 Left arm is bandaged due to skin fragility causing oozing wounds.     Today:  Patient presents prior to cycle 1 of rituxan infusion. He continues to be very weak with fatigue. He was seen by hepatology on 8/19/19 for positive hep B core & surface antibodies on 8/6/19. He was started on tenofavir on 8/21/19, denies any adverse effects from that medication today. Denies fevers, chills, chest pain, n/v/d/c. He continues with shortness of breath, although states small improvements each day.    HPI  Review of Systems   Constitutional: Positive for fatigue. Negative  for chills, diaphoresis and fever.   HENT: Positive for postnasal drip and rhinorrhea (d/t chronic sinus issues). Negative for congestion, ear pain, mouth sores, nosebleeds, sinus pain, sore throat and trouble swallowing.    Eyes: Negative for pain, redness and visual disturbance.   Respiratory: Positive for shortness of breath. Negative for cough, chest tightness, wheezing and stridor.    Cardiovascular: Positive for leg swelling. Negative for chest pain and palpitations.   Gastrointestinal: Negative for abdominal distention, abdominal pain, blood in stool, constipation, diarrhea, nausea and vomiting.   Genitourinary: Negative for hematuria.   Musculoskeletal: Negative for arthralgias and back pain.   Skin: Negative for rash.        Discoloration/darking of bilateral arms   Allergic/Immunologic: Negative for environmental allergies, food allergies and immunocompromised state.   Neurological: Positive for weakness. Negative for dizziness, syncope, light-headedness, numbness and headaches.   Hematological: Negative for adenopathy. Bruises/bleeds easily.   Psychiatric/Behavioral: Negative.  Negative for confusion.       Objective:      Physical Exam   Constitutional: He is oriented to person, place, and time. He appears well-developed and well-nourished.   HENT:   Head: Normocephalic and atraumatic.   Right Ear: External ear normal.   Left Ear: External ear normal.   Mouth/Throat: Oropharynx is clear and moist.   Eyes: Conjunctivae and EOM are normal. No scleral icterus.   Neck: Normal range of motion. Neck supple.   Cardiovascular: Normal rate and intact distal pulses.   Pulmonary/Chest: Effort normal. No respiratory distress. He has wheezes (left lobe).   Fine crackles throughout   Abdominal: Soft. Bowel sounds are normal. He exhibits no distension. There is no tenderness.   Musculoskeletal: Normal range of motion. He exhibits edema (+2-3 BLE edema).   +2-3 pitting edema to BLE   Neurological: He is alert and  oriented to person, place, and time. No cranial nerve deficit.   Skin: Skin is warm and dry. Ecchymosis noted. There is erythema.   Fragile skin with generalized ecchymosis to BUE   Psychiatric: He has a normal mood and affect. His behavior is normal. Judgment and thought content normal.   Vitals reviewed.      Assessment:       1. Acute ITP    2. CLL (chronic lymphocytic leukemia)    3. Pulmonary HTN    4. Aortic atherosclerosis    5. Hepatitis B core antibody positive    6. Transaminitis    7. Edema of both legs        Plan:       CLL/ITP  Ibrutinib 280mg daily has been stopped due to side effects of weakness and bruising.  Developed worsening ITP since stopping therapy (has had chronic low platelets since FCR treatment in 2013)  mild response to steroids and platelets improved/stable at 38K on prednisone 20mg daily which will continue for now   IVIG received in July 2019  Plan per Dr. Marr to start Rituximab 375mg/m2 weekly for 4 weeks today then consider maintenance Rituxan every 8 weeks pending response. Today is Cycle 1  Plan to taper steroids pending response to Rituxan back to baseline of prednisone 7.5mg daily  Discussed possible port or PICC line placement if unable to obtain IV due to difficult stick; pt expressed verbal discontent with idea at this time    Hepatitis B core antibody +/Transaminitis  Serology checked 8/6 in preparation for Rituxan therapy  Hep B surface antigen negative however Hep B core antibody + (was negative in 2013 and 2016), Hep B surface antibody +  Seen by hepatology on 8/19/19   Liver function continues to improve; tbili normal (previously 1.7); alk phos 208 (330 previously), AST 43 (previously 85), and ALT 48 (previously 79)  Per hepatology, Marylistepan daily (received and started 8/21/19)  -- recommend LFT and HBV DNA monitoring with Rituxan infusions  -- will need to continue at least 12 months post d/c of Rituxan   -- U/S in 6 months with AFP with f/u    Pulmonary HTN  Noted on  TTE-7/19/2019--Pulmonary hypertension present.  The estimated PA systolic pressure is 41 mm Hg    On Cozaar 25 mg daily  Followed by Cardiology    Aortic atherosclerosis  Noted per chest xray  On ASA and plavix however on hold due to platelet count <50,000    BLE edema  - continue daily lasix and spironolactone    Follow-up: Labs (CBC, CMP, uric acid, HBV DNA) & appointment with NP or Dr. Marr prior to scheduled rituxan infusion on 8/30    Tanisha Barreto NP  Hematology/Oncology/BMT

## 2024-05-27 NOTE — PROGRESS NOTES
"Subjective:       Patient ID: Thierry Ramos Jr. is a 84 y.o. male.    Vitals:  height is 5' 11" (1.803 m) and weight is 68 kg (150 lb). His oral temperature is 97.5 °F (36.4 °C). His blood pressure is 140/64 (abnormal) and his pulse is 63. His respiration is 19 and oxygen saturation is 98%.     Chief Complaint: Laceration (L wrist)    Laceration    The incident occurred 12 to 24 hours ago (12pm yesterday). The laceration is located on the left arm. The laceration is 4 cm in size. Injury mechanism: door edge. The pain is at a severity of 7/10. The pain is moderate. He reports no foreign bodies present. His tetanus status is UTD.       Constitution: Negative for chills, fatigue and fever.   HENT: Negative for congestion and sore throat.    Neck: Negative for painful lymph nodes.   Cardiovascular: Negative for chest pain and leg swelling.   Eyes: Negative for double vision and blurred vision.   Respiratory: Negative for cough and shortness of breath.    Gastrointestinal: Negative for nausea, vomiting and diarrhea.   Genitourinary: Negative for dysuria, frequency and urgency.   Musculoskeletal: Negative for joint pain, joint swelling, muscle cramps and muscle ache.   Skin: Positive for laceration. Negative for color change, pale and rash.   Allergic/Immunologic: Negative for seasonal allergies.   Neurological: Negative for dizziness, history of vertigo, light-headedness, passing out and headaches.   Hematologic/Lymphatic: Negative for swollen lymph nodes, easy bruising/bleeding and history of blood clots. Does not bruise/bleed easily.   Psychiatric/Behavioral: Negative for nervous/anxious, sleep disturbance and depression. The patient is not nervous/anxious.        Objective:      Physical Exam   Constitutional: He appears well-developed and well-nourished.   HENT:   Head: Normocephalic and atraumatic.   Cardiovascular: Intact distal pulses.   Musculoskeletal: Normal range of motion.   Neurological:   Motor and " sensory intact L upper ext   Skin:   6 cm in diameter skin tear dorsal aspect L forearm   Nursing note and vitals reviewed.      Assessment:       1. Skin tear of left forearm without complication, initial encounter        Plan:         Skin tear of left forearm without complication, initial encounter  -     mupirocin (BACTROBAN) 2 % ointment; Apply to affected area 2 times daily  Dispense: 22 g; Refill: 1          [place]

## 2024-10-09 NOTE — ASSESSMENT & PLAN NOTE
-continue oral steroid   Please continue to exercise and follow a good diet  Please continue to take all medications as prescribed      Please call the office for any questions or concerns! Have a great week!

## 2024-12-22 NOTE — Clinical Note
Return to clinic in 1 week with CMP, CBC, and appt with NP Bed/Stretcher in lowest position, wheels locked, appropriate side rails in place/Call bell, personal items and telephone in reach/Instruct patient to call for assistance before getting out of bed/chair/stretcher/Non-slip footwear applied when patient is off stretcher/Starlight to call system/Physically safe environment - no spills, clutter or unnecessary equipment/Purposeful proactive rounding/Room/bathroom lighting operational, light cord in reach

## (undated) DEVICE — SEE MEDLINE ITEM 157194

## (undated) DEVICE — SEE MEDLINE ITEM 157128

## (undated) DEVICE — PENCIL ROCKER SWITCH 10FT CORD

## (undated) DEVICE — SEE MEDLINE ITEM 146372

## (undated) DEVICE — SEE MEDLINE ITEM 157117

## (undated) DEVICE — ELECTRODE NEEDLE 2.8IN

## (undated) DEVICE — SUT ETHILON 6-0.PLAIN

## (undated) DEVICE — SEE MEDLINE ITEM 152622

## (undated) DEVICE — SEE MEDLINE ITEM 152487

## (undated) DEVICE — SEE MEDLINE ITEM 146373

## (undated) DEVICE — TRAY ENT 4/CS

## (undated) DEVICE — GAUZE SPONGE 4X4 12PLY

## (undated) DEVICE — ELECTRODE REM PLYHSV RETURN 9

## (undated) DEVICE — DRESSING TELFA PAD N ADH 2X3

## (undated) DEVICE — BLADE SURGICAL 15C

## (undated) DEVICE — NDL HYPO A BEVEL 30X1/2

## (undated) DEVICE — SKINMARKER & RULER REGULAR X-F

## (undated) DEVICE — DRAPE STERI-DRAPE 1000 17X11IN

## (undated) DEVICE — SUT SILK 6-0 BLK BR P-1 P-1

## (undated) DEVICE — SHEET EENT SPLIT

## (undated) DEVICE — SEE MEDLINE ITEM 146313

## (undated) DEVICE — GOWN SURGICAL X-LARGE

## (undated) DEVICE — COVER LIGHT HANDLE 80/CA